# Patient Record
Sex: FEMALE | Race: BLACK OR AFRICAN AMERICAN | Employment: FULL TIME | URBAN - METROPOLITAN AREA
[De-identification: names, ages, dates, MRNs, and addresses within clinical notes are randomized per-mention and may not be internally consistent; named-entity substitution may affect disease eponyms.]

---

## 2017-02-25 ENCOUNTER — HOSPITAL ENCOUNTER (EMERGENCY)
Age: 42
Discharge: HOME | End: 2017-02-25
Attending: EMERGENCY MEDICINE | Admitting: EMERGENCY MEDICINE
Payer: MEDICAID

## 2017-02-25 ENCOUNTER — APPOINTMENT (OUTPATIENT)
Dept: CT IMAGING | Age: 42
End: 2017-02-25
Attending: PHYSICIAN ASSISTANT
Payer: MEDICAID

## 2017-02-25 VITALS
HEIGHT: 66 IN | SYSTOLIC BLOOD PRESSURE: 167 MMHG | OXYGEN SATURATION: 91 % | DIASTOLIC BLOOD PRESSURE: 102 MMHG | BODY MASS INDEX: 29.41 KG/M2 | HEART RATE: 76 BPM | TEMPERATURE: 97.5 F | RESPIRATION RATE: 20 BRPM | WEIGHT: 183 LBS

## 2017-02-25 DIAGNOSIS — N39.0 URINARY TRACT INFECTION WITHOUT HEMATURIA, SITE UNSPECIFIED: ICD-10-CM

## 2017-02-25 DIAGNOSIS — R11.2 NAUSEA AND VOMITING, INTRACTABILITY OF VOMITING NOT SPECIFIED, UNSPECIFIED VOMITING TYPE: ICD-10-CM

## 2017-02-25 DIAGNOSIS — K85.90 ACUTE PANCREATITIS, UNSPECIFIED COMPLICATION STATUS, UNSPECIFIED PANCREATITIS TYPE: Primary | ICD-10-CM

## 2017-02-25 DIAGNOSIS — R19.7 DIARRHEA, UNSPECIFIED TYPE: ICD-10-CM

## 2017-02-25 LAB
ALBUMIN SERPL BCP-MCNC: 2.9 G/DL (ref 3.4–5)
ALBUMIN/GLOB SERPL: 0.6 {RATIO} (ref 0.8–1.7)
ALP SERPL-CCNC: 99 U/L (ref 45–117)
ALT SERPL-CCNC: 85 U/L (ref 13–56)
ANION GAP BLD CALC-SCNC: 9 MMOL/L (ref 3–18)
APPEARANCE UR: ABNORMAL
AST SERPL W P-5'-P-CCNC: 225 U/L (ref 15–37)
BACTERIA URNS QL MICRO: ABNORMAL /HPF
BASOPHILS # BLD AUTO: 0 K/UL (ref 0–0.06)
BASOPHILS # BLD: 1 % (ref 0–2)
BILIRUB SERPL-MCNC: 0.3 MG/DL (ref 0.2–1)
BILIRUB UR QL: NEGATIVE
BUN SERPL-MCNC: 5 MG/DL (ref 7–18)
BUN/CREAT SERPL: 6 (ref 12–20)
CALCIUM SERPL-MCNC: 7.9 MG/DL (ref 8.5–10.1)
CHLORIDE SERPL-SCNC: 105 MMOL/L (ref 100–108)
CO2 SERPL-SCNC: 26 MMOL/L (ref 21–32)
COLOR UR: YELLOW
CREAT SERPL-MCNC: 0.79 MG/DL (ref 0.6–1.3)
DIFFERENTIAL METHOD BLD: ABNORMAL
EOSINOPHIL # BLD: 0.1 K/UL (ref 0–0.4)
EOSINOPHIL NFR BLD: 2 % (ref 0–5)
EPITH CASTS URNS QL MICRO: ABNORMAL /LPF (ref 0–5)
ERYTHROCYTE [DISTWIDTH] IN BLOOD BY AUTOMATED COUNT: 20.9 % (ref 11.6–14.5)
GLOBULIN SER CALC-MCNC: 4.9 G/DL (ref 2–4)
GLUCOSE SERPL-MCNC: 110 MG/DL (ref 74–99)
GLUCOSE UR STRIP.AUTO-MCNC: NEGATIVE MG/DL
HCT VFR BLD AUTO: 37.6 % (ref 35–45)
HGB BLD-MCNC: 12.4 G/DL (ref 12–16)
HGB UR QL STRIP: ABNORMAL
KETONES UR QL STRIP.AUTO: ABNORMAL MG/DL
LEUKOCYTE ESTERASE UR QL STRIP.AUTO: ABNORMAL
LIPASE SERPL-CCNC: 978 U/L (ref 73–393)
LYMPHOCYTES # BLD AUTO: 35 % (ref 21–52)
LYMPHOCYTES # BLD: 1.5 K/UL (ref 0.9–3.6)
MCH RBC QN AUTO: 30.3 PG (ref 24–34)
MCHC RBC AUTO-ENTMCNC: 33 G/DL (ref 31–37)
MCV RBC AUTO: 91.9 FL (ref 74–97)
MONOCYTES # BLD: 0.4 K/UL (ref 0.05–1.2)
MONOCYTES NFR BLD AUTO: 10 % (ref 3–10)
NEUTS SEG # BLD: 2.3 K/UL (ref 1.8–8)
NEUTS SEG NFR BLD AUTO: 52 % (ref 40–73)
NITRITE UR QL STRIP.AUTO: POSITIVE
PH UR STRIP: 5 [PH] (ref 5–8)
PLATELET # BLD AUTO: 284 K/UL (ref 135–420)
PMV BLD AUTO: 8.8 FL (ref 9.2–11.8)
POTASSIUM SERPL-SCNC: 3.8 MMOL/L (ref 3.5–5.5)
PROT SERPL-MCNC: 7.8 G/DL (ref 6.4–8.2)
PROT UR STRIP-MCNC: NEGATIVE MG/DL
RBC # BLD AUTO: 4.09 M/UL (ref 4.2–5.3)
RBC #/AREA URNS HPF: ABNORMAL /HPF (ref 0–5)
SODIUM SERPL-SCNC: 140 MMOL/L (ref 136–145)
SP GR UR REFRACTOMETRY: 1.02 (ref 1–1.03)
UROBILINOGEN UR QL STRIP.AUTO: 1 EU/DL (ref 0.2–1)
WBC # BLD AUTO: 4.4 K/UL (ref 4.6–13.2)
WBC URNS QL MICRO: ABNORMAL /HPF (ref 0–5)

## 2017-02-25 PROCEDURE — 85025 COMPLETE CBC W/AUTO DIFF WBC: CPT | Performed by: EMERGENCY MEDICINE

## 2017-02-25 PROCEDURE — 87045 FECES CULTURE AEROBIC BACT: CPT | Performed by: EMERGENCY MEDICINE

## 2017-02-25 PROCEDURE — 93005 ELECTROCARDIOGRAM TRACING: CPT

## 2017-02-25 PROCEDURE — 83690 ASSAY OF LIPASE: CPT

## 2017-02-25 PROCEDURE — 96374 THER/PROPH/DIAG INJ IV PUSH: CPT

## 2017-02-25 PROCEDURE — 81001 URINALYSIS AUTO W/SCOPE: CPT | Performed by: EMERGENCY MEDICINE

## 2017-02-25 PROCEDURE — 74011250636 HC RX REV CODE- 250/636: Performed by: PHYSICIAN ASSISTANT

## 2017-02-25 PROCEDURE — 74011250637 HC RX REV CODE- 250/637: Performed by: PHYSICIAN ASSISTANT

## 2017-02-25 PROCEDURE — 80053 COMPREHEN METABOLIC PANEL: CPT | Performed by: EMERGENCY MEDICINE

## 2017-02-25 PROCEDURE — 74176 CT ABD & PELVIS W/O CONTRAST: CPT

## 2017-02-25 PROCEDURE — 99285 EMERGENCY DEPT VISIT HI MDM: CPT

## 2017-02-25 PROCEDURE — 96361 HYDRATE IV INFUSION ADD-ON: CPT

## 2017-02-25 RX ORDER — CEPHALEXIN 250 MG/1
500 CAPSULE ORAL
Status: COMPLETED | OUTPATIENT
Start: 2017-02-25 | End: 2017-02-25

## 2017-02-25 RX ORDER — ONDANSETRON 4 MG/1
4 TABLET, ORALLY DISINTEGRATING ORAL
Qty: 30 TAB | Refills: 0 | Status: SHIPPED | OUTPATIENT
Start: 2017-02-25 | End: 2017-03-14

## 2017-02-25 RX ORDER — CEPHALEXIN 500 MG/1
500 CAPSULE ORAL 2 TIMES DAILY
Qty: 14 CAP | Refills: 0 | Status: SHIPPED | OUTPATIENT
Start: 2017-02-25 | End: 2017-03-04

## 2017-02-25 RX ORDER — ONDANSETRON 2 MG/ML
4 INJECTION INTRAMUSCULAR; INTRAVENOUS
Status: COMPLETED | OUTPATIENT
Start: 2017-02-25 | End: 2017-02-25

## 2017-02-25 RX ADMIN — SODIUM CHLORIDE 1000 ML: 900 INJECTION, SOLUTION INTRAVENOUS at 13:41

## 2017-02-25 RX ADMIN — SODIUM CHLORIDE 1000 ML: 900 INJECTION, SOLUTION INTRAVENOUS at 14:30

## 2017-02-25 RX ADMIN — CEPHALEXIN 500 MG: 250 CAPSULE ORAL at 13:41

## 2017-02-25 RX ADMIN — ONDANSETRON 4 MG: 2 INJECTION INTRAMUSCULAR; INTRAVENOUS at 10:24

## 2017-02-25 RX ADMIN — SODIUM CHLORIDE 1000 ML: 900 INJECTION, SOLUTION INTRAVENOUS at 11:59

## 2017-02-25 NOTE — DISCHARGE INSTRUCTIONS
Urinary Tract Infection in Women: Care Instructions  Your Care Instructions    A urinary tract infection, or UTI, is a general term for an infection anywhere between the kidneys and the urethra (where urine comes out). Most UTIs are bladder infections. They often cause pain or burning when you urinate. UTIs are caused by bacteria and can be cured with antibiotics. Be sure to complete your treatment so that the infection goes away. Follow-up care is a key part of your treatment and safety. Be sure to make and go to all appointments, and call your doctor if you are having problems. It's also a good idea to know your test results and keep a list of the medicines you take. How can you care for yourself at home? · Take your antibiotics as directed. Do not stop taking them just because you feel better. You need to take the full course of antibiotics. · Drink extra water and other fluids for the next day or two. This may help wash out the bacteria that are causing the infection. (If you have kidney, heart, or liver disease and have to limit fluids, talk with your doctor before you increase your fluid intake.)  · Avoid drinks that are carbonated or have caffeine. They can irritate the bladder. · Urinate often. Try to empty your bladder each time. · To relieve pain, take a hot bath or lay a heating pad set on low over your lower belly or genital area. Never go to sleep with a heating pad in place. To prevent UTIs  · Drink plenty of water each day. This helps you urinate often, which clears bacteria from your system. (If you have kidney, heart, or liver disease and have to limit fluids, talk with your doctor before you increase your fluid intake.)  · Consider adding cranberry juice to your diet. · Urinate when you need to. · Urinate right after you have sex. · Change sanitary pads often. · Avoid douches, bubble baths, feminine hygiene sprays, and other feminine hygiene products that have deodorants.   · After going to the bathroom, wipe from front to back. When should you call for help? Call your doctor now or seek immediate medical care if:  · Symptoms such as fever, chills, nausea, or vomiting get worse or appear for the first time. · You have new pain in your back just below your rib cage. This is called flank pain. · There is new blood or pus in your urine. · You have any problems with your antibiotic medicine. Watch closely for changes in your health, and be sure to contact your doctor if:  · You are not getting better after taking an antibiotic for 2 days. · Your symptoms go away but then come back. Where can you learn more? Go to http://sandra-abel.info/. Enter S194 in the search box to learn more about \"Urinary Tract Infection in Women: Care Instructions. \"  Current as of: August 12, 2016  Content Version: 11.1  © 5883-3958 [employer]. Care instructions adapted under license by [employer] (which disclaims liability or warranty for this information). If you have questions about a medical condition or this instruction, always ask your healthcare professional. [place] [address] any warranty or liability for your use of this information. Pancreatitis: Care Instructions  Your Care Instructions    The pancreas is an organ behind the stomach. It makes hormones and enzymes to help your body digest food. But if these enzymes attack the pancreas, it can get inflamed. This is called pancreatitis. Most cases are caused by gallstones or by heavy alcohol use. If you take care of yourself at home, it will help you get better. It will also help you avoid more problems with your pancreas. Follow-up care is a key part of your treatment and safety. Be sure to make and go to all appointments, and call your doctor if you are having problems. It's also a good idea to know your test results and keep a list of the medicines you take.   How can you care for yourself at home? · Drink clear liquids and eat bland foods until you feel better. [place] foods include rice, dry toast, and crackers. They also include bananas and applesauce. · Eat a low-fat diet until your doctor says your pancreas is healed. · Do not drink alcohol. Tell your doctor if you need help to quit. Counseling, support groups, and sometimes medicines can help you stay sober. · Be safe with medicines. Read and follow all instructions on the label. ¨ If the doctor gave you a prescription medicine for pain, take it as prescribed. ¨ If you are not taking a prescription pain medicine, ask your doctor if you can take an over-the-counter medicine. · If your doctor prescribed antibiotics, take them as directed. Do not stop taking them just because you feel better. You need to take the full course of antibiotics. · Get extra rest until you feel better. To prevent future problems with your pancreas  · Do not drink alcohol. · Tell your doctors and pharmacist that you've had pancreatitis. They can help you avoid medicines that may cause this problem again. When should you call for help? Call your doctor now or seek immediate medical care if:  · You have new or severe belly pain. · You have a new or higher fever. · You can't keep fluid or medicines down. Watch closely for changes in your health, and be sure to contact your doctor if:  · The symptoms you had when you first started feeling sick come back. · You do not get better as expected. · You need help to stop drinking alcohol. Where can you learn more? Go to http://sandra-abel.info/. Enter G728 in the search box to learn more about \"Pancreatitis: Care Instructions. \"  Current as of: August 9, 2016  Content Version: 11.1  © 8762-5035 [employer]. Care instructions adapted under license by [employer] (which disclaims liability or warranty for this information).  If you have questions about a medical condition or this instruction, always ask your healthcare professional. [place] [address] any warranty or liability for your use of this information.

## 2017-02-25 NOTE — ED NOTES
I have reviewed discharge instructions with the patient and spouse. The patient and spouse verbalized understanding.   Patient armband removed and shredded, scripts x 2 given

## 2017-02-25 NOTE — ED NOTES
Resting quietly on stretcher, up to bathroom (x1 stool ) without difficulty or unsteady gait, and now back to stretcher. Updated on status, explanation of wait and plan of care. Patient states she is feeling slightly better with decreased nausea. No emesis.

## 2017-02-25 NOTE — ED PROVIDER NOTES
[address]  EMERGENCY DEPARTMENT HISTORY AND PHYSICAL EXAM       Date: 2/25/2017   Patient Name: Justice Wilson   YOB: 1975  Medical Record Number: [id number]    History of Presenting Illness     Chief Complaint   Patient presents with    Diarrhea    Vomiting        History Provided By:  patient    Additional History:   9:49 AM  Justice Wilson is a 46 y.o. female who presents to the emergency department C/O intermittent n/v/d (at least 1 episode each day; 6-7 episodes yesterday), onset 1 month ago. Not associated with eating. Stools are watery, loose, and \"feels like its burning like acid\". Associated sxs include fatigue, abd pain when vomiting, and back pain \"when I eat\". Pt has a hx of endometrial CA and states her sxs remind her of the CA. Pt states she fell 2 days ago, denies tripping. Pt is unsure if she passed out. Pt states she fell again the same evening as she was going down the steps. PSHX gastric bypass, cholecystecomy. Endorses use of EtOH and tobacco. Pt denies abx use in the last 2 months, dysuria, and any other sxs or complaints. Primary Care Provider: Kari Martinez MD   Specialist:    Past History     Past Medical History:   Past Medical History:   Diagnosis Date    Anemia         Past Surgical History:   Past Surgical History:   Procedure Laterality Date    HX CHOLECYSTECTOMY      HX GASTRIC BYPASS          Family History:   History reviewed. No pertinent family history. Social History:   Social History   Substance Use Topics    Smoking status: None    Smokeless tobacco: None    Alcohol use None        Allergies:   No Known Allergies     Review of Systems   Review of Systems   Constitutional: Positive for fatigue. Gastrointestinal: Positive for abdominal pain (when vomiting), diarrhea, nausea and vomiting. Genitourinary: Negative for dysuria. Musculoskeletal: Positive for back pain (\"when I eat\").    All other systems reviewed and are negative. Physical Exam  Vitals:    02/25/17 1100 02/25/17 1159 02/25/17 1200 02/25/17 1446   BP: (!) 143/96 (!) 153/102 (!) 153/102 (!) 167/102   Pulse: 75 74 78 76   Resp: 17 20 15 20   Temp:       SpO2: 98% 98% 98% 91%   Weight:       Height:           Physical Exam   Constitutional: She is oriented to person, place, and time. She appears well-developed and well-nourished. No distress. HENT:   Head: Normocephalic and atraumatic. Eyes: Conjunctivae are normal.   Neck: Normal range of motion. Neck supple. Cardiovascular: Normal rate and regular rhythm. Pulmonary/Chest: Effort normal and breath sounds normal.   Abdominal: Soft. Bowel sounds are normal. She exhibits no distension and no mass. There is tenderness. There is no rebound and no guarding. RUQ +ttp   Musculoskeletal: Normal range of motion. Neurological: She is alert and oriented to person, place, and time. Skin: Skin is warm and dry. She is not diaphoretic. Psychiatric: She has a normal mood and affect. Nursing note and vitals reviewed.       Diagnostic Study Results     Labs -      Recent Results (from the past 12 hour(s))   URINALYSIS W/ RFLX MICROSCOPIC    Collection Time: 02/25/17  9:30 AM   Result Value Ref Range    Color YELLOW      Appearance CLOUDY      Specific gravity 1.022 1.005 - 1.030      pH (UA) 5.0 5.0 - 8.0      Protein NEGATIVE  NEG mg/dL    Glucose NEGATIVE  NEG mg/dL    Ketone TRACE (A) NEG mg/dL    Bilirubin NEGATIVE  NEG      Blood TRACE (A) NEG      Urobilinogen 1.0 0.2 - 1.0 EU/dL    Nitrites POSITIVE (A) NEG      Leukocyte Esterase MODERATE (A) NEG     URINE MICROSCOPIC ONLY    Collection Time: 02/25/17  9:30 AM   Result Value Ref Range    WBC 11 to 20 0 - 5 /hpf    RBC 0 to 3 0 - 5 /hpf    Epithelial cells 1+ 0 - 5 /lpf    Bacteria 2+ (A) NEG /hpf   EKG, 12 LEAD, INITIAL    Collection Time: 02/25/17  9:46 AM   Result Value Ref Range    Ventricular Rate 88 BPM    Atrial Rate 88 BPM    P-R Interval 138 ms QRS Duration 80 ms    Q-T Interval 364 ms    QTC Calculation (Bezet) 440 ms    Calculated P Axis 67 degrees    Calculated R Axis 31 degrees    Calculated T Axis 53 degrees    Diagnosis       Normal sinus rhythm  Normal ECG  No previous ECGs available     CBC WITH AUTOMATED DIFF    Collection Time: 02/25/17  9:50 AM   Result Value Ref Range    WBC 4.4 (L) 4.6 - 13.2 K/uL    RBC 4.09 (L) 4.20 - 5.30 M/uL    HGB 12.4 12.0 - 16.0 g/dL    HCT 37.6 35.0 - 45.0 %    MCV 91.9 74.0 - 97.0 FL    MCH 30.3 24.0 - 34.0 PG    MCHC 33.0 31.0 - 37.0 g/dL    RDW 20.9 (H) 11.6 - 14.5 %    PLATELET 642 294 - 625 K/uL    MPV 8.8 (L) 9.2 - 11.8 FL    NEUTROPHILS 52 40 - 73 %    LYMPHOCYTES 35 21 - 52 %    MONOCYTES 10 3 - 10 %    EOSINOPHILS 2 0 - 5 %    BASOPHILS 1 0 - 2 %    ABS. NEUTROPHILS 2.3 1.8 - 8.0 K/UL    ABS. LYMPHOCYTES 1.5 0.9 - 3.6 K/UL    ABS. MONOCYTES 0.4 0.05 - 1.2 K/UL    ABS. EOSINOPHILS 0.1 0.0 - 0.4 K/UL    ABS. BASOPHILS 0.0 0.0 - 0.06 K/UL    DF AUTOMATED     METABOLIC PANEL, COMPREHENSIVE    Collection Time: 02/25/17  9:50 AM   Result Value Ref Range    Sodium 140 136 - 145 mmol/L    Potassium 3.8 3.5 - 5.5 mmol/L    Chloride 105 100 - 108 mmol/L    CO2 26 21 - 32 mmol/L    Anion gap 9 3.0 - 18 mmol/L    Glucose 110 (H) 74 - 99 mg/dL    BUN 5 (L) 7.0 - 18 MG/DL    Creatinine 0.79 0.6 - 1.3 MG/DL    BUN/Creatinine ratio 6 (L) 12 - 20      GFR est AA >60 >60 ml/min/1.73m2    GFR est non-AA >60 >60 ml/min/1.73m2    Calcium 7.9 (L) 8.5 - 10.1 MG/DL    Bilirubin, total 0.3 0.2 - 1.0 MG/DL    ALT (SGPT) 85 (H) 13 - 56 U/L    AST (SGOT) 225 (H) 15 - 37 U/L    Alk. phosphatase 99 45 - 117 U/L    Protein, total 7.8 6.4 - 8.2 g/dL    Albumin 2.9 (L) 3.4 - 5.0 g/dL    Globulin 4.9 (H) 2.0 - 4.0 g/dL    A-G Ratio 0.6 (L) 0.8 - 1.7     LIPASE    Collection Time: 02/25/17  9:50 AM   Result Value Ref Range    Lipase 978 (H) 73 - 393 U/L       Radiologic Studies -  CT ABD PELV WO CONT   Final Result   1.  Minimal fat stranding in the region of the tail the pancreas. This is  nonspecific, may pertain to mild focal pancreatitis. No organized peripancreatic  fluid collection. Correlation with amylase and lipase may be helpful. 2. Postsurgical changes of gastric bypass procedure. No evidence of bowel  obstruction. No free intraperitoneal gas. 2. Cholecystectomy. No intrahepatic or extrahepatic biliary ductal dilatation. As read by the radiologist.        Medical Decision Making   I am the first provider for this patient. I reviewed the vital signs, available nursing notes, past medical history, past surgical history, family history and social history. Vital Signs-Reviewed the patient's vital signs. Patient Vitals for the past 12 hrs:   Temp Pulse Resp BP SpO2   02/25/17 1446 - 76 20 (!) 167/102 91 %   02/25/17 1200 - 78 15 (!) 153/102 98 %   02/25/17 1159 - 74 20 (!) 153/102 98 %   02/25/17 1100 - 75 17 (!) 143/96 98 %   02/25/17 0920 97.5 °F (36.4 °C) 98 16 126/83 98 %       Pulse Oximetry Analysis - Normal 98% on RA. No intervention needed. Cardiac Monitor:   Rate: 78 bpm  Rhythm: Normal Sinus Rhythm     EKG interpretation: (Preliminary)  9:46 AM  88 bpm, NSR, normal ECG. No STEMI. EKG read by Ajit Mcgee PA-C     Provider Notes:   DDX: UTI, renal stones, pancreatitis     ED Course:   11:41 AM Pt is resting comfortably, no n/v. Offered pt pain medicine, but she decline. I discussed lab and CT results with the patient and expressed my concerns for possible pancreatitis. Pt states she has had pancreatitis in the past and that her father had chronic pancreatitis. Will order lipase. 12:25 PM Discussed lipase results with pt. Pt has pancreatitis and a UTI. Discussed possible admission for observation. Pt states she will think about admission offer, but states she thinks she should probably stay. Will PO challenge with oral abx.     1:29 PM PO challenge given. 2:11 PM Pt is comfortable, no n/v.  Feeling better with hydration. Pt has kept down the Keflex so far. Will observe for another 30 minutes and plan for discharge. Medications Given in the ED:  Medications   ondansetron (ZOFRAN) injection 4 mg (4 mg IntraVENous Given 2/25/17 1024)   sodium chloride 0.9 % bolus infusion 1,000 mL (0 mL IntraVENous IV Completed 2/25/17 1341)   sodium chloride 0.9 % bolus infusion 1,000 mL (0 mL IntraVENous IV Completed 2/25/17 1430)   cephALEXin (KEFLEX) capsule 500 mg (500 mg Oral Given 2/25/17 1341)   sodium chloride 0.9 % bolus infusion 1,000 mL (0 mL IntraVENous IV Completed 2/25/17 1539)       Discharge Note:  2:24 PM  Pt has been reexamined. Patient has no new complaints, changes, or physical findings. Care plan outlined and precautions discussed. Results were reviewed with the patient. All medications were reviewed with the patient; will d/c home with Keflex and Zofran. All of pt's questions and concerns were addressed. Patient was instructed and agrees to follow up with her PCP, as well as to return to the ED upon further deterioration. Patient is ready to go home. Diagnosis   Clinical Impression:   1. Acute pancreatitis, unspecified complication status, unspecified pancreatitis type    2. Urinary tract infection without hematuria, site unspecified    3. Nausea and vomiting, intractability of vomiting not specified, unspecified vomiting type    4. Diarrhea, unspecified type         Discussion:  Pt presented with >1 month of vomiting and diarrhea. She felt she had the stomach flu at the time. She's elliott vomiting 6+ times per day. CT and labs correlated to mild pancreatitis and mild UTI. Option for discharge vs. Inpatient admission were discussed with the patient at great length. The pt and her  that if she could tolerate PO challenge s/p Keflex administration they would prefer to take her home. Pt was given 3L of IV fluids, Zofran which relieved her nausea, and reported overall improvement with IV fluids.  Stool cultures are pending. Pt is otherwise stable and will return to ED for worsening pain, intractable vomiting, fever, chills, or any other sxs that may seem alarming. Follow-up Information     Follow up With Details Comments Contact Info    Your PCP Schedule an appointment as soon as possible for a visit in 2 days      [facility] EMERGENCY DEPT  As needed, If symptoms worsen (increased pain, fever, nausea, vomiting) [address] [facility] [place]  [phone]          Discharge Medication List as of 2/25/2017  2:24 PM      START taking these medications    Details   cephALEXin (KEFLEX) 500 mg capsule Take 1 Cap by mouth two (2) times a day for 7 days. , Print, Disp-14 Cap, R-0      ondansetron (ZOFRAN ODT) 4 mg disintegrating tablet Take 1 Tab by mouth every eight (8) hours as needed for Nausea. , Print, Disp-30 Tab, R-0             _______________________________   Attestations:     SCRIBE ATTESTATION:  This note is prepared by Mariann Jose, acting as Scribe for Elis Perez PA-C.    PROVIDER ATTESTATION:  Elis Perez PA-C: The scribe's documentation has been prepared under my direction and personally reviewed by me in its entirety.  I confirm that the note above accurately reflects all work, treatment, procedures, and medical decision making performed by me.   _______________________________

## 2017-02-25 NOTE — ED TRIAGE NOTES
Pt states vomiting and diarrhea off and on for 1 month; Pt states having loose stools, not watery; Pt denies antibiotics in last 2 months;  Pt states keeps water down, but sometimes when she eats she vomits;   Pt states she feels weak, and fell 2 days ago, not sure if she passed out

## 2017-02-25 NOTE — ED NOTES
Lung and cardiac auscultation deferred, patient continually speaking with visitor during assessment.

## 2017-02-27 LAB
BACTERIA SPEC CULT: NORMAL
SERVICE CMNT-IMP: NORMAL

## 2017-03-05 LAB
ATRIAL RATE: 88 BPM
CALCULATED P AXIS, ECG09: 67 DEGREES
CALCULATED R AXIS, ECG10: 31 DEGREES
CALCULATED T AXIS, ECG11: 53 DEGREES
DIAGNOSIS, 93000: NORMAL
P-R INTERVAL, ECG05: 138 MS
Q-T INTERVAL, ECG07: 364 MS
QRS DURATION, ECG06: 80 MS
QTC CALCULATION (BEZET), ECG08: 440 MS
VENTRICULAR RATE, ECG03: 88 BPM

## 2017-03-14 ENCOUNTER — APPOINTMENT (OUTPATIENT)
Dept: GENERAL RADIOLOGY | Age: 42
End: 2017-03-14
Attending: EMERGENCY MEDICINE
Payer: MEDICAID

## 2017-03-14 ENCOUNTER — HOSPITAL ENCOUNTER (EMERGENCY)
Age: 42
Discharge: HOME | End: 2017-03-14
Attending: EMERGENCY MEDICINE | Admitting: EMERGENCY MEDICINE
Payer: MEDICAID

## 2017-03-14 VITALS
BODY MASS INDEX: 29.66 KG/M2 | TEMPERATURE: 99.2 F | HEIGHT: 65 IN | HEART RATE: 83 BPM | WEIGHT: 178 LBS | OXYGEN SATURATION: 100 % | DIASTOLIC BLOOD PRESSURE: 100 MMHG | SYSTOLIC BLOOD PRESSURE: 156 MMHG | RESPIRATION RATE: 18 BRPM

## 2017-03-14 DIAGNOSIS — K85.90 ACUTE PANCREATITIS, UNSPECIFIED COMPLICATION STATUS, UNSPECIFIED PANCREATITIS TYPE: Primary | ICD-10-CM

## 2017-03-14 LAB
ALBUMIN SERPL BCP-MCNC: 3.1 G/DL (ref 3.4–5)
ALBUMIN/GLOB SERPL: 0.6 {RATIO} (ref 0.8–1.7)
ALP SERPL-CCNC: 164 U/L (ref 45–117)
ALT SERPL-CCNC: 109 U/L (ref 13–56)
AMPHET UR QL SCN: NEGATIVE
ANION GAP BLD CALC-SCNC: 10 MMOL/L (ref 3–18)
APPEARANCE UR: CLEAR
AST SERPL W P-5'-P-CCNC: 260 U/L (ref 15–37)
BACTERIA URNS QL MICRO: ABNORMAL /HPF
BARBITURATES UR QL SCN: NEGATIVE
BASOPHILS # BLD AUTO: 0 K/UL (ref 0–0.06)
BASOPHILS # BLD: 0 % (ref 0–2)
BENZODIAZ UR QL: NEGATIVE
BILIRUB SERPL-MCNC: 1 MG/DL (ref 0.2–1)
BILIRUB UR QL: NEGATIVE
BUN SERPL-MCNC: 5 MG/DL (ref 7–18)
BUN/CREAT SERPL: 6 (ref 12–20)
CALCIUM SERPL-MCNC: 8.3 MG/DL (ref 8.5–10.1)
CANNABINOIDS UR QL SCN: NEGATIVE
CHLORIDE SERPL-SCNC: 100 MMOL/L (ref 100–108)
CK MB CFR SERPL CALC: NORMAL % (ref 0–4)
CK MB SERPL-MCNC: <1 NG/ML (ref 5–25)
CK SERPL-CCNC: 103 U/L (ref 26–192)
CO2 SERPL-SCNC: 25 MMOL/L (ref 21–32)
COCAINE UR QL SCN: NEGATIVE
COLOR UR: YELLOW
CREAT SERPL-MCNC: 0.82 MG/DL (ref 0.6–1.3)
DIFFERENTIAL METHOD BLD: ABNORMAL
EOSINOPHIL # BLD: 0.1 K/UL (ref 0–0.4)
EOSINOPHIL NFR BLD: 1 % (ref 0–5)
EPITH CASTS URNS QL MICRO: ABNORMAL /LPF (ref 0–5)
ERYTHROCYTE [DISTWIDTH] IN BLOOD BY AUTOMATED COUNT: 20.6 % (ref 11.6–14.5)
GLOBULIN SER CALC-MCNC: 5 G/DL (ref 2–4)
GLUCOSE SERPL-MCNC: 99 MG/DL (ref 74–99)
GLUCOSE UR STRIP.AUTO-MCNC: NEGATIVE MG/DL
HCT VFR BLD AUTO: 39.3 % (ref 35–45)
HDSCOM,HDSCOM: NORMAL
HGB BLD-MCNC: 13 G/DL (ref 12–16)
HGB UR QL STRIP: ABNORMAL
KETONES UR QL STRIP.AUTO: NEGATIVE MG/DL
LEUKOCYTE ESTERASE UR QL STRIP.AUTO: ABNORMAL
LIPASE SERPL-CCNC: 696 U/L (ref 73–393)
LYMPHOCYTES # BLD AUTO: 21 % (ref 21–52)
LYMPHOCYTES # BLD: 1.5 K/UL (ref 0.9–3.6)
MCH RBC QN AUTO: 31.1 PG (ref 24–34)
MCHC RBC AUTO-ENTMCNC: 33.1 G/DL (ref 31–37)
MCV RBC AUTO: 94 FL (ref 74–97)
METHADONE UR QL: NEGATIVE
MONOCYTES # BLD: 0.5 K/UL (ref 0.05–1.2)
MONOCYTES NFR BLD AUTO: 8 % (ref 3–10)
NEUTS SEG # BLD: 4.9 K/UL (ref 1.8–8)
NEUTS SEG NFR BLD AUTO: 70 % (ref 40–73)
NITRITE UR QL STRIP.AUTO: NEGATIVE
OPIATES UR QL: NEGATIVE
PCP UR QL: NEGATIVE
PH UR STRIP: 5 [PH] (ref 5–8)
PLATELET # BLD AUTO: 309 K/UL (ref 135–420)
PMV BLD AUTO: 8.9 FL (ref 9.2–11.8)
POTASSIUM SERPL-SCNC: 4 MMOL/L (ref 3.5–5.5)
PROT SERPL-MCNC: 8.1 G/DL (ref 6.4–8.2)
PROT UR STRIP-MCNC: NEGATIVE MG/DL
RBC # BLD AUTO: 4.18 M/UL (ref 4.2–5.3)
RBC #/AREA URNS HPF: ABNORMAL /HPF (ref 0–5)
SODIUM SERPL-SCNC: 135 MMOL/L (ref 136–145)
SP GR UR REFRACTOMETRY: 1.01 (ref 1–1.03)
TROPONIN I SERPL-MCNC: <0.02 NG/ML (ref 0–0.06)
UROBILINOGEN UR QL STRIP.AUTO: 0.2 EU/DL (ref 0.2–1)
WBC # BLD AUTO: 7 K/UL (ref 4.6–13.2)
WBC URNS QL MICRO: ABNORMAL /HPF (ref 0–5)

## 2017-03-14 PROCEDURE — 81001 URINALYSIS AUTO W/SCOPE: CPT | Performed by: EMERGENCY MEDICINE

## 2017-03-14 PROCEDURE — 83690 ASSAY OF LIPASE: CPT | Performed by: EMERGENCY MEDICINE

## 2017-03-14 PROCEDURE — 85025 COMPLETE CBC W/AUTO DIFF WBC: CPT | Performed by: EMERGENCY MEDICINE

## 2017-03-14 PROCEDURE — 96374 THER/PROPH/DIAG INJ IV PUSH: CPT

## 2017-03-14 PROCEDURE — 74022 RADEX COMPL AQT ABD SERIES: CPT

## 2017-03-14 PROCEDURE — 96361 HYDRATE IV INFUSION ADD-ON: CPT

## 2017-03-14 PROCEDURE — 74011250637 HC RX REV CODE- 250/637: Performed by: EMERGENCY MEDICINE

## 2017-03-14 PROCEDURE — 80053 COMPREHEN METABOLIC PANEL: CPT | Performed by: EMERGENCY MEDICINE

## 2017-03-14 PROCEDURE — 80307 DRUG TEST PRSMV CHEM ANLYZR: CPT | Performed by: EMERGENCY MEDICINE

## 2017-03-14 PROCEDURE — 74011250636 HC RX REV CODE- 250/636: Performed by: EMERGENCY MEDICINE

## 2017-03-14 PROCEDURE — 82550 ASSAY OF CK (CPK): CPT | Performed by: EMERGENCY MEDICINE

## 2017-03-14 PROCEDURE — 99285 EMERGENCY DEPT VISIT HI MDM: CPT

## 2017-03-14 PROCEDURE — 96375 TX/PRO/DX INJ NEW DRUG ADDON: CPT

## 2017-03-14 RX ORDER — LOPERAMIDE HCL 2 MG
2 TABLET ORAL
COMMUNITY
End: 2018-04-12

## 2017-03-14 RX ORDER — SODIUM CHLORIDE 0.9 % (FLUSH) 0.9 %
5-10 SYRINGE (ML) INJECTION AS NEEDED
Status: DISCONTINUED | OUTPATIENT
Start: 2017-03-14 | End: 2017-03-14 | Stop reason: HOSPADM

## 2017-03-14 RX ORDER — HYDROMORPHONE HYDROCHLORIDE 1 MG/ML
1 INJECTION, SOLUTION INTRAMUSCULAR; INTRAVENOUS; SUBCUTANEOUS ONCE
Status: DISCONTINUED | OUTPATIENT
Start: 2017-03-14 | End: 2017-03-14 | Stop reason: HOSPADM

## 2017-03-14 RX ORDER — CLONIDINE HYDROCHLORIDE 0.1 MG/1
0.1 TABLET ORAL
Status: COMPLETED | OUTPATIENT
Start: 2017-03-14 | End: 2017-03-14

## 2017-03-14 RX ORDER — PROMETHAZINE HYDROCHLORIDE 25 MG/1
25 TABLET ORAL
COMMUNITY
End: 2018-04-12

## 2017-03-14 RX ORDER — ONDANSETRON 2 MG/ML
4 INJECTION INTRAMUSCULAR; INTRAVENOUS
Status: COMPLETED | OUTPATIENT
Start: 2017-03-14 | End: 2017-03-14

## 2017-03-14 RX ORDER — ONDANSETRON 4 MG/1
4 TABLET, ORALLY DISINTEGRATING ORAL
Qty: 6 TAB | Refills: 0 | Status: SHIPPED | OUTPATIENT
Start: 2017-03-14 | End: 2018-04-12

## 2017-03-14 RX ORDER — OXYCODONE AND ACETAMINOPHEN 5; 325 MG/1; MG/1
1 TABLET ORAL
Qty: 20 TAB | Refills: 0 | Status: SHIPPED | OUTPATIENT
Start: 2017-03-14 | End: 2018-04-12

## 2017-03-14 RX ORDER — RANITIDINE 150 MG/1
150 CAPSULE ORAL 2 TIMES DAILY
COMMUNITY
End: 2018-04-12

## 2017-03-14 RX ORDER — SODIUM CHLORIDE 0.9 % (FLUSH) 0.9 %
5-10 SYRINGE (ML) INJECTION EVERY 8 HOURS
Status: DISCONTINUED | OUTPATIENT
Start: 2017-03-14 | End: 2017-03-14 | Stop reason: HOSPADM

## 2017-03-14 RX ADMIN — CLONIDINE HYDROCHLORIDE 0.1 MG: 0.1 TABLET ORAL at 12:31

## 2017-03-14 RX ADMIN — ONDANSETRON 4 MG: 2 INJECTION INTRAMUSCULAR; INTRAVENOUS at 11:55

## 2017-03-14 RX ADMIN — SODIUM CHLORIDE 1000 ML: 900 INJECTION, SOLUTION INTRAVENOUS at 14:38

## 2017-03-14 RX ADMIN — SODIUM CHLORIDE 1000 ML: 900 INJECTION, SOLUTION INTRAVENOUS at 12:32

## 2017-03-14 NOTE — ED NOTES
Pt states, \"I'd rather just take the pain medications once I get home in the comfort of my own home because I still have to go and get them filled. \" MD aware.

## 2017-03-14 NOTE — ED TRIAGE NOTES
Pt reports RUQ pain N/V/D x 2 months. Pt reports \"I was here two weeks ago and diagnosed w/ pancreatitis. I had four bags of fluids and the doctor wanted to keep me at the hospital for observation but I didn't want to because I don't like hospitals. I should have listened to her. \" Pt was seen at [facility] on 3/6/17 for follow up w/ her PCP. Pt states \"that doctor thought it was liver. I just need a diagnosis. I have been out of work for two months. I can't sleep. I'm so tired. I need this figured out. \"    Sepsis Screening completed    (  )Patient meets SIRS criteria. ( X )Patient does not meet SIRS criteria.       SIRS Criteria is achieved when two or more of the following are present   Temperature < 96.8°F (36°C) or > 100.9°F (38.3°C)   Heart Rate > 90 beats per minute   Respiratory Rate > 20 breaths per minute   WBC count > 12,000 or <4,000 or > 10% bands

## 2017-03-14 NOTE — ED PROVIDER NOTES
HPI Comments:   11:07 AM   Tameka Gutierrez is a 46 y.o. Female with a hx of anemia and endometrial cancer presenting to the ED C/O persistent 7/10 RUQ abdominal pain and nausea/vomiting, daily x 2 months. Pt reports she was seen at this facility 2017 for complaints of same and was dx'd with pancreatitis; states she was advised at that time she should be admitted but she refused because \"I don't like hospitals and I have children at home. \" She then followed up 3/6/2017 with her PCP Dr. Joni Espinal [facility]) who told her that her LFTs were low; states next appointment with him is 3/20/2017. + decreased appetite/PO intake secondary to sxs. PSHx gastric bypass (), cholecystectomy, , and partial hysterectomy. She endorses alcohol use (~2-3 days/week). Pt denies any other Sx or complaints. Patient is a 46 y.o. female presenting with abdominal pain. The history is provided by the patient. No  was used. Abdominal Pain    This is a new problem. The current episode started more than 1 week ago. The problem has not changed since onset. The pain is located in the RUQ. The pain is at a severity of 7/10. Associated symptoms include nausea and vomiting. The patient's surgical history includes cholecystectomy, hysterectomy and gastric bypass. Past Medical History:   Diagnosis Date    Anemia     Cancer [facility])     Endometrial Cancer       Past Surgical History:   Procedure Laterality Date    HX CHOLECYSTECTOMY      HX GASTRIC BYPASS           History reviewed. No pertinent family history. Social History     Social History    Marital status: SINGLE     Spouse name: N/A    Number of children: N/A    Years of education: N/A     Occupational History    Not on file.      Social History Main Topics    Smoking status: Former Smoker     Quit date: 2017    Smokeless tobacco: Not on file    Alcohol use No    Drug use: Not on file    Sexual activity: Not on file     Other Topics Concern    Not on file     Social History Narrative         ALLERGIES: Review of patient's allergies indicates no known allergies. Review of Systems   Constitutional: Positive for appetite change (decreased, secondary to sxs). Gastrointestinal: Positive for abdominal pain (RUQ), nausea and vomiting. All other systems reviewed and are negative. Vitals:    03/14/17 1231 03/14/17 1300 03/14/17 1330 03/14/17 1345   BP: (!) 163/119 (!) 154/116 (!) 157/112 (!) 147/101   Pulse: 100 86 95 85   Resp:  16 18 14   Temp:       SpO2:  100% 100% 100%   Weight:       Height:                Physical Exam   Constitutional: She is oriented to person, place, and time. She appears well-developed and well-nourished. No distress. HENT:   Head: Normocephalic and atraumatic. Eyes: Pupils are equal, round, and reactive to light. Neck: Neck supple. Cardiovascular: Normal rate, regular rhythm, S1 normal, S2 normal and normal heart sounds. Pulmonary/Chest: Breath sounds normal. No respiratory distress. She has no wheezes. She has no rales. She exhibits no tenderness. Abdominal: Soft. She exhibits no distension and no mass. There is tenderness (mild) in the right upper quadrant. There is no guarding. Musculoskeletal: Normal range of motion. She exhibits no edema or tenderness. Neurological: She is alert and oriented to person, place, and time. No cranial nerve deficit. Skin: No rash noted. Psychiatric: She has a normal mood and affect. Her behavior is normal. Thought content normal.   Nursing note and vitals reviewed. RESULTS:    CARDIAC MONITOR NOTE:  12:41 PM    Cardiac Rhythm: NSR  Rate: 92 bpm  Intervention: None      PULSE OXIMETRY NOTE:  12:41 PM    Pulse-ox is 100% on room air  Interpretation: Normal  Intervention: None      XR ABD ACUTE W 1 V CHEST   Final Result:  1. No acute pulmonary process identified. 2. Nonobstructive bowel gas pattern.   As read by the radiologist.            Labs Reviewed   URINALYSIS W/ RFLX MICROSCOPIC - Abnormal; Notable for the following:        Result Value    Blood SMALL (*)     Leukocyte Esterase TRACE (*)     All other components within normal limits   URINE MICROSCOPIC ONLY - Abnormal; Notable for the following:     Bacteria 2+ (*)     All other components within normal limits   CBC WITH AUTOMATED DIFF - Abnormal; Notable for the following:     RBC 4.18 (*)     RDW 20.6 (*)     MPV 8.9 (*)     All other components within normal limits   METABOLIC PANEL, COMPREHENSIVE - Abnormal; Notable for the following:     Sodium 135 (*)     BUN 5 (*)     BUN/Creatinine ratio 6 (*)     Calcium 8.3 (*)     ALT (SGPT) 109 (*)     AST (SGOT) 260 (*)     Alk.  phosphatase 164 (*)     Albumin 3.1 (*)     Globulin 5.0 (*)     A-G Ratio 0.6 (*)     All other components within normal limits   LIPASE - Abnormal; Notable for the following:     Lipase 696 (*)     All other components within normal limits   CARDIAC PANEL,(CK, CKMB & TROPONIN)   DRUG SCREEN, URINE       Recent Results (from the past 12 hour(s))   URINALYSIS W/ RFLX MICROSCOPIC    Collection Time: 03/14/17 10:24 AM   Result Value Ref Range    Color YELLOW      Appearance CLEAR      Specific gravity 1.006 1.005 - 1.030      pH (UA) 5.0 5.0 - 8.0      Protein NEGATIVE  NEG mg/dL    Glucose NEGATIVE  NEG mg/dL    Ketone NEGATIVE  NEG mg/dL    Bilirubin NEGATIVE  NEG      Blood SMALL (A) NEG      Urobilinogen 0.2 0.2 - 1.0 EU/dL    Nitrites NEGATIVE  NEG      Leukocyte Esterase TRACE (A) NEG     URINE MICROSCOPIC ONLY    Collection Time: 03/14/17 10:24 AM   Result Value Ref Range    WBC 2 to 5 0 - 5 /hpf    RBC 0 to 2 0 - 5 /hpf    Epithelial cells FEW 0 - 5 /lpf    Bacteria 2+ (A) NEG /hpf   DRUG SCREEN, URINE    Collection Time: 03/14/17 10:24 AM   Result Value Ref Range    BENZODIAZEPINE NEGATIVE  NEG      BARBITURATES NEGATIVE  NEG      THC (TH-CANNABINOL) NEGATIVE  NEG      OPIATES NEGATIVE  NEG PCP(PHENCYCLIDINE) NEGATIVE  NEG      COCAINE NEGATIVE  NEG      AMPHETAMINE NEGATIVE  NEG      METHADONE NEGATIVE  NEG      HDSCOM (NOTE)    CARDIAC PANEL,(CK, CKMB & TROPONIN)    Collection Time: 03/14/17 11:23 AM   Result Value Ref Range     26 - 192 U/L    CK - MB <1.0 <3.6 ng/ml    CK-MB Index Cannot be calulated 0.0 - 4.0 %    Troponin-I, Qt. <0.02 0.00 - 0.06 NG/ML   CBC WITH AUTOMATED DIFF    Collection Time: 03/14/17 11:23 AM   Result Value Ref Range    WBC 7.0 4.6 - 13.2 K/uL    RBC 4.18 (L) 4.20 - 5.30 M/uL    HGB 13.0 12.0 - 16.0 g/dL    HCT 39.3 35.0 - 45.0 %    MCV 94.0 74.0 - 97.0 FL    MCH 31.1 24.0 - 34.0 PG    MCHC 33.1 31.0 - 37.0 g/dL    RDW 20.6 (H) 11.6 - 14.5 %    PLATELET 213 306 - 682 K/uL    MPV 8.9 (L) 9.2 - 11.8 FL    NEUTROPHILS 70 40 - 73 %    LYMPHOCYTES 21 21 - 52 %    MONOCYTES 8 3 - 10 %    EOSINOPHILS 1 0 - 5 %    BASOPHILS 0 0 - 2 %    ABS. NEUTROPHILS 4.9 1.8 - 8.0 K/UL    ABS. LYMPHOCYTES 1.5 0.9 - 3.6 K/UL    ABS. MONOCYTES 0.5 0.05 - 1.2 K/UL    ABS. EOSINOPHILS 0.1 0.0 - 0.4 K/UL    ABS. BASOPHILS 0.0 0.0 - 0.06 K/UL    DF AUTOMATED     METABOLIC PANEL, COMPREHENSIVE    Collection Time: 03/14/17 11:23 AM   Result Value Ref Range    Sodium 135 (L) 136 - 145 mmol/L    Potassium 4.0 3.5 - 5.5 mmol/L    Chloride 100 100 - 108 mmol/L    CO2 25 21 - 32 mmol/L    Anion gap 10 3.0 - 18 mmol/L    Glucose 99 74 - 99 mg/dL    BUN 5 (L) 7.0 - 18 MG/DL    Creatinine 0.82 0.6 - 1.3 MG/DL    BUN/Creatinine ratio 6 (L) 12 - 20      GFR est AA >60 >60 ml/min/1.73m2    GFR est non-AA >60 >60 ml/min/1.73m2    Calcium 8.3 (L) 8.5 - 10.1 MG/DL    Bilirubin, total 1.0 0.2 - 1.0 MG/DL    ALT (SGPT) 109 (H) 13 - 56 U/L    AST (SGOT) 260 (H) 15 - 37 U/L    Alk.  phosphatase 164 (H) 45 - 117 U/L    Protein, total 8.1 6.4 - 8.2 g/dL    Albumin 3.1 (L) 3.4 - 5.0 g/dL    Globulin 5.0 (H) 2.0 - 4.0 g/dL    A-G Ratio 0.6 (L) 0.8 - 1.7     LIPASE    Collection Time: 03/14/17 11:23 AM   Result Value Ref Range    Lipase 696 (H) 73 - 393 U/L        MDM  Number of Diagnoses or Management Options  Diagnosis management comments: INITIAL CLINICAL IMPRESSION and PLANS:  The patient presents with the primary complaint(s) of: RUQ abdominal pain and nausea/vomiting. The presentation, to include historical aspects and clinical findings are consistent with the DX of nausea/vomiting and pancreatitis. However, other possible DX's to consider as primary, associated with, or exacerbated by include:    Dehydration, exacerbation of pancreatitis, hepatitis, electrolyte abnormalities, diverticulitis, pyelonephritis. Considering the above, my initial management plan to evaluate and therapeutic interventions include the following and as noted in the orders:    1. Labs: UA, Urine Microscope, Cardiac Panel, CBC, CMP, Lipase, UDS  2. Imaging: Abdominal x-ray        Amount and/or Complexity of Data Reviewed  Clinical lab tests: reviewed and ordered  Tests in the radiology section of CPT®: ordered and reviewed (Abdominal x-ray)  Independent visualization of images, tracings, or specimens: yes (Abdominal x-ray)      MEDICATIONS GIVEN:  Medications   sodium chloride (NS) flush 5-10 mL (not administered)   sodium chloride (NS) flush 5-10 mL (not administered)   HYDROmorphone (PF) (DILAUDID) injection 1 mg (0 mg IntraVENous Held 3/14/17 1214)   ondansetron (ZOFRAN) injection 4 mg (4 mg IntraVENous Given 3/14/17 1155)   sodium chloride 0.9 % bolus infusion 1,000 mL (0 mL IntraVENous IV Completed 3/14/17 1332)   cloNIDine HCl (CATAPRES) tablet 0.1 mg (0.1 mg Oral Given 3/14/17 1231)        ED Course       Procedures    PROGRESS NOTE:   11:07 AM  Initial assessment performed. Written by Pablo Muñoz, ED Scribe, as dictated by Laura hSook MD.    DISCHARGE NOTE:  2:23 PM   Chandra Brambila  results have been reviewed with her. She has been counseled regarding her diagnosis, treatment, and plan.   She verbally conveys understanding and agreement of the signs, symptoms, diagnosis, treatment and prognosis and additionally agrees to follow up as discussed. She also agrees with the care-plan and conveys that all of her questions have been answered. I have also provided discharge instructions for her that include: educational information regarding their diagnosis and treatment, and list of reasons why they would want to return to the ED prior to their follow-up appointment, should her condition change. The patient and/or family have been provided with education for proper Emergency Department utilization. CLINICAL IMPRESSION:    1. Acute pancreatitis, unspecified complication status, unspecified pancreatitis type        AFTER VISIT PLAN:    Current Discharge Medication List      START taking these medications    Details   ondansetron (ZOFRAN ODT) 4 mg disintegrating tablet Take 1 Tab by mouth every eight (8) hours as needed for Nausea. Qty: 6 Tab, Refills: 0      oxyCODONE-acetaminophen (PERCOCET) 5-325 mg per tablet Take 1 Tab by mouth every four (4) hours as needed for Pain. Max Daily Amount: 6 Tabs. Qty: 20 Tab, Refills: 0              Follow-up Information     Follow up With Details Comments Contact Info      Follow up with your scheduled Primary Care appointment next week     THE [facility] EMERGENCY DEPT  As needed, If symptoms worsen [address] [id number]  [phone]           Attestations: This note is prepared by Paramjit Otto, acting as Scribe for Kadie Larose MD.    Kadie Larose MD: The scribe's documentation has been prepared under my direction and personally reviewed by me in its entirety. I confirm that the note above accurately reflects all work, treatment, procedures, and medical decision making performed by me.

## 2017-03-14 NOTE — DISCHARGE INSTRUCTIONS
Pancreatitis: Care Instructions  Your Care Instructions    The pancreas is an organ behind the stomach. It makes hormones and enzymes to help your body digest food. But if these enzymes attack the pancreas, it can get inflamed. This is called pancreatitis. Most cases are caused by gallstones or by heavy alcohol use. If you take care of yourself at home, it will help you get better. It will also help you avoid more problems with your pancreas. Follow-up care is a key part of your treatment and safety. Be sure to make and go to all appointments, and call your doctor if you are having problems. It's also a good idea to know your test results and keep a list of the medicines you take. How can you care for yourself at home? · Drink clear liquids and eat bland foods until you feel better. Stewart foods include rice, dry toast, and crackers. They also include bananas and applesauce. · Eat a low-fat diet until your doctor says your pancreas is healed. · Do not drink alcohol. Tell your doctor if you need help to quit. Counseling, support groups, and sometimes medicines can help you stay sober. · Be safe with medicines. Read and follow all instructions on the label. ¨ If the doctor gave you a prescription medicine for pain, take it as prescribed. ¨ If you are not taking a prescription pain medicine, ask your doctor if you can take an over-the-counter medicine. · If your doctor prescribed antibiotics, take them as directed. Do not stop taking them just because you feel better. You need to take the full course of antibiotics. · Get extra rest until you feel better. To prevent future problems with your pancreas  · Do not drink alcohol. · Tell your doctors and pharmacist that you've had pancreatitis. They can help you avoid medicines that may cause this problem again. When should you call for help? Call your doctor now or seek immediate medical care if:  · You have new or severe belly pain.   · You have a new or higher fever. · You can't keep fluid or medicines down. Watch closely for changes in your health, and be sure to contact your doctor if:  · The symptoms you had when you first started feeling sick come back. · You do not get better as expected. · You need help to stop drinking alcohol. Where can you learn more? Go to http://sandra-abel.info/. Enter A715 in the search box to learn more about \"Pancreatitis: Care Instructions. \"  Current as of: August 9, 2016  Content Version: 11.1  © 5380-4081 [employer]. Care instructions adapted under license by [employer] (which disclaims liability or warranty for this information). If you have questions about a medical condition or this instruction, always ask your healthcare professional. [address] any warranty or liability for your use of this information.

## 2017-03-14 NOTE — ED NOTES
Pt reports, \"my ride is on his way and I'm ready to go home. \" Consulted w/ MD regarding discharge plan. MD states pt can go home once fluids are complete.

## 2017-03-14 NOTE — ED NOTES
Pt resting in stretcher in NAD at this time. Pt given warm blankets and TV turned on for pt comfort. Side rails raised and call light within reach.

## 2017-03-14 NOTE — ED NOTES
Order for IV fluids clarified w/ MD and MD made aware of pt's continued HTN. Orders given for BP medication. MD to bedside to speak w/ pt. Pt aware of policy regarding narcotic administration and ride at bedside. Pt states she will notify this RN when ride has arrived. Pt also reports to this RN that she has an appointment w/ her MD on 3/20/17 for her BP.

## 2018-04-12 ENCOUNTER — HOSPITAL ENCOUNTER (EMERGENCY)
Age: 43
Discharge: HOME | End: 2018-04-12
Attending: EMERGENCY MEDICINE
Payer: MEDICAID

## 2018-04-12 VITALS
HEART RATE: 74 BPM | DIASTOLIC BLOOD PRESSURE: 97 MMHG | TEMPERATURE: 98.4 F | WEIGHT: 192 LBS | BODY MASS INDEX: 31.99 KG/M2 | RESPIRATION RATE: 14 BRPM | SYSTOLIC BLOOD PRESSURE: 155 MMHG | HEIGHT: 65 IN | OXYGEN SATURATION: 100 %

## 2018-04-12 DIAGNOSIS — Z98.84 HISTORY OF GASTRIC BYPASS: ICD-10-CM

## 2018-04-12 DIAGNOSIS — R19.7 NAUSEA VOMITING AND DIARRHEA: Primary | ICD-10-CM

## 2018-04-12 DIAGNOSIS — R11.2 NAUSEA VOMITING AND DIARRHEA: Primary | ICD-10-CM

## 2018-04-12 LAB
ALBUMIN SERPL-MCNC: 3 G/DL (ref 3.4–5)
ALBUMIN/GLOB SERPL: 0.6 {RATIO} (ref 0.8–1.7)
ALP SERPL-CCNC: 103 U/L (ref 45–117)
ALT SERPL-CCNC: 48 U/L (ref 13–56)
ANION GAP SERPL CALC-SCNC: 13 MMOL/L (ref 3–18)
AST SERPL-CCNC: 96 U/L (ref 15–37)
BASOPHILS # BLD: 0 K/UL (ref 0–0.06)
BASOPHILS NFR BLD: 1 % (ref 0–2)
BILIRUB SERPL-MCNC: 0.4 MG/DL (ref 0.2–1)
BUN SERPL-MCNC: 10 MG/DL (ref 7–18)
BUN/CREAT SERPL: 11 (ref 12–20)
CALCIUM SERPL-MCNC: 7.9 MG/DL (ref 8.5–10.1)
CHLORIDE SERPL-SCNC: 100 MMOL/L (ref 100–108)
CO2 SERPL-SCNC: 23 MMOL/L (ref 21–32)
CREAT SERPL-MCNC: 0.92 MG/DL (ref 0.6–1.3)
DIFFERENTIAL METHOD BLD: ABNORMAL
EOSINOPHIL # BLD: 0 K/UL (ref 0–0.4)
EOSINOPHIL NFR BLD: 1 % (ref 0–5)
ERYTHROCYTE [DISTWIDTH] IN BLOOD BY AUTOMATED COUNT: 18.3 % (ref 11.6–14.5)
GLOBULIN SER CALC-MCNC: 5 G/DL (ref 2–4)
GLUCOSE SERPL-MCNC: 90 MG/DL (ref 74–99)
HCT VFR BLD AUTO: 38.1 % (ref 35–45)
HGB BLD-MCNC: 12.4 G/DL (ref 12–16)
LIPASE SERPL-CCNC: 136 U/L (ref 73–393)
LYMPHOCYTES # BLD: 1.7 K/UL (ref 0.9–3.6)
LYMPHOCYTES NFR BLD: 27 % (ref 21–52)
MCH RBC QN AUTO: 30.1 PG (ref 24–34)
MCHC RBC AUTO-ENTMCNC: 32.5 G/DL (ref 31–37)
MCV RBC AUTO: 92.5 FL (ref 74–97)
MONOCYTES # BLD: 0.4 K/UL (ref 0.05–1.2)
MONOCYTES NFR BLD: 7 % (ref 3–10)
NEUTS SEG # BLD: 3.9 K/UL (ref 1.8–8)
NEUTS SEG NFR BLD: 64 % (ref 40–73)
PLATELET # BLD AUTO: 381 K/UL (ref 135–420)
PMV BLD AUTO: 9.2 FL (ref 9.2–11.8)
POTASSIUM SERPL-SCNC: 5.1 MMOL/L (ref 3.5–5.5)
PROT SERPL-MCNC: 8 G/DL (ref 6.4–8.2)
RBC # BLD AUTO: 4.12 M/UL (ref 4.2–5.3)
SODIUM SERPL-SCNC: 136 MMOL/L (ref 136–145)
WBC # BLD AUTO: 6.1 K/UL (ref 4.6–13.2)

## 2018-04-12 PROCEDURE — 74011250636 HC RX REV CODE- 250/636: Performed by: PHYSICIAN ASSISTANT

## 2018-04-12 PROCEDURE — 96361 HYDRATE IV INFUSION ADD-ON: CPT

## 2018-04-12 PROCEDURE — 85025 COMPLETE CBC W/AUTO DIFF WBC: CPT | Performed by: PHYSICIAN ASSISTANT

## 2018-04-12 PROCEDURE — 99282 EMERGENCY DEPT VISIT SF MDM: CPT

## 2018-04-12 PROCEDURE — 74011000250 HC RX REV CODE- 250: Performed by: PHYSICIAN ASSISTANT

## 2018-04-12 PROCEDURE — 83690 ASSAY OF LIPASE: CPT | Performed by: PHYSICIAN ASSISTANT

## 2018-04-12 PROCEDURE — 80053 COMPREHEN METABOLIC PANEL: CPT | Performed by: PHYSICIAN ASSISTANT

## 2018-04-12 PROCEDURE — 96375 TX/PRO/DX INJ NEW DRUG ADDON: CPT

## 2018-04-12 PROCEDURE — 96365 THER/PROPH/DIAG IV INF INIT: CPT

## 2018-04-12 RX ORDER — FAMOTIDINE 20 MG/50ML
20 INJECTION, SOLUTION INTRAVENOUS
Status: COMPLETED | OUTPATIENT
Start: 2018-04-12 | End: 2018-04-12

## 2018-04-12 RX ORDER — ONDANSETRON 2 MG/ML
4 INJECTION INTRAMUSCULAR; INTRAVENOUS
Status: COMPLETED | OUTPATIENT
Start: 2018-04-12 | End: 2018-04-12

## 2018-04-12 RX ORDER — PROMETHAZINE HYDROCHLORIDE 25 MG/1
25 TABLET ORAL
Qty: 20 TAB | Refills: 0 | Status: SHIPPED | OUTPATIENT
Start: 2018-04-12 | End: 2018-11-07 | Stop reason: ALTCHOICE

## 2018-04-12 RX ORDER — HYDROCHLOROTHIAZIDE 12.5 MG/1
12.5 TABLET ORAL DAILY
COMMUNITY
End: 2018-09-25

## 2018-04-12 RX ADMIN — ONDANSETRON 4 MG: 2 INJECTION INTRAMUSCULAR; INTRAVENOUS at 14:52

## 2018-04-12 RX ADMIN — SODIUM CHLORIDE, SODIUM LACTATE, POTASSIUM CHLORIDE, AND CALCIUM CHLORIDE 2000 ML: 600; 310; 30; 20 INJECTION, SOLUTION INTRAVENOUS at 14:52

## 2018-04-12 RX ADMIN — FAMOTIDINE 20 MG: 20 INJECTION, SOLUTION INTRAVENOUS at 15:05

## 2018-04-12 NOTE — DISCHARGE INSTRUCTIONS
Nausea and Vomiting: Care Instructions  Your Care Instructions    When you are nauseated, you may feel weak and sweaty and notice a lot of saliva in your mouth. Nausea often leads to vomiting. Most of the time you do not need to worry about nausea and vomiting, but they can be signs of other illnesses. Two common causes of nausea and vomiting are stomach flu and food poisoning. Nausea and vomiting from viral stomach flu will usually start to improve within 24 hours. Nausea and vomiting from food poisoning may last from 12 to 48 hours. The doctor has checked you carefully, but problems can develop later. If you notice any problems or new symptoms, get medical treatment right away. Follow-up care is a key part of your treatment and safety. Be sure to make and go to all appointments, and call your doctor if you are having problems. It's also a good idea to know your test results and keep a list of the medicines you take. How can you care for yourself at home? · To prevent dehydration, drink plenty of fluids, enough so that your urine is light yellow or clear like water. Choose water and other caffeine-free clear liquids until you feel better. If you have kidney, heart, or liver disease and have to limit fluids, talk with your doctor before you increase the amount of fluids you drink. · Rest in bed until you feel better. · When you are able to eat, try clear soups, mild foods, and liquids until all symptoms are gone for 12 to 48 hours. Other good choices include dry toast, crackers, cooked cereal, and gelatin dessert, such as Jell-O. When should you call for help? Call 911 anytime you think you may need emergency care. For example, call if:  ? · You passed out (lost consciousness). ?Call your doctor now or seek immediate medical care if:  ? · You have symptoms of dehydration, such as:  ¨ Dry eyes and a dry mouth. ¨ Passing only a little dark urine.   ¨ Feeling thirstier than usual.   ? · You have new or worsening belly pain. ? · You have a new or higher fever. ? · You vomit blood or what looks like coffee grounds. ? Watch closely for changes in your health, and be sure to contact your doctor if:  ? · You have ongoing nausea and vomiting. ? · Your vomiting is getting worse. ? · Your vomiting lasts longer than 2 days. ? · You are not getting better as expected. Where can you learn more? Go to http://sandra-abel.info/. Enter 25 [id number] in the search box to learn more about \"Nausea and Vomiting: Care Instructions. \"  Current as of: March 20, 2017  Content Version: 11.4  © 3801-7373 [employer]. Care instructions adapted under license by [employer] (which disclaims liability or warranty for this information). If you have questions about a medical condition or this instruction, always ask your healthcare professional. [address] any warranty or liability for your use of this information.

## 2018-04-12 NOTE — ED TRIAGE NOTES
Patient with 5 day history of diarrhea and abdominal pain. Sepsis Screening completed    (  )Patient meets SIRS criteria. ( x )Patient does not meet SIRS criteria.       SIRS Criteria is achieved when two or more of the following are present   Temperature < 96.8°F (36°C) or > 100.9°F (38.3°C)   Heart Rate > 90 beats per minute   Respiratory Rate > 20 breaths per minute   WBC count > 12,000 or <4,000 or > 10% bands

## 2018-04-12 NOTE — LETTER
[facility] [place] 
[facility] 
[address] [address] 
[phone] Work/School Note Date: 4/12/2018 To Whom It May concern: 
 
Mercedes Rios was seen and treated today in the emergency room by the following provider(s): 
Attending Provider: Maikel Cameron MD 
Physician Assistant: Aj Soria PA-C. Mercedes Rios may return to work on 4/13/2018. Sincerely, Omidsong White PA-C

## 2018-04-12 NOTE — ED PROVIDER NOTES
EMERGENCY DEPARTMENT HISTORY AND PHYSICAL EXAM    Date: 4/12/2018  Patient Name: Isaias Woodson    History of Presenting Illness     Chief Complaint   Patient presents with    Abdominal Pain    Diarrhea         History Provided By: Patient    Chief Complaint: Diarrhea and Vomiting  Duration: 5 Days  Timing:  Acute  Modifying Factors: Pt states no worsening or relieving sx  Associated Symptoms: nausea, back pain and lightheadedness    Additional History (Context):   2:04 PM  Isaias Woodson is a 41 y.o. female with PMHX GERD and PSHx Gastric Bypass (Dr. Lisa Chappell, 18 years ago), cholecystectomy, Hysterectomy who presents to the emergency department C/O diarrhea (all liquid, yellow colored) and vomiting onset 5 days ago. Pt states she feels she is dehydrated. Associated sxs include nausea, back pain and lightheadedness. Pt last had EtOH 3 weeks ago. Pt smokes 4 cigarettes a day. Pt denies allergies to medications, abd pain, and any other sxs or complaints. Pt sees [facility]. PCP: Kari Martinez MD    Current Outpatient Prescriptions   Medication Sig Dispense Refill    hydroCHLOROthiazide (HYDRODIURIL) 12.5 mg tablet Take 12.5 mg by mouth daily.  promethazine (PHENERGAN) 25 mg tablet Take 1 Tab by mouth every six (6) hours as needed. 20 Tab 0       Past History     Past Medical History:  Past Medical History:   Diagnosis Date    Anemia     Cancer ([address]     Endometrial Cancer       Past Surgical History:  Past Surgical History:   Procedure Laterality Date    HX CHOLECYSTECTOMY      HX GASTRIC BYPASS         Family History:  History reviewed. No pertinent family history. Social History:  Social History   Substance Use Topics    Smoking status: Former Smoker     Quit date: 2/14/2017    Smokeless tobacco: Never Used    Alcohol use No       Allergies:  No Known Allergies      Review of Systems   Review of Systems   Constitutional: Negative for chills and fever.    Respiratory: Negative for shortness of breath. Cardiovascular: Negative for chest pain. Gastrointestinal: Positive for diarrhea, nausea and vomiting. Negative for abdominal pain. Genitourinary: Negative for decreased urine volume and dysuria. Musculoskeletal: Positive for back pain. Neurological: Positive for light-headedness. Negative for dizziness and headaches. All other systems reviewed and are negative. Physical Exam     Vitals:    04/12/18 1210 04/12/18 1507   BP: (!) 164/110 (!) 155/97   Pulse: 85 74   Resp: 20 14   Temp: 98.4 °F (36.9 °C)    SpO2: 100% 100%   Weight: 87.1 kg (192 lb)    Height: 5' 5\" (1.651 m)      Physical Exam   Constitutional: She is oriented to person, place, and time. She appears well-developed and well-nourished. No distress. AA female in NAD. Alert. Appears comfortable. HENT:   Head: Normocephalic and atraumatic. Right Ear: External ear normal.   Left Ear: External ear normal.   Nose: Nose normal.   Mouth/Throat: Uvula is midline, oropharynx is clear and moist and mucous membranes are normal.   Eyes: Conjunctivae are normal. No scleral icterus. Neck: Normal range of motion. Cardiovascular: Normal rate, regular rhythm, normal heart sounds and intact distal pulses. Exam reveals no gallop and no friction rub. No murmur heard. Pulmonary/Chest: Effort normal and breath sounds normal. No accessory muscle usage. No tachypnea. No respiratory distress. She has no decreased breath sounds. She has no wheezes. She has no rhonchi. She has no rales. Abdominal: Soft. Normal appearance. She exhibits no ascites and no mass. There is no tenderness. There is no rigidity, no rebound, no guarding, no CVA tenderness, no tenderness at McBurney's point and negative Tuttle's sign. Musculoskeletal: Normal range of motion. Neurological: She is alert and oriented to person, place, and time. Skin: Skin is warm and dry. No rash noted. She is not diaphoretic. No erythema.    Psychiatric: She has a normal mood and affect. Judgment normal.   Nursing note and vitals reviewed. Diagnostic Study Results     Labs -     No results found for this or any previous visit (from the past 12 hour(s)). Radiologic Studies -   No orders to display     CT Results  (Last 48 hours)    None        CXR Results  (Last 48 hours)    None          Medications given in the ED-  Medications   lactated ringers bolus infusion 2,000 mL (0 mL IntraVENous IV Completed 4/12/18 1750)   ondansetron (ZOFRAN) injection 4 mg (4 mg IntraVENous Given 4/12/18 0512)   famotidine (PEPCID) IVPB 20 mg (0 mg IntraVENous IV Completed 4/12/18 1536)         Medical Decision Making   I am the first provider for this patient. I reviewed the vital signs, available nursing notes, past medical history, past surgical history, family history and social history. Vital Signs-Reviewed the patient's vital signs. Pulse Oximetry Analysis - 100% on Room Air      Records Reviewed: Nursing Notes     Provider Notes (Medical Decision Making): viral, food borne, gastritis, gastroenteritis, pancreatitis, hepatitis, PUD, GERD, colitis, diverticulitis. Doubt cardiac or AAA    Procedures:  Procedures    ED Course:   2:04 PM   Initial assessment performed. The patients presenting problems have been discussed, and they are in agreement with the care plan formulated and outlined with them. I have encouraged them to ask questions as they arise throughout their visit. 4:19 PM  Pt feels better. Discussed improvement in labs from previous visit in 2017. She will continue with etoh cessation as labs improved from previous visit with no pancreatitis and improvement in LFTs. Discussed start on H2 blocker/PPI BID. Benign abdomen with no pain at McBurney's. GB surgically absent. No indication for emergent intraabdominal imaging. Pt states she takes a lot of BC powder for pain, and NSAIDs which I stressed are important to avoid with a hx of gastric bypass.  FU with bariatric surgery. Reasons to RTED discussed with pt. All questions answered. Pt feels comfortable going home at this time. Pt expressed understanding and she agrees with plan. Diagnosis and Disposition       DISCHARGE NOTE:  4:21 PM  Cody Keita  results have been reviewed with her. She has been counseled regarding her diagnosis, treatment, and plan. She verbally conveys understanding and agreement of the signs, symptoms, diagnosis, treatment and prognosis and additionally agrees to follow up as discussed. She also agrees with the care-plan and conveys that all of her questions have been answered. I have also provided discharge instructions for her that include: educational information regarding their diagnosis and treatment, and list of reasons why they would want to return to the ED prior to their follow-up appointment, should her condition change. She has been provided with education for proper emergency department utilization. CLINICAL IMPRESSION:    1. Nausea vomiting and diarrhea    2. History of gastric bypass        PLAN:  1. D/C Home  2. Discharge Medication List as of 4/12/2018  4:22 PM      START taking these medications    Details   promethazine (PHENERGAN) 25 mg tablet Take 1 Tab by mouth every six (6) hours as needed. , Print, Disp-20 Tab, R-0         CONTINUE these medications which have NOT CHANGED    Details   hydroCHLOROthiazide (HYDRODIURIL) 12.5 mg tablet Take 12.5 mg by mouth daily. , Historical Med           3.    Follow-up Information     Follow up With Details Comments [address]  MD Sada Schedule an appointment as soon as possible for a visit in 3 days For surgery follow up [address] [facility]      Micheline Shook MD Schedule an appointment as soon as possible for a visit in 3 days For GI follow up [address] [facility] EMERGENCY DEPT Go to As needed, if symptoms worsen [address] [place] [address]  [phone]        _______________________________    Attestations: This note is prepared by Rajeev Holt, acting as Scribe for Seferino Burrell PA-C. Seferino Burrell PA-C:  The scribe's documentation has been prepared under my direction and personally reviewed by me in its entirety.   I confirm that the note above accurately reflects all work, treatment, procedures, and medical decision making performed by me.  _______________________________

## 2018-06-20 ENCOUNTER — OFFICE VISIT (OUTPATIENT)
Dept: SURGERY | Age: 43
End: 2018-06-20

## 2018-06-20 ENCOUNTER — HOSPITAL ENCOUNTER (OUTPATIENT)
Dept: LAB | Age: 43
Discharge: HOME | End: 2018-06-20

## 2018-06-20 VITALS
BODY MASS INDEX: 35.62 KG/M2 | WEIGHT: 213.8 LBS | HEART RATE: 95 BPM | RESPIRATION RATE: 16 BRPM | DIASTOLIC BLOOD PRESSURE: 105 MMHG | SYSTOLIC BLOOD PRESSURE: 142 MMHG | HEIGHT: 65 IN | OXYGEN SATURATION: 98 %

## 2018-06-20 DIAGNOSIS — K90.9 INTESTINAL MALABSORPTION, UNSPECIFIED TYPE: ICD-10-CM

## 2018-06-20 DIAGNOSIS — Z98.84 STATUS POST GASTRIC BYPASS FOR OBESITY: ICD-10-CM

## 2018-06-20 DIAGNOSIS — C80.1 CANCER (HCC): ICD-10-CM

## 2018-06-20 DIAGNOSIS — F17.200 SMOKER: ICD-10-CM

## 2018-06-20 DIAGNOSIS — R55 SYNCOPE, UNSPECIFIED SYNCOPE TYPE: ICD-10-CM

## 2018-06-20 DIAGNOSIS — Z97.2 WEARS DENTURES: ICD-10-CM

## 2018-06-20 DIAGNOSIS — R11.10 VOMITING, INTRACTABILITY OF VOMITING NOT SPECIFIED, PRESENCE OF NAUSEA NOT SPECIFIED, UNSPECIFIED VOMITING TYPE: Primary | ICD-10-CM

## 2018-06-20 PROCEDURE — 99001 SPECIMEN HANDLING PT-LAB: CPT | Performed by: SPECIALIST

## 2018-06-20 RX ORDER — ERGOCALCIFEROL 1.25 MG/1
50000 CAPSULE ORAL
COMMUNITY
Start: 2018-06-13 | End: 2018-09-05

## 2018-06-20 RX ORDER — HYDROGEN PEROXIDE 3 %
20 SOLUTION, NON-ORAL MISCELLANEOUS
COMMUNITY
End: 2018-08-02

## 2018-06-20 RX ORDER — ESCITALOPRAM OXALATE 10 MG/1
10 TABLET ORAL
COMMUNITY
Start: 2018-04-18 | End: 2018-10-15

## 2018-06-20 NOTE — MR AVS SNAPSHOT
[address] [facility] 
[phone] Patient: Stephen Herediaan MRN: [id number] :1975 Visit Information Date & Time Provider Department Dept. Phone Encounter #  
 2018  8:00 AM Benjamin Mayen MD [facility] [phone] [id number] Upcoming Health Maintenance Date Due DTaP/Tdap/Td series (1 - Tdap) 1998 PAP AKA CERVICAL CYTOLOGY 1998 Influenza Age 5 to Adult 2018 Allergies as of 2018  Review Complete On: 2018 By: Benjamin Mayen MD  
 No Known Allergies Current Immunizations  Never Reviewed No immunizations on file. Not reviewed this visit You Were Diagnosed With   
  
 Codes Comments Intestinal malabsorption, unspecified type    -  Primary ICD-10-CM: K90.9 ICD-9-CM: 579.9 Cancer [facility])     ICD-10-CM: C80.1 ICD-9-CM: 199.1 Status post gastric bypass for obesity     ICD-10-CM: Z98.84 ICD-9-CM: V45.86 Wears dentures     ICD-10-CM: Z97.2 ICD-9-CM: V45.84 Syncope, unspecified syncope type     ICD-10-CM: R55 
ICD-9-CM: 780.2 Smoker     ICD-10-CM: U95.917 ICD-9-CM: 305.1 Vitals BP Pulse Resp Height(growth percentile) Weight(growth percentile) SpO2  
 (!) 142/105 (BP 1 Location: Left arm, BP Patient Position: Sitting) 95 16 5' 5\" (1.651 m) 213 lb 12.8 oz (97 kg) 98% BMI OB Status Smoking Status 35.58 kg/m2 Hysterectomy Current Every Day Smoker Vitals History BMI and BSA Data Body Mass Index Body Surface Area 35.58 kg/m 2 2.11 m 2 Your Updated Medication List  
  
   
This list is accurate as of 18  8:55 AM.  Always use your most recent med list.  
  
  
  
  
 ergocalciferol 50,000 unit capsule Commonly known as:  ERGOCALCIFEROL Take 50,000 Units by mouth.  
  
 escitalopram oxalate 10 mg tablet Commonly known as:  Dariela Narrow Take 10 mg by mouth.  
  
 esomeprazole 20 mg capsule Commonly known as:  Gunnar Feller Take 20 mg by mouth. hydroCHLOROthiazide 12.5 mg tablet Commonly known as:  HYDRODIURIL Take 12.5 mg by mouth daily. promethazine 25 mg tablet Commonly known as:  PHENERGAN Take 1 Tab by mouth every six (6) hours as needed. To-Do List   
 06/20/2018 Lab:  FERRITIN   
  
 06/20/2018 Lab:  FOLATE   
  
 06/20/2018 Lab:  IRON   
  
 06/20/2018 Lab:  VITAMIN B1, WHOLE BLOOD   
  
 06/20/2018 Lab:  VITAMIN B12 Patient Instructions Body Mass Index: Care Instructions Your Care Instructions Body mass index (BMI) can help you see if your weight is raising your risk for health problems. It uses a formula to compare how much you weigh with how tall you are. · A BMI lower than 18.5 is considered underweight. · A BMI between 18.5 and 24.9 is considered healthy. · A BMI between 25 and 29.9 is considered overweight. A BMI of 30 or higher is considered obese. If your BMI is in the normal range, it means that you have a lower risk for weight-related health problems. If your BMI is in the overweight or obese range, you may be at increased risk for weight-related health problems, such as high blood pressure, heart disease, stroke, arthritis or joint pain, and diabetes. If your BMI is in the underweight range, you may be at increased risk for health problems such as fatigue, lower protection (immunity) against illness, muscle loss, bone loss, hair loss, and hormone problems. BMI is just one measure of your risk for weight-related health problems. You may be at higher risk for health problems if you are not active, you eat an unhealthy diet, or you drink too much alcohol or use tobacco products. Follow-up care is a key part of your treatment and safety.  Be sure to make and go to all appointments, and call your doctor if you are having problems. It's also a good idea to know your test results and keep a list of the medicines you take. How can you care for yourself at home? · Practice healthy eating habits. This includes eating plenty of fruits, vegetables, whole grains, lean protein, and low-fat dairy. · If your doctor recommends it, get more exercise. Walking is a good choice. Bit by bit, increase the amount you walk every day. Try for at least 30 minutes on most days of the week. · Do not smoke. Smoking can increase your risk for health problems. If you need help quitting, talk to your doctor about stop-smoking programs and medicines. These can increase your chances of quitting for good. · Limit alcohol to 2 drinks a day for men and 1 drink a day for women. Too much alcohol can cause health problems. If you have a BMI higher than 25 · Your doctor may do other tests to check your risk for weight-related health problems. This may include measuring the distance around your waist. A waist measurement of more than 40 inches in men or 35 inches in women can increase the risk of weight-related health problems. · Talk with your doctor about steps you can take to stay healthy or improve your health. You may need to make lifestyle changes to lose weight and stay healthy, such as changing your diet and getting regular exercise. If you have a BMI lower than 18.5 · Your doctor may do other tests to check your risk for health problems. · Talk with your doctor about steps you can take to stay healthy or improve your health. You may need to make lifestyle changes to gain or maintain weight and stay healthy, such as getting more healthy foods in your diet and doing exercises to build muscle. Where can you learn more? Go to http://sandra-abel.info/. Enter S176 in the search box to learn more about \"Body Mass Index: Care Instructions. \" Current as of: October 13, 2016 Content Version: 11.4 © 0973-7744 [employer], [employer]. Care instructions adapted under license by [employer] (which disclaims liability or warranty for this information). If you have questions about a medical condition or this instruction, always ask your healthcare professional. Jose Benson any warranty or liability for your use of this information. Patient Instructions 1. Continue to monitor carbohydrate and protein intake- remember to keep your           total  carbohydrates to 50 grams or less per day for best results. 2. Remember hydration goals - usually 48 to 64 ounces of liquids per day 3. Continue to work towards exercise goals - minimum 3 days per week of 45          minutes to  1 hour at a time. 4. Remember to take vitamins as directed Supplement Resource Guide Importance of Protein:  
Maintains lean body mass, produces antibodies to fight off infections, heals wounds, minimizes hair loss, helps to give you energy, helps with satiety, and keeping you full between meals. Importance of Calcium: 
Needed for healthy bones and teeth, normal blood clotting, and nervous system functioning, higher risk of osteoporosis and bone disease with non-compliance. Importance of Multivitamins: Many functions. Supply you with extra nutrients that you may be missing from food. May lead to iron deficiency anemia, weakness, fatigue, and many other symptoms with non-compliance. Importance of B Vitamins: 
Important for red blood cell formation, metabolism, energy, and helps to maintain a healthy nervous system. Protein Supplement Find one you like now. Use immediately after surgery. Look for: 
35-50g protein each day from your protein supplement once you reach the progression diet. 0-3 g fat per serving 0-3 g sugar per serving Protein drinks should be split in separate dosages. Recommend: Lifelong 1 year + Calcium Supplement:  
 
Start taking within a month after surgery. Look for: Calcium Citrate Plus D (1500 mg per day) Recommend: Citracal 
 
 . Avoid chocolate chewable calcium. Can use chewable bariatric or [employer] brand or similar chewable. The body cannot absorb more than 500-600 mg @ a time. Take for Life Multi-vitamin Supplement:   
 
1st Month After Surgery: Any complete chewable, such as: Laurels Complete chewables. Avoid Laurel sours or gummies. They lack iron and other important nutrients and also have added sugar. Continue with chewable vitamin or change to adult complete multivitamin one month after surgery. Menstruating women can take a prenatal vitamin. Make sure has at least 18 mg iron and 699-886 mcg folic acid): Vitamin B12, B Complex Vitamin, and Biotin Start taking within a month after surgery. Vitamin B12:  1000 mcg of Vitamin B12 three times weekly Must take sublingually (meaning you take it under your tongue) or in a liquid drop form for easy absorption. B Complex Vitamin: Take a pill or liquid drop form once daily. Biotin: This vitamin can help prevent hair loss. Recommend 5mg  
(5000 mcg) a day Biotin is Optional  
 
 
 
 
 
  
Introducing [facility]! Yves Roger introduces [employer] patient portal. Now you can access parts of your medical record, email your doctor's office, and request medication refills online. 1. In your internet browser, go to [email or web address] 2. Click on the First Time User? Click Here link in the Sign In box. You will see the New Member Sign Up page. 3. Enter your [employer] Access Code exactly as it appears below. You will not need to use this code after youve completed the sign-up process. If you do not sign up before the expiration date, you must request a new code. · [employer] Access Code: [email or web address] Expires: 7/11/2018 12:09 PM 
 
 4. Enter the last four digits of your Social Security Number (xxxx) and Date of Birth (mm/dd/yyyy) as indicated and click Submit. You will be taken to the next sign-up page. 5. Create a [employer] ID. This will be your [employer] login ID and cannot be changed, so think of one that is secure and easy to remember. 6. Create a [employer] password. You can change your password at any time. 7. Enter your Password Reset Question and Answer. This can be used at a later time if you forget your password. 8. Enter your e-mail address. You will receive e-mail notification when new information is available in [address] 9. Click Sign Up. You can now view and download portions of your medical record. 10. Click the Download Summary menu link to download a portable copy of your medical information. If you have questions, please visit the Frequently Asked Questions section of the [employer] website. Remember, [employer] is NOT to be used for urgent needs. For medical emergencies, dial 911. Now available from your iPhone and Android! Please provide this summary of care documentation to your next provider. Your primary care clinician is listed as Estela Mercer. If you have any questions after today's visit, please call [phone].

## 2018-06-20 NOTE — PATIENT INSTRUCTIONS
Body Mass Index: Care Instructions  Your Care Instructions    Body mass index (BMI) can help you see if your weight is raising your risk for health problems. It uses a formula to compare how much you weigh with how tall you are. · A BMI lower than 18.5 is considered underweight. · A BMI between 18.5 and 24.9 is considered healthy. · A BMI between 25 and 29.9 is considered overweight. A BMI of 30 or higher is considered obese. If your BMI is in the normal range, it means that you have a lower risk for weight-related health problems. If your BMI is in the overweight or obese range, you may be at increased risk for weight-related health problems, such as high blood pressure, heart disease, stroke, arthritis or joint pain, and diabetes. If your BMI is in the underweight range, you may be at increased risk for health problems such as fatigue, lower protection (immunity) against illness, muscle loss, bone loss, hair loss, and hormone problems. BMI is just one measure of your risk for weight-related health problems. You may be at higher risk for health problems if you are not active, you eat an unhealthy diet, or you drink too much alcohol or use tobacco products. Follow-up care is a key part of your treatment and safety. Be sure to make and go to all appointments, and call your doctor if you are having problems. It's also a good idea to know your test results and keep a list of the medicines you take. How can you care for yourself at home? · Practice healthy eating habits. This includes eating plenty of fruits, vegetables, whole grains, lean protein, and low-fat dairy. · If your doctor recommends it, get more exercise. Walking is a good choice. Bit by bit, increase the amount you walk every day. Try for at least 30 minutes on most days of the week. · Do not smoke. Smoking can increase your risk for health problems. If you need help quitting, talk to your doctor about stop-smoking programs and medicines. These can increase your chances of quitting for good. · Limit alcohol to 2 drinks a day for men and 1 drink a day for women. Too much alcohol can cause health problems. If you have a BMI higher than 25  · Your doctor may do other tests to check your risk for weight-related health problems. This may include measuring the distance around your waist. A waist measurement of more than 40 inches in men or 35 inches in women can increase the risk of weight-related health problems. · Talk with your doctor about steps you can take to stay healthy or improve your health. You may need to make lifestyle changes to lose weight and stay healthy, such as changing your diet and getting regular exercise. If you have a BMI lower than 18.5  · Your doctor may do other tests to check your risk for health problems. · Talk with your doctor about steps you can take to stay healthy or improve your health. You may need to make lifestyle changes to gain or maintain weight and stay healthy, such as getting more healthy foods in your diet and doing exercises to build muscle. Where can you learn more? Go to http://sandra-abel.info/. Enter S176 in the search box to learn more about \"Body Mass Index: Care Instructions. \"  Current as of: October 13, 2016  Content Version: 11.4  © 0909-5592 Healthwise, Incorporated. Care instructions adapted under license by [employer] (which disclaims liability or warranty for this information). If you have questions about a medical condition or this instruction, always ask your healthcare professional. [place] [address] any warranty or liability for your use of this information. Patient Instructions      1. Continue to monitor carbohydrate and protein intake- remember to keep your           total  carbohydrates to 50 grams or less per day for best results.   2. Remember hydration goals - usually 48 to 64 ounces of liquids per day  3. Continue to work towards exercise goals - minimum 3 days per week of 45          minutes to  1 hour at a time. 4. Remember to take vitamins as directed        Supplement Resource Guide    Importance of Protein:   Maintains lean body mass, produces antibodies to fight off infections, heals wounds, minimizes hair loss, helps to give you energy, helps with satiety, and keeping you full between meals. Importance of Calcium:  Needed for healthy bones and teeth, normal blood clotting, and nervous system functioning, higher risk of osteoporosis and bone disease with non-compliance. Importance of Multivitamins: Many functions. Supply you with extra nutrients that you may be missing from food. May lead to iron deficiency anemia, weakness, fatigue, and many other symptoms with non-compliance. Importance of B Vitamins:  Important for red blood cell formation, metabolism, energy, and helps to maintain a healthy nervous system. Protein Supplement  Find one you like now. Use immediately after surgery. Look for:  35-50g protein each day from your protein supplement once you reach the progression diet. 0-3 g fat per serving  0-3 g sugar per serving    Protein drinks should be split in separate dosages. Recommend: Lifelong  1 year + Calcium Supplement:     Start taking within a month after surgery. Look for: Calcium Citrate Plus D (1500 mg per day)  Recommend: Citracal     .          Avoid chocolate chewable calcium. Can use chewable bariatric or [employer] brand or similar chewable. The body cannot absorb more than 500-600 mg @ a time. Take for Life Multi-vitamin Supplement:      1st Month After Surgery: Any complete chewable, such as: [employer] Rivers Complete chewables. Avoid [employer] sours or gummies. They lack iron and other important nutrients and also have added sugar.     Continue with chewable vitamin or change to adult complete multivitamin one month after surgery. Menstruating women can take a prenatal vitamin. Make sure has at least 18 mg iron and 693-351 mcg folic acid):    Vitamin B12, B Complex Vitamin, and Biotin  Start taking within a month after surgery. Vitamin B12:  1000 mcg of Vitamin B12 three times weekly    Must take sublingually (meaning you take it under your tongue) or in a liquid drop form for easy absorption. B Complex Vitamin: Take a pill or liquid drop form once daily. Biotin: This vitamin can help prevent hair loss.     Recommend 5mg   (5000 mcg) a day  Biotin is Optional

## 2018-06-20 NOTE — PROGRESS NOTES
Revision Surgery Consultation    Subjective: The patient is a 41 y.o. obese female with a Body mass index is 35.58 kg/(m^2). Aimee Suero The patient had a VBG/Gastric bypass procedure done approximatly 18 years ago in [address] [place] [address] by Dr. Jim Palmer.  her starting weight prior to surgery was 360.  she ultimately lost approximately 175 lbs with a subsequent weight regain of 38lbs. her peak EBWL at 2 years out from surgery was 82% and now her current EBWL is 65%. her last bariatric follow-up was 6 years ago with Dr. Jim Palmer. Jeanne Espinoza notes that she had vomiting issues in the immediate post-op phase and continues to have the same problem. she currently is having the following issues related to his health:vomiting to the point that she has now had to have her teeth pulled. she is here today to discuss her issues. All of their prior evaluations available by both their PCP's and specialists physicians have been reviewed today either in the Care Everywhere portal or scanned under the media tab. I have spent a large portion of my initial consultation today reviewing the patients current dietary habits which have contributed to their health issues, weight regain and  their current obesity. They understand that generally speaking,  weight regain is  a function of resuming less that ideal dietary habits instead of being a procedural issue. They understand that older procedures are more likely to be associated with a less that perfect procedural result, such as a prior vertical banded gastroplasty or non divided gastric bypass. These procedures are more likely to result in staple line failures with resultant weight regain. This has been explained to the patient via diagrams of these older procedures and given to the patient. I have suggested to them personally a dietary regimen that they can initiate now to help with their status as it pertains to their weight.   They understand that the most important aspect of their journey through their weight loss endeavor will be their adherence to a new lifestyle of healthy eating behavior. They also understand that an adherence to an exercise program will not only help with weight loss but is ultimately important in weight maintenance. Patient Active Problem List    Diagnosis Date Noted    Cancer ([facility]     Severe obesity with body mass index (BMI) of 35.0 to 39.9 with comorbidity ([facility]     Intestinal malabsorption     Hypertension     Anemia     Anxiety     GERD (gastroesophageal reflux disease)     Wears dentures     Syncopal episodes     Smoker     Diarrhea     Status post gastric bypass for obesity 01/01/2000      Past Surgical History:   Procedure Laterality Date    HX CHOLECYSTECTOMY      HX GASTRIC BYPASS  2000    open / [place]    HX OTHER SURGICAL      resection of benign breast mass    HX PARTIAL HYSTERECTOMY      cancer related / pt with both ovaries      Social History   Substance Use Topics    Smoking status: Current Every Day Smoker     Types: Cigarettes    Smokeless tobacco: Never Used    Alcohol use Yes      Family History   Problem Relation Age of Onset    Diabetes Mother       Current Outpatient Prescriptions   Medication Sig Dispense Refill    ergocalciferol (ERGOCALCIFEROL) 50,000 unit capsule Take 50,000 Units by mouth.  esomeprazole (NEXIUM) 20 mg capsule Take 20 mg by mouth.  escitalopram oxalate (LEXAPRO) 10 mg tablet Take 10 mg by mouth.  hydroCHLOROthiazide (HYDRODIURIL) 12.5 mg tablet Take 12.5 mg by mouth daily.  promethazine (PHENERGAN) 25 mg tablet Take 1 Tab by mouth every six (6) hours as needed.  20 Tab 0     No Known Allergies       Review of Systems:            General - No history or complaints of unexpected fever, chills, or weight loss  Head/Neck - No history or complaints of headache, diplopia, dysphagia, hearing loss  Cardiac - No history or complaints of chest pain, palpitations, murmur, or shortness of breath  Pulmonary - No history or complaints of shortness of breath, productive cough, hemoptysis  Gastrointestinal - No history or complaints of reflux,  abdominal pain, obstipation/constipation, blood per rectum  Genitourinary - No history or complaints of hematuria/dysuria, stress urinary incontinence symptoms, or renal lithiasis  Musculoskeletal - No history or complaints of joint pain or muscular weakness  Hematologic - No history or complaints of bleeding disorders, blood transfusions, sickle cell anemia  Neurologic - No history or complaints of  migraine headaches, seizure activity, syncopal episodes, TIA or stroke  Integumentary - No history or complaints of rashes, abnormal nevi, skin cancer  Gynecological - No history of heavy menses/abnormal menses           Objective:     Visit Vitals    BP (!) 142/105 (BP 1 Location: Left arm, BP Patient Position: Sitting)    Pulse 95    Resp 16    Ht 5' 5\" (1.651 m)    Wt 97 kg (213 lb 12.8 oz)    SpO2 98%    BMI 35.58 kg/m2       Physical Examination: General appearance - alert, well appearing, and in no distress and oriented to person, place, and time  Mental status - alert, oriented to person, place, and time, normal mood, behavior, speech, dress, motor activity, and thought processes  Eyes - pupils equal and reactive, extraocular eye movements intact, sclera anicteric, left eye normal, right eye normal  Ears - right ear normal, left ear normal  Nose - normal and patent, no erythema, discharge or polyps  Mouth - mucous membranes moist, pharynx normal without lesions  Neck - supple, no significant adenopathy  Lymphatics - no palpable lymphadenopathy, no hepatosplenomegaly  Chest - clear to auscultation, no wheezes, rales or rhonchi, symmetric air entry  Heart - normal rate, regular rhythm, normal S1, S2, no murmurs, rubs, clicks or gallops  Abdomen - soft, nontender, nondistended, no masses or organomegaly  Back exam - full range of motion, no tenderness, palpable spasm or pain on motion  Neurological - alert, oriented, normal speech, no focal findings or movement disorder noted  Musculoskeletal - no joint tenderness, deformity or swelling  Extremities - peripheral pulses normal, no pedal edema, no clubbing or cyanosis    Labs / Old Records: Old operative reports reviewed if available and are scanned under the media tab or reviewed under Care Everywhere        Assessment:     Morbid obesity status post VBG gastric bypass procedure approximately 18 years ago with complaint of chronic emesis. This is possibly related to a marginal ulcer versus a stricture at the level of the banded bypass. Plan: 1. Weight regain-Today in our office I had a lengthy discussion with Reggie Gudino regarding the nature of their prior procedure. We discussed the anatomical changes to their anatomy and how this relates to  contributing weight regain. Our office will continue to attempt to obtain any medical records, if not obtained or available already ,related to their procedure including her EGD that she reports she had. It was also discussed today that before any decisions can be made regarding correction of their initial  procedure that an upper GI swallow study must be obtained to evaluate their post surgical anatomy. My suspicion ids that she either had a stricture at the level of the band associated with this procedure versus a marginal ulcer since she is smoking   We will proceed with the UGI swallow study as described above. The patient understands all of the above and wishes to proceed with the study. 2.Nutrition-  I have discussed in detail the pitfalls in diet that have contributed to their weight regain and the importance of adhering to a lifelong regimen of dietary goals and proper eating habits. I have discussed the proper lifelong bariatric diet  in detail spending in excess of 20 minutes discussing this.  We will schedule them for a dietary consultation with our nutritionist and urge them to continue on a regular follow-up schedule with her. 3.Maintenance vitamins- Today we have discussed the importance of vitamins as it pertains to their procedure and we will obtain appropriate lab to check all levels. They have been provided a handout regarding this today. Total time spent with the patient reviewing their complex history of bariatric surgery,diet, and plan is in excess of 60 minutes.       Secondary Diagnoses:         Signed By: Anthony Washington MD     June 20, 2018

## 2018-06-23 LAB
FERRITIN SERPL-MCNC: 42 NG/ML (ref 15–150)
FOLATE SERPL-MCNC: 8.6 NG/ML
IRON SERPL-MCNC: 52 UG/DL (ref 27–159)
VIT B1 BLD-SCNC: 84.3 NMOL/L (ref 66.5–200)
VIT B12 SERPL-MCNC: 164 PG/ML (ref 232–1245)

## 2018-06-27 ENCOUNTER — HOSPITAL ENCOUNTER (OUTPATIENT)
Age: 43
Setting detail: OUTPATIENT SURGERY
Discharge: HOME | End: 2018-06-27
Attending: SPECIALIST | Admitting: SPECIALIST
Payer: MEDICAID

## 2018-06-27 ENCOUNTER — APPOINTMENT (OUTPATIENT)
Dept: GENERAL RADIOLOGY | Age: 43
End: 2018-06-27
Attending: SPECIALIST
Payer: MEDICAID

## 2018-06-27 VITALS
DIASTOLIC BLOOD PRESSURE: 106 MMHG | BODY MASS INDEX: 34.68 KG/M2 | RESPIRATION RATE: 15 BRPM | HEART RATE: 83 BPM | OXYGEN SATURATION: 99 % | WEIGHT: 208.4 LBS | SYSTOLIC BLOOD PRESSURE: 148 MMHG | TEMPERATURE: 98.3 F

## 2018-06-27 DIAGNOSIS — E66.01 MORBID OBESITY (HCC): ICD-10-CM

## 2018-06-27 PROCEDURE — 74240 X-RAY XM UPR GI TRC 1CNTRST: CPT

## 2018-06-27 PROCEDURE — 74011000255 HC RX REV CODE- 255: Performed by: SPECIALIST

## 2018-06-27 PROCEDURE — 76040000019: Performed by: SPECIALIST

## 2018-06-27 NOTE — PROCEDURES
18 years post [place] gastric bypass with chronic emesis that has resulted in having to have her teeth pulled.   UGI was normal.  EGD in the future

## 2018-06-27 NOTE — IP AVS SNAPSHOT
[facility] 
 
 
 [address] [address] 
[phone] Patient: Alexander Richard MRN: [id number] :1975 About your hospitalization You were admitted on:  2018 You last received care in the:  THE [facility] ENDOSCOPY You were discharged on:  2018 Why you were hospitalized Your primary diagnosis was:  Not on File Follow-up Information None Your Scheduled Appointments 2018 ENDOSCOPY with Kianna Rhodes MD  
THE [facility] ENDOSCOPY [facility] [address] [address]  
[phone] Discharge Orders None A check mega indicates which time of day the medication should be taken. My Medications ASK your doctor about these medications Instructions Each Dose to Equal  
 Morning Noon Evening Bedtime  
 ergocalciferol 50,000 unit capsule Commonly known as:  ERGOCALCIFEROL Your last dose was: Your next dose is: Take 50,000 Units by mouth. [address]93 Units  
    
   
   
   
  
 escitalopram oxalate 10 mg tablet Commonly known as:  Giovana Rye Your last dose was: Your next dose is: Take 10 mg by mouth. 10 mg  
    
   
   
   
  
 esomeprazole 20 mg capsule Commonly known as:  Huntington Don Your last dose was: Your next dose is: Take 20 mg by mouth. 20 mg  
    
   
   
   
  
 hydroCHLOROthiazide 12.5 mg tablet Commonly known as:  HYDRODIURIL Your last dose was: Your next dose is: Take 12.5 mg by mouth daily. 12.5 mg  
    
   
   
   
  
 promethazine 25 mg tablet Commonly known as:  PHENERGAN Your last dose was: Your next dose is: Take 1 Tab by mouth every six (6) hours as needed. 25 mg Discharge Instructions None Introducing hospitals & HEALTH SERVICES! [employer] introduces [facility] patient portal. Now you can access parts of your medical record, email your doctor's office, and request medication refills online. 1. In your internet browser, go to [email or web address] 2. Click on the First Time User? Click Here link in the Sign In box. You will see the New Member Sign Up page. 3. Enter your DineroTaxi Access Code exactly as it appears below. You will not need to use this code after youve completed the sign-up process. If you do not sign up before the expiration date, you must request a new code. · [employer]ero[employer] Access Code: [email or web address] Expires: 7/11/2018 12:09 PM 
 
4. Enter the last four digits of your Social Security Number (xxxx) and Date of Birth (mm/dd/yyyy) as indicated and click Submit. You will be taken to the next sign-up page. 5. Create a DineroTaxi ID. This will be your DineroTax[employer] login ID and cannot be changed, so think of one that is secure and easy to remember. 6. Create a [employer] password. You can change your password at any time. 7. Enter your Password Reset Question and Answer. This can be used at a later time if you forget your password. 8. Enter your e-mail address. You will receive e-mail notification when new information is available in [address] 9. Click Sign Up. You can now view and download portions of your medical record. 10. Click the Download Summary menu link to download a portable copy of your medical information. If you have questions, please visit the Frequently Asked Questions section of the [employer]ero[employer] website. Remember, DineroTaxi is NOT to be used for urgent needs. For medical emergencies, dial 911. Now available from your iPhone and Android! Introducing Jose Lynne As a [employer] patient, I wanted to make you aware of our electronic visit tool called Jose Lynne. [employer] 24/7 allows you to connect within minutes with a medical provider 24 hours a day, seven days a week via a mobile device or tablet or logging into a secure website from your computer. You can access MobiDough from anywhere in the [place]. A virtual visit might be right for you when you have a simple condition and feel like you just dont want to get out of bed, or cant get away from work for an appointment, when your regular [facility] provider is not available (evenings, weekends or holidays), or when youre out of town and need minor care. Electronic visits cost only $49 and if the [facility] 24/7 provider determines a prescription is needed to treat your condition, one can be electronically transmitted to a nearby pharmacy*. Please take a moment to enroll today if you have not already done so. The enrollment process is free and takes just a few minutes. To enroll, please download the [facility] 24/7 melanie to your tablet or phone, or visit [email or web address] to enroll on your computer. And, as an [address] [place] [address] patient with a [employer] account, the results of your visits will be scanned into your electronic medical record and your primary care provider will be able to view the scanned results. We urge you to continue to see your regular [facility] provider for your ongoing medical care. And while your primary care provider may not be the one available when you seek a Jose Lynne virtual visit, the peace of mind you get from getting a real diagnosis real time can be priceless. For more information on Jose Keepskoradorefin, view our Frequently Asked Questions (FAQs) at [email or web address]. Sincerely, 
 
Dawn Gama MD 
Chief Medical Officer [facility] Marielle Fabian *:  certain medications cannot be prescribed via [employer] Unresulted tests-please follow up with your PCP on these results Procedure/Test Authorizing Provider  [facility] GASTROGRAFF[facility]ER GI Nataly Jackson MD  
  
 Providers Seen During Your Hospitalization Provider Specialty Primary office phone Shaneka Rodriguez MD General Surgery [phone] Your Primary Care Physician (PCP) Primary Care Physician Office Phone Office Fax Paula Stewardny [phone] You are allergic to the following No active allergies Recent Documentation Weight BMI OB Status Smoking Status 94.5 kg 34.68 kg/m2 Hysterectomy Current Every Day Smoker Emergency Contacts Name Discharge Info Relation Home Work Mobile [address] [email or web address] CAREGIVER [3] Parent [1] [phone] Patient Belongings The following personal items are in your possession at time of discharge: 
                             
 
  
  
 Please provide this summary of care documentation to your next provider. Signatures-by signing, you are acknowledging that this After Visit Summary has been reviewed with you and you have received a copy. Patient Signature:  ____________________________________________________________ Date:  ____________________________________________________________  
  
Liz Damonamento Provider Signature:  ____________________________________________________________ Date:  ____________________________________________________________

## 2018-06-29 NOTE — OP NOTES
[facility]  OPERATIVE REPORT    Jennifer Tidwell  MR#: [id number]  : 1975  ACCOUNT #: [de-identified]   DATE OF SERVICE: 2018    PREPROCEDURE DIAGNOSIS:  Patient 18 years out from a gastric bypass performed by Dr. Binu Can with chronic emesis. POSTPROCEDURE DIAGNOSIS:  Patient 18 years out from a gastric bypass performed by Dr. Binu Can with chronic emesis with normal upper GI study. PROCEDURE PERFORMED:  Upper GI study with barium. ESTIMATED BLOOD LOSS:  None. SPECIMENS REMOVED:  None. ANESTHESIA:  None. COMPLICATIONS:  None. ASSISTANT:  None. IMPLANTS:  None. SURGEON:  THONG JEFFRIES     STATEMENT OF MEDICAL NECESSITY:  The patient is a 47-year-old female, a patient of Dr. Nicol Collins who 18 years ago underwent a Fobi pouch. She has had chronic emesis over many years and she is smoking and presented to me in consultation for her situation. DESCRIPTION OF PROCEDURE:  The patient brought to the fluoro unit where she was given thin barium. Upon swallowing the barium, she was noted to have normal peristalsis of her esophagus. She had prompt filling of distal esophagus with tapering into and through the gastroesophageal junction. Contrast then filled a vertically oriented gastric pouch which was normal size and dimension. There was no evidence of any staple line breakdown. Contrast really flowed through the Scar limb and a nondilated small intestinal limb. At this juncture, the likely diagnosis is marginal ulcer with possible slight stricture formation versus the band that was placed during a Fobi pouch. I have recommended to her that we are going to proceed with an endoscopy to try to ascertain what the etiology of her obstructive symptoms are and she is certainly amenable to that.   If she does not have a marginal ulcer and there is evidence of restriction at the level of the Fobi band, then we might offer her exploration and removal of that band if feasible.       Suzi Roger       AT / Michi Soto  D: 06/28/2018 23:05     T: 06/29/2018 09:05  JOB #: [id number]

## 2018-07-26 DIAGNOSIS — R11.15 PERSISTENT VOMITING: ICD-10-CM

## 2018-07-26 DIAGNOSIS — Z01.812 BLOOD TESTS PRIOR TO TREATMENT OR PROCEDURE: ICD-10-CM

## 2018-07-26 DIAGNOSIS — Z98.84 BARIATRIC SURGERY STATUS: ICD-10-CM

## 2018-07-26 DIAGNOSIS — K28.9 GASTROINTESTINAL ULCER: Primary | ICD-10-CM

## 2018-08-03 ENCOUNTER — HOSPITAL ENCOUNTER (OUTPATIENT)
Dept: PREADMISSION TESTING | Age: 43
Discharge: HOME | End: 2018-08-03
Payer: MEDICAID

## 2018-08-03 DIAGNOSIS — Z98.84 BARIATRIC SURGERY STATUS: ICD-10-CM

## 2018-08-03 DIAGNOSIS — K28.9 GASTROINTESTINAL ULCER: ICD-10-CM

## 2018-08-03 DIAGNOSIS — Z01.812 BLOOD TESTS PRIOR TO TREATMENT OR PROCEDURE: ICD-10-CM

## 2018-08-03 DIAGNOSIS — R11.15 PERSISTENT VOMITING: ICD-10-CM

## 2018-08-03 LAB
ALBUMIN SERPL-MCNC: 3.3 G/DL (ref 3.4–5)
ALBUMIN/GLOB SERPL: 0.7 {RATIO} (ref 0.8–1.7)
ALP SERPL-CCNC: 107 U/L (ref 45–117)
ALT SERPL-CCNC: 31 U/L (ref 13–56)
ANION GAP SERPL CALC-SCNC: 5 MMOL/L (ref 3–18)
AST SERPL-CCNC: 53 U/L (ref 15–37)
ATRIAL RATE: 86 BPM
BASOPHILS # BLD: 0 K/UL (ref 0–0.1)
BASOPHILS NFR BLD: 0 % (ref 0–2)
BILIRUB SERPL-MCNC: 0.7 MG/DL (ref 0.2–1)
BUN SERPL-MCNC: 8 MG/DL (ref 7–18)
BUN/CREAT SERPL: 7 (ref 12–20)
CALCIUM SERPL-MCNC: 8.4 MG/DL (ref 8.5–10.1)
CALCULATED P AXIS, ECG09: 60 DEGREES
CALCULATED R AXIS, ECG10: 35 DEGREES
CALCULATED T AXIS, ECG11: 42 DEGREES
CHLORIDE SERPL-SCNC: 105 MMOL/L (ref 100–108)
CO2 SERPL-SCNC: 29 MMOL/L (ref 21–32)
CREAT SERPL-MCNC: 1.09 MG/DL (ref 0.6–1.3)
DIAGNOSIS, 93000: NORMAL
DIFFERENTIAL METHOD BLD: ABNORMAL
EOSINOPHIL # BLD: 0.1 K/UL (ref 0–0.4)
EOSINOPHIL NFR BLD: 1 % (ref 0–5)
ERYTHROCYTE [DISTWIDTH] IN BLOOD BY AUTOMATED COUNT: 15.8 % (ref 11.6–14.5)
GLOBULIN SER CALC-MCNC: 4.8 G/DL (ref 2–4)
GLUCOSE SERPL-MCNC: 107 MG/DL (ref 74–99)
HCT VFR BLD AUTO: 35.2 % (ref 35–45)
HGB BLD-MCNC: 11.4 G/DL (ref 12–16)
LYMPHOCYTES # BLD: 1.6 K/UL (ref 0.9–3.6)
LYMPHOCYTES NFR BLD: 26 % (ref 21–52)
MCH RBC QN AUTO: 31.2 PG (ref 24–34)
MCHC RBC AUTO-ENTMCNC: 32.4 G/DL (ref 31–37)
MCV RBC AUTO: 96.4 FL (ref 74–97)
MONOCYTES # BLD: 0.6 K/UL (ref 0.05–1.2)
MONOCYTES NFR BLD: 10 % (ref 3–10)
NEUTS SEG # BLD: 3.8 K/UL (ref 1.8–8)
NEUTS SEG NFR BLD: 63 % (ref 40–73)
P-R INTERVAL, ECG05: 154 MS
PLATELET # BLD AUTO: 289 K/UL (ref 135–420)
PMV BLD AUTO: 8.9 FL (ref 9.2–11.8)
POTASSIUM SERPL-SCNC: 4.6 MMOL/L (ref 3.5–5.5)
PROT SERPL-MCNC: 8.1 G/DL (ref 6.4–8.2)
Q-T INTERVAL, ECG07: 366 MS
QRS DURATION, ECG06: 84 MS
QTC CALCULATION (BEZET), ECG08: 437 MS
RBC # BLD AUTO: 3.65 M/UL (ref 4.2–5.3)
SODIUM SERPL-SCNC: 139 MMOL/L (ref 136–145)
VENTRICULAR RATE, ECG03: 86 BPM
WBC # BLD AUTO: 6.1 K/UL (ref 4.6–13.2)

## 2018-08-03 PROCEDURE — 85025 COMPLETE CBC W/AUTO DIFF WBC: CPT | Performed by: SPECIALIST

## 2018-08-03 PROCEDURE — 93005 ELECTROCARDIOGRAM TRACING: CPT

## 2018-08-03 PROCEDURE — 36415 COLL VENOUS BLD VENIPUNCTURE: CPT | Performed by: SPECIALIST

## 2018-08-03 PROCEDURE — 80053 COMPREHEN METABOLIC PANEL: CPT | Performed by: SPECIALIST

## 2018-08-08 NOTE — H&P
EGD - History and Physical    Subjective: The patient is a 41 y.o. obese female who is 18 years post librado hermosillo gastric bypass with significant history of chronic emesis that has resulted in the need to have her teeth extracted. She was referred to our office for evaluation. An UGI was obtained that showed normal post gastric bypass. She understands the need for EGD to evaluate possible erosion of her silastic band. Patient Active Problem List    Diagnosis Date Noted    Cancer ([facility] [address]     Severe obesity with body mass index (BMI) of 35.0 to 39.9 with comorbidity ([address]     Intestinal malabsorption     Hypertension     Anemia     Anxiety     GERD (gastroesophageal reflux disease)     Wears dentures     Syncopal episodes     Smoker     Diarrhea     Status post gastric bypass for obesity 01/01/2000      Past Surgical History:   Procedure Laterality Date    HX CHOLECYSTECTOMY      HX GASTRIC BYPASS  2000    open / librado hermosillo    HX GI  2017    EGD    HX OTHER SURGICAL Right     resection of benign breast mass    HX PARTIAL HYSTERECTOMY      cancer related / pt with both ovaries      Social History   Substance Use Topics    Smoking status: Former Smoker     Types: Cigarettes     Quit date: 2/2/2018    Smokeless tobacco: Never Used    Alcohol use 0.6 oz/week     1 Cans of beer per week      Family History   Problem Relation Age of Onset    Diabetes Mother       Current Outpatient Prescriptions   Medication Sig Dispense Refill    vitamin B complex (B COMPLEX PO) Take  by mouth daily. Takes the liquid      ergocalciferol (ERGOCALCIFEROL) 50,000 unit capsule Take 50,000 Units by mouth every Wednesday.  escitalopram oxalate (LEXAPRO) 10 mg tablet Take 10 mg by mouth nightly. Indications: depression      hydroCHLOROthiazide (HYDRODIURIL) 12.5 mg tablet Take 12.5 mg by mouth daily.  Indications: hypertension      promethazine (PHENERGAN) 25 mg tablet Take 1 Tab by mouth every six (6) hours as needed. 20 Tab 0     No Known Allergies       Review of Systems:        General - No history or complaints of unexpected fever, chills, or weight loss  Head/Neck - No history or complaints of headache, diplopia, dysphagia, hearing loss  Cardiac - No history or complaints of chest pain, palpitations, murmur, or shortness of breath  Pulmonary - No history or complaints of shortness of breath, productive cough, hemoptysis  Gastrointestinal - No history or complaints of reflux,  abdominal pain, obstipation/constipation, blood per rectum  Genitourinary - No history or complaints of hematuria/dysuria, stress urinary incontinence symptoms, or renal lithiasis  Musculoskeletal - No history or complaints of joint pain or muscular weakness  Hematologic - No history or complaints of bleeding disorders, blood transfusions, sickle cell anemia  Neurologic - No history or complaints of  migraine headaches, seizure activity, syncopal episodes, TIA or stroke  Integumentary - No history or complaints of rashes, abnormal nevi, skin cancer  Gynecological - unremarkable    Objective: There were no vitals taken for this visit.       Physical Examination: General appearance - alert, well appearing, and in no distress and oriented to person, place, and time  Mental status - alert, oriented to person, place, and time, normal mood, behavior, speech, dress, motor activity, and thought processes  Eyes - pupils equal and reactive, extraocular eye movements intact, sclera anicteric, left eye normal, right eye normal  Ears - right ear normal, left ear normal  Nose - normal and patent, no erythema, discharge or polyps  Mouth - mucous membranes moist, pharynx normal without lesions  Neck - supple, no significant adenopathy  Lymphatics - no palpable lymphadenopathy, no hepatosplenomegaly  Chest - clear to auscultation, no wheezes, rales or rhonchi, symmetric air entry  Heart - normal rate, regular rhythm, normal S1, S2, no murmurs, rubs, clicks or gallops  Abdomen - soft, nontender, nondistended, no masses or organomegaly  Back exam - full range of motion, no tenderness, palpable spasm or pain on motion  Neurological - alert, oriented, normal speech, no focal findings or movement disorder noted  Musculoskeletal - no joint tenderness, deformity or swelling  Extremities - peripheral pulses normal, no pedal edema, no clubbing or cyanosis  Skin - normal coloration and turgor, no rashes, no suspicious skin lesions noted    Labs / Preoperative Evaluations:     Recent Results (from the past 1008 hour(s))   CBC WITH AUTOMATED DIFF    Collection Time: 08/03/18  4:31 PM   Result Value Ref Range    WBC 6.1 4.6 - 13.2 K/uL    RBC 3.65 (L) 4.20 - 5.30 M/uL    HGB 11.4 (L) 12.0 - 16.0 g/dL    HCT 35.2 35.0 - 45.0 %    MCV 96.4 74.0 - 97.0 FL    MCH 31.2 24.0 - 34.0 PG    MCHC 32.4 31.0 - 37.0 g/dL    RDW 15.8 (H) 11.6 - 14.5 %    PLATELET 698 451 - 752 K/uL    MPV 8.9 (L) 9.2 - 11.8 FL    NEUTROPHILS 63 40 - 73 %    LYMPHOCYTES 26 21 - 52 %    MONOCYTES 10 3 - 10 %    EOSINOPHILS 1 0 - 5 %    BASOPHILS 0 0 - 2 %    ABS. NEUTROPHILS 3.8 1.8 - 8.0 K/UL    ABS. LYMPHOCYTES 1.6 0.9 - 3.6 K/UL    ABS. MONOCYTES 0.6 0.05 - 1.2 K/UL    ABS. EOSINOPHILS 0.1 0.0 - 0.4 K/UL    ABS. BASOPHILS 0.0 0.0 - 0.1 K/UL    DF AUTOMATED     METABOLIC PANEL, COMPREHENSIVE    Collection Time: 08/03/18  4:31 PM   Result Value Ref Range    Sodium 139 136 - 145 mmol/L    Potassium 4.6 3.5 - 5.5 mmol/L    Chloride 105 100 - 108 mmol/L    CO2 29 21 - 32 mmol/L    Anion gap 5 3.0 - 18 mmol/L    Glucose 107 (H) 74 - 99 mg/dL    BUN 8 7.0 - 18 MG/DL    Creatinine 1.09 0.6 - 1.3 MG/DL    BUN/Creatinine ratio 7 (L) 12 - 20      GFR est AA >60 >60 ml/min/1.73m2    GFR est non-AA 55 (L) >60 ml/min/1.73m2    Calcium 8.4 (L) 8.5 - 10.1 MG/DL    Bilirubin, total 0.7 0.2 - 1.0 MG/DL    ALT (SGPT) 31 13 - 56 U/L    AST (SGOT) 53 (H) 15 - 37 U/L    Alk.  phosphatase 107 45 - 117 U/L    Protein, total 8.1 6.4 - 8.2 g/dL    Albumin 3.3 (L) 3.4 - 5.0 g/dL    Globulin 4.8 (H) 2.0 - 4.0 g/dL    A-G Ratio 0.7 (L) 0.8 - 1.7     EKG, 12 LEAD, INITIAL    Collection Time: 08/03/18  4:38 PM   Result Value Ref Range    Ventricular Rate 86 BPM    Atrial Rate 86 BPM    P-R Interval 154 ms    QRS Duration 84 ms    Q-T Interval 366 ms    QTC Calculation (Bezet) 437 ms    Calculated P Axis 60 degrees    Calculated R Axis 35 degrees    Calculated T Axis 42 degrees    Diagnosis       Normal sinus rhythm  Normal ECG  Confirmed by Leonides Faulkner MD, Iris Bravo ([id number]) on 8/3/2018 8:27:28 PM         Assessment:     20 years post librado bay open gastric bypass with chronic emesis and normal UGI    Plan:     EGD    Plan for EGD. She understands the risks, benefits and alternatives of the EGD. She understands the risk include but are not limited to; death, DVT/PE, damage to surrounding structures, perforation of the GI tract, equipment malfunction, bleeding, and infection. She understands all of the above and wishes to proceed with EGD.         Signed By: ARNIE Gonzales     August 8, 2018

## 2018-08-09 ENCOUNTER — ANESTHESIA EVENT (OUTPATIENT)
Dept: ENDOSCOPY | Age: 43
End: 2018-08-09
Payer: MEDICAID

## 2018-08-09 ENCOUNTER — HOSPITAL ENCOUNTER (OUTPATIENT)
Age: 43
Setting detail: OUTPATIENT SURGERY
Discharge: HOME | End: 2018-08-09
Attending: SPECIALIST | Admitting: SPECIALIST
Payer: MEDICAID

## 2018-08-09 ENCOUNTER — ANESTHESIA (OUTPATIENT)
Dept: ENDOSCOPY | Age: 43
End: 2018-08-09
Payer: MEDICAID

## 2018-08-09 VITALS
HEIGHT: 65 IN | TEMPERATURE: 97.7 F | HEART RATE: 93 BPM | SYSTOLIC BLOOD PRESSURE: 150 MMHG | DIASTOLIC BLOOD PRESSURE: 96 MMHG | OXYGEN SATURATION: 99 % | RESPIRATION RATE: 16 BRPM | BODY MASS INDEX: 34.37 KG/M2 | WEIGHT: 206.31 LBS

## 2018-08-09 PROCEDURE — 74011250636 HC RX REV CODE- 250/636

## 2018-08-09 PROCEDURE — 76040000019: Performed by: SPECIALIST

## 2018-08-09 PROCEDURE — 74011250636 HC RX REV CODE- 250/636: Performed by: SPECIALIST

## 2018-08-09 PROCEDURE — 76060000031 HC ANESTHESIA FIRST 0.5 HR: Performed by: SPECIALIST

## 2018-08-09 PROCEDURE — 77030020263 HC SOL INJ SOD CL0.9% LFCR 1000ML: Performed by: SPECIALIST

## 2018-08-09 PROCEDURE — 88342 IMHCHEM/IMCYTCHM 1ST ANTB: CPT | Performed by: SPECIALIST

## 2018-08-09 PROCEDURE — 77030009426 HC FCPS BIOP ENDOSC BSC -B: Performed by: SPECIALIST

## 2018-08-09 PROCEDURE — 74011000250 HC RX REV CODE- 250: Performed by: SPECIALIST

## 2018-08-09 PROCEDURE — 88305 TISSUE EXAM BY PATHOLOGIST: CPT | Performed by: SPECIALIST

## 2018-08-09 PROCEDURE — 77030038020 HC MANFLD NEPTUNE STRY -B: Performed by: SPECIALIST

## 2018-08-09 RX ORDER — FENTANYL CITRATE 50 UG/ML
25 INJECTION, SOLUTION INTRAMUSCULAR; INTRAVENOUS
Status: CANCELLED | OUTPATIENT
Start: 2018-08-09 | End: 2018-08-09

## 2018-08-09 RX ORDER — ONDANSETRON 2 MG/ML
4 INJECTION INTRAMUSCULAR; INTRAVENOUS ONCE
Status: CANCELLED | OUTPATIENT
Start: 2018-08-09 | End: 2018-08-09

## 2018-08-09 RX ORDER — HYDROMORPHONE HYDROCHLORIDE 2 MG/ML
0.5 INJECTION, SOLUTION INTRAMUSCULAR; INTRAVENOUS; SUBCUTANEOUS
Status: CANCELLED | OUTPATIENT
Start: 2018-08-09

## 2018-08-09 RX ORDER — MAGNESIUM SULFATE 100 %
4 CRYSTALS MISCELLANEOUS AS NEEDED
Status: CANCELLED | OUTPATIENT
Start: 2018-08-09

## 2018-08-09 RX ORDER — OXYCODONE AND ACETAMINOPHEN 5; 325 MG/1; MG/1
1 TABLET ORAL AS NEEDED
Status: CANCELLED | OUTPATIENT
Start: 2018-08-09

## 2018-08-09 RX ORDER — SODIUM CHLORIDE, SODIUM LACTATE, POTASSIUM CHLORIDE, CALCIUM CHLORIDE 600; 310; 30; 20 MG/100ML; MG/100ML; MG/100ML; MG/100ML
125 INJECTION, SOLUTION INTRAVENOUS CONTINUOUS
Status: DISCONTINUED | OUTPATIENT
Start: 2018-08-09 | End: 2018-08-09 | Stop reason: HOSPADM

## 2018-08-09 RX ORDER — NALOXONE HYDROCHLORIDE 0.4 MG/ML
0.1 INJECTION, SOLUTION INTRAMUSCULAR; INTRAVENOUS; SUBCUTANEOUS
Status: CANCELLED | OUTPATIENT
Start: 2018-08-09

## 2018-08-09 RX ORDER — SODIUM CHLORIDE 0.9 % (FLUSH) 0.9 %
5-10 SYRINGE (ML) INJECTION AS NEEDED
Status: CANCELLED | OUTPATIENT
Start: 2018-08-09

## 2018-08-09 RX ORDER — LIDOCAINE HYDROCHLORIDE 20 MG/ML
INJECTION, SOLUTION EPIDURAL; INFILTRATION; INTRACAUDAL; PERINEURAL AS NEEDED
Status: DISCONTINUED | OUTPATIENT
Start: 2018-08-09 | End: 2018-08-09 | Stop reason: HOSPADM

## 2018-08-09 RX ORDER — PROPOFOL 10 MG/ML
INJECTION, EMULSION INTRAVENOUS AS NEEDED
Status: DISCONTINUED | OUTPATIENT
Start: 2018-08-09 | End: 2018-08-09 | Stop reason: HOSPADM

## 2018-08-09 RX ORDER — DEXTROSE 50 % IN WATER (D50W) INTRAVENOUS SYRINGE
25-50 AS NEEDED
Status: CANCELLED | OUTPATIENT
Start: 2018-08-09

## 2018-08-09 RX ORDER — INSULIN LISPRO 100 [IU]/ML
INJECTION, SOLUTION INTRAVENOUS; SUBCUTANEOUS ONCE
Status: CANCELLED | OUTPATIENT
Start: 2018-08-09 | End: 2018-08-09

## 2018-08-09 RX ORDER — SODIUM CHLORIDE, SODIUM LACTATE, POTASSIUM CHLORIDE, CALCIUM CHLORIDE 600; 310; 30; 20 MG/100ML; MG/100ML; MG/100ML; MG/100ML
50 INJECTION, SOLUTION INTRAVENOUS CONTINUOUS
Status: CANCELLED | OUTPATIENT
Start: 2018-08-09

## 2018-08-09 RX ADMIN — PROPOFOL 50 MG: 10 INJECTION, EMULSION INTRAVENOUS at 15:21

## 2018-08-09 RX ADMIN — SODIUM CHLORIDE, SODIUM LACTATE, POTASSIUM CHLORIDE, AND CALCIUM CHLORIDE 125 ML/HR: 600; 310; 30; 20 INJECTION, SOLUTION INTRAVENOUS at 13:20

## 2018-08-09 RX ADMIN — PROPOFOL 200 MG: 10 INJECTION, EMULSION INTRAVENOUS at 15:19

## 2018-08-09 RX ADMIN — LIDOCAINE HYDROCHLORIDE 60 MG: 20 INJECTION, SOLUTION EPIDURAL; INFILTRATION; INTRACAUDAL; PERINEURAL at 15:19

## 2018-08-09 NOTE — IP AVS SNAPSHOT
[address] Nicki [phone] Patient: Diana Merritt MRN: [id number] :1975 About your hospitalization You were admitted on:  2018 You last received care in the:  [address] [place] [address] You were discharged on:  2018 Why you were hospitalized Your primary diagnosis was:  Not on File Follow-up Information Follow up With Details Comments Contact Info Paul [phone] [facility] 
[phone] Discharge Orders None A check mega indicates which time of day the medication should be taken. My Medications CONTINUE taking these medications Instructions Each Dose to Equal  
 Morning Noon Evening Bedtime B COMPLEX PO Your last dose was: Your next dose is: Take  by mouth daily. Takes the liquid  
     
   
   
   
  
 ergocalciferol 50,000 unit capsule Commonly known as:  ERGOCALCIFEROL Your last dose was: Your next dose is: Take 50,000 Units by mouth every Wednesday. 41448 Units  
    
   
   
   
  
 escitalopram oxalate 10 mg tablet Commonly known as:  Allison Leriche Your last dose was: Your next dose is: Take 10 mg by mouth nightly. Indications: depression 10 mg  
    
   
   
   
  
 hydroCHLOROthiazide 12.5 mg tablet Commonly known as:  HYDRODIURIL Your last dose was: Your next dose is: Take 12.5 mg by mouth daily. Indications: hypertension 12.5 mg  
    
   
   
   
  
 promethazine 25 mg tablet Commonly known as:  PHENERGAN Your last dose was: Your next dose is: Take 1 Tab by mouth every six (6) hours as needed. 25 mg Discharge Instructions Diana Merritt DISCHARGE SUMMARY from Nurse PATIENT INSTRUCTIONS: 
 
 
F-face looks uneven A-arms unable to move or move unevenly S-speech slurred or non-existent T-time-call 911 as soon as signs and symptoms begin-DO NOT go Back to bed or wait to see if you get better-TIME IS BRAIN. Warning Signs of HEART ATTACK Call 911 if you have these symptoms: 
? Chest discomfort. Most heart attacks involve discomfort in the center of the chest that lasts more than a few minutes, or that goes away and comes back. It can feel like uncomfortable pressure, squeezing, fullness, or pain. ? Discomfort in other areas of the upper body. Symptoms can include pain or discomfort in one or both arms, the back, neck, jaw, or stomach. ? Shortness of breath with or without chest discomfort. ? Other signs may include breaking out in a cold sweat, nausea, or lightheadedness. Don't wait more than five minutes to call [address]! Fast action can save your life. Calling 911 is almost always the fastest way to get lifesaving treatment. Emergency Medical Services staff can begin treatment when they arrive  up to an hour sooner than if someone gets to the hospital by car. The discharge information has been reviewed with the patient and caregiver. The patient and caregiver verbalized understanding. Discharge medications reviewed with the patient and caregiver and appropriate educational materials and side effects teaching were provided. ___________________________________________________________________________________________________________________________________ 
[id number] 1975 EGD DISCHARGE INSTRUCTIONS Discomfort: 
Sore throat- throat lozenges or warm salt water gargle 
redness at IV site- apply warm compress to area; if redness or soreness persist- contact your physician Gaseous discomfort- walking, belching will help relieve any discomfort You should not operate a vehicle for 12 hours You should note engage in an occupation involving machinery or appliances for rest of today You may note drink alcoholic beverages for at least 12 hours Avoid making any critical decisions for at least 24 hour DIET You may resume your regular diet  however -  remember your colon is empty and a heavy meal will produce gas. Avoid these foods:  vegetables, fried / greasy foods, carbonated drinks ACTIVITY You may resume your normal daily activities Spend the remainder of the day resting -  avoid any strenuous activity. CALL M.D. ANY SIGN OF Increasing pain, nausea, vomiting Abdominal distension (swelling) New increased bleeding (oral or rectal) Fever (chills) Pain in chest area Bloody discharge from nose or mouth Shortness of breath Follow-up Instructions: 
 Dr Anne Damon will call you in one to two weeks. If you do not hear from him, please call his office [phone]. Akhil Hernandez [id number] 1975 DISCHARGE SUMMARY from Nurse The following personal items collected during your admission are returned to you:  
Dental Appliance: Dental Appliances: Partials, Uppers, At home Vision:   
Hearing Aid:   
Jewelry: Jewelry: None Clothing: Clothing: Undergarments, Pants, Socks, Footwear (locker #8) Other Valuables: Other Valuables: Cell Phone (with Juan J) Valuables sent to safe:   
 
PATIENT INSTRUCTIONS: 
 
Take Home Medications: 
 
Patient armband removed and shredded Introducing [facility] & HEALTH SERVICES! [facility] introduces [email or web address] patient portal. Now you can access parts of your medical record, email your doctor's office, and request medication refills online. 1. In your internet browser, go to [email or web address] 2. Click on the First Time User? Click Here link in the Sign In box. You will see the New Member Sign Up page. 3. Enter your [facility] [employer] Access Code exactly as it appears below. You will not need to use this code after youve completed the sign-up process. If you do not sign up before the expiration date, you must request a new code. · [employer] [employer] Access Code: [email or web address] Expires: 11/7/2018  3:45 PM 
 
4. Enter the last four digits of your Social Security Number (xxxx) and Date of Birth (mm/dd/yyyy) as indicated and click Submit. You will be taken to the next sign-up page. 5. Create a [facility] [employer] ID. This will be your [facility] [employer] login ID and cannot be changed, so think of one that is secure and easy to remember. 6. Create a [employer] password. You can change your password at any time. 7. Enter your Password Reset Question and Answer. This can be used at a later time if you forget your password. 8. Enter your e-mail address. You will receive e-mail notification when new information is available in [address] 9. Click Sign Up. You can now view and download portions of your medical record. 10. Click the Download Summary menu link to download a portable copy of your medical information. If you have questions, please visit the Frequently Asked Questions section of the [employer] website. Remember, [facility] [employer] is NOT to be used for urgent needs. For medical emergencies, dial 911. Now available from your iPhone and Android! Introducing Jose Lynne As a [facility] patient, I wanted to make you aware of our electronic visit tool called Jose Lynne. [facility] 24/7 allows you to connect within minutes with a medical provider 24 hours a day, seven days a week via a mobile device or tablet or logging into a secure website from your computer. You can access Jose Lynne from anywhere in the [place].  
 
A virtual visit might be right for you when you have a simple condition and feel like you just dont want to get out of bed, or cant get away from work for an appointment, when your regular [facility] provider is not available (evenings, weekends or holidays), or when youre out of town and need minor care. Electronic visits cost only $49 and if the [facility] 24/7 provider determines a prescription is needed to treat your condition, one can be electronically transmitted to a nearby pharmacy*. Please take a moment to enroll today if you have not already done so. The enrollment process is free and takes just a few minutes. To enroll, please download the [facility] 24/7 melanie to your tablet or phone, or visit [email or web address] to enroll on your computer. And, as an [address] [place] [address] patient with a [employer] account, the results of your visits will be scanned into your electronic medical record and your primary care provider will be able to view the scanned results. We urge you to continue to see your regular [facility] provider for your ongoing medical care. And while your primary care provider may not be the one available when you seek a Jose Energreenadorefin virtual visit, the peace of mind you get from getting a real diagnosis real time can be priceless. For more information on [email or web address], view our Frequently Asked Questions (FAQs) at [email or web address]. Sincerely, 
 
Huy Cazares MD 
Chief Medical Officer Randy Fabian *:  certain medications cannot be prescribed via Virtual Event Bags Providers Seen During Your Hospitalization Provider Specialty Primary office phone Tamara Moreno MD General Surgery [phone] Your Primary Care Physician (PCP) Primary Care Physician Office Phone Office Fax Camelia France [phone] You are allergic to the following No active allergies Recent Documentation Height Weight BMI OB Status Smoking Status 1.645 m 93.6 kg 34.6 kg/m2 Hysterectomy Former Smoker Emergency Contacts Name Discharge Info Relation Home Work Mobile Juan J Duarte DISCHARGE CAREGIVER [3] Father [15]   [phone] [address] CAREGIVER [3] Parent [1] [phone] Patient Belongings The following personal items are in your possession at time of discharge: 
  Dental Appliances: Partials, Uppers, At home         Home Medications: None   Jewelry: None  Clothing: Undergarments, Pants, Socks, Footwear (locker #8)    Other Valuables: Cell Phone (with Justyn) Please provide this summary of care documentation to your next provider. Signatures-by signing, you are acknowledging that this After Visit Summary has been reviewed with you and you have received a copy. Patient Signature:  ____________________________________________________________ Date:  ____________________________________________________________  
  
Juana Green Provider Signature:  ____________________________________________________________ Date:  ____________________________________________________________

## 2018-08-09 NOTE — ANESTHESIA PREPROCEDURE EVALUATION
Anesthetic History   No history of anesthetic complications            Review of Systems / Medical History  Patient summary reviewed, nursing notes reviewed and pertinent labs reviewed    Pulmonary  Within defined limits                 Neuro/Psych   Within defined limits           Cardiovascular    Hypertension                   GI/Hepatic/Renal     GERD           Endo/Other        Morbid obesity     Other Findings              Physical Exam    Airway  Mallampati: II  TM Distance: 4 - 6 cm  Neck ROM: normal range of motion   Mouth opening: Normal     Cardiovascular  Regular rate and rhythm,  S1 and S2 normal,  no murmur, click, rub, or gallop             Dental  No notable dental hx       Pulmonary  Breath sounds clear to auscultation               Abdominal  GI exam deferred       Other Findings            Anesthetic Plan    ASA: 2  Anesthesia type: MAC          Induction: Intravenous  Anesthetic plan and risks discussed with: Patient

## 2018-08-09 NOTE — DISCHARGE INSTRUCTIONS
Connie Gilliam   DISCHARGE SUMMARY from Nurse    PATIENT INSTRUCTIONS:    After general anesthesia or intravenous sedation, for 24 hours or while taking prescription Narcotics:  · Limit your activities  · Do not drive and operate hazardous machinery  · Do not make important personal or business decisions  · Do  not drink alcoholic beverages  · If you have not urinated within 8 hours after discharge, please contact your surgeon on call. Report the following to your surgeon:  · Excessive pain, swelling, redness or odor of or around the surgical area  · Temperature over 100.5  · Nausea and vomiting lasting longer than 4 hours or if unable to take medications  · Any signs of decreased circulation or nerve impairment to extremity: change in color, persistent  numbness, tingling, coldness or increase pain  · Any questions    What to do at Home:  SOFT DIET ADVANCE AS [address]    If you experience any of the following symptoms bleeding, fevers, severe pain, please follow up with dr Glen Norton    *  Please give a list of your current medications to your Primary Care Provider. *  Please update this list whenever your medications are discontinued, doses are      changed, or new medications (including over-the-counter products) are added. *  Please carry medication information at all times in case of emergency situations. These are general instructions for a healthy lifestyle:    No smoking/ No tobacco products/ Avoid exposure to second hand smoke  Surgeon General's Warning:  Quitting smoking now greatly reduces serious risk to your health.     Obesity, smoking, and sedentary lifestyle greatly increases your risk for illness    A healthy diet, regular physical exercise & weight monitoring are important for maintaining a healthy lifestyle    You may be retaining fluid if you have a history of heart failure or if you experience any of the following symptoms:  Weight gain of 3 pounds or more overnight or 5 pounds in a week, increased swelling in our hands or feet or shortness of breath while lying flat in bed. Please call your doctor as soon as you notice any of these symptoms; do not wait until your next office visit. Recognize signs and symptoms of STROKE:    F-face looks uneven    A-arms unable to move or move unevenly    S-speech slurred or non-existent    T-time-call 911 as soon as signs and symptoms begin-DO NOT go       Back to bed or wait to see if you get better-TIME IS BRAIN. Warning Signs of HEART ATTACK     Call 911 if you have these symptoms:   Chest discomfort. Most heart attacks involve discomfort in the center of the chest that lasts more than a few minutes, or that goes away and comes back. It can feel like uncomfortable pressure, squeezing, fullness, or pain.  Discomfort in other areas of the upper body. Symptoms can include pain or discomfort in one or both arms, the back, neck, jaw, or stomach.  Shortness of breath with or without chest discomfort.  Other signs may include breaking out in a cold sweat, nausea, or lightheadedness. Don't wait more than five minutes to call 911 - MINUTES MATTER! Fast action can save your life. Calling 911 is almost always the fastest way to get lifesaving treatment. Emergency Medical Services staff can begin treatment when they arrive -- up to an hour sooner than if someone gets to the hospital by car. The discharge information has been reviewed with the patient and caregiver. The patient and caregiver verbalized understanding. Discharge medications reviewed with the patient and caregiver and appropriate educational materials and side effects teaching were provided.   ___________________________________________________________________________________________________________________________________  [id number]  1975    EGD DISCHARGE INSTRUCTIONS    Discomfort:  Sore throat- throat lozenges or warm salt water gargle  redness at IV site- apply warm compress to area; if redness or soreness persist- contact your physician  Gaseous discomfort- walking, belching will help relieve any discomfort  You should not operate a vehicle for 12 hours  You should note engage in an occupation involving machinery or appliances for rest of today  You may note drink alcoholic beverages for at least 12 hours  Avoid making any critical decisions for at least 24 hour  DIET  You may resume your regular diet - however -  remember your colon is empty and a heavy meal will produce gas. Avoid these foods:  vegetables, fried / greasy foods, carbonated drinks    ACTIVITY  You may resume your normal daily activities   Spend the remainder of the day resting -  avoid any strenuous activity. CALL M.D. ANY SIGN OF   Increasing pain, nausea, vomiting  Abdominal distension (swelling)  New increased bleeding (oral or rectal)  Fever (chills)  Pain in chest area  Bloody discharge from nose or mouth  Shortness of breath       Follow-up Instructions:   Dr Amor Jarquin will call you in one to two weeks. If you do not hear from him, please call his office [phone]. German Donald  [id number]  1975        DISCHARGE SUMMARY from Nurse    The following personal items collected during your admission are returned to you:   Dental Appliance: Dental Appliances: Partials, Uppers, At home  Vision:    Hearing Aid:    Jewelry: Jewelry: None  Clothing: Clothing: Undergarments, Pants, Socks, Footwear (locker #8)  Other Valuables:  Other Valuables: Cell Phone (with Juan J)  Valuables sent to safe:      PATIENT INSTRUCTIONS:    Take Home Medications:    Patient armband removed and shredded

## 2018-08-09 NOTE — INTERVAL H&P NOTE
H&P Update:  Alicia Alexis was seen and examined. History and physical has been reviewed. The patient has been examined.  There have been no significant clinical changes since the completion of the originally dated History and Physical.    Signed By: Sharon Anderson MD     August 9, 2018 2:21 PM

## 2018-08-09 NOTE — PROCEDURES
Esophagogastroduodenoscopy Procedure Note    Indications: 20 years post [place] open gastric bypass with chronic emesis and normal UGI    Anesthesia/Sedation: MAC    Pre-Procedure Exam:  Airway: clear   Heart: normal S1and S2    Lungs: clear bilateral  Abdomen: soft, nontender, bowel sounds present and normal in all quadrants   Mental Status: awake, alert, and oriented to person, place, and time      Procedure in Detail:  Informed consent was obtained for the procedure, including conscious sedation. Risks of pancreatitis, infection, perforation, hemorrhage, adverse drug reaction, and aspiration were discussed. The patient was placed in the left lateral decubitus position. Based on the pre-procedure assessment, including review of the patient's medical history, medications, allergies, and review of systems, he had been deemed to be an appropriate candidate for moderate sedation; he was therefore sedated with the medications listed above. He was monitored continuously with electrocardiogram tracing, pulse oximetry, blood pressure monitoring, and direct observation. The [id number] gastroscope was inserted into the mouth and advanced under direct vision to proximal tory limb. A careful inspection was made as the gastroscope was withdrawn, including a retroflexed view of the proximal stomach; findings and interventions are described below. Appropriate photodocumentation was obtained.     Findings:   Oropharynx - normal mucosa without abnormalities  Esophagus - moderate esophagitis without webs, rings, or strictures, no esophagitis or ulcers  Gastric pouch - severe narrowing at the silastic band otherwise, normal size and configuration with normal mucosa without evidence of ulcers   Anastomosis - normal diameter for timeframe without ulcers or other anatomical abnormalities  [place] limb - normal mucosa without ulcers or obstruction    Plan for laparoscopy with removal of silastic band     Therapies:    biopsy of stomach pouch    Specimens: Specimens were collected and sent to pathology. Complications:   None; patient tolerated the procedure well. EBL:  None           Attending Attestation:  I performed the procedure. Recommendations:  - plan for OR    Signed by:  Ines Peck MD

## 2018-08-30 ENCOUNTER — HOSPITAL ENCOUNTER (OUTPATIENT)
Dept: PREADMISSION TESTING | Age: 43
Discharge: HOME | End: 2018-08-30

## 2018-09-17 ENCOUNTER — ANESTHESIA EVENT (OUTPATIENT)
Dept: SURGERY | Age: 43
DRG: 223 | End: 2018-09-17
Payer: MEDICAID

## 2018-09-18 ENCOUNTER — ANESTHESIA (OUTPATIENT)
Dept: SURGERY | Age: 43
DRG: 223 | End: 2018-09-18
Payer: MEDICAID

## 2018-09-18 ENCOUNTER — HOSPITAL ENCOUNTER (INPATIENT)
Age: 43
LOS: 7 days | Discharge: HOME HEALTH | DRG: 223 | End: 2018-09-25
Attending: SPECIALIST | Admitting: SPECIALIST
Payer: MEDICAID

## 2018-09-18 PROBLEM — Z98.890 STATUS POST LAPAROSCOPY: Status: ACTIVE | Noted: 2018-09-18

## 2018-09-18 LAB
ERYTHROCYTE [DISTWIDTH] IN BLOOD BY AUTOMATED COUNT: 15.5 % (ref 11.6–14.5)
HCT VFR BLD AUTO: 34.5 % (ref 35–45)
HGB BLD-MCNC: 11.2 G/DL (ref 12–16)
MCH RBC QN AUTO: 29.9 PG (ref 24–34)
MCHC RBC AUTO-ENTMCNC: 32.5 G/DL (ref 31–37)
MCV RBC AUTO: 92 FL (ref 74–97)
PLATELET # BLD AUTO: 244 K/UL (ref 135–420)
PMV BLD AUTO: 9 FL (ref 9.2–11.8)
RBC # BLD AUTO: 3.75 M/UL (ref 4.2–5.3)
WBC # BLD AUTO: 6.6 K/UL (ref 4.6–13.2)

## 2018-09-18 PROCEDURE — 0DNW4ZZ RELEASE PERITONEUM, PERCUTANEOUS ENDOSCOPIC APPROACH: ICD-10-PCS | Performed by: SPECIALIST

## 2018-09-18 PROCEDURE — 77030020782 HC GWN BAIR PAWS FLX 3M -B: Performed by: SPECIALIST

## 2018-09-18 PROCEDURE — 76010000132 HC OR TIME 2.5 TO 3 HR: Performed by: SPECIALIST

## 2018-09-18 PROCEDURE — 0DCA4ZZ EXTIRPATION OF MATTER FROM JEJUNUM, PERCUTANEOUS ENDOSCOPIC APPROACH: ICD-10-PCS | Performed by: SPECIALIST

## 2018-09-18 PROCEDURE — 77030002933 HC SUT MCRYL J&J -A: Performed by: SPECIALIST

## 2018-09-18 PROCEDURE — 77030008602 HC TRCR ENDOSC EPATH J&J -B: Performed by: SPECIALIST

## 2018-09-18 PROCEDURE — 65270000029 HC RM PRIVATE

## 2018-09-18 PROCEDURE — 0WQF4ZZ REPAIR ABDOMINAL WALL, PERCUTANEOUS ENDOSCOPIC APPROACH: ICD-10-PCS | Performed by: SPECIALIST

## 2018-09-18 PROCEDURE — 77030003580 HC NDL INSUF VERES J&J -B: Performed by: SPECIALIST

## 2018-09-18 PROCEDURE — 76210000016 HC OR PH I REC 1 TO 1.5 HR: Performed by: SPECIALIST

## 2018-09-18 PROCEDURE — 74011250636 HC RX REV CODE- 250/636: Performed by: SPECIALIST

## 2018-09-18 PROCEDURE — 74011250636 HC RX REV CODE- 250/636

## 2018-09-18 PROCEDURE — 77030034154 HC SHR COAG HARM ACE J&J -F: Performed by: SPECIALIST

## 2018-09-18 PROCEDURE — 77030008728 HC TU GAST LAPSCP APOL -C: Performed by: SPECIALIST

## 2018-09-18 PROCEDURE — 77030018836 HC SOL IRR NACL ICUM -A: Performed by: SPECIALIST

## 2018-09-18 PROCEDURE — 77030018875 HC APPL CLP LIG4 J&J -B: Performed by: SPECIALIST

## 2018-09-18 PROCEDURE — 77030020255 HC SOL INJ LR 1000ML BG: Performed by: SPECIALIST

## 2018-09-18 PROCEDURE — 77030016151 HC PROTCTR LNS DFOG COVD -B: Performed by: SPECIALIST

## 2018-09-18 PROCEDURE — 74011250636 HC RX REV CODE- 250/636: Performed by: ANESTHESIOLOGY

## 2018-09-18 PROCEDURE — 77030002966 HC SUT PDS J&J -A: Performed by: SPECIALIST

## 2018-09-18 PROCEDURE — C9113 INJ PANTOPRAZOLE SODIUM, VIA: HCPCS | Performed by: SPECIALIST

## 2018-09-18 PROCEDURE — 77030038020 HC MANFLD NEPTUNE STRY -B: Performed by: SPECIALIST

## 2018-09-18 PROCEDURE — 77030008603 HC TRCR ENDOSC EPATH J&J -C: Performed by: SPECIALIST

## 2018-09-18 PROCEDURE — 77030032490 HC SLV COMPR SCD KNE COVD -B: Performed by: SPECIALIST

## 2018-09-18 PROCEDURE — 74011000250 HC RX REV CODE- 250

## 2018-09-18 PROCEDURE — 85027 COMPLETE CBC AUTOMATED: CPT | Performed by: SPECIALIST

## 2018-09-18 PROCEDURE — 77030006643: Performed by: ANESTHESIOLOGY

## 2018-09-18 PROCEDURE — 76060000036 HC ANESTHESIA 2.5 TO 3 HR: Performed by: SPECIALIST

## 2018-09-18 PROCEDURE — 74011000250 HC RX REV CODE- 250: Performed by: SPECIALIST

## 2018-09-18 PROCEDURE — 77030014008 HC SPNG HEMSTAT J&J -C: Performed by: SPECIALIST

## 2018-09-18 PROCEDURE — 77030010030: Performed by: SPECIALIST

## 2018-09-18 PROCEDURE — 77030008683 HC TU ET CUF COVD -A: Performed by: ANESTHESIOLOGY

## 2018-09-18 PROCEDURE — 77030031139 HC SUT VCRL2 J&J -A: Performed by: SPECIALIST

## 2018-09-18 PROCEDURE — 36415 COLL VENOUS BLD VENIPUNCTURE: CPT | Performed by: SPECIALIST

## 2018-09-18 PROCEDURE — 88300 SURGICAL PATH GROSS: CPT | Performed by: SPECIALIST

## 2018-09-18 PROCEDURE — 74011250637 HC RX REV CODE- 250/637

## 2018-09-18 RX ORDER — NEOSTIGMINE METHYLSULFATE 5 MG/5 ML
SYRINGE (ML) INTRAVENOUS AS NEEDED
Status: DISCONTINUED | OUTPATIENT
Start: 2018-09-18 | End: 2018-09-18 | Stop reason: HOSPADM

## 2018-09-18 RX ORDER — SUCCINYLCHOLINE CHLORIDE 20 MG/ML
INJECTION INTRAMUSCULAR; INTRAVENOUS AS NEEDED
Status: DISCONTINUED | OUTPATIENT
Start: 2018-09-18 | End: 2018-09-18 | Stop reason: HOSPADM

## 2018-09-18 RX ORDER — FENTANYL CITRATE 50 UG/ML
50 INJECTION, SOLUTION INTRAMUSCULAR; INTRAVENOUS
Status: DISCONTINUED | OUTPATIENT
Start: 2018-09-18 | End: 2018-09-18 | Stop reason: HOSPADM

## 2018-09-18 RX ORDER — ENOXAPARIN SODIUM 100 MG/ML
40 INJECTION SUBCUTANEOUS EVERY 12 HOURS
Status: DISCONTINUED | OUTPATIENT
Start: 2018-09-18 | End: 2018-09-24

## 2018-09-18 RX ORDER — ALBUTEROL SULFATE 90 UG/1
AEROSOL, METERED RESPIRATORY (INHALATION) AS NEEDED
Status: DISCONTINUED | OUTPATIENT
Start: 2018-09-18 | End: 2018-09-18 | Stop reason: HOSPADM

## 2018-09-18 RX ORDER — SODIUM CHLORIDE, SODIUM LACTATE, POTASSIUM CHLORIDE, CALCIUM CHLORIDE 600; 310; 30; 20 MG/100ML; MG/100ML; MG/100ML; MG/100ML
125 INJECTION, SOLUTION INTRAVENOUS CONTINUOUS
Status: DISCONTINUED | OUTPATIENT
Start: 2018-09-18 | End: 2018-09-18

## 2018-09-18 RX ORDER — FLUMAZENIL 0.1 MG/ML
0.2 INJECTION INTRAVENOUS
Status: DISCONTINUED | OUTPATIENT
Start: 2018-09-18 | End: 2018-09-18 | Stop reason: HOSPADM

## 2018-09-18 RX ORDER — ONDANSETRON 2 MG/ML
4 INJECTION INTRAMUSCULAR; INTRAVENOUS
Status: DISCONTINUED | OUTPATIENT
Start: 2018-09-18 | End: 2018-09-25 | Stop reason: HOSPADM

## 2018-09-18 RX ORDER — PROPOFOL 10 MG/ML
INJECTION, EMULSION INTRAVENOUS AS NEEDED
Status: DISCONTINUED | OUTPATIENT
Start: 2018-09-18 | End: 2018-09-18 | Stop reason: HOSPADM

## 2018-09-18 RX ORDER — LABETALOL HCL 20 MG/4 ML
SYRINGE (ML) INTRAVENOUS AS NEEDED
Status: DISCONTINUED | OUTPATIENT
Start: 2018-09-18 | End: 2018-09-18 | Stop reason: HOSPADM

## 2018-09-18 RX ORDER — CEFAZOLIN SODIUM 2 G/50ML
2 SOLUTION INTRAVENOUS ONCE
Status: DISCONTINUED | OUTPATIENT
Start: 2018-09-18 | End: 2018-09-18

## 2018-09-18 RX ORDER — NALOXONE HYDROCHLORIDE 0.4 MG/ML
0.4 INJECTION, SOLUTION INTRAMUSCULAR; INTRAVENOUS; SUBCUTANEOUS AS NEEDED
Status: DISCONTINUED | OUTPATIENT
Start: 2018-09-18 | End: 2018-09-25 | Stop reason: HOSPADM

## 2018-09-18 RX ORDER — SODIUM CHLORIDE 0.9 % (FLUSH) 0.9 %
5-10 SYRINGE (ML) INJECTION AS NEEDED
Status: DISCONTINUED | OUTPATIENT
Start: 2018-09-18 | End: 2018-09-18 | Stop reason: HOSPADM

## 2018-09-18 RX ORDER — GLYCOPYRROLATE 0.2 MG/ML
INJECTION INTRAMUSCULAR; INTRAVENOUS AS NEEDED
Status: DISCONTINUED | OUTPATIENT
Start: 2018-09-18 | End: 2018-09-18 | Stop reason: HOSPADM

## 2018-09-18 RX ORDER — SODIUM CHLORIDE, SODIUM LACTATE, POTASSIUM CHLORIDE, CALCIUM CHLORIDE 600; 310; 30; 20 MG/100ML; MG/100ML; MG/100ML; MG/100ML
125 INJECTION, SOLUTION INTRAVENOUS CONTINUOUS
Status: DISCONTINUED | OUTPATIENT
Start: 2018-09-18 | End: 2018-09-18 | Stop reason: HOSPADM

## 2018-09-18 RX ORDER — CEFAZOLIN SODIUM 1 G/3ML
2 INJECTION, POWDER, FOR SOLUTION INTRAMUSCULAR; INTRAVENOUS
Status: DISCONTINUED | OUTPATIENT
Start: 2018-09-18 | End: 2018-09-18 | Stop reason: RX

## 2018-09-18 RX ORDER — ROCURONIUM BROMIDE 10 MG/ML
INJECTION, SOLUTION INTRAVENOUS AS NEEDED
Status: DISCONTINUED | OUTPATIENT
Start: 2018-09-18 | End: 2018-09-18 | Stop reason: HOSPADM

## 2018-09-18 RX ORDER — DIPHENHYDRAMINE HYDROCHLORIDE 50 MG/ML
25 INJECTION, SOLUTION INTRAMUSCULAR; INTRAVENOUS
Status: DISCONTINUED | OUTPATIENT
Start: 2018-09-18 | End: 2018-09-25 | Stop reason: HOSPADM

## 2018-09-18 RX ORDER — MORPHINE SULFATE 4 MG/ML
6 INJECTION INTRAVENOUS
Status: DISCONTINUED | OUTPATIENT
Start: 2018-09-18 | End: 2018-09-20

## 2018-09-18 RX ORDER — LIDOCAINE HYDROCHLORIDE 20 MG/ML
INJECTION, SOLUTION EPIDURAL; INFILTRATION; INTRACAUDAL; PERINEURAL AS NEEDED
Status: DISCONTINUED | OUTPATIENT
Start: 2018-09-18 | End: 2018-09-18 | Stop reason: HOSPADM

## 2018-09-18 RX ORDER — BUPIVACAINE HYDROCHLORIDE AND EPINEPHRINE 2.5; 5 MG/ML; UG/ML
INJECTION, SOLUTION EPIDURAL; INFILTRATION; INTRACAUDAL; PERINEURAL AS NEEDED
Status: DISCONTINUED | OUTPATIENT
Start: 2018-09-18 | End: 2018-09-18 | Stop reason: HOSPADM

## 2018-09-18 RX ORDER — KETOROLAC TROMETHAMINE 30 MG/ML
30 INJECTION, SOLUTION INTRAMUSCULAR; INTRAVENOUS EVERY 6 HOURS
Status: COMPLETED | OUTPATIENT
Start: 2018-09-18 | End: 2018-09-20

## 2018-09-18 RX ORDER — ONDANSETRON 2 MG/ML
INJECTION INTRAMUSCULAR; INTRAVENOUS AS NEEDED
Status: DISCONTINUED | OUTPATIENT
Start: 2018-09-18 | End: 2018-09-18 | Stop reason: HOSPADM

## 2018-09-18 RX ORDER — MIDAZOLAM HYDROCHLORIDE 1 MG/ML
INJECTION, SOLUTION INTRAMUSCULAR; INTRAVENOUS AS NEEDED
Status: DISCONTINUED | OUTPATIENT
Start: 2018-09-18 | End: 2018-09-18 | Stop reason: HOSPADM

## 2018-09-18 RX ORDER — ACETAMINOPHEN 10 MG/ML
1000 INJECTION, SOLUTION INTRAVENOUS EVERY 6 HOURS
Status: COMPLETED | OUTPATIENT
Start: 2018-09-18 | End: 2018-09-21

## 2018-09-18 RX ORDER — FENTANYL CITRATE 50 UG/ML
INJECTION, SOLUTION INTRAMUSCULAR; INTRAVENOUS AS NEEDED
Status: DISCONTINUED | OUTPATIENT
Start: 2018-09-18 | End: 2018-09-18 | Stop reason: HOSPADM

## 2018-09-18 RX ORDER — CEFAZOLIN SODIUM 2 G/50ML
2 SOLUTION INTRAVENOUS EVERY 8 HOURS
Status: COMPLETED | OUTPATIENT
Start: 2018-09-18 | End: 2018-09-21

## 2018-09-18 RX ORDER — NALOXONE HYDROCHLORIDE 0.4 MG/ML
0.4 INJECTION, SOLUTION INTRAMUSCULAR; INTRAVENOUS; SUBCUTANEOUS AS NEEDED
Status: DISCONTINUED | OUTPATIENT
Start: 2018-09-18 | End: 2018-09-18 | Stop reason: HOSPADM

## 2018-09-18 RX ORDER — SODIUM CHLORIDE, SODIUM LACTATE, POTASSIUM CHLORIDE, CALCIUM CHLORIDE 600; 310; 30; 20 MG/100ML; MG/100ML; MG/100ML; MG/100ML
75 INJECTION, SOLUTION INTRAVENOUS CONTINUOUS
Status: DISCONTINUED | OUTPATIENT
Start: 2018-09-18 | End: 2018-09-23

## 2018-09-18 RX ADMIN — SODIUM CHLORIDE, SODIUM LACTATE, POTASSIUM CHLORIDE, AND CALCIUM CHLORIDE: 600; 310; 30; 20 INJECTION, SOLUTION INTRAVENOUS at 16:22

## 2018-09-18 RX ADMIN — SODIUM CHLORIDE, SODIUM LACTATE, POTASSIUM CHLORIDE, AND CALCIUM CHLORIDE: 600; 310; 30; 20 INJECTION, SOLUTION INTRAVENOUS at 14:10

## 2018-09-18 RX ADMIN — ROCURONIUM BROMIDE 35 MG: 10 INJECTION, SOLUTION INTRAVENOUS at 14:25

## 2018-09-18 RX ADMIN — Medication 5 MG: at 14:52

## 2018-09-18 RX ADMIN — SODIUM CHLORIDE, SODIUM LACTATE, POTASSIUM CHLORIDE, AND CALCIUM CHLORIDE 125 ML/HR: 600; 310; 30; 20 INJECTION, SOLUTION INTRAVENOUS at 11:21

## 2018-09-18 RX ADMIN — FENTANYL CITRATE 50 MCG: 50 INJECTION, SOLUTION INTRAMUSCULAR; INTRAVENOUS at 17:42

## 2018-09-18 RX ADMIN — CEFAZOLIN SODIUM 2 G: 2 SOLUTION INTRAVENOUS at 23:38

## 2018-09-18 RX ADMIN — SUCCINYLCHOLINE CHLORIDE 120 MG: 20 INJECTION INTRAMUSCULAR; INTRAVENOUS at 14:15

## 2018-09-18 RX ADMIN — ROCURONIUM BROMIDE 5 MG: 10 INJECTION, SOLUTION INTRAVENOUS at 14:15

## 2018-09-18 RX ADMIN — ONDANSETRON 4 MG: 2 INJECTION INTRAMUSCULAR; INTRAVENOUS at 14:21

## 2018-09-18 RX ADMIN — CEFAZOLIN 2 G: 1 INJECTION, POWDER, FOR SOLUTION INTRAMUSCULAR; INTRAVENOUS; PARENTERAL at 15:46

## 2018-09-18 RX ADMIN — KETOROLAC TROMETHAMINE 30 MG: 30 INJECTION, SOLUTION INTRAMUSCULAR at 23:44

## 2018-09-18 RX ADMIN — FENTANYL CITRATE 50 MCG: 50 INJECTION, SOLUTION INTRAMUSCULAR; INTRAVENOUS at 17:06

## 2018-09-18 RX ADMIN — Medication 3 MG: at 16:28

## 2018-09-18 RX ADMIN — LIDOCAINE HYDROCHLORIDE 80 MG: 20 INJECTION, SOLUTION EPIDURAL; INFILTRATION; INTRACAUDAL; PERINEURAL at 14:15

## 2018-09-18 RX ADMIN — GLYCOPYRROLATE 0.6 MG: 0.2 INJECTION INTRAMUSCULAR; INTRAVENOUS at 16:28

## 2018-09-18 RX ADMIN — KETOROLAC TROMETHAMINE 30 MG: 30 INJECTION, SOLUTION INTRAMUSCULAR at 18:48

## 2018-09-18 RX ADMIN — SODIUM CHLORIDE, SODIUM LACTATE, POTASSIUM CHLORIDE, AND CALCIUM CHLORIDE 125 ML/HR: 600; 310; 30; 20 INJECTION, SOLUTION INTRAVENOUS at 13:43

## 2018-09-18 RX ADMIN — ALBUTEROL SULFATE 3 PUFF: 90 AEROSOL, METERED RESPIRATORY (INHALATION) at 14:21

## 2018-09-18 RX ADMIN — FENTANYL CITRATE 50 MCG: 50 INJECTION, SOLUTION INTRAMUSCULAR; INTRAVENOUS at 16:32

## 2018-09-18 RX ADMIN — MORPHINE SULFATE 6 MG: 4 INJECTION INTRAVENOUS at 21:13

## 2018-09-18 RX ADMIN — FENTANYL CITRATE 100 MCG: 50 INJECTION, SOLUTION INTRAMUSCULAR; INTRAVENOUS at 14:34

## 2018-09-18 RX ADMIN — FENTANYL CITRATE 100 MCG: 50 INJECTION, SOLUTION INTRAMUSCULAR; INTRAVENOUS at 14:15

## 2018-09-18 RX ADMIN — SODIUM CHLORIDE, SODIUM LACTATE, POTASSIUM CHLORIDE, AND CALCIUM CHLORIDE 125 ML: 600; 310; 30; 20 INJECTION, SOLUTION INTRAVENOUS at 17:00

## 2018-09-18 RX ADMIN — SODIUM CHLORIDE, SODIUM LACTATE, POTASSIUM CHLORIDE, AND CALCIUM CHLORIDE 1000 ML: 600; 310; 30; 20 INJECTION, SOLUTION INTRAVENOUS at 22:37

## 2018-09-18 RX ADMIN — ACETAMINOPHEN 1000 MG: 10 INJECTION, SOLUTION INTRAVENOUS at 21:52

## 2018-09-18 RX ADMIN — SODIUM CHLORIDE, SODIUM LACTATE, POTASSIUM CHLORIDE, AND CALCIUM CHLORIDE 150 ML/HR: 600; 310; 30; 20 INJECTION, SOLUTION INTRAVENOUS at 18:50

## 2018-09-18 RX ADMIN — SODIUM CHLORIDE 40 MG: 9 INJECTION INTRAMUSCULAR; INTRAVENOUS; SUBCUTANEOUS at 20:46

## 2018-09-18 RX ADMIN — ROCURONIUM BROMIDE 10 MG: 10 INJECTION, SOLUTION INTRAVENOUS at 15:04

## 2018-09-18 RX ADMIN — PROPOFOL 180 MG: 10 INJECTION, EMULSION INTRAVENOUS at 14:15

## 2018-09-18 RX ADMIN — FENTANYL CITRATE 50 MCG: 50 INJECTION, SOLUTION INTRAMUSCULAR; INTRAVENOUS at 16:33

## 2018-09-18 RX ADMIN — ROCURONIUM BROMIDE 10 MG: 10 INJECTION, SOLUTION INTRAVENOUS at 15:59

## 2018-09-18 RX ADMIN — MIDAZOLAM HYDROCHLORIDE 4 MG: 1 INJECTION, SOLUTION INTRAMUSCULAR; INTRAVENOUS at 14:03

## 2018-09-18 NOTE — IP AVS SNAPSHOT
[address] [place] [id number] 
[phone] Patient: Suzi Shore MRN: [id number] :1975 A check mega indicates which time of day the medication should be taken. My Medications START taking these medications Instructions Each Dose to Equal  
 Morning Noon Evening Bedtime  
 cloNIDine 0.2 mg/24 hr patch Commonly known as:  CATAPRES Start taking on:  2018 Your last dose was: Your next dose is:    
   
   
 1 Patch by TransDERmal route every seven (7) days. 1 Patch  
    
   
   
   
  
 fluconazole 100 mg tablet Commonly known as:  DIFLUCAN Your last dose was: Your next dose is: Take 1 Tab by mouth daily for 7 days. FDA advises cautious prescribing of oral fluconazole in pregnancy. 100 mg  
    
   
   
   
  
 levoFLOXacin 500 mg tablet Commonly known as:  Sarah Jose Your last dose was: Your next dose is: Take 1 Tab by mouth daily for 10 days. 500 mg Omeprazole delayed release 20 mg tablet Commonly known as:  PRILOSEC D/R Your last dose was: Your next dose is: Take 1 Tab by mouth daily. 20 mg CONTINUE taking these medications Instructions Each Dose to Equal  
 Morning Noon Evening Bedtime B COMPLEX PO Your last dose was: Your next dose is: Take  by mouth daily. Takes the liquid  
     
   
   
   
  
 escitalopram oxalate 10 mg tablet Commonly known as:  Kellie Shaw Your last dose was: Your next dose is: Take 10 mg by mouth nightly. Indications: depression 10 mg  
    
   
   
   
  
 promethazine 25 mg tablet Commonly known as:  PHENERGAN Your last dose was: Your next dose is: Take 1 Tab by mouth every six (6) hours as needed.   
 25 mg  
    
   
   
   
  
  
 STOP taking these medications   
 hydroCHLOROthiazide 12.5 mg tablet Commonly known as:  HYDRODIURIL Where to Get Your Medications These medications were sent to [facility]/pharm[id number]- [place], [address]  [address], [address] [address] Phone:  [phone]  
  cloNIDine 0.2 mg/24 hr patch  
 fluconazole 100 mg tablet  
 levoFLOXacin 500 mg tablet Omeprazole delayed release 20 mg tablet

## 2018-09-18 NOTE — PERIOP NOTES
TRANSFER - OUT REPORT:    Verbal report given to LAMONTE Galvan (name) on [place]  being transferred to 3 S (unit) for routine post - op       Report consisted of patients Situation, Background, Assessment and   Recommendations(SBAR). Information from the following report(s) SBAR, Kardex, OR Summary, Procedure Summary, Intake/Output, MAR and Cardiac Rhythm NSR was reviewed with the receiving nurse. Lines:   Peripheral IV 09/18/18 Left Hand (Active)   Site Assessment Clean, dry, & intact 9/18/2018  5:30 PM   Phlebitis Assessment 0 9/18/2018  5:30 PM   Infiltration Assessment 0 9/18/2018  5:30 PM   Dressing Status Clean, dry, & intact 9/18/2018  5:30 PM   Dressing Type Tape;Transparent 9/18/2018  5:30 PM   Hub Color/Line Status Outdated; Infusing;Pink 9/18/2018  5:30 PM        Opportunity for questions and clarification was provided.       Patient transported with:   O2 @ 2 liters

## 2018-09-18 NOTE — ROUTINE PROCESS
1806 rcved report from recovery, will assess pt upon arrival to unit    1826 rcved pt via bed from recovery, pt awake, alert and oriented x4, no acute distress noted, vitals obtained, lactated ringers started at 150 ml/r, will monitor     1940 Bedside and Verbal shift change report given to LAMONTE Souza(oncoming nurse) by LAMONTE Clemons (offgoing nurse). Report included the following information SBAR, Kardex and MAR.

## 2018-09-18 NOTE — IP AVS SNAPSHOT
[address] [address] 
[phone] Patient: Jackelin Hill MRN: [id number] :[id number] About your hospitalization You were admitted on:  2018 You last received care in the:  [address] [place] [address] You were discharged on:  2018 Why you were hospitalized Your primary diagnosis was:  Not on File Your diagnoses also included:  Status Post Laparoscopy, Fever Follow-up Information Follow up With Details Comments Contact Info Maykel Luther MD On 10/2/2018 Follow up appointment scheduled for 2018 at 11:00 a.m. [address] [facility] [address] [facility] 
[phone] Chika Garcia NP   [address] [facility] 
[phone] Bonita Lin DO On 2018 Follow up appointment scheduled for 2018 at 8:30 a.m. Please bring picture ID, insurance cards and current list of medications to f/u appointment. [address] [place] [address] [address] [place] [address] 
[phone] [address] [place] [address] to continue managing your healthcare needs [address] [place] [address] [phone] Your Scheduled Appointments 2018 11:00 AM EDT  
POST OP with Elige Ganser, NP [facility] [employer] [facility] Javi Peck ([address])  
 [address] [place] [address] [address] [facility]  
[phone] Discharge Orders None A check mega indicates which time of day the medication should be taken. My Medications START taking these medications Instructions Each Dose to Equal  
 Morning Noon Evening Bedtime  
 cloNIDine 0.2 mg/24 hr patch Commonly known as:  CATAPRES Start taking on:  2018 Your last dose was: Your next dose is:    
   
   
 1 Patch by TransDERmal route every seven (7) days. 1 Patch fluconazole 100 mg tablet Commonly known as:  DIFLUCAN Your last dose was: Your next dose is: Take 1 Tab by mouth daily for 7 days. FDA advises cautious prescribing of oral fluconazole in pregnancy. 100 mg  
    
   
   
   
  
 levoFLOXacin 500 mg tablet Commonly known as:  Garry Rahman Your last dose was: Your next dose is: Take 1 Tab by mouth daily for 10 days. 500 mg Omeprazole delayed release 20 mg tablet Commonly known as:  PRILOSEC D/R Your last dose was: Your next dose is: Take 1 Tab by mouth daily. 20 mg CONTINUE taking these medications Instructions Each Dose to Equal  
 Morning Noon Evening Bedtime B COMPLEX PO Your last dose was: Your next dose is: Take  by mouth daily. Takes the liquid  
     
   
   
   
  
 escitalopram oxalate 10 mg tablet Commonly known as:  Guanako Gordon Your last dose was: Your next dose is: Take 10 mg by mouth nightly. Indications: depression 10 mg  
    
   
   
   
  
 promethazine 25 mg tablet Commonly known as:  PHENERGAN Your last dose was: Your next dose is: Take 1 Tab by mouth every six (6) hours as needed. 25 mg  
    
   
   
   
  
  
STOP taking these medications   
 hydroCHLOROthiazide 12.5 mg tablet Commonly known as:  HYDRODIURIL Where to Get Your Medications These medications were sent to [facility]/pharmacy #[id number]- [place], [address] [facility] [address]  [address], [address] [address] Phone:  [phone]  
  cloNIDine 0.2 mg/24 hr patch  
 fluconazole 100 mg tablet  
 levoFLOXacin 500 mg tablet Omeprazole delayed release 20 mg tablet Discharge Instructions DISCHARGE SUMMARY from Nurse PATIENT INSTRUCTIONS: 
 
 
F-face looks uneven A-arms unable to move or move unevenly S-speech slurred or non-existent T-time-call 911 as soon as signs and symptoms begin-DO NOT go Back to bed or wait to see if you get better-TIME IS BRAIN. Warning Signs of HEART ATTACK Call 911 if you have these symptoms: 
? Chest discomfort. Most heart attacks involve discomfort in the center of the chest that lasts more than a few minutes, or that goes away and comes back. It can feel like uncomfortable pressure, squeezing, fullness, or pain. ? Discomfort in other areas of the upper body. Symptoms can include pain or discomfort in one or both arms, the back, neck, jaw, or stomach. ? Shortness of breath with or without chest discomfort. ? Other signs may include breaking out in a cold sweat, nausea, or lightheadedness. Don't wait more than five minutes to call [address]! Fast action can save your life. Calling 911 is almost always the fastest way to get lifesaving treatment. Emergency Medical Services staff can begin treatment when they arrive  up to an hour sooner than if someone gets to the hospital by car. The discharge information has been reviewed with the patient. The patient verbalized understanding. Discharge medications reviewed with the patient and appropriate educational materials and side effects teaching were provided. ___________________________________________________________________________________________________________________________________ Introducing [facility]!    
 Nash Contreras introduces [employer] patient portal. Now you can access parts of your medical record, email your doctor's office, and request medication refills online. 1. In your internet browser, go to [email or web address] 2. Click on the First Time User? Click Here link in the Sign In box. You will see the New Member Sign Up page. 3. Enter your Mimiboard Access Code exactly as it appears below. You will not need to use this code after youve completed the sign-up process. If you do not sign up before the expiration date, you must request a new code. · Mimiboard Access Code: [email or web address] Expires: 11/7/2018  3:45 PM 
 
4. Enter the last four digits of your Social Security Number (xxxx) and Date of Birth (mm/dd/yyyy) as indicated and click Submit. You will be taken to the next sign-up page. 5. Create a Mimiboard ID. This will be your Mimiboard login ID and cannot be changed, so think of one that is secure and easy to remember. 6. Create a Mimiboard password. You can change your password at any time. 7. Enter your Password Reset Question and Answer. This can be used at a later time if you forget your password. 8. Enter your e-mail address. You will receive e-mail notification when new information is available in [address] 9. Click Sign Up. You can now view and download portions of your medical record. 10. Click the Download Summary menu link to download a portable copy of your medical information. If you have questions, please visit the Frequently Asked Questions section of the Mimiboard website. Remember, Mimiboard is NOT to be used for urgent needs. For medical emergencies, dial 911. Now available from your iPhone and Android! Introducing Jose Lynne As a [facility] patient, I wanted to make you aware of our electronic visit tool called Jose Lynne. [facility] 24/7 allows you to connect within minutes with a medical provider 24 hours a day, seven days a week via a mobile device or tablet or logging into a secure website from your computer. You can access Jose Winkadorefin from anywhere in the [place]. A virtual visit might be right for you when you have a simple condition and feel like you just dont want to get out of bed, or cant get away from work for an appointment, when your regular [facility] provider is not available (evenings, weekends or holidays), or when youre out of town and need minor care. Electronic visits cost only $49 and if the [facility] 24/7 provider determines a prescription is needed to treat your condition, one can be electronically transmitted to a nearby pharmacy*. Please take a moment to enroll today if you have not already done so. The enrollment process is free and takes just a few minutes. To enroll, please download the [facility] 24/7 melanie to your tablet or phone, or visit [email or web address] to enroll on your computer. And, as an [address] [place] [address] patient with a [employer] account, the results of your visits will be scanned into your electronic medical record and your primary care provider will be able to view the scanned results. We urge you to continue to see your regular [facility] provider for your ongoing medical care. And while your primary care provider may not be the one available when you seek a Jose Lynne virtual visit, the peace of mind you get from getting a real diagnosis real time can be priceless. For more information on Jose Winkadorefin, view our Frequently Asked Questions (FAQs) at [email or web address]. Sincerely, 
 
Daiana Smith MD 
Chief Medical Officer [employer] *:  certain medications cannot be prescribed via Jose WinkadoreGreenSQL Unresulted Labs-Please follow up with your PCP about these lab tests Order Current Status CULTURE, BLOOD Preliminary result CULTURE, BLOOD Preliminary result Providers Seen During Your Hospitalization Provider Specialty Primary office phone Ayan Velazquez MD General Surgery [phone] Your Primary Care Physician (PCP) Primary Care Physician Office Phone Office Fax Darnell Daniel [phone] You are allergic to the following No active allergies Recent Documentation Height Weight BMI OB Status Smoking Status 1.651 m 107.3 kg 39.36 kg/m2 Hysterectomy Former Smoker Emergency Contacts Name Discharge Info Relation Home Work Mobile Pedro Agustin DISCHARGE CAREGIVER [3] Friend [5]   [phone] Juan J Duarte DISCHARGE CAREGIVER [3] Father [15]   [phone] [address] CAREGIVER [3] Parent [1] [phone] Patient Belongings The following personal items are in your possession at time of discharge: 
  Dental Appliances: None  Visual Aid: None      Home Medications: None   Jewelry: None  Clothing: Undergarments, Footwear, Shirt, Pants    Other Valuables: Money (comment), Cell Phone, Georjean Casi ($18.07, wallet, keys, bank cards) Discharge Instructions Attachments/References FEVER: GENERAL INFO (ENGLISH) LAPAROSCOPY: POST-OP (ENGLISH) Patient Handouts Learning About Fever What is a fever? A fever is a high body temperature. It's one way your body fights being sick. A fever shows that the body is responding to infection or other illnesses, both minor and severe. A fever is a symptom, not an illness by itself. A fever can be a sign that you are ill, but most fevers are not caused by a serious problem. You may have a fever with a minor illness, such as a cold. But sometimes a very serious infection may cause little or no fever. It is important to look at other symptoms, other conditions you have, and how you feel in general. In children, notice how they act and see what symptoms they complain of. What is a normal body temperature? A normal body temperature is about 98. 6ºF. Some people have a normal temperature that is a little higher or a little lower than this. Your temperature may be a little lower in the morning than it is later in the day. It may go up during hot weather or when you exercise, wear heavy clothes, or take a hot bath. Your temperature may also be different depending on how you take it. A temperature taken in the mouth (oral) or under the arm may be a little lower than your core temperature (rectal). What is a fever temperature? A core temperature of 100.4°F or above is considered a fever. What can cause a fever? A fever may be caused by: · Infections. This is the most common cause of a fever. Examples of infections that can cause a fever include the flu, a kidney infection, or pneumonia. · Some medicines. · Severe trauma or injury, such as a heart attack, stroke, heatstroke, or burns. · Other medical conditions, such as arthritis and some cancers. How can you treat a fever at home? · Ask your doctor if you can take an over-the-counter pain medicine, such as acetaminophen (Tylenol), ibuprofen (Advil, Motrin), or naproxen (Aleve). Be safe with medicines. Read and follow all instructions on the label. · To prevent dehydration, drink plenty of fluids. Choose water and other caffeine-free clear liquids until you feel better. If you have kidney, heart, or liver disease and have to limit fluids, talk with your doctor before you increase the amount of fluids you drink. Follow-up care is a key part of your treatment and safety. Be sure to make and go to all appointments, and call your doctor if you are having problems. It's also a good idea to know your test results and keep a list of the medicines you take. Where can you learn more? Go to http://sandra-abel.info/. Enter E455 in the search box to learn more about \"Learning About Fever. \" Current as of: November 20, 2017 Content Version: 11.7 © 9060-5021 Healthwise, Incorporated. Care instructions adapted under license by [employer] (which disclaims liability or warranty for this information). If you have questions about a medical condition or this instruction, always ask your healthcare professional. [address] any warranty or liability for your use of this information. Laparoscopy: What to Expect at [facility] Your Recovery After laparoscopic surgery, you are likely to have pain for the next several days. You may also feel like you have the flu. You may have a low fever and feel tired and sick to your stomach. This is common. You should feel better after 1 to 2 weeks. This care sheet gives you a general idea about how long it will take for you to recover. But each person recovers at a different pace. Follow the steps below to get better as quickly as possible. How can you care for yourself at home? Activity 
  · Rest when you feel tired. Getting enough sleep will help you recover.  
  · Try to walk each day. Start by walking a little more than you did the day before. Bit by bit, increase the amount you walk. Walking boosts blood flow and helps prevent pneumonia and constipation.  
  · Avoid strenuous activities, such as bicycle riding, jogging, weight lifting, or aerobic exercise, until your doctor says it is okay.  
  · Avoid lifting anything that would make you strain. This may include a child, heavy grocery bags and milk containers, a heavy briefcase or backpack, cat litter or dog food bags, or a vacuum .  
  · You may also have pain in your shoulder. The pain usually lasts about 1 or 2 days.  
  · You may drive when you are no longer taking pain medicine and can quickly move your foot from the gas pedal to the brake. You must also be able to sit comfortably for a long period of time, even if you do not plan to go far. You might get caught in traffic.   · You will probably need to take 2 weeks off from work. It depends on the type of work you do and how you feel.  
  · You may shower 24 to 48 hours after surgery, if your doctor okays it. Pat the cut (incision) dry. Do not take a bath for the first 2 weeks, or until your doctor tells you it is okay. Diet 
  · If your stomach is upset, try bland, low-fat foods such as plain rice, broiled chicken, toast, and yogurt.  
  · Drink plenty of fluids (enough so that your urine is light yellow or clear like water) to prevent dehydration. Choose water and other caffeine-free clear liquids. If you have kidney, heart, or liver disease and have to limit fluids, talk with your doctor before you increase the amount of fluids you drink.  
  · You may notice that your bowel movements are not regular right after your surgery. This is common. Avoid constipation and straining with bowel movements. You may want to take a fiber supplement every day. If you have not had a bowel movement after a couple of days, ask your doctor about taking a mild laxative. Medicines 
  · Your doctor will tell you if and when you can restart your medicines. He or she will also give you instructions about taking any new medicines.  
  · If you take blood thinners, such as warfarin (Coumadin), clopidogrel (Plavix), or aspirin, be sure to talk to your doctor. He or she will tell you if and when to start taking those medicines again. Make sure that you understand exactly what your doctor wants you to do.  
  · Take pain medicines exactly as directed. ¨ If the doctor gave you a prescription medicine for pain, take it as prescribed. ¨ If you are not taking a prescription pain medicine, ask your doctor if you can take an over-the-counter medicine.  
  · If your doctor prescribed antibiotics, take them as directed. Do not stop taking them just because you feel better. You need to take the full course of antibiotics.   · If you think your pain medicine is making you sick to your stomach: 
¨ Take your medicine after meals (unless your doctor has told you not to). ¨ Ask your doctor for a different pain medicine. Incision care 
  · If you have strips of tape on the incision, leave the tape on for a week or until it falls off.  
  · Wash the area daily with warm, soapy water and pat it dry. Don't use hydrogen peroxide or alcohol, which can slow healing. You may cover the area with a gauze bandage if it weeps or rubs against clothing. Change the bandage every day. Follow-up care is a key part of your treatment and safety. Be sure to make and go to all appointments, and call your doctor if you are having problems. It's also a good idea to know your test results and keep a list of the medicines you take. When should you call for help? Call 911 anytime you think you may need emergency care. For example, call if: 
  · You passed out (lost consciousness).  
  · You are short of breath.  
 Call your doctor now or seek immediate medical care if: 
  · You have pain that does not get better after you take pain medicine.  
  · You have loose stitches, or your incision comes open.  
  · Bright red blood has soaked through your bandage.  
  · You have signs of infection, such as: 
¨ Increased pain, swelling, warmth, or redness. ¨ Red streaks leading from the incision. ¨ Pus draining from the incision. ¨ A fever.  
  · You are sick to your stomach or cannot keep fluids down.  
  · You have signs of a blood clot in your leg (called a deep vein thrombosis), such as: 
¨ Pain in your calf, back of the knee, thigh, or groin. ¨ Redness and swelling in your leg or groin.  
  · You cannot pass stools or gas.  
 Watch closely for any changes in your health, and be sure to contact your doctor if you have any problems. Where can you learn more? Go to http://sandra-abel.info/. Enter G260 in the search box to learn more about \"Laparoscopy: What to Expect at Home. \" Current as of: May 12, 2017 Content Version: 11.7 © 2006-2018 [facility], Incorporated. Care instructions adapted under license by [employer] (which disclaims liability or warranty for this information). If you have questions about a medical condition or this instruction, always ask your healthcare professional. [address] any warranty or liability for your use of this information. Please provide this summary of care documentation to your next provider. Signatures-by signing, you are acknowledging that this After Visit Summary has been reviewed with you and you have received a copy. Patient Signature:  ____________________________________________________________ Date:  ____________________________________________________________  
  
Dot Huertaer Provider Signature:  ____________________________________________________________ Date:  ____________________________________________________________

## 2018-09-18 NOTE — INTERVAL H&P NOTE
H&P Update:  Criselda Negro was seen and examined. History and physical has been reviewed. The patient has been examined.  There have been no significant clinical changes since the completion of the originally dated History and Physical.    Signed By: Diana Thomason MD     September 18, 2018 1:51 PM

## 2018-09-18 NOTE — PROGRESS NOTES
Vivian Purdy assumed pt care. Dual skin assessment and progress of care completed. Patient stable; dressing CDI. Opportunity for questions and clarification was provided.

## 2018-09-18 NOTE — ANESTHESIA POSTPROCEDURE EVALUATION
Post-Anesthesia Evaluation and Assessment Cardiovascular Function/Vital Signs Visit Vitals  /88  Pulse 99  Temp 36.6 °C (97.9 °F)  Resp 21  
 Ht 5' 5\" (1.651 m)  Wt 92.3 kg (203 lb 7 oz)  SpO2 100%  BMI 33.85 kg/m2 Patient is status post Procedure(s): DIAGNOSTIC LAPAROSCOPY WITH ENDOSCOPY, LYSIS OF ADHESIONS FOR 90 MINUTES, ENTEROTOMY WITH REMOVAL OF SILASTIC BAND FROM POUCH, CLOSURE OF ENTEROTOMY. Nausea/Vomiting: Controlled. Postoperative hydration reviewed and adequate. Pain: 
Pain Scale 1: FLACC (09/18/18 1745) Pain Intensity 1: 2 (09/18/18 1745) Managed. Neurological Status:  
Neuro (WDL): Within Defined Limits (09/18/18 1745) At baseline. Mental Status and Level of Consciousness: Arousable. Pulmonary Status:  
O2 Device: Nasal cannula (09/18/18 1757) Adequate oxygenation and airway patent. Complications related to anesthesia: None Post-anesthesia assessment completed. No concerns. Patient has met all discharge requirements. Signed By: Jimmy Richter CRNA September 18, 2018

## 2018-09-18 NOTE — ANESTHESIA PREPROCEDURE EVALUATION
Anesthetic History No history of anesthetic complications Review of Systems / Medical History Patient summary reviewed, nursing notes reviewed and pertinent labs reviewed Pulmonary Comments: 1/4 ppd x  10y; did not smoke today Neuro/Psych Within defined limits Cardiovascular Hypertension Exercise tolerance: >4 METS 
  
GI/Hepatic/Renal 
  
GERD: poorly controlled Endo/Other Obesity Other Findings Physical Exam 
 
Airway Mallampati: II 
TM Distance: 4 - 6 cm Neck ROM: normal range of motion Mouth opening: Normal 
 
 Cardiovascular Regular rate and rhythm,  S1 and S2 normal,  no murmur, click, rub, or gallop Dental 
 
Dentition: Upper partial plate Pulmonary Breath sounds clear to auscultation Abdominal 
GI exam deferred Other Findings Anesthetic Plan ASA: 2 Anesthesia type: general 
 
 
 
 
Induction: RSI Anesthetic plan and risks discussed with: Patient

## 2018-09-18 NOTE — H&P
Diagnostic Laparoscopy - History and Physical    Subjective: The patient is a 41 y.o. obese female with a Body mass index is 35.58 kg/(m^2). Adele Kruger The patient had a VBG/Gastric bypass procedure done approximatly 18 years ago in [address] [place] [address] by Dr. Carter Tatum.  her starting weight prior to surgery was 360.  she ultimately lost approximately 175 lbs with a subsequent weight regain of 38lbs. her peak EBWL at 2 years out from surgery was 82% and now her current EBWL is 65%. her last bariatric follow-up was 6 years ago with Dr. Carter Tatum (prior to being seen in our office summer of 2018). Teresa Harrison notes that she had vomiting issues in the immediate post-op phase and continues to have the same problem. she currently is having the following issues related to his health: vomiting to the point that she has now had to have her teeth pulled. After and UGI and EGD her issues are most likely related her silastic band placed at the time of her gastric bypass.     Bariatric comorbidities continue to include:   Patient Active Problem List   Diagnosis Code    Cancer ([address] C80.1    Severe obesity with body mass index (BMI) of 35.0 to 39.9 with comorbidity ([address] E66.01    Intestinal malabsorption K90.9    Status post gastric bypass for obesity Z98.84    Hypertension I10    Anemia D64.9    Anxiety F41.9    GERD (gastroesophageal reflux disease) K21.9    Wears dentures Z97.2    Syncopal episodes R55    Smoker F17.200    Diarrhea R19.7        Patient Active Problem List    Diagnosis Date Noted    Cancer ([address]     Severe obesity with body mass index (BMI) of 35.0 to 39.9 with comorbidity ([address]     Intestinal malabsorption     Hypertension     Anemia     Anxiety     GERD (gastroesophageal reflux disease)     Wears dentures     Syncopal episodes     Smoker     Diarrhea     Status post gastric bypass for obesity 01/01/2000     Past Surgical History:   Procedure Laterality Date    HX CHOLECYSTECTOMY      HX GASTRIC BYPASS  2000    open / [facility] [place]    HX GI  2017    EGD    HX OTHER SURGICAL Right     resection of benign breast mass    HX PARTIAL HYSTERECTOMY      cancer related / pt with both ovaries      Social History   Substance Use Topics    Smoking status: Former Smoker     Types: Cigarettes     Quit date: 2/2/2018    Smokeless tobacco: Never Used    Alcohol use 0.6 oz/week     1 Cans of beer per week      Family History   Problem Relation Age of Onset    Diabetes Mother       Current Facility-Administered Medications   Medication Dose Route Frequency Provider Last Rate Last Dose    lactated Ringers infusion  125 mL/hr IntraVENous CONTINUOUS Bijal Cid  mL/hr at 09/18/18 1121 125 mL/hr at 09/18/18 1121     No Known Allergies     Review of Systems:     General - No history or complaints of unexpected fever, chills, or weight loss  Head/Neck - No history or complaints of headache, diplopia, dysphagia, hearing loss  Cardiac - No history or complaints of chest pain, palpitations, murmur, or shortness of breath  Pulmonary - No history or complaints of shortness of breath, productive cough, hemoptysis  Gastrointestinal - see above  Genitourinary - No history or complaints of hematuria/dysuria, stress urinary incontinence symptoms, or renal lithiasis  Musculoskeletal - No history or complaints of joint pain or muscular weakness  Hematologic - No history or complaints of bleeding disorders, blood transfusions, sickle cell anemia  Neurologic - No history or complaints of  migraine headaches, seizure activity, syncopal episodes, TIA or stroke  Integumentary - No history or complaints of rashes, abnormal nevi, skin cancer  Gynecological - unremarkable    Objective:     Visit Vitals    BP (!) 147/106 (BP 1 Location: Left arm, BP Patient Position: At rest;Pre-activity; Sitting)    Pulse 97    Temp 97.3 °F (36.3 °C)    Resp 15    Ht 5' 5\" (1.651 m)    Wt 92.3 kg (203 lb 7 oz)    SpO2 100%    BMI 33.85 kg/m2       Physical Examination: General appearance - alert, well appearing, and in no distress  Mental status - alert, oriented to person, place, and time  Eyes - pupils equal and reactive, extraocular eye movements intact  Ears - bilateral TM's and external ear canals normal  Nose - normal and patent, no erythema, discharge or polyps  Mouth - mucous membranes moist, pharynx normal without lesions  Neck - supple, no significant adenopathy  Lymphatics - no palpable lymphadenopathy, no hepatosplenomegaly  Chest - clear to auscultation, no wheezes, rales or rhonchi, symmetric air entry  Heart - normal rate, regular rhythm, normal S1, S2, no murmurs, rubs, clicks or gallops  Abdomen - soft, nontender, nondistended, no masses or organomegaly  Back exam - full range of motion, no tenderness, palpable spasm or pain on motion  Neurological - alert, oriented, normal speech, no focal findings or movement disorder noted  Musculoskeletal - no joint tenderness, deformity or swelling  Extremities - peripheral pulses normal, no pedal edema, no clubbing or cyanosis  Skin - normal coloration and turgor, no rashes, no suspicious skin lesions noted    Labs / Preoperative Evaluation:      No results found for this or any previous visit (from the past 1008 hour(s)). Assessment:     18 years post open VBG by [facility] with chronic emesis and EGD evidence of stricture at level of her silastic band    Plan:     diagnostic laparoscopy with plans for silastic band removal    She understands the risks associated with this procedure include but are not limited to; death, DVT/PE, bleeding, infection, damage to surrounding structures and perforation of intestinal organs. She understands the above risks and wishes to proceed.     Signed By: ARNIE Rodriguez     September 18, 2018

## 2018-09-18 NOTE — PERIOP NOTES
Primary Nurse Taiwo Mcelroy RN and Shavonne Machado RN performed a dual skin assessment on this patient     Reviewed PTA medication list with patient/caregiver and patient/caregiver denies any additional medications.  Patient admits to having a responsible adult care for them for at least 24 hours after surgery.

## 2018-09-18 NOTE — BRIEF OP NOTE
BRIEF OPERATIVE NOTE    Date of Procedure: 9/18/2018   Preoperative Diagnosis: VOMITING,MECHANICAL COMPLICATION OF GASTRO INTESTINAL PROSTHETIC,POST BARIATRIC SURGERY  Postoperative Diagnosis: EROSION SILASTIC BAND INTO JESSICA LIMB  Procedure(s):  1. DIAGNOSTIC LAPAROSCOPY WITH ENDOSCOPY  2. LYSIS OF ADHESIONS FOR 90 MINUTES  3.  ENTEROTOMY WITH REMOVAL OF SILASTIC BAND FROM JESSICA LIMB  4. CLOSURE OF ENTEROTOMY  Surgeon(s) and Role:     * Almedia Gosselin, MD - Primary         Surgical Assistant: Janay Pitt    Surgical Staff:  Circ-1: Nicolas Guaman RN  Physician Assistant: ARNIE Rodriguez  Scrub Tech-1: Ryder Backbone; Mitzi Loan  Scrub Tech-2: Rose Batista  Scrub RN-2: Lucia Alcantar RN  Event Time In   Incision Start 1431   Incision Close      Anesthesia: General   Estimated Blood Loss: 10cc  Specimens:   ID Type Source Tests Collected by Time Destination   1 : SILASTIC BAND Fresh Stomach  Almedia Gosselin, MD 9/18/2018 1436 Pathology      Findings: ERODED SILASTIC BAND INTO JESSICA LIMB   Complications: NONE  Implants: * No implants in log *

## 2018-09-19 LAB
ANION GAP SERPL CALC-SCNC: 9 MMOL/L (ref 3–18)
BUN SERPL-MCNC: 7 MG/DL (ref 7–18)
BUN/CREAT SERPL: 5 (ref 12–20)
CALCIUM SERPL-MCNC: 7.5 MG/DL (ref 8.5–10.1)
CHLORIDE SERPL-SCNC: 100 MMOL/L (ref 100–108)
CO2 SERPL-SCNC: 27 MMOL/L (ref 21–32)
CREAT SERPL-MCNC: 1.38 MG/DL (ref 0.6–1.3)
ERYTHROCYTE [DISTWIDTH] IN BLOOD BY AUTOMATED COUNT: 15.7 % (ref 11.6–14.5)
GLUCOSE SERPL-MCNC: 101 MG/DL (ref 74–99)
HCT VFR BLD AUTO: 31.6 % (ref 35–45)
HGB BLD-MCNC: 10.1 G/DL (ref 12–16)
MAGNESIUM SERPL-MCNC: 1.2 MG/DL (ref 1.6–2.6)
MCH RBC QN AUTO: 29.5 PG (ref 24–34)
MCHC RBC AUTO-ENTMCNC: 32 G/DL (ref 31–37)
MCV RBC AUTO: 92.4 FL (ref 74–97)
PLATELET # BLD AUTO: 214 K/UL (ref 135–420)
PMV BLD AUTO: 8.7 FL (ref 9.2–11.8)
POTASSIUM SERPL-SCNC: 4.5 MMOL/L (ref 3.5–5.5)
RBC # BLD AUTO: 3.42 M/UL (ref 4.2–5.3)
SODIUM SERPL-SCNC: 136 MMOL/L (ref 136–145)
WBC # BLD AUTO: 7.2 K/UL (ref 4.6–13.2)

## 2018-09-19 PROCEDURE — 36415 COLL VENOUS BLD VENIPUNCTURE: CPT | Performed by: SPECIALIST

## 2018-09-19 PROCEDURE — 77010033678 HC OXYGEN DAILY

## 2018-09-19 PROCEDURE — 65270000029 HC RM PRIVATE

## 2018-09-19 PROCEDURE — 80048 BASIC METABOLIC PNL TOTAL CA: CPT | Performed by: SPECIALIST

## 2018-09-19 PROCEDURE — 85027 COMPLETE CBC AUTOMATED: CPT | Performed by: SPECIALIST

## 2018-09-19 PROCEDURE — 74011250636 HC RX REV CODE- 250/636: Performed by: SPECIALIST

## 2018-09-19 PROCEDURE — C9113 INJ PANTOPRAZOLE SODIUM, VIA: HCPCS | Performed by: SPECIALIST

## 2018-09-19 PROCEDURE — 83735 ASSAY OF MAGNESIUM: CPT | Performed by: SPECIALIST

## 2018-09-19 RX ORDER — MAGNESIUM SULFATE 1 G/100ML
1 INJECTION INTRAVENOUS ONCE
Status: COMPLETED | OUTPATIENT
Start: 2018-09-19 | End: 2018-09-21

## 2018-09-19 RX ORDER — MAGNESIUM SULFATE HEPTAHYDRATE 40 MG/ML
2 INJECTION, SOLUTION INTRAVENOUS ONCE
Status: COMPLETED | OUTPATIENT
Start: 2018-09-19 | End: 2018-09-21

## 2018-09-19 RX ADMIN — SODIUM CHLORIDE 40 MG: 9 INJECTION INTRAMUSCULAR; INTRAVENOUS; SUBCUTANEOUS at 09:17

## 2018-09-19 RX ADMIN — MORPHINE SULFATE 6 MG: 4 INJECTION INTRAVENOUS at 00:29

## 2018-09-19 RX ADMIN — TAZOBACTAM SODIUM AND PIPERACILLIN SODIUM 3.38 G: 375; 3 INJECTION, SOLUTION INTRAVENOUS at 01:45

## 2018-09-19 RX ADMIN — KETOROLAC TROMETHAMINE 30 MG: 30 INJECTION, SOLUTION INTRAMUSCULAR at 18:48

## 2018-09-19 RX ADMIN — SODIUM CHLORIDE, SODIUM LACTATE, POTASSIUM CHLORIDE, AND CALCIUM CHLORIDE 1000 ML: 600; 310; 30; 20 INJECTION, SOLUTION INTRAVENOUS at 06:40

## 2018-09-19 RX ADMIN — MORPHINE SULFATE 6 MG: 4 INJECTION INTRAVENOUS at 04:20

## 2018-09-19 RX ADMIN — KETOROLAC TROMETHAMINE 30 MG: 30 INJECTION, SOLUTION INTRAMUSCULAR at 05:50

## 2018-09-19 RX ADMIN — MORPHINE SULFATE 6 MG: 4 INJECTION INTRAVENOUS at 16:32

## 2018-09-19 RX ADMIN — MAGNESIUM SULFATE IN DEXTROSE 1 G: 10 INJECTION, SOLUTION INTRAVENOUS at 14:00

## 2018-09-19 RX ADMIN — MORPHINE SULFATE 6 MG: 4 INJECTION INTRAVENOUS at 21:00

## 2018-09-19 RX ADMIN — SODIUM CHLORIDE, SODIUM LACTATE, POTASSIUM CHLORIDE, AND CALCIUM CHLORIDE 1000 ML: 600; 310; 30; 20 INJECTION, SOLUTION INTRAVENOUS at 13:39

## 2018-09-19 RX ADMIN — ACETAMINOPHEN 1000 MG: 10 INJECTION, SOLUTION INTRAVENOUS at 09:18

## 2018-09-19 RX ADMIN — KETOROLAC TROMETHAMINE 30 MG: 30 INJECTION, SOLUTION INTRAMUSCULAR at 13:39

## 2018-09-19 RX ADMIN — MAGNESIUM SULFATE HEPTAHYDRATE 2 G: 40 INJECTION, SOLUTION INTRAVENOUS at 15:50

## 2018-09-19 RX ADMIN — ACETAMINOPHEN 1000 MG: 10 INJECTION, SOLUTION INTRAVENOUS at 04:20

## 2018-09-19 RX ADMIN — MORPHINE SULFATE 6 MG: 4 INJECTION INTRAVENOUS at 10:33

## 2018-09-19 RX ADMIN — TAZOBACTAM SODIUM AND PIPERACILLIN SODIUM 3.38 G: 375; 3 INJECTION, SOLUTION INTRAVENOUS at 05:50

## 2018-09-19 RX ADMIN — ENOXAPARIN SODIUM 40 MG: 40 INJECTION, SOLUTION INTRAVENOUS; SUBCUTANEOUS at 18:49

## 2018-09-19 RX ADMIN — SODIUM CHLORIDE, SODIUM LACTATE, POTASSIUM CHLORIDE, AND CALCIUM CHLORIDE 150 ML/HR: 600; 310; 30; 20 INJECTION, SOLUTION INTRAVENOUS at 16:00

## 2018-09-19 RX ADMIN — SODIUM CHLORIDE, SODIUM LACTATE, POTASSIUM CHLORIDE, AND CALCIUM CHLORIDE 1000 ML: 600; 310; 30; 20 INJECTION, SOLUTION INTRAVENOUS at 00:27

## 2018-09-19 RX ADMIN — TAZOBACTAM SODIUM AND PIPERACILLIN SODIUM 3.38 G: 375; 3 INJECTION, SOLUTION INTRAVENOUS at 13:39

## 2018-09-19 RX ADMIN — SODIUM CHLORIDE 40 MG: 9 INJECTION INTRAMUSCULAR; INTRAVENOUS; SUBCUTANEOUS at 21:00

## 2018-09-19 RX ADMIN — TAZOBACTAM SODIUM AND PIPERACILLIN SODIUM 3.38 G: 375; 3 INJECTION, SOLUTION INTRAVENOUS at 18:49

## 2018-09-19 RX ADMIN — CEFAZOLIN SODIUM 2 G: 2 SOLUTION INTRAVENOUS at 07:54

## 2018-09-19 NOTE — NURSE NAVIGATOR
Resting quietly in bed. Denies pain/nausea at this time. Incisions with  Steristrips, dry and intact. No ss of infection. Remains NPO. Complains of Lightheadedness upon sitting up or standing she feels is due to low bp. Last documented BP 45 min ago was 100/65. Instructed to call for assistance before standing.

## 2018-09-19 NOTE — PROGRESS NOTES
Problem: Falls - Risk of  Goal: *Absence of Falls  Document Lucila Fall Risk and appropriate interventions in the flowsheet.    Outcome: Progressing Towards Goal  Fall Risk Interventions:            Medication Interventions: Patient to call before getting OOB

## 2018-09-19 NOTE — PROGRESS NOTES
Inpatient Daily Progress Note    Subjective:    Patient is c/o having a dry mouth. Pain is well controlled. She has not had nausea, has not vomited, has not passed flatus, and has not had a bowel movement. Objective:     Physical Exam:  Visit Vitals    BP 98/74 (BP 1 Location: Right arm, BP Patient Position: At rest)    Pulse 95    Temp 98.2 °F (36.8 °C)    Resp 18    Ht 5' 5\" (1.651 m)    Wt 96.6 kg (212 lb 15.4 oz)    SpO2 97%    BMI 35.44 kg/m2       Gen: Well developed, well nourished 41 y.o. female in no acute distress  Resp: clear to auscultation bilaterally, no wheezes   Cardio: Regular rate and rhythm, no murmurs, clicks, gallops or rubs, no edema  Abdomen: soft, is appropriately tender, nondistended, without active bowel sounds, no hernias  Psych: alert and oriented to person, place and time      Recent Results (from the past 12 hour(s))   CBC W/O DIFF    Collection Time: 09/18/18 10:18 PM   Result Value Ref Range    WBC 6.6 4.6 - 13.2 K/uL    RBC 3.75 (L) 4.20 - 5.30 M/uL    HGB 11.2 (L) 12.0 - 16.0 g/dL    HCT 34.5 (L) 35.0 - 45.0 %    MCV 92.0 74.0 - 97.0 FL    MCH 29.9 24.0 - 34.0 PG    MCHC 32.5 31.0 - 37.0 g/dL    RDW 15.5 (H) 11.6 - 14.5 %    PLATELET 699 136 - 564 K/uL    MPV 9.0 (L) 9.2 - 11.8 FL   CBC W/O DIFF    Collection Time: 09/19/18  7:20 AM   Result Value Ref Range    WBC 7.2 4.6 - 13.2 K/uL    RBC 3.42 (L) 4.20 - 5.30 M/uL    HGB 10.1 (L) 12.0 - 16.0 g/dL    HCT 31.6 (L) 35.0 - 45.0 %    MCV 92.4 74.0 - 97.0 FL    MCH 29.5 24.0 - 34.0 PG    MCHC 32.0 31.0 - 37.0 g/dL    RDW 15.7 (H) 11.6 - 14.5 %    PLATELET 525 502 - 621 K/uL    MPV 8.7 (L) 9.2 - 11.8 FL       Assessment:     Diana Merritt is a 41 y.o. female POD #1 diagnostic laparoscopy with enterotomy/gastrotomy w/ removal of silatstic band from Scar limb.      Plan:     - Continue current medications  - Strict NPO  - Strict I&O  - UGI tomorrow  - Notify Dr. Kanchan Connolly team if vital signs worsen

## 2018-09-19 NOTE — OP NOTES
[facility] [place]  OPERATIVE REPORT    Lazarus Dad  MR#: [id number]  : 1975  ACCOUNT #: [de-identified]   DATE OF SERVICE: 2018    PREOPERATIVE DIAGNOSIS:  Obstructing Silastic band around gastric pouch causing chronic emesis. POSTOPERATIVE DIAGNOSES:    1. Erosion of Silastic band into Scar limb of gastric bypass. 2.  Massive intra-abdominal adhesions due to prior open laparotomy. PROCEDURES PERFORMED:  1. Laparoscopic lysis of adhesions greater than 90 minutes' duration. 2.  Intraoperative endoscopy x4.  3.  Enterotomy of Scar limb with extraction of eroded Silastic band. 4.  Closure of enterotomy. 5.  Removal silastic band off of gastric remnanat    SURGEON:  Grant Means MD    ASSISTANT:  ARNIE Gupta. ARNIE Tomlin assisted with laparoscopic lysis of adhesions, multiple endoscopies and identification of the eroded Silastic band, enterotomy for extraction of band, and closure of enterotomy and closure of skin incisions    ANESTHESIA:  General endotracheal.    ESTIMATED BLOOD LOSS:  10cc    SPECIMENS REMOVED:  Silastic bands x 2    COMPLICATIONS:  None. IMPLANTS:  None implanted. FINDINGS:  1. The patient did have massive intra-abdominal adhesions due to prior laparotomy, which were out of proportion to a normal post-gastric bypass procedure. 2.  The patient had evidence of an eroded band in her Scar limb, which was not identified initially on endoscopy due to the unusual location of the eroded band. STATEMENT OF MEDICAL NECESSITY:  The patient is a patient of Dr. Ole Lewis who 18 years ago underwent a vertical banded gastroplasty gastric bypass procedure, or more commonly known as a Fobi pouch.   She states that very soon after surgery, she began to experience reflux symptoms, and she presented to Dr. Carlos Hayes innumerous times for her complaint of her symptoms, which she thought was just simply related to her eating too fast.  She suffered for innumerous years with this problem. She has had prior evaluations with upper GI endoscopies in the past, and finally she got to the point where she presented to me for 2 reasons, number one, the fact that she had gained some weight back, and number two, she was still having the same symptoms. We assessed her initially in our fluoroscopy clinic with an upper GI study. There appeared to be some holdup of contrast in her gastric pouch initially and we could not identify a specific site of obstruction, but there was stasis of contrast present. We then scheduled her for an endoscopy, which I performed several weeks ago, and endoscopically within her gastric pouch, there appeared to be narrowing at the distal pouch consistent with where the Silastic band is usually placed on the Fobi pouch, and I entered this narrow area, then saw her anastomosis and entered the Scar limb, and did not see anything else on that endoscopic evaluation. I felt like that was indeed the Silastic band causing obstruction, and with her chronic symptoms, I have recommended removal of that band. She is desperate for help and she agrees to proceed with surgery at this juncture. DESCRIPTION OF PROCEDURE:  The patient was brought to operating room and placed on the table in supine position, at which time general anesthesia was administered without any difficulty. Her abdomen was then prepped and draped in the usual sterile fashion. Using a 15 blade, a 1 cm incision was made just to the left of the umbilicus. Veress needle approach was used to gain access to the peritoneal cavity, which was then insufflated. I placed a Visiport at that site and I entered the abdomen just under a mass of adhesions, which extended from costal margin to costal margin all the way to the level of the liver.   I had to place 2 lateral trocars in the right upper quadrant region and I began an exhaustive adhesiolysis, taking down omental attachments to the anterior abdominal wall, and I actually reduced a small incisional hernia in the process. That adhesiolysis just to clear the mid abdomen took about 30-40 minutes. Then, I began a much more difficult portion of the operation, which was to try to dissect in the left subhepatic space. The stomach was pulled anteriorly, and initially I did not understand why that was the case, but we identified the gastrostomy tube and we had to take down the gastric remnant off the abdominal wall, and there was a Silastic band as a radiologic marker present, which we excised off the gastric wall. I then began to dissect further in the left subhepatic space. This took almost another hour to achieve, clearing the entire abdomen all the way to the level of the diaphragm. Once this was achieved, interestingly enough, I noted that the patient had a retrogastric anastomosis, which was unusual for this surgeon's procedures, as I have operated on innumerous patients of his in the past.  I was able to finally identify the Scar limb. I traced it up cephalad to the anastomosis at the gastric pouch, but I saw no Silastic band around the pouch, which once again, knowing this surgeon's operations, I felt was very unusual.  There was a dense scar present anterior to the gastric pouch distally, and I then went to the head of the table where I performed an endoscopy on the patient. I placed the scope transorally. The esophagus was normal.  Pouch was normal.  Distally along the pouch, there was some erythema and that narrowing which I had identified previously, which I entered using the scope. I then saw the anastomosis and entered the Scar limb. It was only after I began to pull the scope back that I noticed completely almost posteriorly and behind the scope was an eroded Silastic band present. I then went back towards the abdominal portion of the operation where I dissected further around the medial aspect of the pouch.   I saw some evidence where likely the Silastic band had eroded into the gastric pouch, but I could not find the Silastic band at all. I went back towards the head of the table where I performed an endoscopy a second time. With my PA's help, I identified where the Silastic band was located. We put a mega on the bowel and it was actually migrated into the Scar limb. I then went back towards the abdominal portion of the operation where I performed an anterior enterotomy of the Scar limb, which likely transected just a portion of the gastrojejunal anastomosis, and this was exceptionally difficult. I had to perform a third endoscopy to confirm the location again as I could not initially see the Silastic band, but with considerable effort, we were able to then grasp the band using graspers and then remove the clips from the Silastic band and remove the Silastic band in its entirety from its position. With this having been completed, I then closed the enterotomy using a 2-layer approach using 2-0 Vicryl suture in a running fashion, then 2-0 Ethibond suture in a Lembert fashion. I went back towards the abdominal  Portion of the operation where I performed endoscopy once again. The enterotomy closure was intact. The Silastic band had been removed and there were no other abnormalities noted. We then went back towards the abdominal portion of the   operation. We reirrigated the abdomen using copious amounts of normal saline solution. Some Surgicel was placed in the upper abdomen for hemostasis purposes due to all of the raw surface areas, and we then removed all trocars, closing the skin incisions using 4-0 subcuticular Monocryl. Steri-Strips and sterile dressings were applied. The patient tolerated the procedure well.       Igor Pierre       AT / Alise Limb  D: 09/19/2018 07:42     T: 09/19/2018 08:40  JOB #: [id number]

## 2018-09-19 NOTE — PROGRESS NOTES
2052 Pt on phone and telling nurse that she is in [place] lot of pain\" and in \"too much pain. \" Offered pt morphine and explained it is pain medication but pt decidednot to take now as it makes her sleepy. Will offer again. 2203 Pt's HR heart rate rising despite being given pain medication. No acute distress, pt is calm and alert, denies lightheadedness and nausea. Pain rated 8/10. No obvious bleeding. Informed Dr. Grey Silver about rising HR, /88, temp 99. Asked if EKG is appropriate at this time. Received no new order for EKG. Orders received for 1L LR bolus over 30mins, CBC stat, hold lovenox at this time, and telemetry monitor. [address] Grey Silver calls the floor after CBC was checked. Received orders for a second LR 1L bolus over 1 hour, and also to expand antibiotics to include zosyn 3.375g q6h.    0025 Ambulated pt to the restroom. Per tele tech, pt's HR rena to 140's. After returning to bed, rate returned to 120's.     0145 Both boluses completed. HR 95 Per telemetry tech. 0230 BP 94/57, HR 90. Abdomen  to touch. 0300 Ambulate for some steps in the hallway. Fairly tolerated. Pain afterwards, but pt declined offer for morphine. 0600 HR 95 per tele tech. BP 95/74. Temp 98.2F. Pt reports itchiness, benadryl given. 5269 Lovenox held at this time. 8153 Third LR bolus started. 0440 Informed MORA GAITAN that lovenox was held this morning. Asked if it should be given, and she deferred to Dr. Grey Silver. SHIFT SUMMARY: Pt stated that she was feeling \"weak. \" No sweating. Able to tolerate ambulation to the restroom, though it is painful for her. Burping, but not passing flatus. She describes the pain to be in her shoulders and upper lateral abdomen. When offered morphine, she is reluctant to take it but occasionally accepts dose. Voiding, but only little at a time. 400ml urine measured for shift.

## 2018-09-19 NOTE — PROGRESS NOTES
Bedside and Verbal shift change report given to REBEKAH Truong RN (oncoming nurse) by Elena Frankel RN  (offgoing nurse). Report included the following information SBAR, Kardex, Intake/Output and MAR.

## 2018-09-20 ENCOUNTER — APPOINTMENT (OUTPATIENT)
Dept: GENERAL RADIOLOGY | Age: 43
DRG: 223 | End: 2018-09-20
Attending: SPECIALIST
Payer: MEDICAID

## 2018-09-20 ENCOUNTER — APPOINTMENT (OUTPATIENT)
Dept: CT IMAGING | Age: 43
DRG: 223 | End: 2018-09-20
Attending: SPECIALIST
Payer: MEDICAID

## 2018-09-20 PROCEDURE — C9113 INJ PANTOPRAZOLE SODIUM, VIA: HCPCS | Performed by: SPECIALIST

## 2018-09-20 PROCEDURE — 74240 X-RAY XM UPR GI TRC 1CNTRST: CPT

## 2018-09-20 PROCEDURE — 74011250636 HC RX REV CODE- 250/636: Performed by: SPECIALIST

## 2018-09-20 PROCEDURE — 74011636320 HC RX REV CODE- 636/320: Performed by: SPECIALIST

## 2018-09-20 PROCEDURE — 65270000029 HC RM PRIVATE

## 2018-09-20 PROCEDURE — 74011000250 HC RX REV CODE- 250: Performed by: SPECIALIST

## 2018-09-20 PROCEDURE — 74150 CT ABDOMEN W/O CONTRAST: CPT

## 2018-09-20 RX ORDER — OXYCODONE HYDROCHLORIDE 5 MG/1
5-10 TABLET ORAL
Status: DISCONTINUED | OUTPATIENT
Start: 2018-09-20 | End: 2018-09-25 | Stop reason: HOSPADM

## 2018-09-20 RX ORDER — ACETAMINOPHEN 10 MG/ML
1000 INJECTION, SOLUTION INTRAVENOUS ONCE
Status: COMPLETED | OUTPATIENT
Start: 2018-09-20 | End: 2018-09-21

## 2018-09-20 RX ORDER — ACETAMINOPHEN 500 MG
1000 TABLET ORAL
Status: DISCONTINUED | OUTPATIENT
Start: 2018-09-20 | End: 2018-09-25 | Stop reason: HOSPADM

## 2018-09-20 RX ORDER — FLUCONAZOLE 2 MG/ML
200 INJECTION, SOLUTION INTRAVENOUS EVERY 24 HOURS
Status: DISCONTINUED | OUTPATIENT
Start: 2018-09-20 | End: 2018-09-21

## 2018-09-20 RX ORDER — MORPHINE SULFATE 10 MG/ML
5 INJECTION, SOLUTION INTRAMUSCULAR; INTRAVENOUS ONCE
Status: COMPLETED | OUTPATIENT
Start: 2018-09-20 | End: 2018-09-20

## 2018-09-20 RX ADMIN — SODIUM CHLORIDE 40 MG: 9 INJECTION INTRAMUSCULAR; INTRAVENOUS; SUBCUTANEOUS at 21:44

## 2018-09-20 RX ADMIN — ENOXAPARIN SODIUM 40 MG: 40 INJECTION, SOLUTION INTRAVENOUS; SUBCUTANEOUS at 21:47

## 2018-09-20 RX ADMIN — MORPHINE SULFATE 6 MG: 4 INJECTION INTRAVENOUS at 06:40

## 2018-09-20 RX ADMIN — MORPHINE SULFATE 5 MG: 10 INJECTION INTRAVENOUS at 21:43

## 2018-09-20 RX ADMIN — KETOROLAC TROMETHAMINE 30 MG: 30 INJECTION, SOLUTION INTRAMUSCULAR at 00:05

## 2018-09-20 RX ADMIN — SODIUM CHLORIDE, SODIUM LACTATE, POTASSIUM CHLORIDE, AND CALCIUM CHLORIDE 150 ML/HR: 600; 310; 30; 20 INJECTION, SOLUTION INTRAVENOUS at 23:55

## 2018-09-20 RX ADMIN — TAZOBACTAM SODIUM AND PIPERACILLIN SODIUM 3.38 G: 375; 3 INJECTION, SOLUTION INTRAVENOUS at 16:42

## 2018-09-20 RX ADMIN — ACETAMINOPHEN 1000 MG: 10 INJECTION, SOLUTION INTRAVENOUS at 17:04

## 2018-09-20 RX ADMIN — TAZOBACTAM SODIUM AND PIPERACILLIN SODIUM 3.38 G: 375; 3 INJECTION, SOLUTION INTRAVENOUS at 23:57

## 2018-09-20 RX ADMIN — SODIUM CHLORIDE 40 MG: 9 INJECTION INTRAMUSCULAR; INTRAVENOUS; SUBCUTANEOUS at 09:26

## 2018-09-20 RX ADMIN — SODIUM CHLORIDE, SODIUM LACTATE, POTASSIUM CHLORIDE, AND CALCIUM CHLORIDE 150 ML/HR: 600; 310; 30; 20 INJECTION, SOLUTION INTRAVENOUS at 15:31

## 2018-09-20 RX ADMIN — AZTREONAM 1 G: 1 INJECTION, POWDER, LYOPHILIZED, FOR SOLUTION INTRAMUSCULAR; INTRAVENOUS at 23:56

## 2018-09-20 RX ADMIN — ENOXAPARIN SODIUM 40 MG: 40 INJECTION, SOLUTION INTRAVENOUS; SUBCUTANEOUS at 06:34

## 2018-09-20 RX ADMIN — IOHEXOL 60 ML: 240 INJECTION, SOLUTION INTRATHECAL; INTRAVASCULAR; INTRAVENOUS; ORAL at 08:32

## 2018-09-20 RX ADMIN — MORPHINE SULFATE 6 MG: 4 INJECTION INTRAVENOUS at 00:06

## 2018-09-20 RX ADMIN — FLUCONAZOLE IN SODIUM CHLORIDE 200 MG: 2 INJECTION, SOLUTION INTRAVENOUS at 23:56

## 2018-09-20 RX ADMIN — TAZOBACTAM SODIUM AND PIPERACILLIN SODIUM 3.38 G: 375; 3 INJECTION, SOLUTION INTRAVENOUS at 04:33

## 2018-09-20 RX ADMIN — TAZOBACTAM SODIUM AND PIPERACILLIN SODIUM 3.38 G: 375; 3 INJECTION, SOLUTION INTRAVENOUS at 09:26

## 2018-09-20 NOTE — PROGRESS NOTES
Inpatient Daily Progress Note    Subjective:    Patient sitting on edge of bed and is asking when she can start drinking and eating. Pain is well controlled. She has not had nausea, has not vomited, has not passed flatus, and has not had a bowel movement. Objective:     Physical Exam:  Visit Vitals    /81 (BP Patient Position: At rest)    Pulse (!) 108    Temp 99.4 °F (37.4 °C)    Resp 18    Ht 5' 5\" (1.651 m)    Wt 106.2 kg (234 lb 1.6 oz)    SpO2 92%    BMI 38.96 kg/m2       Gen: Well developed, well nourished 41 y.o. female in no acute distress  Resp: clear to auscultation bilaterally, no wheezes   Cardio: Regular rate and rhythm, no murmurs, clicks, gallops or rubs, no edema  Abdomen: soft, appropriately tender, nondistended, with active bowel sounds, no hernias  Psych: alert and oriented to person, place and time      No results found for this or any previous visit (from the past 12 hour(s)). Assessment:     Gianluca Mario is a 41 y.o. female POD #2 s/p diagnostic laparoscopy with enterotomy and removal of silastic band from Scar limb. Persistent tachycardia    Plan:     -Continue current medications  -Encourage IS 10x/hr  -Encourage ambulation  -Bariatric Stage 2 diet  -Continue telemetry  -Switched to pain pills, pt does not like narcotics, so I gave her the option of Tylenol and Roxicodone separately if needed.

## 2018-09-20 NOTE — PROGRESS NOTES
Shift summary: Pt A&Ox4, up with minimal assist, appears to be resting comfortably. Pain managed with IV morphine. Pt ambulating in the hallway.

## 2018-09-20 NOTE — PROGRESS NOTES
0800hrs Patient alert and oriented x4, denies pain. Patient going to xray at this time. 0900hrs Patient returned from xray. She is ordering breakfast at this time. 1130hrs Patient had chicken broth and popcicle small bite. Patient ambulated in hallway. 1500hrs Patient complained of chills, T 99.8    1511hrs Dr Mirta Saba was paged with VS BP:164/94, P:113, T:101.2, R:18, O2 sats 97% at room air. Orders: Keep patient NPO and no shower at this time. 1645hrs VS: /110 T:99.4, P:114, R:18, O2 sates 93% at room air. 1705hrs Tylenol 1000mg IV given at this time. 1730hrs Pt to CT scan    1800hrs Patient returned from CT.    2005hrs Bedside, Verbal and Written shift change report given to Angelica Olvera RN (oncoming nurse) by Christiane Thakkar (offgoing nurse). Report included the following information SBAR, Kardex, Procedure Summary, Intake/Output, MAR, Accordion, Recent Results and Med Rec Status. All questions answered.

## 2018-09-20 NOTE — NURSE NAVIGATOR
Just notified that retake of vs 116, 101.2 T, 164/94. Dr. Alissa Cedillo notified order to be put in by Dr Parveen Cedillo. Patient notified to be NPO.

## 2018-09-20 NOTE — NURSE NAVIGATOR
Sitting up on side of bed. Currently bp diastolic is 148. States she feels [place] all over. \"  T 99. Wanting to shower. Tolerated liquid diet but states she tolerated soup better than other liquids.

## 2018-09-20 NOTE — ROUTINE PROCESS
Responded to room 310 @ 1615 on alert of MEWS of 5. Upon arriving at patient's bedside, found female pt. 43 years, sitting up in bed, eating ice chips, pt. c/o headache. After introducing myself to the patient we discussed her head pain, and concern over being off her antihypertensive, (HCTZ) beyond 48 hrs. With assistance from an RN & a CNA we assessed the patients vitals as:  Respirations -15 x 2 = 30 breaths per minute by observation, Temp- 99.4 F axillary, ( patient had been eating ice chips ), Sa02- 96% (room air), B/P- 154/121, (repositioned the B/P cuff assuring proper placement), 2nd B/P 2 minutes later- 168/110, Pulse- 122, (regular). Based only on the respirations & pulse I calculate a current MEWS of 4, (excepting an elevated B/P, and axillary temp. Of 99.4. Our concerns over the patient's status are found from the sudden increase in temp & respiratory status with tachycardia. In speaking with the patient's RN, Marsha Serrano), she was very helpful with history and her contact with the patient's physician, Dr. Mary Sierra and his concerns post op. The patient has been receiving IV antibiotics post surgery today and the second bag was started while we were assessing the patient. The patient's primary concerns are headache and thirst, patient's nurse states the IV Acetaminophen,  is forthcoming from the pharmacy.   Cleared from the response asking the RN to please advise if we may assist in any way with caring for the patient,  stipulating specifically that in the event POC Lactic Acid, Blood Cultures,  IV's, target fluid management measures should be needed we will respond upon request.

## 2018-09-20 NOTE — ROUTINE PROCESS
Bedside and Verbal shift change report given to REBEKAH Holley RN (oncoming nurse) by KATINA Baron RN (offgoing nurse). Report included the following information SBAR, Kardex, Intake/Output and MAR.

## 2018-09-21 ENCOUNTER — APPOINTMENT (OUTPATIENT)
Dept: GENERAL RADIOLOGY | Age: 43
DRG: 223 | End: 2018-09-21
Attending: INTERNAL MEDICINE
Payer: MEDICAID

## 2018-09-21 ENCOUNTER — APPOINTMENT (OUTPATIENT)
Dept: INTERVENTIONAL RADIOLOGY/VASCULAR | Age: 43
DRG: 223 | End: 2018-09-21
Attending: SPECIALIST
Payer: MEDICAID

## 2018-09-21 ENCOUNTER — APPOINTMENT (OUTPATIENT)
Dept: VASCULAR SURGERY | Age: 43
DRG: 223 | End: 2018-09-21
Attending: INTERNAL MEDICINE
Payer: MEDICAID

## 2018-09-21 ENCOUNTER — HOME HEALTH ADMISSION (OUTPATIENT)
Dept: HOME HEALTH SERVICES | Facility: HOME HEALTH | Age: 43
End: 2018-09-21

## 2018-09-21 PROBLEM — R50.9 FEVER: Status: ACTIVE | Noted: 2018-09-21

## 2018-09-21 LAB
ALBUMIN SERPL-MCNC: 2.2 G/DL (ref 3.4–5)
ALBUMIN/GLOB SERPL: 0.5 {RATIO} (ref 0.8–1.7)
ALP SERPL-CCNC: 73 U/L (ref 45–117)
ALT SERPL-CCNC: 36 U/L (ref 13–56)
ANION GAP SERPL CALC-SCNC: 10 MMOL/L (ref 3–18)
AST SERPL-CCNC: 39 U/L (ref 15–37)
BASOPHILS # BLD: 0 K/UL (ref 0–0.1)
BASOPHILS NFR BLD: 0 % (ref 0–2)
BILIRUB SERPL-MCNC: 0.7 MG/DL (ref 0.2–1)
BUN SERPL-MCNC: 5 MG/DL (ref 7–18)
BUN/CREAT SERPL: 6 (ref 12–20)
CALCIUM SERPL-MCNC: 8 MG/DL (ref 8.5–10.1)
CHLORIDE SERPL-SCNC: 101 MMOL/L (ref 100–108)
CO2 SERPL-SCNC: 24 MMOL/L (ref 21–32)
CREAT SERPL-MCNC: 0.89 MG/DL (ref 0.6–1.3)
DIFFERENTIAL METHOD BLD: ABNORMAL
EOSINOPHIL # BLD: 0.3 K/UL (ref 0–0.4)
EOSINOPHIL NFR BLD: 4 % (ref 0–5)
ERYTHROCYTE [DISTWIDTH] IN BLOOD BY AUTOMATED COUNT: 15.7 % (ref 11.6–14.5)
GLOBULIN SER CALC-MCNC: 4.1 G/DL (ref 2–4)
GLUCOSE BLD STRIP.AUTO-MCNC: 76 MG/DL (ref 70–110)
GLUCOSE SERPL-MCNC: 68 MG/DL (ref 74–99)
HCT VFR BLD AUTO: 30 % (ref 35–45)
HGB BLD-MCNC: 9.9 G/DL (ref 12–16)
INR PPP: 1 (ref 0.8–1.2)
LYMPHOCYTES # BLD: 0.8 K/UL (ref 0.9–3.6)
LYMPHOCYTES NFR BLD: 10 % (ref 21–52)
MAGNESIUM SERPL-MCNC: 1.8 MG/DL (ref 1.6–2.6)
MCH RBC QN AUTO: 30.3 PG (ref 24–34)
MCHC RBC AUTO-ENTMCNC: 33 G/DL (ref 31–37)
MCV RBC AUTO: 91.7 FL (ref 74–97)
MONOCYTES # BLD: 0.4 K/UL (ref 0.05–1.2)
MONOCYTES NFR BLD: 5 % (ref 3–10)
NEUTS SEG # BLD: 6.6 K/UL (ref 1.8–8)
NEUTS SEG NFR BLD: 81 % (ref 40–73)
PLATELET # BLD AUTO: 200 K/UL (ref 135–420)
PMV BLD AUTO: 9.1 FL (ref 9.2–11.8)
POTASSIUM SERPL-SCNC: 3.3 MMOL/L (ref 3.5–5.5)
PROT SERPL-MCNC: 6.3 G/DL (ref 6.4–8.2)
PROTHROMBIN TIME: 13.1 SEC (ref 11.5–15.2)
RBC # BLD AUTO: 3.27 M/UL (ref 4.2–5.3)
SODIUM SERPL-SCNC: 135 MMOL/L (ref 136–145)
WBC # BLD AUTO: 8.1 K/UL (ref 4.6–13.2)

## 2018-09-21 PROCEDURE — 82962 GLUCOSE BLOOD TEST: CPT

## 2018-09-21 PROCEDURE — 85025 COMPLETE CBC W/AUTO DIFF WBC: CPT | Performed by: SPECIALIST

## 2018-09-21 PROCEDURE — 83735 ASSAY OF MAGNESIUM: CPT | Performed by: INTERNAL MEDICINE

## 2018-09-21 PROCEDURE — 65270000029 HC RM PRIVATE

## 2018-09-21 PROCEDURE — 80053 COMPREHEN METABOLIC PANEL: CPT | Performed by: SPECIALIST

## 2018-09-21 PROCEDURE — 74011250636 HC RX REV CODE- 250/636: Performed by: INTERNAL MEDICINE

## 2018-09-21 PROCEDURE — 76937 US GUIDE VASCULAR ACCESS: CPT

## 2018-09-21 PROCEDURE — 74011000250 HC RX REV CODE- 250: Performed by: SPECIALIST

## 2018-09-21 PROCEDURE — 85610 PROTHROMBIN TIME: CPT | Performed by: SPECIALIST

## 2018-09-21 PROCEDURE — 93970 EXTREMITY STUDY: CPT

## 2018-09-21 PROCEDURE — C9113 INJ PANTOPRAZOLE SODIUM, VIA: HCPCS | Performed by: SPECIALIST

## 2018-09-21 PROCEDURE — 74011250636 HC RX REV CODE- 250/636: Performed by: RADIOLOGY

## 2018-09-21 PROCEDURE — 74011250637 HC RX REV CODE- 250/637: Performed by: INTERNAL MEDICINE

## 2018-09-21 PROCEDURE — 74011250637 HC RX REV CODE- 250/637: Performed by: PHYSICIAN ASSISTANT

## 2018-09-21 PROCEDURE — 71046 X-RAY EXAM CHEST 2 VIEWS: CPT

## 2018-09-21 PROCEDURE — 74011250636 HC RX REV CODE- 250/636: Performed by: SPECIALIST

## 2018-09-21 PROCEDURE — 36415 COLL VENOUS BLD VENIPUNCTURE: CPT | Performed by: SPECIALIST

## 2018-09-21 PROCEDURE — 87040 BLOOD CULTURE FOR BACTERIA: CPT | Performed by: INTERNAL MEDICINE

## 2018-09-21 RX ORDER — VANCOMYCIN 1.75 GRAM/500 ML IN 0.9 % SODIUM CHLORIDE INTRAVENOUS
1750 ONCE
Status: COMPLETED | OUTPATIENT
Start: 2018-09-21 | End: 2018-09-21

## 2018-09-21 RX ORDER — HEPARIN SODIUM (PORCINE) LOCK FLUSH IV SOLN 100 UNIT/ML 100 UNIT/ML
500 SOLUTION INTRAVENOUS
Status: COMPLETED | OUTPATIENT
Start: 2018-09-21 | End: 2018-09-21

## 2018-09-21 RX ORDER — LEVOFLOXACIN 5 MG/ML
500 INJECTION, SOLUTION INTRAVENOUS EVERY 24 HOURS
Status: DISCONTINUED | OUTPATIENT
Start: 2018-09-21 | End: 2018-09-24

## 2018-09-21 RX ORDER — HEPARIN SODIUM 200 [USP'U]/100ML
500 INJECTION, SOLUTION INTRAVENOUS
Status: COMPLETED | OUTPATIENT
Start: 2018-09-21 | End: 2018-09-21

## 2018-09-21 RX ORDER — LIDOCAINE HYDROCHLORIDE 10 MG/ML
1-5 INJECTION, SOLUTION EPIDURAL; INFILTRATION; INTRACAUDAL; PERINEURAL
Status: COMPLETED | OUTPATIENT
Start: 2018-09-21 | End: 2018-09-21

## 2018-09-21 RX ORDER — IPRATROPIUM BROMIDE AND ALBUTEROL SULFATE 2.5; .5 MG/3ML; MG/3ML
3 SOLUTION RESPIRATORY (INHALATION)
Status: DISCONTINUED | OUTPATIENT
Start: 2018-09-21 | End: 2018-09-25 | Stop reason: HOSPADM

## 2018-09-21 RX ORDER — CLONIDINE 0.1 MG/24H
1 PATCH, EXTENDED RELEASE TRANSDERMAL
Status: DISCONTINUED | OUTPATIENT
Start: 2018-09-21 | End: 2018-09-22

## 2018-09-21 RX ORDER — VANCOMYCIN/0.9 % SOD CHLORIDE 1.5G/250ML
1500 PLASTIC BAG, INJECTION (ML) INTRAVENOUS EVERY 12 HOURS
Status: DISCONTINUED | OUTPATIENT
Start: 2018-09-22 | End: 2018-09-23

## 2018-09-21 RX ORDER — POTASSIUM CHLORIDE 7.45 MG/ML
10 INJECTION INTRAVENOUS
Status: COMPLETED | OUTPATIENT
Start: 2018-09-21 | End: 2018-09-21

## 2018-09-21 RX ADMIN — POTASSIUM CHLORIDE 10 MEQ: 10 INJECTION, SOLUTION INTRAVENOUS at 13:33

## 2018-09-21 RX ADMIN — LIDOCAINE HYDROCHLORIDE 2 ML: 10 INJECTION, SOLUTION EPIDURAL; INFILTRATION; INTRACAUDAL; PERINEURAL at 10:38

## 2018-09-21 RX ADMIN — POTASSIUM CHLORIDE 10 MEQ: 10 INJECTION, SOLUTION INTRAVENOUS at 15:32

## 2018-09-21 RX ADMIN — TAZOBACTAM SODIUM AND PIPERACILLIN SODIUM 3.38 G: 375; 3 INJECTION, SOLUTION INTRAVENOUS at 16:09

## 2018-09-21 RX ADMIN — SODIUM CHLORIDE, SODIUM LACTATE, POTASSIUM CHLORIDE, AND CALCIUM CHLORIDE 150 ML/HR: 600; 310; 30; 20 INJECTION, SOLUTION INTRAVENOUS at 08:54

## 2018-09-21 RX ADMIN — TAZOBACTAM SODIUM AND PIPERACILLIN SODIUM 3.38 G: 375; 3 INJECTION, SOLUTION INTRAVENOUS at 21:06

## 2018-09-21 RX ADMIN — TAZOBACTAM SODIUM AND PIPERACILLIN SODIUM 3.38 G: 375; 3 INJECTION, SOLUTION INTRAVENOUS at 04:54

## 2018-09-21 RX ADMIN — POTASSIUM CHLORIDE 10 MEQ: 10 INJECTION, SOLUTION INTRAVENOUS at 13:00

## 2018-09-21 RX ADMIN — OXYCODONE HYDROCHLORIDE 5 MG: 5 TABLET ORAL at 18:47

## 2018-09-21 RX ADMIN — POTASSIUM CHLORIDE 10 MEQ: 10 INJECTION, SOLUTION INTRAVENOUS at 16:47

## 2018-09-21 RX ADMIN — HEPARIN SODIUM (PORCINE) LOCK FLUSH IV SOLN 100 UNIT/ML 500 UNITS: 100 SOLUTION at 10:41

## 2018-09-21 RX ADMIN — AZTREONAM 1 G: 1 INJECTION, POWDER, LYOPHILIZED, FOR SOLUTION INTRAMUSCULAR; INTRAVENOUS at 06:38

## 2018-09-21 RX ADMIN — ENOXAPARIN SODIUM 40 MG: 40 INJECTION, SOLUTION INTRAVENOUS; SUBCUTANEOUS at 18:47

## 2018-09-21 RX ADMIN — LEVOFLOXACIN 500 MG: 5 INJECTION, SOLUTION INTRAVENOUS at 11:49

## 2018-09-21 RX ADMIN — HEPARIN SODIUM 1000 UNITS: 200 INJECTION, SOLUTION INTRAVENOUS at 10:38

## 2018-09-21 RX ADMIN — SODIUM CHLORIDE 40 MG: 9 INJECTION INTRAMUSCULAR; INTRAVENOUS; SUBCUTANEOUS at 20:37

## 2018-09-21 RX ADMIN — SODIUM CHLORIDE, SODIUM LACTATE, POTASSIUM CHLORIDE, AND CALCIUM CHLORIDE 150 ML/HR: 600; 310; 30; 20 INJECTION, SOLUTION INTRAVENOUS at 11:49

## 2018-09-21 RX ADMIN — HEPARIN SODIUM (PORCINE) LOCK FLUSH IV SOLN 100 UNIT/ML 500 UNITS: 100 SOLUTION at 10:43

## 2018-09-21 RX ADMIN — VANCOMYCIN HYDROCHLORIDE 1750 MG: 10 INJECTION, POWDER, LYOPHILIZED, FOR SOLUTION INTRAVENOUS at 13:57

## 2018-09-21 RX ADMIN — SODIUM CHLORIDE 40 MG: 9 INJECTION INTRAMUSCULAR; INTRAVENOUS; SUBCUTANEOUS at 08:54

## 2018-09-21 RX ADMIN — OXYCODONE HYDROCHLORIDE 5 MG: 5 TABLET ORAL at 08:59

## 2018-09-21 NOTE — PROGRESS NOTES
Vancomycin - Pharmacy to Dose  Consult provided for this 41y.o. year old ,female for indication of Bloodstream Infection. Therapy day 1        Wt Readings from Last 1 Encounters:   09/20/18 108 kg (238 lb 1.6 oz)       Ht Readings from Last 1 Encounters:   09/18/18 165.1 cm (65\")     Dosing Weight:  108 kg     Additional Antibiotics:   Levofloxacin, Zosyn    Date:   9/21/18   Lab Results   Component Value Date/Time    Creatinine 0.89 09/21/2018 06:40 AM     creatinine clearance:  101.6 ml/min    white blood cell count:  8.1    Will dose Vancomycin 1750 mg IV now, then 1500 mg IV q12hrs. Trough level after 4-5 doses infused. Dose calculated to approximate a therapeutic trough of 15-20 mcg/mL. Pharmacy to follow daily and will make changes to dose and/or frequency based on clinical status.       [address] [facility], [address] [place] [address]

## 2018-09-21 NOTE — PROGRESS NOTES
Progress Note  German Donald  9/20/18    I have seen Ms. Ksenia Dyson now about three times on rounds today, first around noon, then 3:00 PM and then again at 4:30 PM. I saw her earlier today. She really had no complaints other than some chills. She was drinking liquids and having no issues with liquids. She complained mainly about the chills she was having. On examination, she appears very normal. Her abdomen is soft, nontender, nondistended. Her upper GI study from earlier in the day with gastrografin was totally normal with no leak present. I then left to go back to the operating room and was called by my bariatric coordinator who said she had a fever of 101 and then we ordered a CT scan of the abdomen at that juncture. I did give her oral contrast. I reviewed the CT scan with the radiologist and there was no extravasation of contrast. There was considerable reaction in the region which we had dissected on Tuesday for the silastic band removal. There were some tiny air pockets present which was consistent with the prior laparoscopy and the Surgicel which was placed intraoperatively and I felt like this was all normal. Once again, she had no extravasation of the oral contrast which we had given her. Clinically, she has remained stable. Her vital signs are now back to normal. She is afebrile. I have extended her antibiotic coverage to include Aztreonam and Diflucan. We will continue with the current plan of action. There is no indication for any surgical intervention at this juncture. I have explained this to the patient. We will treat her in a conservative fashion.

## 2018-09-21 NOTE — ROUTINE PROCESS
19:50: Receive verbal/bedside change of shift report from Mimi Mak RN. Report included SBAR, KARDEX, MAR and recent results. 20:15: Pt received ambulating on unit utilizing IV pole for stability and in NAD. Pt is tolerating this activity well and appears euthymic. Respirations even and unlabored, skin W&D. IVF infusing as ordered. Denies C/O at this time. Monitoring ongoing. 06:15: Pt slept soundly overnight with brief periods of wakefulness noted.  Denies C/O discomfort/ N&V.

## 2018-09-21 NOTE — CONSULTS
Medicine Consult    Patient:  Mushtaq Miranda 41 y.o. female 1975  Asked to evaluate patient by  Dr Rain Ruelas  Primary Care Provider: Johanny Lozano NP  Date of Admission:  9/18/2018  Reason for Consult: postoperative fever      Assessment/Plan     1. Post operative fever- likely atelectasis but DDX includes: intraabdominal source post surgery, pnuemonia, UTI, DVT  2. Calf pain  3. HTN uncontrolled  4. Morbid obesity  5. Hypokalemia  6./ hypomagnasemia      PLAN:  - stop IV azactam/ diflucan, continue zosyn, add vancomycin and levaquin IV  - obtain cultures of blood/ urine  - CXR  - PVL LE  - encourage IS  - add clonidine patch for HTN while npo  - replete electrolytes   - may need to consider repeat imaging of abdomen to reassess fluid collection depending on clinical course  - DVT ppx/ diet per primary team      Patient Active Problem List   Diagnosis Code    Cancer ([facility] C80.1    Severe obesity with body mass index (BMI) of 35.0 to 39.9 with comorbidity ([facility] E66.01    Intestinal malabsorption K90.9    Status post gastric bypass for obesity Z98.84    Hypertension I10    Anemia D64.9    Anxiety F41.9    GERD (gastroesophageal reflux disease) K21.9    Wears dentures Z97.2    Syncopal episodes R55    Smoker F17.200    Diarrhea R19.7    Status post laparoscopy Z98.890    Fever R50.9       Thank you for allowing us to participate in Ms Sousa's care  HPI:   CC: absil Miranda is a 41 y.o. female with past medical history significant for HTn and morbid obesity sp gastric bypass remotely  Who had been experiencing significant vomiting and issues related to her surgery requiring removal of silastic bands at her pouch. She had diagnostic laparscopy w endoscopy, BETTY and enterotomy w removal of band from limb on 9/18. Overnight last nigh she started experiencing a fever w temp 101.2 assocaited w tachycardia and hypertension.  This is associated w epigastric abdominal pain, worse w deep inspiration and movement, better w medication, cough which she reports is productive of thick sputum and dyspnea witihout wheezing and R calf tenderness. She has no leukocytosis but a left shift is present. She underwent CT of abdomin with out extravasation of contrast but she did have a small amount of fluid near the lesser curvature of the stomach for which infection can not be ruled out as well as bilobar atelectasis Medicine is asked to evaluate regarding her fever. She also reports she is not using IS often due to associated pain. Past Medical History:   Diagnosis Date    Anemia     Anxiety     Cancer ([address] 2011     history of endometrial cancer    Diarrhea     GERD (gastroesophageal reflux disease)     Hypertension 2017    Intestinal malabsorption     Severe obesity with body mass index (BMI) of 35.0 to 39.9 with comorbidity ([facility]     Smoker     smokes 5 cigarettes per day    Status post gastric bypass for obesity 2000    daryl / librado hermosillo    Syncopal episodes     has plans to see a neurologist     Wears dentures     teeth extraction due to vomiting and vitamin deficiencies     Past Surgical History:   Procedure Laterality Date    HX CHOLECYSTECTOMY      HX GASTRIC BYPASS  2000    open / librado hermosillo    HX GI  2017    EGD    HX OTHER SURGICAL Right     resection of benign breast mass    HX PARTIAL HYSTERECTOMY      cancer related / pt with both ovaries      Social History   Substance Use Topics    Smoking status: Former Smoker     Types: Cigarettes     Quit date: 2/2/2018    Smokeless tobacco: Never Used    Alcohol use 0.6 oz/week     1 Cans of beer per week     Family History   Problem Relation Age of Onset    Diabetes Mother      No current facility-administered medications on file prior to encounter. Current Outpatient Prescriptions on File Prior to Encounter   Medication Sig Dispense Refill    escitalopram oxalate (LEXAPRO) 10 mg tablet Take 10 mg by mouth nightly.  Indications: depression      hydroCHLOROthiazide (HYDRODIURIL) 12.5 mg tablet Take 12.5 mg by mouth daily. Indications: hypertension      promethazine (PHENERGAN) 25 mg tablet Take 1 Tab by mouth every six (6) hours as needed. 20 Tab 0    vitamin B complex (B COMPLEX PO) Take  by mouth daily. Takes the liquid        No Known Allergies      Review of Systems  Constitutional:+ fever, -chills, diaphoresis, malaise, fatigue or weight gain/loss or falls  Skin: no itching or rashes  HEENT: no ear discomfort, hearing loss, tinnitus, epistaxis or sore throat  EYES: no blurry vision, double vision or photophobia  CARDIOVASCULAR: no claudication, cp, palpitations, orthopnea, pnd or LE edema  PULMONARY: no cough, wheeze, shortness of breath or sputum production  GI: no nausea, vomiting, +diarrhea, +abdominal pain, -melena, -hematemesis - brbpr  : no dysuria, hematuria  MUSCULOSKELETAL: no back pain, joint pain or myalgias  ENDOCRINE: no heat/cold intolerance, polyuria or polydipsia  HEME: no easy bruising or bleeding  NEURO: no unilateral weakness, numbness, tingling or seizures      Physical Exam:      Visit Vitals    BP (!) 164/98 (BP 1 Location: Left arm, BP Patient Position: At rest)    Pulse (!) 108    Temp 99.2 °F (37.3 °C)    Resp 18    Ht 5' 5\" (1.651 m)    Wt 108 kg (238 lb 1.6 oz)    SpO2 100%    BMI 39.62 kg/m2     Body mass index is 39.62 kg/(m^2).     Physical Exam:  GEN: well nourished, laying in bed in no acute distress  HEENT: atraumatic, nose normal,oropharynx clear, MMM  NECK: supple, trachea midline, no supraclavicular or submandibular adenopathy noted  EYES: conjuctiva normal, lids with out lesions, PERRL  HEART: RRR with out m/r/g, pmi nondisplaced, pulses 2+ distally, cap refil normal  LUNGS: equal chest wall expansion, cta bl with out wheezes/rales or rhonchi  AB: soft, +BS, nt/nd no organomegaly  NEURO: alert, awake and oriented x3, gait not assessed, cranial nerves intact, strength 5/5 bl UE and LE, sensation intact, reflexes nonpathological  SKIN: dry, intact, warm no breakdown noted        Laboratory Studies:    CMP:   Lab Results   Component Value Date/Time     (L) 09/21/2018 06:40 AM    K 3.3 (L) 09/21/2018 06:40 AM     09/21/2018 06:40 AM    CO2 24 09/21/2018 06:40 AM    AGAP 10 09/21/2018 06:40 AM    GLU 68 (L) 09/21/2018 06:40 AM    BUN 5 (L) 09/21/2018 06:40 AM    CREA 0.89 09/21/2018 06:40 AM    GFRAA >60 09/21/2018 06:40 AM    GFRNA >60 09/21/2018 06:40 AM    CA 8.0 (L) 09/21/2018 06:40 AM    ALB 2.2 (L) 09/21/2018 06:40 AM    TP 6.3 (L) 09/21/2018 06:40 AM    GLOB 4.1 (H) 09/21/2018 06:40 AM    AGRAT 0.5 (L) 09/21/2018 06:40 AM    SGOT 39 (H) 09/21/2018 06:40 AM    ALT 36 09/21/2018 06:40 AM     CBC:   Lab Results   Component Value Date/Time    WBC 8.1 09/21/2018 06:40 AM    HGB 9.9 (L) 09/21/2018 06:40 AM    HCT 30.0 (L) 09/21/2018 06:40 AM     09/21/2018 06:40 AM       Imaging studies personally reviewed:  CT abdomen:\"    There are postsurgical changes from lysis of adhesion and gastric band removal.  There is a low density collection between the lesser curvature the stomach in  the left hepatic lobe with the above-mentioned dimensions with tiny dots of gas. No extraluminal contrast is identified. This collection may represent  postsurgical changes.  Infection of this fluid collection is difficult to  completely exclude.     Bilateral small effusions with lower lobe atelectasis\"      Magdalena Baker, DO  Internal Medicine/Geriatrics

## 2018-09-21 NOTE — PROGRESS NOTES
Problem: Falls - Risk of 
Goal: *Absence of Falls Document Edgar Brunner Fall Risk and appropriate interventions in the flowsheet. Outcome: Progressing Towards Goal 
Fall Risk Interventions: 
Mobility Interventions: Patient to call before getting OOB Medication Interventions: Teach patient to arise slowly, Patient to call before getting OOB Elimination Interventions: Call light in reach, Patient to call for help with toileting needs, Toileting schedule/hourly rounds

## 2018-09-21 NOTE — PROGRESS NOTES
Bariatric Surgery                POD #3    Visit Vitals    BP (!) 164/98 (BP 1 Location: Left arm, BP Patient Position: At rest)    Pulse (!) 108    Temp 99.2 °F (37.3 °C)    Resp 18    Ht 5' 5\" (1.651 m)    Wt 108 kg (238 lb 1.6 oz)    SpO2 100%    BMI 39.62 kg/m2     Patient has minimal complaints of pain, minimal nausea noted     Exam:  Appears well in no distress  Lungs- clear bilaterally  Abd - soft, incisions look good without erythema  Extremities- no new edema or swelling      Data Review:    Labs: Results:       Chemistry Recent Labs      09/21/18   0640  09/19/18   0720   GLU  68*  101*   NA  135*  136   K  3.3*  4.5   CL  101  100   CO2  24  27   BUN  5*  7   CREA  0.89  1.38*   CA  8.0*  7.5*   AGAP  10  9   BUCR  6*  5*   AP  73   --    TP  6.3*   --    ALB  2.2*   --    GLOB  4.1*   --    AGRAT  0.5*   --       CBC w/Diff Recent Labs      09/21/18   0640  09/19/18   0720  09/18/18   2218   WBC  8.1  7.2  6.6   RBC  3.27*  3.42*  3.75*   HGB  9.9*  10.1*  11.2*   HCT  30.0*  31.6*  34.5*   PLT  200  214  244   GRANS  81*   --    --    LYMPH  10*   --    --    EOS  4   --    --       Coagulation Recent Labs      09/21/18   0640   PTP  13.1   INR  1.0       Liver Enzymes Recent Labs      09/21/18   0640   TP  6.3*   ALB  2.2*   AP  73   SGOT  39*          Assessment/Plan: S/P  removal of eroded silastic gastric band from around gastric bypass pouch. I had a long discussion with the pt this AM regarding the nature of her hospitalization. She understands the rationale behind her gastrotomy / enterotomy to remove her intraluminal silastic band. She understands the complications that can occur with the spillage of gastric contents into her abd cavity. I have explained to her the need for minimal PO intake while she heals. She understands the need for antibiotics and her pending PICC line for possible long term TPN. 1. Seen by [facility] this AM (refer to note)  2.  Will be seen by hospitalist for antibiotic input

## 2018-09-21 NOTE — PROGRESS NOTES
Bedside and Verbal shift change report given to KALEN Khan RN (oncoming nurse) by Cesia Jay RN (offgoing nurse). Report included the following information SBAR and Kardex.      Patient Vitals for the past 12 hrs:   Temp Pulse Resp BP SpO2   09/21/18 1913 99.3 °F (37.4 °C) (!) 102 18 (!) 151/103 100 %   09/21/18 1550 99.1 °F (37.3 °C) 91 18 (!) 152/93 97 %   09/21/18 1130 98.7 °F (37.1 °C) 91 18 (!) 157/96 99 %

## 2018-09-21 NOTE — ROUTINE PROCESS
Bedside, Verbal and Written shift change report given to PORTILLO Allison RN (oncoming nurse) by Zak Quach (offgoing nurse). Report included the following information SBAR, Kardex, MAR and Recent Results.

## 2018-09-21 NOTE — ROUTINE PROCESS
Bedside and Verbal shift change report given to Ramo Valdez (oncoming nurse) by Alex Li RN   (offgoing nurse). Report included the following information SBAR, Kardex, Intake/Output and MAR.

## 2018-09-21 NOTE — PROGRESS NOTES
Chart reviewed. Met with pt at bedside. Pt will likely require IV abx and TPN at discharge. Pt has PICC. Provider, please provide scripts for abx and TPN. Need home health order. FOC offered for [facility] and pt states she will use whomever works with her insurance. Pt noted to have [employer]. Will submit referral for [facility]. CM will cont to follow.

## 2018-09-21 NOTE — PROGRESS NOTES
Progress Note  Rashid Randolph  9/21/18 (Approximately 6:30 AM)    I saw the patient on morning rounds and I reviewed her x-rays last night with the radiologist on call. It did show quite a bit of inflammation particularly around the medial aspect of her pouch which would be expected which was likely the erosion point of the Silastic band, but there was no extravasation of contrast present and we only have some post operative edema and fluid collection likely all consistent with this area in question. She, today, is afebrile. Her highest temperature last night as 99. Leslie Barrio Her vital signs have been totally stable. She is intermittently tachycardic, but only in the low 100's at times. She states she feels about the same, no worse, no better. She is really frustrated with the progress to this point. On clinical examination she appears normal. Her abdomen is mildly tender in the mid epigastric region, but no worse than yesterday. There is certainly no peritoneal sinus present. At this juncture, I told her that this is going to be a very slow process of healing and that there is no extravasation contrast; this is all nonoperative management of the situation. I have explained to her that I did expand her antibiotics to include Aztreonam and Difllucan last night and I will have the hospitalist look in on her and see if they have any suggestions regarding any expansion of her antibiotics. Since she did have a flare up in her symptoms yesterday when she had clear liquids, we will keep her NPO with the exception of minimal ice chips and we discussed the plan for PICC line placement as we might need the PICC line for long term IV antibiotics and/or potential TPN. She does have very poor IV access peripherally at this juncture. She is very comfortable with the plan of action and we will proceed accordingly.

## 2018-09-21 NOTE — ROUTINE PROCESS
19:40: Received verbal/bedside change of shift report from Suma Pool RN. Report included SBAR, KARDEX, MAR and recent results. 20:10: Received pt ambulating on unit utilizing IV pole for stability. Pt inquired of previous shift RN if \"the doctor ever cleared me to drink something? \"  Pt informed as of this time her CT results were still pending and as such she was NPO. Pt was displeased by this information. Telemetry box #36 transmitting SR at this time. 21:45: Pt received MS04 5 MG IVP at this time for C/O generalized discomfort.    06:15: Pt alternated between periods of restful sleep and wakefulness overnight. Pt has not expressed any somatic C/O but rather is focused on the \"disruption to her life\" this hospitalization has caused.

## 2018-09-21 NOTE — DIABETES MGMT
GLYCEMIC CONTROL PROGRESS NOTE:    -no known h/o DM, s/p bariatric surgery with NPO status  Noted:  -mild hypoglycemia on AM labs r/t NPO status recommend obtain POCT and follow hypoglycemia protocol if needed  -if continued hypoglycemia recommend POCT Q 6 hours  -RN on floor notified    Recent Glucose Results:   Lab Results   Component Value Date/Time    GLU 68 (L) 09/21/2018 06:40 AM     Bay Anderson MS, RN, CDE  Glycemic Control Team  [phone]  Pager [phone] (M-TH 8:30-5P)  *After Hours pager [phone]

## 2018-09-22 LAB
ALBUMIN SERPL-MCNC: 2.2 G/DL (ref 3.4–5)
ALBUMIN/GLOB SERPL: 0.6 {RATIO} (ref 0.8–1.7)
ALP SERPL-CCNC: 72 U/L (ref 45–117)
ALT SERPL-CCNC: 30 U/L (ref 13–56)
ANION GAP SERPL CALC-SCNC: 13 MMOL/L (ref 3–18)
APPEARANCE UR: CLEAR
AST SERPL-CCNC: 26 U/L (ref 15–37)
BASOPHILS # BLD: 0 K/UL (ref 0–0.1)
BASOPHILS NFR BLD: 0 % (ref 0–2)
BILIRUB SERPL-MCNC: 0.6 MG/DL (ref 0.2–1)
BILIRUB UR QL: NEGATIVE
BUN SERPL-MCNC: 5 MG/DL (ref 7–18)
BUN/CREAT SERPL: 5 (ref 12–20)
CALCIUM SERPL-MCNC: 7.9 MG/DL (ref 8.5–10.1)
CHLORIDE SERPL-SCNC: 101 MMOL/L (ref 100–108)
CO2 SERPL-SCNC: 22 MMOL/L (ref 21–32)
COLOR UR: YELLOW
CREAT SERPL-MCNC: 0.92 MG/DL (ref 0.6–1.3)
DIFFERENTIAL METHOD BLD: ABNORMAL
EOSINOPHIL # BLD: 0.3 K/UL (ref 0–0.4)
EOSINOPHIL NFR BLD: 5 % (ref 0–5)
EPITH CASTS URNS QL MICRO: ABNORMAL /LPF (ref 0–5)
ERYTHROCYTE [DISTWIDTH] IN BLOOD BY AUTOMATED COUNT: 15.8 % (ref 11.6–14.5)
GLOBULIN SER CALC-MCNC: 3.8 G/DL (ref 2–4)
GLUCOSE SERPL-MCNC: 83 MG/DL (ref 74–99)
GLUCOSE UR STRIP.AUTO-MCNC: NEGATIVE MG/DL
HCT VFR BLD AUTO: 30 % (ref 35–45)
HGB BLD-MCNC: 9.8 G/DL (ref 12–16)
HGB UR QL STRIP: ABNORMAL
KETONES UR QL STRIP.AUTO: ABNORMAL MG/DL
LEUKOCYTE ESTERASE UR QL STRIP.AUTO: NEGATIVE
LYMPHOCYTES # BLD: 0.8 K/UL (ref 0.9–3.6)
LYMPHOCYTES NFR BLD: 13 % (ref 21–52)
MCH RBC QN AUTO: 30.1 PG (ref 24–34)
MCHC RBC AUTO-ENTMCNC: 32.7 G/DL (ref 31–37)
MCV RBC AUTO: 92 FL (ref 74–97)
MONOCYTES # BLD: 0.7 K/UL (ref 0.05–1.2)
MONOCYTES NFR BLD: 11 % (ref 3–10)
NEUTS SEG # BLD: 4.4 K/UL (ref 1.8–8)
NEUTS SEG NFR BLD: 71 % (ref 40–73)
NITRITE UR QL STRIP.AUTO: NEGATIVE
PH UR STRIP: 5 [PH] (ref 5–8)
PLATELET # BLD AUTO: 202 K/UL (ref 135–420)
PMV BLD AUTO: 9.2 FL (ref 9.2–11.8)
POTASSIUM SERPL-SCNC: 3.7 MMOL/L (ref 3.5–5.5)
PROT SERPL-MCNC: 6 G/DL (ref 6.4–8.2)
PROT UR STRIP-MCNC: NEGATIVE MG/DL
RBC # BLD AUTO: 3.26 M/UL (ref 4.2–5.3)
SODIUM SERPL-SCNC: 136 MMOL/L (ref 136–145)
SP GR UR REFRACTOMETRY: 1.01 (ref 1–1.03)
UROBILINOGEN UR QL STRIP.AUTO: 0.2 EU/DL (ref 0.2–1)
WBC # BLD AUTO: 6.1 K/UL (ref 4.6–13.2)
YEAST URNS QL MICRO: ABNORMAL

## 2018-09-22 PROCEDURE — 80053 COMPREHEN METABOLIC PANEL: CPT | Performed by: INTERNAL MEDICINE

## 2018-09-22 PROCEDURE — 87086 URINE CULTURE/COLONY COUNT: CPT | Performed by: INTERNAL MEDICINE

## 2018-09-22 PROCEDURE — C9113 INJ PANTOPRAZOLE SODIUM, VIA: HCPCS | Performed by: SPECIALIST

## 2018-09-22 PROCEDURE — 74011250636 HC RX REV CODE- 250/636: Performed by: SPECIALIST

## 2018-09-22 PROCEDURE — 85025 COMPLETE CBC W/AUTO DIFF WBC: CPT | Performed by: INTERNAL MEDICINE

## 2018-09-22 PROCEDURE — 65270000029 HC RM PRIVATE

## 2018-09-22 PROCEDURE — 74011250637 HC RX REV CODE- 250/637: Performed by: PHYSICIAN ASSISTANT

## 2018-09-22 PROCEDURE — 74011250636 HC RX REV CODE- 250/636: Performed by: INTERNAL MEDICINE

## 2018-09-22 PROCEDURE — 81001 URINALYSIS AUTO W/SCOPE: CPT | Performed by: INTERNAL MEDICINE

## 2018-09-22 PROCEDURE — 74011250637 HC RX REV CODE- 250/637: Performed by: HOSPITALIST

## 2018-09-22 RX ORDER — CLONIDINE 0.2 MG/24H
1 PATCH, EXTENDED RELEASE TRANSDERMAL
Status: DISCONTINUED | OUTPATIENT
Start: 2018-09-22 | End: 2018-09-25 | Stop reason: HOSPADM

## 2018-09-22 RX ORDER — FUROSEMIDE 10 MG/ML
20 INJECTION INTRAMUSCULAR; INTRAVENOUS ONCE
Status: COMPLETED | OUTPATIENT
Start: 2018-09-22 | End: 2018-09-22

## 2018-09-22 RX ADMIN — VANCOMYCIN HYDROCHLORIDE 1500 MG: 10 INJECTION, POWDER, LYOPHILIZED, FOR SOLUTION INTRAVENOUS at 12:20

## 2018-09-22 RX ADMIN — LEVOFLOXACIN 500 MG: 5 INJECTION, SOLUTION INTRAVENOUS at 10:31

## 2018-09-22 RX ADMIN — SODIUM CHLORIDE, SODIUM LACTATE, POTASSIUM CHLORIDE, AND CALCIUM CHLORIDE 150 ML/HR: 600; 310; 30; 20 INJECTION, SOLUTION INTRAVENOUS at 17:50

## 2018-09-22 RX ADMIN — FUROSEMIDE 20 MG: 10 INJECTION, SOLUTION INTRAMUSCULAR; INTRAVENOUS at 21:32

## 2018-09-22 RX ADMIN — SODIUM CHLORIDE 40 MG: 9 INJECTION INTRAMUSCULAR; INTRAVENOUS; SUBCUTANEOUS at 20:35

## 2018-09-22 RX ADMIN — OXYCODONE HYDROCHLORIDE 10 MG: 5 TABLET ORAL at 20:43

## 2018-09-22 RX ADMIN — ACETAMINOPHEN 1000 MG: 500 TABLET, FILM COATED ORAL at 12:23

## 2018-09-22 RX ADMIN — TAZOBACTAM SODIUM AND PIPERACILLIN SODIUM 3.38 G: 375; 3 INJECTION, SOLUTION INTRAVENOUS at 15:51

## 2018-09-22 RX ADMIN — TAZOBACTAM SODIUM AND PIPERACILLIN SODIUM 3.38 G: 375; 3 INJECTION, SOLUTION INTRAVENOUS at 04:01

## 2018-09-22 RX ADMIN — SODIUM CHLORIDE 40 MG: 9 INJECTION INTRAMUSCULAR; INTRAVENOUS; SUBCUTANEOUS at 08:13

## 2018-09-22 RX ADMIN — OXYCODONE HYDROCHLORIDE 10 MG: 5 TABLET ORAL at 01:07

## 2018-09-22 RX ADMIN — TAZOBACTAM SODIUM AND PIPERACILLIN SODIUM 3.38 G: 375; 3 INJECTION, SOLUTION INTRAVENOUS at 21:35

## 2018-09-22 RX ADMIN — ENOXAPARIN SODIUM 40 MG: 40 INJECTION, SOLUTION INTRAVENOUS; SUBCUTANEOUS at 18:25

## 2018-09-22 RX ADMIN — TAZOBACTAM SODIUM AND PIPERACILLIN SODIUM 3.38 G: 375; 3 INJECTION, SOLUTION INTRAVENOUS at 09:38

## 2018-09-22 RX ADMIN — VANCOMYCIN HYDROCHLORIDE 1500 MG: 10 INJECTION, POWDER, LYOPHILIZED, FOR SOLUTION INTRAVENOUS at 01:08

## 2018-09-22 NOTE — PROGRESS NOTES
Problem: Falls - Risk of  Goal: *Absence of Falls  Document Lucila Fall Risk and appropriate interventions in the flowsheet.    Outcome: Progressing Towards Goal  Fall Risk Interventions:  Mobility Interventions: Patient to call before getting OOB         Medication Interventions: Teach patient to arise slowly    Elimination Interventions: Call light in reach

## 2018-09-22 NOTE — PROGRESS NOTES
Hospitalist Progress Note    Patient: Jackelin Hill MRN: [id number]  CSN: [id number]    YOB: 1975  Age: 41 y.o. Sex: female    DOA: 9/18/2018 LOS:  LOS: 4 days          Chief Complaint:    fevers      Assessment/Plan     1. Post operative fever- silastic band removal after a prior tory-en-Y procedure- likely atelectasis -her cx are unrevealing-abx broadened yesterday, can deescalate shortly if remains afebrile and cx neg  2. Calf pain-she has no DVT on the US  3. HTN uncontrolled-change catapres to 0.3 mg patch  4. Morbid obesity  5.  Hypokalemia-replete as needed-K normal now  6.hx of gyn cancer    Continue current surveillance and current abx plan  Labs reviewed  Resume diet or start TPN as per bariatric surgery  Daily BC Cand BMP      Disposition :  Patient Active Problem List   Diagnosis Code    Cancer ([facility] C80.1    Severe obesity with body mass index (BMI) of 35.0 to 39.9 with comorbidity (HCC) E66.01    Intestinal malabsorption K90.9    Status post gastric bypass for obesity Z98.84    Hypertension I10    Anemia D64.9    Anxiety F41.9    GERD (gastroesophageal reflux disease) K21.9    Wears dentures Z97.2    Syncopal episodes R55    Smoker F17.200    Diarrhea R19.7    Status post laparoscopy Z98.890    Fever R50.9       Subjective:    Wants to eat  denies CP, SOB, cough, fevers, chills, worsening leg pain    Review of systems:    Constitutional: denies fevers, chills, myalgias  Cardiovascular: denies chest pain, palpitations  Gastrointestinal: denies nausea, vomiting, diarrhea      Vital signs/Intake and Output:  Visit Vitals    BP (!) 162/106 (BP 1 Location: Left arm, BP Patient Position: Sitting)    Pulse 78    Temp 99.7 °F (37.6 °C)    Resp 18    Ht 5' 5\" (1.651 m)    Wt 108 kg (238 lb 1.6 oz)    SpO2 100%    BMI 39.62 kg/m2     Current Shift:     Last three shifts:  09/20 1901 - 09/22 0700  In: 50 [I.V.:50]  Out: 750 [Urine:750]    Exam:    General: Well developed, alert, NAD, OX3  CVS:Regular rate and rhythm, no M/R/G, S1/S2 heard, no thrill  Lungs:Clear to auscultation bilaterally, no wheezes, rhonchi, or rales  Abdomen: Soft, Nontender, No distention, Normal Bowel sounds, No hepatomegaly  Extremities: No C/C/E, pulses palpable 2+  Neuro:grossly normal , follows commands  Psych:appropriate                Labs: Results:       Chemistry Recent Labs      09/22/18   0400  09/21/18   0640   GLU  83  68*   NA  136  135*   K  3.7  3.3*   CL  101  101   CO2  22  24   BUN  5*  5*   CREA  0.92  0.89   CA  7.9*  8.0*   AGAP  13  10   BUCR  5*  6*   AP  72  73   TP  6.0*  6.3*   ALB  2.2*  2.2*   GLOB  3.8  4.1*   AGRAT  0.6*  0.5*      CBC w/Diff Recent Labs      09/22/18   0400  09/21/18   0640   WBC  6.1  8.1   RBC  3.26*  3.27*   HGB  9.8*  9.9*   HCT  30.0*  30.0*   PLT  202  200   GRANS  71  81*   LYMPH  13*  10*   EOS  5  4      Cardiac Enzymes No results for input(s): CPK, CKND1, JONATAN in the last 72 hours. No lab exists for component: CKRMB, TROIP   Coagulation Recent Labs      09/21/18   0640   PTP  13.1   INR  1.0       Lipid Panel No results found for: CHOL, CHOLPOCT, CHOLX, CHLST, CHOLV, 953941, HDL, LDL, LDLC, DLDLP, 528536, VLDLC, VLDL, TGLX, TRIGL, TRIGP, TGLPOCT, CHHD, CHHDX   BNP No results for input(s): BNPP in the last 72 hours.    Liver Enzymes Recent Labs      09/22/18   0400   TP  6.0*   ALB  2.2*   AP  72   SGOT  26      Thyroid Studies No results found for: T4, T3U, TSH, TSHEXT     Procedures/imaging: see electronic medical records for all procedures/Xrays and details which were not copied into this note but were reviewed prior to creation of Jamaal Manriquez MD

## 2018-09-22 NOTE — PROGRESS NOTES
0715-received report from Kirsten Duffy RN included SBAR MAR and Kardex. Shift summary-no change in pt status, ambulating in hallway, tolerating diet. Bedside and Verbal shift change report given to Amberly Reardon RN (oncoming nurse) by Radhika Archuleta RN (offgoing nurse). Report included the following information SBAR, Kardex and MAR.

## 2018-09-22 NOTE — ROUTINE PROCESS
Bedside, Verbal and Written shift change report given to Devonna Kehr, RN (oncoming nurse) by Trent Roberts. Robbi Ritter (offgoing nurse). Report included the following information SBAR, Kardex, MAR and Recent Results.

## 2018-09-23 LAB
ALBUMIN SERPL-MCNC: 1.8 G/DL (ref 3.4–5)
ALBUMIN/GLOB SERPL: 0.5 {RATIO} (ref 0.8–1.7)
ALP SERPL-CCNC: 54 U/L (ref 45–117)
ALT SERPL-CCNC: 22 U/L (ref 13–56)
ANION GAP SERPL CALC-SCNC: 11 MMOL/L (ref 3–18)
AST SERPL-CCNC: 25 U/L (ref 15–37)
BASOPHILS # BLD: 0 K/UL (ref 0–0.1)
BASOPHILS NFR BLD: 0 % (ref 0–2)
BILIRUB SERPL-MCNC: 0.5 MG/DL (ref 0.2–1)
BUN SERPL-MCNC: 5 MG/DL (ref 7–18)
BUN/CREAT SERPL: 3 (ref 12–20)
CALCIUM SERPL-MCNC: 7.7 MG/DL (ref 8.5–10.1)
CHLORIDE SERPL-SCNC: 101 MMOL/L (ref 100–108)
CO2 SERPL-SCNC: 23 MMOL/L (ref 21–32)
CREAT SERPL-MCNC: 1.83 MG/DL (ref 0.6–1.3)
DIFFERENTIAL METHOD BLD: ABNORMAL
EOSINOPHIL # BLD: 0.2 K/UL (ref 0–0.4)
EOSINOPHIL NFR BLD: 4 % (ref 0–5)
ERYTHROCYTE [DISTWIDTH] IN BLOOD BY AUTOMATED COUNT: 15.8 % (ref 11.6–14.5)
GLOBULIN SER CALC-MCNC: 4 G/DL (ref 2–4)
GLUCOSE BLD STRIP.AUTO-MCNC: 112 MG/DL (ref 70–110)
GLUCOSE BLD STRIP.AUTO-MCNC: 87 MG/DL (ref 70–110)
GLUCOSE SERPL-MCNC: 82 MG/DL (ref 74–99)
HCT VFR BLD AUTO: 26.3 % (ref 35–45)
HGB BLD-MCNC: 8.5 G/DL (ref 12–16)
LYMPHOCYTES # BLD: 0.8 K/UL (ref 0.9–3.6)
LYMPHOCYTES NFR BLD: 18 % (ref 21–52)
MCH RBC QN AUTO: 29.5 PG (ref 24–34)
MCHC RBC AUTO-ENTMCNC: 32.3 G/DL (ref 31–37)
MCV RBC AUTO: 91.3 FL (ref 74–97)
MONOCYTES # BLD: 1 K/UL (ref 0.05–1.2)
MONOCYTES NFR BLD: 22 % (ref 3–10)
NEUTS SEG # BLD: 2.6 K/UL (ref 1.8–8)
NEUTS SEG NFR BLD: 56 % (ref 40–73)
PLATELET # BLD AUTO: 167 K/UL (ref 135–420)
PMV BLD AUTO: 8.8 FL (ref 9.2–11.8)
POTASSIUM SERPL-SCNC: 3.2 MMOL/L (ref 3.5–5.5)
PROT SERPL-MCNC: 5.8 G/DL (ref 6.4–8.2)
RBC # BLD AUTO: 2.88 M/UL (ref 4.2–5.3)
SODIUM SERPL-SCNC: 135 MMOL/L (ref 136–145)
WBC # BLD AUTO: 4.6 K/UL (ref 4.6–13.2)

## 2018-09-23 PROCEDURE — 74011000258 HC RX REV CODE- 258: Performed by: HOSPITALIST

## 2018-09-23 PROCEDURE — 65270000029 HC RM PRIVATE

## 2018-09-23 PROCEDURE — 36592 COLLECT BLOOD FROM PICC: CPT

## 2018-09-23 PROCEDURE — 85025 COMPLETE CBC W/AUTO DIFF WBC: CPT | Performed by: INTERNAL MEDICINE

## 2018-09-23 PROCEDURE — 80053 COMPREHEN METABOLIC PANEL: CPT | Performed by: INTERNAL MEDICINE

## 2018-09-23 PROCEDURE — 74011250636 HC RX REV CODE- 250/636: Performed by: SPECIALIST

## 2018-09-23 PROCEDURE — 82962 GLUCOSE BLOOD TEST: CPT

## 2018-09-23 PROCEDURE — 74011250636 HC RX REV CODE- 250/636: Performed by: HOSPITALIST

## 2018-09-23 PROCEDURE — 74011250637 HC RX REV CODE- 250/637: Performed by: PHYSICIAN ASSISTANT

## 2018-09-23 PROCEDURE — C9113 INJ PANTOPRAZOLE SODIUM, VIA: HCPCS | Performed by: SPECIALIST

## 2018-09-23 PROCEDURE — 74011250636 HC RX REV CODE- 250/636: Performed by: INTERNAL MEDICINE

## 2018-09-23 RX ORDER — SODIUM CHLORIDE 450 MG/100ML
100 INJECTION, SOLUTION INTRAVENOUS CONTINUOUS
Status: DISCONTINUED | OUTPATIENT
Start: 2018-09-23 | End: 2018-09-24

## 2018-09-23 RX ORDER — HYDRALAZINE HYDROCHLORIDE 20 MG/ML
20 INJECTION INTRAMUSCULAR; INTRAVENOUS
Status: DISCONTINUED | OUTPATIENT
Start: 2018-09-23 | End: 2018-09-25 | Stop reason: HOSPADM

## 2018-09-23 RX ORDER — POTASSIUM CHLORIDE 7.45 MG/ML
10 INJECTION INTRAVENOUS
Status: COMPLETED | OUTPATIENT
Start: 2018-09-23 | End: 2018-09-23

## 2018-09-23 RX ADMIN — SODIUM CHLORIDE 100 ML/HR: 450 INJECTION, SOLUTION INTRAVENOUS at 09:32

## 2018-09-23 RX ADMIN — SODIUM CHLORIDE, SODIUM LACTATE, POTASSIUM CHLORIDE, AND CALCIUM CHLORIDE 75 ML/HR: 600; 310; 30; 20 INJECTION, SOLUTION INTRAVENOUS at 06:48

## 2018-09-23 RX ADMIN — SODIUM CHLORIDE 100 ML/HR: 450 INJECTION, SOLUTION INTRAVENOUS at 22:10

## 2018-09-23 RX ADMIN — OXYCODONE HYDROCHLORIDE 10 MG: 5 TABLET ORAL at 23:07

## 2018-09-23 RX ADMIN — TAZOBACTAM SODIUM AND PIPERACILLIN SODIUM 3.38 G: 375; 3 INJECTION, SOLUTION INTRAVENOUS at 11:15

## 2018-09-23 RX ADMIN — TAZOBACTAM SODIUM AND PIPERACILLIN SODIUM 3.38 G: 375; 3 INJECTION, SOLUTION INTRAVENOUS at 04:02

## 2018-09-23 RX ADMIN — ENOXAPARIN SODIUM 40 MG: 40 INJECTION, SOLUTION INTRAVENOUS; SUBCUTANEOUS at 06:46

## 2018-09-23 RX ADMIN — SODIUM CHLORIDE 40 MG: 9 INJECTION INTRAMUSCULAR; INTRAVENOUS; SUBCUTANEOUS at 08:52

## 2018-09-23 RX ADMIN — ENOXAPARIN SODIUM 40 MG: 40 INJECTION, SOLUTION INTRAVENOUS; SUBCUTANEOUS at 18:24

## 2018-09-23 RX ADMIN — POTASSIUM CHLORIDE 10 MEQ: 10 INJECTION, SOLUTION INTRAVENOUS at 10:11

## 2018-09-23 RX ADMIN — TAZOBACTAM SODIUM AND PIPERACILLIN SODIUM 3.38 G: 375; 3 INJECTION, SOLUTION INTRAVENOUS at 22:10

## 2018-09-23 RX ADMIN — TAZOBACTAM SODIUM AND PIPERACILLIN SODIUM 3.38 G: 375; 3 INJECTION, SOLUTION INTRAVENOUS at 15:55

## 2018-09-23 RX ADMIN — VANCOMYCIN HYDROCHLORIDE 1500 MG: 10 INJECTION, POWDER, LYOPHILIZED, FOR SOLUTION INTRAVENOUS at 01:40

## 2018-09-23 RX ADMIN — LEVOFLOXACIN 500 MG: 5 INJECTION, SOLUTION INTRAVENOUS at 12:01

## 2018-09-23 RX ADMIN — POTASSIUM CHLORIDE 10 MEQ: 10 INJECTION, SOLUTION INTRAVENOUS at 08:54

## 2018-09-23 RX ADMIN — SODIUM CHLORIDE 40 MG: 9 INJECTION INTRAMUSCULAR; INTRAVENOUS; SUBCUTANEOUS at 20:16

## 2018-09-23 NOTE — PROGRESS NOTES
0720-received report from [address] included SBAR MAR and Kardex. Shift summary-no change in pt status  Bedside and Verbal shift change report given to Edgar Casillas RN (oncoming nurse) by Gabbie Ravi RN (offgoing nurse). Report included the following information SBAR, Kardex and MAR.

## 2018-09-23 NOTE — PROGRESS NOTES
Bariatric Surgery                POD #4    Visit Vitals    BP (!) 149/95 (BP 1 Location: Left arm, BP Patient Position: At rest)  Comment: RN AWARE    Pulse 71    Temp 98.3 °F (36.8 °C)    Resp 18    Ht 5' 5\" (1.651 m)    Wt 108 kg (238 lb 1.6 oz)    SpO2 100%    BMI 39.62 kg/m2     Patient anxious for D/C     Exam:  Appears well in no distress  Lungs- clear bilaterally  Abd - soft, incisions look good without erythema      Data Review:    Labs: Results:       Chemistry Recent Labs      09/23/18 0417 09/22/18   0400  09/21/18   0640   GLU  82  83  68*   NA  135*  136  135*   K  3.2*  3.7  3.3*   CL  101  101  101   CO2  23  22  24   BUN  5*  5*  5*   CREA  1.83*  0.92  0.89   CA  7.7*  7.9*  8.0*   AGAP  11  13  10   BUCR  3*  5*  6*   AP  54  72  73   TP  5.8*  6.0*  6.3*   ALB  1.8*  2.2*  2.2*   GLOB  4.0  3.8  4.1*   AGRAT  0.5*  0.6*  0.5*      CBC w/Diff Recent Labs      09/23/18 0417 09/22/18   0400  09/21/18   0640   WBC  4.6  6.1  8.1   RBC  2.88*  3.26*  3.27*   HGB  8.5*  9.8*  9.9*   HCT  26.3*  30.0*  30.0*   PLT  167  202  200   GRANS  56  71  81*   LYMPH  18*  13*  10*   EOS  4  5  4      Coagulation Recent Labs      09/21/18   0640   PTP  13.1   INR  1.0       Liver Enzymes Recent Labs      09/23/18 0417   TP  5.8*   ALB  1.8*   AP  54   SGOT  25          Assessment/Plan: S/P  removal of eroded silastic gastric band from around gastric bypass pouch. Note IM entry - K+ and Cr- addressed as well has HTN. She has had two normal BMs since yesterday. She is tolerating PO without issue - will add protein shakes today. 1. Cont PO  2. Labs in AM  3.  D/C home in AM if cont afebrile - no plans for IV antibiotics upon D/C per IM

## 2018-09-23 NOTE — PROGRESS NOTES
2045 - (Per RN Deshawn Huber) Dr. Javi Kay ordered to give one time dose of 20 mg Lasix IV and to decrease IVF to 75 mL/hr. Administered PRN Roxicodone for pain rated 8/10.    2132 - VS rechecked: HR 82, /87. IV Lasix administered    2335 - VS rechecked: T 99.5, , R 18, /94. Reassessed pain at this time. Will continue to monitor. 0330 - VS rechecked: HR 79, /78     0417 - Labs drawn from PICC line. 0530 - Resting quietly. Respirations observed.      Patient Vitals for the past 12 hrs:   Temp Pulse Resp BP SpO2   09/23/18 0330 98.6 °F (37 °C) 79 18 140/78 97 %   09/22/18 2335 99.5 °F (37.5 °C) (!) 104 18 (!) 130/94 97 %   09/22/18 2132 - 82 - 155/87 -   09/22/18 1937 99.1 °F (37.3 °C) 91 18 (!) 175/103 100 %

## 2018-09-23 NOTE — PROGRESS NOTES
Hospitalist Progress Note    Patient: Dina Garcia MRN: [id number]  CSN: [id number]    YOB: 1975  Age: 41 y.o.   Sex: female    DOA: 9/18/2018 LOS:  LOS: 5 days          Chief Complaint:    LARON      Assessment/Plan     Post operative fever- silastic band removal after a prior tory-en-Y procedure- possible pneumonia -her cx are unrevealing-abx broadened 9/21, can deescalate shortly if remains afebrile and cx neg    New uaodf-SLS-BM up to 1.8, GFR down, will change to IV      Calf pain-she has no DVT on the US  HTN uncontrolled-changed catapres to 0.3 mg patch on 9/22, add IV PRN hydralazine  Morbid obesity  Hypokalemia-k repeltion IV  hx of gyn cancer    Stop vanco as no signs/sx for staph  Continue tx for possible pneumonia  Hydration, BP control    Repeat BMP Monday  Diet as per bariatrics    Do not expect her to need continued IV antibitoic treatment after d/c      Disposition :  Patient Active Problem List   Diagnosis Code    Cancer ([facility] [address] C80.1    Severe obesity with body mass index (BMI) of 35.0 to 39.9 with comorbidity (HCC) E66.01    Intestinal malabsorption K90.9    Status post gastric bypass for obesity Z98.84    Hypertension I10    Anemia D64.9    Anxiety F41.9    GERD (gastroesophageal reflux disease) K21.9    Wears dentures Z97.2    Syncopal episodes R55    Smoker F17.200    Diarrhea R19.7    Status post laparoscopy Z98.890    Fever R50.9       Subjective:  Wants to go home  Eating liquids w/o vomiting but not happy about it  Denies abd pain  Is urinating regularly  No dysuria      Review of systems:    Constitutional: denies fevers, chills, myalgias  Respiratory: denies SOB, cough  Cardiovascular: denies chest pain  Gastrointestinal: denies nausea, vomiting, diarrhea      Vital signs/Intake and Output:  Visit Vitals    BP (!) 145/96    Pulse 93    Temp 98.7 °F (37.1 °C)    Resp 18    Ht 5' 5\" (1.651 m)    Wt 108 kg (238 lb 1.6 oz)    SpO2 100%    BMI 39.62 kg/m2     Current Shift:  09/23 0701 - 09/23 1900  In: -   Out: 400 [Urine:400]  Last three shifts:  09/21 1901 - 09/23 0700  In: 5438.2 [P.O.:240; I.V.:5198.2]  Out: 650 [Urine:650]    Exam:    General: Well developed, alert, NAD, OX3  Head/Neck: NCAT, supple, No masses, No lymphadenopathy  CVS:Regular rate and rhythm, no M/R/G, S1/S2 heard, no thrill  Lungs:Clear to auscultation bilaterally, no wheezes, rhonchi, or rales  Abdomen: Soft, Nontender, No distention, Normal Bowel sounds, No hepatomegaly  Extremities: 1 plus edema LE BL  Neuro:grossly normal , follows commands  Psych:appropriate                Labs: Results:       Chemistry Recent Labs      09/23/18 0417 09/22/18   0400  09/21/18   0640   GLU  82  83  68*   NA  135*  136  135*   K  3.2*  3.7  3.3*   CL  101  101  101   CO2  23  22  24   BUN  5*  5*  5*   CREA  1.83*  0.92  0.89   CA  7.7*  7.9*  8.0*   AGAP  11  13  10   BUCR  3*  5*  6*   AP  54  72  73   TP  5.8*  6.0*  6.3*   ALB  1.8*  2.2*  2.2*   GLOB  4.0  3.8  4.1*   AGRAT  0.5*  0.6*  0.5*      CBC w/Diff Recent Labs      09/23/18 0417 09/22/18   0400  09/21/18   0640   WBC  4.6  6.1  8.1   RBC  2.88*  3.26*  3.27*   HGB  8.5*  9.8*  9.9*   HCT  26.3*  30.0*  30.0*   PLT  167  202  200   GRANS  56  71  81*   LYMPH  18*  13*  10*   EOS  4  5  4      Cardiac Enzymes No results for input(s): CPK, CKND1, JONATAN in the last 72 hours. No lab exists for component: CKRMB, TROIP   Coagulation Recent Labs      09/21/18   0640   PTP  13.1   INR  1.0       Lipid Panel No results found for: CHOL, CHOLPOCT, CHOLX, CHLST, CHOLV, 641194, HDL, LDL, LDLC, DLDLP, 683421, VLDLC, VLDL, TGLX, TRIGL, TRIGP, TGLPOCT, CHHD, CHHDX   BNP No results for input(s): BNPP in the last 72 hours.    Liver Enzymes Recent Labs      09/23/18   0417   TP  5.8*   ALB  1.8*   AP  54   SGOT  25      Thyroid Studies No results found for: T4, T3U, TSH, TSHEXT     Procedures/imaging: see electronic medical records for all procedures/Xrays and details which were not copied into this note but were reviewed prior to creation of Roshni Bauer MD

## 2018-09-23 NOTE — ROUTINE PROCESS
Bedside and Verbal shift change report given to JACQUELYN Petty RN (oncoming nurse) by Stefan Bourne RN  (offgoing nurse). Report included the following information SBAR, Kardex, Procedure Summary, Intake/Output, MAR and Recent Results.

## 2018-09-24 ENCOUNTER — APPOINTMENT (OUTPATIENT)
Dept: ULTRASOUND IMAGING | Age: 43
DRG: 223 | End: 2018-09-24
Attending: HOSPITALIST
Payer: MEDICAID

## 2018-09-24 LAB
ALBUMIN SERPL-MCNC: 1.7 G/DL (ref 3.4–5)
ALBUMIN/GLOB SERPL: 0.4 {RATIO} (ref 0.8–1.7)
ALP SERPL-CCNC: 49 U/L (ref 45–117)
ALT SERPL-CCNC: 17 U/L (ref 13–56)
ANION GAP SERPL CALC-SCNC: 12 MMOL/L (ref 3–18)
ANION GAP SERPL CALC-SCNC: 8 MMOL/L (ref 3–18)
APPEARANCE UR: CLEAR
AST SERPL-CCNC: 19 U/L (ref 15–37)
BACTERIA SPEC CULT: NORMAL
BACTERIA URNS QL MICRO: NEGATIVE /HPF
BASOPHILS # BLD: 0 K/UL (ref 0–0.1)
BASOPHILS NFR BLD: 1 % (ref 0–2)
BILIRUB SERPL-MCNC: 0.5 MG/DL (ref 0.2–1)
BILIRUB UR QL: NEGATIVE
BUN SERPL-MCNC: 8 MG/DL (ref 7–18)
BUN SERPL-MCNC: 8 MG/DL (ref 7–18)
BUN/CREAT SERPL: 3 (ref 12–20)
BUN/CREAT SERPL: 3 (ref 12–20)
CALCIUM SERPL-MCNC: 7.7 MG/DL (ref 8.5–10.1)
CALCIUM SERPL-MCNC: 8.1 MG/DL (ref 8.5–10.1)
CHLORIDE SERPL-SCNC: 101 MMOL/L (ref 100–108)
CHLORIDE SERPL-SCNC: 102 MMOL/L (ref 100–108)
CO2 SERPL-SCNC: 22 MMOL/L (ref 21–32)
CO2 SERPL-SCNC: 24 MMOL/L (ref 21–32)
COLOR UR: YELLOW
CREAT SERPL-MCNC: 2.7 MG/DL (ref 0.6–1.3)
CREAT SERPL-MCNC: 2.97 MG/DL (ref 0.6–1.3)
CREAT UR-MCNC: 47.91 MG/DL (ref 30–125)
DIFFERENTIAL METHOD BLD: ABNORMAL
EOSINOPHIL # BLD: 0.3 K/UL (ref 0–0.4)
EOSINOPHIL NFR BLD: 5 % (ref 0–5)
EPITH CASTS URNS QL MICRO: NORMAL /LPF (ref 0–5)
ERYTHROCYTE [DISTWIDTH] IN BLOOD BY AUTOMATED COUNT: 15.5 % (ref 11.6–14.5)
GLOBULIN SER CALC-MCNC: 4 G/DL (ref 2–4)
GLUCOSE BLD STRIP.AUTO-MCNC: 74 MG/DL (ref 70–110)
GLUCOSE BLD STRIP.AUTO-MCNC: 96 MG/DL (ref 70–110)
GLUCOSE SERPL-MCNC: 126 MG/DL (ref 74–99)
GLUCOSE SERPL-MCNC: 70 MG/DL (ref 74–99)
GLUCOSE UR STRIP.AUTO-MCNC: NEGATIVE MG/DL
HCT VFR BLD AUTO: 28.7 % (ref 35–45)
HGB BLD-MCNC: 9.3 G/DL (ref 12–16)
HGB UR QL STRIP: NEGATIVE
KETONES UR QL STRIP.AUTO: NEGATIVE MG/DL
LEUKOCYTE ESTERASE UR QL STRIP.AUTO: NEGATIVE
LYMPHOCYTES # BLD: 1.2 K/UL (ref 0.9–3.6)
LYMPHOCYTES NFR BLD: 18 % (ref 21–52)
MCH RBC QN AUTO: 29.2 PG (ref 24–34)
MCHC RBC AUTO-ENTMCNC: 32.4 G/DL (ref 31–37)
MCV RBC AUTO: 90.3 FL (ref 74–97)
MONOCYTES # BLD: 1 K/UL (ref 0.05–1.2)
MONOCYTES NFR BLD: 16 % (ref 3–10)
NEUTS SEG # BLD: 4 K/UL (ref 1.8–8)
NEUTS SEG NFR BLD: 60 % (ref 40–73)
NITRITE UR QL STRIP.AUTO: NEGATIVE
OSMOLALITY UR: 96 MOSM/KG H2O (ref 50–1400)
PH UR STRIP: 5 [PH] (ref 5–8)
PLATELET # BLD AUTO: 207 K/UL (ref 135–420)
PMV BLD AUTO: 8.9 FL (ref 9.2–11.8)
POTASSIUM SERPL-SCNC: 3.3 MMOL/L (ref 3.5–5.5)
POTASSIUM SERPL-SCNC: 3.8 MMOL/L (ref 3.5–5.5)
PROT SERPL-MCNC: 5.7 G/DL (ref 6.4–8.2)
PROT UR STRIP-MCNC: NEGATIVE MG/DL
RBC # BLD AUTO: 3.18 M/UL (ref 4.2–5.3)
RBC #/AREA URNS HPF: NEGATIVE /HPF (ref 0–5)
SERVICE CMNT-IMP: NORMAL
SODIUM SERPL-SCNC: 134 MMOL/L (ref 136–145)
SODIUM SERPL-SCNC: 135 MMOL/L (ref 136–145)
SODIUM UR-SCNC: 20 MMOL/L (ref 20–110)
SP GR UR REFRACTOMETRY: 1.01 (ref 1–1.03)
UROBILINOGEN UR QL STRIP.AUTO: 0.2 EU/DL (ref 0.2–1)
WBC # BLD AUTO: 6.5 K/UL (ref 4.6–13.2)
WBC URNS QL MICRO: NORMAL /HPF (ref 0–5)

## 2018-09-24 PROCEDURE — 76770 US EXAM ABDO BACK WALL COMP: CPT

## 2018-09-24 PROCEDURE — 85025 COMPLETE CBC W/AUTO DIFF WBC: CPT | Performed by: INTERNAL MEDICINE

## 2018-09-24 PROCEDURE — 82962 GLUCOSE BLOOD TEST: CPT

## 2018-09-24 PROCEDURE — 81001 URINALYSIS AUTO W/SCOPE: CPT | Performed by: INTERNAL MEDICINE

## 2018-09-24 PROCEDURE — 36415 COLL VENOUS BLD VENIPUNCTURE: CPT | Performed by: INTERNAL MEDICINE

## 2018-09-24 PROCEDURE — 84300 ASSAY OF URINE SODIUM: CPT | Performed by: INTERNAL MEDICINE

## 2018-09-24 PROCEDURE — 74011250636 HC RX REV CODE- 250/636: Performed by: HOSPITALIST

## 2018-09-24 PROCEDURE — C9113 INJ PANTOPRAZOLE SODIUM, VIA: HCPCS | Performed by: SPECIALIST

## 2018-09-24 PROCEDURE — 74011250637 HC RX REV CODE- 250/637: Performed by: PHYSICIAN ASSISTANT

## 2018-09-24 PROCEDURE — 74011250636 HC RX REV CODE- 250/636: Performed by: INTERNAL MEDICINE

## 2018-09-24 PROCEDURE — 83935 ASSAY OF URINE OSMOLALITY: CPT | Performed by: INTERNAL MEDICINE

## 2018-09-24 PROCEDURE — 74011000258 HC RX REV CODE- 258: Performed by: HOSPITALIST

## 2018-09-24 PROCEDURE — 74011250637 HC RX REV CODE- 250/637: Performed by: HOSPITALIST

## 2018-09-24 PROCEDURE — 82570 ASSAY OF URINE CREATININE: CPT | Performed by: INTERNAL MEDICINE

## 2018-09-24 PROCEDURE — 80053 COMPREHEN METABOLIC PANEL: CPT | Performed by: INTERNAL MEDICINE

## 2018-09-24 PROCEDURE — 36592 COLLECT BLOOD FROM PICC: CPT

## 2018-09-24 PROCEDURE — 65270000029 HC RM PRIVATE

## 2018-09-24 PROCEDURE — 74011250636 HC RX REV CODE- 250/636: Performed by: SPECIALIST

## 2018-09-24 RX ORDER — FLUCONAZOLE 2 MG/ML
100 INJECTION, SOLUTION INTRAVENOUS DAILY
Status: DISCONTINUED | OUTPATIENT
Start: 2018-09-25 | End: 2018-09-25 | Stop reason: HOSPADM

## 2018-09-24 RX ORDER — POTASSIUM CHLORIDE 20 MEQ/1
20 TABLET, EXTENDED RELEASE ORAL
Status: COMPLETED | OUTPATIENT
Start: 2018-09-24 | End: 2018-09-24

## 2018-09-24 RX ORDER — LEVOFLOXACIN 5 MG/ML
250 INJECTION, SOLUTION INTRAVENOUS
Status: DISCONTINUED | OUTPATIENT
Start: 2018-09-25 | End: 2018-09-25 | Stop reason: HOSPADM

## 2018-09-24 RX ORDER — HEPARIN SODIUM 5000 [USP'U]/ML
5000 INJECTION, SOLUTION INTRAVENOUS; SUBCUTANEOUS EVERY 8 HOURS
Status: DISCONTINUED | OUTPATIENT
Start: 2018-09-24 | End: 2018-09-25 | Stop reason: HOSPADM

## 2018-09-24 RX ORDER — POTASSIUM CHLORIDE, DEXTROSE MONOHYDRATE AND SODIUM CHLORIDE 300; 5; 900 MG/100ML; G/100ML; MG/100ML
INJECTION, SOLUTION INTRAVENOUS CONTINUOUS
Status: DISCONTINUED | OUTPATIENT
Start: 2018-09-24 | End: 2018-09-25

## 2018-09-24 RX ADMIN — POTASSIUM CHLORIDE 20 MEQ: 20 TABLET, EXTENDED RELEASE ORAL at 10:18

## 2018-09-24 RX ADMIN — ENOXAPARIN SODIUM 40 MG: 40 INJECTION, SOLUTION INTRAVENOUS; SUBCUTANEOUS at 06:28

## 2018-09-24 RX ADMIN — HEPARIN SODIUM 5000 UNITS: 5000 INJECTION INTRAVENOUS; SUBCUTANEOUS at 22:31

## 2018-09-24 RX ADMIN — SODIUM CHLORIDE, SODIUM LACTATE, POTASSIUM CHLORIDE, AND CALCIUM CHLORIDE 1000 ML: 600; 310; 30; 20 INJECTION, SOLUTION INTRAVENOUS at 16:58

## 2018-09-24 RX ADMIN — SODIUM CHLORIDE 40 MG: 9 INJECTION INTRAMUSCULAR; INTRAVENOUS; SUBCUTANEOUS at 21:32

## 2018-09-24 RX ADMIN — POTASSIUM CHLORIDE, DEXTROSE MONOHYDRATE AND SODIUM CHLORIDE: 300; 5; 900 INJECTION, SOLUTION INTRAVENOUS at 12:08

## 2018-09-24 RX ADMIN — HEPARIN SODIUM 5000 UNITS: 5000 INJECTION INTRAVENOUS; SUBCUTANEOUS at 15:00

## 2018-09-24 RX ADMIN — OXYCODONE HYDROCHLORIDE 10 MG: 5 TABLET ORAL at 22:30

## 2018-09-24 RX ADMIN — PIPERACILLIN SODIUM,TAZOBACTAM SODIUM 2.25 G: 2; .25 INJECTION, POWDER, FOR SOLUTION INTRAVENOUS at 10:20

## 2018-09-24 RX ADMIN — TAZOBACTAM SODIUM AND PIPERACILLIN SODIUM 3.38 G: 375; 3 INJECTION, SOLUTION INTRAVENOUS at 03:03

## 2018-09-24 RX ADMIN — SODIUM CHLORIDE 40 MG: 9 INJECTION INTRAMUSCULAR; INTRAVENOUS; SUBCUTANEOUS at 10:18

## 2018-09-24 RX ADMIN — ACETAMINOPHEN 1000 MG: 500 TABLET, FILM COATED ORAL at 21:38

## 2018-09-24 RX ADMIN — SODIUM CHLORIDE 100 ML/HR: 450 INJECTION, SOLUTION INTRAVENOUS at 07:50

## 2018-09-24 RX ADMIN — PIPERACILLIN SODIUM,TAZOBACTAM SODIUM 2.25 G: 2; .25 INJECTION, POWDER, FOR SOLUTION INTRAVENOUS at 22:24

## 2018-09-24 RX ADMIN — PIPERACILLIN SODIUM,TAZOBACTAM SODIUM 2.25 G: 2; .25 INJECTION, POWDER, FOR SOLUTION INTRAVENOUS at 16:58

## 2018-09-24 NOTE — ROUTINE PROCESS
Bedside and Verbal shift change report given to GENOVEVA Keenan (oncoming nurse) by Alissa Agrawal RN  (offgoing nurse). Report included the following information SBAR, Kardex, Intake/Output, MAR and Recent Results.

## 2018-09-24 NOTE — ROUTINE PROCESS
Bedside and Verbal shift change report given to Armand Lopez RN (oncoming nurse) by Kailee Caldera RN (offgoing nurse). Report included the following information SBAR, Kardex, Intake/Output, MAR and Recent Results.

## 2018-09-24 NOTE — PROGRESS NOTES
Received bedside report from night shift RN. Patient resting quietly in bed. Lung sounds clear, bowel sounds active. Patient frustrated that she is still in the hospital.     Nephrologist in to speak with patient. Dr Arsen Mann contacted regarding patient's diet, she wanted to be on full liquid instead of bariatric full liquid. Physician changed order. Physician came in to speak with patient. Patient resting quietly throughout shift, ambulated hallway periodically without difficulty. Patient voiding throughout shift without difficulty.

## 2018-09-24 NOTE — CONSULTS
RENAL CONSULT  2018    Patient:  Jewell Bender  :  1975  Gender:  female  MRN #:  [id number]    Consulting Physician:  Radha Wong DO,  Assessment:    )Active Problems:    Status post laparoscopy (2018)      Fever (2018)      ARF  Hyponatremia  Hypokalemia  Yeast UTI  Plan:    Pt most likely has ketoacidosis and the labs (probe)  Is reading keto acetate has Creatinine  PT needs more carb such simple sugars to change the metabolism from Ketogenesis. Treat Yeast UTI  Labs are pending  PT really wants to go home. If the above working diagnosis is correct, her cr should improve. PT can follow up with me at office on  Wed. History of Present Illness:  Jewell Bender is a 41y.o. year old female s/p gastric bypass band removal on the . PT had normal kidney function on  since then her cr has risen, of note is that pt had not any nephrotoxic drugs and her BP had been maintained. PT had not been eating and had been on clear liquid diet. She has been on IVF in form of lactate ringers. PT is urinating about 350 ml every 30 minutes  Has edema since Friday.        Past Medical History:   Diagnosis Date    Anemia     Anxiety     Cancer ([address]      history of endometrial cancer    Diarrhea     GERD (gastroesophageal reflux disease)     Hypertension 2017    Intestinal malabsorption     Severe obesity with body mass index (BMI) of 35.0 to 39.9 with comorbidity ([address]     Smoker     smokes 5 cigarettes per day    Status post gastric bypass for obesity     daryl / librado hermosillo    Syncopal episodes     has plans to see a neurologist     Wears dentures     teeth extraction due to vomiting and vitamin deficiencies     Past Surgical History:   Procedure Laterality Date    HX CHOLECYSTECTOMY      HX GASTRIC BYPASS      daryl / librado hermosillo    HX GI  2017    EGD    HX OTHER SURGICAL Right     resection of benign breast mass    HX PARTIAL HYSTERECTOMY      cancer related / pt with both ovaries     Family History   Problem Relation Age of Onset    Diabetes Mother      No Known Allergies  Current Facility-Administered Medications   Medication Dose Route Frequency Provider Last Rate Last Dose    [START ON 9/25/2018] levoFLOXacin (LEVAQUIN) 250 mg in D5W IVPB  250 mg IntraVENous Q48H Reyna Mackenzie MD        heparin (porcine) injection 5,000 Units  5,000 Units SubCUTAneous Jarrett Helm MD   Stopped at 09/24/18 0700    piperacillin-tazobactam (ZOSYN) 2.25 g in 0.9% sodium chloride (MBP/ADV) 50 mL MBP  2.25 g IntraVENous Q6H Reyna Mackenzie MD        dextrose 5% - 0.9% NaCl with KCl 40 mEq/L infusion   IntraVENous CONTINUOUS Rayo Browne DO        [START ON 9/25/2018] fluconazole (DIFLUCAN) 100 mg/50 ml IVPB (premix)  100 mg IntraVENous DAILY Rayo Browne DO        hydrALAZINE (APRESOLINE) 20 mg/mL injection 20 mg  20 mg IntraVENous Q6H PRN Reyna Mackenzie MD        cloNIDine (CATAPRES) 0.2 mg/24 hr patch 1 Patch  1 Patch TransDERmal Q7D Reyna Mackenzie MD   1 Patch at 09/22/18 1451    albuterol-ipratropium (DUO-NEB) 2.5 MG-0.5 MG/3 ML  3 mL Nebulization Q4H PRN Sydni Giron DO        acetaminophen (TYLENOL) tablet 1,000 mg  1,000 mg Oral Q6H PRN ARNIE Torres   1,000 mg at 09/22/18 1223    oxyCODONE IR (ROXICODONE) tablet 5-10 mg  5-10 mg Oral Q4H PRN ARNIE Torres   10 mg at 09/23/18 2307    naloxone (NARCAN) injection 0.4 mg  0.4 mg IntraVENous PRN Dylan Wells MD        ondansetron [facility]) injection 4 mg  4 mg IntraVENous Q6H PRN Dylan Wells MD        [facility]) with saline injection 12.5 mg  12.5 mg IntraVENous Q6H PRN Dylan Wells MD        diphenhydrAMINE (BENADRYL) injection 25 mg  25 mg IntraVENous Q4H PRN Dylan Wells MD        pantoprazole (PROTONIX) 40 mg in sodium chloride 0.9% 10 mL injection  40 mg IntraVENous Q12H Dylan Wells MD   40 mg at 09/24/18 1018       Review of Symptoms:  Below symptoms negative if not in [place]. Consitutional Symptoms: Fever, weight loss, weight gain, fatigue  Eyes:  pain, sudden vision loss, blurry vision, double vision             bleeding nose, sore throat, hoarseness  GI: nausea, vomiting, diarrhea, pain, blood in stool  Pulmonary; Cough, Shortness of breath, Wheezing's  Cardiac: chest pain, palpitation  Musculoskeletal: difficulty walking, falls, pain over muscle, joint pain, weakness  : dysuria, blood in urine, pain with urination, difficulty voiding  Neurologia: dizziness, syncope, focal weakness, difficulty speaking  Integumentary: rash, redness, ulcer   Psychiatric:  Depression suicidal ideation    Objective:  Visit Vitals    /87 (BP 1 Location: Right arm)    Pulse 79    Temp 98.5 °F (36.9 °C)    Resp 18    Ht 5' 5\" (1.651 m)    Wt 108 kg (238 lb 1.6 oz)    SpO2 100%    BMI 39.62 kg/m2         Well developed and groomed  Eyes: anicteric  Ears, nose, mouth and throat without visible mass, scars, lesion  Neck: supple, trachea in midline position, thyriod without pain on palpation nor enlargement. Respiratory: good effort, no rales, good breath sounds, no wheezings  Cardiovascular:  Normal rate, regular rythem normal S1 S2 no rubs or gallops   GI: soft  Musculoskeletal: No clubbing cynosis, no petechia  Skin: no rash, lesion or ulcers  Edema plus 2  Neruoligcal: no focal dificit grossly  Psychicatric: good judgment and insights, good memory. No ynes affect. Non anxiouse.           Laboratory Data:  Lab Results   Component Value Date    BUN 8 09/24/2018    BUN 5 (L) 09/23/2018    BUN 5 (L) 09/22/2018     (L) 09/24/2018     (L) 09/23/2018     09/22/2018    CO2 22 09/24/2018    CO2 23 09/23/2018    CO2 22 09/22/2018     Lab Results   Component Value Date    WBC 6.5 09/24/2018    HGB 9.3 (L) 09/24/2018    HCT 28.7 (L) 09/24/2018     Imaging have been reviewed:  )Xr Chest Pa Lat    Result Date: 9/21/2018  CHEST PA, LATERAL Indication: Fever. Comparison: X-ray 03/14/2017. Findings: Right arm PICC with catheter tip at the superior vena cava. There is small left pleural effusion and left lower lobe alveolar opacity partially obscuring the diaphragm. Left upper lung zone and right lung appear clear. No evidence for right pleural effusion. No pneumothorax. Cardiac silhouette and pulmonary vascularity are within normal limits. Osseous structures appear intact. IMPRESSION: Small left pleural effusion and left lower lobe atelectasis or infiltrate. Xr Gastrograffin Upper Gi    Result Date: 9/20/2018  UPPER GI. INDICATION:  41year-old patient, recent removal of a laparotomy band, status post Scar-en-Y gastric bypass Fluoroscopic time: 48 seconds Fluoroscopic dose (reference air kerma): 59.69 mGy TECHNIQUE:  projection obtained. Omnipaque-240 water-soluble oral contrast was administered under direct fluoroscopic observation for postsurgical bariatric upper GI series with multiple overhead fluoroscopic spot views. Findings:  projection demonstrates surgical clips and chain suture material projecting over the upper and mid portions of the left abdomen. A small amount of extraperitoneal gas noted along the subcutaneous tissues of the left mid and lower abdomen. Nonobstructive and nonspecific bowel gas pattern demonstrated. Contrast freely flows across the gastro-esophageal junction. No evidence of contrast crossing into the gastric remnant. Contrast flows across the gastrojejunostomy anastomosis freely. There is no evidence of extraluminal contrast. Surgical drain is present with no evidence of contrast to suggest leak. IMPRESSION: 1. No evidence of stricture or leak following laparotomy band removal.     Xr Gastrograffin Upper Gi    Result Date: 6/28/2018  See impression. Impression:  Fluoroscopy was provided for this procedure under the supervision and/or direction of the attending provider. ?  For further information regarding this procedure, see patient medical record. Ct Abd Wo Cont    Result Date: 9/20/2018  EXAM: CT of the abdomen INDICATION: Status post removal of a eroded gastric band and lysis of adhesions from gastric pouch. COMPARISON: Gastrografin studies dated same date and prior CT dated February 25, 2017 TECHNIQUE: Axial CT imaging of the abdomen was performed without intravenous contrast. Multiplanar reformats were generated. One or more dose reduction techniques were used on this CT: automated exposure control, adjustment of the mAs and/or kVp according to patient size, and iterative reconstruction techniques. The specific techniques used on this CT exam have been documented in the patient's electronic medical record. FINDINGS: LOWER CHEST: Small effusions are present left greater than right with bibasilar atelectasis. LIVER, BILIARY: Liver is normal. No biliary dilation. Cholecystectomy PANCREAS: Normal. SPLEEN: Normal. ADRENALS: Normal. KIDNEYS: Normal. VASCULATURE: Unremarkable LYMPH NODES: No enlarged lymph nodes seen. GASTROINTESTINAL TRACT: There are postsurgical changes from gastric band removal and lysis of adhesions. There is an inflammatory low-attenuation area between the lesser curvature the stomach and the left hepatic lobe with bubbles of gas. This collection measures 7.6 x 5.7 x 7 cm. There is no extraluminal contrast. Small bubbles of free air is seen along the anterior surface of the liver secondary to recent surgery. There is stranding of the omentum. There is a small ventral hernia containing fat content along the midline. BONES: No acute or aggressive osseous abnormalities identified. OTHER: There is stranding of the subcutaneous tissues of the anterior abdominal wall with soft tissue gas from recent laparoscopic surgery.     IMPRESSION: There are postsurgical changes from lysis of adhesion and gastric band removal. There is a low density collection between the lesser curvature the stomach in the left hepatic lobe with the above-mentioned dimensions with tiny dots of gas. No extraluminal contrast is identified. This collection may represent postsurgical changes. Infection of this fluid collection is difficult to completely exclude. Bilateral small effusions with lower lobe atelectasis Findings were discussed with Dr. Grant Farris at approximately 6:30 PM September 20, 2018. Ir Fluoro Guide Picc Insertion    Result Date: 9/21/2018  PICC Placement Indication: iv access for long-term intravenous antibiotics. Comment: After informed consent was obtained the patient was prepped and draped in sterile fashion, including cap, mask, sterile gown, proper hand hygiene and sterile gloves, large full body drape, and 2% chlorhexidine for cutaneous antisepsis per current guidelines. Sterile US technique, sterile gel and sterile probe cover was used. Time out was observed at 10:36 hours. Under concurrent real-time ultrasound guidance the right basilic vein was accessed with a 21 gauge needle. The tip of the needle was identified within the lumen under ultrasound. Accessed vessel is patent and a permanent ultrasound image was saved in PACS. A 5 Georgian peel away sheath was advanced over an .018 in mandrill wire. A 41 cm, 5 [place] double lumen Bard power injectable PICC line was advanced to the superior vena cava under fluoroscopic guidance and image saved in PACS. Both ports were flushed with heparinized saline. The patient tolerated procedure well without complication. GUIDANCE: ultrasound and fluoroscopy guidance was used to position (and confirm the position of) the needle and catheter. Image(s) saved in PACS: Ultrasound and fluoroscopy. Dose (reference air kerma): 2 mGy. Impression: Successful right upper extremity basilic, 41 cm DL power PICC line placement as described. PICC is ready for immediate use and is power injectable for CT.      65 minutes of  time spent in the direct evaluation and treatment of this high risk patient.  . This care involved high complexity decision making to assess, manipulate, and support vital system functions, to treat this degreee vital organ system failure and to prevent further life threatening deterioration of the patients condition.         )Rayo Browne DO,

## 2018-09-24 NOTE — PROGRESS NOTES
Hospitalist Progress Note    Patient: Connie Gilliam MRN: [id number]  CSN: [id number]    YOB: 1975  Age: 41 y.o.   Sex: female    DOA: 9/18/2018 LOS:  LOS: 6 days          Chief Complaint:    LARON      Assessment/Plan     Post operative fever- resolved  silastic band removal after a prior tory-en-Y procedure  possible pneumonia -her cx are unrevealing-abx broadened 9/21,  And deescalated this weekend  Hypoalbuminemia  anemia     New tdalb-CPV-OA up to 1.8 yesterday, 2.7 with depressed GFR this am-discussed with patient-ordered renal US, consult to nephrology  Not on nephrotoxic meds  Stopped vanco this weekend after it was started 9/21  Continue IVF, she is urinating regularly  No contrast products     Calf pain-neg DVT study  HTN uncontrolled-changed catapres to 0.3 mg patch on 9/22, added IV PRN hydralazine  Morbid obesity  Hypokalemia-k repeltion PO  hx of gyn cancer     As far as possible pneumonia she can likely change to PO antibiotics, but her renal function is concerning and needs further addressing before d/c planning      Disposition :  Patient Active Problem List   Diagnosis Code    Cancer ([facility] C80.1    Severe obesity with body mass index (BMI) of 35.0 to 39.9 with comorbidity ([facility] E66.01    Intestinal malabsorption K90.9    Status post gastric bypass for obesity Z98.84    Hypertension I10    Anemia D64.9    Anxiety F41.9    GERD (gastroesophageal reflux disease) K21.9    Wears dentures Z97.2    Syncopal episodes R55    Smoker F17.200    Diarrhea R19.7    Status post laparoscopy Z98.890    Fever R50.9       Subjective:    She is upset and wanted to leave today  States she wants to know what has happened to her kidneys  Denies N/V/D  Has had a few BM's  Denies CP, SOB or cough  Tolerated her diet    Review of systems:    Constitutional: denies fevers, chills, myalgias  Cardiovascular: denies chest pain, palpitations  Gastrointestinal: denies nausea, vomiting      Vital signs/Intake and Output:  Visit Vitals    /87 (BP 1 Location: Right arm)    Pulse 79    Temp 98.5 °F (36.9 °C)    Resp 18    Ht 5' 5\" (1.651 m)    Wt 108 kg (238 lb 1.6 oz)    SpO2 100%    BMI 39.62 kg/m2     Current Shift:  09/24 0701 - 09/24 1900  In: 2509.9 [I.V.:2509.9]  Out: -   Last three shifts:  09/22 1901 - 09/24 0700  In: 5798.2 [P.O.:550; I.V.:5248.2]  Out: 1700 [Urine:1700]    Exam:    General: Well developed, alert, NAD, OX3  Patient is on her phone and upset, so CVS/pulm exam deferred    Extremities: No C/C/E, pulses palpable 2+  Neuro:grossly normal , follows commands  Psych:appropriate, but upset                Labs: Results:       Chemistry Recent Labs      09/24/18 0437 09/23/18 0417 09/22/18   0400   GLU  70*  82  83   NA  135*  135*  136   K  3.3*  3.2*  3.7   CL  101  101  101   CO2  22  23  22   BUN  8  5*  5*   CREA  2.70*  1.83*  0.92   CA  7.7*  7.7*  7.9*   AGAP  12  11  13   BUCR  3*  3*  5*   AP  49  54  72   TP  5.7*  5.8*  6.0*   ALB  1.7*  1.8*  2.2*   GLOB  4.0  4.0  3.8   AGRAT  0.4*  0.5*  0.6*      CBC w/Diff Recent Labs      09/24/18 0437 09/23/18 0417 09/22/18   0400   WBC  6.5  4.6  6.1   RBC  3.18*  2.88*  3.26*   HGB  9.3*  8.5*  9.8*   HCT  28.7*  26.3*  30.0*   PLT  207  167  202   GRANS  60  56  71   LYMPH  18*  18*  13*   EOS  5  4  5      Cardiac Enzymes No results for input(s): CPK, CKND1, JONATAN in the last 72 hours. No lab exists for component: CKRMB, TROIP   Coagulation No results for input(s): PTP, INR, APTT in the last 72 hours. No lab exists for component: INREXT    Lipid Panel No results found for: CHOL, CHOLPOCT, CHOLX, CHLST, CHOLV, 231380, HDL, LDL, LDLC, DLDLP, 647289, VLDLC, VLDL, TGLX, TRIGL, TRIGP, TGLPOCT, CHHD, CHHDX   BNP No results for input(s): BNPP in the last 72 hours.    Liver Enzymes Recent Labs      09/24/18   0437   TP  5.7*   ALB  1.7*   AP  49   SGOT  19      Thyroid Studies No results found for: T4, T3U, TSH, TSHEXT Procedures/imaging: see electronic medical records for all procedures/Xrays and details which were not copied into this note but were reviewed prior to creation of Rashid Novoa MD

## 2018-09-24 NOTE — PROGRESS NOTES
Notified by Dr. Jackie Pettit that this pt will not require TPN or IVABX upon discharge. Notified CMS to assist.  Pt continues to have renal issues and is not medically stable for discharge. Anticipate pt will be discharged within the next 24-48 hours. CM continuing to follow. Care Management Interventions  PCP Verified by CM: Yes  Mode of Transport at Discharge:  Other (see comment) (friend/family)  Transition of Care Consult (CM Consult): Home Health  Current Support Network: Own Home (lives with children)  Confirm Follow Up Transport: Self  Plan discussed with Pt/Family/Caregiver: Yes  Discharge Location  Discharge Placement: Home

## 2018-09-24 NOTE — PROGRESS NOTES
Shift summary: No reports of N/V with liquid diet. Ambulated in hallway frequently. Pain controlled with PO Roxicodone.     Patient Vitals for the past 12 hrs:   Temp Pulse Resp BP SpO2   09/24/18 0358 98 °F (36.7 °C) 77 18 141/82 100 %   09/23/18 2226 98.4 °F (36.9 °C) 80 18 (!) 142/97 100 %   09/23/18 1916 98.4 °F (36.9 °C) 78 18 (!) 151/96 100 %

## 2018-09-24 NOTE — PROGRESS NOTES
Problem: Falls - Risk of  Goal: *Absence of Falls  Document Lucila Fall Risk and appropriate interventions in the flowsheet.    Outcome: Progressing Towards Goal  Fall Risk Interventions:  Mobility Interventions: Assess mobility with egress test     Medication Interventions: Teach patient to arise slowly    Elimination Interventions: Call light in reach, Patient to call for help with toileting needs

## 2018-09-25 VITALS
HEIGHT: 65 IN | TEMPERATURE: 98.2 F | RESPIRATION RATE: 18 BRPM | DIASTOLIC BLOOD PRESSURE: 73 MMHG | OXYGEN SATURATION: 100 % | SYSTOLIC BLOOD PRESSURE: 117 MMHG | HEART RATE: 92 BPM | WEIGHT: 236.5 LBS | BODY MASS INDEX: 39.4 KG/M2

## 2018-09-25 LAB
ANION GAP SERPL CALC-SCNC: 7 MMOL/L (ref 3–18)
ANION GAP SERPL CALC-SCNC: 9 MMOL/L (ref 3–18)
BUN SERPL-MCNC: 8 MG/DL (ref 7–18)
BUN SERPL-MCNC: 8 MG/DL (ref 7–18)
BUN/CREAT SERPL: 3 (ref 12–20)
BUN/CREAT SERPL: 3 (ref 12–20)
CALCIUM SERPL-MCNC: 7.8 MG/DL (ref 8.5–10.1)
CALCIUM SERPL-MCNC: 8 MG/DL (ref 8.5–10.1)
CHLORIDE SERPL-SCNC: 104 MMOL/L (ref 100–108)
CHLORIDE SERPL-SCNC: 105 MMOL/L (ref 100–108)
CO2 SERPL-SCNC: 23 MMOL/L (ref 21–32)
CO2 SERPL-SCNC: 24 MMOL/L (ref 21–32)
CREAT SERPL-MCNC: 2.89 MG/DL (ref 0.6–1.3)
CREAT SERPL-MCNC: 2.96 MG/DL (ref 0.6–1.3)
GLUCOSE SERPL-MCNC: 117 MG/DL (ref 74–99)
GLUCOSE SERPL-MCNC: 94 MG/DL (ref 74–99)
MAGNESIUM SERPL-MCNC: 1.3 MG/DL (ref 1.6–2.6)
POTASSIUM SERPL-SCNC: 3.5 MMOL/L (ref 3.5–5.5)
POTASSIUM SERPL-SCNC: 3.6 MMOL/L (ref 3.5–5.5)
SODIUM SERPL-SCNC: 135 MMOL/L (ref 136–145)
SODIUM SERPL-SCNC: 137 MMOL/L (ref 136–145)

## 2018-09-25 PROCEDURE — 80048 BASIC METABOLIC PNL TOTAL CA: CPT | Performed by: SPECIALIST

## 2018-09-25 PROCEDURE — 74011250637 HC RX REV CODE- 250/637: Performed by: INTERNAL MEDICINE

## 2018-09-25 PROCEDURE — 74011250636 HC RX REV CODE- 250/636: Performed by: SPECIALIST

## 2018-09-25 PROCEDURE — 83735 ASSAY OF MAGNESIUM: CPT | Performed by: SPECIALIST

## 2018-09-25 PROCEDURE — 74011250636 HC RX REV CODE- 250/636: Performed by: HOSPITALIST

## 2018-09-25 PROCEDURE — 74011250636 HC RX REV CODE- 250/636: Performed by: INTERNAL MEDICINE

## 2018-09-25 PROCEDURE — 36592 COLLECT BLOOD FROM PICC: CPT

## 2018-09-25 PROCEDURE — 74011000258 HC RX REV CODE- 258: Performed by: HOSPITALIST

## 2018-09-25 PROCEDURE — C9113 INJ PANTOPRAZOLE SODIUM, VIA: HCPCS | Performed by: SPECIALIST

## 2018-09-25 PROCEDURE — 74011258636 HC RX REV CODE- 258/636: Performed by: SPECIALIST

## 2018-09-25 RX ORDER — DEXTROSE, SODIUM CHLORIDE, SODIUM LACTATE, POTASSIUM CHLORIDE, AND CALCIUM CHLORIDE 5; .6; .31; .03; .02 G/100ML; G/100ML; G/100ML; G/100ML; G/100ML
500 INJECTION, SOLUTION INTRAVENOUS CONTINUOUS
Status: DISCONTINUED | OUTPATIENT
Start: 2018-09-25 | End: 2018-09-25

## 2018-09-25 RX ORDER — FLUCONAZOLE 100 MG/1
100 TABLET ORAL DAILY
Qty: 7 TAB | Refills: 0 | Status: SHIPPED | OUTPATIENT
Start: 2018-09-25 | End: 2018-10-02

## 2018-09-25 RX ORDER — LEVOFLOXACIN 500 MG/1
500 TABLET, FILM COATED ORAL DAILY
Qty: 14 TAB | Refills: 0 | Status: SHIPPED | OUTPATIENT
Start: 2018-09-25 | End: 2018-09-26 | Stop reason: SDUPTHER

## 2018-09-25 RX ORDER — CLONIDINE 0.2 MG/24H
1 PATCH, EXTENDED RELEASE TRANSDERMAL
Qty: 4 PATCH | Refills: 0 | Status: SHIPPED | OUTPATIENT
Start: 2018-09-29 | End: 2018-10-02 | Stop reason: ALTCHOICE

## 2018-09-25 RX ORDER — MAGNESIUM SULFATE 1 G/100ML
1 INJECTION INTRAVENOUS ONCE
Status: COMPLETED | OUTPATIENT
Start: 2018-09-25 | End: 2018-09-25

## 2018-09-25 RX ORDER — MAGNESIUM SULFATE HEPTAHYDRATE 40 MG/ML
2 INJECTION, SOLUTION INTRAVENOUS ONCE
Status: COMPLETED | OUTPATIENT
Start: 2018-09-25 | End: 2018-09-25

## 2018-09-25 RX ORDER — LANOLIN ALCOHOL/MO/W.PET/CERES
400 CREAM (GRAM) TOPICAL DAILY
Status: DISCONTINUED | OUTPATIENT
Start: 2018-09-25 | End: 2018-09-25 | Stop reason: HOSPADM

## 2018-09-25 RX ORDER — PHENOL/SODIUM PHENOLATE
20 AEROSOL, SPRAY (ML) MUCOUS MEMBRANE DAILY
Qty: 30 TAB | Refills: 2 | Status: SHIPPED | OUTPATIENT
Start: 2018-09-25 | End: 2019-12-03

## 2018-09-25 RX ADMIN — SODIUM CHLORIDE, SODIUM LACTATE, POTASSIUM CHLORIDE, CALCIUM CHLORIDE, AND DEXTROSE MONOHYDRATE 500 ML/HR: 600; 310; 30; 20; 5 INJECTION, SOLUTION INTRAVENOUS at 09:02

## 2018-09-25 RX ADMIN — PIPERACILLIN SODIUM,TAZOBACTAM SODIUM 2.25 G: 2; .25 INJECTION, POWDER, FOR SOLUTION INTRAVENOUS at 10:29

## 2018-09-25 RX ADMIN — Medication 400 MG: at 12:42

## 2018-09-25 RX ADMIN — MAGNESIUM SULFATE HEPTAHYDRATE 2 G: 40 INJECTION, SOLUTION INTRAVENOUS at 08:55

## 2018-09-25 RX ADMIN — LEVOFLOXACIN 250 MG: 5 INJECTION, SOLUTION INTRAVENOUS at 12:42

## 2018-09-25 RX ADMIN — PIPERACILLIN SODIUM,TAZOBACTAM SODIUM 2.25 G: 2; .25 INJECTION, POWDER, FOR SOLUTION INTRAVENOUS at 03:27

## 2018-09-25 RX ADMIN — HEPARIN SODIUM 5000 UNITS: 5000 INJECTION INTRAVENOUS; SUBCUTANEOUS at 06:10

## 2018-09-25 RX ADMIN — SODIUM CHLORIDE 40 MG: 9 INJECTION INTRAMUSCULAR; INTRAVENOUS; SUBCUTANEOUS at 08:55

## 2018-09-25 RX ADMIN — FLUCONAZOLE 100 MG: 2 INJECTION INTRAVENOUS at 11:43

## 2018-09-25 RX ADMIN — MAGNESIUM SULFATE HEPTAHYDRATE 1 G: 1 INJECTION, SOLUTION INTRAVENOUS at 08:55

## 2018-09-25 NOTE — PROGRESS NOTES
D/C Plan: [facility] 9/25/18    Noted pt is to be discharged today. Pt will be discharged with a PICC line and will need [facility] services. CM met with pt at bedside to discuss transition of care. CM offered a list of area [facility] agencies and offered [address]. Pt has selected [facility]. Spoke with Kavita (physician liaison) and she will fax orders to [facility]. No other transition of care needs have been identified. Care Management Interventions  PCP Verified by CM: Yes  Mode of Transport at Discharge:  Other (see comment) (Family)  Transition of Care Consult (CM Consult): Home Health, [facility] [address]: No  Reason Outside [place]: Patient already serviced by other home care/hospice agency (pt preference)  Health Maintenance Reviewed: Yes  Current Support Network: Family Lives Nearby  Confirm Follow Up Transport: Self  Plan discussed with Pt/Family/Caregiver: Yes  Freedom of Choice Offered: Yes  Discharge Location  Discharge Placement: Home with home health ([facility])

## 2018-09-25 NOTE — PROGRESS NOTES
Hospitalist Progress Note    Patient: Tanja Moya MRN: [id number]  CSN: [id number]    YOB: 1975  Age: 41 y.o. Sex: female    DOA: 9/18/2018 LOS:  LOS: 7 days          Chief Complaint:gina          Assessment/Plan       Disposition :  Patient Active Problem List   Diagnosis Code    Cancer ([facility] C80.1    Severe obesity with body mass index (BMI) of 35.0 to 39.9 with comorbidity ([facility] E66.01    Intestinal malabsorption K90.9    Status post gastric bypass for obesity Z98.84    Hypertension I10    Anemia D64.9    Anxiety F41.9    GERD (gastroesophageal reflux disease) K21.9    Wears dentures Z97.2    Syncopal episodes R55    Smoker F17.200    Diarrhea R19.7    Status post laparoscopy Z98.890    Fever R50.9       Doing well. No complaints. Working diagnosis  = gina. May be due to vancomycin. Should resolve. Replete deficient electrolytes. Can go home and f/u in office with Dr. Mamadou Vail in a couple days. Repeat renal fxn then. Subjective:    Feeling well. Seen with Dr. Mamadou Vail. Review of systems:    Constitutional: denies fevers, chills, myalgias  Respiratory: denies SOB, cough  Cardiovascular: denies chest pain, palpitations  Gastrointestinal: denies nausea, vomiting, diarrhea      Vital signs/Intake and Output:  Visit Vitals    BP (!) 154/91 (BP 1 Location: Left arm, BP Patient Position: Sitting)    Pulse 70    Temp 97.9 °F (36.6 °C)    Resp 18    Ht 5' 5\" (1.651 m)    Wt 107.3 kg (236 lb 8 oz)    SpO2 100%    BMI 39.36 kg/m2     Current Shift:     Last three shifts:  09/23 1901 - 09/25 0700  In: 4659.9 [I.V.:4659.9]  Out: 1500 [Urine:1500]    Exam:    General: Well developed, alert, NAD, OX3  Head/Neck: mmm  CVS:Regular rate and rhythm, no M/R/G, S1/S2 heard, no thrill  Lungs:Clear to auscultation bilaterally, no wheezes, rhonchi, or rales  Abdomen: Soft,BS+  Extremities: + edema.                  Labs: Results:       Chemistry Recent Labs      09/25/18   0413 09/24/18   1845  09/24/18 0437 09/23/18 0417   GLU  117*  126*  70*  82   NA  137  134*  135*  135*   K  3.6  3.8  3.3*  3.2*   CL  105  102  101  101   CO2  23  24  22  23   BUN  8  8  8  5*   CREA  2.96*  2.97*  2.70*  1.83*   CA  7.8*  8.1*  7.7*  7.7*   AGAP  9  8  12  11   BUCR  3*  3*  3*  3*   AP   --    --   49  54   TP   --    --   5.7*  5.8*   ALB   --    --   1.7*  1.8*   GLOB   --    --   4.0  4.0   AGRAT   --    --   0.4*  0.5*      CBC w/Diff Recent Labs      09/24/18 0437 09/23/18 0417   WBC  6.5  4.6   RBC  3.18*  2.88*   HGB  9.3*  8.5*   HCT  28.7*  26.3*   PLT  207  167   GRANS  60  56   LYMPH  18*  18*   EOS  5  4      Cardiac Enzymes No results for input(s): CPK, CKND1, JONATAN in the last 72 hours. No lab exists for component: CKRMB, TROIP   Coagulation No results for input(s): PTP, INR, APTT in the last 72 hours. No lab exists for component: INREXT    Lipid Panel No results found for: CHOL, CHOLPOCT, CHOLX, CHLST, CHOLV, 820497, HDL, LDL, LDLC, DLDLP, 174658, VLDLC, VLDL, TGLX, TRIGL, TRIGP, TGLPOCT, CHHD, CHHDX   BNP No results for input(s): BNPP in the last 72 hours.    Liver Enzymes Recent Labs      09/24/18 0437   TP  5.7*   ALB  1.7*   AP  49   SGOT  19      Thyroid Studies No results found for: T4, T3U, TSH, TSHEXT     Procedures/imaging: see electronic medical records for all procedures/Xrays and details which were not copied into this note but were reviewed prior to creation of Sami Helm MD

## 2018-09-25 NOTE — PROGRESS NOTES
Progress Note  John Paredes  9/25/18    I saw Ms. Mary Burnette on rounds early this morning before my OR cases started. She was totally asymptomatic, feeling fine. She made good urine output throughout the night and her creatinine from about 7:00 PM last night to 0400 had stabilized and had gone down just a touch. I told her I would give one more liter of IV fluids as a bolus, repeat creatinine at 12 noon and we would allow her to go home if she is in a downward trend will plans to follow up with Dr. Sarah Courtney on Wednesday. We did draw that repeat BMP albeit slightly later than I wanted to and indeed, the creatinine continued to drop slightly. Since there is a downward trend and she appears to be doing well and he have had no problem with oral intake, we are going to go ahead and discharge her home. She will follow up with Dr. Sarah Courtney in his office tomorrow.

## 2018-09-25 NOTE — PROGRESS NOTES
Progress Note  Sukhi Mckay  9/24/18    I saw the patient earlier today at approximately 4:00 PM in the afternoon. Ms. Andreea Hargrove essentially had a very uneventful weekend in my absence being managed by the hospitalist service. My coverage was Dr. Monica Lew if there were any surgical emergencies. Over the weekend, she really felt progressively better. She was afebrile, white blood cell count normalized and she was advanced to a liquid diet on Sunday with no ill effects. Interestingly enough on Friday night, I did back off on the fluids because she was having some persistent hypertension. Her urine output was quite good at that juncture, but on Sunday she had an elevation of her creatinine in the 1.7-1.8 range and today she has had a further elevation to 2.7 of unclear etiology. She did receive a couple of doses of Vancomycin on Friday and Saturday and that was stopped on Sunday and Dr. Teresa Coronado consulted nephrology, Dr. Alfonzo Briceno. I have discussed the case with this evening with him. Dr. Alfonzo Briceno feels like this is likely not a volume issue, but instead relates to severe ketosis, and probably not related to her Vancomycin. However, my feeling is that it is likely a combination of volume depletion, Vancomycin and of course, the ketosis. The patient at bedside feels no different than she did all weekend long. She feels well. She wants to go home but is frustrated that she cannot go home due to the elevation of her creatinine. For the past 24 hours she has had in excess of 1800 cc's of urine output and that has not included a couple of times when she did not record her urine output and on clinical examination her abdomen is completely soft. It is nondistended and nontender throughout.      At this juncture certainly I will defer to Dr. Alfonzo Briceno for treatment of her acute renal insufficiency and I have given her on new bolus of Ringer's as I do feel like volume is part of the issue and Dr. Alfonzo Briceno is checking some lab work on her and urine electrolytes to see if that will assist in her diagnosis. He feels that this should get better over the next 24-48 hours. I have had an exceptionally long discussion with the patient tonight, probably in excess of 45 minutes, talking about her treatment course to this point, the fact that she has had a very unusual issue from her original operation which is now corrected, and in my experience, patient's can get quite ill with the removal of the eroded bands. Indeed, she was quite ill at her initial postoperative 48-72 hour time period. That seems to all have reversed and right now her discharge is pending recheck of her basic metabolic panel and the creatinine tomorrow morning. I have explained to the patient that I have discussed the case in detail with all consultants and that is the current plan of action.

## 2018-09-25 NOTE — PROGRESS NOTES
BARIATRIC SURGERY PROGRESS NOTE      Name: Robert Tong MRN: [id number]   : 1975 Hospital: [facility] [place]   Date: 2018  Admission Date: 2018     Chart and notes reviewed. Data reviewed. I have evaluated and examined the patient at 4 pm today  She feels better and wants to go home    Patient Active Problem List   Diagnosis Code    Cancer ([facility] C80.1    Severe obesity with body mass index (BMI) of 35.0 to 39.9 with comorbidity ([facility] E66.01    Intestinal malabsorption K90.9    Status post gastric bypass for obesity Z98.84    Hypertension I10    Anemia D64.9    Anxiety F41.9    GERD (gastroesophageal reflux disease) K21.9    Wears dentures Z97.2    Syncopal episodes R55    Smoker F17.200    Diarrhea R19.7    Status post laparoscopy Z98.890    Fever R50.9       Vital Signs:  Visit Vitals    /73 (BP 1 Location: Left arm, BP Patient Position: At rest)    Pulse 87    Temp 98.7 °F (37.1 °C)    Resp 18    Ht 5' 5\" (1.651 m)    Wt 108 kg (238 lb 1.6 oz)    SpO2 100%    BMI 39.62 kg/m2       O2 Device: Room air   O2 Flow Rate (L/min): 2 l/min   Temp (24hrs), Av.3 °F (36.8 °C), Min:98 °F (36.7 °C), Max:98.7 °F (37.1 °C)       Intake/Output:   Last shift:         Last 3 shifts:  0701 -  1900  In: 3109.9 [P.O.:550;  I.V.:2559.9]  Out: 1700 [Urine:1700]    Intake/Output Summary (Last 24 hours) at 18  Last data filed at 18 0750   Gross per 24 hour   Intake          2559.88 ml   Output             1100 ml   Net          1459.88 ml            Physical Exam:    General: in no apparent distress   Chest: normal   Lungs: normal air entry   Heart: Regular rate and rhythm   Abdomen: abdomen is soft without significant tenderness, masses, organomegaly or guarding   Extremity: negative   Neuro: alert      DATA:  MAR reviewed and pertinent medications noted or modified as needed    Labs:  Recent Labs      18   0437  18   0417  18   0405 WBC  6.5  4.6  6.1   HGB  9.3*  8.5*  9.8*   HCT  28.7*  26.3*  30.0*   PLT  207  167  202     Recent Labs      09/24/18   1845  09/24/18   0437  09/23/18   0417  09/22/18   0400   NA  134*  135*  135*  136   K  3.8  3.3*  3.2*  3.7   CL  102  101  101  101   CO2  24  22  23  22   GLU  126*  70*  82  83   BUN  8  8  5*  5*   CREA  2.97*  2.70*  1.83*  0.92   CA  8.1*  7.7*  7.7*  7.9*   ALB   --   1.7*  1.8*  2.2*   SGOT   --   19  25  26   ALT   --   17  22  30     No results for input(s): PH, PCO2, PO2, HCO3, FIO2 in the last 72 hours.     Imaging:  [x]                           I have personally reviewed the patients radiographs and reports      [x]      Time spent with patient exclusive of procedures  45     Minutes    IMPRESSION:   · New onset ARF      PLAN:   · Dr Miriam Araujo to manage- have discussed with patient  · See long dictation from earlier for specifics         Danis Le MD

## 2018-09-25 NOTE — PROGRESS NOTES
Problem: Falls - Risk of  Goal: *Absence of Falls  Document Lucila Fall Risk and appropriate interventions in the flowsheet.    Outcome: Progressing Towards Goal  Fall Risk Interventions:  Mobility Interventions: Patient to call before getting OOB, Assess mobility with egress test, Utilize gait belt for transfers/ambulation         Medication Interventions: Teach patient to arise slowly, Assess postural VS orthostatic hypotension, Evaluate medications/consider consulting pharmacy    Elimination Interventions: Call light in reach, Patient to call for help with toileting needs    History of Falls Interventions: Evaluate medications/consider consulting pharmacy

## 2018-09-25 NOTE — ROUTINE PROCESS
Bedside and Verbal shift change report given to KALEN Herbert (oncoming nurse) by Nisa Woodruff RN  (offgoing nurse). Report included the following information SBAR, Kardex, Intake/Output and MAR.

## 2018-09-25 NOTE — NURSE NAVIGATOR
Very anxious for discharge. Appointment with Dr. Mamta Bynum discussed. Informed of of HH. PICC line dressing intact without ss of infection.

## 2018-09-25 NOTE — PROGRESS NOTES
2226 Lactated Ringers bolus completed. Switched back to d5% + Normal saline with 40meq KCL infusion. Roxicodone 10mg given for pain in abdomen. 0327 D5% + Normal saline with 40meq KCL infusion 1 L completed. Infusion stopped per order. 8885 Metabolic panel and magnesum blood drawn from PICC line. Flushes easily. SHIFT SUMMARY: Fluids completed. Pt is voiding. However, creatinine is rising. Able to get some rest. She is hopeful for discharge, but is upset this morning that she is not yet able to.

## 2018-09-25 NOTE — PROGRESS NOTES
Dual AVS reviewed with Vasile ABURTO. All medications reviewed individually with patient. Opportunities for questions and concerns provided. Patient discharged via (mode of transport ie. Car, ambulance or air transport) car  Patient's arm band appropriately discarded. Patient is going home with a PICC line. Home health has been set up per Cassie Smith .

## 2018-09-25 NOTE — NURSE NAVIGATOR
HH orders for PICC management faxed to [facility] and [facility] case management.

## 2018-09-25 NOTE — DISCHARGE INSTRUCTIONS
DISCHARGE SUMMARY from Nurse    PATIENT INSTRUCTIONS:    After general anesthesia or intravenous sedation, for 24 hours or while taking prescription Narcotics:  · Limit your activities  · Do not drive and operate hazardous machinery  · Do not make important personal or business decisions  · Do  not drink alcoholic beverages  · If you have not urinated within 8 hours after discharge, please contact your surgeon on call. Report the following to your surgeon:  · Excessive pain, swelling, redness or odor of or around the surgical area  · Temperature over 100.5  · Nausea and vomiting lasting longer than 4 hours or if unable to take medications  · Any signs of decreased circulation or nerve impairment to extremity: change in color, persistent  numbness, tingling, coldness or increase pain  · Any questions    What to do at Home:  Recommended activity: Activity as tolerated and Activity as tolerated and no driving for today. If you experience any of the following symptoms fever, unrelieved pain and bleeding see the doctor, please follow up with Dr Teixeira President after one week. *  Please give a list of your current medications to your Primary Care Provider. *  Please update this list whenever your medications are discontinued, doses are      changed, or new medications (including over-the-counter products) are added. *  Please carry medication information at all times in case of emergency situations. These are general instructions for a healthy lifestyle:    No smoking/ No tobacco products/ Avoid exposure to second hand smoke  Surgeon General's Warning:  Quitting smoking now greatly reduces serious risk to your health.     Obesity, smoking, and sedentary lifestyle greatly increases your risk for illness    A healthy diet, regular physical exercise & weight monitoring are important for maintaining a healthy lifestyle    You may be retaining fluid if you have a history of heart failure or if you experience any of the following symptoms:  Weight gain of 3 pounds or more overnight or 5 pounds in a week, increased swelling in our hands or feet or shortness of breath while lying flat in bed. Please call your doctor as soon as you notice any of these symptoms; do not wait until your next office visit. Recognize signs and symptoms of STROKE:    F-face looks uneven    A-arms unable to move or move unevenly    S-speech slurred or non-existent    T-time-call 911 as soon as signs and symptoms begin-DO NOT go       Back to bed or wait to see if you get better-TIME IS BRAIN. Warning Signs of HEART ATTACK     Call 911 if you have these symptoms:   Chest discomfort. Most heart attacks involve discomfort in the center of the chest that lasts more than a few minutes, or that goes away and comes back. It can feel like uncomfortable pressure, squeezing, fullness, or pain.  Discomfort in other areas of the upper body. Symptoms can include pain or discomfort in one or both arms, the back, neck, jaw, or stomach.  Shortness of breath with or without chest discomfort.  Other signs may include breaking out in a cold sweat, nausea, or lightheadedness. Don't wait more than five minutes to call 911 - MINUTES MATTER! Fast action can save your life. Calling 911 is almost always the fastest way to get lifesaving treatment. Emergency Medical Services staff can begin treatment when they arrive -- up to an hour sooner than if someone gets to the hospital by car. The discharge information has been reviewed with the patient. The patient verbalized understanding. Discharge medications reviewed with the patient and appropriate educational materials and side effects teaching were provided.   ___________________________________________________________________________________________________________________________________

## 2018-09-25 NOTE — PROGRESS NOTES
Assessment:   Status post laparoscopy (9/18/2018)       ARF  Hyponatremia  Hypokalemia  Hypomagnesemia  Yeast UTI    Plan:     Cr did not improve with Dextrose infusion  Her renal size slightly above normal range. Possible Vanc's base solution induced AIN. The rate of rise in cr is less, pt is making adequate amount of urine. She does not appear to be hypovolumic on exam and by Ins and outs. PT can be discharged, with labs on Thursday at my office and office visit with me on Friday  As long as she has swelling, no need to \"push fluid\" given risk of Hyponatremia. Prefer she ate nutritious food. Mag supplement for out pt oral therapy  D/w pt Dr. Dao Mabry and Dr. Mary Sierra. CC: ARF  Interval History: PT is feeling about the same, feels bloated and edematous      Subjective:   No change since i saw pt last. No new symptoms or issues. Review of Systems  Negative for  SOB, Chest pain, Tremors, Nausea, vomiting        Blood pressure (!) 154/91, pulse 70, temperature 97.9 °F (36.6 °C), resp. rate 18, height 5' 5\" (1.651 m), weight 107.3 kg (236 lb 8 oz), SpO2 100 %. awake and alert and NAD  Abdomen is soft  Plus 2 edema  No new rash  Lungs are CTA    I have reviewed the following and here are my interpretation  )Xr Chest Pa Lat    Result Date: 9/21/2018  CHEST PA, LATERAL Indication: Fever. Comparison: X-ray 03/14/2017. Findings: Right arm PICC with catheter tip at the superior vena cava. There is small left pleural effusion and left lower lobe alveolar opacity partially obscuring the diaphragm. Left upper lung zone and right lung appear clear. No evidence for right pleural effusion. No pneumothorax. Cardiac silhouette and pulmonary vascularity are within normal limits. Osseous structures appear intact. IMPRESSION: Small left pleural effusion and left lower lobe atelectasis or infiltrate.     Xr Gastrograffin Upper Gi    Result Date: 9/20/2018  UPPER GI. INDICATION:  41year-old patient, recent removal of a laparotomy band, status post Scar-en-Y gastric bypass Fluoroscopic time: 48 seconds Fluoroscopic dose (reference air kerma): 59.69 mGy TECHNIQUE:  projection obtained. Omnipaque-240 water-soluble oral contrast was administered under direct fluoroscopic observation for postsurgical bariatric upper GI series with multiple overhead fluoroscopic spot views. Findings:  projection demonstrates surgical clips and chain suture material projecting over the upper and mid portions of the left abdomen. A small amount of extraperitoneal gas noted along the subcutaneous tissues of the left mid and lower abdomen. Nonobstructive and nonspecific bowel gas pattern demonstrated. Contrast freely flows across the gastro-esophageal junction. No evidence of contrast crossing into the gastric remnant. Contrast flows across the gastrojejunostomy anastomosis freely. There is no evidence of extraluminal contrast. Surgical drain is present with no evidence of contrast to suggest leak. IMPRESSION: 1. No evidence of stricture or leak following laparotomy band removal.     Xr Gastrograffin Upper Gi    Result Date: 6/28/2018  See impression. Impression:  Fluoroscopy was provided for this procedure under the supervision and/or direction of the attending provider. ? For further information regarding this procedure, see patient medical record. Ct Abd Wo Cont    Result Date: 9/20/2018  EXAM: CT of the abdomen INDICATION: Status post removal of a eroded gastric band and lysis of adhesions from gastric pouch. COMPARISON: Gastrografin studies dated same date and prior CT dated February 25, 2017 TECHNIQUE: Axial CT imaging of the abdomen was performed without intravenous contrast. Multiplanar reformats were generated.  One or more dose reduction techniques were used on this CT: automated exposure control, adjustment of the mAs and/or kVp according to patient size, and iterative reconstruction techniques. The specific techniques used on this CT exam have been documented in the patient's electronic medical record. FINDINGS: LOWER CHEST: Small effusions are present left greater than right with bibasilar atelectasis. LIVER, BILIARY: Liver is normal. No biliary dilation. Cholecystectomy PANCREAS: Normal. SPLEEN: Normal. ADRENALS: Normal. KIDNEYS: Normal. VASCULATURE: Unremarkable LYMPH NODES: No enlarged lymph nodes seen. GASTROINTESTINAL TRACT: There are postsurgical changes from gastric band removal and lysis of adhesions. There is an inflammatory low-attenuation area between the lesser curvature the stomach and the left hepatic lobe with bubbles of gas. This collection measures 7.6 x 5.7 x 7 cm. There is no extraluminal contrast. Small bubbles of free air is seen along the anterior surface of the liver secondary to recent surgery. There is stranding of the omentum. There is a small ventral hernia containing fat content along the midline. BONES: No acute or aggressive osseous abnormalities identified. OTHER: There is stranding of the subcutaneous tissues of the anterior abdominal wall with soft tissue gas from recent laparoscopic surgery. IMPRESSION: There are postsurgical changes from lysis of adhesion and gastric band removal. There is a low density collection between the lesser curvature the stomach in the left hepatic lobe with the above-mentioned dimensions with tiny dots of gas. No extraluminal contrast is identified. This collection may represent postsurgical changes. Infection of this fluid collection is difficult to completely exclude. Bilateral small effusions with lower lobe atelectasis Findings were discussed with Dr. Ela Maloney at approximately 6:30 PM September 20, 2018. Us Retroperitoneum Comp    Result Date: 9/25/2018  EXAM: Ultrasound retroperitoneum complete INDICATION: Acute kidney injury. History of hypertension.  COMPARISON: CT abdomen on 9/20/2018  FINDINGS: Kidneys are normal in size and configuration. The parenchymal mantles are well preserved. No hydronephrosis or renal calculi. No renal space-occupying lesion. Right renal echotexture is equivocal into the adjacent liver, left renal echotexture is difficult to , possibly also slightly increased. No filling defects in the urinary bladder. The prevoid bladder volume measures 106 cc, no postvoid residual. Ureteral jets could not be demonstrated in the urinary bladder with color Doppler, probably not significant in the absence of hydronephrosis. Correlated with the prior CT, kidneys are unchanged. IMPRESSION: Mildly echogenic kidneys suggesting medical renal disease. No hydronephrosis. Ir Fluoro Guide Picc Insertion    Result Date: 9/21/2018  PICC Placement Indication: iv access for long-term intravenous antibiotics. Comment: After informed consent was obtained the patient was prepped and draped in sterile fashion, including cap, mask, sterile gown, proper hand hygiene and sterile gloves, large full body drape, and 2% chlorhexidine for cutaneous antisepsis per current guidelines. Sterile US technique, sterile gel and sterile probe cover was used. Time out was observed at 10:36 hours. Under concurrent real-time ultrasound guidance the right basilic vein was accessed with a 21 gauge needle. The tip of the needle was identified within the lumen under ultrasound. Accessed vessel is patent and a permanent ultrasound image was saved in PACS. A 5 Qatari peel away sheath was advanced over an .018 in mandrill wire. A 41 cm, 5 [place] double lumen Bard power injectable PICC line was advanced to the superior vena cava under fluoroscopic guidance and image saved in PACS. Both ports were flushed with heparinized saline. The patient tolerated procedure well without complication. GUIDANCE: ultrasound and fluoroscopy guidance was used to position (and confirm the position of) the needle and catheter.   Image(s) saved in PACS: Ultrasound and fluoroscopy. Dose (reference air kerma): 2 mGy. Impression: Successful right upper extremity basilic, 41 cm DL power PICC line placement as described. PICC is ready for immediate use and is power injectable for CT.          Intake/Output Summary (Last 24 hours) at 09/25/18 1045  Last data filed at 09/25/18 0410   Gross per 24 hour   Intake             2100 ml   Output              200 ml   Net             1900 ml      Recent Labs      09/24/18   0437   WBC  6.5     Lab Results   Component Value Date/Time    Sodium 137 09/25/2018 04:10 AM    Potassium 3.6 09/25/2018 04:10 AM    Chloride 105 09/25/2018 04:10 AM    CO2 23 09/25/2018 04:10 AM    Anion gap 9 09/25/2018 04:10 AM    Glucose 117 (H) 09/25/2018 04:10 AM    BUN 8 09/25/2018 04:10 AM    Creatinine 2.96 (H) 09/25/2018 04:10 AM    BUN/Creatinine ratio 3 (L) 09/25/2018 04:10 AM    GFR est AA 21 (L) 09/25/2018 04:10 AM    GFR est non-AA 17 (L) 09/25/2018 04:10 AM    Calcium 7.8 (L) 09/25/2018 04:10 AM      No Known Allergies  Current Facility-Administered Medications   Medication Dose Route Frequency Provider Last Rate Last Dose    magnesium oxide (MAG-OX) tablet 400 mg  400 mg Oral DAILY Rayo Browne DO        levoFLOXacin (LEVAQUIN) 250 mg in D5W IVPB  250 mg IntraVENous Q48H Karl Driscoll MD        heparin (porcine) injection 5,000 Units  5,000 Units SubCUTAneous Q8H Karl Driscoll MD   5,000 Units at 09/25/18 0610    piperacillin-tazobactam (ZOSYN) 2.25 g in 0.9% sodium chloride (MBP/ADV) 50 mL MBP  2.25 g IntraVENous Q6H Karl Driscoll  mL/hr at 09/25/18 1029 2.25 g at 09/25/18 1029    fluconazole (DIFLUCAN) 100 mg/50 ml IVPB (premix)  100 mg IntraVENous DAILY Rayo Browne DO        hydrALAZINE (APRESOLINE) 20 mg/mL injection 20 mg  20 mg IntraVENous Q6H PRN Karl Driscoll MD        cloNIDine (CATAPRES) 0.2 mg/24 hr patch 1 Patch  1 Patch TransDERmal Q7D Karl Driscoll MD   1 Patch at 09/22/18 1457    albuterol-ipratropium (DUO-NEB) 2.5 MG-0.5 MG/3 ML  3 mL Nebulization Q4H PRN Matilde Wilson DO        acetaminophen (TYLENOL) tablet 1,000 mg  1,000 mg Oral Q6H PRN ARNIE Reddy   1,000 mg at 09/24/18 2138    oxyCODONE IR (ROXICODONE) tablet 5-10 mg  5-10 mg Oral Q4H PRN ARNIE Reddy   10 mg at 09/24/18 2230    naloxone [facility]) injection 0.4 mg  0.4 mg IntraVENous PRN Amada Richter MD        ondansetron [facility]) injection 4 mg  4 mg IntraVENous Q6H PRN Amada Richter MD        [facility]) with saline injection 12.5 mg  12.5 mg IntraVENous Q6H PRN Amada Richter MD        diphenhydrAMINE (BENADRYL) injection 25 mg  25 mg IntraVENous Q4H PRN Amada Richter MD        pantoprazole (PROTONIX) 40 mg in sodium chloride 0.9% 10 mL injection  40 mg IntraVENous Q12H Amada Richter MD   40 mg at 09/25/18 6086

## 2018-09-26 ENCOUNTER — DOCUMENTATION ONLY (OUTPATIENT)
Dept: SURGERY | Age: 43
End: 2018-09-26

## 2018-09-26 ENCOUNTER — NURSE NAVIGATOR (OUTPATIENT)
Dept: SURGERY | Age: 43
End: 2018-09-26

## 2018-09-26 DIAGNOSIS — G89.18 POST-OP PAIN: Primary | ICD-10-CM

## 2018-09-26 RX ORDER — LEVOFLOXACIN 500 MG/1
500 TABLET, FILM COATED ORAL DAILY
Qty: 10 TAB | Refills: 0 | Status: SHIPPED | OUTPATIENT
Start: 2018-09-26 | End: 2018-10-06

## 2018-09-26 RX ORDER — OXYCODONE AND ACETAMINOPHEN 5; 325 MG/1; MG/1
1 TABLET ORAL
Qty: 30 TAB | Refills: 0 | Status: SHIPPED | OUTPATIENT
Start: 2018-09-26 | End: 2019-01-02

## 2018-09-26 NOTE — PROGRESS NOTES
Received message from patient earlier stating Rx Levaquin was written for 14 tabs for 10 days. Prescription corrected and receipt confirmed by pharmacy. Pt. Verbalized understanding.

## 2018-09-26 NOTE — PROGRESS NOTES
Pt called stating that she had recent surgery and requested pain medicine. Stated that none was prescribed. Gave message to BRADY. Kavita will speak with Dr. Antonieta Gannon and get back with the pt.

## 2018-09-27 ENCOUNTER — HOSPITAL ENCOUNTER (OUTPATIENT)
Dept: LAB | Age: 43
Discharge: HOME | End: 2018-09-27

## 2018-09-27 LAB
BACTERIA SPEC CULT: NORMAL
BACTERIA SPEC CULT: NORMAL
SERVICE CMNT-IMP: NORMAL
SERVICE CMNT-IMP: NORMAL
XX-LABCORP SPECIMEN COL,LCBCF: NORMAL

## 2018-09-27 PROCEDURE — 99001 SPECIMEN HANDLING PT-LAB: CPT | Performed by: SPECIALIST

## 2018-09-28 LAB
BUN SERPL-MCNC: 6 MG/DL (ref 6–24)
BUN/CREAT SERPL: 2 (ref 9–23)
CALCIUM SERPL-MCNC: 8.5 MG/DL (ref 8.7–10.2)
CHLORIDE SERPL-SCNC: 106 MMOL/L (ref 96–106)
CO2 SERPL-SCNC: 21 MMOL/L (ref 20–29)
CREAT SERPL-MCNC: 2.85 MG/DL (ref 0.57–1)
GLUCOSE SERPL-MCNC: 97 MG/DL (ref 65–99)
POTASSIUM SERPL-SCNC: 4 MMOL/L (ref 3.5–5.2)
SODIUM SERPL-SCNC: 143 MMOL/L (ref 134–144)

## 2018-09-28 NOTE — DISCHARGE SUMMARY
Leeroy Huddleston  MR#: [id number]  : 1975  ACCOUNT #: [de-identified]   ADMIT DATE: 2018  DISCHARGE DATE: 2018    CHIEF DIAGNOSIS:   Erosion of Silastic band through prior gastric bypass pouch. SECONDARY DIAGNOSES:  1.  Moderate bacteremia status post removal of eroded band. 2.  Acute renal failure. 3.  Hypertension. 4.  Anemia. 5.  Gastroesophageal reflux disease. 6.  Chronic emesis. PROCEDURES PERFORMED:  1. On 2018 she underwent an extensive laparoscopic lysis of adhesions of greater than 90 minutes' duration. She underwent intraoperative endoscopy x4.  2.  She had enterotomy of Scar limb with extraction of eroded Silastic band. 3.  She had closure of enterotomy. 4.  She had removal of Silastic band off her gastric remnant. HOSPITAL COURSE:  Patient was admitted to the hospital on the aforementioned date with her history of having chronic emesis after a vertical banded gastroplasty gastric bypass procedure by Dr. Bogdan Mariscal many years ago. We explored her to remove the Silastic band off of the gastric pouch, but during the extensive adhesiolysis, we found that the band was not around the gastric pouch but had instead eroded into the Scar limb. We had to create an enterotomy to extract the Silastic band and we closed that enterotomy. Postoperative day #1, as is per my experience, patients develop moderate bacteremia and she required extensive resuscitation over the first 24 hour time periods with multiple lactated Ringer's boluses to maintain a normal blood pressure and bring her pulse down. I saw her on rounds the day after surgery and as I expected, once again, she did require extensive volume resuscitation.   I continued with resuscitation for 24 hours and basically the next day, on the , I had seen her earlier that day on rounds and I saw her at noon, 3 o'clock, and then at 4:30 p.m., she had no real complaints other than fever and her abdomen was, although, somewhat tender to midepigastrium. Clinically, she did not look particularly bad. I obtained a Gastrografin swallow earlier that day, I thought DI would go and feed her, but right after she took her first liquid intake, about midday, was when the fever began. I then ordered a CT scan of the abdomen with oral contrast and that CT scan showed some fluid around the gastric pouch, some postoperative changes, but no extravasation of contrast was noted. I expanded her antibiotic coverage that day to include aztreonam and Diflucan and the next day, on the 21st, I asked the hospitalist to be involved in her case to check on my antibiotic coverage and to see the patient clinically. I saw her early that morning, about 6:00 a.m., and things appear to be relatively stable. That weekend I was out of town, so therefore she had very difficult IV access, so I talked to her about a PICC line placement, which we had placed that morning for IV antibiotic purposes. Throughout that Friday she was fine. No new issues were noted, but I did expand her antibiotic coverage to include vancomycin and she did have a dose on Friday and a dose on Saturday, but then on Sunday, in my absence, they did notice that her creatinine had bumped up slightly. It had gone from a value of 1 preoperatively to 1.8 by that Sunday and vancomycin was stopped. The next day they elected to go ahead and check another creatinine, it was 2.7, which was Monday on the 24th. They consulted nephrology. We did resuscitate her little bit with some volume. Urine electrolytes suggested that she might be slightly dehydrated, and her nephrologist felt like it might also be due to her ketotic state. We did go ahead and feed her some more as far as some carbohydrates were concerned and the patient was very adamant about trying to get out of the hospital on Tuesday. We rechecked her creatinine twice.   It was stable early in the morning that at midday it was 2.8 and it was starting to head downward. The patient was comfortable being discharged home. She knew that she would have to follow up with Dr. Obed Redman, nephrologist, the next day to recheck her creatinine and she was making very good urine. She was tolerating a diet with no symptoms and her pain was well controlled from her surgical sites. PLAN:  At this time period is to discharge her home. She will follow up with Dr. Obed Redman next day for a check of her creatinine and from our standpoint, she will stay on a liquid diet for a week before advancing to a soft diet. She will be discharged home on p.o. Levaquin and a proton pump inhibitor and once again will follow up with Dr. Obed Redman next day and with me next week. DISPOSITION:  Home.       Kip Spine       AT/LN  D: 09/27/2018 14:56     T: 09/27/2018 22:48  JOB #: [id number]

## 2018-09-29 ENCOUNTER — TELEPHONE (OUTPATIENT)
Dept: SURGERY | Age: 43
End: 2018-09-29

## 2018-09-29 NOTE — PROGRESS NOTES
Progress Note  Jackelin Hill  9/29/18    I was checking through Ms. Nita Martinez old lab and I noticed that her creatinine is still about 2.8 on last check. She finally had her appointment yesterday with Dr. Jessica Carvalho and apparently according to Dr. Jessica Carvalho, she told the patient her creatinine has changed very little since leaving the hospital. She continues to make a large amount of urine. She has had no other issues. She has asked if she can advance to a small diet with a larger carbohydrate load. I told her today that would be fine from my standpoint. She will see Dr. Jessica Carvalho next Thursday. She will follow up with our office this Tuesday.

## 2018-10-02 ENCOUNTER — OFFICE VISIT (OUTPATIENT)
Dept: SURGERY | Age: 43
End: 2018-10-02

## 2018-10-02 VITALS
HEIGHT: 65 IN | SYSTOLIC BLOOD PRESSURE: 143 MMHG | HEART RATE: 70 BPM | TEMPERATURE: 97.6 F | WEIGHT: 208.2 LBS | DIASTOLIC BLOOD PRESSURE: 95 MMHG | BODY MASS INDEX: 34.69 KG/M2 | OXYGEN SATURATION: 99 %

## 2018-10-02 DIAGNOSIS — Z98.890 STATUS POST LAPAROSCOPY: ICD-10-CM

## 2018-10-02 DIAGNOSIS — Z98.84 STATUS POST GASTRIC BYPASS FOR OBESITY: ICD-10-CM

## 2018-10-02 DIAGNOSIS — K90.9 INTESTINAL MALABSORPTION, UNSPECIFIED TYPE: Primary | ICD-10-CM

## 2018-10-02 DIAGNOSIS — I10 HYPERTENSION, UNSPECIFIED TYPE: ICD-10-CM

## 2018-10-02 RX ORDER — FUROSEMIDE 40 MG/1
40 TABLET ORAL DAILY
Refills: 0 | COMMUNITY
Start: 2018-09-28 | End: 2019-01-02

## 2018-10-02 RX ORDER — HYDROCHLOROTHIAZIDE 25 MG/1
TABLET ORAL
Refills: 2 | COMMUNITY
Start: 2018-08-30 | End: 2018-10-02 | Stop reason: ALTCHOICE

## 2018-10-02 RX ORDER — SPIRONOLACTONE 25 MG/1
25 TABLET ORAL
Refills: 0 | COMMUNITY
Start: 2018-09-28 | End: 2019-01-02

## 2018-10-02 NOTE — PROGRESS NOTES
Subjective:  
  
Claudia Han is a 41 y.o. female is now 2 weeks status post removal of eroded silastic band into Scar limb, with BETTY greater than 90 minutes. Pt had history of VBG. Doing well overall. Her weight on day of admission was 203 lbs. She developed LARON with peak creatinine 2.97 during course of hospitalization from 9/18-9/25. Was discharged home on Levaquin for bacteremia. Since discharge, has seen Dr. Dinorah Guevara for kidney function and has weekly follow-up scheduled. Still has PICC in place. She has lost a total of 33 pounds of fluid weight since discharge and reports now being on 2 duiretics. Body mass index is 34.65 kg/(m^2). Currently on a liquid diet without difficulty. Taking in 60oz water daily. She has advanced her diet to soft food and has been able to tolerate tuna, jello, canned fruit. There has been concern about not getting enough calories/carbs and she has been trying to add more back into her diet. She cannot stand the taste of protein shakes. Bowel movements are alternating constipation and regularity. Pain is controlled without any medications. . The patient is compliant with multivitamins. Weight Loss Metrics 10/2/2018 9/24/2018 9/18/2018 9/14/2018 8/9/2018 8/2/2018 6/27/2018 Today's Wt 208 lb 3.2 oz 236 lb 8 oz - 213 lb 206 lb 5 oz 196 lb 208 lb 6.4 oz BMI 34.65 kg/m2 - 39.36 kg/m2 35.45 kg/m2 34.6 kg/m2 33.64 kg/m2 34.68 kg/m2 Comorbidities: Hypertension: unchanged Diabetes: not applicable Obstructive Sleep Apnea: not applicable Hyperlipidemia: not applicable Stress Urinary Incontinence: not applicable Gastroesophageal Reflux: unchanged Weight related arthropathy:not applicable Patient Active Problem List  
Diagnosis Code  [facility] C80.1  Severe obesity with body mass index (BMI) of 35.0 to 39.9 with comorbidity (HCC) E66.01  
 Intestinal malabsorption K90.9  Status post gastric bypass for obesity Z98.84  
 Hypertension I10  
  Anemia D64.9  Anxiety F41.9  GERD (gastroesophageal reflux disease) K21.9  Wears dentures Z97.2  Syncopal episodes R55  Smoker F17.200  Diarrhea R19.7  Status post laparoscopy Z98.890  Fever R50.9 Past Medical History:  
Diagnosis Date  Anemia  Anxiety  Cancer [facility]) 2011  
  history of endometrial cancer  Diarrhea  GERD (gastroesophageal reflux disease)  Hypertension 2017  Intestinal malabsorption  Severe obesity with body mass index (BMI) of 35.0 to 39.9 with comorbidity ([address]  Smoker   
 smokes 5 cigarettes per day  Status post gastric bypass for obesity 2000  
 open / librado hermosillo  Syncopal episodes   
 has plans to see a neurologist   
 Wears dentures   
 teeth extraction due to vomiting and vitamin deficiencies Past Surgical History:  
Procedure Laterality Date  HX CHOLECYSTECTOMY  HX GASTRIC BYPASS  2000  
 open / [facility]  HX GI  2017 EGD  HX OTHER SURGICAL Right   
 resection of benign breast mass  HX PARTIAL HYSTERECTOMY    
 cancer related / pt with both ovaries Current Outpatient Prescriptions Medication Sig Dispense Refill  oxyCODONE-acetaminophen (PERCOCET) 5-325 mg per tablet Take 1 Tab by mouth every four (4) hours as needed for Pain. Max Daily Amount: 6 Tabs. 30 Tab 0  
 levoFLOXacin (LEVAQUIN) 500 mg tablet Take 1 Tab by mouth daily for 10 days. 10 Tab 0  
 Omeprazole delayed release (PRILOSEC D/R) 20 mg tablet Take 1 Tab by mouth daily. 30 Tab 2  
 fluconazole (DIFLUCAN) 100 mg tablet Take 1 Tab by mouth daily for 7 days. FDA advises cautious prescribing of oral fluconazole in pregnancy. 7 Tab 0  
 vitamin B complex (B COMPLEX PO) Take  by mouth daily. Takes the liquid  escitalopram oxalate (LEXAPRO) 10 mg tablet Take 10 mg by mouth nightly. Indications: depression  furosemide (LASIX) 40 mg tablet   0  
 hydroCHLOROthiazide (HYDRODIURIL) 25 mg tablet   2  
  cloNIDine (CATAPRES) 0.2 mg/24 hr patch 1 Patch by TransDERmal route every seven (7) days. 4 Patch 0  
 promethazine (PHENERGAN) 25 mg tablet Take 1 Tab by mouth every six (6) hours as needed. 20 Tab 0 No Known Allergies Review of Symptoms:  
 
 
General - No history or complaints of unexpected fever or chills Head/Neck - No history or complaints of headache or dizziness Cardiac - No history or complaints of chest pain, palpitations, or shortness of breath Pulmonary - No history or complaints of shortness of breath or productive cough Gastrointestinal - as noted above Genitourinary - No history or complaints of hematuria/dysuria or renal lithiasis Musculoskeletal - No history or complaints of joint  muscular weakness Hematologic - No history of any bleeding episodes Neurologic - No history or complaints of  migraine headaches or neurologic symptoms Objective:  
 
Visit Vitals  BP (!) 143/95 (BP 1 Location: Left arm, BP Patient Position: Sitting)  Pulse 70  Temp 97.6 °F (36.4 °C)  Ht 5' 5\" (1.651 m)  Wt 94.4 kg (208 lb 3.2 oz)  SpO2 99%  BMI 34.65 kg/m2 General:  alert, cooperative, no distress, appears stated age; PICC in right upper arm, dressing dry and intact Chest: lungs clear to auscultation, breath sounds equal and symmetric, no rhonchi, rales or wheezes, no accessory muscle use Cor:   Regular rate and rhythm, S1S2 present or without murmur or extra heart sounds Abdomen: soft, bowel sounds active, non-tender, no masses or organomegaly Incisions:   healing well, no drainage, no erythema, no hernia, no seroma, no swelling, no dehiscence, incision well approximated Assessment:  
History of Morbid obesity, status post removal of eroded silastic band from prior VBG. Doing well postoperatively. Is going to work on increasing protein (given handout on lactose free products).  Will work on adding more calories/carbs into diet. Has close renal f/u. Pt will call back in 48 hours once sees Dr. Angela Boo again to see if PICC still needed. Has been using po antibiotics without difficulty. HTN - on Rx, monitor closely and f/u PCP Plan:  
 
1. Increase protein and calories 2. Advance diet to soft solid phase. Reminded to measure portions, continue high protein, low carbohydrate diet. Reminded to eat regularly, to eat slowly & not to drink with meals. Refer to the handbook given in class. 3. Continue multivitamin 4. Continue current medications and follow up with PCP for management of regimen. 5. Encouraged to attend support group 6. I have discussed this plan with patient and they verbalized understanding 7. Follow up in 2 weeks or sooner if patient has questions, concerns or worsening of condition, if unable to reach our office, patient should report to the ED. 8. Ms. Margaret Polk has a reminder for a \"due or due soon\" health maintenance. I have asked that she contact her primary care provider for a follow-up on this health maintenance.

## 2018-10-02 NOTE — MR AVS SNAPSHOT
[facility] 
 
 
 [address] 
[phone] Patient: Coleen Encarnacion MRN: [id number] :1975 Visit Information Date & Time Provider Department Dept. Phone Encounter #  
 10/2/2018 11:00 AM Ronen Rodriguez  [facility] [place] [phone] [id number] Follow-up Instructions Return in about 2 weeks (around 10/16/2018). Your Appointments 10/17/2018  8:00 AM  
POST OP with Aggie Gamez MD  
[facility] [place] ([facility] Appt Note: 1 month post op [address] [facility]  
  
   
 [address] [place] [address] Upcoming Health Maintenance Date Due Pneumococcal 19-64 Highest Risk (1 of 3 - PCV13) 1996 DTaP/Tdap/Td series (1 - Tdap) 1998 PAP AKA CERVICAL CYTOLOGY 1998 Influenza Age 5 to Adult 2018 Allergies as of 10/2/2018  Review Complete On: 10/2/2018 By: Major Rodríguez LPN No Known Allergies Current Immunizations  Reviewed on 2018 Name Date Influenza Vaccine 2005 12:00 AM  
  
 Not reviewed this visit Vitals BP Pulse Temp Height(growth percentile) Weight(growth percentile) SpO2  
 (!) 143/95 (BP 1 Location: Left arm, BP Patient Position: Sitting) 70 97.6 °F (36.4 °C) 5' 5\" (1.651 m) 208 lb 3.2 oz (94.4 kg) 99% BMI OB Status Smoking Status 34.65 kg/m2 Hysterectomy Former Smoker Vitals History BMI and BSA Data Body Mass Index Body Surface Area  
 34.65 kg/m 2 2.08 m 2 Preferred Pharmacy Pharmacy Name Phone [id number] [place], [address] Your Updated Medication List  
  
   
This list is accurate as of 10/2/18 11:21 AM.  Always use your most recent med list.  
  
  
  
  
 B COMPLEX PO  
 Take  by mouth daily. Takes the liquid  
  
 escitalopram oxalate 10 mg tablet Commonly known as:  Ezzard File Take 10 mg by mouth nightly. Indications: depression  
  
 fluconazole 100 mg tablet Commonly known as:  DIFLUCAN Take 1 Tab by mouth daily for 7 days. FDA advises cautious prescribing of oral fluconazole in pregnancy. furosemide 40 mg tablet Commonly known as:  LASIX  
  
 levoFLOXacin 500 mg tablet Commonly known as:  David Musa Take 1 Tab by mouth daily for 10 days. Omeprazole delayed release 20 mg tablet Commonly known as:  PRILOSEC D/R Take 1 Tab by mouth daily. oxyCODONE-acetaminophen 5-325 mg per tablet Commonly known as:  PERCOCET Take 1 Tab by mouth every four (4) hours as needed for Pain. Max Daily Amount: 6 Tabs. promethazine 25 mg tablet Commonly known as:  PHENERGAN Take 1 Tab by mouth every six (6) hours as needed. spironolactone 25 mg tablet Commonly known as:  ALDACTONE Follow-up Instructions Return in about 2 weeks (around 10/16/2018). Patient Instructions Try IsoPur protein water usually at [facility] - has 40g protein Premiere water - 15g May advance diet to solid phase. Continue the use of protein shakes or hidalgo. Stay hydrated! Eat regularly, eat slowly & do not drink with your meals. Vitamins to be taken include your multivitamin, B12, calcium citrate, Vit D & Bcomplex. Refer to your notebook & handouts for dosages. Continue follow-up with your PCP. Return to this office in 2 weeks. Call if questions/concerns prior to your office visit. Introducing [facility]! [facility] introduces PRX patient portal. Now you can access parts of your medical record, email your doctor's office, and request medication refills online. 1. In your internet browser, go to [email or web address] 2. Click on the First Time User? Click Here link in the Sign In box.  You will see the New Member Sign Up page. 3. Enter your [employer] Access Code exactly as it appears below. You will not need to use this code after youve completed the sign-up process. If you do not sign up before the expiration date, you must request a new code. · [employer] Access Code: [email or web address] Expires: 11/7/2018  3:45 PM 
 
4. Enter the last four digits of your Social Security Number (xxxx) and Date of Birth (mm/dd/yyyy) as indicated and click Submit. You will be taken to the next sign-up page. 5. Create a [employer] ID. This will be your [employer] login ID and cannot be changed, so think of one that is secure and easy to remember. 6. Create a [employer] password. You can change your password at any time. 7. Enter your Password Reset Question and Answer. This can be used at a later time if you forget your password. 8. Enter your e-mail address. You will receive e-mail notification when new information is available in [address] 9. Click Sign Up. You can now view and download portions of your medical record. 10. Click the Download Summary menu link to download a portable copy of your medical information. If you have questions, please visit the Frequently Asked Questions section of the [employer] website. Remember, [employer] is NOT to be used for urgent needs. For medical emergencies, dial 911. Now available from your iPhone and Android! Please provide this summary of care documentation to your next provider. Your primary care clinician is listed as Karlie Reynolds. If you have any questions after today's visit, please call [phone].

## 2018-10-02 NOTE — PATIENT INSTRUCTIONS
Try IsoPur protein water usually at [facility] - has 40g protein Premiere water - 15g May advance diet to solid phase. Continue the use of protein shakes or hidalgo. Stay hydrated! Eat regularly, eat slowly & do not drink with your meals. Vitamins to be taken include your multivitamin, B12, calcium citrate, Vit D & Bcomplex. Refer to your notebook & handouts for dosages. Continue follow-up with your PCP. Return to this office in 2 weeks. Call if questions/concerns prior to your office visit.

## 2018-10-04 ENCOUNTER — TELEPHONE (OUTPATIENT)
Dept: SURGERY | Age: 43
End: 2018-10-04

## 2018-10-04 NOTE — TELEPHONE ENCOUNTER
Dr. Vladislav Jimenez please be advised:    Patient called with c/o tingling in hands and feet, poor fluid intake, nausea and weakness. Stated there has been no PICC since 9/28/18. Spoke with [address] [place] [address] and the following orders sent per Dr. Vladislav Jimenez:    Labs: BMP and Magnesium    PICC line flush per Protocol. [facility] appt scheduled for 10/05/18 @10am for 2 Liters LR. Patient advised of all instructions and verbalized understanding.

## 2018-10-05 ENCOUNTER — TELEPHONE (OUTPATIENT)
Dept: SURGERY | Age: 43
End: 2018-10-05

## 2018-10-05 NOTE — TELEPHONE ENCOUNTER
Dr. Mireya Obrien please advise:    Patient called  requesting results of recent lab orders drawn by [address] on 10/04/18. She stated another physician said her \"potassium and magnesium were low and she did not need fuids\". Advised patient that lab results were not in system. Advised pt to cancel [facility] appointment until lab were reviewed by Dr. Mireya Obrien. Patient agreed and stated she will call to cancel.

## 2018-10-09 ENCOUNTER — TELEPHONE (OUTPATIENT)
Dept: SURGERY | Age: 43
End: 2018-10-09

## 2018-10-09 NOTE — TELEPHONE ENCOUNTER
Dr. Ceravntes Fuelnomi please advise:    Patient requesting results of recent lab testing and advice on next steps of care.

## 2018-10-10 ENCOUNTER — TELEPHONE (OUTPATIENT)
Dept: SURGERY | Age: 43
End: 2018-10-10

## 2018-10-10 NOTE — PROGRESS NOTES
Progress Note  Dori Goodman  10/10/18    I talked with Ms. Leelee Aragon this morning and she had seen her family doctor who felt that she did not need to go to the infusion center. I did confirm that she had been taking in normal amounts of volume via liquids and she is having no problem with eating. She informed me that her follow up with  Dr. Sindhu Corley did indicate her kidney function is improving and her creatinine is down to 2.2. I explained to her that even though she was told by her family doctor her potassium was slightly low, we want to be very careful in not administering any oral potassium given her marginalized renal function. I will try to track down those labs although they are not in [address] [facility], but [address] [facility] did draw the labs and they are likely in one of their hospital systems. She seems to be doing well overall and I told her a good way to increase magnesium and potassium naturally would be with some green leafy vegetables which she will start on today.

## 2018-10-23 ENCOUNTER — TELEPHONE (OUTPATIENT)
Dept: SURGERY | Age: 43
End: 2018-10-23

## 2018-10-23 NOTE — TELEPHONE ENCOUNTER
Dr. Maty Sinclair please be advised:    Received call from [address] [place] [address]. The patient has been discharged from care and has returned to work.

## 2018-11-07 ENCOUNTER — OFFICE VISIT (OUTPATIENT)
Dept: SURGERY | Age: 43
End: 2018-11-07

## 2018-11-07 ENCOUNTER — HOSPITAL ENCOUNTER (OUTPATIENT)
Dept: LAB | Age: 43
Discharge: HOME | End: 2018-11-07

## 2018-11-07 VITALS
DIASTOLIC BLOOD PRESSURE: 80 MMHG | TEMPERATURE: 98.2 F | BODY MASS INDEX: 33.89 KG/M2 | HEIGHT: 65 IN | OXYGEN SATURATION: 100 % | WEIGHT: 203.4 LBS | SYSTOLIC BLOOD PRESSURE: 122 MMHG | HEART RATE: 90 BPM

## 2018-11-07 DIAGNOSIS — K95.09 EROSION OF GASTRIC BAND: Primary | ICD-10-CM

## 2018-11-07 DIAGNOSIS — K90.9 INTESTINAL MALABSORPTION, UNSPECIFIED TYPE: ICD-10-CM

## 2018-11-07 LAB — XX-LABCORP SPECIMEN COL,LCBCF: NORMAL

## 2018-11-07 PROCEDURE — 99001 SPECIMEN HANDLING PT-LAB: CPT | Performed by: SPECIALIST

## 2018-11-07 RX ORDER — METOPROLOL TARTRATE 25 MG/1
25 TABLET, FILM COATED ORAL
Refills: 0 | COMMUNITY
Start: 2018-10-30 | End: 2019-01-02

## 2018-11-07 RX ORDER — HYDROXYZINE PAMOATE 25 MG/1
25 CAPSULE ORAL
Qty: 50 CAP | Refills: 1 | Status: SHIPPED | OUTPATIENT
Start: 2018-11-07 | End: 2018-11-21

## 2018-11-07 RX ORDER — ESCITALOPRAM OXALATE 10 MG/1
10 TABLET ORAL
COMMUNITY
Start: 2018-04-18 | End: 2018-11-07 | Stop reason: ALTCHOICE

## 2018-11-07 NOTE — PATIENT INSTRUCTIONS
Body Mass Index: Care Instructions Your Care Instructions Body mass index (BMI) can help you see if your weight is raising your risk for health problems. It uses a formula to compare how much you weigh with how tall you are. · A BMI lower than 18.5 is considered underweight. · A BMI between 18.5 and 24.9 is considered healthy. · A BMI between 25 and 29.9 is considered overweight. A BMI of 30 or higher is considered obese. If your BMI is in the normal range, it means that you have a lower risk for weight-related health problems. If your BMI is in the overweight or obese range, you may be at increased risk for weight-related health problems, such as high blood pressure, heart disease, stroke, arthritis or joint pain, and diabetes. If your BMI is in the underweight range, you may be at increased risk for health problems such as fatigue, lower protection (immunity) against illness, muscle loss, bone loss, hair loss, and hormone problems. BMI is just one measure of your risk for weight-related health problems. You may be at higher risk for health problems if you are not active, you eat an unhealthy diet, or you drink too much alcohol or use tobacco products. Follow-up care is a key part of your treatment and safety. Be sure to make and go to all appointments, and call your doctor if you are having problems. It's also a good idea to know your test results and keep a list of the medicines you take. How can you care for yourself at home? · Practice healthy eating habits. This includes eating plenty of fruits, vegetables, whole grains, lean protein, and low-fat dairy. · If your doctor recommends it, get more exercise. Walking is a good choice. Bit by bit, increase the amount you walk every day. Try for at least 30 minutes on most days of the week. · Do not smoke. Smoking can increase your risk for health problems.  If you need help quitting, talk to your doctor about stop-smoking programs and medicines. These can increase your chances of quitting for good. · Limit alcohol to 2 drinks a day for men and 1 drink a day for women. Too much alcohol can cause health problems. If you have a BMI higher than 25 · Your doctor may do other tests to check your risk for weight-related health problems. This may include measuring the distance around your waist. A waist measurement of more than 40 inches in men or 35 inches in women can increase the risk of weight-related health problems. · Talk with your doctor about steps you can take to stay healthy or improve your health. You may need to make lifestyle changes to lose weight and stay healthy, such as changing your diet and getting regular exercise. If you have a BMI lower than 18.5 · Your doctor may do other tests to check your risk for health problems. · Talk with your doctor about steps you can take to stay healthy or improve your health. You may need to make lifestyle changes to gain or maintain weight and stay healthy, such as getting more healthy foods in your diet and doing exercises to build muscle. Where can you learn more? Go to http://sandra-abel.info/. Enter S176 in the search box to learn more about \"Body Mass Index: Care Instructions. \" Current as of: June 26, 2018 Content Version: 11.8 © 7938-1888 Healthwise, [employer]orporated. Care instructions adapted under license by [employer] (which disclaims liability or warranty for this information). If you have questions about a medical condition or this instruction, always ask your healthcare professional. [address] any warranty or liability for your use of this information.

## 2018-11-08 LAB
25(OH)D3+25(OH)D2 SERPL-MCNC: 14.2 NG/ML (ref 30–100)
ALBUMIN SERPL-MCNC: 3.7 G/DL (ref 3.5–5.5)
ALBUMIN/GLOB SERPL: 1 {RATIO} (ref 1.2–2.2)
ALP SERPL-CCNC: 99 IU/L (ref 39–117)
ALT SERPL-CCNC: 14 IU/L (ref 0–32)
AST SERPL-CCNC: 37 IU/L (ref 0–40)
BASOPHILS # BLD AUTO: 0 X10E3/UL (ref 0–0.2)
BASOPHILS NFR BLD AUTO: 0 %
BILIRUB SERPL-MCNC: 1.2 MG/DL (ref 0–1.2)
BUN SERPL-MCNC: 6 MG/DL (ref 6–24)
BUN/CREAT SERPL: 6 (ref 9–23)
CALCIUM SERPL-MCNC: 8.5 MG/DL (ref 8.7–10.2)
CHLORIDE SERPL-SCNC: 99 MMOL/L (ref 96–106)
CO2 SERPL-SCNC: 26 MMOL/L (ref 20–29)
CREAT SERPL-MCNC: 0.95 MG/DL (ref 0.57–1)
EOSINOPHIL # BLD AUTO: 0.2 X10E3/UL (ref 0–0.4)
EOSINOPHIL NFR BLD AUTO: 5 %
ERYTHROCYTE [DISTWIDTH] IN BLOOD BY AUTOMATED COUNT: 22.8 % (ref 12.3–15.4)
FERRITIN SERPL-MCNC: 86 NG/ML (ref 15–150)
FOLATE SERPL-MCNC: 3.1 NG/ML
GLOBULIN SER CALC-MCNC: 3.8 G/DL (ref 1.5–4.5)
GLUCOSE SERPL-MCNC: 111 MG/DL (ref 65–99)
HCT VFR BLD AUTO: 34.5 % (ref 34–46.6)
HGB BLD-MCNC: 10.6 G/DL (ref 11.1–15.9)
IMM GRANULOCYTES # BLD: 0 X10E3/UL (ref 0–0.1)
IMM GRANULOCYTES NFR BLD: 0 %
IRON SERPL-MCNC: 345 UG/DL (ref 27–159)
LYMPHOCYTES # BLD AUTO: 1.3 X10E3/UL (ref 0.7–3.1)
LYMPHOCYTES NFR BLD AUTO: 30 %
MCH RBC QN AUTO: 29.6 PG (ref 26.6–33)
MCHC RBC AUTO-ENTMCNC: 30.7 G/DL (ref 31.5–35.7)
MCV RBC AUTO: 96 FL (ref 79–97)
MONOCYTES # BLD AUTO: 0.3 X10E3/UL (ref 0.1–0.9)
MONOCYTES NFR BLD AUTO: 7 %
MORPHOLOGY BLD-IMP: ABNORMAL
NEUTROPHILS # BLD AUTO: 2.6 X10E3/UL (ref 1.4–7)
NEUTROPHILS NFR BLD AUTO: 58 %
PLATELET # BLD AUTO: 345 X10E3/UL (ref 150–379)
POTASSIUM SERPL-SCNC: 4.1 MMOL/L (ref 3.5–5.2)
PROT SERPL-MCNC: 7.5 G/DL (ref 6–8.5)
RBC # BLD AUTO: 3.58 X10E6/UL (ref 3.77–5.28)
SODIUM SERPL-SCNC: 136 MMOL/L (ref 134–144)
VIT B1 BLD-SCNC: NORMAL NMOL/L
VIT B12 SERPL-MCNC: 219 PG/ML (ref 232–1245)
WBC # BLD AUTO: 4.4 X10E3/UL (ref 3.4–10.8)

## 2018-11-09 RX ORDER — ERGOCALCIFEROL 1.25 MG/1
CAPSULE ORAL
Qty: 52 CAP | Refills: 1 | Status: SHIPPED | OUTPATIENT
Start: 2018-11-09 | End: 2019-03-23

## 2018-11-10 LAB — VIT B1 BLD-SCNC: 61.8 NMOL/L (ref 66.5–200)

## 2018-11-13 ENCOUNTER — TELEPHONE (OUTPATIENT)
Dept: SURGERY | Age: 43
End: 2018-11-13

## 2018-11-13 NOTE — PROGRESS NOTES
Progress Note  Yi Lock  11/13/18    I talked with Ms. Aparna Angelo once again today. Her BUN level did come back slightly low also. As we discussed previously, she has low values on all of her vitamins and she has already initiated taking a B-12 supplementation and a B-complex supplement. We will repeat all of her blood work in three months.

## 2018-12-14 ENCOUNTER — OFFICE VISIT (OUTPATIENT)
Dept: SURGERY | Age: 43
End: 2018-12-14

## 2018-12-14 VITALS
TEMPERATURE: 97.8 F | BODY MASS INDEX: 34.62 KG/M2 | OXYGEN SATURATION: 100 % | SYSTOLIC BLOOD PRESSURE: 140 MMHG | HEIGHT: 65 IN | DIASTOLIC BLOOD PRESSURE: 80 MMHG | RESPIRATION RATE: 16 BRPM | HEART RATE: 97 BPM | WEIGHT: 207.8 LBS

## 2018-12-14 DIAGNOSIS — K90.9 INTESTINAL MALABSORPTION, UNSPECIFIED TYPE: Primary | ICD-10-CM

## 2018-12-14 DIAGNOSIS — Z98.890 STATUS POST LAPAROSCOPY: ICD-10-CM

## 2018-12-14 RX ORDER — BISMUTH SUBSALICYLATE 262 MG
1 TABLET,CHEWABLE ORAL DAILY
COMMUNITY

## 2018-12-17 NOTE — PROGRESS NOTES
3 months follow-up / 18 years post open VBG    Subjective:     Claudia Han  is a 41 y.o. female who presents for follow-up about 3 months following removal of eroded silastic band from prior librado hermosillo VBG procedure. She has lost a total of 153 pounds since VBG surgery. Body mass index is 34.58 kg/m². . EBWL is (adequate). The patient presents today to assess their progress toward their goal of weight loss and to address any issues that may be present. Today the patient and I have reviewed their diet and how appropriate their food choices are. The following issues have been identified - none from a surgical standpoint. Pain assessment - 0/10  . Surgery related complication: prior librado hermosillo VBG pt with abd pain this past summer related to eroded silastic band       She reports a complete resolution of all prior abd pains and denies vomiting, abdominal pain, diarrhea and difficulty breathing. The patient's exercise level: moderately active. Changes in her medical history and medications have been reviewed.     Patient Active Problem List   Diagnosis Code    [facility] C[facility]    Intestinal malabsorption K90.9    Status post gastric bypass for obesity Z98.84    Hypertension I10    Anemia D64.9    Anxiety F41.9    GERD (gastroesophageal reflux disease) K21.9    Wears dentures Z97.2    Syncopal episodes R55    Smoker F17.200    Diarrhea R19.7    Status post laparoscopy Z98.890    Fever R50.9     Past Medical History:   Diagnosis Date    Anemia     Anxiety     [facility] ([facility] 2011     history of endometrial cancer    Diarrhea     GERD (gastroesophageal reflux disease)     Hypertension 2017    Intestinal malabsorption     Severe obesity with body mass index (BMI) of 35.0 to 39.9 with comorbidity ([facility]     Smoker     smokes 5 cigarettes per day    Status post gastric bypass for obesity 2000    open / librado hermosillo    Syncopal episodes     has plans to see a neurologist     Wears dentures teeth extraction due to vomiting and vitamin deficiencies     Past Surgical History:   Procedure Laterality Date    HX CHOLECYSTECTOMY      HX GASTRIC BYPASS  2000    open / [facility]    HX GI  2017    EGD    HX OTHER SURGICAL Right     resection of benign breast mass    HX PARTIAL HYSTERECTOMY      cancer related / pt with both ovaries     Current Outpatient Medications   Medication Sig Dispense Refill    multivitamin (ONE A DAY) tablet Take 1 Tab by mouth daily.  ergocalciferol (ERGOCALCIFEROL) 50,000 unit capsule Take 1 cap by mouth twice weekly for 1 year 52 Cap 1    Omeprazole delayed release (PRILOSEC D/R) 20 mg tablet Take 1 Tab by mouth daily. 30 Tab 2    vitamin B complex (B COMPLEX PO) Take  by mouth daily. Takes the liquid      metoprolol tartrate (LOPRESSOR) 25 mg tablet   0    furosemide (LASIX) 40 mg tablet   0    spironolactone (ALDACTONE) 25 mg tablet   0    oxyCODONE-acetaminophen (PERCOCET) 5-325 mg per tablet Take 1 Tab by mouth every four (4) hours as needed for Pain. Max Daily Amount: 6 Tabs.  30 Tab 0         Review of Symptoms:     General - No history or complaints of unexpected fever or chills  Head/Neck - No history or complaints of headache or dizziness  Cardiac - No history or complaints of chest pain, palpitations, or shortness of breath  Pulmonary - No history or complaints of shortness of breath or productive cough  Gastrointestinal - as noted above  Genitourinary - No history or complaints of hematuria/dysuria or renal lithiasis  Musculoskeletal - No history or complaints of joint  muscular weakness  Hematologic - No history of any bleeding episodes  Neurologic - No history or complaints of  migraine headaches or neurologic symptoms    Objective:     Visit Vitals  /80 (BP 1 Location: Left arm, BP Patient Position: Sitting)   Pulse 97   Temp 97.8 °F (36.6 °C)   Resp 16   Ht 5' 5\" (1.651 m)   Wt 94.3 kg (207 lb 12.8 oz)   SpO2 100%   BMI 34.58 kg/m² Physical Exam:    General:  alert, cooperative, no distress, appears stated age   Lungs:   clear to auscultation bilaterally   Heart:  Regular rate and rhythm   Abdomen:   abdomen is soft without significant tenderness, masses, organomegaly or guarding;     Incisions: healing well, no significant drainage         Labs:     Recent Results (from the past 2016 hour(s))   CREATININE, UR, RANDOM    Collection Time: 09/24/18 11:15 AM   Result Value Ref Range    Creatinine, urine 47.91 30 - 125 mg/dL   SODIUM, UR, RANDOM    Collection Time: 09/24/18 11:15 AM   Result Value Ref Range    Sodium,urine random 20 20 - 110 MMOL/L   URINALYSIS W/MICROSCOPIC    Collection Time: 09/24/18 11:15 AM   Result Value Ref Range    Color YELLOW      Appearance CLEAR      Specific gravity 1.008 1.005 - 1.030      pH (UA) 5.0 5.0 - 8.0      Protein NEGATIVE  NEG mg/dL    Glucose NEGATIVE  NEG mg/dL    Ketone NEGATIVE  NEG mg/dL    Bilirubin NEGATIVE  NEG      Blood NEGATIVE  NEG      Urobilinogen 0.2 0.2 - 1.0 EU/dL    Nitrites NEGATIVE  NEG      Leukocyte Esterase NEGATIVE  NEG      WBC 0 to 1 0 - 5 /hpf    RBC NEGATIVE  0 - 5 /hpf    Epithelial cells FEW 0 - 5 /lpf    Bacteria NEGATIVE  NEG /hpf   OSMOLALITY, UR    Collection Time: 09/24/18 11:15 AM   Result Value Ref Range    Osmolality,urine 96 50 - 1,400 MOSM/kg H2O   GLUCOSE, POC    Collection Time: 09/24/18 12:09 PM   Result Value Ref Range    Glucose (POC) 96 70 - 021 mg/dL   METABOLIC PANEL, BASIC    Collection Time: 09/24/18  6:45 PM   Result Value Ref Range    Sodium 134 (L) 136 - 145 mmol/L    Potassium 3.8 3.5 - 5.5 mmol/L    Chloride 102 100 - 108 mmol/L    CO2 24 21 - 32 mmol/L    Anion gap 8 3.0 - 18 mmol/L    Glucose 126 (H) 74 - 99 mg/dL    BUN 8 7.0 - 18 MG/DL    Creatinine 2.97 (H) 0.6 - 1.3 MG/DL    BUN/Creatinine ratio 3 (L) 12 - 20      GFR est AA 21 (L) >60 ml/min/1.73m2    GFR est non-AA 17 (L) >60 ml/min/1.73m2    Calcium 8.1 (L) 8.5 - 78.0 MG/DL   METABOLIC PANEL, BASIC    Collection Time: 09/25/18  4:10 AM   Result Value Ref Range    Sodium 137 136 - 145 mmol/L    Potassium 3.6 3.5 - 5.5 mmol/L    Chloride 105 100 - 108 mmol/L    CO2 23 21 - 32 mmol/L    Anion gap 9 3.0 - 18 mmol/L    Glucose 117 (H) 74 - 99 mg/dL    BUN 8 7.0 - 18 MG/DL    Creatinine 2.96 (H) 0.6 - 1.3 MG/DL    BUN/Creatinine ratio 3 (L) 12 - 20      GFR est AA 21 (L) >60 ml/min/1.73m2    GFR est non-AA 17 (L) >60 ml/min/1.73m2    Calcium 7.8 (L) 8.5 - 10.1 MG/DL   MAGNESIUM    Collection Time: 09/25/18  4:10 AM   Result Value Ref Range    Magnesium 1.3 (L) 1.6 - 2.6 mg/dL   METABOLIC PANEL, BASIC    Collection Time: 09/25/18  2:25 PM   Result Value Ref Range    Sodium 135 (L) 136 - 145 mmol/L    Potassium 3.5 3.5 - 5.5 mmol/L    Chloride 104 100 - 108 mmol/L    CO2 24 21 - 32 mmol/L    Anion gap 7 3.0 - 18 mmol/L    Glucose 94 74 - 99 mg/dL    BUN 8 7.0 - 18 MG/DL    Creatinine 2.89 (H) 0.6 - 1.3 MG/DL    BUN/Creatinine ratio 3 (L) 12 - 20      GFR est AA 22 (L) >60 ml/min/1.73m2    GFR est non-AA 18 (L) >60 ml/min/1.73m2    Calcium 8.0 (L) 8.5 - 10.1 MG/DL   [facility] SPECIMEN COL    Collection Time: 09/27/18  8:45 AM   Result Value Ref Range    [facility]. Specimens collected/sent to [facility]. Please direct inquiries to ([phone]).    METABOLIC PANEL, BASIC    Collection Time: 09/27/18  8:47 AM   Result Value Ref Range    Glucose 97 65 - 99 mg/dL    BUN 6 6 - 24 mg/dL    Creatinine 2.85 (H) 0.57 - 1.00 mg/dL    GFR est non-AA 20 (L) >59 mL/min/1.73    GFR est AA 23 (L) >59 mL/min/1.73    BUN/Creatinine ratio 2 (L) 9 - 23    Sodium 143 134 - 144 mmol/L    Potassium 4.0 3.5 - 5.2 mmol/L    Chloride 106 96 - 106 mmol/L    CO2 21 20 - 29 mmol/L    Calcium 8.5 (L) 8.7 - 10.2 mg/dL   VITAMIN B1, WHOLE BLOOD    Collection Time: 11/07/18 12:00 AM   Result Value Ref Range    Vitamin B1 61.8 (L) 66.5 - 200.0 nmol/L   [facility] SPECIMEN COL    Collection Time: 11/07/18  9:38 AM   Result Value Ref Range    [facility]. Specimens collected/sent to [facility]. Please direct inquiries to ([phone]). VITAMIN D, 25 HYDROXY    Collection Time: 11/07/18  9:38 AM   Result Value Ref Range    VITAMIN D, 25-HYDROXY 14.2 (L) 30.0 - 100.0 ng/mL   CBC WITH AUTOMATED DIFF    Collection Time: 11/07/18  9:38 AM   Result Value Ref Range    WBC 4.4 3.4 - 10.8 x10E3/uL    RBC 3.58 (L) 3.77 - 5.28 x10E6/uL    HGB 10.6 (L) 11.1 - 15.9 g/dL    HCT 34.5 34.0 - 46.6 %    MCV 96 79 - 97 fL    MCH 29.6 26.6 - 33.0 pg    MCHC 30.7 (L) 31.5 - 35.7 g/dL    RDW 22.8 (H) 12.3 - 15.4 %    PLATELET 935 975 - 234 x10E3/uL    NEUTROPHILS 58 Not Estab. %    Lymphocytes 30 Not Estab. %    MONOCYTES 7 Not Estab. %    EOSINOPHILS 5 Not Estab. %    BASOPHILS 0 Not Estab. %    ABS. NEUTROPHILS 2.6 1.4 - 7.0 x10E3/uL    Abs Lymphocytes 1.3 0.7 - 3.1 x10E3/uL    ABS. MONOCYTES 0.3 0.1 - 0.9 x10E3/uL    ABS. EOSINOPHILS 0.2 0.0 - 0.4 x10E3/uL    ABS. BASOPHILS 0.0 0.0 - 0.2 x10E3/uL    IMMATURE GRANULOCYTES 0 Not Estab. %    ABS. IMM. GRANS. 0.0 0.0 - 0.1 x10E3/uL    Hematology comments: Note:    METABOLIC PANEL, COMPREHENSIVE    Collection Time: 11/07/18  9:38 AM   Result Value Ref Range    Glucose 111 (H) 65 - 99 mg/dL    BUN 6 6 - 24 mg/dL    Creatinine 0.95 0.57 - 1.00 mg/dL    GFR est non-AA 75 >59 mL/min/1.73    GFR est AA 86 >59 mL/min/1.73    BUN/Creatinine ratio 6 (L) 9 - 23    Sodium 136 134 - 144 mmol/L    Potassium 4.1 3.5 - 5.2 mmol/L    Chloride 99 96 - 106 mmol/L    CO2 26 20 - 29 mmol/L    Calcium 8.5 (L) 8.7 - 10.2 mg/dL    Protein, total 7.5 6.0 - 8.5 g/dL    Albumin 3.7 3.5 - 5.5 g/dL    GLOBULIN, TOTAL 3.8 1.5 - 4.5 g/dL    A-G Ratio 1.0 (L) 1.2 - 2.2    Bilirubin, total 1.2 0.0 - 1.2 mg/dL    Alk.  phosphatase 99 39 - 117 IU/L    AST (SGOT) 37 0 - 40 IU/L    ALT (SGPT) 14 0 - 32 IU/L   VITAMIN B12 & FOLATE    Collection Time: 11/07/18  9:38 AM   Result Value Ref Range    Vitamin B12 219 (L) 232 - 1,245 pg/mL    Folate 3.1 >3.0 ng/mL   VITAMIN B1, WHOLE BLOOD    Collection Time: 11/07/18  9:38 AM   Result Value Ref Range    Vitamin B1 CANCELED nmol/L   IRON    Collection Time: 11/07/18  9:38 AM   Result Value Ref Range    Iron 345 (HH) 27 - 159 ug/dL   FERRITIN    Collection Time: 11/07/18  9:38 AM   Result Value Ref Range    Ferritin 86 15 - 150 ng/mL       Assessment:     1. History of Morbid obesity, status post  removal of silastic band from prior VBG procedure. Doing well, no concerns but still with moderate dysphagia symptoms. The patient understands the complicated removal process (gastrotomy was needed to remove eroded band). At this point we will continue yearly follow-up as we would with any post bariatric patient. We will need to assess her repair and for any stricture formation at outlet  of her anastomosis. Plan:     Plan is for EGD to assess gastric outlet.   Cont diet as tolerated  Cont vitamins

## 2018-12-17 NOTE — PATIENT INSTRUCTIONS
Patient Instructions      1. Continue to monitor carbohydrate and protein intake- remember to keep your           total  carbohydrates to 50 grams or less per day for best results. 2. Remember hydration goals - usually 48 to 64 ounces of liquids per day  3. Continue to work towards exercise goals - minimum 3 days per week of 45          minutes to  1 hour at a time. 4. Remember to take vitamins as directed        Supplement Resource Guide    Importance of Protein:   Maintains lean body mass, produces antibodies to fight off infections, heals wounds, minimizes hair loss, helps to give you energy, helps with satiety, and keeping you full between meals. Importance of Calcium:  Needed for healthy bones and teeth, normal blood clotting, and nervous system functioning, higher risk of osteoporosis and bone disease with non-compliance. Importance of Multivitamins: Many functions. Supply you with extra nutrients that you may be missing from food. May lead to iron deficiency anemia, weakness, fatigue, and many other symptoms with non-compliance. Importance of B Vitamins:  Important for red blood cell formation, metabolism, energy, and helps to maintain a healthy nervous system. Protein Supplement  Find one you like now. Use immediately after surgery. Look for:  35-50g protein each day from your protein supplement once you reach the progression diet. 0-3 g fat per serving  0-3 g sugar per serving    Protein drinks should be split in separate dosages. Recommend: Lifelong  1 year + Calcium Supplement:     Start taking within a month after surgery. Look for: Calcium Citrate Plus D (1500 mg per day)  Recommend: Citracal     .          Avoid chocolate chewable calcium. Can use chewable bariatric or [employer] brand or similar chewable. The body cannot absorb more than 500-600 mg @ a time.       Take for Life Multi-vitamin Supplement:      1st Month After Surgery: Any complete chewable, such as: Hintons Complete chewables. Avoid Hinton sours or gummies. They lack iron and other important nutrients and also have added sugar. Continue with chewable vitamin or change to adult complete multivitamin one month after surgery. Menstruating women can take a prenatal vitamin. Make sure has at least 18 mg iron and 687-960 mcg folic acid):    Vitamin B12, B Complex Vitamin, and Biotin  Start taking within a month after surgery. Vitamin B12:  1000 mcg of Vitamin B12 three times weekly    Must take sublingually (meaning you take it under your tongue) or in a liquid drop form for easy absorption. B Complex Vitamin: Take a pill or liquid drop form once daily. Biotin: This vitamin can help prevent hair loss.     Recommend 5mg   (5000 mcg) a day  Biotin is Optional

## 2019-01-03 ENCOUNTER — HOSPITAL ENCOUNTER (OUTPATIENT)
Age: 44
Setting detail: OUTPATIENT SURGERY
Discharge: HOME | End: 2019-01-03
Attending: SPECIALIST | Admitting: SPECIALIST
Payer: MEDICAID

## 2019-01-03 ENCOUNTER — ANESTHESIA (OUTPATIENT)
Dept: ENDOSCOPY | Age: 44
End: 2019-01-03
Payer: MEDICAID

## 2019-01-03 ENCOUNTER — ANESTHESIA EVENT (OUTPATIENT)
Dept: ENDOSCOPY | Age: 44
End: 2019-01-03
Payer: MEDICAID

## 2019-01-03 VITALS
TEMPERATURE: 97.5 F | SYSTOLIC BLOOD PRESSURE: 148 MMHG | DIASTOLIC BLOOD PRESSURE: 84 MMHG | BODY MASS INDEX: 34.04 KG/M2 | WEIGHT: 204.31 LBS | HEIGHT: 65 IN | RESPIRATION RATE: 18 BRPM | OXYGEN SATURATION: 100 % | HEART RATE: 72 BPM

## 2019-01-03 DIAGNOSIS — R19.7 DIARRHEA, UNSPECIFIED TYPE: Primary | ICD-10-CM

## 2019-01-03 LAB
ALBUMIN SERPL-MCNC: 3.4 G/DL (ref 3.4–5)
ALBUMIN/GLOB SERPL: 0.8 {RATIO} (ref 0.8–1.7)
ALP SERPL-CCNC: 112 U/L (ref 45–117)
ALT SERPL-CCNC: 25 U/L (ref 13–56)
ANION GAP SERPL CALC-SCNC: 7 MMOL/L (ref 3–18)
AST SERPL-CCNC: 42 U/L (ref 15–37)
BASOPHILS # BLD: 0 K/UL (ref 0–0.1)
BASOPHILS NFR BLD: 0 % (ref 0–2)
BILIRUB SERPL-MCNC: 0.3 MG/DL (ref 0.2–1)
BUN SERPL-MCNC: 12 MG/DL (ref 7–18)
BUN/CREAT SERPL: 11
CALCIUM SERPL-MCNC: 8.6 MG/DL (ref 8.5–10.1)
CHLORIDE SERPL-SCNC: 105 MMOL/L (ref 100–108)
CO2 SERPL-SCNC: 26 MMOL/L (ref 21–32)
CREAT SERPL-MCNC: 1.13 MG/DL (ref 0.6–1.3)
DIFFERENTIAL METHOD BLD: ABNORMAL
EOSINOPHIL # BLD: 0.2 K/UL (ref 0–0.4)
EOSINOPHIL NFR BLD: 3 % (ref 0–5)
ERYTHROCYTE [DISTWIDTH] IN BLOOD BY AUTOMATED COUNT: 23 % (ref 11.6–14.5)
GLOBULIN SER CALC-MCNC: 4.3 G/DL (ref 2–4)
GLUCOSE SERPL-MCNC: 88 MG/DL (ref 74–99)
HCT VFR BLD AUTO: 36 % (ref 35–45)
HGB BLD-MCNC: 11.6 G/DL (ref 12–16)
LYMPHOCYTES # BLD: 2 K/UL (ref 0.9–3.6)
LYMPHOCYTES NFR BLD: 36 % (ref 21–52)
MCH RBC QN AUTO: 32.7 PG (ref 24–34)
MCHC RBC AUTO-ENTMCNC: 32.2 G/DL (ref 31–37)
MCV RBC AUTO: 101.4 FL (ref 74–97)
MONOCYTES # BLD: 0.3 K/UL (ref 0.05–1.2)
MONOCYTES NFR BLD: 6 % (ref 3–10)
NEUTS SEG # BLD: 3.1 K/UL (ref 1.8–8)
NEUTS SEG NFR BLD: 55 % (ref 40–73)
PLATELET # BLD AUTO: 281 K/UL (ref 135–420)
PMV BLD AUTO: 9.3 FL (ref 9.2–11.8)
POTASSIUM SERPL-SCNC: 5 MMOL/L (ref 3.5–5.5)
PROT SERPL-MCNC: 7.7 G/DL (ref 6.4–8.2)
RBC # BLD AUTO: 3.55 M/UL (ref 4.2–5.3)
SODIUM SERPL-SCNC: 138 MMOL/L (ref 136–145)
WBC # BLD AUTO: 5.7 K/UL (ref 4.6–13.2)

## 2019-01-03 PROCEDURE — 85025 COMPLETE CBC W/AUTO DIFF WBC: CPT

## 2019-01-03 PROCEDURE — 93005 ELECTROCARDIOGRAM TRACING: CPT

## 2019-01-03 PROCEDURE — 74011000250 HC RX REV CODE- 250: Performed by: SPECIALIST

## 2019-01-03 PROCEDURE — 88305 TISSUE EXAM BY PATHOLOGIST: CPT

## 2019-01-03 PROCEDURE — 36415 COLL VENOUS BLD VENIPUNCTURE: CPT

## 2019-01-03 PROCEDURE — 76060000031 HC ANESTHESIA FIRST 0.5 HR: Performed by: SPECIALIST

## 2019-01-03 PROCEDURE — 76040000019: Performed by: SPECIALIST

## 2019-01-03 PROCEDURE — 80053 COMPREHEN METABOLIC PANEL: CPT

## 2019-01-03 PROCEDURE — 77030009426 HC FCPS BIOP ENDOSC BSC -B: Performed by: SPECIALIST

## 2019-01-03 PROCEDURE — 77030020263 HC SOL INJ SOD CL0.9% LFCR 1000ML: Performed by: SPECIALIST

## 2019-01-03 PROCEDURE — 74011250636 HC RX REV CODE- 250/636: Performed by: SPECIALIST

## 2019-01-03 PROCEDURE — 74011250636 HC RX REV CODE- 250/636

## 2019-01-03 RX ORDER — HYDROMORPHONE HYDROCHLORIDE 2 MG/ML
0.5 INJECTION, SOLUTION INTRAMUSCULAR; INTRAVENOUS; SUBCUTANEOUS
Status: CANCELLED | OUTPATIENT
Start: 2019-01-03

## 2019-01-03 RX ORDER — OXYCODONE AND ACETAMINOPHEN 5; 325 MG/1; MG/1
1 TABLET ORAL AS NEEDED
Status: CANCELLED | OUTPATIENT
Start: 2019-01-03

## 2019-01-03 RX ORDER — FENTANYL CITRATE 50 UG/ML
25 INJECTION, SOLUTION INTRAMUSCULAR; INTRAVENOUS
Status: CANCELLED | OUTPATIENT
Start: 2019-01-03 | End: 2019-01-03

## 2019-01-03 RX ORDER — NALOXONE HYDROCHLORIDE 0.4 MG/ML
0.1 INJECTION, SOLUTION INTRAMUSCULAR; INTRAVENOUS; SUBCUTANEOUS
Status: CANCELLED | OUTPATIENT
Start: 2019-01-03

## 2019-01-03 RX ORDER — SODIUM CHLORIDE, SODIUM LACTATE, POTASSIUM CHLORIDE, CALCIUM CHLORIDE 600; 310; 30; 20 MG/100ML; MG/100ML; MG/100ML; MG/100ML
50 INJECTION, SOLUTION INTRAVENOUS CONTINUOUS
Status: CANCELLED | OUTPATIENT
Start: 2019-01-03

## 2019-01-03 RX ORDER — LIDOCAINE HYDROCHLORIDE 20 MG/ML
INJECTION, SOLUTION EPIDURAL; INFILTRATION; INTRACAUDAL; PERINEURAL AS NEEDED
Status: DISCONTINUED | OUTPATIENT
Start: 2019-01-03 | End: 2019-01-03 | Stop reason: HOSPADM

## 2019-01-03 RX ORDER — ONDANSETRON 2 MG/ML
4 INJECTION INTRAMUSCULAR; INTRAVENOUS ONCE
Status: CANCELLED | OUTPATIENT
Start: 2019-01-03 | End: 2019-01-03

## 2019-01-03 RX ORDER — PROPOFOL 10 MG/ML
INJECTION, EMULSION INTRAVENOUS AS NEEDED
Status: DISCONTINUED | OUTPATIENT
Start: 2019-01-03 | End: 2019-01-03 | Stop reason: HOSPADM

## 2019-01-03 RX ORDER — SODIUM CHLORIDE, SODIUM LACTATE, POTASSIUM CHLORIDE, CALCIUM CHLORIDE 600; 310; 30; 20 MG/100ML; MG/100ML; MG/100ML; MG/100ML
125 INJECTION, SOLUTION INTRAVENOUS CONTINUOUS
Status: DISCONTINUED | OUTPATIENT
Start: 2019-01-03 | End: 2019-01-03 | Stop reason: HOSPADM

## 2019-01-03 RX ADMIN — PROPOFOL 30 MG: 10 INJECTION, EMULSION INTRAVENOUS at 14:19

## 2019-01-03 RX ADMIN — PROPOFOL 30 MG: 10 INJECTION, EMULSION INTRAVENOUS at 14:21

## 2019-01-03 RX ADMIN — PROPOFOL 70 MG: 10 INJECTION, EMULSION INTRAVENOUS at 14:15

## 2019-01-03 RX ADMIN — LIDOCAINE HYDROCHLORIDE 50 MG: 20 INJECTION, SOLUTION EPIDURAL; INFILTRATION; INTRACAUDAL; PERINEURAL at 14:18

## 2019-01-03 RX ADMIN — SODIUM CHLORIDE, SODIUM LACTATE, POTASSIUM CHLORIDE, AND CALCIUM CHLORIDE 125 ML/HR: 600; 310; 30; 20 INJECTION, SOLUTION INTRAVENOUS at 12:58

## 2019-01-03 RX ADMIN — PROPOFOL 30 MG: 10 INJECTION, EMULSION INTRAVENOUS at 14:16

## 2019-01-03 RX ADMIN — PROPOFOL 40 MG: 10 INJECTION, EMULSION INTRAVENOUS at 14:18

## 2019-01-03 NOTE — INTERVAL H&P NOTE
H&P Update: 
Reyna Huynh was seen and examined. History and physical has been reviewed. The patient has been examined.  There have been no significant clinical changes since the completion of the originally dated History and Physical. 
 
Signed By: Valeria Vazquez MD   
 January 3, 2019 12:20 PM

## 2019-01-03 NOTE — DISCHARGE INSTRUCTIONS
Yamini Marrero  [id number]  1975    EGD DISCHARGE INSTRUCTIONS    Discomfort:  Sore throat- throat lozenges or warm salt water gargle  redness at IV site- apply warm compress to area; if redness or soreness persist- contact your physician  Gaseous discomfort- walking, belching will help relieve any discomfort  You should not operate a vehicle for 12 hours  You should note engage in an occupation involving machinery or appliances for rest of today  You may note drink alcoholic beverages for at least 12 hours  Avoid making any critical decisions for at least 24 hour  DIET  You may resume your regular diet - however -  remember your colon is empty and a heavy meal will produce gas. Avoid these foods:  vegetables, fried / greasy foods, carbonated drinks    ACTIVITY  You may resume your normal daily activities   Spend the remainder of the day resting -  avoid any strenuous activity. CALL M.D. ANY SIGN OF   Increasing pain, nausea, vomiting  Abdominal distension (swelling)  New increased bleeding (oral or rectal)  Fever (chills)  Pain in chest area  Bloody discharge from nose or mouth  Shortness of breath       Follow-up Instructions:   Dr Pauly Castillo will call you in one to two weeks. If you do not hear from him, please call his office [phone]. Yaminiyamil Marrero  [id number]  1975        DISCHARGE SUMMARY from Nurse    The following personal items collected during your admission are returned to you:   Dental Appliance: Dental Appliances: At home, Partials, Uppers  Vision:    Hearing Aid:    Jewelry: Jewelry: None  Clothing: Clothing: Shirt, Pants, Undergarments, Footwear, Socks, Jacket/Coat(locker 16)  Other Valuables:  Other Valuables: None  Valuables sent to safe: Personal Items Sent to Safe: cell    PATIENT INSTRUCTIONS:    Take Home Medications:      DISCHARGE SUMMARY from Nurse    PATIENT INSTRUCTIONS:    After general anesthesia or intravenous sedation, for 24 hours or while taking prescription Narcotics:  · Limit your activities  · Do not drive and operate hazardous machinery  · Do not make important personal or business decisions  · Do  not drink alcoholic beverages  · If you have not urinated within 8 hours after discharge, please contact your surgeon on call. Report the following to your surgeon:  · Excessive pain, swelling, redness or odor of or around the surgical area  · Temperature over 100.5  · Nausea and vomiting lasting longer than 4 hours or if unable to take medications  · Any signs of decreased circulation or nerve impairment to extremity: change in color, persistent  numbness, tingling, coldness or increase pain  · Any questions    What to do at Home:  Recommended activity: Activity as tolerated and Ambulate in house,     If you experience any of the following symptoms above, please follow up with Dr. Marcia Blair. *  Please give a list of your current medications to your Primary Care Provider. *  Please update this list whenever your medications are discontinued, doses are      changed, or new medications (including over-the-counter products) are added. *  Please carry medication information at all times in case of emergency situations. These are general instructions for a healthy lifestyle:    No smoking/ No tobacco products/ Avoid exposure to second hand smoke  Surgeon General's Warning:  Quitting smoking now greatly reduces serious risk to your health. Obesity, smoking, and sedentary lifestyle greatly increases your risk for illness    A healthy diet, regular physical exercise & weight monitoring are important for maintaining a healthy lifestyle    You may be retaining fluid if you have a history of heart failure or if you experience any of the following symptoms:  Weight gain of 3 pounds or more overnight or 5 pounds in a week, increased swelling in our hands or feet or shortness of breath while lying flat in bed.   Please call your doctor as soon as you notice any of these symptoms; do not wait until your next office visit. Recognize signs and symptoms of STROKE:    F-face looks uneven    A-arms unable to move or move unevenly    S-speech slurred or non-existent    T-time-call 911 as soon as signs and symptoms begin-DO NOT go       Back to bed or wait to see if you get better-TIME IS BRAIN. Warning Signs of HEART ATTACK     Call 911 if you have these symptoms:   Chest discomfort. Most heart attacks involve discomfort in the center of the chest that lasts more than a few minutes, or that goes away and comes back. It can feel like uncomfortable pressure, squeezing, fullness, or pain.  Discomfort in other areas of the upper body. Symptoms can include pain or discomfort in one or both arms, the back, neck, jaw, or stomach.  Shortness of breath with or without chest discomfort.  Other signs may include breaking out in a cold sweat, nausea, or lightheadedness. Don't wait more than five minutes to call 911 - MINUTES MATTER! Fast action can save your life. Calling 911 is almost always the fastest way to get lifesaving treatment. Emergency Medical Services staff can begin treatment when they arrive -- up to an hour sooner than if someone gets to the hospital by car. Patient armband removed and shredded      The discharge information has been reviewed with the patient and caregiver. The patient and caregiver verbalized understanding. Discharge medications reviewed with the patient and caregiver and appropriate educational materials and side effects teaching were provided.   ___________________________________________________________________________________________________________________________________

## 2019-01-03 NOTE — H&P
EGD - History and Physical 
 
Subjective: The patient is a 41 y.o. obese female who had a laparoscopy to remove the silastic band from around her VBG pouch (performed in ) in mid 2018. The removal process was complicated due to the fact her band had eroded into her gastric pouch. A gastrotomy was performed and the band was removed successfully. Despite a successful removal and closure of her gastrotomy the patient still has issues with dysphagia. She understands the need for EGD to evaluate her anastomosis and gastrotomy repair site. Patient Active Problem List  
 Diagnosis Date Noted  Fever 2018  Status post laparoscopy 2018  Cancer ([facility] [address]  Intestinal malabsorption  Hypertension  Anemia  Anxiety  GERD (gastroesophageal reflux disease)  Wears dentures  Syncopal episodes  Smoker  Diarrhea  Status post gastric bypass for obesity 2000 Past Surgical History:  
Procedure Laterality Date  HX CHOLECYSTECTOMY  HX GASTRIC BYPASS    
 open / [facility] Pigrega  HX GASTRIC BYPASS  2018  HX GI  2017 EGD  HX HYSTERECTOMY  HX OTHER SURGICAL Right   
 resection of benign breast mass Social History Tobacco Use  Smoking status: Former Smoker Types: Cigarettes Last attempt to quit: 2018 Years since quittin.3  Smokeless tobacco: Never Used Substance Use Topics  Alcohol use: No  
  Alcohol/week: 0.6 oz Types: 1 Cans of beer per week Frequency: Never Family History Problem Relation Age of Onset  Diabetes Mother Current Outpatient Medications Medication Sig Dispense Refill  multivitamin (ONE A DAY) tablet Take 1 Tab by mouth daily.     
 ergocalciferol (ERGOCALCIFEROL) 50,000 unit capsule Take 1 cap by mouth twice weekly for 1 year 52 Cap 1  
 Omeprazole delayed release (PRILOSEC D/R) 20 mg tablet Take 1 Tab by mouth daily. (Patient taking differently: Take 20 mg by mouth two (2) times a day.) 30 Tab 2 No Known Allergies Review of Systems:  
  
 
General - No history or complaints of unexpected fever, chills, or weight loss Head/Neck - No history or complaints of headache, diplopia, dysphagia, hearing loss Cardiac - No history or complaints of chest pain, palpitations, murmur, or shortness of breath Pulmonary - No history or complaints of shortness of breath, productive cough, hemoptysis Gastrointestinal - No history or complaints of reflux,  abdominal pain, obstipation/constipation, blood per rectum Genitourinary - No history or complaints of hematuria/dysuria, stress urinary incontinence symptoms, or renal lithiasis Musculoskeletal - No history or complaints of joint pain or muscular weakness Hematologic - No history or complaints of bleeding disorders, blood transfusions, sickle cell anemia Neurologic - No history or complaints of  migraine headaches, seizure activity, syncopal episodes, TIA or stroke Integumentary - No history or complaints of rashes, abnormal nevi, skin cancer Gynecological - unremarkable Objective: There were no vitals taken for this visit. Physical Examination: General appearance - alert, well appearing, and in no distress and oriented to person, place, and time Mental status - alert, oriented to person, place, and time, normal mood, behavior, speech, dress, motor activity, and thought processes Eyes - pupils equal and reactive, extraocular eye movements intact, sclera anicteric, left eye normal, right eye normal 
Ears - right ear normal, left ear normal 
Nose - normal and patent, no erythema, discharge or polyps Mouth - mucous membranes moist, pharynx normal without lesions Neck - supple, no significant adenopathy Lymphatics - no palpable lymphadenopathy, no hepatosplenomegaly Chest - clear to auscultation, no wheezes, rales or rhonchi, symmetric air entry Heart - normal rate, regular rhythm, normal S1, S2, no murmurs, rubs, clicks or gallops Abdomen - soft, nontender, nondistended, no masses or organomegaly Back exam - full range of motion, no tenderness, palpable spasm or pain on motion Neurological - alert, oriented, normal speech, no focal findings or movement disorder noted Musculoskeletal - no joint tenderness, deformity or swelling Extremities - peripheral pulses normal, no pedal edema, no clubbing or cyanosis Skin - normal coloration and turgor, no rashes, no suspicious skin lesions noted Labs / Preoperative Evaluations:  
 
No results found for this or any previous visit (from the past 1008 hour(s)). Assessment: We will need to assess her repair and for any stricture formation at outlet of her anastomosis 3.5 months removal of eroded silastic band from around VBG pouch. Plan: EGD Plan for EGD. She understands the risks, benefits and alternatives of the EGD. She understands the risk include but are not limited to; death, DVT/PE, damage to surrounding structures, perforation of the GI tract, equipment malfunction, bleeding, and infection. She understands all of the above and wishes to proceed with EGD. Signed By: ARNIE Horton   
 January 3, 2019

## 2019-01-03 NOTE — PROCEDURES
Esophagogastroduodenoscopy Procedure Note    Indications: The patient is a 41 y.o. obese female who had a laparoscopy to remove the silastic band from around her VBG pouch (performed in 2000) in mid August of 2018. The removal process was complicated due to the fact her band had eroded into her gastric pouch. A gastrotomy was performed and the band was removed successfully. Despite a successful removal and closure of her gastrotomy the patient still has issues with dysphagia. She understands the need for EGD to evaluate her anastomosis and gastrotomy repair site. Anesthesia/Sedation: MAC anesthesia    Pre-Procedure Exam:  Airway: clear   Heart: normal S1and S2    Lungs: clear bilateral  Abdomen: soft, nontender, bowel sounds present and normal in all quadrants   Mental Status: awake, alert, and oriented to person, place, and time      Procedure in Detail:  Informed consent was obtained for the procedure, including conscious sedation. Risks of pancreatitis, infection, perforation, hemorrhage, adverse drug reaction, and aspiration were discussed. The patient was placed in the left lateral decubitus position. Based on the pre-procedure assessment, including review of the patient's medical history, medications, allergies, and review of systems, he had been deemed to be an appropriate candidate for moderate sedation; he was therefore sedated with the medications listed above. He was monitored continuously with electrocardiogram tracing, pulse oximetry, blood pressure monitoring, and direct observation. The [id number] gastroscope was inserted into the mouth and advanced under direct vision to proximal tory limb. A careful inspection was made as the gastroscope was withdrawn, including a retroflexed view of the proximal stomach; findings and interventions are described below. Appropriate photodocumentation was obtained.     Findings:   Oropharynx - normal mucosa without abnormalities  Esophagus - normal mucosa without webs, rings, or strictures, no esophagitis or ulcers  Gastric pouch - satisfactory evidence of prior closure of gastrotomy, normal size and configuration with normal mucosa without evidence of ulcers  Anastomosis - normal diameter for timeframe without ulcers or other anatomical abnormalities  Scar limb - normal mucosa without ulcers or obstruction    (normal anatomy - no anatomical reason for her symptoms. C. Diff was ordered today as an outpatient test)    Therapies:    biopsy of stomach (gastric pouch)    Specimens: Specimens were collected and sent to pathology. Complications:   None; patient tolerated the procedure well. EBL:  None           Attending Attestation:  I performed the procedure. Recommendations:  - Await pathology. Signed by:  Larry Chang MD

## 2019-01-03 NOTE — ANESTHESIA POSTPROCEDURE EVALUATION
Post-Anesthesia Evaluation and Assessment Cardiovascular Function/Vital Signs Visit Vitals BP (!) 157/93 (BP 1 Location: Right arm, BP Patient Position: At rest;Sitting) Pulse 79 Temp 36.4 °C (97.6 °F) Resp 16 Ht 5' 5\" (1.651 m) Wt 92.7 kg (204 lb 5 oz) SpO2 100% BMI 34.00 kg/m² Patient is status post Procedure(s): EGD WITH BIOPSY WITH MAC ANESTHESIA. Nausea/Vomiting: Controlled. Postoperative hydration reviewed and adequate. Pain: 
Pain Scale 1: Numeric (0 - 10) (01/03/19 1436) Pain Intensity 1: 0 (01/03/19 1436) Managed. Neurological Status:  
Neuro (WDL): Within Defined Limits (01/03/19 1436) At baseline. Mental Status and Level of Consciousness: Baseline and appropriate for discharge. Pulmonary Status:  
O2 Device: Nasal cannula (01/03/19 1425) Adequate oxygenation and airway patent. Complications related to anesthesia: None Post-anesthesia assessment completed. No concerns. Patient has met all discharge requirements. Signed By: Urvashi Neely MD  
 January 3, 2019

## 2019-01-03 NOTE — ANESTHESIA PREPROCEDURE EVALUATION
Anesthetic History No history of anesthetic complications Review of Systems / Medical History Patient summary reviewed, nursing notes reviewed and pertinent labs reviewed Pulmonary Within defined limits Neuro/Psych Within defined limits Cardiovascular Pertinent negatives: No hypertension Exercise tolerance: >4 METS 
  
GI/Hepatic/Renal 
  
GERD: well controlled Endo/Other Pertinent negatives: No morbid obesity Other Findings Physical Exam 
 
Airway Mallampati: II 
TM Distance: 4 - 6 cm Neck ROM: normal range of motion Mouth opening: Normal 
 
 Cardiovascular Dental 
 
Dentition: Full upper dentures Pulmonary Abdominal 
 
 
 
 Other Findings Anesthetic Plan ASA: 2 Anesthesia type: MAC Anesthetic plan and risks discussed with: Patient

## 2019-01-03 NOTE — PERIOP NOTES
Discharge instructions completed - opportunity for questions - AVS med list reviewed - pt tolerating ice chips and water without swallowing difficulties

## 2019-01-06 DIAGNOSIS — K90.9 INTESTINAL MALABSORPTION, UNSPECIFIED TYPE: ICD-10-CM

## 2019-01-09 ENCOUNTER — HOSPITAL ENCOUNTER (OUTPATIENT)
Dept: LAB | Age: 44
Discharge: HOME | End: 2019-01-09

## 2019-01-09 LAB — XX-LABCORP SPECIMEN COL,LCBCF: NORMAL

## 2019-01-09 PROCEDURE — 99001 SPECIMEN HANDLING PT-LAB: CPT

## 2019-01-11 LAB — C DIFF TOX A+B STL QL IA: NEGATIVE

## 2019-03-06 LAB
ATRIAL RATE: 79 BPM
CALCULATED P AXIS, ECG09: 37 DEGREES
CALCULATED R AXIS, ECG10: 17 DEGREES
CALCULATED T AXIS, ECG11: 15 DEGREES
DIAGNOSIS, 93000: NORMAL
P-R INTERVAL, ECG05: 160 MS
Q-T INTERVAL, ECG07: 396 MS
QRS DURATION, ECG06: 76 MS
QTC CALCULATION (BEZET), ECG08: 454 MS
VENTRICULAR RATE, ECG03: 79 BPM

## 2019-03-23 ENCOUNTER — HOSPITAL ENCOUNTER (EMERGENCY)
Age: 44
Discharge: HOME | End: 2019-03-23
Attending: EMERGENCY MEDICINE | Admitting: EMERGENCY MEDICINE
Payer: MEDICAID

## 2019-03-23 ENCOUNTER — APPOINTMENT (OUTPATIENT)
Dept: GENERAL RADIOLOGY | Age: 44
End: 2019-03-23
Attending: PHYSICIAN ASSISTANT
Payer: MEDICAID

## 2019-03-23 ENCOUNTER — HOSPITAL ENCOUNTER (EMERGENCY)
Dept: CT IMAGING | Age: 44
Discharge: HOME | End: 2019-03-23
Attending: PHYSICIAN ASSISTANT
Payer: MEDICAID

## 2019-03-23 VITALS
RESPIRATION RATE: 13 BRPM | TEMPERATURE: 98.3 F | HEART RATE: 69 BPM | DIASTOLIC BLOOD PRESSURE: 102 MMHG | WEIGHT: 200 LBS | HEIGHT: 65 IN | OXYGEN SATURATION: 100 % | SYSTOLIC BLOOD PRESSURE: 176 MMHG | BODY MASS INDEX: 33.32 KG/M2

## 2019-03-23 DIAGNOSIS — K52.9 CHRONIC DIARRHEA: ICD-10-CM

## 2019-03-23 DIAGNOSIS — K85.90 ACUTE PANCREATITIS, UNSPECIFIED COMPLICATION STATUS, UNSPECIFIED PANCREATITIS TYPE: Primary | ICD-10-CM

## 2019-03-23 DIAGNOSIS — Z98.84 HISTORY OF GASTRIC BYPASS: ICD-10-CM

## 2019-03-23 LAB
ALBUMIN SERPL-MCNC: 3.1 G/DL (ref 3.4–5)
ALBUMIN/GLOB SERPL: 0.8 {RATIO} (ref 0.8–1.7)
ALP SERPL-CCNC: 126 U/L (ref 45–117)
ALT SERPL-CCNC: 51 U/L (ref 13–56)
ANION GAP SERPL CALC-SCNC: 8 MMOL/L (ref 3–18)
APPEARANCE UR: NORMAL
AST SERPL-CCNC: 128 U/L (ref 15–37)
BASOPHILS # BLD: 0 K/UL (ref 0–0.1)
BASOPHILS NFR BLD: 0 % (ref 0–2)
BILIRUB SERPL-MCNC: 0.4 MG/DL (ref 0.2–1)
BILIRUB UR QL: NEGATIVE
BUN SERPL-MCNC: 9 MG/DL (ref 7–18)
BUN/CREAT SERPL: 10 (ref 12–20)
CALCIUM SERPL-MCNC: 8.2 MG/DL (ref 8.5–10.1)
CHLORIDE SERPL-SCNC: 104 MMOL/L (ref 100–108)
CK MB CFR SERPL CALC: NORMAL % (ref 0–4)
CK MB SERPL-MCNC: <1 NG/ML (ref 5–25)
CK SERPL-CCNC: 93 U/L (ref 26–192)
CO2 SERPL-SCNC: 27 MMOL/L (ref 21–32)
COLOR UR: YELLOW
CREAT SERPL-MCNC: 0.94 MG/DL (ref 0.6–1.3)
DIFFERENTIAL METHOD BLD: ABNORMAL
EOSINOPHIL # BLD: 0 K/UL (ref 0–0.4)
EOSINOPHIL NFR BLD: 0 % (ref 0–5)
ERYTHROCYTE [DISTWIDTH] IN BLOOD BY AUTOMATED COUNT: 19.4 % (ref 11.6–14.5)
ETHANOL SERPL-MCNC: 17 MG/DL (ref 0–3)
GLOBULIN SER CALC-MCNC: 3.9 G/DL (ref 2–4)
GLUCOSE SERPL-MCNC: 119 MG/DL (ref 74–99)
GLUCOSE UR STRIP.AUTO-MCNC: NEGATIVE MG/DL
HCG UR QL: NEGATIVE
HCT VFR BLD AUTO: 33.7 % (ref 35–45)
HGB BLD-MCNC: 11.2 G/DL (ref 12–16)
HGB UR QL STRIP: NEGATIVE
KETONES UR QL STRIP.AUTO: NEGATIVE MG/DL
LEUKOCYTE ESTERASE UR QL STRIP.AUTO: NEGATIVE
LIPASE SERPL-CCNC: 531 U/L (ref 73–393)
LYMPHOCYTES # BLD: 1.2 K/UL (ref 0.9–3.6)
LYMPHOCYTES NFR BLD: 21 % (ref 21–52)
MCH RBC QN AUTO: 36.8 PG (ref 24–34)
MCHC RBC AUTO-ENTMCNC: 33.2 G/DL (ref 31–37)
MCV RBC AUTO: 110.9 FL (ref 74–97)
MONOCYTES # BLD: 0.6 K/UL (ref 0.05–1.2)
MONOCYTES NFR BLD: 9 % (ref 3–10)
NEUTS SEG # BLD: 4.2 K/UL (ref 1.8–8)
NEUTS SEG NFR BLD: 70 % (ref 40–73)
NITRITE UR QL STRIP.AUTO: NEGATIVE
PH UR STRIP: 7 [PH] (ref 5–8)
PLATELET # BLD AUTO: 234 K/UL (ref 135–420)
PMV BLD AUTO: 9.5 FL (ref 9.2–11.8)
POTASSIUM SERPL-SCNC: 3.4 MMOL/L (ref 3.5–5.5)
PROT SERPL-MCNC: 7 G/DL (ref 6.4–8.2)
PROT UR STRIP-MCNC: NEGATIVE MG/DL
RBC # BLD AUTO: 3.04 M/UL (ref 4.2–5.3)
SODIUM SERPL-SCNC: 139 MMOL/L (ref 136–145)
SP GR UR REFRACTOMETRY: 1.02 (ref 1–1.03)
TROPONIN I SERPL-MCNC: <0.02 NG/ML (ref 0–0.04)
UROBILINOGEN UR QL STRIP.AUTO: 0.2 EU/DL (ref 0.2–1)
WBC # BLD AUTO: 6 K/UL (ref 4.6–13.2)

## 2019-03-23 PROCEDURE — 80307 DRUG TEST PRSMV CHEM ANLYZR: CPT

## 2019-03-23 PROCEDURE — 99285 EMERGENCY DEPT VISIT HI MDM: CPT

## 2019-03-23 PROCEDURE — C9113 INJ PANTOPRAZOLE SODIUM, VIA: HCPCS | Performed by: PHYSICIAN ASSISTANT

## 2019-03-23 PROCEDURE — 93005 ELECTROCARDIOGRAM TRACING: CPT

## 2019-03-23 PROCEDURE — 81025 URINE PREGNANCY TEST: CPT

## 2019-03-23 PROCEDURE — 85025 COMPLETE CBC W/AUTO DIFF WBC: CPT

## 2019-03-23 PROCEDURE — 96374 THER/PROPH/DIAG INJ IV PUSH: CPT

## 2019-03-23 PROCEDURE — 74022 RADEX COMPL AQT ABD SERIES: CPT

## 2019-03-23 PROCEDURE — 81003 URINALYSIS AUTO W/O SCOPE: CPT

## 2019-03-23 PROCEDURE — 82550 ASSAY OF CK (CPK): CPT

## 2019-03-23 PROCEDURE — 80053 COMPREHEN METABOLIC PANEL: CPT

## 2019-03-23 PROCEDURE — 96361 HYDRATE IV INFUSION ADD-ON: CPT

## 2019-03-23 PROCEDURE — 83690 ASSAY OF LIPASE: CPT

## 2019-03-23 PROCEDURE — 74011250636 HC RX REV CODE- 250/636: Performed by: PHYSICIAN ASSISTANT

## 2019-03-23 PROCEDURE — 74177 CT ABD & PELVIS W/CONTRAST: CPT

## 2019-03-23 PROCEDURE — 74011636320 HC RX REV CODE- 636/320: Performed by: PHYSICIAN ASSISTANT

## 2019-03-23 PROCEDURE — 96375 TX/PRO/DX INJ NEW DRUG ADDON: CPT

## 2019-03-23 RX ORDER — PANTOPRAZOLE SODIUM 40 MG/10ML
40 INJECTION, POWDER, LYOPHILIZED, FOR SOLUTION INTRAVENOUS
Status: COMPLETED | OUTPATIENT
Start: 2019-03-23 | End: 2019-03-23

## 2019-03-23 RX ORDER — ONDANSETRON 2 MG/ML
4 INJECTION INTRAMUSCULAR; INTRAVENOUS
Status: COMPLETED | OUTPATIENT
Start: 2019-03-23 | End: 2019-03-23

## 2019-03-23 RX ORDER — ONDANSETRON 4 MG/1
4 TABLET, ORALLY DISINTEGRATING ORAL
Qty: 20 TAB | Refills: 0 | Status: SHIPPED | OUTPATIENT
Start: 2019-03-23 | End: 2019-04-26

## 2019-03-23 RX ORDER — HYDROCODONE BITARTRATE AND ACETAMINOPHEN 5; 325 MG/1; MG/1
1 TABLET ORAL
Qty: 10 TAB | Refills: 0 | Status: SHIPPED | OUTPATIENT
Start: 2019-03-23 | End: 2019-03-26

## 2019-03-23 RX ADMIN — SODIUM CHLORIDE 1000 ML: 900 INJECTION, SOLUTION INTRAVENOUS at 19:31

## 2019-03-23 RX ADMIN — IOPAMIDOL 100 ML: 612 INJECTION, SOLUTION INTRAVENOUS at 20:33

## 2019-03-23 RX ADMIN — SODIUM CHLORIDE 1000 ML: 900 INJECTION, SOLUTION INTRAVENOUS at 22:07

## 2019-03-23 RX ADMIN — PANTOPRAZOLE SODIUM 40 MG: 40 INJECTION, POWDER, FOR SOLUTION INTRAVENOUS at 19:31

## 2019-03-23 RX ADMIN — ONDANSETRON 4 MG: 2 INJECTION INTRAMUSCULAR; INTRAVENOUS at 19:31

## 2019-03-23 NOTE — ED PROVIDER NOTES
EMERGENCY DEPARTMENT HISTORY AND PHYSICAL EXAM 
 
Date: 3/23/2019 Patient Name: Chris Cortez History of Presenting Illness Chief Complaint Patient presents with  Abdominal Pain History Provided By: Patient Chris Cortez is a 41 y.o. female with PMHX of gastric bypass who presents to the emergency department C/O epigastric abdominal pain. Associated sxs include nausea vomiting non-bloody diarrhea. She complaining of epigastric sharp shooting pain that radiates from epigastric area to back onset last night almost 24 hours ago to constant pain worsened with lying down in certain positions she endorses nausea and vomiting. Patient is followed by Dr. Lockhart Pert she has had remote gastric bypass with revision approximately 7 months ago. She is followed by Dr. Dior Kam most recently has undergone colonoscopy for chronic diarrhea and abdominal pain patient reports being worked up for Crohn's disease and colitis. Patient states currently on steroids for colitis. Pt denies chest pain shortness of breath fever bloody or black stool, alcohol use, and any other sxs or complaints. PCP: Nicole Lozano NP Current Outpatient Medications Medication Sig Dispense Refill  
 HYDROcodone-acetaminophen (NORCO) 5-325 mg per tablet Take 1 Tab by mouth every four (4) hours as needed for Pain for up to 3 days. Max Daily Amount: 6 Tabs. 10 Tab 0  
 ondansetron (ZOFRAN ODT) 4 mg disintegrating tablet Take 1 Tab by mouth every eight (8) hours as needed for Nausea. 20 Tab 0  
 multivitamin (ONE A DAY) tablet Take 1 Tab by mouth daily.  Omeprazole delayed release (PRILOSEC D/R) 20 mg tablet Take 1 Tab by mouth daily. (Patient taking differently: Take 20 mg by mouth two (2) times a day.) 30 Tab 2 Past History Past Medical History: 
Past Medical History:  
Diagnosis Date  Anemia  Cancer [facility]) 2011  
  history of endometrial cancer  Diarrhea  GERD (gastroesophageal reflux disease)  Hypertension 2017  Intestinal malabsorption  Psychiatric disorder Anxiety and depression  Severe obesity with body mass index (BMI) of 35.0 to 39.9 with comorbidity ([address]  Smoker   
 smokes 5 cigarettes per day  Status post gastric bypass for obesity   
 daryl / librado hermosillo  Syncopal episodes   
 has plans to see a neurologist   
 Wears dentures   
 teeth extraction due to vomiting and vitamin deficiencies Past Surgical History: 
Past Surgical History:  
Procedure Laterality Date  HX CHOLECYSTECTOMY  HX GASTRIC BYPASS    
 open / Tonia John  HX GASTRIC BYPASS  2018  HX GI  2017 EGD  HX HYSTERECTOMY  HX OTHER SURGICAL Right   
 resection of benign breast mass Family History: 
Family History Problem Relation Age of Onset  Diabetes Mother Social History: 
Social History Tobacco Use  Smoking status: Former Smoker Types: Cigarettes Last attempt to quit: 2018 Years since quittin.5  Smokeless tobacco: Never Used Substance Use Topics  Alcohol use: No  
  Alcohol/week: 0.6 oz Types: 1 Cans of beer per week Frequency: Never  Drug use: No  
 
 
Allergies: 
No Known Allergies Review of Systems Review of Systems Constitutional: Negative for fever. Respiratory: Negative for cough and shortness of breath. Cardiovascular: Negative for chest pain. Gastrointestinal: Positive for abdominal pain, diarrhea, nausea and vomiting. Negative for blood in stool. All other systems reviewed and are negative. Physical Exam  
 
Vitals:  
 19 1616 19 1947 19 2200 BP: (!) 152/122 (!) 160/102 (!) 176/102 Pulse: 92 73 69 Resp: 18 13 13 Temp: 98.3 °F (36.8 °C) SpO2: 100% Weight: 90.7 kg (200 lb) Height: 5' 5\" (1.651 m) Physical Exam  
Constitutional: She is oriented to person, place, and time.  She appears well-developed and well-nourished. overweight HENT:  
Head: Normocephalic and atraumatic. Eyes: Pupils are equal, round, and reactive to light. Conjunctivae and EOM are normal. No scleral icterus. Neck: Normal range of motion. Neck supple. Cardiovascular: Normal rate and regular rhythm. Pulmonary/Chest: Effort normal and breath sounds normal. She exhibits no tenderness. Abdominal: Soft. Bowel sounds are normal. She exhibits no distension. There is tenderness. There is guarding. There is no rebound and no CVA tenderness. Musculoskeletal: Normal range of motion. Neurological: She is alert and oriented to person, place, and time. Skin: Skin is warm and dry. Psychiatric: She has a normal mood and affect. Her behavior is normal.  
Nursing note and vitals reviewed. Diagnostic Study Results Labs - Recent Results (from the past 12 hour(s)) CBC WITH AUTOMATED DIFF Collection Time: 03/23/19  6:29 PM  
Result Value Ref Range WBC 6.0 4.6 - 13.2 K/uL  
 RBC 3.04 (L) 4.20 - 5.30 M/uL  
 HGB 11.2 (L) 12.0 - 16.0 g/dL HCT 33.7 (L) 35.0 - 45.0 % .9 (H) 74.0 - 97.0 FL  
 MCH 36.8 (H) 24.0 - 34.0 PG  
 MCHC 33.2 31.0 - 37.0 g/dL  
 RDW 19.4 (H) 11.6 - 14.5 % PLATELET 377 302 - 417 K/uL MPV 9.5 9.2 - 11.8 FL  
 NEUTROPHILS 70 40 - 73 % LYMPHOCYTES 21 21 - 52 % MONOCYTES 9 3 - 10 % EOSINOPHILS 0 0 - 5 % BASOPHILS 0 0 - 2 %  
 ABS. NEUTROPHILS 4.2 1.8 - 8.0 K/UL  
 ABS. LYMPHOCYTES 1.2 0.9 - 3.6 K/UL  
 ABS. MONOCYTES 0.6 0.05 - 1.2 K/UL  
 ABS. EOSINOPHILS 0.0 0.0 - 0.4 K/UL  
 ABS. BASOPHILS 0.0 0.0 - 0.1 K/UL  
 DF AUTOMATED METABOLIC PANEL, COMPREHENSIVE Collection Time: 03/23/19  6:29 PM  
Result Value Ref Range Sodium 139 136 - 145 mmol/L Potassium 3.4 (L) 3.5 - 5.5 mmol/L Chloride 104 100 - 108 mmol/L  
 CO2 27 21 - 32 mmol/L Anion gap 8 3.0 - 18 mmol/L Glucose 119 (H) 74 - 99 mg/dL  BUN 9 7.0 - 18 MG/DL  
 Creatinine 0.94 0.6 - 1.3 MG/DL  
 BUN/Creatinine ratio 10 (L) 12 - 20 GFR est AA >60 >60 ml/min/1.73m2 GFR est non-AA >60 >60 ml/min/1.73m2 Calcium 8.2 (L) 8.5 - 10.1 MG/DL Bilirubin, total 0.4 0.2 - 1.0 MG/DL  
 ALT (SGPT) 51 13 - 56 U/L  
 AST (SGOT) 128 (H) 15 - 37 U/L Alk. phosphatase 126 (H) 45 - 117 U/L Protein, total 7.0 6.4 - 8.2 g/dL Albumin 3.1 (L) 3.4 - 5.0 g/dL Globulin 3.9 2.0 - 4.0 g/dL A-G Ratio 0.8 0.8 - 1.7 CARDIAC PANEL,(CK, CKMB & TROPONIN) Collection Time: 03/23/19  6:29 PM  
Result Value Ref Range CK 93 26 - 192 U/L  
 CK - MB <1.0 <3.6 ng/ml CK-MB Index  0.0 - 4.0 % CALCULATION NOT PERFORMED WHEN RESULT IS BELOW LINEAR LIMIT Troponin-I, QT <0.02 0.0 - 0.045 NG/ML  
LIPASE Collection Time: 03/23/19  6:29 PM  
Result Value Ref Range Lipase 531 (H) 73 - 393 U/L  
ETHYL ALCOHOL Collection Time: 03/23/19  6:29 PM  
Result Value Ref Range ALCOHOL(ETHYL),SERUM 17 (H) 0 - 3 MG/DL URINALYSIS W/ RFLX MICROSCOPIC Collection Time: 03/23/19  7:00 PM  
Result Value Ref Range Color YELLOW Appearance CLOUDY Specific gravity 1.024 1.005 - 1.030    
 pH (UA) 7.0 5.0 - 8.0 Protein NEGATIVE  NEG mg/dL Glucose NEGATIVE  NEG mg/dL Ketone NEGATIVE  NEG mg/dL Bilirubin NEGATIVE  NEG Blood NEGATIVE  NEG Urobilinogen 0.2 0.2 - 1.0 EU/dL Nitrites NEGATIVE  NEG Leukocyte Esterase NEGATIVE  NEG    
HCG URINE, QL. - POC Collection Time: 03/23/19  7:08 PM  
Result Value Ref Range Pregnancy test,urine (POC) NEGATIVE  NEG    
EKG, 12 LEAD, INITIAL Collection Time: 03/23/19  7:28 PM  
Result Value Ref Range Ventricular Rate 73 BPM  
 Atrial Rate 73 BPM  
 P-R Interval 162 ms QRS Duration 84 ms Q-T Interval 404 ms QTC Calculation (Bezet) 445 ms Calculated P Axis 69 degrees Calculated R Axis 30 degrees Calculated T Axis 32 degrees Diagnosis Normal sinus rhythm Normal ECG 
 When compared with ECG of 03-JAN-2019 13:17, No significant change was found Radiologic Studies -  
CT ABD PELV W CONT Final Result IMPRESSION:  
  
There is acute edematous interstitial pancreatitis. Periduodenal inflammation likely secondary to the acute pancreatitis however  
duodenitis not excluded Diffuse colonic wall thickening of the entire colon suggesting underdistention  
versus colitis either infectious or inflammatory. Post gastric bypass changes with a small hiatal hernia. XR ABD ACUTE W 1 V CHEST    (Results Pending) CT Results  (Last 48 hours) 03/23/19 2044  CT ABD PELV W CONT Final result Impression:  IMPRESSION:  
   
There is acute edematous interstitial pancreatitis. Periduodenal inflammation likely secondary to the acute pancreatitis however  
duodenitis not excluded Diffuse colonic wall thickening of the entire colon suggesting underdistention  
versus colitis either infectious or inflammatory. Post gastric bypass changes with a small hiatal hernia. Narrative:  EXAM: CT of the abdomen and pelvis INDICATION: Abdominal pain COMPARISON: September 20, 2018 TECHNIQUE: Axial CT imaging of the abdomen and pelvis was performed with  
intravenous contrast. Multiplanar reformats were generated. One or more dose  
reduction techniques were used on this CT: automated exposure control,  
adjustment of the mAs and/or kVp according to patient size, and iterative  
reconstruction techniques. The specific techniques used on this CT exam have  
been documented in the patient's electronic medical record. Digital Imaging and  
Communications in Medicine (DICOM) format image data are available to  
nonaffiliated external healthcare facilities or entities on a secure, media  
free, reciprocally searchable basis with patient authorization for at least a  
12-month period after this study. _______________ FINDINGS:  
   
LOWER CHEST: Unremarkable. There is a small hiatal hernia. LIVER, BILIARY: Liver is normal. No biliary dilation. Cholecystectomy PANCREAS: There is peripancreatic inflammation around the head and uncinate  
process of the pancreas suggesting acute interstitial edematous pancreatitis. Pancreas demonstrates normal enhancement and there is no pancreatic ductal  
dilatation. SPLEEN: Normal.  
   
ADRENALS: Normal.  
   
KIDNEYS: Normal.  
   
VASCULATURE: Mild calcific atherosclerosis present LYMPH NODES: No enlarged lymph nodes. GASTROINTESTINAL TRACT: There are postsurgical changes from gastric bypass  
surgery. There is extensive inflammation around the duodenum likely secondary to  
the pancreatitis however duodenitis not excluded. No bowel obstruction seen. There is colonic wall thickening of rectosigmoid colon, descending colon,  
transverse and ascending colon suggesting underdistention versus colitis either  
infectious or inflammatory. Appendix is normal.  
   
PELVIC ORGANS: Urinary bladder is under distended therefore difficult to  
evaluate. Hysterectomy. No free fluid. BONES: No acute or aggressive osseous abnormalities identified. OTHER: None.  
   
_______________ CXR Results  (Last 48 hours) None Medications given in the ED- Medications  
pantoprazole (PROTONIX) injection 40 mg (40 mg IntraVENous Given 3/23/19 1931) ondansetron [facility]) injection 4 mg (4 mg IntraVENous Given 3/23/19 1931)  
sodium chloride 0.9 % bolus infusion 1,000 mL (0 mL IntraVENous IV Completed 3/23/19 2031)  
sodium chloride 0.9 % bolus infusion 1,000 mL (0 mL IntraVENous IV Completed 3/23/19 2307) Medical Decision Making I am the first provider for this patient. I reviewed the vital signs, available nursing notes, past medical history, past surgical history, family history and social history. Vital Signs-Reviewed the patient's vital signs. Pulse Oximetry Analysis - 100% on RA Records Reviewed: Nursing Notes and Old Medical records. Upon review of EMR patient with multiple visits to GI. Patient with complex GI history with history of VD BG gastric bypass almost 20 years ago and had revision by Dr. Ashwini Cain this last year. He is followed by GI for postprandial diarrhea chronic in nature question whether or not she has dumping syndrome versus Crohn's disease. Patient just had colonoscopy 6 days ago currently on steroids for possible colitis. Assessment patient was questioned about pancreatitis and alcohol consumption patient states she has never had pancreatitis nor does she drink alcohol, otherwise she has had dx of pancreatitis in the past 
 
 
Procedures: 
Procedures ED Course:  
6:00 PM Initial assessment performed. The patients presenting problems have been discussed, and they are in agreement with the care plan formulated and outlined with them. I have encouraged them to ask questions as they arise throughout their visit. Discussion: 41 y.o. female history of gastric bypass, chronic diarrhea, hiatal hernia currently on steroids for possible inflammation of the colon status post colonoscopy 6 days ago as part of Crohn's disease work up, ambulatory to ED complaining of epigastric abdominal pain nausea vomiting diarrhea acute onset last night. She is afebrile with stable vital signs, normal WBC, lipase slightly elevated and a CT that shows mild pancreatitis with possible colitis. On initial assessment patient denied alcohol consumption however alcohol values here elevated with further questioning patient admits to drinking alcohol last night which is probably what triggered her pancreatitis. Patient is tolerating p.o. she is stable vital signs think it is appropriate for outpatient follow-up.   Patient also asking for discharge she wants to go home hoping to see her GI doctor and follow-up. Strict return precautions discussed patient in agreement with plan. Diagnosis and Disposition DISCHARGE NOTE: 
Jing Espinoza  results have been reviewed with her. She has been counseled regarding her diagnosis, treatment, and plan. She verbally conveys understanding and agreement of the signs, symptoms, diagnosis, treatment and prognosis and additionally agrees to follow up as discussed. She also agrees with the care-plan and conveys that all of her questions have been answered. I have also provided discharge instructions for her that include: educational information regarding their diagnosis and treatment, and list of reasons why they would want to return to the ED prior to their follow-up appointment, should her condition change. She has been provided with education for proper emergency department utilization. CLINICAL IMPRESSION: 
 
1. Acute pancreatitis, unspecified complication status, unspecified pancreatitis type 2. History of gastric bypass 3. Chronic diarrhea PLAN: 
1. D/C Home 2. Discharge Medication List as of 3/23/2019 10:49 PM  
  
START taking these medications Details HYDROcodone-acetaminophen (NORCO) 5-325 mg per tablet Take 1 Tab by mouth every four (4) hours as needed for Pain for up to 3 days. Max Daily Amount: 6 Tabs., Print, Disp-10 Tab, R-0  
  
ondansetron (ZOFRAN ODT) 4 mg disintegrating tablet Take 1 Tab by mouth every eight (8) hours as needed for Nausea. , Print, Disp-20 Tab, R-0  
  
  
CONTINUE these medications which have NOT CHANGED Details  
multivitamin (ONE A DAY) tablet Take 1 Tab by mouth daily. , Historical Med Omeprazole delayed release (PRILOSEC D/R) 20 mg tablet Take 1 Tab by mouth daily. , Normal, Disp-30 Tab, R-2 3. Follow-up Information Follow up With Specialties Details Why Contact Info  Nicole Lozano NP Nurse Practitioner Schedule an appointment as soon as possible for a visit  [address] [facility] [address] [facility] [place] 
[phone] 
  
 your GI Doctor DR Anabell Campbell  Schedule an appointment as soon as possible for a visit THE [facility] EMERGENCY DEPT Emergency Medicine  As needed, If symptoms worsen [address] [place] [id number] 
[phone] Please note that this dictation was completed with [employer], the computer voice recognition software. Quite often unanticipated grammatical, syntax, homophones, and other interpretive errors are inadvertently transcribed by the computer software. Please disregard these errors. Please excuse any errors that have escaped final proofreading.

## 2019-03-23 NOTE — ED TRIAGE NOTES
Patient ambulated to ED with abdominal pain since this morning with nausea and vomiting, a/ox3, patient states pain starts mid abdomin and pain shoots to her back

## 2019-03-23 NOTE — ED NOTES
Bedside report given to [facility], pt resting on stretcher, side rails up, call bell within reach, no needs expressed at this time

## 2019-03-24 NOTE — DISCHARGE INSTRUCTIONS
Patient Education        Pancreatitis: Care Instructions  Your Care Instructions    The pancreas is an organ behind the stomach. It makes hormones and enzymes to help your body digest food. But if these enzymes attack the pancreas, it can get inflamed. This is called pancreatitis. Most cases are caused by gallstones or by heavy alcohol use. If you take care of yourself at home, it will help you get better. It will also help you avoid more problems with your pancreas. Follow-up care is a key part of your treatment and safety. Be sure to make and go to all appointments, and call your doctor if you are having problems. It's also a good idea to know your test results and keep a list of the medicines you take. How can you care for yourself at home? · Drink clear liquids and eat bland foods until you feel better. [place] foods include rice, dry toast, and crackers. They also include bananas and applesauce. · Eat a low-fat diet until your doctor says your pancreas is healed. · Do not drink alcohol. Tell your doctor if you need help to quit. Counseling, support groups, and sometimes medicines can help you stay sober. · Be safe with medicines. Read and follow all instructions on the label. ? If the doctor gave you a prescription medicine for pain, take it as prescribed. ? If you are not taking a prescription pain medicine, ask your doctor if you can take an over-the-counter medicine. · If your doctor prescribed antibiotics, take them as directed. Do not stop taking them just because you feel better. You need to take the full course of antibiotics. · Get extra rest until you feel better. To prevent future problems with your pancreas  · Do not drink alcohol. · Tell your doctors and pharmacist that you've had pancreatitis. They can help you avoid medicines that may cause this problem again. When should you call for help? Call 911 anytime you think you may need emergency care.  For example, call if:    · You vomit blood or what looks like coffee grounds.     · Your stools are maroon or very bloody.    Call your doctor now or seek immediate medical care if:    · You have new or worse belly pain.     · Your stools are black and look like tar, or they have streaks of blood.     · You are vomiting.    Watch closely for changes in your health, and be sure to contact your doctor if:    · You do not get better as expected. Where can you learn more? Go to http://sandra-abel.info/. Enter U247 in the search box to learn more about \"Pancreatitis: Care Instructions. \"  Current as of: March 27, 2018  Content Version: 11.9  © 6744-5801 [employer]. Care instructions adapted under license by [employer] (which disclaims liability or warranty for this information). If you have questions about a medical condition or this instruction, always ask your healthcare professional. [address] any warranty or liability for your use of this information.

## 2019-04-26 ENCOUNTER — HOSPITAL ENCOUNTER (EMERGENCY)
Age: 44
Discharge: OTHER INSTITUTION | End: 2019-04-26
Attending: EMERGENCY MEDICINE
Payer: MEDICAID

## 2019-04-26 VITALS
TEMPERATURE: 98.7 F | HEIGHT: 65 IN | RESPIRATION RATE: 15 BRPM | OXYGEN SATURATION: 100 % | DIASTOLIC BLOOD PRESSURE: 76 MMHG | WEIGHT: 195 LBS | HEART RATE: 108 BPM | BODY MASS INDEX: 32.49 KG/M2 | SYSTOLIC BLOOD PRESSURE: 130 MMHG

## 2019-04-26 DIAGNOSIS — N39.0 URINARY TRACT INFECTION WITHOUT HEMATURIA, SITE UNSPECIFIED: ICD-10-CM

## 2019-04-26 DIAGNOSIS — K85.90 ACUTE PANCREATITIS, UNSPECIFIED COMPLICATION STATUS, UNSPECIFIED PANCREATITIS TYPE: Primary | ICD-10-CM

## 2019-04-26 LAB
ALBUMIN SERPL-MCNC: 3.4 G/DL (ref 3.4–5)
ALBUMIN/GLOB SERPL: 0.8 {RATIO} (ref 0.8–1.7)
ALP SERPL-CCNC: 115 U/L (ref 45–117)
ALT SERPL-CCNC: 51 U/L (ref 13–56)
ANION GAP SERPL CALC-SCNC: 11 MMOL/L (ref 3–18)
APPEARANCE UR: ABNORMAL
AST SERPL-CCNC: 43 U/L (ref 15–37)
BACTERIA URNS QL MICRO: ABNORMAL /HPF
BASOPHILS # BLD: 0 K/UL (ref 0–0.1)
BASOPHILS NFR BLD: 0 % (ref 0–2)
BILIRUB SERPL-MCNC: 0.8 MG/DL (ref 0.2–1)
BILIRUB UR QL: ABNORMAL
BUN SERPL-MCNC: 12 MG/DL (ref 7–18)
BUN/CREAT SERPL: 11 (ref 12–20)
CALCIUM SERPL-MCNC: 8.9 MG/DL (ref 8.5–10.1)
CHLORIDE SERPL-SCNC: 100 MMOL/L (ref 100–108)
CO2 SERPL-SCNC: 22 MMOL/L (ref 21–32)
COLOR UR: ABNORMAL
CREAT SERPL-MCNC: 1.14 MG/DL (ref 0.6–1.3)
DIFFERENTIAL METHOD BLD: ABNORMAL
EOSINOPHIL # BLD: 0 K/UL (ref 0–0.4)
EOSINOPHIL NFR BLD: 0 % (ref 0–5)
EPITH CASTS URNS QL MICRO: ABNORMAL /LPF (ref 0–5)
ERYTHROCYTE [DISTWIDTH] IN BLOOD BY AUTOMATED COUNT: 20.5 % (ref 11.6–14.5)
GLOBULIN SER CALC-MCNC: 4.4 G/DL (ref 2–4)
GLUCOSE SERPL-MCNC: 114 MG/DL (ref 74–99)
GLUCOSE UR STRIP.AUTO-MCNC: NEGATIVE MG/DL
GRAN CASTS URNS QL MICRO: ABNORMAL /LPF
HCG SERPL QL: NEGATIVE
HCT VFR BLD AUTO: 38 % (ref 35–45)
HEMOCCULT STL QL: NEGATIVE
HGB BLD-MCNC: 12.6 G/DL (ref 12–16)
HGB UR QL STRIP: ABNORMAL
INR PPP: 1 (ref 0.8–1.2)
KETONES UR QL STRIP.AUTO: ABNORMAL MG/DL
LACTATE BLD-SCNC: 0.6 MMOL/L (ref 0.4–2)
LEUKOCYTE ESTERASE UR QL STRIP.AUTO: ABNORMAL
LIPASE SERPL-CCNC: 2532 U/L (ref 73–393)
LYMPHOCYTES # BLD: 1 K/UL (ref 0.9–3.6)
LYMPHOCYTES NFR BLD: 9 % (ref 21–52)
MCH RBC QN AUTO: 33.4 PG (ref 24–34)
MCHC RBC AUTO-ENTMCNC: 33.2 G/DL (ref 31–37)
MCV RBC AUTO: 100.8 FL (ref 74–97)
MONOCYTES # BLD: 0.8 K/UL (ref 0.05–1.2)
MONOCYTES NFR BLD: 8 % (ref 3–10)
NEUTS SEG # BLD: 8.8 K/UL (ref 1.8–8)
NEUTS SEG NFR BLD: 83 % (ref 40–73)
NITRITE UR QL STRIP.AUTO: POSITIVE
PH UR STRIP: 5.5 [PH] (ref 5–8)
PLATELET # BLD AUTO: 228 K/UL (ref 135–420)
PMV BLD AUTO: 9.6 FL (ref 9.2–11.8)
POTASSIUM SERPL-SCNC: 3.5 MMOL/L (ref 3.5–5.5)
PROT SERPL-MCNC: 7.8 G/DL (ref 6.4–8.2)
PROT UR STRIP-MCNC: 100 MG/DL
PROTHROMBIN TIME: 12.4 SEC (ref 11.5–15.2)
RBC # BLD AUTO: 3.77 M/UL (ref 4.2–5.3)
RBC #/AREA URNS HPF: ABNORMAL /HPF (ref 0–5)
SODIUM SERPL-SCNC: 133 MMOL/L (ref 136–145)
SP GR UR REFRACTOMETRY: 1.03 (ref 1–1.03)
UROBILINOGEN UR QL STRIP.AUTO: 1 EU/DL (ref 0.2–1)
WBC # BLD AUTO: 10.6 K/UL (ref 4.6–13.2)
WBC URNS QL MICRO: ABNORMAL /HPF (ref 0–5)

## 2019-04-26 PROCEDURE — 80053 COMPREHEN METABOLIC PANEL: CPT

## 2019-04-26 PROCEDURE — 85025 COMPLETE CBC W/AUTO DIFF WBC: CPT

## 2019-04-26 PROCEDURE — 96361 HYDRATE IV INFUSION ADD-ON: CPT

## 2019-04-26 PROCEDURE — 85610 PROTHROMBIN TIME: CPT

## 2019-04-26 PROCEDURE — 96365 THER/PROPH/DIAG IV INF INIT: CPT

## 2019-04-26 PROCEDURE — C9113 INJ PANTOPRAZOLE SODIUM, VIA: HCPCS | Performed by: EMERGENCY MEDICINE

## 2019-04-26 PROCEDURE — 83690 ASSAY OF LIPASE: CPT

## 2019-04-26 PROCEDURE — 74011000258 HC RX REV CODE- 258: Performed by: EMERGENCY MEDICINE

## 2019-04-26 PROCEDURE — 74011250636 HC RX REV CODE- 250/636: Performed by: EMERGENCY MEDICINE

## 2019-04-26 PROCEDURE — 81001 URINALYSIS AUTO W/SCOPE: CPT

## 2019-04-26 PROCEDURE — 84703 CHORIONIC GONADOTROPIN ASSAY: CPT

## 2019-04-26 PROCEDURE — 87040 BLOOD CULTURE FOR BACTERIA: CPT

## 2019-04-26 PROCEDURE — 96375 TX/PRO/DX INJ NEW DRUG ADDON: CPT

## 2019-04-26 PROCEDURE — 82272 OCCULT BLD FECES 1-3 TESTS: CPT

## 2019-04-26 PROCEDURE — 93005 ELECTROCARDIOGRAM TRACING: CPT

## 2019-04-26 PROCEDURE — 83605 ASSAY OF LACTIC ACID: CPT

## 2019-04-26 PROCEDURE — 99285 EMERGENCY DEPT VISIT HI MDM: CPT

## 2019-04-26 RX ORDER — PANTOPRAZOLE SODIUM 40 MG/10ML
40 INJECTION, POWDER, LYOPHILIZED, FOR SOLUTION INTRAVENOUS
Status: COMPLETED | OUTPATIENT
Start: 2019-04-26 | End: 2019-04-26

## 2019-04-26 RX ORDER — ONDANSETRON 2 MG/ML
4 INJECTION INTRAMUSCULAR; INTRAVENOUS ONCE
Status: COMPLETED | OUTPATIENT
Start: 2019-04-26 | End: 2019-04-26

## 2019-04-26 RX ORDER — TRAZODONE HYDROCHLORIDE 50 MG/1
TABLET ORAL
Refills: 0 | COMMUNITY
Start: 2019-03-28 | End: 2019-12-03

## 2019-04-26 RX ORDER — LOSARTAN POTASSIUM 25 MG/1
25 TABLET ORAL
COMMUNITY
Start: 2019-03-28 | End: 2019-12-03

## 2019-04-26 RX ORDER — ESCITALOPRAM OXALATE 10 MG/1
10 TABLET ORAL
COMMUNITY
Start: 2019-03-28 | End: 2019-09-24

## 2019-04-26 RX ORDER — PIPERACILLIN SODIUM, TAZOBACTAM SODIUM 3; .375 G/15ML; G/15ML
3.38 INJECTION, POWDER, LYOPHILIZED, FOR SOLUTION INTRAVENOUS
Status: DISCONTINUED | OUTPATIENT
Start: 2019-04-26 | End: 2019-04-26 | Stop reason: CLARIF

## 2019-04-26 RX ORDER — MORPHINE SULFATE 4 MG/ML
4 INJECTION INTRAVENOUS ONCE
Status: COMPLETED | OUTPATIENT
Start: 2019-04-26 | End: 2019-04-26

## 2019-04-26 RX ADMIN — MORPHINE SULFATE 4 MG: 4 INJECTION INTRAVENOUS at 10:50

## 2019-04-26 RX ADMIN — PIPERACILLIN SODIUM,TAZOBACTAM SODIUM 3.38 G: 3; .375 INJECTION, POWDER, FOR SOLUTION INTRAVENOUS at 11:33

## 2019-04-26 RX ADMIN — SODIUM CHLORIDE 1000 ML: 900 INJECTION, SOLUTION INTRAVENOUS at 10:47

## 2019-04-26 RX ADMIN — ONDANSETRON 4 MG: 2 INJECTION INTRAMUSCULAR; INTRAVENOUS at 10:48

## 2019-04-26 RX ADMIN — SODIUM CHLORIDE 1000 ML: 900 INJECTION, SOLUTION INTRAVENOUS at 08:42

## 2019-04-26 RX ADMIN — PANTOPRAZOLE SODIUM 40 MG: 40 INJECTION, POWDER, FOR SOLUTION INTRAVENOUS at 08:42

## 2019-04-26 NOTE — ED NOTES
Pt arrives alert and oriented c/o diffuse abdominal pain x 3 days. Pt had gastric bypass surgery, states she has been taking motrin \"that's all they told me I could take\" x 3 days. Pt c/o poor appetite and vomiting.

## 2019-04-26 NOTE — ED NOTES
Report received from DEAN Solares pt laying in bed voices complaints of abdominal pain, discussed care with Dr. Dietrich [place] orders received. Jerad Cho

## 2019-04-26 NOTE — ED NOTES
EMERGENCY DEPARTMENT HISTORY AND PHYSICAL EXAM    Date: 4/26/2019  Patient Name: Bebe Wright    History of Presenting Illness     Chief Complaint   Patient presents with    Vomiting    Abdominal Pain         History Provided By: Patient    Chief Complaint: Abdominal pain vomiting and diarrhea  Duration: Days  Timing:  Acute  Location: Abdomen  Quality: Sharp  Severity: Severe  Modifying Factors: eating  Associated Symptoms: denies any other associated signs or symptoms    Additional History (Context):   8:20 AM  Bebe Wright is a 45 y.o. female with PMHX of gastric bypass who presents to the emergency department C/O abdominal pain, vomiting diarrhea. Pt denies chest pain, SOB, fevers and any other sxs or complaints. Patient has a history of gastric bypass x 2. She initially had gastric bypass in 2002 by Dr. Miladys Chan. This was redone by Dr. Ivonne Kauffman in October of last year. Since that she was well until 3 days ago when she started having epigastric pain with vomiting and diarrhea. She has been taking NSAIDs(motrin) for the last 2 days. Yesterday she had a black stool. Her pain got worse she came in for evaluation today. She denies any chest pain or shortness of breath. PCP: Chevy RAMOS, NP    Current Facility-Administered Medications   Medication Dose Route Frequency Provider Last Rate Last Dose    iohexol (OMNIPAQUE) ORAL mixture   Oral NOW Trice Lewis MD   Stopped at 04/26/19 0820    sodium chloride 0.9 % bolus infusion 1,000 mL  1,000 mL IntraVENous ONCE Trice Lewis MD 1,000 mL/hr at 04/26/19 1047 1,000 mL at 04/26/19 1047    piperacillin-tazobactam (ZOSYN) injection 3.375 g  3.375 g IntraVENous NOW Trice Lewis MD         Current Outpatient Medications   Medication Sig Dispense Refill    escitalopram oxalate (LEXAPRO) 10 mg tablet Take 10 mg by mouth.  losartan (COZAAR) 25 mg tablet Take 25 mg by mouth.       traZODone (DESYREL) 50 mg tablet   0    multivitamin (ONE A DAY) tablet Take 1 Tab by mouth daily.  Omeprazole delayed release (PRILOSEC D/R) 20 mg tablet Take 1 Tab by mouth daily. (Patient taking differently: Take 20 mg by mouth two (2) times a day.) 30 Tab 2       Past History     Past Medical History:  Past Medical History:   Diagnosis Date    Anemia     Cancer ([facility] 2011     history of endometrial cancer    Diarrhea     GERD (gastroesophageal reflux disease)     Hypertension 2017    Intestinal malabsorption     Psychiatric disorder     Anxiety and depression    Severe obesity with body mass index (BMI) of 35.0 to 39.9 with comorbidity ([facility]     Smoker     smokes 5 cigarettes per day    Status post gastric bypass for obesity     daryl / librado hermosillo    Syncopal episodes     has plans to see a neurologist     Wears dentures     teeth extraction due to vomiting and vitamin deficiencies       Past Surgical History:  Past Surgical History:   Procedure Laterality Date    HX CHOLECYSTECTOMY      HX GASTRIC BYPASS      daryl / librado hermosillo    HX GASTRIC BYPASS  2018    HX GI  2017    EGD    HX HYSTERECTOMY      HX OTHER SURGICAL Right     resection of benign breast mass       Family History:  Family History   Problem Relation Age of Onset    Diabetes Mother        Social History:  Social History     Tobacco Use    Smoking status: Former Smoker     Types: Cigarettes     Last attempt to quit: 2018     Years since quittin.6    Smokeless tobacco: Never Used   Substance Use Topics    Alcohol use: No     Alcohol/week: 0.6 oz     Types: 1 Cans of beer per week     Frequency: Never    Drug use: No       Allergies:  No Known Allergies      Review of Systems   Review of Systems   Constitutional: Positive for appetite change. Negative for fever. Respiratory: Negative for cough and shortness of breath. Cardiovascular: Negative for chest pain and palpitations.    Gastrointestinal: Positive for abdominal pain, blood in stool, diarrhea, nausea and vomiting. Genitourinary: Negative for dysuria and flank pain. Musculoskeletal: Negative for back pain and neck pain. Skin: Negative for color change and rash. All other systems reviewed and are negative. Physical Exam     Vitals:    04/26/19 0848 04/26/19 0915 04/26/19 1003 04/26/19 1030   BP: (!) 131/103 (!) 145/99  (!) 112/93   Pulse: (!) 106 (!) 106 (!) 146 (!) 108   Resp: 14 21 (!) 32 24   Temp: 98.7 °F (37.1 °C)      SpO2: 100% 100% 96% 100%   Weight:       Height:         Physical Exam   Constitutional: She is oriented to person, place, and time. She appears well-developed and well-nourished. HENT:   Head: Normocephalic and atraumatic. Right Ear: External ear normal.   Left Ear: External ear normal.   Nose: Nose normal.   Mouth/Throat: Oropharynx is clear and moist.   Eyes: Pupils are equal, round, and reactive to light. Conjunctivae and EOM are normal.   Neck: Normal range of motion. Neck supple. No JVD present. No tracheal deviation present. Cardiovascular: Normal rate, regular rhythm, normal heart sounds and intact distal pulses. Exam reveals no gallop and no friction rub. No murmur heard. Pulmonary/Chest: Effort normal and breath sounds normal. No respiratory distress. She has no wheezes. She has no rales. Abdominal: She exhibits no distension and no mass. There is tenderness. There is no rebound and no guarding. With diffuse abdominal tenderness greatest in the epigastrium with guarding, decreased bowel sounds   Genitourinary:   Genitourinary Comments: Rectal exam chaperone Kenyatta RN  Stool brown, no gross blood. Musculoskeletal: Normal range of motion. She exhibits no edema or tenderness. Neurological: She is alert and oriented to person, place, and time. She has normal reflexes. No cranial nerve deficit. Skin: Skin is warm and dry. No rash noted. Psychiatric: She has a normal mood and affect. Her behavior is normal.   Nursing note and vitals reviewed.         Diagnostic Study Results     Labs -     Recent Results (from the past 24 hour(s))   OCCULT BLOOD, STOOL    Collection Time: 04/26/19  8:00 AM   Result Value Ref Range    Occult blood, stool NEGATIVE  NEG     EKG, 12 LEAD, INITIAL    Collection Time: 04/26/19  8:08 AM   Result Value Ref Range    Ventricular Rate 106 BPM    Atrial Rate 106 BPM    P-R Interval 134 ms    QRS Duration 82 ms    Q-T Interval 320 ms    QTC Calculation (Bezet) 425 ms    Calculated P Axis 64 degrees    Calculated R Axis 2 degrees    Calculated T Axis 54 degrees    Diagnosis       Sinus tachycardia  Possible Anterior infarct , age undetermined  Abnormal ECG  When compared with ECG of 23-MAR-2019 19:28,  Borderline criteria for Anterior infarct are now present     CBC WITH AUTOMATED DIFF    Collection Time: 04/26/19  8:15 AM   Result Value Ref Range    WBC 10.6 4.6 - 13.2 K/uL    RBC 3.77 (L) 4.20 - 5.30 M/uL    HGB 12.6 12.0 - 16.0 g/dL    HCT 38.0 35.0 - 45.0 %    .8 (H) 74.0 - 97.0 FL    MCH 33.4 24.0 - 34.0 PG    MCHC 33.2 31.0 - 37.0 g/dL    RDW 20.5 (H) 11.6 - 14.5 %    PLATELET 567 307 - 539 K/uL    MPV 9.6 9.2 - 11.8 FL    NEUTROPHILS 83 (H) 40 - 73 %    LYMPHOCYTES 9 (L) 21 - 52 %    MONOCYTES 8 3 - 10 %    EOSINOPHILS 0 0 - 5 %    BASOPHILS 0 0 - 2 %    ABS. NEUTROPHILS 8.8 (H) 1.8 - 8.0 K/UL    ABS. LYMPHOCYTES 1.0 0.9 - 3.6 K/UL    ABS. MONOCYTES 0.8 0.05 - 1.2 K/UL    ABS. EOSINOPHILS 0.0 0.0 - 0.4 K/UL    ABS.  BASOPHILS 0.0 0.0 - 0.1 K/UL    DF AUTOMATED     METABOLIC PANEL, COMPREHENSIVE    Collection Time: 04/26/19  8:15 AM   Result Value Ref Range    Sodium 133 (L) 136 - 145 mmol/L    Potassium 3.5 3.5 - 5.5 mmol/L    Chloride 100 100 - 108 mmol/L    CO2 22 21 - 32 mmol/L    Anion gap 11 3.0 - 18 mmol/L    Glucose 114 (H) 74 - 99 mg/dL    BUN 12 7.0 - 18 MG/DL    Creatinine 1.14 0.6 - 1.3 MG/DL    BUN/Creatinine ratio 11 (L) 12 - 20      GFR est AA >60 >60 ml/min/1.73m2    GFR est non-AA 52 (L) >60 ml/min/1.73m2    Calcium 8.9 8.5 - 10.1 MG/DL    Bilirubin, total 0.8 0.2 - 1.0 MG/DL    ALT (SGPT) 51 13 - 56 U/L    AST (SGOT) 43 (H) 15 - 37 U/L    Alk.  phosphatase 115 45 - 117 U/L    Protein, total 7.8 6.4 - 8.2 g/dL    Albumin 3.4 3.4 - 5.0 g/dL    Globulin 4.4 (H) 2.0 - 4.0 g/dL    A-G Ratio 0.8 0.8 - 1.7     LIPASE    Collection Time: 04/26/19  8:15 AM   Result Value Ref Range    Lipase 2,532 (H) 73 - 393 U/L   PROTHROMBIN TIME + INR    Collection Time: 04/26/19  8:15 AM   Result Value Ref Range    Prothrombin time 12.4 11.5 - 15.2 sec    INR 1.0 0.8 - 1.2     HCG QL SERUM    Collection Time: 04/26/19  8:15 AM   Result Value Ref Range    HCG, Ql. NEGATIVE  NEG     POC LACTIC ACID    Collection Time: 04/26/19  8:43 AM   Result Value Ref Range    Lactic Acid (POC) 0.60 0.40 - 2.00 mmol/L   URINALYSIS W/ RFLX MICROSCOPIC    Collection Time: 04/26/19 10:00 AM   Result Value Ref Range    Color OR      Appearance CLOUDY      Specific gravity 1.026 1.005 - 1.030      pH (UA) 5.5 5.0 - 8.0      Protein 100 (A) NEG mg/dL    Glucose NEGATIVE  NEG mg/dL    Ketone TRACE (A) NEG mg/dL    Bilirubin SMALL (A) NEG      Blood MODERATE (A) NEG      Urobilinogen 1.0 0.2 - 1.0 EU/dL    Nitrites POSITIVE (A) NEG      Leukocyte Esterase SMALL (A) NEG     URINE MICROSCOPIC ONLY    Collection Time: 04/26/19 10:00 AM   Result Value Ref Range    WBC 41 to 50 0 - 5 /hpf    RBC 0 to 3 0 - 5 /hpf    Epithelial cells 3+ 0 - 5 /lpf    Bacteria 4+ (A) NEG /hpf    Granular cast 4 to 10 NEG /lpf       Radiologic Studies -  CT ABD PELV W CONT    (Results Pending)     CT Results  (Last 48 hours)    None        CXR Results  (Last 48 hours)    None          Medications given in the ED-  Medications   iohexol (OMNIPAQUE) ORAL mixture ( Oral Held 4/26/19 0820)   sodium chloride 0.9 % bolus infusion 1,000 mL (1,000 mL IntraVENous New Bag 4/26/19 1047)   piperacillin-tazobactam (ZOSYN) injection 3.375 g (has no administration in time range)   sodium chloride 0.9 % bolus infusion 1,000 mL (0 mL IntraVENous IV Completed 4/26/19 1056)   pantoprazole (PROTONIX) injection 40 mg (40 mg IntraVENous Given 4/26/19 0842)   morphine injection 4 mg (4 mg IntraVENous Given 4/26/19 1050)   ondansetron (ZOFRAN) injection 4 mg (4 mg IntraVENous Given 4/26/19 1048)         Medical Decision Making   I am the first provider for this patient. I reviewed the vital signs, available nursing notes, past medical history, past surgical history, family history and social history. Vital Signs-Reviewed the patient's vital signs.     Pulse Oximetry Analysis - 100 of % on RA     Cardiac Monitor:  Rate: 106 bpm  Rhythm: ST    EKG interpretation: (Preliminary)  8:08AM  Sinus tachycardia 106 with poor R wave progression and no acute ST-T wave changes     ECG read by Kimmy Braun MD     Records Reviewed: Nursing Notes    Provider Notes (Medical Decision Making):   DDx-incarcerated hernia, obstruction, pancreatitis, perforation, upper GI bleed, peptic ulcer disease    Procedures:  CRITICAL CARE (ASAP ONLY)  Performed by: Fran Perez MD  Authorized by: Fran Perez MD     Critical care provider statement:     Critical care time (minutes):  30    Critical care start time:  4/26/2019 11:00 AM    Critical care end time:  4/26/2019 11:31 AM    Critical care time was exclusive of:  Separately billable procedures and treating other patients    Critical care was necessary to treat or prevent imminent or life-threatening deterioration of the following conditions:  Dehydration and sepsis    Critical care was time spent personally by me on the following activities:  Blood draw for specimens, ordering and performing treatments and interventions, ordering and review of laboratory studies, pulse oximetry, re-evaluation of patient's condition, discussions with consultants, examination of patient and evaluation of patient's response to treatment (Patient transferred [facility] for abd/pelvic CT scan imaging-Patient has had prior gastric bypass)    I assumed direction of critical care for this patient from another provider in my specialty: no          ED Course:   Initial assessment performed. The patients presenting problems have been discussed, and they are in agreement with the care plan formulated and outlined with them. I have encouraged them to ask questions as they arise throughout their visit. 10:55AM  Case discussed with [facility] and with Dr. Jorge Prado who accepted the patient for further evaluation. Patient will be transferred to [facility] for further evaluation and CT scan imaging of her abdomen      Diagnosis and Disposition   DISCUSSION:  Patient was tachycardic in the emergency department improved after IV normal saline hydration, Zofran, Protonix and morphine. Patient met SIRS criteria blood cultures were drawn and serum lactate was normal at 0.6. Stool brown heme-negative, no melena. Labs remarkable for hyponatremia, elevated lipase. Patient likely has pancreatitis. UA showed UTI patient was given Zosyn IV urine culture sent. Abdominal pelvic CT scan was not available at [facility]. Patient transferred to [facility] for further evaluation. Patient accepted by Dr. Jorge Prado. DISCHARGE NOTE:  11:20AM  Radha Sinclair  results have been reviewed with her. She has been counseled regarding her diagnosis, treatment, and plan. She verbally conveys understanding and agreement of the signs, symptoms, diagnosis, treatment and prognosis and additionally agrees to follow up as discussed. She also agrees with the care-plan and conveys that all of her questions have been answered. I have also provided discharge instructions for her that include: educational information regarding their diagnosis and treatment, and list of reasons why they would want to return to the ED prior to their follow-up appointment, should her condition change.  She has been provided with education for proper emergency department utilization. CLINICAL IMPRESSION:    1. Acute pancreatitis, unspecified complication status, unspecified pancreatitis type    2. Urinary tract infection without hematuria, site unspecified        PLAN:  1. Transferred to [facility]  Accepting Dr. Kamila Hoang      Please note that this dictation was completed with [employer], the computer voice recognition software. Quite often unanticipated grammatical, syntax, homophones, and other interpretive errors are inadvertently transcribed by the computer software. Please disregard these errors. Please excuse any errors that have escaped final proofreading.

## 2019-05-02 LAB
BACTERIA SPEC CULT: NORMAL
SERVICE CMNT-IMP: NORMAL

## 2019-05-12 LAB
ATRIAL RATE: 106 BPM
CALCULATED P AXIS, ECG09: 64 DEGREES
CALCULATED R AXIS, ECG10: 2 DEGREES
CALCULATED T AXIS, ECG11: 54 DEGREES
DIAGNOSIS, 93000: NORMAL
P-R INTERVAL, ECG05: 134 MS
Q-T INTERVAL, ECG07: 320 MS
QRS DURATION, ECG06: 82 MS
QTC CALCULATION (BEZET), ECG08: 425 MS
VENTRICULAR RATE, ECG03: 106 BPM

## 2019-05-14 LAB
ATRIAL RATE: 73 BPM
CALCULATED P AXIS, ECG09: 69 DEGREES
CALCULATED R AXIS, ECG10: 30 DEGREES
CALCULATED T AXIS, ECG11: 32 DEGREES
DIAGNOSIS, 93000: NORMAL
P-R INTERVAL, ECG05: 162 MS
Q-T INTERVAL, ECG07: 404 MS
QRS DURATION, ECG06: 84 MS
QTC CALCULATION (BEZET), ECG08: 445 MS
VENTRICULAR RATE, ECG03: 73 BPM

## 2019-05-15 NOTE — PROGRESS NOTES
Patient alert and oriented x4, complained of pain to the abdomen 8/10 in the pain scale. Morphine 6mg IVP given at this time. Dr Grey Silver was called, patient complained of hunger pain and thurst. She was re-educated about NPO status. Dr Grey Silver stated to keep patient NPO until tomorrow morning after xrays are read. 1700hrs Patient ambulated on hallway x2 without any complications. 1935hrs Bedside, Verbal and Written shift change report given to Waldemar Farias RN (oncoming nurse) by Rober Yoder RN (offgoing nurse). Report included the following information SBAR, Kardex, OR Summary, Procedure Summary, Intake/Output, MAR, Accordion, Recent Results, Med Rec Status and Cardiac Rhythm NSR Heart rate 110. All questions answered. Statement Selected

## 2019-10-02 ENCOUNTER — APPOINTMENT (OUTPATIENT)
Dept: CT IMAGING | Age: 44
End: 2019-10-02
Attending: PHYSICIAN ASSISTANT
Payer: MEDICAID

## 2019-10-02 ENCOUNTER — HOSPITAL ENCOUNTER (EMERGENCY)
Age: 44
Discharge: HOME | End: 2019-10-02
Attending: EMERGENCY MEDICINE
Payer: MEDICAID

## 2019-10-02 VITALS
HEIGHT: 65 IN | OXYGEN SATURATION: 100 % | RESPIRATION RATE: 16 BRPM | SYSTOLIC BLOOD PRESSURE: 152 MMHG | TEMPERATURE: 98.8 F | WEIGHT: 200 LBS | HEART RATE: 89 BPM | BODY MASS INDEX: 33.32 KG/M2 | DIASTOLIC BLOOD PRESSURE: 103 MMHG

## 2019-10-02 DIAGNOSIS — N39.0 UTI (URINARY TRACT INFECTION), UNCOMPLICATED: ICD-10-CM

## 2019-10-02 DIAGNOSIS — K52.9 COLITIS: Primary | ICD-10-CM

## 2019-10-02 LAB
ALBUMIN SERPL-MCNC: 3.7 G/DL (ref 3.4–5)
ALBUMIN/GLOB SERPL: 0.9 {RATIO} (ref 0.8–1.7)
ALP SERPL-CCNC: 143 U/L (ref 45–117)
ALT SERPL-CCNC: 77 U/L (ref 13–56)
ANION GAP SERPL CALC-SCNC: 8 MMOL/L (ref 3–18)
APPEARANCE UR: ABNORMAL
AST SERPL-CCNC: 201 U/L (ref 10–38)
BACTERIA URNS QL MICRO: ABNORMAL /HPF
BASOPHILS # BLD: 0 K/UL (ref 0–0.1)
BASOPHILS NFR BLD: 0 % (ref 0–2)
BILIRUB SERPL-MCNC: 1.1 MG/DL (ref 0.2–1)
BILIRUB UR QL: NEGATIVE
BUN SERPL-MCNC: 10 MG/DL (ref 7–18)
BUN/CREAT SERPL: 9 (ref 12–20)
CALCIUM SERPL-MCNC: 8.5 MG/DL (ref 8.5–10.1)
CHLORIDE SERPL-SCNC: 104 MMOL/L (ref 100–111)
CK MB CFR SERPL CALC: NORMAL % (ref 0–4)
CK MB SERPL-MCNC: <1 NG/ML (ref 5–25)
CK SERPL-CCNC: 93 U/L (ref 26–192)
CO2 SERPL-SCNC: 24 MMOL/L (ref 21–32)
COLOR UR: YELLOW
CREAT SERPL-MCNC: 1.1 MG/DL (ref 0.6–1.3)
DIFFERENTIAL METHOD BLD: ABNORMAL
EOSINOPHIL # BLD: 0 K/UL (ref 0–0.4)
EOSINOPHIL NFR BLD: 0 % (ref 0–5)
EPITH CASTS URNS QL MICRO: ABNORMAL /LPF (ref 0–5)
ERYTHROCYTE [DISTWIDTH] IN BLOOD BY AUTOMATED COUNT: 23 % (ref 11.6–14.5)
GLOBULIN SER CALC-MCNC: 4.1 G/DL (ref 2–4)
GLUCOSE SERPL-MCNC: 117 MG/DL (ref 74–99)
GLUCOSE UR STRIP.AUTO-MCNC: NEGATIVE MG/DL
HCT VFR BLD AUTO: 38.7 % (ref 35–45)
HEMOCCULT STL QL: POSITIVE
HGB BLD-MCNC: 12.5 G/DL (ref 12–16)
HGB UR QL STRIP: ABNORMAL
KETONES UR QL STRIP.AUTO: NEGATIVE MG/DL
LEUKOCYTE ESTERASE UR QL STRIP.AUTO: ABNORMAL
LIPASE SERPL-CCNC: 200 U/L (ref 73–393)
LYMPHOCYTES # BLD: 1.3 K/UL (ref 0.9–3.6)
LYMPHOCYTES NFR BLD: 17 % (ref 21–52)
MAGNESIUM SERPL-MCNC: 1.8 MG/DL (ref 1.6–2.6)
MCH RBC QN AUTO: 32.3 PG (ref 24–34)
MCHC RBC AUTO-ENTMCNC: 32.3 G/DL (ref 31–37)
MCV RBC AUTO: 100 FL (ref 74–97)
MONOCYTES # BLD: 0.5 K/UL (ref 0.05–1.2)
MONOCYTES NFR BLD: 6 % (ref 3–10)
NEUTS SEG # BLD: 5.8 K/UL (ref 1.8–8)
NEUTS SEG NFR BLD: 77 % (ref 40–73)
NITRITE UR QL STRIP.AUTO: NEGATIVE
PH UR STRIP: 6 [PH] (ref 5–8)
PLATELET # BLD AUTO: 229 K/UL (ref 135–420)
PMV BLD AUTO: 9.4 FL (ref 9.2–11.8)
POTASSIUM SERPL-SCNC: 4.2 MMOL/L (ref 3.5–5.5)
PROT SERPL-MCNC: 7.8 G/DL (ref 6.4–8.2)
PROT UR STRIP-MCNC: NEGATIVE MG/DL
RBC # BLD AUTO: 3.87 M/UL (ref 4.2–5.3)
RBC #/AREA URNS HPF: ABNORMAL /HPF (ref 0–5)
SODIUM SERPL-SCNC: 136 MMOL/L (ref 136–145)
SP GR UR REFRACTOMETRY: 1.02 (ref 1–1.03)
TROPONIN I SERPL-MCNC: <0.02 NG/ML (ref 0–0.04)
UROBILINOGEN UR QL STRIP.AUTO: 1 EU/DL (ref 0.2–1)
WBC # BLD AUTO: 7.7 K/UL (ref 4.6–13.2)
WBC URNS QL MICRO: ABNORMAL /HPF (ref 0–5)

## 2019-10-02 PROCEDURE — 82272 OCCULT BLD FECES 1-3 TESTS: CPT

## 2019-10-02 PROCEDURE — C9113 INJ PANTOPRAZOLE SODIUM, VIA: HCPCS | Performed by: PHYSICIAN ASSISTANT

## 2019-10-02 PROCEDURE — 87045 FECES CULTURE AEROBIC BACT: CPT

## 2019-10-02 PROCEDURE — 81001 URINALYSIS AUTO W/SCOPE: CPT

## 2019-10-02 PROCEDURE — 96361 HYDRATE IV INFUSION ADD-ON: CPT

## 2019-10-02 PROCEDURE — 83735 ASSAY OF MAGNESIUM: CPT

## 2019-10-02 PROCEDURE — 83690 ASSAY OF LIPASE: CPT

## 2019-10-02 PROCEDURE — 74011250637 HC RX REV CODE- 250/637: Performed by: PHYSICIAN ASSISTANT

## 2019-10-02 PROCEDURE — 74011636320 HC RX REV CODE- 636/320: Performed by: EMERGENCY MEDICINE

## 2019-10-02 PROCEDURE — 80053 COMPREHEN METABOLIC PANEL: CPT

## 2019-10-02 PROCEDURE — 82550 ASSAY OF CK (CPK): CPT

## 2019-10-02 PROCEDURE — 74011250636 HC RX REV CODE- 250/636: Performed by: PHYSICIAN ASSISTANT

## 2019-10-02 PROCEDURE — 99285 EMERGENCY DEPT VISIT HI MDM: CPT

## 2019-10-02 PROCEDURE — 74177 CT ABD & PELVIS W/CONTRAST: CPT

## 2019-10-02 PROCEDURE — 96375 TX/PRO/DX INJ NEW DRUG ADDON: CPT

## 2019-10-02 PROCEDURE — 96374 THER/PROPH/DIAG INJ IV PUSH: CPT

## 2019-10-02 PROCEDURE — 74011636320 HC RX REV CODE- 636/320: Performed by: PHYSICIAN ASSISTANT

## 2019-10-02 PROCEDURE — 85025 COMPLETE CBC W/AUTO DIFF WBC: CPT

## 2019-10-02 RX ORDER — ONDANSETRON 2 MG/ML
4 INJECTION INTRAMUSCULAR; INTRAVENOUS
Status: COMPLETED | OUTPATIENT
Start: 2019-10-02 | End: 2019-10-02

## 2019-10-02 RX ORDER — CIPROFLOXACIN 500 MG/1
500 TABLET ORAL
Status: COMPLETED | OUTPATIENT
Start: 2019-10-02 | End: 2019-10-02

## 2019-10-02 RX ORDER — METRONIDAZOLE 250 MG/1
500 TABLET ORAL
Status: COMPLETED | OUTPATIENT
Start: 2019-10-02 | End: 2019-10-02

## 2019-10-02 RX ORDER — LORAZEPAM 1 MG/1
1 TABLET ORAL
COMMUNITY
End: 2019-12-03

## 2019-10-02 RX ORDER — PANTOPRAZOLE SODIUM 40 MG/10ML
40 INJECTION, POWDER, LYOPHILIZED, FOR SOLUTION INTRAVENOUS
Status: COMPLETED | OUTPATIENT
Start: 2019-10-02 | End: 2019-10-02

## 2019-10-02 RX ORDER — METRONIDAZOLE 500 MG/1
500 TABLET ORAL 2 TIMES DAILY
Qty: 14 TAB | Refills: 0 | Status: SHIPPED | OUTPATIENT
Start: 2019-10-02 | End: 2019-10-09

## 2019-10-02 RX ORDER — CIPROFLOXACIN 500 MG/1
500 TABLET ORAL 2 TIMES DAILY
Qty: 14 TAB | Refills: 0 | Status: SHIPPED | OUTPATIENT
Start: 2019-10-02 | End: 2019-10-09

## 2019-10-02 RX ADMIN — METRONIDAZOLE 500 MG: 250 TABLET ORAL at 21:04

## 2019-10-02 RX ADMIN — SODIUM CHLORIDE 1000 ML: 900 INJECTION, SOLUTION INTRAVENOUS at 17:24

## 2019-10-02 RX ADMIN — ONDANSETRON 4 MG: 2 INJECTION INTRAMUSCULAR; INTRAVENOUS at 17:23

## 2019-10-02 RX ADMIN — CIPROFLOXACIN HYDROCHLORIDE 500 MG: 500 TABLET, FILM COATED ORAL at 21:04

## 2019-10-02 RX ADMIN — IOPAMIDOL 100 ML: 612 INJECTION, SOLUTION INTRAVENOUS at 19:44

## 2019-10-02 RX ADMIN — PANTOPRAZOLE SODIUM 40 MG: 40 INJECTION, POWDER, FOR SOLUTION INTRAVENOUS at 17:23

## 2019-10-02 RX ADMIN — IOHEXOL: 240 INJECTION, SOLUTION INTRATHECAL; INTRAVASCULAR; INTRAVENOUS; ORAL at 18:33

## 2019-10-02 NOTE — ED PROVIDER NOTES
EMERGENCY DEPARTMENT HISTORY AND PHYSICAL EXAM    Date: 10/2/2019  Patient Name: Kevin Berkowitz    History of Presenting Illness     Chief Complaint   Patient presents with    Diarrhea         History Provided By: Patient    Kevin Berkowitz is a 45 y.o. female with PMHX of anxiety, depression, gastric bypass, chronic diarrhea, GERD, anemia, pancreatitis who presents to the emergency department C/O abdominal pain. Associated sxs include nausea vomiting diarrhea. Patient reports long-standing history of chronic diarrhea however over the last month and then worsening again over the last 2 days has had green diarrhea nausea vomiting and epigastric abdominal pain. Patient reports gastric bypass 20 years ago but then again with some sort of revision 1 year ago by [place]. pt denies fever, bloody or black stools, and any other sxs or complaints. PCP: Abida RAMOS NP    Current Facility-Administered Medications   Medication Dose Route Frequency Provider Last Rate Last Dose    ciprofloxacin HCl (CIPRO) tablet 500 mg  500 mg Oral NOW Sixto Monique PA        metroNIDAZOLE (FLAGYL) tablet 500 mg  500 mg Oral NOW Sixto Monique PA         Current Outpatient Medications   Medication Sig Dispense Refill    LORazepam (ATIVAN) 1 mg tablet Take 1 mg by mouth every four (4) hours as needed for Anxiety.  ciprofloxacin HCl (CIPRO) 500 mg tablet Take 1 Tab by mouth two (2) times a day for 7 days. 14 Tab 0    metroNIDAZOLE (FLAGYL) 500 mg tablet Take 1 Tab by mouth two (2) times a day for 7 days. 14 Tab 0    traZODone (DESYREL) 50 mg tablet   0    multivitamin (ONE A DAY) tablet Take 1 Tab by mouth daily.  losartan (COZAAR) 25 mg tablet Take 25 mg by mouth.  Omeprazole delayed release (PRILOSEC D/R) 20 mg tablet Take 1 Tab by mouth daily.  (Patient taking differently: Take 20 mg by mouth two (2) times a day.) 30 Tab 2       Past History     Past Medical History:  Past Medical History:   Diagnosis Date    Anemia     Cancer [facility]) 2011     history of endometrial cancer    Diarrhea     GERD (gastroesophageal reflux disease)     Hypertension 2017    Intestinal malabsorption     Psychiatric disorder     Anxiety and depression    Severe obesity with body mass index (BMI) of 35.0 to 39.9 with comorbidity ([address]     Smoker     smokes 5 cigarettes per day    Status post gastric bypass for obesity     daryl / librado [place]    Syncopal episodes     has plans to see a neurologist     Wears dentures     teeth extraction due to vomiting and vitamin deficiencies       Past Surgical History:  Past Surgical History:   Procedure Laterality Date    HX CHOLECYSTECTOMY      HX GASTRIC BYPASS      open / librado [place]    HX GASTRIC BYPASS  2018    HX GI  2017    EGD    HX HYSTERECTOMY      HX OTHER SURGICAL Right     resection of benign breast mass       Family History:  Family History   Problem Relation Age of Onset    Diabetes Mother        Social History:  Social History     Tobacco Use    Smoking status: Former Smoker     Types: Cigarettes     Last attempt to quit: 2018     Years since quittin.0    Smokeless tobacco: Never Used   Substance Use Topics    Alcohol use: No     Alcohol/week: 1.0 standard drinks     Types: 1 Cans of beer per week     Frequency: Never    Drug use: No       Allergies:  No Known Allergies      Review of Systems   Review of Systems   Constitutional: Negative for fever. Cardiovascular: Negative for chest pain. Gastrointestinal: Positive for abdominal pain, diarrhea, nausea and vomiting. Negative for blood in stool. Genitourinary: Negative for dysuria. All other systems reviewed and are negative.       Physical Exam     Vitals:    10/02/19 1827 10/02/19 1909 10/02/19 1927 10/02/19 1933   BP:  (!) 154/105     Pulse:   89    Resp:       Temp:       SpO2: 98% 90% 100% 100%   Weight:       Height:         Physical Exam   Constitutional: She is oriented to person, place, and time. She appears well-developed and well-nourished. No distress. HENT:   Head: Normocephalic and atraumatic. Eyes: Pupils are equal, round, and reactive to light. Conjunctivae and EOM are normal.   Neck: Normal range of motion. Neck supple. Cardiovascular: Normal rate and regular rhythm. Pulmonary/Chest: Effort normal and breath sounds normal.   Abdominal: Soft. Bowel sounds are normal. She exhibits no distension. There is tenderness in the epigastric area. There is no rebound, no guarding and no CVA tenderness. Musculoskeletal: Normal range of motion. Neurological: She is alert and oriented to person, place, and time. Skin: Skin is warm and dry. Psychiatric: She has a normal mood and affect. Her behavior is normal.   Nursing note and vitals reviewed.       Diagnostic Study Results     Labs -     Recent Results (from the past 12 hour(s))   URINALYSIS W/ RFLX MICROSCOPIC    Collection Time: 10/02/19  5:00 PM   Result Value Ref Range    Color YELLOW      Appearance CLOUDY      Specific gravity 1.023 1.005 - 1.030      pH (UA) 6.0 5.0 - 8.0      Protein NEGATIVE  NEG mg/dL    Glucose NEGATIVE  NEG mg/dL    Ketone NEGATIVE  NEG mg/dL    Bilirubin NEGATIVE  NEG      Blood TRACE (A) NEG      Urobilinogen 1.0 0.2 - 1.0 EU/dL    Nitrites NEGATIVE  NEG      Leukocyte Esterase MODERATE (A) NEG     URINE MICROSCOPIC ONLY    Collection Time: 10/02/19  5:00 PM   Result Value Ref Range    WBC 21 to 30 0 - 5 /hpf    RBC 0 to 3 0 - 5 /hpf    Epithelial cells FEW 0 - 5 /lpf    Bacteria FEW (A) NEG /hpf   CBC WITH AUTOMATED DIFF    Collection Time: 10/02/19  5:25 PM   Result Value Ref Range    WBC 7.7 4.6 - 13.2 K/uL    RBC 3.87 (L) 4.20 - 5.30 M/uL    HGB 12.5 12.0 - 16.0 g/dL    HCT 38.7 35.0 - 45.0 %    .0 (H) 74.0 - 97.0 FL    MCH 32.3 24.0 - 34.0 PG    MCHC 32.3 31.0 - 37.0 g/dL    RDW 23.0 (H) 11.6 - 14.5 %    PLATELET 248 336 - 477 K/uL    MPV 9.4 9.2 - 11.8 FL    NEUTROPHILS 77 (H) 40 - 73 % LYMPHOCYTES 17 (L) 21 - 52 %    MONOCYTES 6 3 - 10 %    EOSINOPHILS 0 0 - 5 %    BASOPHILS 0 0 - 2 %    ABS. NEUTROPHILS 5.8 1.8 - 8.0 K/UL    ABS. LYMPHOCYTES 1.3 0.9 - 3.6 K/UL    ABS. MONOCYTES 0.5 0.05 - 1.2 K/UL    ABS. EOSINOPHILS 0.0 0.0 - 0.4 K/UL    ABS. BASOPHILS 0.0 0.0 - 0.1 K/UL    DF AUTOMATED     METABOLIC PANEL, COMPREHENSIVE    Collection Time: 10/02/19  5:25 PM   Result Value Ref Range    Sodium 136 136 - 145 mmol/L    Potassium 4.2 3.5 - 5.5 mmol/L    Chloride 104 100 - 111 mmol/L    CO2 24 21 - 32 mmol/L    Anion gap 8 3.0 - 18 mmol/L    Glucose 117 (H) 74 - 99 mg/dL    BUN 10 7.0 - 18 MG/DL    Creatinine 1.10 0.6 - 1.3 MG/DL    BUN/Creatinine ratio 9 (L) 12 - 20      GFR est AA >60 >60 ml/min/1.73m2    GFR est non-AA 54 (L) >60 ml/min/1.73m2    Calcium 8.5 8.5 - 10.1 MG/DL    Bilirubin, total 1.1 (H) 0.2 - 1.0 MG/DL    ALT (SGPT) 77 (H) 13 - 56 U/L    AST (SGOT) 201 (H) 10 - 38 U/L    Alk. phosphatase 143 (H) 45 - 117 U/L    Protein, total 7.8 6.4 - 8.2 g/dL    Albumin 3.7 3.4 - 5.0 g/dL    Globulin 4.1 (H) 2.0 - 4.0 g/dL    A-G Ratio 0.9 0.8 - 1.7     MAGNESIUM    Collection Time: 10/02/19  5:25 PM   Result Value Ref Range    Magnesium 1.8 1.6 - 2.6 mg/dL   LIPASE    Collection Time: 10/02/19  5:25 PM   Result Value Ref Range    Lipase 200 73 - 393 U/L   CARDIAC PANEL,(CK, CKMB & TROPONIN)    Collection Time: 10/02/19  5:25 PM   Result Value Ref Range    CK 93 26 - 192 U/L    CK - MB <1.0 <3.6 ng/ml    CK-MB Index  0.0 - 4.0 %     CALCULATION NOT PERFORMED WHEN RESULT IS BELOW LINEAR LIMIT    Troponin-I, QT <0.02 0.0 - 0.045 NG/ML   OCCULT BLOOD, STOOL    Collection Time: 10/02/19  6:20 PM   Result Value Ref Range    Occult blood, stool POSITIVE (A) NEG         Radiologic Studies -   CT ABD PELV W CONT   Final Result   IMPRESSION:      There is colonic wall thickening of the sigmoid colon suggesting colitis. Post gastric bypass changes present.         CT Results  (Last 48 hours) 10/02/19 1952  CT ABD PELV W CONT Final result    Impression:  IMPRESSION:       There is colonic wall thickening of the sigmoid colon suggesting colitis. Post gastric bypass changes present. Narrative:  EXAM: CT of the abdomen and pelvis       INDICATION: Acute abdominal pain status post gastric bypass surgery in 2000 and   2018       COMPARISON: March 23, 2019       TECHNIQUE: Axial CT imaging of the abdomen and pelvis was performed with   intravenous contrast. Multiplanar reformats were generated. One or more dose   reduction techniques were used on this CT: automated exposure control,   adjustment of the mAs and/or kVp according to patient size, and iterative   reconstruction techniques. The specific techniques used on this CT exam have   been documented in the patient's electronic medical record. Digital Imaging and   Communications in Medicine (DICOM) format image data are available to   nonaffiliated external healthcare facilities or entities on a secure, media   free, reciprocally searchable basis with patient authorization for at least a   12-month period after this study. _______________       FINDINGS:       LOWER CHEST: Unremarkable. LIVER, BILIARY: Liver is normal. No biliary dilation. Cholecystectomy       PANCREAS: Normal.       SPLEEN: Normal.       ADRENALS: Normal.       KIDNEYS: Normal.       VASCULATURE: Unremarkable       LYMPH NODES: No enlarged lymph nodes. GASTROINTESTINAL TRACT: There is colonic wall thickening of the sigmoid colon   suggesting colitis either infectious or inflammatory. Appendix is normal. There   is no bowel obstruction. There are postoperative changes from gastric bypass   surgery. There is no bowel obstruction. PELVIC ORGANS: Urinary bladder is under distended therefore difficult to   evaluate. There is directly. BONES: No acute or aggressive osseous abnormalities identified.        OTHER: None.       _______________ CXR Results  (Last 48 hours)    None          Medications given in the ED-  Medications   ciprofloxacin HCl (CIPRO) tablet 500 mg (has no administration in time range)   metroNIDAZOLE (FLAGYL) tablet 500 mg (has no administration in time range)   ondansetron (ZOFRAN) injection 4 mg (4 mg IntraVENous Given 10/2/19 1723)   sodium chloride 0.9 % bolus infusion 1,000 mL (0 mL IntraVENous IV Completed 10/2/19 1824)   pantoprazole (PROTONIX) injection 40 mg (40 mg IntraVENous Given 10/2/19 1723)   iohexol (OMNIPAQUE) ORAL mixture ( Oral Given 10/2/19 1833)   iopamidol (ISOVUE 300) 61 % contrast injection 100 mL (100 mL IntraVENous Given 10/2/19 1944)         Medical Decision Making   I am the first provider for this patient. I reviewed the vital signs, available nursing notes, past medical history, past surgical history, family history and social history. Vital Signs-Reviewed the patient's vital signs. Records Reviewed: Nursing Notes    Procedures:  Procedures    ED Course:   5:08 PM   Initial assessment performed. The patients presenting problems have been discussed, and they are in agreement with the care plan formulated and outlined with them. I have encouraged them to ask questions as they arise throughout their visit. Discussion: 45 y.o. female history of gastric bypass chronic pancreatitis chronic diarrhea complaining of abdominal pain nausea and diarrhea nonbloody. Patient is afebrile with mild tenderness to her abdomen but tolerating p.o. and nontoxic-appearing. Diagnostics remarkable for normal white blood cell count, normal lipase, UTI, and CT with findings of a mild colitis. Will plan for Cipro Flagyl and GI follow-up with strict return precautions discussed. Diagnosis and Disposition       DISCHARGE NOTE:  Alan Vidal  results have been reviewed with her. She has been counseled regarding her diagnosis, treatment, and plan.   She verbally conveys understanding and agreement of the signs, symptoms, diagnosis, treatment and prognosis and additionally agrees to follow up as discussed. She also agrees with the care-plan and conveys that all of her questions have been answered. I have also provided discharge instructions for her that include: educational information regarding their diagnosis and treatment, and list of reasons why they would want to return to the ED prior to their follow-up appointment, should her condition change. She has been provided with education for proper emergency department utilization. CLINICAL IMPRESSION:    1. Colitis    2. UTI (urinary tract infection), uncomplicated        PLAN:  1. D/C Home  2. Current Discharge Medication List      START taking these medications    Details   ciprofloxacin HCl (CIPRO) 500 mg tablet Take 1 Tab by mouth two (2) times a day for 7 days. Qty: 14 Tab, Refills: 0      metroNIDAZOLE (FLAGYL) 500 mg tablet Take 1 Tab by mouth two (2) times a day for 7 days. Qty: 14 Tab, Refills: 0           3. Follow-up Information     Follow up With Specialties Details Why Contact Info    Rochelle Medrano NP Nurse Practitioner Schedule an appointment as soon as possible for a visit  [address] [facility]  [phone]      Twyla Soler MD Gastroenterology Schedule an appointment as soon as possible for a visit  [address] [facility] [facility] EMERGENCY DEPT Emergency Medicine  As needed, If symptoms worsen [address] [id number]  [phone]                  Please note that this dictation was completed with Jellynote, the computer voice recognition software. Quite often unanticipated grammatical, syntax, homophones, and other interpretive errors are inadvertently transcribed by the computer software. Please disregard these errors. Please excuse any errors that have escaped final proofreading.

## 2019-10-02 NOTE — ED NOTES
Patient is aware stool sample is needed, patient states that she cannot have a bowel movement at this time

## 2019-10-02 NOTE — ED TRIAGE NOTES
Pt arrives ambulatory alert and oriented c/o diarrhea x 2 days. Pt states she cant eat she can only drink water,. Pt also c/o back pain.

## 2019-10-02 NOTE — ED NOTES
Verbal shift change report given to Genaro Lopes RN (oncoming nurse) by Tang High (offgoing nurse). Report included the following information SBAR, ED Summary, Procedure Summary, Intake/Output, MAR and Recent Results.

## 2019-10-03 NOTE — DISCHARGE INSTRUCTIONS
Patient Education     Colitis: Care Instructions  Your Care Instructions  Colitis is the medical term for swelling (inflammation) of the intestine. It can be caused by different things, such as an infection or loss of blood flow in the intestine. Other causes are problems like Crohn's disease or ulcerative colitis. Symptoms may include fever, diarrhea that may be bloody, or belly pain. Sometimes symptoms go away without treatment. But you may need treatment or more tests, such as blood tests or a stool test. Or you may need imaging tests like a CT scan or a colonoscopy. In some cases, the doctor may want to test a sample of tissue from the intestine. This test is called a biopsy. The doctor has checked you carefully, but problems can develop later. If you notice any problems or new symptoms, get medical treatment right away. Follow-up care is a key part of your treatment and safety. Be sure to make and go to all appointments, and call your doctor if you are having problems. It's also a good idea to know your test results and keep a list of the medicines you take. How can you care for yourself at home? · Rest until you feel better. · Your doctor may recommend that you eat bland foods. These include rice, dry toast or crackers, bananas, and applesauce. · To prevent dehydration, drink plenty of fluids. Choose water and other caffeine-free clear liquids until you feel better. If you have kidney, heart, or liver disease and have to limit fluids, talk with your doctor before you increase the amount of fluids you drink. · Be safe with medicines. Take your medicines exactly as prescribed. Call your doctor if you think you are having a problem with your medicine. You will get more details on the specific medicines your doctor prescribes. When should you call for help? Call 911 anytime you think you may need emergency care. For example, call if:  · You passed out (lost consciousness).   · You vomit blood or what looks like coffee grounds. · Your stools are maroon or very bloody. Call your doctor now or seek immediate medical care if:  · You have new or worse pain. · You have a new or higher fever. · You have new or worse symptoms. · You cannot keep fluids or medicines down. Watch closely for changes in your health, and be sure to contact your doctor if:  · You do not get better as expected. Where can you learn more? Go to [employer]  Enter [id number] in the search box to learn more about \"Colitis: Care Instructions. \"   © 1452-9469 [employer], [employer]. Care instructions adapted under license by [facility] (which disclaims liability or warranty for this information). This care instruction is for use with your licensed healthcare professional. If you have questions about a medical condition or this instruction, always ask your healthcare professional. [address] any warranty or liability for your use of this information.   Content Version: 44.2.208710; Current as of: November 20, 2015

## 2019-10-04 LAB
BACTERIA SPEC CULT: NORMAL
SERVICE CMNT-IMP: NORMAL

## 2019-12-03 ENCOUNTER — APPOINTMENT (OUTPATIENT)
Dept: ULTRASOUND IMAGING | Age: 44
DRG: 282 | End: 2019-12-03
Attending: INTERNAL MEDICINE
Payer: MEDICAID

## 2019-12-03 ENCOUNTER — APPOINTMENT (OUTPATIENT)
Dept: CT IMAGING | Age: 44
DRG: 282 | End: 2019-12-03
Attending: EMERGENCY MEDICINE
Payer: MEDICAID

## 2019-12-03 ENCOUNTER — HOSPITAL ENCOUNTER (INPATIENT)
Age: 44
LOS: 2 days | Discharge: HOME | DRG: 282 | End: 2019-12-05
Attending: EMERGENCY MEDICINE | Admitting: INTERNAL MEDICINE
Payer: MEDICAID

## 2019-12-03 DIAGNOSIS — K85.90 ACUTE PANCREATITIS, UNSPECIFIED COMPLICATION STATUS, UNSPECIFIED PANCREATITIS TYPE: Primary | ICD-10-CM

## 2019-12-03 DIAGNOSIS — I10 UNCONTROLLED HYPERTENSION: ICD-10-CM

## 2019-12-03 DIAGNOSIS — K85.00 IDIOPATHIC ACUTE PANCREATITIS WITHOUT INFECTION OR NECROSIS: ICD-10-CM

## 2019-12-03 DIAGNOSIS — E87.1 HYPONATREMIA: ICD-10-CM

## 2019-12-03 LAB
ALBUMIN SERPL-MCNC: 3.3 G/DL (ref 3.4–5)
ALBUMIN/GLOB SERPL: 0.8 {RATIO} (ref 0.8–1.7)
ALP SERPL-CCNC: 149 U/L (ref 45–117)
ALT SERPL-CCNC: 158 U/L (ref 13–56)
AMPHET UR QL SCN: NEGATIVE
ANION GAP SERPL CALC-SCNC: 12 MMOL/L (ref 3–18)
APPEARANCE UR: CLEAR
AST SERPL-CCNC: 276 U/L (ref 10–38)
ATRIAL RATE: 95 BPM
BARBITURATES UR QL SCN: NEGATIVE
BASOPHILS # BLD: 0 K/UL (ref 0–0.1)
BASOPHILS NFR BLD: 0 % (ref 0–2)
BENZODIAZ UR QL: NEGATIVE
BILIRUB SERPL-MCNC: 1.7 MG/DL (ref 0.2–1)
BILIRUB UR QL: NEGATIVE
BUN SERPL-MCNC: 14 MG/DL (ref 7–18)
BUN/CREAT SERPL: 13 (ref 12–20)
CALCIUM SERPL-MCNC: 8.2 MG/DL (ref 8.5–10.1)
CALCULATED P AXIS, ECG09: 59 DEGREES
CALCULATED R AXIS, ECG10: 21 DEGREES
CALCULATED T AXIS, ECG11: 43 DEGREES
CANNABINOIDS UR QL SCN: NEGATIVE
CHLORIDE SERPL-SCNC: 101 MMOL/L (ref 100–111)
CHOLEST SERPL-MCNC: 137 MG/DL
CO2 SERPL-SCNC: 18 MMOL/L (ref 21–32)
COCAINE UR QL SCN: NEGATIVE
COLOR UR: ABNORMAL
CREAT SERPL-MCNC: 1.07 MG/DL (ref 0.6–1.3)
D DIMER PPP FEU-MCNC: 3.69 UG/ML(FEU)
DIAGNOSIS, 93000: NORMAL
DIFFERENTIAL METHOD BLD: ABNORMAL
EOSINOPHIL # BLD: 0 K/UL (ref 0–0.4)
EOSINOPHIL NFR BLD: 0 % (ref 0–5)
ERYTHROCYTE [DISTWIDTH] IN BLOOD BY AUTOMATED COUNT: 17.8 % (ref 11.6–14.5)
FOLATE SERPL-MCNC: 8.2 NG/ML (ref 3.1–17.5)
GLOBULIN SER CALC-MCNC: 4.4 G/DL (ref 2–4)
GLUCOSE SERPL-MCNC: 128 MG/DL (ref 74–99)
GLUCOSE UR STRIP.AUTO-MCNC: NEGATIVE MG/DL
HCG SERPL QL: NEGATIVE
HCT VFR BLD AUTO: 42 % (ref 35–45)
HDLC SERPL-MCNC: 38 MG/DL (ref 40–60)
HDLC SERPL: 3.6 {RATIO} (ref 0–5)
HDSCOM,HDSCOM: ABNORMAL
HGB BLD-MCNC: 13.8 G/DL (ref 12–16)
HGB UR QL STRIP: NEGATIVE
IRON SATN MFR SERPL: 37 %
IRON SERPL-MCNC: 124 UG/DL (ref 50–175)
KETONES UR QL STRIP.AUTO: NEGATIVE MG/DL
LACTATE BLD-SCNC: 1.84 MMOL/L (ref 0.4–2)
LDLC SERPL CALC-MCNC: 64.8 MG/DL (ref 0–100)
LEUKOCYTE ESTERASE UR QL STRIP.AUTO: NEGATIVE
LIPASE SERPL-CCNC: 3309 U/L (ref 73–393)
LIPID PROFILE,FLP: ABNORMAL
LYMPHOCYTES # BLD: 1.2 K/UL (ref 0.9–3.6)
LYMPHOCYTES NFR BLD: 11 % (ref 21–52)
MCH RBC QN AUTO: 34.1 PG (ref 24–34)
MCHC RBC AUTO-ENTMCNC: 32.9 G/DL (ref 31–37)
MCV RBC AUTO: 103.7 FL (ref 74–97)
METHADONE UR QL: NEGATIVE
MONOCYTES # BLD: 0.5 K/UL (ref 0.05–1.2)
MONOCYTES NFR BLD: 5 % (ref 3–10)
NEUTS SEG # BLD: 9.4 K/UL (ref 1.8–8)
NEUTS SEG NFR BLD: 84 % (ref 40–73)
NITRITE UR QL STRIP.AUTO: NEGATIVE
OPIATES UR QL: POSITIVE
P-R INTERVAL, ECG05: 152 MS
PCP UR QL: NEGATIVE
PH UR STRIP: 5.5 [PH] (ref 5–8)
PLATELET # BLD AUTO: 250 K/UL (ref 135–420)
PMV BLD AUTO: 9.6 FL (ref 9.2–11.8)
POTASSIUM SERPL-SCNC: 4.2 MMOL/L (ref 3.5–5.5)
PROT SERPL-MCNC: 7.7 G/DL (ref 6.4–8.2)
PROT UR STRIP-MCNC: NEGATIVE MG/DL
Q-T INTERVAL, ECG07: 372 MS
QRS DURATION, ECG06: 82 MS
QTC CALCULATION (BEZET), ECG08: 467 MS
RBC # BLD AUTO: 4.05 M/UL (ref 4.2–5.3)
SODIUM SERPL-SCNC: 131 MMOL/L (ref 136–145)
SP GR UR REFRACTOMETRY: >1.03 (ref 1–1.03)
TIBC SERPL-MCNC: 334 UG/DL (ref 250–450)
TRIGL SERPL-MCNC: 171 MG/DL (ref ?–150)
TROPONIN I SERPL-MCNC: <0.02 NG/ML (ref 0–0.04)
UROBILINOGEN UR QL STRIP.AUTO: 0.2 EU/DL (ref 0.2–1)
VENTRICULAR RATE, ECG03: 95 BPM
VIT B12 SERPL-MCNC: 316 PG/ML (ref 211–911)
VIT B12 SERPL-MCNC: 549 PG/ML (ref 211–911)
VLDLC SERPL CALC-MCNC: 34.2 MG/DL
WBC # BLD AUTO: 11.1 K/UL (ref 4.6–13.2)

## 2019-12-03 PROCEDURE — 93005 ELECTROCARDIOGRAM TRACING: CPT

## 2019-12-03 PROCEDURE — 76705 ECHO EXAM OF ABDOMEN: CPT

## 2019-12-03 PROCEDURE — 74011250637 HC RX REV CODE- 250/637: Performed by: HOSPITALIST

## 2019-12-03 PROCEDURE — 99285 EMERGENCY DEPT VISIT HI MDM: CPT

## 2019-12-03 PROCEDURE — 87045 FECES CULTURE AEROBIC BACT: CPT

## 2019-12-03 PROCEDURE — 83690 ASSAY OF LIPASE: CPT

## 2019-12-03 PROCEDURE — 74011250636 HC RX REV CODE- 250/636: Performed by: EMERGENCY MEDICINE

## 2019-12-03 PROCEDURE — 80307 DRUG TEST PRSMV CHEM ANLYZR: CPT

## 2019-12-03 PROCEDURE — 96375 TX/PRO/DX INJ NEW DRUG ADDON: CPT

## 2019-12-03 PROCEDURE — 87177 OVA AND PARASITES SMEARS: CPT

## 2019-12-03 PROCEDURE — 82390 ASSAY OF CERULOPLASMIN: CPT

## 2019-12-03 PROCEDURE — 80074 ACUTE HEPATITIS PANEL: CPT

## 2019-12-03 PROCEDURE — 36415 COLL VENOUS BLD VENIPUNCTURE: CPT

## 2019-12-03 PROCEDURE — 82103 ALPHA-1-ANTITRYPSIN TOTAL: CPT

## 2019-12-03 PROCEDURE — 94761 N-INVAS EAR/PLS OXIMETRY MLT: CPT

## 2019-12-03 PROCEDURE — 81003 URINALYSIS AUTO W/O SCOPE: CPT

## 2019-12-03 PROCEDURE — 74011000250 HC RX REV CODE- 250: Performed by: INTERNAL MEDICINE

## 2019-12-03 PROCEDURE — 82607 VITAMIN B-12: CPT

## 2019-12-03 PROCEDURE — 83605 ASSAY OF LACTIC ACID: CPT

## 2019-12-03 PROCEDURE — 82525 ASSAY OF COPPER: CPT

## 2019-12-03 PROCEDURE — 80053 COMPREHEN METABOLIC PANEL: CPT

## 2019-12-03 PROCEDURE — 74011250636 HC RX REV CODE- 250/636: Performed by: INTERNAL MEDICINE

## 2019-12-03 PROCEDURE — C9113 INJ PANTOPRAZOLE SODIUM, VIA: HCPCS | Performed by: INTERNAL MEDICINE

## 2019-12-03 PROCEDURE — 96374 THER/PROPH/DIAG INJ IV PUSH: CPT

## 2019-12-03 PROCEDURE — 86038 ANTINUCLEAR ANTIBODIES: CPT

## 2019-12-03 PROCEDURE — 65270000029 HC RM PRIVATE

## 2019-12-03 PROCEDURE — 89055 LEUKOCYTE ASSESSMENT FECAL: CPT

## 2019-12-03 PROCEDURE — 74011636320 HC RX REV CODE- 636/320: Performed by: EMERGENCY MEDICINE

## 2019-12-03 PROCEDURE — 80061 LIPID PANEL: CPT

## 2019-12-03 PROCEDURE — 87493 C DIFF AMPLIFIED PROBE: CPT

## 2019-12-03 PROCEDURE — 84484 ASSAY OF TROPONIN QUANT: CPT

## 2019-12-03 PROCEDURE — 84703 CHORIONIC GONADOTROPIN ASSAY: CPT

## 2019-12-03 PROCEDURE — 83540 ASSAY OF IRON: CPT

## 2019-12-03 PROCEDURE — 85025 COMPLETE CBC W/AUTO DIFF WBC: CPT

## 2019-12-03 PROCEDURE — 85379 FIBRIN DEGRADATION QUANT: CPT

## 2019-12-03 PROCEDURE — 74011250637 HC RX REV CODE- 250/637: Performed by: INTERNAL MEDICINE

## 2019-12-03 PROCEDURE — 83520 IMMUNOASSAY QUANT NOS NONAB: CPT

## 2019-12-03 PROCEDURE — 83516 IMMUNOASSAY NONANTIBODY: CPT

## 2019-12-03 PROCEDURE — 0107U C DIFF TOX AG DETCJ IA STOOL: CPT

## 2019-12-03 PROCEDURE — 74177 CT ABD & PELVIS W/CONTRAST: CPT

## 2019-12-03 PROCEDURE — 83993 ASSAY FOR CALPROTECTIN FECAL: CPT

## 2019-12-03 RX ORDER — ACETAMINOPHEN 325 MG/1
650 TABLET ORAL
Status: DISCONTINUED | OUTPATIENT
Start: 2019-12-03 | End: 2019-12-05 | Stop reason: HOSPADM

## 2019-12-03 RX ORDER — OXYCODONE AND ACETAMINOPHEN 5; 325 MG/1; MG/1
1 TABLET ORAL
Status: DISCONTINUED | OUTPATIENT
Start: 2019-12-03 | End: 2019-12-05 | Stop reason: HOSPADM

## 2019-12-03 RX ORDER — PANTOPRAZOLE SODIUM 40 MG/10ML
40 INJECTION, POWDER, LYOPHILIZED, FOR SOLUTION INTRAVENOUS DAILY
Status: DISCONTINUED | OUTPATIENT
Start: 2019-12-03 | End: 2019-12-05 | Stop reason: HOSPADM

## 2019-12-03 RX ORDER — DIPHENHYDRAMINE HYDROCHLORIDE 50 MG/ML
12.5 INJECTION, SOLUTION INTRAMUSCULAR; INTRAVENOUS
Status: DISCONTINUED | OUTPATIENT
Start: 2019-12-03 | End: 2019-12-05 | Stop reason: HOSPADM

## 2019-12-03 RX ORDER — LORAZEPAM 1 MG/1
2 TABLET ORAL
Status: DISCONTINUED | OUTPATIENT
Start: 2019-12-03 | End: 2019-12-05 | Stop reason: HOSPADM

## 2019-12-03 RX ORDER — LORAZEPAM 2 MG/ML
2 INJECTION INTRAMUSCULAR
Status: DISCONTINUED | OUTPATIENT
Start: 2019-12-03 | End: 2019-12-05 | Stop reason: HOSPADM

## 2019-12-03 RX ORDER — HYDRALAZINE HYDROCHLORIDE 20 MG/ML
10 INJECTION INTRAMUSCULAR; INTRAVENOUS
Status: DISCONTINUED | OUTPATIENT
Start: 2019-12-03 | End: 2019-12-05 | Stop reason: HOSPADM

## 2019-12-03 RX ORDER — MORPHINE SULFATE 10 MG/ML
4 INJECTION, SOLUTION INTRAMUSCULAR; INTRAVENOUS
Status: DISCONTINUED | OUTPATIENT
Start: 2019-12-03 | End: 2019-12-05 | Stop reason: HOSPADM

## 2019-12-03 RX ORDER — LORAZEPAM 1 MG/1
1 TABLET ORAL
Status: DISCONTINUED | OUTPATIENT
Start: 2019-12-03 | End: 2019-12-05 | Stop reason: HOSPADM

## 2019-12-03 RX ORDER — DICYCLOMINE HYDROCHLORIDE 20 MG/1
20 TABLET ORAL EVERY 6 HOURS
COMMUNITY
End: 2020-07-03

## 2019-12-03 RX ORDER — ONDANSETRON 2 MG/ML
4 INJECTION INTRAMUSCULAR; INTRAVENOUS
Status: DISCONTINUED | OUTPATIENT
Start: 2019-12-03 | End: 2019-12-05 | Stop reason: HOSPADM

## 2019-12-03 RX ORDER — AMLODIPINE BESYLATE 5 MG/1
10 TABLET ORAL DAILY
Status: DISCONTINUED | OUTPATIENT
Start: 2019-12-03 | End: 2019-12-05 | Stop reason: HOSPADM

## 2019-12-03 RX ORDER — LORAZEPAM 2 MG/ML
3 INJECTION INTRAMUSCULAR
Status: DISCONTINUED | OUTPATIENT
Start: 2019-12-03 | End: 2019-12-05 | Stop reason: HOSPADM

## 2019-12-03 RX ORDER — LORAZEPAM 2 MG/ML
1 INJECTION INTRAMUSCULAR
Status: DISCONTINUED | OUTPATIENT
Start: 2019-12-03 | End: 2019-12-05 | Stop reason: HOSPADM

## 2019-12-03 RX ORDER — MORPHINE SULFATE 2 MG/ML
4 INJECTION, SOLUTION INTRAMUSCULAR; INTRAVENOUS ONCE
Status: COMPLETED | OUTPATIENT
Start: 2019-12-03 | End: 2019-12-03

## 2019-12-03 RX ORDER — ONDANSETRON 2 MG/ML
4 INJECTION INTRAMUSCULAR; INTRAVENOUS ONCE
Status: COMPLETED | OUTPATIENT
Start: 2019-12-03 | End: 2019-12-03

## 2019-12-03 RX ORDER — HEPARIN SODIUM 5000 [USP'U]/ML
5000 INJECTION, SOLUTION INTRAVENOUS; SUBCUTANEOUS EVERY 8 HOURS
Status: DISCONTINUED | OUTPATIENT
Start: 2019-12-03 | End: 2019-12-05 | Stop reason: HOSPADM

## 2019-12-03 RX ORDER — DICYCLOMINE HYDROCHLORIDE 10 MG/1
20 CAPSULE ORAL
Status: DISCONTINUED | OUTPATIENT
Start: 2019-12-04 | End: 2019-12-05 | Stop reason: HOSPADM

## 2019-12-03 RX ORDER — SODIUM CHLORIDE 9 MG/ML
125 INJECTION, SOLUTION INTRAVENOUS CONTINUOUS
Status: DISCONTINUED | OUTPATIENT
Start: 2019-12-03 | End: 2019-12-05 | Stop reason: HOSPADM

## 2019-12-03 RX ORDER — TRAZODONE HYDROCHLORIDE 100 MG/1
TABLET ORAL
COMMUNITY

## 2019-12-03 RX ORDER — NALOXONE HYDROCHLORIDE 0.4 MG/ML
0.4 INJECTION, SOLUTION INTRAMUSCULAR; INTRAVENOUS; SUBCUTANEOUS AS NEEDED
Status: DISCONTINUED | OUTPATIENT
Start: 2019-12-03 | End: 2019-12-05 | Stop reason: HOSPADM

## 2019-12-03 RX ORDER — SODIUM CHLORIDE 0.9 % (FLUSH) 0.9 %
5-40 SYRINGE (ML) INJECTION AS NEEDED
Status: DISCONTINUED | OUTPATIENT
Start: 2019-12-03 | End: 2019-12-05 | Stop reason: HOSPADM

## 2019-12-03 RX ORDER — MORPHINE SULFATE 4 MG/ML
8 INJECTION INTRAVENOUS ONCE
Status: COMPLETED | OUTPATIENT
Start: 2019-12-03 | End: 2019-12-03

## 2019-12-03 RX ORDER — SODIUM CHLORIDE 0.9 % (FLUSH) 0.9 %
5-40 SYRINGE (ML) INJECTION EVERY 8 HOURS
Status: DISCONTINUED | OUTPATIENT
Start: 2019-12-03 | End: 2019-12-05 | Stop reason: HOSPADM

## 2019-12-03 RX ADMIN — Medication 10 ML: at 11:28

## 2019-12-03 RX ADMIN — DIPHENHYDRAMINE HYDROCHLORIDE 12.5 MG: 50 INJECTION, SOLUTION INTRAMUSCULAR; INTRAVENOUS at 21:32

## 2019-12-03 RX ADMIN — ACETAMINOPHEN 650 MG: 325 TABLET ORAL at 17:54

## 2019-12-03 RX ADMIN — MORPHINE SULFATE 4 MG: 10 INJECTION INTRAVENOUS at 13:33

## 2019-12-03 RX ADMIN — OXYCODONE HYDROCHLORIDE AND ACETAMINOPHEN 1 TABLET: 5; 325 TABLET ORAL at 09:18

## 2019-12-03 RX ADMIN — HEPARIN SODIUM 5000 UNITS: 5000 INJECTION INTRAVENOUS; SUBCUTANEOUS at 14:30

## 2019-12-03 RX ADMIN — MORPHINE SULFATE 4 MG: 2 INJECTION, SOLUTION INTRAMUSCULAR; INTRAVENOUS at 03:44

## 2019-12-03 RX ADMIN — SODIUM CHLORIDE 1000 ML: 900 INJECTION, SOLUTION INTRAVENOUS at 03:43

## 2019-12-03 RX ADMIN — IOPAMIDOL 100 ML: 612 INJECTION, SOLUTION INTRAVENOUS at 04:52

## 2019-12-03 RX ADMIN — Medication 10 ML: at 16:43

## 2019-12-03 RX ADMIN — OXYCODONE HYDROCHLORIDE AND ACETAMINOPHEN 1 TABLET: 5; 325 TABLET ORAL at 23:01

## 2019-12-03 RX ADMIN — ONDANSETRON 4 MG: 2 INJECTION INTRAMUSCULAR; INTRAVENOUS at 03:44

## 2019-12-03 RX ADMIN — PANTOPRAZOLE SODIUM 40 MG: 40 INJECTION, POWDER, FOR SOLUTION INTRAVENOUS at 08:53

## 2019-12-03 RX ADMIN — HEPARIN SODIUM 5000 UNITS: 5000 INJECTION INTRAVENOUS; SUBCUTANEOUS at 07:11

## 2019-12-03 RX ADMIN — ACETAMINOPHEN 650 MG: 325 TABLET ORAL at 09:18

## 2019-12-03 RX ADMIN — MORPHINE SULFATE 4 MG: 10 INJECTION INTRAVENOUS at 07:11

## 2019-12-03 RX ADMIN — MORPHINE SULFATE 8 MG: 4 INJECTION INTRAVENOUS at 05:56

## 2019-12-03 RX ADMIN — OXYCODONE HYDROCHLORIDE AND ACETAMINOPHEN 1 TABLET: 5; 325 TABLET ORAL at 17:54

## 2019-12-03 RX ADMIN — HEPARIN SODIUM 5000 UNITS: 5000 INJECTION INTRAVENOUS; SUBCUTANEOUS at 21:32

## 2019-12-03 RX ADMIN — FOLIC ACID: 5 INJECTION, SOLUTION INTRAMUSCULAR; INTRAVENOUS; SUBCUTANEOUS at 06:20

## 2019-12-03 RX ADMIN — SODIUM CHLORIDE 125 ML/HR: 900 INJECTION, SOLUTION INTRAVENOUS at 10:00

## 2019-12-03 RX ADMIN — AMLODIPINE BESYLATE 10 MG: 5 TABLET ORAL at 17:24

## 2019-12-03 NOTE — H&P
History & Physical    Patient: Rebecca Álvarez MRN: [id number]  CSN: [id number]    YOB: 1975  Age: 45 y.o. Sex: female      DOA: 12/3/2019    Chief Complaint:   Chief Complaint   Patient presents with    Epigastric Pain          HPI:     Rebecca Álvarez is a 45 y.o.  female who has history of pancreatitis, colitis, fatty liver, post gastric bypass surgery presents to the emergency room via ambulance with worsening abdominal pain 9 out of 10. Patient states she had several alcoholic drinks for Thanksgiving and on Friday started with nausea dry heaving as well as abdominal pain. She is tried Bentyl with no improvement she is only been able to keep liquids down. In the emergency room she was given 8 of morphine with minimal improvement of pain. CT scan shows ascites and pancreatitis no pseudocyst.  Patient is post gastric bypass with secondary inflammation . I was asked to admit for intractable pain and nausea and vomiting    Past Medical History:   Diagnosis Date    Anemia     Cancer ([facility] [address] 2011     history of endometrial cancer    Diarrhea     GERD (gastroesophageal reflux disease)     Hypertension 2017    Intestinal malabsorption     Psychiatric disorder     Anxiety and depression    Severe obesity with body mass index (BMI) of 35.0 to 39.9 with comorbidity ([address]     Smoker     smokes 5 cigarettes per day    Status post gastric bypass for obesity 2000    daryl / librado hermosillo    Syncopal episodes     has plans to see a neurologist    [address] [place] [address] Wears dentures     teeth extraction due to vomiting and vitamin deficiencies       Past Surgical History:   Procedure Laterality Date    HX CHOLECYSTECTOMY      HX GASTRIC BYPASS  2000    daryl / librado hermosillo    HX GASTRIC BYPASS  09/2018    HX GI  2017    EGD    HX HYSTERECTOMY      HX OTHER SURGICAL Right     resection of benign breast mass       Family History   Problem Relation Age of Onset    Diabetes Mother        Social History Socioeconomic History    Marital status: SINGLE     Spouse name: Not on file    Number of children: Not on file    Years of education: Not on file    Highest education level: Not on file   Tobacco Use    Smoking status: Former Smoker     Types: Cigarettes     Last attempt to quit: 2018     Years since quittin.2    Smokeless tobacco: Never Used   Substance and Sexual Activity    Alcohol use: No     Alcohol/week: 1.0 standard drinks     Types: 1 Cans of beer per week     Frequency: Never    Drug use: No    Sexual activity: Not Currently     Birth control/protection: Surgical       Prior to Admission medications    Medication Sig Start Date End Date Taking? Authorizing Provider   dicyclomine (BENTYL) 20 mg tablet Take 20 mg by mouth every six (6) hours. Yes Other, MD Kari   multivitamin (ONE A DAY) tablet Take 1 Tab by mouth daily. Provider, Historical       No Known Allergies      Review of Systems  GENERAL: Patient alert, awake and oriented times 3, able to communicate full sentences and not in distress. HEENT: No change in vision, no earache, tinnitus, sore throat or sinus congestion. NECK: No pain or stiffness. PULMONARY: No shortness of breath, cough or wheeze. Cardiovascular: no pnd or orthopnea, no CP  GASTROINTESTINAL: + abdominal pain, +nausea, +vomiting or diarrhea, melena or bright red blood per rectum. GENITOURINARY: No urinary frequency, urgency, hesitancy or dysuria. MUSCULOSKELETAL: No joint or muscle pain, no back pain, no recent trauma. DERMATOLOGIC: No rash, no itching, no lesions. ENDOCRINE: No polyuria, polydipsia, no heat or cold intolerance. No recent change in weight. HEMATOLOGICAL: + anemia or easy bruising or bleeding. NEUROLOGIC: No headache, seizures, numbness, tingling or weakness.        Physical Exam:     Physical Exam:  Visit Vitals  BP (!) 157/117   Pulse 95   Temp 98.1 °F (36.7 °C)   Resp 21   Ht 5' 5\" (1.651 m)   Wt 97.5 kg (215 lb)   SpO2 100%   BMI 35.78 kg/m²      O2 Device: Room air    Temp (24hrs), Av.1 °F (36.7 °C), Min:98.1 °F (36.7 °C), Max:98.1 °F (36.7 °C)    No intake/output data recorded. No intake/output data recorded. General:  Alert, cooperative, no distress, appears stated age. Head: Normocephalic, without obvious abnormality, atraumatic. Eyes:  Conjunctivae/corneas clear. PERRL, EOMs intact. Nose: Nares normal. No drainage or sinus tenderness. Neck: Supple, symmetrical, trachea midline, no adenopathy, thyroid: no enlargement, no carotid bruit and no JVD. Lungs:   Clear to auscultation bilaterally. Heart:  Regular rate and rhythm, S1, S2 normal.     Abdomen: Soft, +tender. Bowel sounds normal.  Upper gastric tender to palpation mild guarding   Extremities: Extremities normal, atraumatic, no cyanosis or edema. Pulses: 2+ and symmetric all extremities. Skin:  No rashes or lesions   Neurologic: AAOx3, No focal motor or sensory deficit. Labs Reviewed: All lab results for the last 24 hours reviewed. Recent Results (from the past 24 hour(s))   CBC WITH AUTOMATED DIFF    Collection Time: 19  3:28 AM   Result Value Ref Range    WBC 11.1 4.6 - 13.2 K/uL    RBC 4.05 (L) 4.20 - 5.30 M/uL    HGB 13.8 12.0 - 16.0 g/dL    HCT 42.0 35.0 - 45.0 %    .7 (H) 74.0 - 97.0 FL    MCH 34.1 (H) 24.0 - 34.0 PG    MCHC 32.9 31.0 - 37.0 g/dL    RDW 17.8 (H) 11.6 - 14.5 %    PLATELET 914 588 - 718 K/uL    MPV 9.6 9.2 - 11.8 FL    NEUTROPHILS 84 (H) 40 - 73 %    LYMPHOCYTES 11 (L) 21 - 52 %    MONOCYTES 5 3 - 10 %    EOSINOPHILS 0 0 - 5 %    BASOPHILS 0 0 - 2 %    ABS. NEUTROPHILS 9.4 (H) 1.8 - 8.0 K/UL    ABS. LYMPHOCYTES 1.2 0.9 - 3.6 K/UL    ABS. MONOCYTES 0.5 0.05 - 1.2 K/UL    ABS. EOSINOPHILS 0.0 0.0 - 0.4 K/UL    ABS.  BASOPHILS 0.0 0.0 - 0.1 K/UL    DF AUTOMATED     METABOLIC PANEL, COMPREHENSIVE    Collection Time: 19  3:28 AM   Result Value Ref Range    Sodium 131 (L) 136 - 145 mmol/L Potassium 4.2 3.5 - 5.5 mmol/L    Chloride 101 100 - 111 mmol/L    CO2 18 (L) 21 - 32 mmol/L    Anion gap 12 3.0 - 18 mmol/L    Glucose 128 (H) 74 - 99 mg/dL    BUN 14 7.0 - 18 MG/DL    Creatinine 1.07 0.6 - 1.3 MG/DL    BUN/Creatinine ratio 13 12 - 20      GFR est AA >60 >60 ml/min/1.73m2    GFR est non-AA 56 (L) >60 ml/min/1.73m2    Calcium 8.2 (L) 8.5 - 10.1 MG/DL    Bilirubin, total 1.7 (H) 0.2 - 1.0 MG/DL    ALT (SGPT) 158 (H) 13 - 56 U/L    AST (SGOT) 276 (H) 10 - 38 U/L    Alk. phosphatase 149 (H) 45 - 117 U/L    Protein, total 7.7 6.4 - 8.2 g/dL    Albumin 3.3 (L) 3.4 - 5.0 g/dL    Globulin 4.4 (H) 2.0 - 4.0 g/dL    A-G Ratio 0.8 0.8 - 1.7     TROPONIN I    Collection Time: 12/03/19  3:28 AM   Result Value Ref Range    Troponin-I, QT <0.02 0.0 - 0.045 NG/ML   LIPASE    Collection Time: 12/03/19  3:28 AM   Result Value Ref Range    Lipase 3,309 (H) 73 - 393 U/L   HCG QL SERUM    Collection Time: 12/03/19  3:28 AM   Result Value Ref Range    HCG, Ql. NEGATIVE  NEG     EKG, 12 LEAD, INITIAL    Collection Time: 12/03/19  3:43 AM   Result Value Ref Range    Ventricular Rate 95 BPM    Atrial Rate 95 BPM    P-R Interval 152 ms    QRS Duration 82 ms    Q-T Interval 372 ms    QTC Calculation (Bezet) 467 ms    Calculated P Axis 59 degrees    Calculated R Axis 21 degrees    Calculated T Axis 43 degrees    Diagnosis       Normal sinus rhythm  Possible Left atrial enlargement  Borderline ECG  When compared with ECG of 26-APR-2019 08:08,  No significant change was found      and EKG    Procedures/imaging: see electronic medical records for all procedures/Xrays and details which were not copied into this note but were reviewed prior to creation of Plan  CT Results  (Last 48 hours)               12/03/19 0500  CT ABD PELV W CONT Final result    Impression:  IMPRESSION:       1. Acute interstitial pancreatitis. 2. Hepatomegaly and steatosis.        3. Prior gastric bypass with nonspecific edematous appearance of the gastric   pouch the proximal diverting jejunal, potentially reactive etiology from   interstitial pancreatitis. Narrative:  EXAM: CT of the abdomen and pelvis       INDICATION: Epigastric abdominal pain, pancreatitis       COMPARISON: 10/2/2019       TECHNIQUE: Axial CT imaging of the abdomen and pelvis was performed without oral   but, with intravenous contrast. Multiplanar reformats were generated. One or   more dose reduction techniques were used on this CT: automated exposure control,   adjustment of the mAs and/or kVp according to patient size, and iterative   reconstruction techniques. The specific techniques used on this CT exam have   been documented in the patient's electronic medical record. Digital Imaging and   Communications in Medicine (DICOM) format image data are available to   nonaffiliated external healthcare facilities or entities on a secure, media   free, reciprocally searchable basis with patient authorization for at least a   12-month period after this study. _______________       FINDINGS:       LOWER CHEST: Unremarkable. LIVER, BILIARY: Hepatomegaly measuring 20 cm with mild low   attenuation/steatosis. No biliary duct dilatation. Prior cholecystectomy       PANCREAS: Diffuse pancreatic edema with homogeneous enhancement, with moderate   to marked peripancreatic stranding and fluid       SPLEEN: Normal.       ADRENALS: Normal.       KIDNEYS, URETERS, BLADDER: Symmetric enhancing bilateral kidneys without   hydronephrosis, perinephric fluid or induration. No hydroureter. Unremarkable   bladder       GASTROINTESTINAL TRACT: Degraded evaluation secondary to the absence of oral   contrast. Postoperative changes of prior gastric bypass with edematous   thickening of the gastric pouch, gastrojejunal anastomosis and proximal   diverting jejunal. Edematous appearance of the excluded stomach and duodenal   sweep, almost certainly reactive.  No findings of obstruction       LYMPH NODES: No enlarged lymph nodes. PELVIC ORGANS: Prior hysterectomy right adnexal 2.6 and meter cyst, favoring   physiologic ovarian etiology       VASCULATURE: Normal caliber aorta. Narrowed portal vein at the origin, probably   secondary to pancreatic edema, otherwise patent. Patent splenic vein       OSSEOUS: No acute or aggressive osseous abnormalities identified. OTHER: Small volume ascites, moderate central mesenteric edema notably fluid.       _______________               CT-scan of the abdomen  Assessment/Plan     Active Problems:  .  Pancreatitis  We will start on fluids at 125 an hour  We will also start clear liquid diet give Zofran for nausea  Pain control will be with morphine alternating Percocet    Hypertension  Likely aggravated by pain and weight gain (patient is at weight higher than prior to surgery) we will use hydralazine as needed    Tobacco disorder  Advised to quit    Reactive colitis  Was seen by Dr. Carlos Manuel Orellana as an outpatient r consultation called in      Abnormal liver function test   question from alcohol versus stones has history of cholecystectomy will check abdominal ultrasound and placed on CIWA and banana bags daily patient underestimates alcohol consumption     Macrocytosis  Likely from V71 folic acid check levels may need replacement  DVT/GI Prophylaxis: Hep SQ    Discussed with patient at bedside about hospital admission and my plan care, who understood and agree with my plan care.     Dina Moya MD  12/3/2019 5:37 AM

## 2019-12-03 NOTE — PROGRESS NOTES
0930 Bedside shift change report received from Leslie Kelly (offgoing nurse). Report included the following information SBAR, Kardex, ED Summary, Intake/Output, MAR, Accordion, Recent Results and Quality Measures. Patient denies any pain or distress. Patient received PRN Percocet at 6872 by previous Nurse for breakthrough pain and PRN Morphine at 0711. Patient lipase was 3,309, and continues on iv hydration per order. Assessment completed, see flow sheets          1200 Reassessment, no changes, patient alert talking with visitors at bedside        1333  patient c/o abdominal throbbing  pain of  7/10 at this time, like a toothache. Patient states she was here 2 months ago for same pain, dx colitis,uti, discharged home from the ER with antibiotics. Patient c/o Nia Christ very often,patient states \"I had the runs this morning x1\"      1400 Reassessment of pain 0/10. Patient resting in bed, family at bedside  Dr Mena Abreu was in to see patient placed patient on enteric precautions to rule out CDiff. Unable to attain stool sample at this time, patient has not had another bowel movement to collect.           1600-Reassessment completed, no changes, Ciwa scale still remains a zero. Philis Bullion was ordered for HTN by Hospaitalist Dr Sagar Perez       [phone]  Bedside shift change report given to Leslie Kelly (oncoming nurse) by Charmaine Brown RN  (offgoing nurse). Report included the following information SBAR, Kardex, ED Summary, Intake/Output, MAR, Accordion, Recent Results, Med Rec Status and Quality Measures. Patient currently denies pain or distress, and is talking on the telephone, call bell in reach and whiteboard updated.

## 2019-12-03 NOTE — PROGRESS NOTES
Hospitalist Progress Note    Patient: Nickie Rasmussen MRN: [id number]  CSN: [id number]    YOB: 1975  Age: 45 y.o. Sex: female    DOA: 12/3/2019 LOS:  LOS: 0 days          Patient admitted this am by Dr Jeanette Richards labs and orders reviewed    US report reviewed after done. Gi in to see patient and has ordered stool to be sent for c diff. Continue current management and repeat labs including  lipase in am, will add norvasc for uncontrolled HTN.

## 2019-12-03 NOTE — ROUTINE PROCESS
Bedside and Verbal shift change report given to GENOVEVA Shetty (oncoming nurse) by Home Sandoval RN (offgoing nurse). Report included the following information SBAR, Kardex, Intake/Output, MAR and Recent Results.

## 2019-12-03 NOTE — PROGRESS NOTES
0930 Bedside shift change report received from Leslie Kelly (offgoing nurse). Report included the following information SBAR, Kardex, ED Summary, Intake/Output, MAR, Accordion, Recent Results and Quality Measures. Patient denies any pain or distress. Patient received PRN Percocet at 2849 by previous Nurse for breakthrough pain and PRN Morphine at 0711. Patient lipase was 3,309, and continues on iv hydration per order. Patient is awake and oriented to all spheres, denies any distress,mood is euthymic, Patient c/o of black specks falling in her eye site that comes and goes,but denies it happening now,denies earache,sore throat or any congestion, eyes are clear,PERRLA. Patient denies any neck pain, performs neck rolls without difficulty. Neck is supple, no enlarged glands or lymph nodes Patient denies any SOB, c/o of cough when she first wakes up, probable for nasal drip, but declines any medication for it when asked if she would like this writer to notify MD for Flonase. Lung sounds are clear bilaterally, no wheezes, rhonchi,or cough at present. Patient denies any chest pain, regular rate and rhythm. Patient states \"my entire abdomen hurts\" when I am not on this pain medicine\", abdomen is tender with palpation,mild guarding to RUQ,denies nausea, vomiting or diarrhea at the present, stated last diarrhea was this morning at 0200. Patient denies any bleeding via rectum. Active bowel sounds auscultated via sthethoscope, hernia also present on upper abdomen, pt states, \" it has been there for several years. Patient voiding without difficulty with urination however urine is dark dominick color. Patient denies any urinary frequency, or dysuria. Patient denies lower back pain at present but states \"I have lower back pain when my abdomen hurts. She denies any trauma. Extremities are WDL,no edema, or cyanosis, pulses 2+ in all extremities. Patient negative for rashes, or skin lesions, no wounds, or rashes. Patient is + for weight gain, patient states,\" I gained 35lbs over 4 months\". Patient verbalizes usually weighs 180lbs , but weighs 215lbs,She states,\"it is the steroid\". Patient  denies polyuria, polydipsia, she has no heat or cold intolerance. H&H 13.8/42.0,+ for anemia,patient verbalizes \"I  bruise easily\". No active bleeding noted or reported to this writer. Patient denies headache, denies any numbness, weakness,or tingling, no seizures. There is no sensory deficit. Patient has no abnormal reflexes assessed by this writer. Patient able to demonstrate stable gait and stance while ambulating to the bathroom,patient demonstrated fine finger movements, with rapid alternating movements,finger-nose-finger,heel knee shin.patient denies tingling or numbness to extremities. Patient hand  are equal  and strong bilaterally. Patient was attentive and speech is clear. Patient has a very good memory, she is able to regurgitate all past surgeries and dates without difficulty during this writers assessment      1200 Reassessment, no changes, patient alert talking with visitors at bedside      1333  patient c/o abdominal throbbing  pain of  7/10 at this time, like a toothache. Patient states she was here 2 months ago for same pain, dx colitis,uti, discharged home from the ER with antibiotics. Patient c/o Dawna Card very often,patient states \"I had the runs this morning x1\"     1400 Reassessment of pain 0/10. Patient resting in bed, family at bedside  Dr Rosalia Otto was in to see patient placed patient on enteric precautions to rule out CDiff. Unable to attain stool sample at this time, patient has not had another bowel movement to collect.     Patient Vitals for the past 12 hrs:   Temp Pulse Resp BP SpO2   12/03/19 1016 99.4 °F (37.4 °C) 100 15 (!) 156/94 100 %   12/03/19 0729 99.6 °F (37.6 °C) 100 15 (!) 159/98 100 %   12/03/19 0600 98.8 °F (37.1 °C) 97 12 (!) 168/108 100 %   12/03/19 0530  95 15 (!) 170/111 100 %   12/03/19 0400  95 19 (!) 171/110 100 % 12/03/19 0330  95 21 (!) 157/117 100 %   12/03/19 0326 98.1 °F (36.7 °C) (!) 101 20 (!) 174/111 100 %   02 device: Room Air        1600-Reassessment completed, no changes, Ciwa scale still remains a zero. Jose See was ordered for HTN by Hospaitalist Dr Renee Zhang      [phone]  Bedside shift change report given to Lenin Guerrero (oncoming nurse) by Maureen Garnett RN  (offgoing nurse). Report included the following information SBAR, Kardex, ED Summary, Intake/Output, MAR, Accordion, Recent Results, Med Rec Status and Quality Measures. Patient currently denies pain or distress, and is talking on the telephone, call bell in reach and whiteboard updated.

## 2019-12-03 NOTE — ED NOTES
EMERGENCY DEPARTMENT HISTORY AND PHYSICAL EXAM    Date: 12/3/2019  Patient Name: Tan Gutierres    History of Presenting Illness     Chief Complaint   Patient presents with    Epigastric Pain         History Provided By: Patient    Chief Complaint: Epigastric pain and vomiting  Duration: Days  Timing:  Acute  Location: epigastric  Quality: Sharp  Severity: 10 out of 10  Modifying Factors: Movement vomiting  Associated Symptoms: denies any other associated signs or symptoms    Additional History (Context):   3:32 AM  Tan Gutierres is a 45 y.o. female with PMHX of alcoholic pancreatitis, colitis, who presents to the emergency department C/O epigastric pain. Associated sxs include bloating. Pt denies chest pain shortness of breath, and any other sxs or complaints. Patient was well till 2 days ago when she had onset of epigastric pain associated with vomiting. She had abdominal bloating and increased pain worse with eating. She notes no alleviating factors. PCP: Lizeth RAMOS NP    Current Facility-Administered Medications   Medication Dose Route Frequency Provider Last Rate Last Dose    morphine injection 8 mg  8 mg IntraVENous ONCE Bentley Burns MD         Current Outpatient Medications   Medication Sig Dispense Refill    dicyclomine (BENTYL) 20 mg tablet Take 20 mg by mouth every six (6) hours.  multivitamin (ONE A DAY) tablet Take 1 Tab by mouth daily.          Past History     Past Medical History:  Past Medical History:   Diagnosis Date    Anemia     Cancer ([facility] [address] 2011     history of endometrial cancer    Diarrhea     GERD (gastroesophageal reflux disease)     Hypertension 2017    Intestinal malabsorption     Psychiatric disorder     Anxiety and depression    Severe obesity with body mass index (BMI) of 35.0 to 39.9 with comorbidity ([facility] [address]     Smoker     smokes 5 cigarettes per day    Status post gastric bypass for obesity 2000    daryl / librado hermosillo    Syncopal episodes     has plans to see a neurologist    Royce Wears dentures     teeth extraction due to vomiting and vitamin deficiencies       Past Surgical History:  Past Surgical History:   Procedure Laterality Date    HX CHOLECYSTECTOMY      HX GASTRIC BYPASS      open / [place]    HX GASTRIC BYPASS  2018    HX GI  2017    EGD    HX HYSTERECTOMY      HX OTHER SURGICAL Right     resection of benign breast mass       Family History:  Family History   Problem Relation Age of Onset    Diabetes Mother        Social History:  Social History     Tobacco Use    Smoking status: Former Smoker     Types: Cigarettes     Last attempt to quit: 2018     Years since quittin.2    Smokeless tobacco: Never Used   Substance Use Topics    Alcohol use: No     Alcohol/week: 1.0 standard drinks     Types: 1 Cans of beer per week     Frequency: Never    Drug use: No       Allergies:  No Known Allergies      Review of Systems   Review of Systems   Constitutional: Positive for appetite change. Negative for chills and fever. HENT: Negative for congestion and sore throat. Eyes: Negative for pain and redness. Respiratory: Negative for cough and shortness of breath. Cardiovascular: Negative for chest pain and palpitations. Gastrointestinal: Positive for abdominal pain, nausea and vomiting. Negative for diarrhea. Endocrine: Negative for polydipsia and polyuria. Genitourinary: Negative for difficulty urinating and dysuria. Musculoskeletal: Negative for back pain and neck pain. Skin: Negative for pallor and rash. Neurological: Negative for weakness, numbness and headaches. All other systems reviewed and are negative. Physical Exam     Vitals:    19 0326 19 0330   BP: (!) 174/111 (!) 157/117   Pulse: (!) 101 95   Resp: 20 21   Temp: 98.1 °F (36.7 °C)    SpO2: 100% 100%   Weight: 97.5 kg (215 lb)    Height: 5' 5\" (1.651 m)      Physical Exam  Vitals signs and nursing note reviewed.    Constitutional: Appearance: Normal appearance. She is well-developed. HENT:      Head: Normocephalic and atraumatic. Right Ear: External ear normal.      Left Ear: External ear normal.      Nose: Nose normal.      Mouth/Throat:      Mouth: Mucous membranes are dry. Pharynx: No posterior oropharyngeal erythema. Eyes:      Extraocular Movements: Extraocular movements intact. Conjunctiva/sclera: Conjunctivae normal.      Pupils: Pupils are equal, round, and reactive to light. Neck:      Musculoskeletal: Normal range of motion and neck supple. Vascular: No JVD. Trachea: No tracheal deviation. Cardiovascular:      Rate and Rhythm: Tachycardia present. Pulses: Normal pulses. Heart sounds: Normal heart sounds. No murmur. No friction rub. No gallop. Comments: Fast rate and rhythm  Pulmonary:      Effort: Pulmonary effort is normal. No respiratory distress. Breath sounds: Normal breath sounds. No wheezing or rales. Abdominal:      General: Bowel sounds are normal. There is no distension. Palpations: Abdomen is soft. There is no mass. Tenderness: There is tenderness. There is guarding. There is no rebound. Comments: Marked epigastric tenderness to palpation with guarding. Musculoskeletal: Normal range of motion. General: No tenderness. Skin:     General: Skin is warm and dry. Capillary Refill: Capillary refill takes less than 2 seconds. Findings: No rash. Neurological:      General: No focal deficit present. Mental Status: She is alert and oriented to person, place, and time. Cranial Nerves: No cranial nerve deficit. Deep Tendon Reflexes: Reflexes are normal and symmetric. Psychiatric:         Mood and Affect: Mood normal.         Behavior: Behavior normal.         Thought Content:  Thought content normal.           Diagnostic Study Results     Labs -     Recent Results (from the past 24 hour(s))   CBC WITH AUTOMATED DIFF Collection Time: 12/03/19  3:28 AM   Result Value Ref Range    WBC 11.1 4.6 - 13.2 K/uL    RBC 4.05 (L) 4.20 - 5.30 M/uL    HGB 13.8 12.0 - 16.0 g/dL    HCT 42.0 35.0 - 45.0 %    .7 (H) 74.0 - 97.0 FL    MCH 34.1 (H) 24.0 - 34.0 PG    MCHC 32.9 31.0 - 37.0 g/dL    RDW 17.8 (H) 11.6 - 14.5 %    PLATELET 587 027 - 570 K/uL    MPV 9.6 9.2 - 11.8 FL    NEUTROPHILS 84 (H) 40 - 73 %    LYMPHOCYTES 11 (L) 21 - 52 %    MONOCYTES 5 3 - 10 %    EOSINOPHILS 0 0 - 5 %    BASOPHILS 0 0 - 2 %    ABS. NEUTROPHILS 9.4 (H) 1.8 - 8.0 K/UL    ABS. LYMPHOCYTES 1.2 0.9 - 3.6 K/UL    ABS. MONOCYTES 0.5 0.05 - 1.2 K/UL    ABS. EOSINOPHILS 0.0 0.0 - 0.4 K/UL    ABS. BASOPHILS 0.0 0.0 - 0.1 K/UL    DF AUTOMATED     METABOLIC PANEL, COMPREHENSIVE    Collection Time: 12/03/19  3:28 AM   Result Value Ref Range    Sodium 131 (L) 136 - 145 mmol/L    Potassium 4.2 3.5 - 5.5 mmol/L    Chloride 101 100 - 111 mmol/L    CO2 18 (L) 21 - 32 mmol/L    Anion gap 12 3.0 - 18 mmol/L    Glucose 128 (H) 74 - 99 mg/dL    BUN 14 7.0 - 18 MG/DL    Creatinine 1.07 0.6 - 1.3 MG/DL    BUN/Creatinine ratio 13 12 - 20      GFR est AA >60 >60 ml/min/1.73m2    GFR est non-AA 56 (L) >60 ml/min/1.73m2    Calcium 8.2 (L) 8.5 - 10.1 MG/DL    Bilirubin, total 1.7 (H) 0.2 - 1.0 MG/DL    ALT (SGPT) 158 (H) 13 - 56 U/L    AST (SGOT) 276 (H) 10 - 38 U/L    Alk.  phosphatase 149 (H) 45 - 117 U/L    Protein, total 7.7 6.4 - 8.2 g/dL    Albumin 3.3 (L) 3.4 - 5.0 g/dL    Globulin 4.4 (H) 2.0 - 4.0 g/dL    A-G Ratio 0.8 0.8 - 1.7     TROPONIN I    Collection Time: 12/03/19  3:28 AM   Result Value Ref Range    Troponin-I, QT <0.02 0.0 - 0.045 NG/ML   LIPASE    Collection Time: 12/03/19  3:28 AM   Result Value Ref Range    Lipase 3,309 (H) 73 - 393 U/L   HCG QL SERUM    Collection Time: 12/03/19  3:28 AM   Result Value Ref Range    HCG, Ql. NEGATIVE  NEG     EKG, 12 LEAD, INITIAL    Collection Time: 12/03/19  3:43 AM   Result Value Ref Range    Ventricular Rate 95 BPM    Atrial Rate 95 BPM    P-R Interval 152 ms    QRS Duration 82 ms    Q-T Interval 372 ms    QTC Calculation (Bezet) 467 ms    Calculated P Axis 59 degrees    Calculated R Axis 21 degrees    Calculated T Axis 43 degrees    Diagnosis       Normal sinus rhythm  Possible Left atrial enlargement  Borderline ECG  When compared with ECG of 26-APR-2019 08:08,  No significant change was found         Radiologic Studies -  CT ABD PELV W CONT   Final Result   IMPRESSION:      1. Acute interstitial pancreatitis. 2. Hepatomegaly and steatosis. 3. Prior gastric bypass with nonspecific edematous appearance of the gastric   pouch the proximal diverting jejunal, potentially reactive etiology from   interstitial pancreatitis. CT Results  (Last 48 hours)               12/03/19 0500  CT ABD PELV W CONT Final result    Impression:  IMPRESSION:       1. Acute interstitial pancreatitis. 2. Hepatomegaly and steatosis. 3. Prior gastric bypass with nonspecific edematous appearance of the gastric   pouch the proximal diverting jejunal, potentially reactive etiology from   interstitial pancreatitis. Narrative:  EXAM: CT of the abdomen and pelvis       INDICATION: Epigastric abdominal pain, pancreatitis       COMPARISON: 10/2/2019       TECHNIQUE: Axial CT imaging of the abdomen and pelvis was performed without oral   but, with intravenous contrast. Multiplanar reformats were generated. One or   more dose reduction techniques were used on this CT: automated exposure control,   adjustment of the mAs and/or kVp according to patient size, and iterative   reconstruction techniques. The specific techniques used on this CT exam have   been documented in the patient's electronic medical record.  Digital Imaging and   Communications in Medicine (DICOM) format image data are available to   nonaffiliated external healthcare facilities or entities on a secure, media   free, reciprocally searchable basis with patient authorization for at least a   12-month period after this study. _______________       FINDINGS:       LOWER CHEST: Unremarkable. LIVER, BILIARY: Hepatomegaly measuring 20 cm with mild low   attenuation/steatosis. No biliary duct dilatation. Prior cholecystectomy       PANCREAS: Diffuse pancreatic edema with homogeneous enhancement, with moderate   to marked peripancreatic stranding and fluid       SPLEEN: Normal.       ADRENALS: Normal.       KIDNEYS, URETERS, BLADDER: Symmetric enhancing bilateral kidneys without   hydronephrosis, perinephric fluid or induration. No hydroureter. Unremarkable   bladder       GASTROINTESTINAL TRACT: Degraded evaluation secondary to the absence of oral   contrast. Postoperative changes of prior gastric bypass with edematous   thickening of the gastric pouch, gastrojejunal anastomosis and proximal   diverting jejunal. Edematous appearance of the excluded stomach and duodenal   sweep, almost certainly reactive. No findings of obstruction       LYMPH NODES: No enlarged lymph nodes. PELVIC ORGANS: Prior hysterectomy right adnexal 2.6 and meter cyst, favoring   physiologic ovarian etiology       VASCULATURE: Normal caliber aorta. Narrowed portal vein at the origin, probably   secondary to pancreatic edema, otherwise patent. Patent splenic vein       OSSEOUS: No acute or aggressive osseous abnormalities identified.        OTHER: Small volume ascites, moderate central mesenteric edema notably fluid.       _______________               CXR Results  (Last 48 hours)    None          Medications given in the ED-  Medications   morphine injection 8 mg (has no administration in time range)   sodium chloride 0.9 % bolus infusion 1,000 mL (1,000 mL IntraVENous New Bag 12/3/19 8255)   morphine injection 4 mg (4 mg IntraVENous Given 12/3/19 7327)   ondansetron (ZOFRAN) injection 4 mg (4 mg IntraVENous Given 12/3/19 8505)   iopamidol (ISOVUE 300) 61 % contrast injection  mL (100 mL IntraVENous Given 12/3/19 0452)         Medical Decision Making   I am the first provider for this patient. I reviewed the vital signs, available nursing notes, past medical history, past surgical history, family history and social history. Vital Signs-Reviewed the patient's vital signs. EKG interpretation: (Preliminary)  03:43  ECG  Normal sinus rhythm @ 95 with LAE, normal intervals and no ST-T wave changes suggestive of ischemia  ECG read by Zane Litten, MD    Records Reviewed: Nursing Notes    Provider Notes (Medical Decision Making):   DDx-pancreatitis, GERD, obstruction, ACS    Procedures:  Procedures    ED Course:   Initial assessment performed. The patients presenting problems have been discussed, and they are in agreement with the care plan formulated and outlined with them. I have encouraged them to ask questions as they arise throughout their visit. 05:40  Case discussed with Dr. Jonel Cottrell hospitalist who agrees to admission      Diagnosis and Disposition   DISCUSSION:  Patient was hypertensive and tachycardic on presentation improved after IV NS hydration morphine and Zofran. ECG and troponin showed no evidence of ACS. Labs remarkable for elevated lipase, hyponatremia, elevated LFTs. CT abdomen and pelvis showed acute pancreatitis. Case discussed with hospitalist who agreed with admission for further evaluation. Critical Care Time: 0 minutes    Core Measures:  For Hospitalized Patients:    1. Hospitalization Decision Time:  The decision to hospitalize the patient was made by DEAN Salazar at 05:35 on 12/3/2019    2. Aspirin: Aspirin was not given because the patient did not present with a stroke at the time of their Emergency Department evaluation    5:42 AM  Patient is being admitted to the hospital by Jonel Cottrell. The results of their tests and reasons for their admission have been discussed with them and/or available family.  They convey agreement and understanding for the need to be admitted and for their admission diagnosis. CONDITIONS ON ADMISSION:  Sepsis is not present at the time of admission. Deep Vein Thrombosis is not present at the time of admission. Thrombosis is not present at the time of admission. Urinary Tract Infection is not present at the time of admission. Pneumonia is not present at the time of admission. MRSA is not present at the time of admission. Wound infection is not present at the time of admission. Pressure Ulcer is not present at the time of admission. CLINICAL IMPRESSION:    1. Acute pancreatitis, unspecified complication status, unspecified pancreatitis type    2. Hyponatremia    3. Uncontrolled hypertension          Please note that this dictation was completed with iTagged, the computer voice recognition software. Quite often unanticipated grammatical, syntax, homophones, and other interpretive errors are inadvertently transcribed by the computer software. Please disregard these errors. Please excuse any errors that have escaped final proofreading.

## 2019-12-03 NOTE — PROGRESS NOTES
Reason for Admission:   Epigastric Pain                   RRAT Score:   Low; 8                  Plan for utilizing home health:  unlikely                        Current Advanced Directive/Advance Care Plan:  Not on file                         Transition of Care Plan:  Physician follow up                     Chart reviewed. Per H&P Hannah Anderson is a 45 y.o.  female who has history of pancreatitis, colitis, fatty liver, post gastric bypass surgery presents to the emergency room via ambulance with worsening abdominal pain 9 out of 10. Patient states she had several alcoholic drinks for Thanksgiving and on Friday started with nausea dry heaving as well as abdominal pain. She is tried Bentyl with no improvement she is only been able to keep liquids down. In the emergency room she was given 8 of morphine with minimal improvement of pain. CT scan shows ascites and pancreatitis no pseudocyst.  Patient is post gastric bypass with secondary inflammation . I was asked to admit for intractable pain and nausea and vomiting\"    1420:  CM met with pt at bedside to discuss transition of care. Pt is independent and her teenage children live with her. Pt's mother also lives near by and will assist as needed. Please encourage ambulation as appropriate to assist with identifying potential transition of care needs. No needs have been identified at this time. CM to continue to follow and assist.    Care Management Interventions  PCP Verified by CM: Yes  Mode of Transport at Discharge:  Other (see comment)(Family)  Transition of Care Consult (CM Consult): Discharge Planning  Health Maintenance Reviewed: Yes  Current Support Network: Family Lives Nearby  Confirm Follow Up Transport: Self  Discharge Location  Discharge Placement: Home with family assistance

## 2019-12-03 NOTE — ROUTINE PROCESS
TRANSFER - OUT REPORT: 
 
Attempted to call report on Vidhya Tellez  being transferred to 3S(unit) for routine progression of care Report available consisted of patients Situation, Background, Assessment and  
Recommendations(SBAR). Information from the following report(s) SBAR, ED Summary and MAR was made available to the recieving nurse. Lines:  
Peripheral IV 12/03/19 Left Antecubital (Active) Site Assessment Clean, dry, & intact 12/3/2019  3:34 AM  
Phlebitis Assessment 0 12/3/2019  3:34 AM  
Infiltration Assessment 0 12/3/2019  3:34 AM  
Dressing Status Clean, dry, & intact 12/3/2019  3:34 AM  
Dressing Type Transparent 12/3/2019  3:34 AM  
Hub Color/Line Status Pink 12/3/2019  3:34 AM  
Action Taken Blood drawn 12/3/2019  3:34 AM  
   
Peripheral IV 12/03/19 Left Antecubital (Active) Asked  to have receiving RN review SBAR and call back within ten min with any questions Patient transported with: 
 Registered Nurse

## 2019-12-04 PROBLEM — R50.9 FEVER: Status: RESOLVED | Noted: 2018-09-21 | Resolved: 2019-12-04

## 2019-12-04 LAB
A1AT SERPL-MCNC: 125 MG/DL (ref 101–187)
ACTIN IGG SERPL-ACNC: 11 UNITS (ref 0–19)
ALBUMIN SERPL-MCNC: 2.7 G/DL (ref 3.4–5)
ALBUMIN/GLOB SERPL: 0.7 {RATIO} (ref 0.8–1.7)
ALP SERPL-CCNC: 179 U/L (ref 45–117)
ALT SERPL-CCNC: 134 U/L (ref 13–56)
ANA SER QL: NEGATIVE
ANION GAP SERPL CALC-SCNC: 9 MMOL/L (ref 3–18)
AST SERPL-CCNC: 238 U/L (ref 10–38)
BASOPHILS # BLD: 0 K/UL (ref 0–0.1)
BASOPHILS NFR BLD: 0 % (ref 0–2)
BILIRUB SERPL-MCNC: 2.4 MG/DL (ref 0.2–1)
BUN SERPL-MCNC: 5 MG/DL (ref 7–18)
BUN/CREAT SERPL: 6 (ref 12–20)
CALCIUM SERPL-MCNC: 8 MG/DL (ref 8.5–10.1)
CERULOPLASMIN SERPL-MCNC: 18.8 MG/DL (ref 19–39)
CHLORIDE SERPL-SCNC: 105 MMOL/L (ref 100–111)
CO2 SERPL-SCNC: 23 MMOL/L (ref 21–32)
CREAT SERPL-MCNC: 0.84 MG/DL (ref 0.6–1.3)
DIFFERENTIAL METHOD BLD: ABNORMAL
EOSINOPHIL # BLD: 0.1 K/UL (ref 0–0.4)
EOSINOPHIL NFR BLD: 1 % (ref 0–5)
ERYTHROCYTE [DISTWIDTH] IN BLOOD BY AUTOMATED COUNT: 17.2 % (ref 11.6–14.5)
GLOBULIN SER CALC-MCNC: 3.8 G/DL (ref 2–4)
GLUCOSE SERPL-MCNC: 117 MG/DL (ref 74–99)
HAV IGM SER QL: NEGATIVE
HBV CORE IGM SER QL: NEGATIVE
HBV SURFACE AG SER QL: <0.1 INDEX
HBV SURFACE AG SER QL: NEGATIVE
HCT VFR BLD AUTO: 35.9 % (ref 35–45)
HCV AB SER IA-ACNC: 0.13 INDEX
HCV AB SERPL QL IA: NEGATIVE
HCV COMMENT,HCGAC: NORMAL
HGB BLD-MCNC: 11.7 G/DL (ref 12–16)
LIPASE SERPL-CCNC: 535 U/L (ref 73–393)
LYMPHOCYTES # BLD: 0.7 K/UL (ref 0.9–3.6)
LYMPHOCYTES NFR BLD: 13 % (ref 21–52)
MCH RBC QN AUTO: 33.9 PG (ref 24–34)
MCHC RBC AUTO-ENTMCNC: 32.6 G/DL (ref 31–37)
MCV RBC AUTO: 104.1 FL (ref 74–97)
MITOCHONDRIA M2 IGG SER-ACNC: <20 UNITS (ref 0–20)
MONOCYTES # BLD: 0.3 K/UL (ref 0.05–1.2)
MONOCYTES NFR BLD: 6 % (ref 3–10)
NEUTS SEG # BLD: 4.4 K/UL (ref 1.8–8)
NEUTS SEG NFR BLD: 80 % (ref 40–73)
PLATELET # BLD AUTO: 198 K/UL (ref 135–420)
PMV BLD AUTO: 10 FL (ref 9.2–11.8)
POTASSIUM SERPL-SCNC: 4 MMOL/L (ref 3.5–5.5)
PROT SERPL-MCNC: 6.5 G/DL (ref 6.4–8.2)
RBC # BLD AUTO: 3.45 M/UL (ref 4.2–5.3)
SODIUM SERPL-SCNC: 137 MMOL/L (ref 136–145)
SP1: NORMAL
SP2: NORMAL
SP3: NORMAL
WBC # BLD AUTO: 5.5 K/UL (ref 4.6–13.2)
WBC #/AREA STL HPF: NORMAL /HPF

## 2019-12-04 PROCEDURE — 83690 ASSAY OF LIPASE: CPT

## 2019-12-04 PROCEDURE — 80053 COMPREHEN METABOLIC PANEL: CPT

## 2019-12-04 PROCEDURE — 65270000029 HC RM PRIVATE

## 2019-12-04 PROCEDURE — 85025 COMPLETE CBC W/AUTO DIFF WBC: CPT

## 2019-12-04 PROCEDURE — 74011250637 HC RX REV CODE- 250/637: Performed by: HOSPITALIST

## 2019-12-04 PROCEDURE — 36415 COLL VENOUS BLD VENIPUNCTURE: CPT

## 2019-12-04 PROCEDURE — 74011250636 HC RX REV CODE- 250/636: Performed by: INTERNAL MEDICINE

## 2019-12-04 PROCEDURE — 74011250637 HC RX REV CODE- 250/637: Performed by: INTERNAL MEDICINE

## 2019-12-04 PROCEDURE — C9113 INJ PANTOPRAZOLE SODIUM, VIA: HCPCS | Performed by: INTERNAL MEDICINE

## 2019-12-04 RX ORDER — SIMETHICONE 80 MG
80 TABLET,CHEWABLE ORAL
Status: DISCONTINUED | OUTPATIENT
Start: 2019-12-04 | End: 2019-12-05 | Stop reason: HOSPADM

## 2019-12-04 RX ORDER — METOPROLOL TARTRATE 25 MG/1
25 TABLET, FILM COATED ORAL EVERY 12 HOURS
Status: DISCONTINUED | OUTPATIENT
Start: 2019-12-04 | End: 2019-12-05 | Stop reason: HOSPADM

## 2019-12-04 RX ORDER — ASPIRIN 325 MG/1
100 TABLET, FILM COATED ORAL DAILY
Status: DISCONTINUED | OUTPATIENT
Start: 2019-12-04 | End: 2019-12-05 | Stop reason: HOSPADM

## 2019-12-04 RX ADMIN — OXYCODONE HYDROCHLORIDE AND ACETAMINOPHEN 1 TABLET: 5; 325 TABLET ORAL at 12:21

## 2019-12-04 RX ADMIN — OXYCODONE HYDROCHLORIDE AND ACETAMINOPHEN 1 TABLET: 5; 325 TABLET ORAL at 20:42

## 2019-12-04 RX ADMIN — Medication 100 MG: at 12:07

## 2019-12-04 RX ADMIN — MORPHINE SULFATE 4 MG: 10 INJECTION INTRAVENOUS at 03:43

## 2019-12-04 RX ADMIN — METOPROLOL TARTRATE 25 MG: 25 TABLET ORAL at 09:57

## 2019-12-04 RX ADMIN — DICYCLOMINE HYDROCHLORIDE 20 MG: 10 CAPSULE ORAL at 06:56

## 2019-12-04 RX ADMIN — Medication 10 ML: at 14:00

## 2019-12-04 RX ADMIN — PANTOPRAZOLE SODIUM 40 MG: 40 INJECTION, POWDER, FOR SOLUTION INTRAVENOUS at 09:36

## 2019-12-04 RX ADMIN — HEPARIN SODIUM 5000 UNITS: 5000 INJECTION INTRAVENOUS; SUBCUTANEOUS at 15:40

## 2019-12-04 RX ADMIN — AMLODIPINE BESYLATE 10 MG: 5 TABLET ORAL at 09:36

## 2019-12-04 RX ADMIN — SODIUM CHLORIDE 125 ML/HR: 900 INJECTION, SOLUTION INTRAVENOUS at 12:21

## 2019-12-04 RX ADMIN — HEPARIN SODIUM 5000 UNITS: 5000 INJECTION INTRAVENOUS; SUBCUTANEOUS at 06:22

## 2019-12-04 RX ADMIN — OXYCODONE HYDROCHLORIDE AND ACETAMINOPHEN 1 TABLET: 5; 325 TABLET ORAL at 06:56

## 2019-12-04 RX ADMIN — SODIUM CHLORIDE 125 ML/HR: 900 INJECTION, SOLUTION INTRAVENOUS at 03:43

## 2019-12-04 RX ADMIN — DICYCLOMINE HYDROCHLORIDE 20 MG: 10 CAPSULE ORAL at 17:04

## 2019-12-04 RX ADMIN — MORPHINE SULFATE 4 MG: 10 INJECTION INTRAVENOUS at 17:27

## 2019-12-04 RX ADMIN — DICYCLOMINE HYDROCHLORIDE 20 MG: 10 CAPSULE ORAL at 12:21

## 2019-12-04 RX ADMIN — HYDRALAZINE HYDROCHLORIDE 10 MG: 20 INJECTION INTRAMUSCULAR; INTRAVENOUS at 06:25

## 2019-12-04 NOTE — PROGRESS NOTES
Problem: Risk for Spread of Infection  Goal: Prevent transmission of infectious organism to others  Description  Prevent the transmission of infectious organisms to other patients, staff members, and visitors. Outcome: Progressing Towards Goal     Problem: Patient Education:  Go to Education Activity  Goal: Patient/Family Education  Outcome: Progressing Towards Goal     Problem: Management of Known or Suspected C. difficile  Goal: Minimize complications of C.difficile infection and prevent spread of infection  Outcome: Progressing Towards Goal  Goal: Patient/Family Education  Outcome: Progressing Towards Goal     Problem: Falls - Risk of  Goal: *Absence of Falls  Description  Document [facility] Fall Risk and appropriate interventions in the flowsheet.   Outcome: Progressing Towards Goal  Note: Fall Risk Interventions:            Medication Interventions: Assess postural VS orthostatic hypotension                   Problem: Patient Education: Go to Patient Education Activity  Goal: Patient/Family Education  Outcome: Progressing Towards Goal     Problem: Pain  Goal: *Control of Pain  Outcome: Progressing Towards Goal     Problem: Patient Education: Go to Patient Education Activity  Goal: Patient/Family Education  Outcome: Progressing Towards Goal

## 2019-12-04 NOTE — PROGRESS NOTES
Hospitalist Progress Note    Patient: Henna Luis MRN: [id number]  CSN: [id number]    YOB: 1975  Age: 45 y.o. Sex: female    DOA: 12/3/2019 LOS:  LOS: 1 day          Chief Complaint:    abd pain      Assessment/Plan     Acute pancreatitis-lipase is better this am, continue IVF hydration, pain control, and advance diet as tolerated    Diarrhea-none since Monday so no C diff testing done, prolonged diarrheal issue-seen as outpatient for this also-testing otherwise sent by GI last night    Morbid obesity    Uncontrolled HTN-add lopressor BID to norvasc-is this etoh w/d also?     Hx of revision of gastric bypass-had eroded band from original surgery operated on last year    Anemia-no signs for blood loss, mild, continue PPI    GERD    transaminitis-follow daily LFT's-US showed fatty liver    Advance diet  Continue IVF  Monitor clinical course-she is better today  Can ambulate in [place]      Disposition :  Patient Active Problem List   Diagnosis Code    Intestinal malabsorption K90.9    Status post gastric bypass for obesity Z98.84    Hypertension I10    Anemia D64.9    Anxiety F41.9    GERD (gastroesophageal reflux disease) K21.9    Wears dentures Z97.2    Syncopal episodes R55    Smoker F17.200    Status post laparoscopy Z98.890    Acute pancreatitis K85.90    Hyponatremia E87.1       Subjective:    She feels better  Less abd pain but a lot of gas  Denies new issue  No BM since Monday  No vomiting    Review of systems:    Constitutional: denies fevers, chills  Respiratory: denies SOB  Cardiovascular: denies chest pain, palpitations        Vital signs/Intake and Output:  Visit Vitals  BP (!) 116/93 (BP 1 Location: Right arm, BP Patient Position: At rest)   Pulse (!) 119   Temp 98.9 °F (37.2 °C)   Resp 18   Ht 5' 5\" (1.651 m)   Wt 97.5 kg (215 lb)   SpO2 96%   Breastfeeding No   BMI 35.78 kg/m²     Current Shift:  12/04 0701 - 12/04 1900  In: -   Out: 600 [Urine:600]  Last three shifts: 12/02 1901 - 12/04 0700  In: 2880 [P.O.:305; I.V.:2575]  Out: 1800 [Urine:1800]    Exam:    General: Well developed, alert, NAD, OX3  Head/Neck: NCAT, supple, No masses, No lymphadenopathy  CVS:Regular rate and rhythm, no M/R/G, S1/S2 heard, no thrill  Lungs:Clear to auscultation bilaterally, no wheezes, rhonchi, or rales  Abdomen: Soft, mild tenderness in epigastrium, No distention, Normal Bowel sounds, No hepatomegaly  Extremities: No C/C/E, pulses palpable 2+  Neuro:grossly normal , follows commands  Psych:appropriate                Labs: Results:       Chemistry Recent Labs     12/04/19 0450 12/03/19 0328   * 128*    131*   K 4.0 4.2    101   CO2 23 18*   BUN 5* 14   CREA 0.84 1.07   CA 8.0* 8.2*   AGAP 9 12   BUCR 6* 13   * 149*   TP 6.5 7.7   ALB 2.7* 3.3*   GLOB 3.8 4.4*   AGRAT 0.7* 0.8      CBC w/Diff Recent Labs     12/04/19 0450 12/03/19 0328   WBC 5.5 11.1   RBC 3.45* 4.05*   HGB 11.7* 13.8   HCT 35.9 42.0    250   GRANS 80* 84*   LYMPH 13* 11*   EOS 1 0      Cardiac Enzymes No results for input(s): CPK, CKND1, JONATAN in the last 72 hours. No lab exists for component: CKRMB, TROIP   Coagulation No results for input(s): PTP, INR, APTT, INREXT, INREXT in the last 72 hours. Lipid Panel Lab Results   Component Value Date/Time    Cholesterol, total 137 12/03/2019 02:10 PM    HDL Cholesterol 38 (L) 12/03/2019 02:10 PM    LDL, calculated 64.8 12/03/2019 02:10 PM    VLDL, calculated 34.2 12/03/2019 02:10 PM    Triglyceride 171 (H) 12/03/2019 02:10 PM    CHOL/HDL Ratio 3.6 12/03/2019 02:10 PM      BNP No results for input(s): BNPP in the last 72 hours.    Liver Enzymes Recent Labs     12/04/19  0450   TP 6.5   ALB 2.7*   *   SGOT 238*      Thyroid Studies No results found for: T4, T3U, TSH, TSHEXT, TSHEXT     Procedures/imaging: see electronic medical records for all procedures/Xrays and details which were not copied into this note but were reviewed prior to creation of Jeffrey Cheung MD

## 2019-12-04 NOTE — PROGRESS NOTES
5024  Bedside and Verbal shift change report given by Gladis Boston, RN (off going nurse) to Triston Siu RN (on coming nurse). Report included the following information SBAR, Kardex, OR Summary, Intake/Output and MAR.    1915  Bedside and Verbal shift change report given by Triston Siu RN (off going nurse) to DANNA Pal (on coming nurse). Report included the following information SBAR, Kardex, OR Summary, Intake/Output and MAR.

## 2019-12-04 NOTE — PROGRESS NOTES
Per pt, Dr. Cynthia Cabrera redid Gastric bypass in 2018. Tolerating clear liquids. 2300 Ambulate to bathroom to void without assist. Informed pt of order for stool specimen - container provided for collection    0655 Hydralazine given as ordered for BP of 147/103, BP monitored - see flowsheet. Pain med given for abdominal pain, pt also reported passing flatus and felt better afterwards    0730 Bedside and Verbal shift change report given to Joseph West RN (oncoming nurse) by Ivan Hector RN   (offgoing nurse). Report included the following information SBAR, Kardex, Intake/Output, MAR and Recent Results.

## 2019-12-05 VITALS
OXYGEN SATURATION: 98 % | BODY MASS INDEX: 35.82 KG/M2 | SYSTOLIC BLOOD PRESSURE: 144 MMHG | TEMPERATURE: 98.1 F | HEART RATE: 93 BPM | WEIGHT: 215 LBS | DIASTOLIC BLOOD PRESSURE: 89 MMHG | RESPIRATION RATE: 16 BRPM | HEIGHT: 65 IN

## 2019-12-05 LAB
ALBUMIN SERPL-MCNC: 2.3 G/DL (ref 3.4–5)
ALBUMIN/GLOB SERPL: 0.7 {RATIO} (ref 0.8–1.7)
ALP SERPL-CCNC: 149 U/L (ref 45–117)
ALT SERPL-CCNC: 77 U/L (ref 13–56)
ANION GAP SERPL CALC-SCNC: 6 MMOL/L (ref 3–18)
AST SERPL-CCNC: 70 U/L (ref 10–38)
BASOPHILS # BLD: 0 K/UL (ref 0–0.1)
BASOPHILS NFR BLD: 0 % (ref 0–2)
BILIRUB SERPL-MCNC: 1.3 MG/DL (ref 0.2–1)
BUN SERPL-MCNC: 4 MG/DL (ref 7–18)
BUN/CREAT SERPL: 6 (ref 12–20)
C DIFF TOX GENS STL QL NAA+PROBE: POSITIVE
CALCIUM SERPL-MCNC: 7.6 MG/DL (ref 8.5–10.1)
CHLORIDE SERPL-SCNC: 107 MMOL/L (ref 100–111)
CO2 SERPL-SCNC: 24 MMOL/L (ref 21–32)
COPPER SERPL-MCNC: 80 UG/DL (ref 72–166)
CREAT SERPL-MCNC: 0.64 MG/DL (ref 0.6–1.3)
DIFFERENTIAL METHOD BLD: ABNORMAL
EOSINOPHIL # BLD: 0.1 K/UL (ref 0–0.4)
EOSINOPHIL NFR BLD: 2 % (ref 0–5)
ERYTHROCYTE [DISTWIDTH] IN BLOOD BY AUTOMATED COUNT: 17.4 % (ref 11.6–14.5)
GLOBULIN SER CALC-MCNC: 3.4 G/DL (ref 2–4)
GLUCOSE SERPL-MCNC: 95 MG/DL (ref 74–99)
HCT VFR BLD AUTO: 31.9 % (ref 35–45)
HGB BLD-MCNC: 10.4 G/DL (ref 12–16)
INTERPRETATION: ABNORMAL
LIPASE SERPL-CCNC: 243 U/L (ref 73–393)
LYMPHOCYTES # BLD: 1.1 K/UL (ref 0.9–3.6)
LYMPHOCYTES NFR BLD: 18 % (ref 21–52)
MCH RBC QN AUTO: 33.8 PG (ref 24–34)
MCHC RBC AUTO-ENTMCNC: 32.6 G/DL (ref 31–37)
MCV RBC AUTO: 103.6 FL (ref 74–97)
MONOCYTES # BLD: 0.4 K/UL (ref 0.05–1.2)
MONOCYTES NFR BLD: 7 % (ref 3–10)
NEUTS SEG # BLD: 4.3 K/UL (ref 1.8–8)
NEUTS SEG NFR BLD: 73 % (ref 40–73)
PCR REFLEX: ABNORMAL
PLATELET # BLD AUTO: 174 K/UL (ref 135–420)
PMV BLD AUTO: 10.4 FL (ref 9.2–11.8)
POTASSIUM SERPL-SCNC: 3.6 MMOL/L (ref 3.5–5.5)
PROT SERPL-MCNC: 5.7 G/DL (ref 6.4–8.2)
RBC # BLD AUTO: 3.08 M/UL (ref 4.2–5.3)
SODIUM SERPL-SCNC: 137 MMOL/L (ref 136–145)
WBC # BLD AUTO: 5.9 K/UL (ref 4.6–13.2)

## 2019-12-05 PROCEDURE — 85025 COMPLETE CBC W/AUTO DIFF WBC: CPT

## 2019-12-05 PROCEDURE — 80053 COMPREHEN METABOLIC PANEL: CPT

## 2019-12-05 PROCEDURE — 83690 ASSAY OF LIPASE: CPT

## 2019-12-05 PROCEDURE — 74011250636 HC RX REV CODE- 250/636: Performed by: INTERNAL MEDICINE

## 2019-12-05 PROCEDURE — 74011250637 HC RX REV CODE- 250/637: Performed by: HOSPITALIST

## 2019-12-05 PROCEDURE — 74011250637 HC RX REV CODE- 250/637: Performed by: INTERNAL MEDICINE

## 2019-12-05 PROCEDURE — 36415 COLL VENOUS BLD VENIPUNCTURE: CPT

## 2019-12-05 RX ORDER — OMEPRAZOLE 20 MG/1
20 TABLET, DELAYED RELEASE ORAL DAILY
Qty: 30 TAB | Refills: 0 | Status: ON HOLD | OUTPATIENT
Start: 2019-12-05 | End: 2020-09-23 | Stop reason: CLARIF

## 2019-12-05 RX ORDER — AMLODIPINE BESYLATE 10 MG/1
5 TABLET ORAL DAILY
Qty: 30 TAB | Refills: 0 | Status: ON HOLD | OUTPATIENT
Start: 2019-12-05 | End: 2020-09-23 | Stop reason: CLARIF

## 2019-12-05 RX ORDER — ASPIRIN 325 MG/1
100 TABLET, FILM COATED ORAL DAILY
Qty: 30 TAB | Refills: 0 | Status: SHIPPED | OUTPATIENT
Start: 2019-12-05 | End: 2020-08-31

## 2019-12-05 RX ORDER — OXYCODONE HYDROCHLORIDE 5 MG/1
5 TABLET ORAL
Qty: 10 TAB | Refills: 0 | Status: SHIPPED | OUTPATIENT
Start: 2019-12-05 | End: 2019-12-08

## 2019-12-05 RX ADMIN — OXYCODONE HYDROCHLORIDE AND ACETAMINOPHEN 1 TABLET: 5; 325 TABLET ORAL at 01:23

## 2019-12-05 RX ADMIN — METOPROLOL TARTRATE 25 MG: 25 TABLET ORAL at 00:05

## 2019-12-05 RX ADMIN — DICYCLOMINE HYDROCHLORIDE 20 MG: 10 CAPSULE ORAL at 00:06

## 2019-12-05 RX ADMIN — HEPARIN SODIUM 5000 UNITS: 5000 INJECTION INTRAVENOUS; SUBCUTANEOUS at 06:52

## 2019-12-05 RX ADMIN — DICYCLOMINE HYDROCHLORIDE 20 MG: 10 CAPSULE ORAL at 06:52

## 2019-12-05 RX ADMIN — SODIUM CHLORIDE 125 ML/HR: 900 INJECTION, SOLUTION INTRAVENOUS at 00:07

## 2019-12-05 RX ADMIN — MORPHINE SULFATE 4 MG: 10 INJECTION INTRAVENOUS at 06:59

## 2019-12-05 RX ADMIN — HEPARIN SODIUM 5000 UNITS: 5000 INJECTION INTRAVENOUS; SUBCUTANEOUS at 00:06

## 2019-12-05 NOTE — ROUTINE PROCESS
Bedside and Verbal shift change report given to Clemencia Helm RN (oncoming nurse) by Yanick Potetr RN 
 (offgoing nurse). Report included the following information SBAR, Kardex, Intake/Output, MAR and Recent Results.

## 2019-12-05 NOTE — PROGRESS NOTES
Pt ambulating in room, no distress noted, voiding dominick urine, no BM reported for shift. On contact isolation for history of C. Diff.  Pt stated she is tired of the full liquid diet and wants to go home - informed pt that MD will see her soon, passed on in report to incoming nurse

## 2019-12-05 NOTE — DISCHARGE SUMMARY
[address]    Name:  Keya Adams  MR#:   [id number]  :  1975  ACCOUNT #:  [de-identified]  ADMIT DATE:  2019  DISCHARGE DATE:  2019    ALLERGIES:  THE PATIENT HAS NO MEDICAL ALLERGIES. CODE STATUS:  She is a full code. DISCHARGE DIAGNOSES:  1. Acute pancreatitis. 2.  History of gastric bypass revision. 3.  History of alcohol use. 4.  History of eosinophilic colitis. 5.  Previous cholecystectomy. 6.  Partial hysterectomy. HOSPITAL SUMMARY:  This is a 53-year-old [facility] American female who has had a revised gastric bypass surgery in 2018. She also has had issues with mild colitis, diagnosed by Dr. Kaitlin Armenta, who did an upper endoscopy and colonoscopy apparently within the last year or two. She was having regular diarrhea issues, has had some intermittent abdominal pain, but came into the emergency room again here with abdominal pain, elevated lipase, diagnosed with pancreatitis. In 2019, a CT scan showed she had preduodenal inflammation secondary to acute pancreatitis and diffuse colonic wall thickening of the entire colon, suggestive of either colitis and/or distention then. She had been on an extensive course of steroids after being diagnosed with colitis in the past.  She states she gained a lot of weight from that. She also admits to drinking a bottle of wine at least 3 to 4 times a week. Her last drink was on Thanksgiving weekend. She had some glasses of Moscato per her report. The patient states that she has improved since her hospitalization. She has been given IV Protonix, Bentyl, and Mylicon for gas pains, and pain medicine as necessary. Her lipase has come down to normal range, from over 3000 now to 243. Her LFTs were elevated, with an ALT of 158, , now down to ALT 77, AST 70. She admits to taking Tylenol regularly at home for pain. Pregnancy test was negative. Troponin was within normal limits.   Metabolic panel is otherwise normal as is her creatinine. A urinalysis was unremarkable on admission. Her tox screen was positive for opiates. She has been advised and seen by GI here, recommended strongly to eliminate alcohol completely from her diet, which she agrees to do. At this point, she has improved. She has advanced to a full liquid diet. She has had a bowel movement. Her gas pains are better and she would like to go home today. She is awake and alert, in no acute distress, feeling better. No nausea, vomiting. Alert and oriented x3. Blood pressure 144/89, pulse 93, temperature 98.1, respiratory rate 16, SaO2 of 98%. Abdomen:  Soft, nontender with normal bowel sounds. Lungs are clear bilaterally. Cardiac exam within normal limits. We did have to start her on two antihypertensives here due to persistent hypertension. With improvement of her blood pressure now, I think we can narrow that to a lower dose of one. She has not been on blood pressure medicine prior. She is to continue her vitamins, and we will add thiamine as well for her discharge. Dr. Elisabeth Garcia sent a number of blood tests for her here, including a hepatitis panel which was negative, alpha-1 antitrypsin test which is within normal limits, and GABRIELLA direct which is negative. No C. diff was collected because she did not have enough watery stools here, her diarrhea really resolved. Ceruloplasmin, which is within normal range. Mitochondrial antibody, pancreatic elastase is pending. Actin smooth muscle test is pending. She is clinically stable to leave the hospital today. I have advised against regular Tylenol use. She could do an Extra Strength Tylenol once or twice a day as her LFTs are better, but only sparingly. DISCHARGE MEDICATIONS:  For discharge, I recommend the following meds. 1.  Norvasc 5 mg daily. 2.  Prilosec 20 mg daily. 3.  Oxycodone 1 tablet every 8 hours due to her severe pain, #10 only, no refill.   4.  Thiamine 100 mg daily.    Follow up with her PCP, Gayathri Levi, as scheduled on 12/11 and Dr. Yumiko Mckeon on 12/10. She is stable to leave the hospital.  She has been advised against any alcohol in her diet. Continue on full liquids for now as tolerated, advance slowly, keep diet bland. She understands these instructions. 40 minutes on discharge.       Magi Gandhi MD      [id number]  D:  12/05/2019 8:23  T:  12/05/2019 9:10  JOB #:  [id number]  CC:  Gayathri Levi NP

## 2019-12-05 NOTE — PROGRESS NOTES
Bedside and Verbal shift change report given to Lele Archuleta RN (oncoming nurse) by Denny Carias RN (offgoing nurse). Report included the following information SBAR, Kardex, Procedure Summary, Intake/Output, MAR and Recent Results.

## 2019-12-05 NOTE — DISCHARGE INSTRUCTIONS
Patient Education        DASH Diet: Care Instructions  Your Care Instructions    The DASH diet is an eating plan that can help lower your blood pressure. DASH stands for Dietary Approaches to Stop Hypertension. Hypertension is high blood pressure. The DASH diet focuses on eating foods that are high in calcium, potassium, and magnesium. These nutrients can lower blood pressure. The foods that are highest in these nutrients are fruits, vegetables, low-fat dairy products, nuts, seeds, and legumes. But taking calcium, potassium, and magnesium supplements instead of eating foods that are high in those nutrients does not have the same effect. The DASH diet also includes whole grains, fish, and poultry. The DASH diet is one of several lifestyle changes your doctor may recommend to lower your high blood pressure. Your doctor may also want you to decrease the amount of sodium in your diet. Lowering sodium while following the DASH diet can lower blood pressure even further than just the DASH diet alone. Follow-up care is a key part of your treatment and safety. Be sure to make and go to all appointments, and call your doctor if you are having problems. It's also a good idea to know your test results and keep a list of the medicines you take. How can you care for yourself at home? Following the DASH diet  · Eat 4 to 5 servings of fruit each day. A serving is 1 medium-sized piece of fruit, ½ cup chopped or canned fruit, 1/4 cup dried fruit, or 4 ounces (½ cup) of fruit juice. Choose fruit more often than fruit juice. · Eat 4 to 5 servings of vegetables each day. A serving is 1 cup of lettuce or raw leafy vegetables, ½ cup of chopped or cooked vegetables, or 4 ounces (½ cup) of vegetable juice. Choose vegetables more often than vegetable juice. · Get 2 to 3 servings of low-fat and fat-free dairy each day. A serving is 8 ounces of milk, 1 cup of yogurt, or 1 ½ ounces of cheese. · Eat 6 to 8 servings of grains each day.  A serving is 1 slice of bread, 1 ounce of dry cereal, or ½ cup of cooked rice, pasta, or cooked cereal. Try to choose whole-grain products as much as possible. · Limit lean meat, poultry, and fish to 2 servings each day. A serving is 3 ounces, about the size of a deck of cards. · Eat 4 to 5 servings of nuts, seeds, and legumes (cooked dried beans, lentils, and split peas) each week. A serving is 1/3 cup of nuts, 2 tablespoons of seeds, or ½ cup of cooked beans or peas. · Limit fats and oils to 2 to 3 servings each day. A serving is 1 teaspoon of vegetable oil or 2 tablespoons of salad dressing. · Limit sweets and added sugars to 5 servings or less a week. A serving is 1 tablespoon jelly or jam, ½ cup sorbet, or 1 cup of lemonade. · Eat less than 2,300 milligrams (mg) of sodium a day. If you limit your sodium to 1,500 mg a day, you can lower your blood pressure even more. Tips for success  · Start small. Do not try to make dramatic changes to your diet all at once. You might feel that you are missing out on your favorite foods and then be more likely to not follow the plan. Make small changes, and stick with them. Once those changes become habit, add a few more changes. · Try some of the following:  ? Make it a goal to eat a fruit or vegetable at every meal and at snacks. This will make it easy to get the recommended amount of fruits and vegetables each day. ? Try yogurt topped with fruit and nuts for a snack or healthy dessert. ? Add lettuce, tomato, cucumber, and onion to sandwiches. ? Combine a ready-made pizza crust with low-fat mozzarella cheese and lots of vegetable toppings. Try using tomatoes, squash, spinach, broccoli, carrots, cauliflower, and onions. ? Have a variety of cut-up vegetables with a low-fat dip as an appetizer instead of chips and dip. ? Sprinkle sunflower seeds or chopped almonds over salads. Or try adding chopped walnuts or almonds to cooked vegetables.   ? Try some vegetarian meals using beans and peas. Add garbanzo or kidney beans to salads. Make burritos and tacos with mashed marcelino beans or black beans. Where can you learn more? Go to http://sandra-abel.info/. Enter G089 in the search box to learn more about \"DASH Diet: Care Instructions. \"  Current as of: April 9, 2019  Content Version: 12.2  © 0822-5656 [employer]. Care instructions adapted under license by [employer] (which disclaims liability or warranty for this information). If you have questions about a medical condition or this instruction, always ask your healthcare professional. [place] [address] any warranty or liability for your use of this information. Patient Education        Learning About High Blood Pressure in Children and Teens  What is high blood pressure? Blood pressure is a measure of how hard the blood pushes against the walls of the arteries as it moves through the body. It's normal for your child's blood pressure to go up and down throughout the day. But if it stays up, your child has high blood pressure. Another name for high blood pressure is hypertension. What is normal and what is high blood pressure depends on your child's age, sex, and height. The numbers change as your child grows. Blood pressure is described with two numbers. For example, a child's reading might be 96/57 or \"96 over 57.\"  · The first number is the systolic pressure. It shows how hard the blood pushes when the heart is pumping. · The second number is the diastolic pressure. It shows how hard the blood pushes between heartbeats, when the heart is relaxed and filling with blood. What can happen when children and teens have high blood pressure? When blood pressure is a little high, it may increase the risk of health problems later in life. If blood pressure is very high, it can cause serious and immediate damage to a child's body, especially the heart and brain.  This type of high blood pressure is rare. With very high blood pressure, your child or teen may need more tests to find the cause. What causes high blood pressure in children and teens? Primary, or essential, high blood pressure is the most common type of high blood pressure. With this type, doctors can't tell exactly what is causing the high blood pressure. But several things make a child more likely to get it. One is having a family history of it. Another is being overweight. Secondary high blood pressure is caused by another disease or medicine. This type is less common. Problems that can cause secondary high blood pressure may include:  · Sleep apnea. · Kidney disease. · Thyroid disease and other problems with glands in the body (the endocrine system). Sometimes it can be caused by medicine a child is taking. How is high blood pressure diagnosed in children and teens? Children age 1 and older often have their blood pressure checked during routine doctor visits. If your child's blood pressure reads high, you may be asked to bring your child in again for another blood pressure check. The doctor might have your child wear a portable device to measure blood pressure over 24 hours. Your child may need more tests to check for illnesses that may be causing high blood pressure. How is high blood pressure in children and teens treated? High blood pressure is treated in different ways, depending on how high it is. When blood pressure is just a little high, doctors often treat it with lifestyle changes, like eating healthy foods and being more active. If the blood pressure is higher, and if lifestyle changes don't help lower it, the doctor may recommend medicine. If another health problem is causing the high blood pressure (secondary high blood pressure), and the levels are very high, treating the other health problem usually lowers the blood pressure.  Your child may also need medicine to lower blood pressure. Lifestyle changes  Your doctor may suggest lifestyle changes to help lower your child's blood pressure. Try these tips:  · Help your child lose weight, if your child is overweight. Eating healthy foods and being physically active are the best ways to do this. · Encourage your child to eat more fresh fruit and vegetables, fiber, and nonfat dairy products. Help your child eat fewer high-sugar and high-sodium foods and drinks. · Help your child be more active. Children need at least 1 hour of physical activity every day. · Limit how much your child watches TV and plays video or computer games. Set a goal of seeing that your child does these activities for no more than 2 hours a day. · Work on lifestyle changes together as a family. For example, try to eat as a family at regular times. Find an activity you all can do. Medicine  If your child has very high blood pressure, medicines may be needed. Your doctor can tell you how long your child may need to take medicine. It can be hard to remember to help your child take pills when there are no symptoms. But blood pressure will go back up if your child doesn't take the medicine. Make your child's pill schedule as simple as you can. Try to plan a time for your child to take medicine along with something else that happens at that same time every day. This can be something like eating a meal or getting ready for bed. If that is hard to do, you or your child can set a daily alarm as a reminder. Medicines for high blood pressure can have side effects. Ask your doctor what side effects to look for and what to do if you see them. Follow-up care is a key part of your child's treatment and safety. Be sure to make and go to all appointments, and call your doctor if your child is having problems. It's also a good idea to know your child's test results and keep a list of the medicines your child takes. Where can you learn more?   Go to http://sandra-abel.info/. Enter 136 354 563 in the search box to learn more about \"Learning About High Blood Pressure in Children and Teens. \"  Current as of: April 9, 2019  Content Version: 12.2  © 2666-0725 [employer]. Care instructions adapted under license by [employer] (which disclaims liability or warranty for this information). If you have questions about a medical condition or this instruction, always ask your healthcare professional. [address] any warranty or liability for your use of this information. Patient Education        Pancreatitis: Care Instructions  Your Care Instructions    The pancreas is an organ behind the stomach. It makes hormones and enzymes to help your body digest food. But if these enzymes attack the pancreas, it can get inflamed. This is called pancreatitis. Most cases are caused by gallstones or by heavy alcohol use. If you take care of yourself at home, it will help you get better. It will also help you avoid more problems with your pancreas. Follow-up care is a key part of your treatment and safety. Be sure to make and go to all appointments, and call your doctor if you are having problems. It's also a good idea to know your test results and keep a list of the medicines you take. How can you care for yourself at home? · Drink clear liquids and eat bland foods until you feel better. Coke foods include rice, dry toast, and crackers. They also include bananas and applesauce. · Eat a low-fat diet until your doctor says your pancreas is healed. · Do not drink alcohol. Tell your doctor if you need help to quit. Counseling, support groups, and sometimes medicines can help you stay sober. · Be safe with medicines. Read and follow all instructions on the label. ? If the doctor gave you a prescription medicine for pain, take it as prescribed.   ? If you are not taking a prescription pain medicine, ask your doctor if you can take an over-the-counter medicine. · If your doctor prescribed antibiotics, take them as directed. Do not stop taking them just because you feel better. You need to take the full course of antibiotics. · Get extra rest until you feel better. To prevent future problems with your pancreas  · Do not drink alcohol. · Tell your doctors and pharmacist that you've had pancreatitis. They can help you avoid medicines that may cause this problem again. When should you call for help? Call 911 anytime you think you may need emergency care. For example, call if:    · You vomit blood or what looks like coffee grounds.     · Your stools are maroon or very bloody.    Call your doctor now or seek immediate medical care if:    · You have new or worse belly pain.     · Your stools are black and look like tar, or they have streaks of blood.     · You are vomiting.    Watch closely for changes in your health, and be sure to contact your doctor if:    · You do not get better as expected. Where can you learn more? Go to http://sandra-abel.info/. Enter G791 in the search box to learn more about \"Pancreatitis: Care Instructions. \"  Current as of: November 7, 2018  Content Version: 12.2  © 5100-9908 [employer]. Care instructions adapted under license by [employer] (which disclaims liability or warranty for this information). If you have questions about a medical condition or this instruction, always ask your healthcare professional. [address] any warranty or liability for your use of this information.

## 2019-12-05 NOTE — ROUTINE PROCESS
2100 Bedside and Verbal shift change report given to KALEN Arcos RN (oncoming nurse) by Mike Allen RN  (offgoing nurse). Report included the following information SBAR, Kardex, Intake/Output and MAR.

## 2019-12-05 NOTE — PROGRESS NOTES
Hospitalist Progress Note    Patient: David Brewer MRN: [id number]  [facility]: [id number]    YOB: 1975  Age: 45 y.o.   Sex: female    DOA: 12/3/2019 LOS:  LOS: 2 days            Called about positive c diff after her discharge    I called and updated Ms Renee Rehman    Reviewed course and tx plan    vanco PO for 10 days called to her pharmacy of choice    Follow with PCP early next week    She had no diarrhea last 3 days

## 2019-12-05 NOTE — PROGRESS NOTES
Problem: Risk for Spread of Infection  Goal: Prevent transmission of infectious organism to others  Description  Prevent the transmission of infectious organisms to other patients, staff members, and visitors. Outcome: Resolved/Met     Problem: Patient Education:  Go to Education Activity  Goal: Patient/Family Education  Outcome: Resolved/Met     Problem: Management of Known or Suspected C. difficile  Goal: Minimize complications of C.difficile infection and prevent spread of infection  Outcome: Resolved/Met  Goal: Patient/Family Education  Outcome: Resolved/Met     Problem: Falls - Risk of  Goal: *Absence of Falls  Description  Document Lucila Fall Risk and appropriate interventions in the flowsheet.   Outcome: Resolved/Met  Note: Fall Risk Interventions:            Medication Interventions: Assess postural VS orthostatic hypotension                   Problem: Patient Education: Go to Patient Education Activity  Goal: Patient/Family Education  Outcome: Resolved/Met     Problem: Pain  Goal: *Control of Pain  Outcome: Resolved/Met     Problem: Patient Education: Go to Patient Education Activity  Goal: Patient/Family Education  Outcome: Resolved/Met

## 2019-12-06 LAB
BACTERIA SPEC CULT: NORMAL
CALPROTECTIN STL-MCNT: 120 UG/G (ref 0–120)
SERVICE CMNT-IMP: NORMAL

## 2019-12-09 LAB
ELASTASE PANC STL-MCNT: 144 UG ELAST./G
O+P SPEC MICRO: NORMAL
O+P STL CONC: NORMAL
SPECIMEN SOURCE: NORMAL

## 2020-06-26 ENCOUNTER — APPOINTMENT (OUTPATIENT)
Dept: CT IMAGING | Age: 45
DRG: 282 | End: 2020-06-26
Attending: PHYSICIAN ASSISTANT
Payer: MEDICAID

## 2020-06-26 ENCOUNTER — HOSPITAL ENCOUNTER (INPATIENT)
Age: 45
LOS: 7 days | Discharge: HOME | DRG: 282 | End: 2020-07-03
Attending: EMERGENCY MEDICINE | Admitting: FAMILY MEDICINE
Payer: MEDICAID

## 2020-06-26 DIAGNOSIS — I81 PORTAL VEIN THROMBOSIS: ICD-10-CM

## 2020-06-26 DIAGNOSIS — R19.7 DIARRHEA, UNSPECIFIED TYPE: ICD-10-CM

## 2020-06-26 DIAGNOSIS — Z86.19 HISTORY OF CLOSTRIDIUM DIFFICILE INFECTION: ICD-10-CM

## 2020-06-26 DIAGNOSIS — K55.069 MESENTERIC VEIN THROMBOSIS (HCC): ICD-10-CM

## 2020-06-26 DIAGNOSIS — K85.90 ACUTE PANCREATITIS, UNSPECIFIED COMPLICATION STATUS, UNSPECIFIED PANCREATITIS TYPE: Primary | ICD-10-CM

## 2020-06-26 PROBLEM — K85.91 ACUTE PANCREATIC NECROSIS: Status: ACTIVE | Noted: 2020-06-26

## 2020-06-26 LAB
ALBUMIN SERPL-MCNC: 3.1 G/DL (ref 3.4–5)
ALBUMIN/GLOB SERPL: 0.7 {RATIO} (ref 0.8–1.7)
ALP SERPL-CCNC: 165 U/L (ref 45–117)
ALT SERPL-CCNC: 40 U/L (ref 13–56)
ANION GAP SERPL CALC-SCNC: 7 MMOL/L (ref 3–18)
APTT PPP: 30.9 SEC (ref 23–36.4)
AST SERPL-CCNC: 154 U/L (ref 10–38)
BASOPHILS # BLD: 0 K/UL (ref 0–0.1)
BASOPHILS NFR BLD: 0 % (ref 0–2)
BILIRUB SERPL-MCNC: 0.8 MG/DL (ref 0.2–1)
BUN SERPL-MCNC: 10 MG/DL (ref 7–18)
BUN/CREAT SERPL: 12 (ref 12–20)
CALCIUM SERPL-MCNC: 8.7 MG/DL (ref 8.5–10.1)
CHLORIDE SERPL-SCNC: 103 MMOL/L (ref 100–111)
CO2 SERPL-SCNC: 23 MMOL/L (ref 21–32)
CREAT SERPL-MCNC: 0.86 MG/DL (ref 0.6–1.3)
DIFFERENTIAL METHOD BLD: ABNORMAL
EOSINOPHIL # BLD: 0.2 K/UL (ref 0–0.4)
EOSINOPHIL NFR BLD: 2 % (ref 0–5)
ERYTHROCYTE [DISTWIDTH] IN BLOOD BY AUTOMATED COUNT: 16.7 % (ref 11.6–14.5)
ETHANOL SERPL-MCNC: <3 MG/DL (ref 0–3)
GLOBULIN SER CALC-MCNC: 4.3 G/DL (ref 2–4)
GLUCOSE SERPL-MCNC: 131 MG/DL (ref 74–99)
HCT VFR BLD AUTO: 38.5 % (ref 35–45)
HGB BLD-MCNC: 12.6 G/DL (ref 12–16)
INR PPP: 1.1 (ref 0.8–1.2)
LACTATE SERPL-SCNC: 1.1 MMOL/L (ref 0.4–2)
LIPASE SERPL-CCNC: 1879 U/L (ref 73–393)
LYMPHOCYTES # BLD: 1.5 K/UL (ref 0.9–3.6)
LYMPHOCYTES NFR BLD: 17 % (ref 21–52)
MCH RBC QN AUTO: 33.1 PG (ref 24–34)
MCHC RBC AUTO-ENTMCNC: 32.7 G/DL (ref 31–37)
MCV RBC AUTO: 101 FL (ref 74–97)
MONOCYTES # BLD: 0.6 K/UL (ref 0.05–1.2)
MONOCYTES NFR BLD: 7 % (ref 3–10)
NEUTS SEG # BLD: 6.4 K/UL (ref 1.8–8)
NEUTS SEG NFR BLD: 74 % (ref 40–73)
PLATELET # BLD AUTO: 342 K/UL (ref 135–420)
PMV BLD AUTO: 9.1 FL (ref 9.2–11.8)
POTASSIUM SERPL-SCNC: 4.2 MMOL/L (ref 3.5–5.5)
PROT SERPL-MCNC: 7.4 G/DL (ref 6.4–8.2)
PROTHROMBIN TIME: 13.5 SEC (ref 11.5–15.2)
RBC # BLD AUTO: 3.81 M/UL (ref 4.2–5.3)
SODIUM SERPL-SCNC: 133 MMOL/L (ref 136–145)
WBC # BLD AUTO: 8.7 K/UL (ref 4.6–13.2)

## 2020-06-26 PROCEDURE — 99285 EMERGENCY DEPT VISIT HI MDM: CPT

## 2020-06-26 PROCEDURE — 83605 ASSAY OF LACTIC ACID: CPT

## 2020-06-26 PROCEDURE — 83690 ASSAY OF LIPASE: CPT

## 2020-06-26 PROCEDURE — 65270000029 HC RM PRIVATE

## 2020-06-26 PROCEDURE — 80053 COMPREHEN METABOLIC PANEL: CPT

## 2020-06-26 PROCEDURE — 85610 PROTHROMBIN TIME: CPT

## 2020-06-26 PROCEDURE — 96375 TX/PRO/DX INJ NEW DRUG ADDON: CPT

## 2020-06-26 PROCEDURE — 74177 CT ABD & PELVIS W/CONTRAST: CPT

## 2020-06-26 PROCEDURE — 96374 THER/PROPH/DIAG INJ IV PUSH: CPT

## 2020-06-26 PROCEDURE — 85025 COMPLETE CBC W/AUTO DIFF WBC: CPT

## 2020-06-26 PROCEDURE — 74011636320 HC RX REV CODE- 636/320: Performed by: EMERGENCY MEDICINE

## 2020-06-26 PROCEDURE — 85730 THROMBOPLASTIN TIME PARTIAL: CPT

## 2020-06-26 PROCEDURE — 80307 DRUG TEST PRSMV CHEM ANLYZR: CPT

## 2020-06-26 PROCEDURE — 74011250636 HC RX REV CODE- 250/636: Performed by: PHYSICIAN ASSISTANT

## 2020-06-26 PROCEDURE — 87040 BLOOD CULTURE FOR BACTERIA: CPT

## 2020-06-26 RX ORDER — HEPARIN SODIUM 1000 [USP'U]/ML
80 INJECTION, SOLUTION INTRAVENOUS; SUBCUTANEOUS ONCE
Status: COMPLETED | OUTPATIENT
Start: 2020-06-26 | End: 2020-06-26

## 2020-06-26 RX ORDER — ONDANSETRON 2 MG/ML
4 INJECTION INTRAMUSCULAR; INTRAVENOUS
Status: DISCONTINUED | OUTPATIENT
Start: 2020-06-26 | End: 2020-07-03 | Stop reason: HOSPADM

## 2020-06-26 RX ORDER — ONDANSETRON 2 MG/ML
4 INJECTION INTRAMUSCULAR; INTRAVENOUS
Status: COMPLETED | OUTPATIENT
Start: 2020-06-26 | End: 2020-06-26

## 2020-06-26 RX ORDER — HEPARIN SODIUM 10000 [USP'U]/100ML
18-36 INJECTION, SOLUTION INTRAVENOUS
Status: DISCONTINUED | OUTPATIENT
Start: 2020-06-26 | End: 2020-06-30

## 2020-06-26 RX ORDER — SODIUM CHLORIDE 9 MG/ML
100 INJECTION, SOLUTION INTRAVENOUS CONTINUOUS
Status: DISCONTINUED | OUTPATIENT
Start: 2020-06-26 | End: 2020-06-30

## 2020-06-26 RX ORDER — MORPHINE SULFATE 4 MG/ML
4 INJECTION INTRAVENOUS
Status: COMPLETED | OUTPATIENT
Start: 2020-06-26 | End: 2020-06-26

## 2020-06-26 RX ADMIN — HEPARIN SODIUM 7260 UNITS: 1000 INJECTION INTRAVENOUS; SUBCUTANEOUS at 23:04

## 2020-06-26 RX ADMIN — MORPHINE SULFATE 4 MG: 4 INJECTION INTRAVENOUS at 21:00

## 2020-06-26 RX ADMIN — ONDANSETRON 4 MG: 2 INJECTION INTRAMUSCULAR; INTRAVENOUS at 21:00

## 2020-06-26 RX ADMIN — SODIUM CHLORIDE 1000 ML: 900 INJECTION, SOLUTION INTRAVENOUS at 21:00

## 2020-06-26 RX ADMIN — HEPARIN SODIUM 18 UNITS/KG/HR: 10000 INJECTION, SOLUTION INTRAVENOUS at 23:03

## 2020-06-26 RX ADMIN — IOPAMIDOL 100 ML: 612 INJECTION, SOLUTION INTRAVENOUS at 21:21

## 2020-06-26 NOTE — ED TRIAGE NOTES
Patient states has C-diff infection. States was put on an antibiotic two days ago per Dr. Stephani Soriano and she is not getting any better.

## 2020-06-27 PROBLEM — I81 PORTAL VEIN THROMBOSIS: Status: ACTIVE | Noted: 2020-06-27

## 2020-06-27 PROBLEM — Z86.19 HISTORY OF CLOSTRIDIOIDES DIFFICILE COLITIS: Status: ACTIVE | Noted: 2020-06-27

## 2020-06-27 PROBLEM — K29.90 GASTRITIS AND DUODENITIS: Status: ACTIVE | Noted: 2020-06-27

## 2020-06-27 LAB
ALBUMIN SERPL-MCNC: 2.5 G/DL (ref 3.4–5)
ALBUMIN/GLOB SERPL: 0.7 {RATIO} (ref 0.8–1.7)
ALP SERPL-CCNC: 256 U/L (ref 45–117)
ALT SERPL-CCNC: 280 U/L (ref 13–56)
ANION GAP SERPL CALC-SCNC: 8 MMOL/L (ref 3–18)
APPEARANCE UR: CLEAR
APTT PPP: 114.9 SEC (ref 23–36.4)
APTT PPP: 99.7 SEC (ref 23–36.4)
APTT PPP: >180 SEC (ref 23–36.4)
AST SERPL-CCNC: 2037 U/L (ref 10–38)
BACTERIA URNS QL MICRO: ABNORMAL /HPF
BASOPHILS # BLD: 0 K/UL (ref 0–0.1)
BASOPHILS NFR BLD: 0 % (ref 0–2)
BILIRUB SERPL-MCNC: 1.9 MG/DL (ref 0.2–1)
BILIRUB UR QL: NEGATIVE
BUN SERPL-MCNC: 7 MG/DL (ref 7–18)
BUN/CREAT SERPL: 9 (ref 12–20)
C DIFF GDH STL QL: POSITIVE
C DIFF TOX A+B STL QL IA: POSITIVE
CA-I SERPL-SCNC: 1.1 MMOL/L (ref 1.12–1.32)
CALCIUM SERPL-MCNC: 7.7 MG/DL (ref 8.5–10.1)
CHLORIDE SERPL-SCNC: 106 MMOL/L (ref 100–111)
CHOLEST SERPL-MCNC: 156 MG/DL
CO2 SERPL-SCNC: 22 MMOL/L (ref 21–32)
COLOR UR: ABNORMAL
CREAT SERPL-MCNC: 0.78 MG/DL (ref 0.6–1.3)
DIFFERENTIAL METHOD BLD: ABNORMAL
EOSINOPHIL # BLD: 0.1 K/UL (ref 0–0.4)
EOSINOPHIL NFR BLD: 2 % (ref 0–5)
EPITH CASTS URNS QL MICRO: ABNORMAL /LPF (ref 0–5)
ERYTHROCYTE [DISTWIDTH] IN BLOOD BY AUTOMATED COUNT: 16.6 % (ref 11.6–14.5)
GLOBULIN SER CALC-MCNC: 3.4 G/DL (ref 2–4)
GLUCOSE SERPL-MCNC: 100 MG/DL (ref 74–99)
GLUCOSE UR STRIP.AUTO-MCNC: NEGATIVE MG/DL
HCT VFR BLD AUTO: 33.2 % (ref 35–45)
HDLC SERPL-MCNC: 47 MG/DL (ref 40–60)
HDLC SERPL: 3.3 {RATIO} (ref 0–5)
HGB BLD-MCNC: 10.8 G/DL (ref 12–16)
HGB UR QL STRIP: NEGATIVE
INTERPRETATION: ABNORMAL
KETONES UR QL STRIP.AUTO: NEGATIVE MG/DL
LDH SERPL L TO P-CCNC: 1234 U/L (ref 81–234)
LDLC SERPL CALC-MCNC: 86.4 MG/DL (ref 0–100)
LEUKOCYTE ESTERASE UR QL STRIP.AUTO: NEGATIVE
LIPASE SERPL-CCNC: 1849 U/L (ref 73–393)
LIPID PROFILE,FLP: NORMAL
LYMPHOCYTES # BLD: 1.1 K/UL (ref 0.9–3.6)
LYMPHOCYTES NFR BLD: 33 % (ref 21–52)
MCH RBC QN AUTO: 32.7 PG (ref 24–34)
MCHC RBC AUTO-ENTMCNC: 32.5 G/DL (ref 31–37)
MCV RBC AUTO: 100.6 FL (ref 74–97)
MONOCYTES # BLD: 0.2 K/UL (ref 0.05–1.2)
MONOCYTES NFR BLD: 7 % (ref 3–10)
NEUTS SEG # BLD: 1.9 K/UL (ref 1.8–8)
NEUTS SEG NFR BLD: 58 % (ref 40–73)
NITRITE UR QL STRIP.AUTO: NEGATIVE
PH UR STRIP: 5 [PH] (ref 5–8)
PLATELET # BLD AUTO: 309 K/UL (ref 135–420)
PMV BLD AUTO: 9.8 FL (ref 9.2–11.8)
POTASSIUM SERPL-SCNC: 3.8 MMOL/L (ref 3.5–5.5)
PROT SERPL-MCNC: 5.9 G/DL (ref 6.4–8.2)
PROT UR STRIP-MCNC: ABNORMAL MG/DL
RBC # BLD AUTO: 3.3 M/UL (ref 4.2–5.3)
RBC #/AREA URNS HPF: NEGATIVE /HPF (ref 0–5)
SODIUM SERPL-SCNC: 136 MMOL/L (ref 136–145)
SP GR UR REFRACTOMETRY: >1.03 (ref 1–1.03)
TRIGL SERPL-MCNC: 113 MG/DL (ref ?–150)
UROBILINOGEN UR QL STRIP.AUTO: 1 EU/DL (ref 0.2–1)
VLDLC SERPL CALC-MCNC: 22.6 MG/DL
WBC # BLD AUTO: 3.3 K/UL (ref 4.6–13.2)
WBC URNS QL MICRO: NEGATIVE /HPF (ref 0–5)

## 2020-06-27 PROCEDURE — 74011250637 HC RX REV CODE- 250/637: Performed by: INTERNAL MEDICINE

## 2020-06-27 PROCEDURE — 87506 IADNA-DNA/RNA PROBE TQ 6-11: CPT

## 2020-06-27 PROCEDURE — 82330 ASSAY OF CALCIUM: CPT

## 2020-06-27 PROCEDURE — 83690 ASSAY OF LIPASE: CPT

## 2020-06-27 PROCEDURE — 85025 COMPLETE CBC W/AUTO DIFF WBC: CPT

## 2020-06-27 PROCEDURE — 36415 COLL VENOUS BLD VENIPUNCTURE: CPT

## 2020-06-27 PROCEDURE — 81001 URINALYSIS AUTO W/SCOPE: CPT

## 2020-06-27 PROCEDURE — 74011250636 HC RX REV CODE- 250/636: Performed by: FAMILY MEDICINE

## 2020-06-27 PROCEDURE — 83615 LACTATE (LD) (LDH) ENZYME: CPT

## 2020-06-27 PROCEDURE — 85730 THROMBOPLASTIN TIME PARTIAL: CPT

## 2020-06-27 PROCEDURE — 65270000029 HC RM PRIVATE

## 2020-06-27 PROCEDURE — 74011250636 HC RX REV CODE- 250/636: Performed by: PHYSICIAN ASSISTANT

## 2020-06-27 PROCEDURE — 74011000250 HC RX REV CODE- 250: Performed by: FAMILY MEDICINE

## 2020-06-27 PROCEDURE — 87177 OVA AND PARASITES SMEARS: CPT

## 2020-06-27 PROCEDURE — 0107U C DIFF TOX AG DETCJ IA STOOL: CPT

## 2020-06-27 PROCEDURE — 80053 COMPREHEN METABOLIC PANEL: CPT

## 2020-06-27 PROCEDURE — 80061 LIPID PANEL: CPT

## 2020-06-27 PROCEDURE — C9113 INJ PANTOPRAZOLE SODIUM, VIA: HCPCS | Performed by: FAMILY MEDICINE

## 2020-06-27 PROCEDURE — 74011250636 HC RX REV CODE- 250/636: Performed by: INTERNAL MEDICINE

## 2020-06-27 RX ORDER — LORAZEPAM 2 MG/ML
0.5 INJECTION INTRAMUSCULAR ONCE
Status: COMPLETED | OUTPATIENT
Start: 2020-06-27 | End: 2020-06-27

## 2020-06-27 RX ORDER — ESCITALOPRAM OXALATE 10 MG/1
10 TABLET ORAL DAILY
Status: DISCONTINUED | OUTPATIENT
Start: 2020-06-27 | End: 2020-06-30

## 2020-06-27 RX ORDER — DIPHENHYDRAMINE HYDROCHLORIDE 50 MG/ML
25 INJECTION, SOLUTION INTRAMUSCULAR; INTRAVENOUS
Status: DISCONTINUED | OUTPATIENT
Start: 2020-06-27 | End: 2020-07-03 | Stop reason: HOSPADM

## 2020-06-27 RX ORDER — METRONIDAZOLE 500 MG/100ML
500 INJECTION, SOLUTION INTRAVENOUS EVERY 8 HOURS
Status: DISCONTINUED | OUTPATIENT
Start: 2020-06-27 | End: 2020-07-01 | Stop reason: ALTCHOICE

## 2020-06-27 RX ORDER — FENTANYL CITRATE 50 UG/ML
50 INJECTION, SOLUTION INTRAMUSCULAR; INTRAVENOUS ONCE
Status: COMPLETED | OUTPATIENT
Start: 2020-06-27 | End: 2020-06-27

## 2020-06-27 RX ORDER — FENTANYL CITRATE 50 UG/ML
50 INJECTION, SOLUTION INTRAMUSCULAR; INTRAVENOUS
Status: DISCONTINUED | OUTPATIENT
Start: 2020-06-27 | End: 2020-06-27

## 2020-06-27 RX ORDER — MORPHINE SULFATE 2 MG/ML
2 INJECTION, SOLUTION INTRAMUSCULAR; INTRAVENOUS
Status: DISCONTINUED | OUTPATIENT
Start: 2020-06-27 | End: 2020-07-01

## 2020-06-27 RX ORDER — HYDROMORPHONE HYDROCHLORIDE 1 MG/ML
0.5 INJECTION, SOLUTION INTRAMUSCULAR; INTRAVENOUS; SUBCUTANEOUS
Status: DISCONTINUED | OUTPATIENT
Start: 2020-06-27 | End: 2020-06-27

## 2020-06-27 RX ADMIN — SODIUM CHLORIDE 40 MG: 9 INJECTION, SOLUTION INTRAMUSCULAR; INTRAVENOUS; SUBCUTANEOUS at 02:49

## 2020-06-27 RX ADMIN — SODIUM CHLORIDE 200 ML/HR: 900 INJECTION, SOLUTION INTRAVENOUS at 15:49

## 2020-06-27 RX ADMIN — DIPHENHYDRAMINE HYDROCHLORIDE 25 MG: 50 INJECTION, SOLUTION INTRAMUSCULAR; INTRAVENOUS at 02:19

## 2020-06-27 RX ADMIN — MORPHINE SULFATE 2 MG: 2 INJECTION, SOLUTION INTRAMUSCULAR; INTRAVENOUS at 22:19

## 2020-06-27 RX ADMIN — HYDROMORPHONE HYDROCHLORIDE 0.5 MG: 1 INJECTION, SOLUTION INTRAMUSCULAR; INTRAVENOUS; SUBCUTANEOUS at 00:40

## 2020-06-27 RX ADMIN — MORPHINE SULFATE 2 MG: 2 INJECTION, SOLUTION INTRAMUSCULAR; INTRAVENOUS at 17:22

## 2020-06-27 RX ADMIN — FAMOTIDINE 20 MG: 10 INJECTION INTRAVENOUS at 02:50

## 2020-06-27 RX ADMIN — METRONIDAZOLE 500 MG: 500 INJECTION, SOLUTION INTRAVENOUS at 22:19

## 2020-06-27 RX ADMIN — SODIUM CHLORIDE 200 ML/HR: 900 INJECTION, SOLUTION INTRAVENOUS at 22:19

## 2020-06-27 RX ADMIN — SODIUM CHLORIDE 1000 ML: 900 INJECTION, SOLUTION INTRAVENOUS at 00:55

## 2020-06-27 RX ADMIN — FAMOTIDINE 20 MG: 10 INJECTION INTRAVENOUS at 21:15

## 2020-06-27 RX ADMIN — SODIUM CHLORIDE 200 ML/HR: 900 INJECTION, SOLUTION INTRAVENOUS at 03:33

## 2020-06-27 RX ADMIN — FENTANYL CITRATE 50 MCG: 50 INJECTION INTRAMUSCULAR; INTRAVENOUS at 05:58

## 2020-06-27 RX ADMIN — METRONIDAZOLE 500 MG: 500 INJECTION, SOLUTION INTRAVENOUS at 15:48

## 2020-06-27 RX ADMIN — MORPHINE SULFATE 2 MG: 2 INJECTION, SOLUTION INTRAMUSCULAR; INTRAVENOUS at 12:00

## 2020-06-27 RX ADMIN — ONDANSETRON 4 MG: 2 INJECTION INTRAMUSCULAR; INTRAVENOUS at 16:53

## 2020-06-27 RX ADMIN — FAMOTIDINE 20 MG: 10 INJECTION INTRAVENOUS at 10:50

## 2020-06-27 RX ADMIN — FENTANYL CITRATE 50 MCG: 50 INJECTION INTRAMUSCULAR; INTRAVENOUS at 10:44

## 2020-06-27 RX ADMIN — LORAZEPAM 0.5 MG: 2 INJECTION INTRAMUSCULAR; INTRAVENOUS at 00:38

## 2020-06-27 RX ADMIN — ESCITALOPRAM OXALATE 10 MG: 10 TABLET ORAL at 14:13

## 2020-06-27 RX ADMIN — MORPHINE SULFATE 2 MG: 2 INJECTION, SOLUTION INTRAMUSCULAR; INTRAVENOUS at 14:13

## 2020-06-27 RX ADMIN — DIPHENHYDRAMINE HYDROCHLORIDE 25 MG: 50 INJECTION, SOLUTION INTRAMUSCULAR; INTRAVENOUS at 22:19

## 2020-06-27 RX ADMIN — HEPARIN SODIUM 13 UNITS/KG/HR: 10000 INJECTION, SOLUTION INTRAVENOUS at 19:21

## 2020-06-27 RX ADMIN — FENTANYL CITRATE 50 MCG: 50 INJECTION INTRAMUSCULAR; INTRAVENOUS at 03:32

## 2020-06-27 RX ADMIN — HEPARIN SODIUM 13 UNITS/KG/HR: 10000 INJECTION, SOLUTION INTRAVENOUS at 14:46

## 2020-06-27 NOTE — PROGRESS NOTES
Bedside and verbal shift change report given to Verónica Diaz and Faustina Christensen, RN (on coming nurse) by Love Figueroa RN (off going nurse). Report included the following information SBAR, Kardex, OR Summary, Intake/Output and MAR.    1930 Bedside and verbal shift change report given by  Verónica Diaz and Faustina Christensen RN (off going nurse) to [employer] , RN(on coming nurse). Report included the following information SBAR, Kardex, OR Summary, Intake/Output and MAR.

## 2020-06-27 NOTE — PROGRESS NOTES
CM on call, chart reviewed. Met with pt at bedside. Lives with 3 children. States is IADLs, denies use of DME. Pt was not very talkative, states is in pain. Primary nurse aware. No needs identified. Care Management Interventions  PCP Verified by CM: Yes  Current Support Network:  Other  Confirm Follow Up Transport: Family  Discharge Location  Discharge Placement: Home    Reason for Admission:   Acute pancreatic necrosis                   RUR Score:     11                Plan for utilizing home health:  none    PCP: First and Last name:  Blayne Krishnamurthy    Name of Practice:    Are you a current patient: Yes/No:    Approximate date of last visit:    Can you participate in a virtual visit with your PCP:                     Current Advanced Directive/Advance Care Plan:                          Transition of Care Plan:   Home with provider follow up and family assistance

## 2020-06-27 NOTE — PROGRESS NOTES
Problem: Falls - Risk of  Goal: *Absence of Falls  Description: Document Jim Lemus Fall Risk and appropriate interventions in the flowsheet.   Outcome: Progressing Towards Goal  Note: Fall Risk Interventions:  Mobility Interventions: Communicate number of staff needed for ambulation/transfer, Patient to call before getting OOB         Medication Interventions: Assess postural VS orthostatic hypotension, Patient to call before getting OOB, Teach patient to arise slowly    Elimination Interventions: Call light in reach, Patient to call for help with toileting needs

## 2020-06-27 NOTE — PROGRESS NOTES
0800 Bedside shift report with Cassia Guerin, RN offgoing nurse. Included plan for GI consult and an MRI. 1030 Head to toe assessment. WDL except abdomen which is very sore/tender. 1053 Administered Fentanyl as prescribed. Pain of 9/10.    1130 Pain assessment 7/10. Pt goal of 5/10. Kirt ESCOBAR for prn pain medication for severe pain. Pt has not slept since admission. 1133 Informed MD pt pain 9/10. Requesting additional prn pain medication. Asked MD about possible order for SCD's. MD said Heparin drip makes it unnecessary for SCD at this time. MD putting in order for Morphine. 1200 Pt responding well to morphine and trying to sleep. Pt requested home medication for depression. Verified with Louisa Humphreys a prescription that is current. Kirt ESCOBAR to request order. 1226 Verified new order for Escital 10 mg/daily in am for depression with Dr. Joel Caldwell. Telephone order with readback. 1300 Pt pain stated 4/10. Resting in bed.    1450 Informed MD of critical result of C-Diff positive. No new orders received. 1605 Re-assessed BP manually as elevated. Asked pt if she normally has high BP or takes any medications. She said she takes BP medications. 1653 pt c/o of nausea. Provided prn medication as prescribed. 1730 pt informed nausea resolved. Zeny Moralez MD to inform of pt home medication for hypertension. 1930 Bedside shift report given by Madison Read RN to oncoming nurse LAMONTE Easley. Informed oncoming nurse about next pTT at 2046.

## 2020-06-27 NOTE — ED PROVIDER NOTES
EMERGENCY DEPARTMENT HISTORY AND PHYSICAL EXAM    Date: 6/26/2020  Patient Name: Dot Lopez    History of Presenting Illness     Chief Complaint   Patient presents with    Diarrhea         History Provided By: Patient    8:50 PM  Dot Lopez is a 39 y.o. female with PMHX of pancreatitis and recurrent C. difficile who presents to the emergency department C/O abdominal pain and diarrhea onset 2 to 3 days ago. Associated N/V. Patient reports 10 episodes of loose stool per day. Called her GI DrParveen Crenshaw who called in vancomycin which she started taking 2 days ago, in addition to Tylenol, without relief. Pain worsened today prompting her to come to ED. Past surgical history of gastric bypass with revision. Pt denies fever, dysuria, pregnancy, and any other sxs or complaints. PCP: Dorie RAMOS NP    Current Facility-Administered Medications   Medication Dose Route Frequency Provider Last Rate Last Dose    sodium chloride 0.9 % bolus infusion 1,000 mL  1,000 mL IntraVENous ONCE [place] [facility]        ondansetron [facility]) injection 4 mg  4 mg IntraVENous Q6H PRN Ludmila Holder MD        0.9% sodium chloride infusion  150 mL/hr IntraVENous CONTINUOUS Ludmila Holder MD        heparin (porcine) 1,000 unit/mL injection 7,260 Units  80 Units/kg IntraVENous ONCE Bret Petersen [place]        heparin 25,000 units in D5W 250 ml infusion  18-36 Units/kg/hr IntraVENous TITRATE Jill Rincon [place]         Current Outpatient Medications   Medication Sig Dispense Refill    vancomycin HCl (VANCOMYCIN PO) Take  by mouth.  amLODIPine (NORVASC) 10 mg tablet Take 0.5 Tabs by mouth daily. 30 Tab 0    thiamine mononitrate (B-1) 100 mg tablet Take 1 Tab by mouth daily. 30 Tab 0    omeprazole (PRILOSEC OTC) 20 mg tablet Take 1 Tab by mouth daily. 30 Tab 0    dicyclomine (BENTYL) 20 mg tablet Take 20 mg by mouth every six (6) hours.       traZODone (DESYREL) 100 mg tablet Take  by mouth nightly. Pt is unsure of the dose for this medication      multivitamin (ONE A DAY) tablet Take 1 Tab by mouth daily. Past History     Past Medical History:  Past Medical History:   Diagnosis Date    Anemia     C. difficile diarrhea     Cancer ([facility]      history of endometrial cancer    Diarrhea     GERD (gastroesophageal reflux disease)     Hypertension 2017    Intestinal malabsorption     Psychiatric disorder     Anxiety and depression    Severe obesity with body mass index (BMI) of 35.0 to 39.9 with comorbidity ([facility]     Smoker     smokes 5 cigarettes per day    Status post gastric bypass for obesity     daryl / librado hermosillo    Syncopal episodes     has plans to see a neurologist     Wears dentures     teeth extraction due to vomiting and vitamin deficiencies       Past Surgical History:  Past Surgical History:   Procedure Laterality Date    HX CHOLECYSTECTOMY      HX GASTRIC BYPASS      open / librado hermosillo    HX GASTRIC BYPASS  2018    HX GI  2017    EGD    HX HYSTERECTOMY      HX OTHER SURGICAL Right     resection of benign breast mass       Family History:  Family History   Problem Relation Age of Onset    Diabetes Mother        Social History:  Social History     Tobacco Use    Smoking status: Former Smoker     Types: Cigarettes     Last attempt to quit: 2018     Years since quittin.8    Smokeless tobacco: Never Used   Substance Use Topics    Alcohol use: No     Alcohol/week: 1.0 standard drinks     Types: 1 Cans of beer per week     Frequency: Never    Drug use: No       Allergies:  No Known Allergies      Review of Systems   Review of Systems   Constitutional: Negative for fever. Gastrointestinal: Positive for abdominal pain, diarrhea, nausea and vomiting. Genitourinary: Negative for dysuria. All other systems reviewed and are negative.         Physical Exam     Vitals:    20 1942 20 2200   BP: 132/90 (!) 133/92   Pulse: (!) 113 100   Resp: 16 16   Temp: 97.7 °F (36.5 °C)    SpO2: 99% 93%   Weight: 90.7 kg (200 lb)    Height: 5' 5\" (1.651 m)      Physical Exam  Vital signs and nursing notes reviewed. CONSTITUTIONAL: Alert. Well-appearing; well-nourished; in no apparent distress. HEAD: Normocephalic; atraumatic. CV: Normal S1, S2; no murmurs, rubs, or gallops. No chest wall tenderness. RESPIRATORY: Normal chest excursion with respiration; breath sounds clear and equal bilaterally; no wheezes, rhonchi, or rales. GI: Normal bowel sounds; non-distended; generalized upper abdominal tenderness; no guarding or rigidity; no palpable organomegaly. No CVA tenderness. SKIN: Normal for age and race; warm; dry; good turgor; no apparent lesions or exudate. NEURO: A & O x3. PSYCH:  Mood and affect appropriate. Diagnostic Study Results     Labs -     Recent Results (from the past 12 hour(s))   CBC WITH AUTOMATED DIFF    Collection Time: 06/26/20  8:30 PM   Result Value Ref Range    WBC 8.7 4.6 - 13.2 K/uL    RBC 3.81 (L) 4.20 - 5.30 M/uL    HGB 12.6 12.0 - 16.0 g/dL    HCT 38.5 35.0 - 45.0 %    .0 (H) 74.0 - 97.0 FL    MCH 33.1 24.0 - 34.0 PG    MCHC 32.7 31.0 - 37.0 g/dL    RDW 16.7 (H) 11.6 - 14.5 %    PLATELET 383 748 - 583 K/uL    MPV 9.1 (L) 9.2 - 11.8 FL    NEUTROPHILS 74 (H) 40 - 73 %    LYMPHOCYTES 17 (L) 21 - 52 %    MONOCYTES 7 3 - 10 %    EOSINOPHILS 2 0 - 5 %    BASOPHILS 0 0 - 2 %    ABS. NEUTROPHILS 6.4 1.8 - 8.0 K/UL    ABS. LYMPHOCYTES 1.5 0.9 - 3.6 K/UL    ABS. MONOCYTES 0.6 0.05 - 1.2 K/UL    ABS. EOSINOPHILS 0.2 0.0 - 0.4 K/UL    ABS.  BASOPHILS 0.0 0.0 - 0.1 K/UL    DF AUTOMATED     METABOLIC PANEL, COMPREHENSIVE    Collection Time: 06/26/20  8:30 PM   Result Value Ref Range    Sodium 133 (L) 136 - 145 mmol/L    Potassium 4.2 3.5 - 5.5 mmol/L    Chloride 103 100 - 111 mmol/L    CO2 23 21 - 32 mmol/L    Anion gap 7 3.0 - 18 mmol/L    Glucose 131 (H) 74 - 99 mg/dL    BUN 10 7.0 - 18 MG/DL    Creatinine 0.86 0.6 - 1.3 MG/DL    BUN/Creatinine ratio 12 12 - 20      GFR est AA >60 >60 ml/min/1.73m2    GFR est non-AA >60 >60 ml/min/1.73m2    Calcium 8.7 8.5 - 10.1 MG/DL    Bilirubin, total 0.8 0.2 - 1.0 MG/DL    ALT (SGPT) 40 13 - 56 U/L    AST (SGOT) 154 (H) 10 - 38 U/L    Alk. phosphatase 165 (H) 45 - 117 U/L    Protein, total 7.4 6.4 - 8.2 g/dL    Albumin 3.1 (L) 3.4 - 5.0 g/dL    Globulin 4.3 (H) 2.0 - 4.0 g/dL    A-G Ratio 0.7 (L) 0.8 - 1.7     LIPASE    Collection Time: 06/26/20  8:30 PM   Result Value Ref Range    Lipase 1,879 (H) 73 - 393 U/L   LACTIC ACID    Collection Time: 06/26/20  8:30 PM   Result Value Ref Range    Lactic acid 1.1 0.4 - 2.0 MMOL/L       Radiologic Studies -   CT ABD PELV W CONT   Final Result   IMPRESSION:      There appears to be recurrence of acute edematous interstitial pancreatitis   involving the head and uncinate process of the pancreas with 4.3 x 3.3 cm area   of decreased enhancement in the head of the pancreas concerning for pancreatic   necrosis. There is  atrophy of the pancreatic body and tail with pancreatic duct   dilatation may be a sequelae of prior pancreatitis. Follow-up MRI advised after   appropriate therapy to exclude any underlying neoplasm in the head of the   pancreas. There are cystic structures along the body and tail the pancreas which may   represent pancreatic pseudocyst versus side branch IPMN. 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 Advise GI consultation. ARNIE Petersen informed 9:58 PM June 26, 2020. There is secondary inflammation of the duodenum and stomach.         CT Results  (Last 48 hours)               06/26/20 2131  CT ABD PELV W CONT Final result    Impression:  IMPRESSION:       There appears to be recurrence of acute edematous interstitial pancreatitis   involving the head and uncinate process of the pancreas with 4.3 x 3.3 cm area   of decreased enhancement in the head of the pancreas concerning for pancreatic   necrosis. There is  atrophy of the pancreatic body and tail with pancreatic duct   dilatation may be a sequelae of prior pancreatitis. Follow-up MRI advised after   appropriate therapy to exclude any underlying neoplasm in the head of the   pancreas. There are cystic structures along the body and tail the pancreas which may   represent pancreatic pseudocyst versus side branch IPMN. 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 Advise GI consultation. ARNIE Petersen informed 9:58 PM June 26, 2020. There is secondary inflammation of the duodenum and stomach. Narrative:  EXAM: CT of the abdomen and pelvis       INDICATION: Abdominal pain diarrhea x2 days       COMPARISON: December 3, 2019       TECHNIQUE: Axial CT imaging of the abdomen and pelvis was performed with   intravenous contrast. Multiplanar reformats were generated. One or more dose   reduction techniques were used on this CT: automated exposure control,   adjustment of the mAs and/or kVp according to patient size, and iterative   reconstruction techniques. The specific techniques used on this CT exam have   been documented in the patient's electronic medical record. Digital Imaging and   Communications in Medicine (DICOM) format image data are available to   nonaffiliated external healthcare facilities or entities on a secure, media   free, reciprocally searchable basis with patient authorization for at least a   12-month period after this study. _______________       FINDINGS:       LOWER CHEST: Unremarkable. LIVER, BILIARY: Liver is normal. No biliary dilation. Cholecystectomy. PANCREAS: There is diffuse enlargement of the head and uncinate process of the   pancreas with extensive peripancreatic inflammation suggesting acute edematous   interstitial pancreatitis.  There is decreased enhancement of the head of the   pancreas measuring 4.3 x 3.3 cm suggesting pancreatic necrosis. There is mild   pancreatic duct dilatation of the body measuring 5 mm. Cystic structures are seen in the tail the pancreas measuring up to 14 mm and   another cystic duct and the junction of the body and tail measuring 10 mm. These   are new from December 2019 and suggestive of pancreatic pseudocyst however side   branch IPMN cannot be completely excluded. There is some atrophy of the body and   tail the pancreas when compared to prior exam..       SPLEEN: Normal.       ADRENALS: Normal.       KIDNEYS: Normal.       VASCULATURE: There is thrombosis of the superior mesenteric vein at the   confluence and partial thrombosis of the portal vein at the confluence. This is   best seen on axial image 39. LYMPH NODES: No enlarged lymph nodes. GASTROINTESTINAL TRACT: There is wall thickening of the duodenum and antrum of   the stomach secondary to the adjacent acute pancreatitis. Appendix is normal.   Post gastric bypass changes present. PELVIC ORGANS: Urinary bladder is decompressed therefore difficult to evaluate. Hysterectomy. Small amount of free fluid is present in the pelvis. Small midline   ventral hernia with fat content measuring 2.8 x 1.7 cm. BONES: No acute or aggressive osseous abnormalities identified.        OTHER: None.       _______________               CXR Results  (Last 48 hours)    None          Medications given in the ED-  Medications   sodium chloride 0.9 % bolus infusion 1,000 mL (has no administration in time range)   ondansetron (ZOFRAN) injection 4 mg (has no administration in time range)   0.9% sodium chloride infusion (has no administration in time range)   heparin (porcine) 1,000 unit/mL injection 7,260 Units (has no administration in time range)   heparin 25,000 units in D5W 250 ml infusion (has no administration in time range)   sodium chloride 0.9 % bolus infusion 1,000 mL (1,000 mL IntraVENous New Bag 6/26/20 2100)   ondansetron (ZOFRAN) injection 4 mg (4 mg IntraVENous Given 6/26/20 2100)   morphine injection 4 mg (4 mg IntraVENous Given 6/26/20 2100)   iopamidoL (ISOVUE 300) 61 % contrast injection 100 mL (100 mL IntraVENous Given 6/26/20 2121)         Medical Decision Making   I am the first provider for this patient. I reviewed the vital signs, available nursing notes, past medical history, past surgical history, family history and social history. Vital Signs-Reviewed the patient's vital signs. Records Reviewed: Nursing Notes and Old Medical Records      Procedures:  Procedures    ED Course:  8:50 PM   Initial assessment performed. The patients presenting problems have been discussed, and they are in agreement with the care plan formulated and outlined with them. I have encouraged them to ask questions as they arise throughout their visit. 10 PM progress note  Patient states her pain is currently a 6 out of 10 and is sitting comfortably. Results and plan for admission reviewed with patient. 10:10 PM consult note  Case discussed with the ED attending Dr. Heraclio Ling who agrees with consulting hospitalist for admission and anticoagulant recommendation for mesenteric/portal vein thrombosis. 10:15 PM consult note  Case discussed with hospitalist Dr. Lewis Vicente who will admit to medical floor. Recommends consulting GI, and starting heparin bolus and drip per PE/thrombosis protocol. 10:25 PM consult note  Case discussed with gastroenterologist Dr. Darcy Dixon, who agrees with starting heparin for mesenteric vein thrombus, clear liquid diet and he will consult. Diagnosis and Disposition     ADMISSION NOTE:  10:15PM  Patient is being admitted to the hospital by Dr. Nasra Griffin. The results of their tests and reasons for their admission have been discussed with them and/or available family. They convey agreement and understanding for the need to be admitted and for their admission diagnosis.       CONDITIONS ON ADMISSION:  Deep Vein Thrombosis is not present at the time of admission. Thrombosis is present at the time of admission. Urinary Tract Infection is not present at the time of admission. Pneumonia is not present at the time of admission. MRSA is not present at the time of admission. Wound infection is not present at the time of admission. Pressure Ulcer is not present at the time of admission. CLINICAL IMPRESSION:    1. Acute pancreatitis, unspecified complication status, unspecified pancreatitis type    2. Portal vein thrombosis    3. Mesenteric vein thrombosis ([address]    4. Diarrhea, unspecified type    5. History of Clostridium difficile infection        PLAN:    1. Admit      Please note that this dictation was completed with [employer], the computer voice recognition software. Quite often unanticipated grammatical, syntax, homophones, and other interpretive errors are inadvertently transcribed by the computer software. Please disregard these errors. Please excuse any errors that have escaped final proofreading.

## 2020-06-27 NOTE — H&P
History & Physical    Patient: Jovani Lou MRN: [id number]  CSN: [id number]    YOB: 1975  Age: 39 y.o. Sex: female      DOA: 6/26/2020  Primary Care Provider:  Moises Nath NP      Assessment/Plan     Patient Active Problem List   Diagnosis Code    Intestinal malabsorption K90.9    Status post gastric bypass for obesity Z98.84    Hypertension I10    Anemia D64.9    Anxiety F41.9    GERD (gastroesophageal reflux disease) K21.9    Wears dentures Z97.2    Syncopal episodes R55    Smoker F17.200    Status post laparoscopy Z98.890    Hyponatremia E87.1    Acute pancreatic necrosis K85.91    Portal vein thrombosis I81    Gastritis and duodenitis K29.90    History of Clostridioides difficile colitis Z80.22     40 y/o female w/ hx of C diff infection in the past as well as gastric bypass surgery w/ revision is admitted for acute pancreatitis w/  necrosis as well as portal vein/SMV thrombosis and gastritis/duodenitis. Pancreatic necrosis/pancreatitis-recurrent pancreatitis but necrosis may be due to thrombosis  -aggressive IV hydration  -monitor I/O  -GI c/s  -will need pancreas MRI, defer to GI in terms of the timing of this    Portal vein/SMV thrombosis  -heparin gtt    Gastritis/duodenitis  -IV pepcid and protonix to minimize inflammation    H/o C diff infection-I do not believe she has an acute infection right now. I think her symptoms are explained by the above diagnoses. Additionally, there is no colitis noted on CT scan.  -contact precautions  -retest for C diff  -hold PO vanc unless she has a positive test    Full code    Prophylaxis: SCDs, protonix IV, heparin gtt    Estimated length of stay : 3-4 nights    Tyrell Patel MD  Nocturnist  -------------------------------------------------------------------------------------------------------------------------------------------------------------------------  CC: abdominal pain       HPI:     Jovani Lou is a 39 y.o. female who has a history of recurrent pancreatitis and as well as history of C diff infection (last in Dec 2019) who came to the ER for abdominal pain and diarrhea that started 2 days ago. She has also had N/V and noticed foul smelling stool. Dr. Zulema Hutton (GI) prescribed oral vancomycin but she has had no relief with this so she came to the ER. No fever.      Past Medical History:   Diagnosis Date    Anemia     C. difficile diarrhea     [facility]      history of endometrial cancer    Diarrhea     GERD (gastroesophageal reflux disease)     Hypertension 2017    Intestinal malabsorption     Psychiatric disorder     Anxiety and depression    Severe obesity with body mass index (BMI) of 35.0 to 39.9 with comorbidity ([facility]     Smoker     smokes 5 cigarettes per day    Status post gastric bypass for obesity     daryl hermosillo    Syncopal episodes     has plans to see a neurologist     Wears dentures     teeth extraction due to vomiting and vitamin deficiencies       Past Surgical History:   Procedure Laterality Date    HX CHOLECYSTECTOMY      HX GASTRIC BYPASS      daryl / librado hermosillo    HX GASTRIC BYPASS  2018    HX GI  2017    EGD    HX HYSTERECTOMY      HX OTHER SURGICAL Right     resection of benign breast mass       Family History   Problem Relation Age of Onset    Diabetes Mother    liver cancer-father    Social History     Socioeconomic History    Marital status: SINGLE     Spouse name: Not on file    Number of children: Not on file    Years of education: Not on file    Highest education level: Not on file   Tobacco Use    Smoking status: Former Smoker     Types: Cigarettes     Last attempt to quit: 2018     Years since quittin.8    Smokeless tobacco: Never Used   Substance and Sexual Activity    Alcohol use: No     Alcohol/week: 1.0 standard drinks     Types: 1 Cans of beer per week     Frequency: Never    Drug use: No    Sexual activity: Not Currently     Birth control/protection: Surgical       Prior to Admission medications    Medication Sig Start Date End Date Taking? Authorizing Provider   vancomycin HCl (VANCOMYCIN PO) Take  by mouth. Yes Other, MD Kari   amLODIPine (NORVASC) 10 mg tablet Take 0.5 Tabs by mouth daily. 19  Yes Maria L Bergeron MD   thiamine mononitrate (B-1) 100 mg tablet Take 1 Tab by mouth daily. 19  Yes Maria L Bergeron MD   omeprazole (PRILOSEC OTC) 20 mg tablet Take 1 Tab by mouth daily. 19  Yes Maria L Bergeron MD   dicyclomine (BENTYL) 20 mg tablet Take 20 mg by mouth every six (6) hours. Yes Other, MD Kari   traZODone (DESYREL) 100 mg tablet Take  by mouth nightly. Pt is unsure of the dose for this medication   Yes Provider, Historical   multivitamin (ONE A DAY) tablet Take 1 Tab by mouth daily. Yes Provider, Historical       No Known Allergies    Review of Systems  Gen: No fever, chills, malaise, weight loss/gain. Heent: No headache, rhinorrhea, epistaxis, ear pain, hearing loss, sinus pain, neck pain/stiffness, sore throat. Heart: No chest pain, palpitations, HAYES, pnd, or orthopnea. Resp: No cough, hemoptysis, wheezing and shortness of breath. GI: +nausea, vomiting, diarrhea, no constipation, melena or hematochezia. : No urinary obstruction, dysuria or hematuria. Vasc: No edema, cyanosis or claudication. Endo: No heat/cold intolerance, no polyuria,polydipsia or polyphagia. Neuro: No unilateral weakness, numbness, tingling. No seizures. Heme: No easy bruising or bleeding. Physical Exam:     Physical Exam:  Visit Vitals  /87 (BP 1 Location: Left arm)   Pulse 95   Temp 98.8 °F (37.1 °C)   Resp 16   Ht 5' 5\" (1.651 m)   Wt 90.7 kg (200 lb)   SpO2 96%   BMI 33.28 kg/m²      O2 Device: Room air    Temp (24hrs), Av.1 °F (36.7 °C), Min:97.7 °F (36.5 °C), Max:98.8 °F (37.1 °C)    No intake/output data recorded. No intake/output data recorded. General:  Awake, cooperative, uncomfortable.    Head: Normocephalic, without obvious abnormality, atraumatic. Eyes:  Conjunctivae/corneas clear, sclera anicteric. Neck: Supple, symmetrical, trachea midline. Lungs:   Clear to auscultation bilaterally. Heart:  Regular rate and rhythm, S1, S2 normal, no murmur, click, rub or gallop. Abdomen: Soft, ttp over epigastric region and superior to umbilicus. Bowel sounds normal. No masses,  No organomegaly. Extremities: Extremities normal, atraumatic, no cyanosis or edema. Capillary refill normal.   Pulses: 2+ and symmetric all extremities. Skin: Skin color pink, turgor normal. No rashes or lesions   Neurologic: No focal motor or sensory deficit. Labs Reviewed: All lab results for the last 24 hours reviewed. Recent Results (from the past 24 hour(s))   CBC WITH AUTOMATED DIFF    Collection Time: 06/26/20  8:30 PM   Result Value Ref Range    WBC 8.7 4.6 - 13.2 K/uL    RBC 3.81 (L) 4.20 - 5.30 M/uL    HGB 12.6 12.0 - 16.0 g/dL    HCT 38.5 35.0 - 45.0 %    .0 (H) 74.0 - 97.0 FL    MCH 33.1 24.0 - 34.0 PG    MCHC 32.7 31.0 - 37.0 g/dL    RDW 16.7 (H) 11.6 - 14.5 %    PLATELET 414 774 - 801 K/uL    MPV 9.1 (L) 9.2 - 11.8 FL    NEUTROPHILS 74 (H) 40 - 73 %    LYMPHOCYTES 17 (L) 21 - 52 %    MONOCYTES 7 3 - 10 %    EOSINOPHILS 2 0 - 5 %    BASOPHILS 0 0 - 2 %    ABS. NEUTROPHILS 6.4 1.8 - 8.0 K/UL    ABS. LYMPHOCYTES 1.5 0.9 - 3.6 K/UL    ABS. MONOCYTES 0.6 0.05 - 1.2 K/UL    ABS. EOSINOPHILS 0.2 0.0 - 0.4 K/UL    ABS.  BASOPHILS 0.0 0.0 - 0.1 K/UL    DF AUTOMATED     METABOLIC PANEL, COMPREHENSIVE    Collection Time: 06/26/20  8:30 PM   Result Value Ref Range    Sodium 133 (L) 136 - 145 mmol/L    Potassium 4.2 3.5 - 5.5 mmol/L    Chloride 103 100 - 111 mmol/L    CO2 23 21 - 32 mmol/L    Anion gap 7 3.0 - 18 mmol/L    Glucose 131 (H) 74 - 99 mg/dL    BUN 10 7.0 - 18 MG/DL    Creatinine 0.86 0.6 - 1.3 MG/DL    BUN/Creatinine ratio 12 12 - 20      GFR est AA >60 >60 ml/min/1.73m2    GFR est non-AA >60 >60 ml/min/1.73m2    Calcium 8.7 8.5 - 10.1 MG/DL    Bilirubin, total 0.8 0.2 - 1.0 MG/DL    ALT (SGPT) 40 13 - 56 U/L    AST (SGOT) 154 (H) 10 - 38 U/L    Alk.  phosphatase 165 (H) 45 - 117 U/L    Protein, total 7.4 6.4 - 8.2 g/dL    Albumin 3.1 (L) 3.4 - 5.0 g/dL    Globulin 4.3 (H) 2.0 - 4.0 g/dL    A-G Ratio 0.7 (L) 0.8 - 1.7     LIPASE    Collection Time: 06/26/20  8:30 PM   Result Value Ref Range    Lipase 1,879 (H) 73 - 393 U/L   LACTIC ACID    Collection Time: 06/26/20  8:30 PM   Result Value Ref Range    Lactic acid 1.1 0.4 - 2.0 MMOL/L   PTT    Collection Time: 06/26/20  8:30 PM   Result Value Ref Range    aPTT 30.9 23.0 - 36.4 SEC   PROTHROMBIN TIME + INR    Collection Time: 06/26/20  8:30 PM   Result Value Ref Range    Prothrombin time 13.5 11.5 - 15.2 sec    INR 1.1 0.8 - 1.2     ETHYL ALCOHOL    Collection Time: 06/26/20  8:30 PM   Result Value Ref Range    ALCOHOL(ETHYL),SERUM <3 0 - 3 MG/DL   URINALYSIS W/ RFLX MICROSCOPIC    Collection Time: 06/27/20 12:27 AM   Result Value Ref Range    Color DARK YELLOW      Appearance CLEAR      Specific gravity >1.030 (H) 1.005 - 1.030    pH (UA) 5.0 5.0 - 8.0      Protein TRACE (A) NEG mg/dL    Glucose Negative NEG mg/dL    Ketone Negative NEG mg/dL    Bilirubin Negative NEG      Blood Negative NEG      Urobilinogen 1.0 0.2 - 1.0 EU/dL    Nitrites Negative NEG      Leukocyte Esterase Negative NEG     URINE MICROSCOPIC ONLY    Collection Time: 06/27/20 12:27 AM   Result Value Ref Range    WBC Negative 0 - 5 /hpf    RBC Negative 0 - 5 /hpf    Epithelial cells 2+ 0 - 5 /lpf    Bacteria FEW (A) NEG /hpf     Results   CT ABD PELV W CONT (Accession [id number]) (Order [id number])   Allergies      No Known Allergies   Exam Information     Status Exam Begun  Exam Ended    Final [99] 6/26/2020 21:20 6/26/2020 21:31   Result Information     Status: Final result (Exam End: 6/26/2020 21:31) Provider Status: Open   Study Result     EXAM: CT of the abdomen and pelvis     INDICATION: Abdominal pain diarrhea x2 days     COMPARISON: December 3, 2019     TECHNIQUE: Axial CT imaging of the abdomen and pelvis was performed with  intravenous contrast. Multiplanar reformats were generated. One or more dose  reduction techniques were used on this CT: automated exposure control,  adjustment of the mAs and/or kVp according to patient size, and iterative  reconstruction techniques. The specific techniques used on this CT exam have  been documented in the patient's electronic medical record. Digital Imaging and  Communications in Medicine (DICOM) format image data are available to  nonaffiliated external healthcare facilities or entities on a secure, media  free, reciprocally searchable basis with patient authorization for at least a  12-month period after this study.     _______________     FINDINGS:     LOWER CHEST: Unremarkable.     LIVER, BILIARY: Liver is normal. No biliary dilation. Cholecystectomy.     PANCREAS: There is diffuse enlargement of the head and uncinate process of the  pancreas with extensive peripancreatic inflammation suggesting acute edematous  interstitial pancreatitis. There is decreased enhancement of the head of the  pancreas measuring 4.3 x 3.3 cm suggesting pancreatic necrosis. There is mild  pancreatic duct dilatation of the body measuring 5 mm. Cystic structures are seen in the tail the pancreas measuring up to 14 mm and  another cystic duct and the junction of the body and tail measuring 10 mm. These  are new from December 2019 and suggestive of pancreatic pseudocyst however side  branch IPMN cannot be completely excluded. There is some atrophy of the body and  tail the pancreas when compared to prior exam..     SPLEEN: Normal.     ADRENALS: Normal.     KIDNEYS: Normal.     VASCULATURE: There is thrombosis of the superior mesenteric vein at the  confluence and partial thrombosis of the portal vein at the confluence.  This is  best seen on axial image 39.     LYMPH NODES: No enlarged lymph nodes.     GASTROINTESTINAL TRACT: There is wall thickening of the duodenum and antrum of  the stomach secondary to the adjacent acute pancreatitis. Appendix is normal.  Post gastric bypass changes present.     PELVIC ORGANS: Urinary bladder is decompressed therefore difficult to evaluate. Hysterectomy. Small amount of free fluid is present in the pelvis. Small midline  ventral hernia with fat content measuring 2.8 x 1.7 cm.     BONES: No acute or aggressive osseous abnormalities identified.     OTHER: None.     _______________     IMPRESSION  IMPRESSION:     There appears to be recurrence of acute edematous interstitial pancreatitis  involving the head and uncinate process of the pancreas with 4.3 x 3.3 cm area  of decreased enhancement in the head of the pancreas concerning for pancreatic  necrosis. There is  atrophy of the pancreatic body and tail with pancreatic duct  dilatation may be a sequelae of prior pancreatitis. Follow-up MRI advised after  appropriate therapy to exclude any underlying neoplasm in the head of the  pancreas.      There are cystic structures along the body and tail the pancreas which may  represent pancreatic pseudocyst versus side branch IPMN.      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 Advise GI consultation.      ARNIE Petersen informed 9:58 PM June 26, 2020.     There is secondary inflammation of the duodenum and stomach.

## 2020-06-27 NOTE — ED NOTES
Pt co abd pain, N/V/D x 2 days. Pt reports Vx4, Dx 8 in last 24hrs. Pt has hx of Cdiff since Dec2019, pancreatitis, gastric bypass x 2. Pt called her PCP and was placed on vancomycin q6hrs 2 days ago. Pt denies CP, SOB, fever. Endorses chills, and has been taking tylenol every 4-6 hrs for her pain, which she now rates at 8/10.

## 2020-06-27 NOTE — PROGRESS NOTES
Hospitalist Progress Note    Patient: Maryuri Maravilla MRN: [id number]  CSN: [id number]    YOB: 1975  Age: 39 y.o. Sex: female    DOA: 6/26/2020 LOS:  LOS: 1 day              IMPRESSION and Plan:    Maryuri Maravilla is a 39 y.o. female with   Patient Active Problem List    Diagnosis Date Noted    Portal vein thrombosis 06/27/2020    Gastritis and duodenitis 06/27/2020    History of Clostridioides difficile colitis 06/27/2020    Acute pancreatic necrosis 06/26/2020    Hyponatremia 12/03/2019    Status post laparoscopy 09/18/2018    Intestinal malabsorption     Hypertension     Anemia     Anxiety     GERD (gastroesophageal reflux disease)     Wears dentures     Syncopal episodes     Smoker     Status post gastric bypass for obesity 01/01/2000     Principal Problem:    Acute pancreatic necrosis (6/26/2020)    Active Problems:    Portal vein thrombosis (6/27/2020)      Gastritis and duodenitis (6/27/2020)      History of Clostridioides difficile colitis (6/27/2020)        38 y/o female w/ hx of C diff infection in the past as well as gastric bypass surgery w/ revision is admitted for acute pancreatitis w/  necrosis as well as portal vein/SMV thrombosis and gastritis/duodenitis.     Pancreatic necrosis/pancreatitis-recurrent pancreatitis but necrosis may be due to thrombosis -- await GI consult. Pain meds as ordered. IV hep MRI pancreas per GI     Pain meds as ordered     Portal vein/SMV thrombosis  -heparin gtt     Gastritis/duodenitis  -IV pepcid and protonix      H/o C diff infection-I do not believe she has an acute infection right now. I think her symptoms are explained by the above diagnoses. Additionally, there is no colitis noted on CT scan. -- doubt c.diff testing pending         Patient's condition is fair        Recommend to continue hospitalization. Discussed with patient.     Chief Complaints:   Chief Complaint   Patient presents with    Diarrhea     SUBJECTIVE:  Pt is seen and examined. Chart reviewed  Still co abd pain 7/10. Fentanyl doesnot hep. IV morphine had helped in ER. Review of systems:    Review of Systems   Constitutional: Positive for malaise/fatigue. HENT: Negative. Eyes: Negative. Respiratory: Negative for shortness of breath. Cardiovascular: Positive for leg swelling. Negative for chest pain, palpitations and orthopnea. Gastrointestinal: Positive for abdominal pain, heartburn, nausea and vomiting. Negative for diarrhea. Genitourinary: Negative for dysuria and hematuria. Skin: Negative. Neurological: Positive for weakness. Psychiatric/Behavioral: Negative for depression, substance abuse and suicidal ideas. The patient is not nervous/anxious. PE:  Patient Vitals for the past 24 hrs:   BP Temp Pulse Resp SpO2 Height Weight   06/27/20 1125 (!) 146/96 98.5 °F (36.9 °C) 87 16 97 %     06/27/20 0730 137/80 97.8 °F (36.6 °C) 95 17 95 %     06/27/20 0509 (!) 133/97 98 °F (36.7 °C) 94 17 95 %     06/27/20 0029 135/87 98.8 °F (37.1 °C) 95 16 96 %     06/26/20 2300 135/90 97.9 °F (36.6 °C) (!) 104 15 100 %     06/26/20 2230 137/83  96 18 100 %     06/26/20 2200 (!) 133/92  100 16 93 %     06/26/20 2045 (!) 137/101  93 19 100 %     06/26/20 1942 132/90 97.7 °F (36.5 °C) (!) 113 16 99 % 5' 5\" (1.651 m) 90.7 kg (200 lb)       Intake/Output Summary (Last 24 hours) at 6/27/2020 1134  Last data filed at 6/27/2020 0644  Gross per 24 hour   Intake 1640 ml   Output    Net 1640 ml     Patient Vitals for the past 120 hrs:   Weight   06/26/20 1942 90.7 kg (200 lb)         Physical Exam  Vitals signs and nursing note reviewed. Constitutional:       General: She is in acute distress. HENT:      Nose: No rhinorrhea. Mouth/Throat:      Mouth: Mucous membranes are dry. Pharynx: Oropharynx is clear. Eyes:      Extraocular Movements: Extraocular movements intact.       Conjunctiva/sclera: Conjunctivae normal.      Pupils: Pupils are equal, round, and reactive to light. Neck:      Musculoskeletal: Normal range of motion and neck supple. Vascular: No JVD. Cardiovascular:      Rate and Rhythm: Regular rhythm. Tachycardia present. Heart sounds: Normal heart sounds. Pulmonary:      Effort: No respiratory distress. Breath sounds: Normal breath sounds. Abdominal:      General: Bowel sounds are normal. There is no distension. Palpations: Abdomen is soft. There is no mass. Tenderness: There is abdominal tenderness. There is guarding. There is no right CVA tenderness, left CVA tenderness or rebound. Hernia: No hernia is present. Musculoskeletal: Normal range of motion. Skin:     General: Skin is warm and dry. Neurological:      Mental Status: She is alert and oriented to person, place, and time. Psychiatric:         Mood and Affect: Mood and affect normal.         Behavior: Behavior normal.         Thought Content: Thought content normal.             Intake and Output:  Current Shift:  No intake/output data recorded. Last three shifts:  06/25 1901 - 06/27 0700  In: 1640 [P.O.:240; I.V.:1400]  Out: -     Lab/Data Reviewed:  Recent Results (from the past 8 hour(s))   METABOLIC PANEL, COMPREHENSIVE    Collection Time: 06/27/20  4:10 AM   Result Value Ref Range    Sodium 136 136 - 145 mmol/L    Potassium 3.8 3.5 - 5.5 mmol/L    Chloride 106 100 - 111 mmol/L    CO2 22 21 - 32 mmol/L    Anion gap 8 3.0 - 18 mmol/L    Glucose 100 (H) 74 - 99 mg/dL    BUN 7 7.0 - 18 MG/DL    Creatinine 0.78 0.6 - 1.3 MG/DL    BUN/Creatinine ratio 9 (L) 12 - 20      GFR est AA >60 >60 ml/min/1.73m2    GFR est non-AA >60 >60 ml/min/1.73m2    Calcium 7.7 (L) 8.5 - 10.1 MG/DL    Bilirubin, total 1.9 (H) 0.2 - 1.0 MG/DL    ALT (SGPT) 280 (H) 13 - 56 U/L    AST (SGOT) 2,037 (H) 10 - 38 U/L    Alk.  phosphatase 256 (H) 45 - 117 U/L    Protein, total 5.9 (L) 6.4 - 8.2 g/dL    Albumin 2.5 (L) 3.4 - 5.0 g/dL    Globulin 3.4 2.0 - 4.0 g/dL A-G Ratio 0.7 (L) 0.8 - 1.7     LIPASE    Collection Time: 06/27/20  4:10 AM   Result Value Ref Range    Lipase 1,849 (H) 73 - 393 U/L   PTT    Collection Time: 06/27/20  4:10 AM   Result Value Ref Range    aPTT >180.0 (HH) 23.0 - 36.4 SEC   CBC WITH AUTOMATED DIFF    Collection Time: 06/27/20  4:10 AM   Result Value Ref Range    WBC 3.3 (L) 4.6 - 13.2 K/uL    RBC 3.30 (L) 4.20 - 5.30 M/uL    HGB 10.8 (L) 12.0 - 16.0 g/dL    HCT 33.2 (L) 35.0 - 45.0 %    .6 (H) 74.0 - 97.0 FL    MCH 32.7 24.0 - 34.0 PG    MCHC 32.5 31.0 - 37.0 g/dL    RDW 16.6 (H) 11.6 - 14.5 %    PLATELET 425 556 - 341 K/uL    MPV 9.8 9.2 - 11.8 FL    NEUTROPHILS 58 40 - 73 %    LYMPHOCYTES 33 21 - 52 %    MONOCYTES 7 3 - 10 %    EOSINOPHILS 2 0 - 5 %    BASOPHILS 0 0 - 2 %    ABS. NEUTROPHILS 1.9 1.8 - 8.0 K/UL    ABS. LYMPHOCYTES 1.1 0.9 - 3.6 K/UL    ABS. MONOCYTES 0.2 0.05 - 1.2 K/UL    ABS. EOSINOPHILS 0.1 0.0 - 0.4 K/UL    ABS.  BASOPHILS 0.0 0.0 - 0.1 K/UL    DF AUTOMATED     CALCIUM, IONIZED    Collection Time: 06/27/20  6:10 AM   Result Value Ref Range    Ionized Calcium 1.10 (L) 1.12 - 1.32 MMOL/L   PTT    Collection Time: 06/27/20 10:17 AM   Result Value Ref Range    aPTT 99.7 (H) 23.0 - 36.4 SEC     Medications:  Current Facility-Administered Medications   Medication Dose Route Frequency    diphenhydrAMINE (BENADRYL) injection 25 mg  25 mg IntraVENous Q6H PRN    pantoprazole (PROTONIX) 40 mg in 0.9% sodium chloride 10 mL injection  40 mg IntraVENous DAILY    famotidine (PF) (PEPCID) 20 mg in 0.9% sodium chloride 10 mL injection  20 mg IntraVENous Q12H    morphine injection 2 mg  2 mg IntraVENous Q2H PRN    ondansetron (ZOFRAN) injection 4 mg  4 mg IntraVENous Q6H PRN    0.9% sodium chloride infusion  200 mL/hr IntraVENous CONTINUOUS    heparin 25,000 units in D5W 250 ml infusion  18-36 Units/kg/hr IntraVENous TITRATE       Recent Results (from the past 24 hour(s))   CBC WITH AUTOMATED DIFF    Collection Time: 06/26/20 8:30 PM   Result Value Ref Range    WBC 8.7 4.6 - 13.2 K/uL    RBC 3.81 (L) 4.20 - 5.30 M/uL    HGB 12.6 12.0 - 16.0 g/dL    HCT 38.5 35.0 - 45.0 %    .0 (H) 74.0 - 97.0 FL    MCH 33.1 24.0 - 34.0 PG    MCHC 32.7 31.0 - 37.0 g/dL    RDW 16.7 (H) 11.6 - 14.5 %    PLATELET 335 241 - 482 K/uL    MPV 9.1 (L) 9.2 - 11.8 FL    NEUTROPHILS 74 (H) 40 - 73 %    LYMPHOCYTES 17 (L) 21 - 52 %    MONOCYTES 7 3 - 10 %    EOSINOPHILS 2 0 - 5 %    BASOPHILS 0 0 - 2 %    ABS. NEUTROPHILS 6.4 1.8 - 8.0 K/UL    ABS. LYMPHOCYTES 1.5 0.9 - 3.6 K/UL    ABS. MONOCYTES 0.6 0.05 - 1.2 K/UL    ABS. EOSINOPHILS 0.2 0.0 - 0.4 K/UL    ABS. BASOPHILS 0.0 0.0 - 0.1 K/UL    DF AUTOMATED     METABOLIC PANEL, COMPREHENSIVE    Collection Time: 06/26/20  8:30 PM   Result Value Ref Range    Sodium 133 (L) 136 - 145 mmol/L    Potassium 4.2 3.5 - 5.5 mmol/L    Chloride 103 100 - 111 mmol/L    CO2 23 21 - 32 mmol/L    Anion gap 7 3.0 - 18 mmol/L    Glucose 131 (H) 74 - 99 mg/dL    BUN 10 7.0 - 18 MG/DL    Creatinine 0.86 0.6 - 1.3 MG/DL    BUN/Creatinine ratio 12 12 - 20      GFR est AA >60 >60 ml/min/1.73m2    GFR est non-AA >60 >60 ml/min/1.73m2    Calcium 8.7 8.5 - 10.1 MG/DL    Bilirubin, total 0.8 0.2 - 1.0 MG/DL    ALT (SGPT) 40 13 - 56 U/L    AST (SGOT) 154 (H) 10 - 38 U/L    Alk.  phosphatase 165 (H) 45 - 117 U/L    Protein, total 7.4 6.4 - 8.2 g/dL    Albumin 3.1 (L) 3.4 - 5.0 g/dL    Globulin 4.3 (H) 2.0 - 4.0 g/dL    A-G Ratio 0.7 (L) 0.8 - 1.7     LIPASE    Collection Time: 06/26/20  8:30 PM   Result Value Ref Range    Lipase 1,879 (H) 73 - 393 U/L   LACTIC ACID    Collection Time: 06/26/20  8:30 PM   Result Value Ref Range    Lactic acid 1.1 0.4 - 2.0 MMOL/L   CULTURE, BLOOD    Collection Time: 06/26/20  8:30 PM   Result Value Ref Range    Special Requests: NO SPECIAL REQUESTS      Culture result: NO GROWTH AFTER 11 HOURS     PTT    Collection Time: 06/26/20  8:30 PM   Result Value Ref Range    aPTT 30.9 23.0 - 36.4 SEC   PROTHROMBIN TIME + INR    Collection Time: 06/26/20  8:30 PM   Result Value Ref Range    Prothrombin time 13.5 11.5 - 15.2 sec    INR 1.1 0.8 - 1.2     ETHYL ALCOHOL    Collection Time: 06/26/20  8:30 PM   Result Value Ref Range    ALCOHOL(ETHYL),SERUM <3 0 - 3 MG/DL   URINALYSIS W/ RFLX MICROSCOPIC    Collection Time: 06/27/20 12:27 AM   Result Value Ref Range    Color DARK YELLOW      Appearance CLEAR      Specific gravity >1.030 (H) 1.005 - 1.030    pH (UA) 5.0 5.0 - 8.0      Protein TRACE (A) NEG mg/dL    Glucose Negative NEG mg/dL    Ketone Negative NEG mg/dL    Bilirubin Negative NEG      Blood Negative NEG      Urobilinogen 1.0 0.2 - 1.0 EU/dL    Nitrites Negative NEG      Leukocyte Esterase Negative NEG     URINE MICROSCOPIC ONLY    Collection Time: 06/27/20 12:27 AM   Result Value Ref Range    WBC Negative 0 - 5 /hpf    RBC Negative 0 - 5 /hpf    Epithelial cells 2+ 0 - 5 /lpf    Bacteria FEW (A) NEG /hpf   METABOLIC PANEL, COMPREHENSIVE    Collection Time: 06/27/20  4:10 AM   Result Value Ref Range    Sodium 136 136 - 145 mmol/L    Potassium 3.8 3.5 - 5.5 mmol/L    Chloride 106 100 - 111 mmol/L    CO2 22 21 - 32 mmol/L    Anion gap 8 3.0 - 18 mmol/L    Glucose 100 (H) 74 - 99 mg/dL    BUN 7 7.0 - 18 MG/DL    Creatinine 0.78 0.6 - 1.3 MG/DL    BUN/Creatinine ratio 9 (L) 12 - 20      GFR est AA >60 >60 ml/min/1.73m2    GFR est non-AA >60 >60 ml/min/1.73m2    Calcium 7.7 (L) 8.5 - 10.1 MG/DL    Bilirubin, total 1.9 (H) 0.2 - 1.0 MG/DL    ALT (SGPT) 280 (H) 13 - 56 U/L    AST (SGOT) 2,037 (H) 10 - 38 U/L    Alk.  phosphatase 256 (H) 45 - 117 U/L    Protein, total 5.9 (L) 6.4 - 8.2 g/dL    Albumin 2.5 (L) 3.4 - 5.0 g/dL    Globulin 3.4 2.0 - 4.0 g/dL    A-G Ratio 0.7 (L) 0.8 - 1.7     LIPASE    Collection Time: 06/27/20  4:10 AM   Result Value Ref Range    Lipase 1,849 (H) 73 - 393 U/L   PTT    Collection Time: 06/27/20  4:10 AM   Result Value Ref Range    aPTT >180.0 (HH) 23.0 - 36.4 SEC   CBC WITH AUTOMATED DIFF Collection Time: 06/27/20  4:10 AM   Result Value Ref Range    WBC 3.3 (L) 4.6 - 13.2 K/uL    RBC 3.30 (L) 4.20 - 5.30 M/uL    HGB 10.8 (L) 12.0 - 16.0 g/dL    HCT 33.2 (L) 35.0 - 45.0 %    .6 (H) 74.0 - 97.0 FL    MCH 32.7 24.0 - 34.0 PG    MCHC 32.5 31.0 - 37.0 g/dL    RDW 16.6 (H) 11.6 - 14.5 %    PLATELET 806 682 - 971 K/uL    MPV 9.8 9.2 - 11.8 FL    NEUTROPHILS 58 40 - 73 %    LYMPHOCYTES 33 21 - 52 %    MONOCYTES 7 3 - 10 %    EOSINOPHILS 2 0 - 5 %    BASOPHILS 0 0 - 2 %    ABS. NEUTROPHILS 1.9 1.8 - 8.0 K/UL    ABS. LYMPHOCYTES 1.1 0.9 - 3.6 K/UL    ABS. MONOCYTES 0.2 0.05 - 1.2 K/UL    ABS. EOSINOPHILS 0.1 0.0 - 0.4 K/UL    ABS.  BASOPHILS 0.0 0.0 - 0.1 K/UL    DF AUTOMATED     CALCIUM, IONIZED    Collection Time: 06/27/20  6:10 AM   Result Value Ref Range    Ionized Calcium 1.10 (L) 1.12 - 1.32 MMOL/L   PTT    Collection Time: 06/27/20 10:17 AM   Result Value Ref Range    aPTT 99.7 (H) 23.0 - 36.4 SEC       Procedures/imaging: see electronic medical records for all procedures/Xrays and details which were not copied into this note but were reviewed prior to creation of Amina Chowdary MD   6/27/2020, 11:34 AM

## 2020-06-27 NOTE — ED NOTES
Pt is lying in bed, aox4, breathing is unlabored, speaks in full sentences. Pt reports pain is a 6/10, increases at times, but is comfortable. Bed set to lowest position, call light within reach.

## 2020-06-28 LAB
ALBUMIN SERPL-MCNC: 2.5 G/DL (ref 3.4–5)
ALBUMIN/GLOB SERPL: 0.8 {RATIO} (ref 0.8–1.7)
ALP SERPL-CCNC: 341 U/L (ref 45–117)
ALT SERPL-CCNC: 182 U/L (ref 13–56)
AMYLASE SERPL-CCNC: 84 U/L (ref 25–115)
ANION GAP SERPL CALC-SCNC: 6 MMOL/L (ref 3–18)
APTT PPP: 114.8 SEC (ref 23–36.4)
APTT PPP: 73.3 SEC (ref 23–36.4)
APTT PPP: 80.5 SEC (ref 23–36.4)
APTT PPP: 97.8 SEC (ref 23–36.4)
AST SERPL-CCNC: 523 U/L (ref 10–38)
BASOPHILS # BLD: 0 K/UL (ref 0–0.1)
BASOPHILS NFR BLD: 0 % (ref 0–2)
BILIRUB SERPL-MCNC: 2.6 MG/DL (ref 0.2–1)
BUN SERPL-MCNC: 3 MG/DL (ref 7–18)
BUN/CREAT SERPL: 5 (ref 12–20)
CA-I SERPL-SCNC: 1.1 MMOL/L (ref 1.12–1.32)
CALCIUM SERPL-MCNC: 7.8 MG/DL (ref 8.5–10.1)
CAMPYLOBACTER SPECIES, DNA: NEGATIVE
CHLORIDE SERPL-SCNC: 108 MMOL/L (ref 100–111)
CO2 SERPL-SCNC: 22 MMOL/L (ref 21–32)
CREAT SERPL-MCNC: 0.64 MG/DL (ref 0.6–1.3)
DIFFERENTIAL METHOD BLD: ABNORMAL
ENTEROTOXIGEN E COLI, DNA: NEGATIVE
EOSINOPHIL # BLD: 0.1 K/UL (ref 0–0.4)
EOSINOPHIL NFR BLD: 2 % (ref 0–5)
ERYTHROCYTE [DISTWIDTH] IN BLOOD BY AUTOMATED COUNT: 16.3 % (ref 11.6–14.5)
GLOBULIN SER CALC-MCNC: 3.3 G/DL (ref 2–4)
GLUCOSE SERPL-MCNC: 129 MG/DL (ref 74–99)
HCT VFR BLD AUTO: 32.9 % (ref 35–45)
HGB BLD-MCNC: 10.8 G/DL (ref 12–16)
LIPASE SERPL-CCNC: 1061 U/L (ref 73–393)
LYMPHOCYTES # BLD: 0.9 K/UL (ref 0.9–3.6)
LYMPHOCYTES NFR BLD: 17 % (ref 21–52)
MAGNESIUM SERPL-MCNC: 1.6 MG/DL (ref 1.6–2.6)
MCH RBC QN AUTO: 32.8 PG (ref 24–34)
MCHC RBC AUTO-ENTMCNC: 32.8 G/DL (ref 31–37)
MCV RBC AUTO: 100 FL (ref 74–97)
MONOCYTES # BLD: 0.5 K/UL (ref 0.05–1.2)
MONOCYTES NFR BLD: 9 % (ref 3–10)
NEUTS SEG # BLD: 3.8 K/UL (ref 1.8–8)
NEUTS SEG NFR BLD: 72 % (ref 40–73)
P SHIGELLOIDES DNA STL QL NAA+PROBE: NEGATIVE
PHOSPHATE SERPL-MCNC: 2.3 MG/DL (ref 2.5–4.9)
PLATELET # BLD AUTO: 274 K/UL (ref 135–420)
PMV BLD AUTO: 9 FL (ref 9.2–11.8)
POTASSIUM SERPL-SCNC: 3.7 MMOL/L (ref 3.5–5.5)
PROT SERPL-MCNC: 5.8 G/DL (ref 6.4–8.2)
RBC # BLD AUTO: 3.29 M/UL (ref 4.2–5.3)
SALMONELLA SPECIES, DNA: NEGATIVE
SHIGA TOXIN PRODUCING, DNA: NEGATIVE
SHIGELLA SP+EIEC IPAH STL QL NAA+PROBE: NEGATIVE
SODIUM SERPL-SCNC: 136 MMOL/L (ref 136–145)
VIBRIO SPECIES, DNA: NEGATIVE
WBC # BLD AUTO: 5.4 K/UL (ref 4.6–13.2)
Y. ENTEROCOLITICA, DNA: NEGATIVE

## 2020-06-28 PROCEDURE — 83690 ASSAY OF LIPASE: CPT

## 2020-06-28 PROCEDURE — 80053 COMPREHEN METABOLIC PANEL: CPT

## 2020-06-28 PROCEDURE — C9113 INJ PANTOPRAZOLE SODIUM, VIA: HCPCS | Performed by: FAMILY MEDICINE

## 2020-06-28 PROCEDURE — 74011250637 HC RX REV CODE- 250/637: Performed by: INTERNAL MEDICINE

## 2020-06-28 PROCEDURE — 74011250636 HC RX REV CODE- 250/636: Performed by: PHYSICIAN ASSISTANT

## 2020-06-28 PROCEDURE — 85730 THROMBOPLASTIN TIME PARTIAL: CPT

## 2020-06-28 PROCEDURE — 36415 COLL VENOUS BLD VENIPUNCTURE: CPT

## 2020-06-28 PROCEDURE — 74011000250 HC RX REV CODE- 250: Performed by: FAMILY MEDICINE

## 2020-06-28 PROCEDURE — 82150 ASSAY OF AMYLASE: CPT

## 2020-06-28 PROCEDURE — 74011250637 HC RX REV CODE- 250/637: Performed by: FAMILY MEDICINE

## 2020-06-28 PROCEDURE — 82330 ASSAY OF CALCIUM: CPT

## 2020-06-28 PROCEDURE — 65270000029 HC RM PRIVATE

## 2020-06-28 PROCEDURE — 74011250636 HC RX REV CODE- 250/636: Performed by: FAMILY MEDICINE

## 2020-06-28 PROCEDURE — 84100 ASSAY OF PHOSPHORUS: CPT

## 2020-06-28 PROCEDURE — 83735 ASSAY OF MAGNESIUM: CPT

## 2020-06-28 PROCEDURE — 74011250636 HC RX REV CODE- 250/636: Performed by: INTERNAL MEDICINE

## 2020-06-28 PROCEDURE — 85025 COMPLETE CBC W/AUTO DIFF WBC: CPT

## 2020-06-28 RX ORDER — POLYETHYLENE GLYCOL 3350 17 G/17G
17 POWDER, FOR SOLUTION ORAL DAILY
Status: DISCONTINUED | OUTPATIENT
Start: 2020-06-28 | End: 2020-07-02

## 2020-06-28 RX ORDER — POLYETHYLENE GLYCOL 3350 17 G/17G
17 POWDER, FOR SOLUTION ORAL DAILY
Status: DISCONTINUED | OUTPATIENT
Start: 2020-06-28 | End: 2020-06-28

## 2020-06-28 RX ORDER — TRAZODONE HYDROCHLORIDE 50 MG/1
50 TABLET ORAL
Status: DISCONTINUED | OUTPATIENT
Start: 2020-06-28 | End: 2020-07-03 | Stop reason: HOSPADM

## 2020-06-28 RX ORDER — AMLODIPINE BESYLATE 5 MG/1
10 TABLET ORAL DAILY
Status: DISCONTINUED | OUTPATIENT
Start: 2020-06-28 | End: 2020-06-28 | Stop reason: SDUPTHER

## 2020-06-28 RX ORDER — AMLODIPINE BESYLATE 5 MG/1
10 TABLET ORAL DAILY
Status: DISCONTINUED | OUTPATIENT
Start: 2020-06-28 | End: 2020-07-03 | Stop reason: HOSPADM

## 2020-06-28 RX ADMIN — MORPHINE SULFATE 2 MG: 2 INJECTION, SOLUTION INTRAMUSCULAR; INTRAVENOUS at 17:34

## 2020-06-28 RX ADMIN — FAMOTIDINE 20 MG: 10 INJECTION INTRAVENOUS at 09:39

## 2020-06-28 RX ADMIN — HEPARIN SODIUM 11 UNITS/KG/HR: 10000 INJECTION, SOLUTION INTRAVENOUS at 17:34

## 2020-06-28 RX ADMIN — FAMOTIDINE 20 MG: 10 INJECTION INTRAVENOUS at 20:19

## 2020-06-28 RX ADMIN — MORPHINE SULFATE 2 MG: 2 INJECTION, SOLUTION INTRAMUSCULAR; INTRAVENOUS at 22:56

## 2020-06-28 RX ADMIN — POLYETHYLENE GLYCOL 3350 17 G: 17 POWDER, FOR SOLUTION ORAL at 05:15

## 2020-06-28 RX ADMIN — MORPHINE SULFATE 2 MG: 2 INJECTION, SOLUTION INTRAMUSCULAR; INTRAVENOUS at 12:18

## 2020-06-28 RX ADMIN — ESCITALOPRAM OXALATE 10 MG: 10 TABLET ORAL at 09:38

## 2020-06-28 RX ADMIN — METRONIDAZOLE 500 MG: 500 INJECTION, SOLUTION INTRAVENOUS at 22:56

## 2020-06-28 RX ADMIN — METRONIDAZOLE 500 MG: 500 INJECTION, SOLUTION INTRAVENOUS at 14:21

## 2020-06-28 RX ADMIN — SODIUM CHLORIDE 40 MG: 9 INJECTION, SOLUTION INTRAMUSCULAR; INTRAVENOUS; SUBCUTANEOUS at 09:40

## 2020-06-28 RX ADMIN — AMLODIPINE BESYLATE 10 MG: 5 TABLET ORAL at 09:38

## 2020-06-28 RX ADMIN — ONDANSETRON 4 MG: 2 INJECTION INTRAMUSCULAR; INTRAVENOUS at 09:54

## 2020-06-28 RX ADMIN — TRAZODONE HYDROCHLORIDE 50 MG: 50 TABLET ORAL at 22:56

## 2020-06-28 RX ADMIN — METRONIDAZOLE 500 MG: 500 INJECTION, SOLUTION INTRAVENOUS at 07:01

## 2020-06-28 RX ADMIN — MORPHINE SULFATE 2 MG: 2 INJECTION, SOLUTION INTRAMUSCULAR; INTRAVENOUS at 09:51

## 2020-06-28 NOTE — PROGRESS NOTES
Problem: Falls - Risk of  Goal: *Absence of Falls  Description: Document Ashia Floriston Fall Risk and appropriate interventions in the flowsheet.   Outcome: Progressing Towards Goal  Note: Fall Risk Interventions:  Mobility Interventions: Assess mobility with egress test         Medication Interventions: Teach patient to arise slowly    Elimination Interventions: Call light in reach, Stay With Me (per policy), Patient to call for help with toileting needs    History of Falls Interventions: Door open when patient unattended

## 2020-06-28 NOTE — PROGRESS NOTES
0739-Bedside and Verbal shift change report given to Aidee Escudero RN (oncoming nurse) by Ricco Rogers RN (offgoing nurse). Report included the following information SBAR, Kardex and MAR.     3584- Rate change verified with Ricco Rogers RN.    3290- Page placed for Dr. Roddy Epps to inform him of patient's elevated blood pressure and her request for PTA blood pressure medications. 4860- Received orders for patient's Norvasc. 1320- APTT is 80.5 No change at this time. Lab will redraw at 1800.    1745- Dr. Martha Nickerson notified of patient's request for Trazadone. Orders placed by physician. 1901- APTT resulted of 73.3. No change in rate. Order placed for lab draw at 0000.

## 2020-06-28 NOTE — PROGRESS NOTES
1905 - Assumed care of patient at this time. 2022 - Head to toe assessment completed at this time. Pt denies any chest pain or SOB. Pt denies any numbness or tingling to extremities. Pt lungs sounds clear and bowel sounds active. Pt encouraged to manage pain and to call for assistance. Pt left sitting at the side of the bed with call light within reach, wheels locked and bed in the low position. Will continue to monitor. 4160 - aPTT is 85.3 there is no change to heparin dosage. Shift summary - Pt left with no signs of distress.

## 2020-06-28 NOTE — PROGRESS NOTES
2118 - Head to toe assessment completed at this time. Pt denies any unusual chest pain or SOB. Pt denies any numbness or tingling to extremities. Pt did complain of discomfort due to gas. Pt left in bed with no signs of distress. Pt encouraged to call for assistance. Bed in the low position, wheels locked, and call light within reach. Will continue to monitor. 0020 - aPTT resulted as 97.8. There were no adjustments made to heparin drip. Will continue to monitor. 5978 - Pt complained of abdominal pain. Dr. Katina Fung informed and ordered Miralax daily. Shift summary - Pt concerns addressed. Pt left in room sitting at the side of the bed. Pt left with no signs of distress.

## 2020-06-28 NOTE — PROGRESS NOTES
Hospitalist Progress Note    Patient: Emmie Muller MRN: [id number]  [facility]: [id number]    YOB: 1975  Age: 39 y.o. Sex: female    DOA: 6/26/2020 LOS:  LOS: 2 days              IMPRESSION and Plan:    Emmie Muller is a 39 y.o. female with   Patient Active Problem List    Diagnosis Date Noted    Portal vein thrombosis 06/27/2020    Gastritis and duodenitis 06/27/2020    History of Clostridioides difficile colitis 06/27/2020    Acute pancreatic necrosis 06/26/2020    Hyponatremia 12/03/2019    Status post laparoscopy 09/18/2018    Intestinal malabsorption     Hypertension     Anemia     Anxiety     GERD (gastroesophageal reflux disease)     Wears dentures     Syncopal episodes     Smoker     Status post gastric bypass for obesity 01/01/2000     Principal Problem:    Acute pancreatic necrosis (6/26/2020)    Active Problems:    Portal vein thrombosis (6/27/2020)      Gastritis and duodenitis (6/27/2020)      History of Clostridioides difficile colitis (6/27/2020)        40 y/o female w/ hx of C diff infection in the past as well as gastric bypass surgery w/ revision is admitted for acute pancreatitis w/  necrosis as well as portal vein/SMV thrombosis and gastritis/duodenitis.     Pancreatic necrosis/pancreatitis-recurrent pancreatitis but necrosis may be due to thrombosis -- await GI consult. Pain meds as ordered. IV hep MRI pancreas per GI . Pain meds as ordered. Lipase is improving    Elevated LFt's -- improving      Portal vein/SMV thrombosis  -heparin gtt. Await Gi input     Gastritis/duodenitis  -IV pepcid and protonix       C diff infection -- c.diff is pos. Cont IV Flagyl. Diarrhea improving. Consider po vanc         HTN -- restart Norvasc    Patient's condition is fair        Recommend to continue hospitalization. Discussed with patient. Chief Complaints:   Chief Complaint   Patient presents with    Diarrhea     SUBJECTIVE:  Pt is seen and examined. Pain is improved somewhat. No diarrhea. occnl nausea             Review of systems:    Review of Systems   Constitutional: Positive for malaise/fatigue. HENT: Negative. Eyes: Negative. Respiratory: Negative for shortness of breath. Cardiovascular: Positive for leg swelling. Negative for chest pain, palpitations and orthopnea. Gastrointestinal: Positive for abdominal pain, heartburn, nausea and vomiting. Negative for diarrhea. Genitourinary: Negative for dysuria and hematuria. Skin: Negative. Neurological: Positive for weakness. Psychiatric/Behavioral: Negative for depression, substance abuse and suicidal ideas. The patient is not nervous/anxious. PE:  Patient Vitals for the past 24 hrs:   BP Temp Pulse Resp SpO2   06/28/20 1129 137/89 98 °F (36.7 °C) 90 16 95 %   06/28/20 0800 (!) 149/105 98.7 °F (37.1 °C) 100 16 94 %   06/27/20 1944 146/90 98.7 °F (37.1 °C) 97 16 96 %   06/27/20 1805 148/86       06/27/20 1519 (!) 158/97 98.1 °F (36.7 °C) 89 16 95 %       Intake/Output Summary (Last 24 hours) at 6/28/2020 1200  Last data filed at 6/27/2020 1921  Gross per 24 hour   Intake 2558.8 ml   Output 400 ml   Net 2158.8 ml     Patient Vitals for the past 120 hrs:   Weight   06/26/20 1942 90.7 kg (200 lb)         Physical Exam  Vitals signs and nursing note reviewed. Constitutional:       General: She is in acute distress. HENT:      Nose: No rhinorrhea. Mouth/Throat:      Mouth: Mucous membranes are dry. Pharynx: Oropharynx is clear. Eyes:      Extraocular Movements: Extraocular movements intact. Conjunctiva/sclera: Conjunctivae normal.      Pupils: Pupils are equal, round, and reactive to light. Neck:      Musculoskeletal: Normal range of motion and neck supple. Vascular: No JVD. Cardiovascular:      Rate and Rhythm: Regular rhythm. Tachycardia present. Heart sounds: Normal heart sounds. Pulmonary:      Effort: No respiratory distress. Breath sounds: Normal breath sounds. Abdominal:      General: Bowel sounds are normal. There is no distension. Palpations: Abdomen is soft. There is no mass. Tenderness: There is abdominal tenderness. There is guarding. There is no right CVA tenderness, left CVA tenderness or rebound. Hernia: No hernia is present. Musculoskeletal: Normal range of motion. Skin:     General: Skin is warm and dry. Neurological:      Mental Status: She is alert and oriented to person, place, and time. Psychiatric:         Mood and Affect: Mood and affect normal.         Behavior: Behavior normal.         Thought Content: Thought content normal.             Intake and Output:  Current Shift:  No intake/output data recorded. Last three shifts:  06/26 1901 - 06/28 0700  In: 4198.8 [P.O.:240;  I.V.:3958.8]  Out: 500 [Urine:400]    Lab/Data Reviewed:  Recent Results (from the past 8 hour(s))   PTT    Collection Time: 06/28/20  6:40 AM   Result Value Ref Range    aPTT 114.8 (H) 23.0 - 36.4 SEC     Medications:  Current Facility-Administered Medications   Medication Dose Route Frequency    polyethylene glycol (MIRALAX) packet 17 g  17 g Oral DAILY    amLODIPine (NORVASC) tablet 10 mg  10 mg Oral DAILY    diphenhydrAMINE (BENADRYL) injection 25 mg  25 mg IntraVENous Q6H PRN    pantoprazole (PROTONIX) 40 mg in 0.9% sodium chloride 10 mL injection  40 mg IntraVENous DAILY    famotidine (PF) (PEPCID) 20 mg in 0.9% sodium chloride 10 mL injection  20 mg IntraVENous Q12H    morphine injection 2 mg  2 mg IntraVENous Q2H PRN    escitalopram oxalate (LEXAPRO) tablet 10 mg  10 mg Oral DAILY    metroNIDAZOLE (FLAGYL) IVPB premix 500 mg  500 mg IntraVENous Q8H    ondansetron (ZOFRAN) injection 4 mg  4 mg IntraVENous Q6H PRN    0.9% sodium chloride infusion  200 mL/hr IntraVENous CONTINUOUS    heparin 25,000 units in D5W 250 ml infusion  18-36 Units/kg/hr IntraVENous TITRATE       Recent Results (from the past 24 hour(s))   PTT    Collection Time: 06/27/20 1:10 PM   Result Value Ref Range    aPTT 114.9 (H) 23.0 - 36.4 SEC   PTT    Collection Time: 06/28/20 12:20 AM   Result Value Ref Range    aPTT 97.8 (H) 23.0 - 43.9 SEC   METABOLIC PANEL, COMPREHENSIVE    Collection Time: 06/28/20 12:20 AM   Result Value Ref Range    Sodium 136 136 - 145 mmol/L    Potassium 3.7 3.5 - 5.5 mmol/L    Chloride 108 100 - 111 mmol/L    CO2 22 21 - 32 mmol/L    Anion gap 6 3.0 - 18 mmol/L    Glucose 129 (H) 74 - 99 mg/dL    BUN 3 (L) 7.0 - 18 MG/DL    Creatinine 0.64 0.6 - 1.3 MG/DL    BUN/Creatinine ratio 5 (L) 12 - 20      GFR est AA >60 >60 ml/min/1.73m2    GFR est non-AA >60 >60 ml/min/1.73m2    Calcium 7.8 (L) 8.5 - 10.1 MG/DL    Bilirubin, total 2.6 (H) 0.2 - 1.0 MG/DL    ALT (SGPT) 182 (H) 13 - 56 U/L    AST (SGOT) 523 (H) 10 - 38 U/L    Alk. phosphatase 341 (H) 45 - 117 U/L    Protein, total 5.8 (L) 6.4 - 8.2 g/dL    Albumin 2.5 (L) 3.4 - 5.0 g/dL    Globulin 3.3 2.0 - 4.0 g/dL    A-G Ratio 0.8 0.8 - 1.7     LIPASE    Collection Time: 06/28/20 12:20 AM   Result Value Ref Range    Lipase 1,061 (H) 73 - 393 U/L   CBC WITH AUTOMATED DIFF    Collection Time: 06/28/20 12:20 AM   Result Value Ref Range    WBC 5.4 4.6 - 13.2 K/uL    RBC 3.29 (L) 4.20 - 5.30 M/uL    HGB 10.8 (L) 12.0 - 16.0 g/dL    HCT 32.9 (L) 35.0 - 45.0 %    .0 (H) 74.0 - 97.0 FL    MCH 32.8 24.0 - 34.0 PG    MCHC 32.8 31.0 - 37.0 g/dL    RDW 16.3 (H) 11.6 - 14.5 %    PLATELET 004 129 - 639 K/uL    MPV 9.0 (L) 9.2 - 11.8 FL    NEUTROPHILS 72 40 - 73 %    LYMPHOCYTES 17 (L) 21 - 52 %    MONOCYTES 9 3 - 10 %    EOSINOPHILS 2 0 - 5 %    BASOPHILS 0 0 - 2 %    ABS. NEUTROPHILS 3.8 1.8 - 8.0 K/UL    ABS. LYMPHOCYTES 0.9 0.9 - 3.6 K/UL    ABS. MONOCYTES 0.5 0.05 - 1.2 K/UL    ABS. EOSINOPHILS 0.1 0.0 - 0.4 K/UL    ABS.  BASOPHILS 0.0 0.0 - 0.1 K/UL    DF AUTOMATED     CALCIUM, IONIZED    Collection Time: 06/28/20 12:20 AM   Result Value Ref Range    Ionized Calcium 1.10 (L) 1.12 - 1.32 MMOL/L   MAGNESIUM    Collection Time: 06/28/20 12:20 AM   Result Value Ref Range    Magnesium 1.6 1.6 - 2.6 mg/dL   PHOSPHORUS    Collection Time: 06/28/20 12:20 AM   Result Value Ref Range    Phosphorus 2.3 (L) 2.5 - 4.9 MG/DL   AMYLASE    Collection Time: 06/28/20 12:20 AM   Result Value Ref Range    Amylase 84 25 - 115 U/L   PTT    Collection Time: 06/28/20  6:40 AM   Result Value Ref Range    aPTT 114.8 (H) 23.0 - 36.4 SEC       Procedures/imaging: see electronic medical records for all procedures/Xrays and details which were not copied into this note but were reviewed prior to creation of Stacy eBll MD   6/28/2020, 11:34 AM

## 2020-06-28 NOTE — ROUTINE PROCESS
Bedside and Verbal shift change report given to Wm Torres RN (oncoming nurse) by LAKISHA Donald RN (offgoing nurse). Report included the following information SBAR, Kardex, Intake/Output, MAR and Recent Results.

## 2020-06-29 ENCOUNTER — APPOINTMENT (OUTPATIENT)
Dept: MRI IMAGING | Age: 45
DRG: 282 | End: 2020-06-29
Attending: HOSPITALIST
Payer: MEDICAID

## 2020-06-29 LAB
ALBUMIN SERPL-MCNC: 2.3 G/DL (ref 3.4–5)
ALBUMIN/GLOB SERPL: 0.7 {RATIO} (ref 0.8–1.7)
ALP SERPL-CCNC: 305 U/L (ref 45–117)
ALT SERPL-CCNC: 105 U/L (ref 13–56)
AMYLASE SERPL-CCNC: 41 U/L (ref 25–115)
ANION GAP SERPL CALC-SCNC: 7 MMOL/L (ref 3–18)
APTT PPP: 100.7 SEC (ref 23–36.4)
APTT PPP: 39.4 SEC (ref 23–36.4)
APTT PPP: 85.3 SEC (ref 23–36.4)
AST SERPL-CCNC: 136 U/L (ref 10–38)
BASOPHILS # BLD: 0 K/UL (ref 0–0.1)
BASOPHILS NFR BLD: 0 % (ref 0–2)
BILIRUB SERPL-MCNC: 1.5 MG/DL (ref 0.2–1)
BUN SERPL-MCNC: 2 MG/DL (ref 7–18)
BUN/CREAT SERPL: 3 (ref 12–20)
CA-I SERPL-SCNC: 1.15 MMOL/L (ref 1.12–1.32)
CALCIUM SERPL-MCNC: 8 MG/DL (ref 8.5–10.1)
CHLORIDE SERPL-SCNC: 108 MMOL/L (ref 100–111)
CO2 SERPL-SCNC: 23 MMOL/L (ref 21–32)
CREAT SERPL-MCNC: 0.65 MG/DL (ref 0.6–1.3)
DIFFERENTIAL METHOD BLD: ABNORMAL
EOSINOPHIL # BLD: 0.1 K/UL (ref 0–0.4)
EOSINOPHIL NFR BLD: 2 % (ref 0–5)
ERYTHROCYTE [DISTWIDTH] IN BLOOD BY AUTOMATED COUNT: 15.6 % (ref 11.6–14.5)
GLOBULIN SER CALC-MCNC: 3.3 G/DL (ref 2–4)
GLUCOSE SERPL-MCNC: 91 MG/DL (ref 74–99)
HCT VFR BLD AUTO: 30.7 % (ref 35–45)
HGB BLD-MCNC: 10.2 G/DL (ref 12–16)
LIPASE SERPL-CCNC: 196 U/L (ref 73–393)
LYMPHOCYTES # BLD: 1.3 K/UL (ref 0.9–3.6)
LYMPHOCYTES NFR BLD: 19 % (ref 21–52)
MAGNESIUM SERPL-MCNC: 1.6 MG/DL (ref 1.6–2.6)
MCH RBC QN AUTO: 32.8 PG (ref 24–34)
MCHC RBC AUTO-ENTMCNC: 33.2 G/DL (ref 31–37)
MCV RBC AUTO: 98.7 FL (ref 74–97)
MONOCYTES # BLD: 0.8 K/UL (ref 0.05–1.2)
MONOCYTES NFR BLD: 12 % (ref 3–10)
NEUTS SEG # BLD: 4.6 K/UL (ref 1.8–8)
NEUTS SEG NFR BLD: 67 % (ref 40–73)
PHOSPHATE SERPL-MCNC: 1.9 MG/DL (ref 2.5–4.9)
PLATELET # BLD AUTO: 238 K/UL (ref 135–420)
PMV BLD AUTO: 9.9 FL (ref 9.2–11.8)
POTASSIUM SERPL-SCNC: 3.3 MMOL/L (ref 3.5–5.5)
PROT SERPL-MCNC: 5.6 G/DL (ref 6.4–8.2)
RBC # BLD AUTO: 3.11 M/UL (ref 4.2–5.3)
SODIUM SERPL-SCNC: 138 MMOL/L (ref 136–145)
TSH SERPL DL<=0.05 MIU/L-ACNC: 2.18 UIU/ML (ref 0.36–3.74)
WBC # BLD AUTO: 6.8 K/UL (ref 4.6–13.2)

## 2020-06-29 PROCEDURE — 74011250636 HC RX REV CODE- 250/636: Performed by: PHYSICIAN ASSISTANT

## 2020-06-29 PROCEDURE — 85730 THROMBOPLASTIN TIME PARTIAL: CPT

## 2020-06-29 PROCEDURE — 83690 ASSAY OF LIPASE: CPT

## 2020-06-29 PROCEDURE — 80053 COMPREHEN METABOLIC PANEL: CPT

## 2020-06-29 PROCEDURE — 74011250636 HC RX REV CODE- 250/636: Performed by: FAMILY MEDICINE

## 2020-06-29 PROCEDURE — 77030021566 MRI ABD W MRCP W WO CONT

## 2020-06-29 PROCEDURE — A9575 INJ GADOTERATE MEGLUMI 0.1ML: HCPCS | Performed by: FAMILY MEDICINE

## 2020-06-29 PROCEDURE — 84100 ASSAY OF PHOSPHORUS: CPT

## 2020-06-29 PROCEDURE — 74011250637 HC RX REV CODE- 250/637: Performed by: HOSPITALIST

## 2020-06-29 PROCEDURE — 84443 ASSAY THYROID STIM HORMONE: CPT

## 2020-06-29 PROCEDURE — 74011636320 HC RX REV CODE- 636/320: Performed by: FAMILY MEDICINE

## 2020-06-29 PROCEDURE — 82330 ASSAY OF CALCIUM: CPT

## 2020-06-29 PROCEDURE — 65270000029 HC RM PRIVATE

## 2020-06-29 PROCEDURE — 82150 ASSAY OF AMYLASE: CPT

## 2020-06-29 PROCEDURE — 83735 ASSAY OF MAGNESIUM: CPT

## 2020-06-29 PROCEDURE — 74011250636 HC RX REV CODE- 250/636: Performed by: INTERNAL MEDICINE

## 2020-06-29 PROCEDURE — 74011250636 HC RX REV CODE- 250/636: Performed by: HOSPITALIST

## 2020-06-29 PROCEDURE — 85025 COMPLETE CBC W/AUTO DIFF WBC: CPT

## 2020-06-29 PROCEDURE — 74011250637 HC RX REV CODE- 250/637: Performed by: INTERNAL MEDICINE

## 2020-06-29 PROCEDURE — C9113 INJ PANTOPRAZOLE SODIUM, VIA: HCPCS | Performed by: FAMILY MEDICINE

## 2020-06-29 PROCEDURE — 36415 COLL VENOUS BLD VENIPUNCTURE: CPT

## 2020-06-29 PROCEDURE — 74011000250 HC RX REV CODE- 250: Performed by: FAMILY MEDICINE

## 2020-06-29 RX ORDER — HEPARIN SODIUM 1000 [USP'U]/ML
80 INJECTION, SOLUTION INTRAVENOUS; SUBCUTANEOUS ONCE
Status: COMPLETED | OUTPATIENT
Start: 2020-06-29 | End: 2020-06-29

## 2020-06-29 RX ORDER — POTASSIUM CHLORIDE 20 MEQ/1
40 TABLET, EXTENDED RELEASE ORAL
Status: COMPLETED | OUTPATIENT
Start: 2020-06-29 | End: 2020-06-29

## 2020-06-29 RX ORDER — MAGNESIUM SULFATE HEPTAHYDRATE 40 MG/ML
2 INJECTION, SOLUTION INTRAVENOUS ONCE
Status: COMPLETED | OUTPATIENT
Start: 2020-06-29 | End: 2020-06-29

## 2020-06-29 RX ADMIN — GADOTERATE MEGLUMINE 20 ML: 376.9 INJECTION INTRAVENOUS at 11:49

## 2020-06-29 RX ADMIN — MORPHINE SULFATE 2 MG: 2 INJECTION, SOLUTION INTRAMUSCULAR; INTRAVENOUS at 13:17

## 2020-06-29 RX ADMIN — HEPARIN SODIUM 15.03 UNITS/KG/HR: 10000 INJECTION, SOLUTION INTRAVENOUS at 10:09

## 2020-06-29 RX ADMIN — TRAZODONE HYDROCHLORIDE 50 MG: 50 TABLET ORAL at 23:06

## 2020-06-29 RX ADMIN — POTASSIUM CHLORIDE 40 MEQ: 1500 TABLET, EXTENDED RELEASE ORAL at 08:24

## 2020-06-29 RX ADMIN — MORPHINE SULFATE 2 MG: 2 INJECTION, SOLUTION INTRAMUSCULAR; INTRAVENOUS at 23:06

## 2020-06-29 RX ADMIN — SODIUM CHLORIDE 40 MG: 9 INJECTION, SOLUTION INTRAMUSCULAR; INTRAVENOUS; SUBCUTANEOUS at 10:58

## 2020-06-29 RX ADMIN — ONDANSETRON 4 MG: 2 INJECTION INTRAMUSCULAR; INTRAVENOUS at 13:17

## 2020-06-29 RX ADMIN — FAMOTIDINE 20 MG: 10 INJECTION INTRAVENOUS at 21:44

## 2020-06-29 RX ADMIN — HEPARIN SODIUM 15 UNITS/KG/HR: 10000 INJECTION, SOLUTION INTRAVENOUS at 21:47

## 2020-06-29 RX ADMIN — MAGNESIUM SULFATE IN WATER 2 G: 40 INJECTION, SOLUTION INTRAVENOUS at 21:43

## 2020-06-29 RX ADMIN — AMLODIPINE BESYLATE 10 MG: 5 TABLET ORAL at 08:24

## 2020-06-29 RX ADMIN — FAMOTIDINE 20 MG: 10 INJECTION INTRAVENOUS at 10:59

## 2020-06-29 RX ADMIN — METRONIDAZOLE 500 MG: 500 INJECTION, SOLUTION INTRAVENOUS at 07:27

## 2020-06-29 RX ADMIN — SODIUM CHLORIDE 100 ML/HR: 900 INJECTION, SOLUTION INTRAVENOUS at 00:57

## 2020-06-29 RX ADMIN — HEPARIN SODIUM 7260 UNITS: 1000 INJECTION INTRAVENOUS; SUBCUTANEOUS at 08:17

## 2020-06-29 RX ADMIN — ESCITALOPRAM OXALATE 10 MG: 10 TABLET ORAL at 08:24

## 2020-06-29 RX ADMIN — METRONIDAZOLE 500 MG: 500 INJECTION, SOLUTION INTRAVENOUS at 23:05

## 2020-06-29 RX ADMIN — METRONIDAZOLE 500 MG: 500 INJECTION, SOLUTION INTRAVENOUS at 14:59

## 2020-06-29 NOTE — PROGRESS NOTES
Noted pt is independent. Please encourage ambulation. Anticipate pt will transition home with physician follow up with in the next 24-48 hours with physician follow up. No other transition of care needs have been identified at this time. CM remains available to assist.    Care Management Interventions  PCP Verified by CM: Yes  Current Support Network:  Other  Confirm Follow Up Transport: Family  Discharge Location  Discharge Placement: Home

## 2020-06-29 NOTE — ROUTINE PROCESS
Bedside and Verbal shift change report given to MORA Adams RN (oncoming nurse) by LAKISHA Mayfield RN (offgoing nurse). Report included the following information SBAR, Kardex and Recent Results.

## 2020-06-29 NOTE — PROGRESS NOTES
Problem: Falls - Risk of  Goal: *Absence of Falls  Description: Document Robbi Host Fall Risk and appropriate interventions in the flowsheet.   Outcome: Progressing Towards Goal  Note: Fall Risk Interventions:  Mobility Interventions: Assess mobility with egress test         Medication Interventions: Teach patient to arise slowly, Patient to call before getting OOB    Elimination Interventions: Call light in reach    History of Falls Interventions: Consult care management for discharge planning

## 2020-06-29 NOTE — PROGRESS NOTES
Problem: Falls - Risk of  Goal: *Absence of Falls  Description: Document José Cooper Fall Risk and appropriate interventions in the flowsheet.   Outcome: Progressing Towards Goal  Note: Fall Risk Interventions:  Mobility Interventions: Assess mobility with egress test, Patient to call before getting OOB    Mentation Interventions: Adequate sleep, hydration, pain control    Medication Interventions: Patient to call before getting OOB, Teach patient to arise slowly    Elimination Interventions: Call light in reach    History of Falls Interventions: Consult care management for discharge planning

## 2020-06-29 NOTE — PROGRESS NOTES
Hospitalist Progress Note-critical care note     Patient: Rebecca Álvarez MRN: [id number]  CSN: [id number]    YOB: 1975  Age: 39 y.o. Sex: female    DOA: 6/26/2020 LOS:  LOS: 3 days            Chief complaint: portal vein thrombosis     Assessment/Plan         Hospital Problems  Date Reviewed: 6/27/2020          Codes Class Noted POA    Portal vein thrombosis ICD-10-CM: A75  ICD-9-CM: 379  6/27/2020 Yes        Gastritis and duodenitis ICD-10-CM: K29.90  ICD-9-CM: 535.50  6/27/2020 Yes        History of Clostridioides difficile colitis ICD-10-CM: Z86.19  ICD-9-CM: V12.79  6/27/2020 Yes        * (Principal) Acute pancreatic necrosis ICD-10-CM: K85.91  ICD-9-CM: 417.5  6/26/2020 Yes            Portal vein thrombosis  Continue heparin drip, monitor H&H  Discussed with patient option of anti-coagulant  Waiting for GI input    Acute pancreatitis  MRCP done,  Lipase normal now, advance diet    History of c diff   No diarrhea now     Gastritis   ppi       Hypokalemia   k replacement     Subjective; still abdomen pain         Disposition :today    Review of systems:    General: No fevers or chills. Cardiovascular: No chest pain or pressure. No palpitations. Pulmonary: No shortness of breath. Gastrointestinal: No nausea, vomiting. + abdomen pain     Vital signs/Intake and Output:  Visit Vitals  /65 (BP 1 Location: Right arm)   Pulse 75   Temp 98.7 °F (37.1 °C)   Resp 16   Ht 5' 5\" (1.651 m)   Wt 90.7 kg (200 lb)   SpO2 99%   Breastfeeding No   BMI 33.28 kg/m²     Current Shift:  No intake/output data recorded. Last three shifts:  06/27 1901 - 06/29 0700  In: 5972.3 [P.O.:220; I.V.:5752.3]  Out: -     Physical Exam:  General: WD, WN. Alert, cooperative, no acute distress    HEENT: NC, Atraumatic. PERRLA, anicteric sclerae. Lungs: CTA Bilaterally. No Wheezing/Rhonchi/Rales. Heart:  Regular  rhythm,  No murmur, No Rubs, No Gallops  Abdomen: Soft, Non distended,mild tender.   +Bowel sounds, Extremities: No c/c/e  Psych:   Not anxious or agitated. Neurologic:  No acute neurological deficit. Labs: Results:       Chemistry Recent Labs     06/29/20  0035 06/28/20  0020 06/27/20 0410   GLU 91 129* 100*    136 136   K 3.3* 3.7 3.8    108 106   CO2 23 22 22   BUN 2* 3* 7   CREA 0.65 0.64 0.78   CA 8.0* 7.8* 7.7*   AGAP 7 6 8   BUCR 3* 5* 9*   * 341* 256*   TP 5.6* 5.8* 5.9*   ALB 2.3* 2.5* 2.5*   GLOB 3.3 3.3 3.4   AGRAT 0.7* 0.8 0.7*      CBC w/Diff Recent Labs     06/29/20 0035 06/28/20  0020 06/27/20 0410   WBC 6.8 5.4 3.3*   RBC 3.11* 3.29* 3.30*   HGB 10.2* 10.8* 10.8*   HCT 30.7* 32.9* 33.2*    274 309   GRANS 67 72 58   LYMPH 19* 17* 33   EOS 2 2 2      Cardiac Enzymes No results for input(s): CPK, CKND1, JONATAN in the last 72 hours. No lab exists for component: CKRMB, TROIP   Coagulation Recent Labs     06/29/20  1404 06/29/20  0659  06/26/20 2030   PTP  --   --   --  13.5   INR  --   --   --  1.1   APTT 100.7* 39.4*   < > 30.9    < > = values in this interval not displayed. Lipid Panel Lab Results   Component Value Date/Time    Cholesterol, total 156 06/27/2020 04:10 AM    HDL Cholesterol 47 06/27/2020 04:10 AM    LDL, calculated 86.4 06/27/2020 04:10 AM    VLDL, calculated 22.6 06/27/2020 04:10 AM    Triglyceride 113 06/27/2020 04:10 AM    CHOL/HDL Ratio 3.3 06/27/2020 04:10 AM      BNP No results for input(s): BNPP in the last 72 hours.    Liver Enzymes Recent Labs     06/29/20 0035   TP 5.6*   ALB 2.3*   *      Thyroid Studies Lab Results   Component Value Date/Time    TSH 2.18 06/29/2020 12:35 AM        Procedures/imaging: see electronic medical records for all procedures/Xrays and details which were not copied into this note but were reviewed prior to creation of Plan    Ct Abd Pelv W Cont    Result Date: 6/26/2020  EXAM: CT of the abdomen and pelvis INDICATION: Abdominal pain diarrhea x2 days COMPARISON: December 3, 2019 TECHNIQUE: Axial CT imaging of the abdomen and pelvis was performed with intravenous contrast. Multiplanar reformats were generated. One or more dose reduction techniques were used on this CT: automated exposure control, adjustment of the mAs and/or kVp according to patient size, and iterative reconstruction techniques. The specific techniques used on this CT exam have been documented in the patient's electronic medical record. Digital Imaging and Communications in Medicine (DICOM) format image data are available to nonaffiliated external healthcare facilities or entities on a secure, media free, reciprocally searchable basis with patient authorization for at least a 12-month period after this study. _______________ FINDINGS: LOWER CHEST: Unremarkable. LIVER, BILIARY: Liver is normal. No biliary dilation. Cholecystectomy. PANCREAS: There is diffuse enlargement of the head and uncinate process of the pancreas with extensive peripancreatic inflammation suggesting acute edematous interstitial pancreatitis. There is decreased enhancement of the head of the pancreas measuring 4.3 x 3.3 cm suggesting pancreatic necrosis. There is mild pancreatic duct dilatation of the body measuring 5 mm. Cystic structures are seen in the tail the pancreas measuring up to 14 mm and another cystic duct and the junction of the body and tail measuring 10 mm. These are new from December 2019 and suggestive of pancreatic pseudocyst however side branch IPMN cannot be completely excluded. There is some atrophy of the body and tail the pancreas when compared to prior exam.. SPLEEN: Normal. ADRENALS: Normal. KIDNEYS: Normal. VASCULATURE: There is thrombosis of the superior mesenteric vein at the confluence and partial thrombosis of the portal vein at the confluence. This is best seen on axial image 39. LYMPH NODES: No enlarged lymph nodes.  GASTROINTESTINAL TRACT: There is wall thickening of the duodenum and antrum of the stomach secondary to the adjacent acute pancreatitis. Appendix is normal. Post gastric bypass changes present. PELVIC ORGANS: Urinary bladder is decompressed therefore difficult to evaluate. Hysterectomy. Small amount of free fluid is present in the pelvis. Small midline ventral hernia with fat content measuring 2.8 x 1.7 cm. BONES: No acute or aggressive osseous abnormalities identified. OTHER: None. _______________     IMPRESSION: There appears to be recurrence of acute edematous interstitial pancreatitis involving the head and uncinate process of the pancreas with 4.3 x 3.3 cm area of decreased enhancement in the head of the pancreas concerning for pancreatic necrosis. There is  atrophy of the pancreatic body and tail with pancreatic duct dilatation may be a sequelae of prior pancreatitis. Follow-up MRI advised after appropriate therapy to exclude any underlying neoplasm in the head of the pancreas. There are cystic structures along the body and tail the pancreas which may represent pancreatic pseudocyst versus side branch IPMN. 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 Advise GI consultation. ARNIE Petersen informed 9:58 PM June 26, 2020. There is secondary inflammation of the duodenum and stomach. Mri Abd W Mrcp W Wo Cont    Result Date: 6/29/2020  EXAM: MRI ABDOMEN CLINICAL INDICATION/HISTORY:  acute pancreatitis -Additional: None COMPARISON: 6/26/2020 TECHNIQUE: MRI of the abdomen was performed with and without intravenous contrast. MRCP included. Post processing 3D rendering was done on a separate workstation under concurrent physician supervision. _______________ FINDINGS: LOWER CHEST: Minimal pleural fluid bilaterally. LIVER: Normal liver contour and parenchymal signal. No suspicious lesion. BILIARY: No biliary dilation, irregularity, or filling defects. Cholecystectomy. PANCREAS: Edematous signal abnormality centered at the pancreatic head/neck with associated early hypoenhancement.  No definitive glandular necrosis, with subsequent homogeneous parenchymal enhancement. There is peripancreatic edema but no formed fluid collection identified. The portal splenic confluence appears occluded due to pancreatic inflammation. Distal pancreatic duct is ectatic and irregular. 2 small cysts in the tail, the larger measuring 1 cm. No worrisome features. SPLEEN: Normal. ADRENALS: Normal. KIDNEYS: Normal. LYMPH NODES: No enlarged lymph nodes. GASTROINTESTINAL TRACT: No bowel dilation or wall thickening. Gastric bypass without evidence of complication. VASCULATURE: Unremarkable. BONES: No acute or aggressive osseous abnormalities identified. OTHER: Minimal perihepatic ascites. Mesenteric edema. _______________     IMPRESSION: Findings of acute pancreatitis centered at the head/neck, appears interstitial edematous without convincing glandular necrosis. No formed fluid collection. Pancreatic duct ectasia and irregularity likely sequela of chronic pancreatitis. 2 small benign-appearing cyst in the pancreatic tail noted, also likely sequela of chronic pancreatitis.       Vinay Power MD

## 2020-06-29 NOTE — PROGRESS NOTES
0800-Head to toe assessment performed at this time, see flow sheet. 0842-Held heparin drip per verbal order from Dr. Paige Watters for STAT MRI, MRI screen done. 1009-Restarted heparin drip as STAT MRI delayed per MRI. 1057-Patient left for MRI, heparin drip held. 1230-Patient returned from MRI, heparin drip restarted. 1450-.7, patient is therapeutic so no change and recheck PTT  tomorrow AM.    1825-Patient asked \"so are you ever going to tell me the deal with my MRI? \" This nurse informed the patient that by hospital policy this nurse can not share results of MRIs but let the patient know that her concern would be passes on to the hospitalist.    1835-Informed Dr. Paige Watters by telephone that patient was very anxious about her MRI results and would appreciate a call informing her of the results. 1845-Dr. Stephani Soriano in room with patient. 1940-Verified heparin  Drip with Milvia Bird RN. No rate change needed. Placed order to draw PTT at 0630 on Tuesday 6/30/2020.

## 2020-06-30 LAB
APTT PPP: 40 SEC (ref 23–36.4)
APTT PPP: 66.3 SEC (ref 23–36.4)
APTT PPP: >180 SEC (ref 23–36.4)
BASOPHILS # BLD: 0 K/UL (ref 0–0.1)
BASOPHILS NFR BLD: 0 % (ref 0–2)
DIFFERENTIAL METHOD BLD: ABNORMAL
EOSINOPHIL # BLD: 0.2 K/UL (ref 0–0.4)
EOSINOPHIL NFR BLD: 3 % (ref 0–5)
ERYTHROCYTE [DISTWIDTH] IN BLOOD BY AUTOMATED COUNT: 16.6 % (ref 11.6–14.5)
HCT VFR BLD AUTO: 35.6 % (ref 35–45)
HGB BLD-MCNC: 11.5 G/DL (ref 12–16)
INR PPP: 1.1 (ref 0.8–1.2)
LYMPHOCYTES # BLD: 1.4 K/UL (ref 0.9–3.6)
LYMPHOCYTES NFR BLD: 22 % (ref 21–52)
MCH RBC QN AUTO: 32.4 PG (ref 24–34)
MCHC RBC AUTO-ENTMCNC: 32.3 G/DL (ref 31–37)
MCV RBC AUTO: 100.3 FL (ref 74–97)
MONOCYTES # BLD: 0.7 K/UL (ref 0.05–1.2)
MONOCYTES NFR BLD: 10 % (ref 3–10)
NEUTS SEG # BLD: 4.4 K/UL (ref 1.8–8)
NEUTS SEG NFR BLD: 65 % (ref 40–73)
PLATELET # BLD AUTO: 311 K/UL (ref 135–420)
PMV BLD AUTO: 9.9 FL (ref 9.2–11.8)
PROTHROMBIN TIME: 14.5 SEC (ref 11.5–15.2)
RBC # BLD AUTO: 3.55 M/UL (ref 4.2–5.3)
WBC # BLD AUTO: 6.7 K/UL (ref 4.6–13.2)

## 2020-06-30 PROCEDURE — 74011250636 HC RX REV CODE- 250/636: Performed by: INTERNAL MEDICINE

## 2020-06-30 PROCEDURE — 85610 PROTHROMBIN TIME: CPT

## 2020-06-30 PROCEDURE — 74011250637 HC RX REV CODE- 250/637: Performed by: INTERNAL MEDICINE

## 2020-06-30 PROCEDURE — 65270000029 HC RM PRIVATE

## 2020-06-30 PROCEDURE — 85730 THROMBOPLASTIN TIME PARTIAL: CPT

## 2020-06-30 PROCEDURE — 74011000250 HC RX REV CODE- 250: Performed by: FAMILY MEDICINE

## 2020-06-30 PROCEDURE — 85025 COMPLETE CBC W/AUTO DIFF WBC: CPT

## 2020-06-30 PROCEDURE — 74011250636 HC RX REV CODE- 250/636: Performed by: FAMILY MEDICINE

## 2020-06-30 PROCEDURE — 36415 COLL VENOUS BLD VENIPUNCTURE: CPT

## 2020-06-30 PROCEDURE — 74011250636 HC RX REV CODE- 250/636: Performed by: HOSPITALIST

## 2020-06-30 PROCEDURE — 74011250637 HC RX REV CODE- 250/637: Performed by: HOSPITALIST

## 2020-06-30 PROCEDURE — C9113 INJ PANTOPRAZOLE SODIUM, VIA: HCPCS | Performed by: FAMILY MEDICINE

## 2020-06-30 RX ORDER — HEPARIN SODIUM 10000 [USP'U]/100ML
18-36 INJECTION, SOLUTION INTRAVENOUS
Status: DISCONTINUED | OUTPATIENT
Start: 2020-06-30 | End: 2020-07-03

## 2020-06-30 RX ORDER — HEPARIN SODIUM 1000 [USP'U]/ML
80 INJECTION, SOLUTION INTRAVENOUS; SUBCUTANEOUS ONCE
Status: COMPLETED | OUTPATIENT
Start: 2020-06-30 | End: 2020-06-30

## 2020-06-30 RX ORDER — ENOXAPARIN SODIUM 100 MG/ML
1 INJECTION SUBCUTANEOUS EVERY 12 HOURS
Status: DISCONTINUED | OUTPATIENT
Start: 2020-06-30 | End: 2020-06-30

## 2020-06-30 RX ORDER — POTASSIUM CHLORIDE 20 MEQ/1
40 TABLET, EXTENDED RELEASE ORAL
Status: COMPLETED | OUTPATIENT
Start: 2020-06-30 | End: 2020-06-30

## 2020-06-30 RX ORDER — MAGNESIUM SULFATE HEPTAHYDRATE 40 MG/ML
2 INJECTION, SOLUTION INTRAVENOUS ONCE
Status: COMPLETED | OUTPATIENT
Start: 2020-06-30 | End: 2020-06-30

## 2020-06-30 RX ADMIN — WARFARIN SODIUM 2.5 MG: 1 TABLET ORAL at 18:59

## 2020-06-30 RX ADMIN — HEPARIN SODIUM 18 UNITS/KG/HR: 10000 INJECTION, SOLUTION INTRAVENOUS at 21:26

## 2020-06-30 RX ADMIN — MAGNESIUM SULFATE HEPTAHYDRATE 2 G: 40 INJECTION, SOLUTION INTRAVENOUS at 10:57

## 2020-06-30 RX ADMIN — MORPHINE SULFATE 2 MG: 2 INJECTION, SOLUTION INTRAMUSCULAR; INTRAVENOUS at 21:30

## 2020-06-30 RX ADMIN — MORPHINE SULFATE 2 MG: 2 INJECTION, SOLUTION INTRAMUSCULAR; INTRAVENOUS at 23:33

## 2020-06-30 RX ADMIN — POTASSIUM CHLORIDE 40 MEQ: 20 TABLET, EXTENDED RELEASE ORAL at 10:56

## 2020-06-30 RX ADMIN — TRAZODONE HYDROCHLORIDE 50 MG: 50 TABLET ORAL at 23:33

## 2020-06-30 RX ADMIN — METRONIDAZOLE 500 MG: 500 INJECTION, SOLUTION INTRAVENOUS at 15:43

## 2020-06-30 RX ADMIN — AMLODIPINE BESYLATE 10 MG: 5 TABLET ORAL at 10:56

## 2020-06-30 RX ADMIN — METRONIDAZOLE 500 MG: 500 INJECTION, SOLUTION INTRAVENOUS at 06:20

## 2020-06-30 RX ADMIN — ENOXAPARIN SODIUM 90 MG: 100 INJECTION SUBCUTANEOUS at 10:56

## 2020-06-30 RX ADMIN — FAMOTIDINE 20 MG: 10 INJECTION INTRAVENOUS at 10:56

## 2020-06-30 RX ADMIN — FAMOTIDINE 20 MG: 10 INJECTION INTRAVENOUS at 21:02

## 2020-06-30 RX ADMIN — HEPARIN SODIUM 7540 UNITS: 1000 INJECTION INTRAVENOUS; SUBCUTANEOUS at 21:23

## 2020-06-30 RX ADMIN — METRONIDAZOLE 500 MG: 500 INJECTION, SOLUTION INTRAVENOUS at 23:34

## 2020-06-30 RX ADMIN — SODIUM CHLORIDE 40 MG: 9 INJECTION, SOLUTION INTRAMUSCULAR; INTRAVENOUS; SUBCUTANEOUS at 10:56

## 2020-06-30 NOTE — CONSULTS
[id number] [facility]    Name:  Vishal Gray  MR#:   [id number]  :  1975  ACCOUNT #:  [de-identified]  DATE OF SERVICE:  2020    HISTORY OF PRESENT ILLNESS:  This is a 79-year-old female, who was admitted on 2020 because of pancreatitis. This patient is known to have chronic alcoholic pancreatitis, that was thought to be symptomatic at least 3 to 4 years ago. We know that she had multiple visits to the emergency room, to different places, for abdominal pain and elevated liver enzymes. I happened to see her during an earlier admission to this hospital on 2019, but I notice also that this patient has been seen by different gastroenterologists, at least at [facility]. Dr. Phoebe Mensah has done a colonoscopy on her in 2019, where biopsies throughout the colon showed mild eosinophilic colitis. Unfortunately, I do not have the official report of the colonoscopy. This patient was known to have history of moderate alcohol abuse for at least 13 years, started in her early 35s. She was drinking about a bottle of wine on a daily basis. According to old records in this hospital, she came to the emergency room with acute pancreatitis, at least since  but, according to her, she had only 1 pancreatitis in 2019, which is, in this case, not true. She claimed that she did well since that time until about last Wednesday, where she had severe epigastric pain radiating to the back, associated with nausea and some vomiting. The pain became so excruciating that she had to come to the emergency room. She claimed that a couple of days earlier, she consumed some alcohol during her son's birthday. She apparently used to smoke, but quit, maybe a year ago. She does not take any drugs. She had a previous gastric bypass with elastic bands, done by Dr. Torrie Olmos in 2000. She had a revision of her surgery by Dr. Stormy Gunderson in 2019. She had an endoscopy before and after that procedure. Also Dr. Dio Vasquez did an EGD and colonoscopy in 2019 and put her even temporarily on steroids for about 3 weeks in 2019. Apparently, the prednisone made her constipated and subsequently reduced the dose and stopped it, and this made her diarrhea to come back. She claims that she has between 5 to 6 loose bowel movements every day. She claimed that she had lost 30 pounds since 2019, mostly because she lost her appetite, but also because she was afraid to eat chips and spicy and mostly greasy food. She also has this diarrhea. She claimed that her stools are foul smelling, but it does not contain any oil. She claimed that she used to weigh 360 pounds, and presently she is about 200 pounds. She is not diabetic. She does not have coronary artery disease or history of stroke. She has previous tooth infections, cholecystectomy, two bypass surgeries in 2000 and 2019. She has a partial hysterectomy, depression. She has eosinophilic colitis. Also, has this chronic alcoholic pancreatitis. The pain has improved substantially since her admission and presently it is graded as 4 to 5 over 10. She does not have any fever or chills. Her urine is clear. No chest pain, no shortness of breath, no coughing. FAMILY HISTORY:  Apparently, was strong for Crohn's disease on her father's side. Her father  from a liver cancer and GI bleeding in his 52's. He used to be a heavy alcoholic. Paternal aunt had ovarian cancer. ALLERGIES:  NO KNOWN DRUG ALLERGIES. MEDICATIONS:  At home:  1. Vancomycin by mouth, possibly because of history of C. difficile. 2.  Norvasc 5 mg.  3.  Thiamine 100 mg.  4.  Prilosec 20.  5.  Bentyl 20.  6.  Multivitamins. 7.  Desyrel 100 mg. PHYSICAL EXAMINATION:  GENERAL:  We have 46-year-old [facility] American female, who appears to be in no distress. She is not the best historian, but this day she was much better than the last time I have seen her.   She has some memory problems, because she tells me that her disease started only in 12/2019, where in fact it started way before that. VITAL SIGNS:  She weighs about 200 pounds. Temperature 98.7, pulse 75, blood pressure 172/86, breathing 16, saturation 99% on room air. SKIN:  Normal.  She has a few tattoos. HEENT:  The eyes are unremarkable. The pupils are equal and reactive to light. The sclerae are anicteric. The conjunctivae are relatively pink. The  oropharyngeal cavity is moist and the mucous membranes are moist and pink. She has her own teeth. NECK:  Supple. No palpable mass. LUNGS:  Clear, but there is mild diffuse decreased air entry in both lungs. HEART:  The cardiac rhythm is regular. S1 and S2 normal.  No murmur. ABDOMEN:  Obese, soft. There is no tenderness, but there is mild tenderness in the right renal fossa. Bowel sounds are normal.  EXTREMITIES:  Unremarkable. No pedal edema. No clubbing. No tremor. NEUROLOGIC:  No focal neurological signs. She is alert and oriented. LABORATORY DATA:  Blood Tests: Hemoglobin 10.2, was 10.4 on 12/05/2019; WBC 6.8. Normal MCV, MCH, platelets, and differential.  Urinalysis, negative. Her PT/PTT on 06/26 was normal.  Subsequently, the patient was put on heparin. She did have C. difficile on 12/03/2019 and this was also positive now. When I went to the room, I asked her if she is in isolation and she told me no, which is unfortunately not true. Enteric bacteria panel was negative. Ova and parasites were normal.  There was no evidence of wbc in the stools on 12/03/2019, but this has not been repeated recently. On her complete metabolic panel when she arrived on 06/26, showed a lipase of 1879, , ALT 40, alkaline phosphatase 165. The next day, her ALT rena to 280, AST 2037, and alkaline phosphatase to 256, but presently they are 105, 136, and 305 respectively, and a lipase of 196.   Cholesterol, triglycerides, HDL, and LDL are all normal.  Vitamin B12, folic acid, and iron saturation were normal on 12/03/2019. CT scan of the abdomen and pelvis with intravenous contrast revealed the presence of acute edematous interstitial pancreatitis, involving the head and the uncinate process of the pancreas, with 4.3 x 3.3 cm area of decreased enhancement in the head of the pancreas, concerning for pancreatic necrosis. There is atrophy of the pancreatic body and tail with pancreatic ductal dilatation, suggestive of sequelae of prior pancreatitis. There is portal vein and superior mesenteric vein thrombosis at the confluence, secondary inflammation of the duodenum and the stomach. MRI with MRCP today, acute pancreatitis centered in the head and neck, without convincing glandular necrosis, pancreatic ductal ectasia, and irregularity, likely sequelae of chronic pancreatitis. Two small benign-appearing cysts in the pancreatic tail. There was no mention of portal and superior mesenteric vein thrombosis at the confluence, as substantiated by the CAT scan. I see that her LFT has been often abnormal, but mostly within the last year, AST was always higher than ALT. Also, this was noticed since 01/2019. Drug screen was positive for opiates on 12/03/2019 and alcohol level was less than 3 on 06/26. Acute hepatitis panel in 12/2019 was negative. Pancreatic elastase on 12/03/2019 showed that she has a very low level at 144, suggestive of exocrine pancreatic insufficiency. Copper level was at the lower limit of normal at 18.8, for a normal 19 to 39. Serum copper was 80 for a normal of 72 to 166. GABRIELLA and antismooth muscle antibodies were normal.  Alpha antitrypsin was also normal.  Her vitamin D3 was low at 14.2 in 11/2018. ASSESSMENT:  In conclusion, this is a 29-year-old female who has a history of alcohol abuse for at least 13 years.   She had moderate alcohol intake recently, but she is known to have chronic alcoholic pancreatitis for at least 3 to 4 years, comes with a new exacerbation. She also has exocrine pancreatic insufficiency without diabetes mellitus. She has some pancreatic body and tail atrophy with dilatation of the pancreatic duct, probably related to her chronic pancreatitis. I told the patient that she needs to abstain completely from all alcohol. She needs to eat a healthy diet, continue to lose weight, and exercise. She also had mild chronic elevation of liver enzymes, which are most likely related to the alcohol intake, but there is also possibility of fatty liver or nonalcoholic steatohepatitis. For now, we have to let her pancreatitis settle down. I recommend that we try to repeat an imaging modality or the ultrasound, or an MRI in two months, once the pancreatitis has settled down. For now, she needs to be treated with Coumadin for anticoagulation for at least 6 months because of the portal vein thrombosis. After 6 months, I will do an echo Doppler or a Duplex study of her mesenteric veins. If they are patent, then I will try to stop the Coumadin. The patient should abstain from taking any nonsteroidal antiinflammatory drugs during that time. She appears to have exocrine pancreatic insufficiency where supplementation with Zenpep or Creon are warranted to control her diarrhea. I recommend that we give her 1 or 2 capsules with every meal and 1 capsule with every snack. Hopefully, this will control her diarrhea and malabsorption. I told her the she needs to make an appointment for followup with me at the office. I may have to consider doing a liver biopsy if her enzymes remain elevated for more than twice the normal value. She has to be always reminded, as I did today, never to touch alcohol again. This patient is at risk of having pancreatitis. She also has vitamin D deficiency. She is status post gastric bypass. She needs to take her multivitamins.     Sincerely,      MD CALEB [email or web address]  D: 06/29/2020 20:31  T:  06/30/2020 0:23  JOB #:  [id number]  CC:  MD Fili Anglin NP

## 2020-06-30 NOTE — PROGRESS NOTES
Pharmacy Dosing Services: Warfarin    Consult for Warfarin Dosing by Pharmacy by Dr. Chino Jimenez provided for this 39 y.o.  female , for indication of Venous Thrombosis. Day of Therapy 1  Dose to achieve an INR goal of 2-3    Order entered for  Warfarin 2.5 mg to be given today at 18:00. Significant drug interactions: heparin, flagyl  PT/INR Lab Results   Component Value Date/Time    INR 1.1 06/30/2020 05:55 AM      Platelets Lab Results   Component Value Date/Time    PLATELET 360 51/55/6976 08:38 AM      H/H     Lab Results   Component Value Date/Time    HGB 11.5 (L) 06/30/2020 08:38 AM          Pharmacy to follow daily and will provide subsequent Warfarin dosing based on clinical status.   REGINA Noel  [place] [id number] [email or web address] [phone]

## 2020-06-30 NOTE — PROGRESS NOTES
0710- Bedside and Verbal shift change report given to Glynn Guaman RN (oncoming nurse) by Margoth Harper RN (offgoing nurse). Report included the following information SBAR, Kardex and MAR.       0810- Heparin drip restarted. Verified with Ronda Carpio      3110- Dr Brooke Whitley provided orders to hold Heparin drip until 2100 and to have an APTT drawn at 2000. Orders placed. 1245- Patient tolerated lunch. No episodes of vomiting reported. 1200-Heparin held. 1800-Patient denies pain at this time. Resting quietly in bed. Asked to not be disturbed. Patient in no distress. 1930- Provided verbal, bedside report to Mer ABURTO. Report included SBAR, kardex and MAR.

## 2020-06-30 NOTE — PROGRESS NOTES
2032 - Head to toe assessment completed at this time. 20G Right AC in place. 20G Left FA Heparin infusing. Pt rate pain 6/10. Pt declines pain medications at this time. Pt denies CP and SOB. No distress noted. Call bell and personal belongings within reach. Will continue to monitor. 2306 - Pt rated pain 7/10 and medicated per MAR. Call bell within reach.       9916 - Dr. Dante Morales informed of critical value of PTT greater than 180.0.    8578 - Heparin drip held related to PTT greater than 180.0

## 2020-06-30 NOTE — PROGRESS NOTES
Hospitalist Progress Note-critical care note     Patient: Nickie Rasmussen MRN: [id number]  CSN: [id number]    YOB: 1975  Age: 39 y.o. Sex: female    DOA: 6/26/2020 LOS:  LOS: 4 days            Chief complaint: portal vein thrombosis     Assessment/Plan         Hospital Problems  Date Reviewed: 6/27/2020          Codes Class Noted POA    Portal vein thrombosis ICD-10-CM: N09  ICD-9-CM: 281  6/27/2020 Yes        Gastritis and duodenitis ICD-10-CM: K29.90  ICD-9-CM: 535.50  6/27/2020 Yes        History of Clostridioides difficile colitis ICD-10-CM: Z86.19  ICD-9-CM: V12.79  6/27/2020 Yes        * (Principal) Acute pancreatic necrosis ICD-10-CM: K85.91  ICD-9-CM: 605.0  6/26/2020 Yes            Portal vein thrombosis  Continue heparin drip, start warfarin   Need ac for 6 months   F/u with Dr. Shilpa Fleming     Acute pancreatitis  MRCP done,  Lipase normal now, advance diet   Recommend to stop alcohol drinking     History of c diff   No diarrhea now     Gastritis   ppi       Hypokalemia   k replacement ,replace mg    Subjective, I can not tolerate soup, I want to have try something else , no diarrhea     Discussed with pt side affect of warfarin -bleeding and benefit, she indicated a verbal understanding     All questions have been answered. 35 total min's spent on patient care including >50% on counseling/coordinating care. Discussed the above assessments. also discussed labs, medications and hospital course        Disposition :today    Review of systems:    General: No fevers or chills. Cardiovascular: No chest pain or pressure. No palpitations. Pulmonary: No shortness of breath.    Gastrointestinal: +nausea, no vomiting. + abdomen pain     Vital signs/Intake and Output:  Visit Vitals  /64   Pulse 77   Temp 98.4 °F (36.9 °C)   Resp 18   Ht 5' 5\" (1.651 m)   Wt 94.2 kg (207 lb 10.8 oz)   SpO2 100%   Breastfeeding No   BMI 34.56 kg/m²     Current Shift:  06/30 0701 - 06/30 1900  In: 9487 [P.O.:240; I.V.:1500]  Out: -   Last three shifts:  No intake/output data recorded. Physical Exam:  General: WD, WN. Alert, cooperative, no acute distress    HEENT: NC, Atraumatic. PERRLA, anicteric sclerae. Lungs: CTA Bilaterally. No Wheezing/Rhonchi/Rales. Heart:  Regular  rhythm,  No murmur, No Rubs, No Gallops  Abdomen: Soft, Non distended, no  tender. +Bowel sounds,   Extremities: No c/c/e  Psych:   Not anxious or agitated. Neurologic:  No acute neurological deficit. Labs: Results:       Chemistry Recent Labs     06/29/20  0035 06/28/20  0020   GLU 91 129*    136   K 3.3* 3.7    108   CO2 23 22   BUN 2* 3*   CREA 0.65 0.64   CA 8.0* 7.8*   AGAP 7 6   BUCR 3* 5*   * 341*   TP 5.6* 5.8*   ALB 2.3* 2.5*   GLOB 3.3 3.3   AGRAT 0.7* 0.8      CBC w/Diff Recent Labs     06/30/20  0838 06/29/20  0035 06/28/20  0020   WBC 6.7 6.8 5.4   RBC 3.55* 3.11* 3.29*   HGB 11.5* 10.2* 10.8*   HCT 35.6 30.7* 32.9*    238 274   GRANS 65 67 72   LYMPH 22 19* 17*   EOS 3 2 2      Cardiac Enzymes No results for input(s): CPK, CKND1, JONATAN in the last 72 hours. No lab exists for component: CKRMB, TROIP   Coagulation Recent Labs     06/30/20  0838 06/30/20  0555   APTT 66.3* >180.0*       Lipid Panel Lab Results   Component Value Date/Time    Cholesterol, total 156 06/27/2020 04:10 AM    HDL Cholesterol 47 06/27/2020 04:10 AM    LDL, calculated 86.4 06/27/2020 04:10 AM    VLDL, calculated 22.6 06/27/2020 04:10 AM    Triglyceride 113 06/27/2020 04:10 AM    CHOL/HDL Ratio 3.3 06/27/2020 04:10 AM      BNP No results for input(s): BNPP in the last 72 hours.    Liver Enzymes Recent Labs     06/29/20  0035   TP 5.6*   ALB 2.3*   *      Thyroid Studies Lab Results   Component Value Date/Time    TSH 2.18 06/29/2020 12:35 AM        Procedures/imaging: see electronic medical records for all procedures/Xrays and details which were not copied into this note but were reviewed prior to creation of Plan    Ct Abd Pelv W Cont    Result Date: 6/26/2020  EXAM: CT of the abdomen and pelvis INDICATION: Abdominal pain diarrhea x2 days COMPARISON: December 3, 2019 TECHNIQUE: Axial CT imaging of the abdomen and pelvis was performed with intravenous contrast. Multiplanar reformats were generated. One or more dose reduction techniques were used on this CT: automated exposure control, adjustment of the mAs and/or kVp according to patient size, and iterative reconstruction techniques. The specific techniques used on this CT exam have been documented in the patient's electronic medical record. Digital Imaging and Communications in Medicine (DICOM) format image data are available to nonaffiliated external healthcare facilities or entities on a secure, media free, reciprocally searchable basis with patient authorization for at least a 12-month period after this study. _______________ FINDINGS: LOWER CHEST: Unremarkable. LIVER, BILIARY: Liver is normal. No biliary dilation. Cholecystectomy. PANCREAS: There is diffuse enlargement of the head and uncinate process of the pancreas with extensive peripancreatic inflammation suggesting acute edematous interstitial pancreatitis. There is decreased enhancement of the head of the pancreas measuring 4.3 x 3.3 cm suggesting pancreatic necrosis. There is mild pancreatic duct dilatation of the body measuring 5 mm. Cystic structures are seen in the tail the pancreas measuring up to 14 mm and another cystic duct and the junction of the body and tail measuring 10 mm. These are new from December 2019 and suggestive of pancreatic pseudocyst however side branch IPMN cannot be completely excluded. There is some atrophy of the body and tail the pancreas when compared to prior exam.. SPLEEN: Normal. ADRENALS: Normal. KIDNEYS: Normal. VASCULATURE: There is thrombosis of the superior mesenteric vein at the confluence and partial thrombosis of the portal vein at the confluence.  This is best seen on axial image 39. LYMPH NODES: No enlarged lymph nodes. GASTROINTESTINAL TRACT: There is wall thickening of the duodenum and antrum of the stomach secondary to the adjacent acute pancreatitis. Appendix is normal. Post gastric bypass changes present. PELVIC ORGANS: Urinary bladder is decompressed therefore difficult to evaluate. Hysterectomy. Small amount of free fluid is present in the pelvis. Small midline ventral hernia with fat content measuring 2.8 x 1.7 cm. BONES: No acute or aggressive osseous abnormalities identified. OTHER: None. _______________     IMPRESSION: There appears to be recurrence of acute edematous interstitial pancreatitis involving the head and uncinate process of the pancreas with 4.3 x 3.3 cm area of decreased enhancement in the head of the pancreas concerning for pancreatic necrosis. There is  atrophy of the pancreatic body and tail with pancreatic duct dilatation may be a sequelae of prior pancreatitis. Follow-up MRI advised after appropriate therapy to exclude any underlying neoplasm in the head of the pancreas. There are cystic structures along the body and tail the pancreas which may represent pancreatic pseudocyst versus side branch IPMN. 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 Advise GI consultation. ARNIE Petersen informed 9:58 PM June 26, 2020. There is secondary inflammation of the duodenum and stomach. Mri Abd W Mrcp W Wo Cont    Result Date: 6/29/2020  EXAM: MRI ABDOMEN CLINICAL INDICATION/HISTORY:  acute pancreatitis -Additional: None COMPARISON: 6/26/2020 TECHNIQUE: MRI of the abdomen was performed with and without intravenous contrast. MRCP included. Post processing 3D rendering was done on a separate workstation under concurrent physician supervision. _______________ FINDINGS: LOWER CHEST: Minimal pleural fluid bilaterally. LIVER: Normal liver contour and parenchymal signal. No suspicious lesion.  BILIARY: No biliary dilation, irregularity, or filling defects. Cholecystectomy. PANCREAS: Edematous signal abnormality centered at the pancreatic head/neck with associated early hypoenhancement. No definitive glandular necrosis, with subsequent homogeneous parenchymal enhancement. There is peripancreatic edema but no formed fluid collection identified. The portal splenic confluence appears occluded due to pancreatic inflammation. Distal pancreatic duct is ectatic and irregular. 2 small cysts in the tail, the larger measuring 1 cm. No worrisome features. SPLEEN: Normal. ADRENALS: Normal. KIDNEYS: Normal. LYMPH NODES: No enlarged lymph nodes. GASTROINTESTINAL TRACT: No bowel dilation or wall thickening. Gastric bypass without evidence of complication. VASCULATURE: Unremarkable. BONES: No acute or aggressive osseous abnormalities identified. OTHER: Minimal perihepatic ascites. Mesenteric edema. _______________     IMPRESSION: Findings of acute pancreatitis centered at the head/neck, appears interstitial edematous without convincing glandular necrosis. No formed fluid collection. Pancreatic duct ectasia and irregularity likely sequela of chronic pancreatitis. 2 small benign-appearing cyst in the pancreatic tail noted, also likely sequela of chronic pancreatitis.       Fabiola Benito MD

## 2020-06-30 NOTE — ROUTINE PROCESS
0730 - Bedside and Verbal shift change report given to Tali Lugo RN by Katelyn Unger RN. Report included the following information SBAR and Kardex.

## 2020-07-01 LAB
APTT PPP: 82.2 SEC (ref 23–36.4)
APTT PPP: >180 SEC (ref 23–36.4)
APTT PPP: >180 SEC (ref 23–36.4)
BASOPHILS # BLD: 0 K/UL (ref 0–0.1)
BASOPHILS NFR BLD: 0 % (ref 0–2)
DIFFERENTIAL METHOD BLD: ABNORMAL
EOSINOPHIL # BLD: 0.2 K/UL (ref 0–0.4)
EOSINOPHIL NFR BLD: 2 % (ref 0–5)
ERYTHROCYTE [DISTWIDTH] IN BLOOD BY AUTOMATED COUNT: 16.9 % (ref 11.6–14.5)
HCT VFR BLD AUTO: 31.7 % (ref 35–45)
HGB BLD-MCNC: 10.2 G/DL (ref 12–16)
INR PPP: 1.1 (ref 0.8–1.2)
LYMPHOCYTES # BLD: 2.1 K/UL (ref 0.9–3.6)
LYMPHOCYTES NFR BLD: 26 % (ref 21–52)
MCH RBC QN AUTO: 32.4 PG (ref 24–34)
MCHC RBC AUTO-ENTMCNC: 32.2 G/DL (ref 31–37)
MCV RBC AUTO: 100.6 FL (ref 74–97)
MONOCYTES # BLD: 0.8 K/UL (ref 0.05–1.2)
MONOCYTES NFR BLD: 10 % (ref 3–10)
NEUTS SEG # BLD: 5 K/UL (ref 1.8–8)
NEUTS SEG NFR BLD: 62 % (ref 40–73)
O+P SPEC MICRO: NORMAL
O+P STL CONC: NORMAL
PLATELET # BLD AUTO: 305 K/UL (ref 135–420)
PMV BLD AUTO: 9.7 FL (ref 9.2–11.8)
PROTHROMBIN TIME: 14.2 SEC (ref 11.5–15.2)
RBC # BLD AUTO: 3.15 M/UL (ref 4.2–5.3)
SPECIMEN SOURCE: NORMAL
WBC # BLD AUTO: 8.1 K/UL (ref 4.6–13.2)

## 2020-07-01 PROCEDURE — 85025 COMPLETE CBC W/AUTO DIFF WBC: CPT

## 2020-07-01 PROCEDURE — 74011250636 HC RX REV CODE- 250/636: Performed by: INTERNAL MEDICINE

## 2020-07-01 PROCEDURE — 74011250637 HC RX REV CODE- 250/637: Performed by: INTERNAL MEDICINE

## 2020-07-01 PROCEDURE — C9113 INJ PANTOPRAZOLE SODIUM, VIA: HCPCS | Performed by: FAMILY MEDICINE

## 2020-07-01 PROCEDURE — 74011250637 HC RX REV CODE- 250/637: Performed by: HOSPITALIST

## 2020-07-01 PROCEDURE — 74011250637 HC RX REV CODE- 250/637: Performed by: FAMILY MEDICINE

## 2020-07-01 PROCEDURE — 74011250636 HC RX REV CODE- 250/636: Performed by: HOSPITALIST

## 2020-07-01 PROCEDURE — 74011250636 HC RX REV CODE- 250/636: Performed by: FAMILY MEDICINE

## 2020-07-01 PROCEDURE — 85730 THROMBOPLASTIN TIME PARTIAL: CPT

## 2020-07-01 PROCEDURE — 85610 PROTHROMBIN TIME: CPT

## 2020-07-01 PROCEDURE — 65270000029 HC RM PRIVATE

## 2020-07-01 PROCEDURE — 74011000250 HC RX REV CODE- 250: Performed by: FAMILY MEDICINE

## 2020-07-01 PROCEDURE — 36415 COLL VENOUS BLD VENIPUNCTURE: CPT

## 2020-07-01 RX ORDER — OXYCODONE HYDROCHLORIDE 5 MG/1
5 TABLET ORAL
Status: DISCONTINUED | OUTPATIENT
Start: 2020-07-01 | End: 2020-07-03 | Stop reason: HOSPADM

## 2020-07-01 RX ORDER — WARFARIN SODIUM 5 MG/1
5 TABLET ORAL ONCE
Status: COMPLETED | OUTPATIENT
Start: 2020-07-01 | End: 2020-07-01

## 2020-07-01 RX ORDER — MORPHINE SULFATE 2 MG/ML
1 INJECTION, SOLUTION INTRAMUSCULAR; INTRAVENOUS
Status: DISCONTINUED | OUTPATIENT
Start: 2020-07-01 | End: 2020-07-03 | Stop reason: HOSPADM

## 2020-07-01 RX ORDER — PANTOPRAZOLE SODIUM 40 MG/1
40 TABLET, DELAYED RELEASE ORAL
Status: DISCONTINUED | OUTPATIENT
Start: 2020-07-02 | End: 2020-07-02

## 2020-07-01 RX ORDER — METRONIDAZOLE 250 MG/1
500 TABLET ORAL EVERY 8 HOURS
Status: DISCONTINUED | OUTPATIENT
Start: 2020-07-01 | End: 2020-07-02

## 2020-07-01 RX ORDER — FAMOTIDINE 20 MG/1
20 TABLET, FILM COATED ORAL EVERY 12 HOURS
Status: DISCONTINUED | OUTPATIENT
Start: 2020-07-01 | End: 2020-07-03 | Stop reason: HOSPADM

## 2020-07-01 RX ADMIN — OXYCODONE 5 MG: 5 TABLET ORAL at 21:52

## 2020-07-01 RX ADMIN — TRAZODONE HYDROCHLORIDE 50 MG: 50 TABLET ORAL at 21:49

## 2020-07-01 RX ADMIN — FAMOTIDINE 20 MG: 20 TABLET ORAL at 21:49

## 2020-07-01 RX ADMIN — HEPARIN SODIUM 15 UNITS/KG/HR: 10000 INJECTION, SOLUTION INTRAVENOUS at 05:31

## 2020-07-01 RX ADMIN — WARFARIN SODIUM 5 MG: 5 TABLET ORAL at 17:52

## 2020-07-01 RX ADMIN — METRONIDAZOLE 500 MG: 250 TABLET ORAL at 21:49

## 2020-07-01 RX ADMIN — METRONIDAZOLE 500 MG: 250 TABLET ORAL at 17:52

## 2020-07-01 RX ADMIN — SODIUM CHLORIDE 40 MG: 9 INJECTION, SOLUTION INTRAMUSCULAR; INTRAVENOUS; SUBCUTANEOUS at 08:37

## 2020-07-01 RX ADMIN — METRONIDAZOLE 500 MG: 500 INJECTION, SOLUTION INTRAVENOUS at 06:45

## 2020-07-01 RX ADMIN — FAMOTIDINE 20 MG: 10 INJECTION INTRAVENOUS at 08:37

## 2020-07-01 RX ADMIN — AMLODIPINE BESYLATE 10 MG: 5 TABLET ORAL at 08:47

## 2020-07-01 NOTE — PROGRESS NOTES
Hospitalist Progress Note-critical care note     Patient: Bryant Ferguson MRN: [id number]  CSN: [id number]    YOB: 1975  Age: 39 y.o. Sex: female    DOA: 6/26/2020 LOS:  LOS: 5 days            Chief complaint: portal vein thrombosis     Assessment/Plan         Hospital Problems  Date Reviewed: 6/27/2020          Codes Class Noted POA    Portal vein thrombosis ICD-10-CM: J53  ICD-9-CM: 816  6/27/2020 Yes        Gastritis and duodenitis ICD-10-CM: K29.90  ICD-9-CM: 535.50  6/27/2020 Yes        History of Clostridioides difficile colitis ICD-10-CM: Z86.19  ICD-9-CM: V12.79  6/27/2020 Yes        * (Principal) Acute pancreatic necrosis ICD-10-CM: K85.91  ICD-9-CM: 156.6  6/26/2020 Yes            Portal vein thrombosis  Continue heparin drip,   warfarin per pharm dose-hope will have Inr up in 3-5 days   Need ac for 6 months   F/u with Dr. Nolan More as out -pt     Acute pancreatitis-resolved   MRCP done,  Lipase normal now, advance diet   Recommend to stop alcohol drinking     History of c diff   No diarrhea now     Gastritis   ppi       Hypokalemia   Replaced , will recheck bmp tomorrow     Subjective, still having abdomen pain, but better, so far no n/v, tolerated soup    Will cut down pain meds-she agrees     Disposition :till inr therapeutic level   Review of systems:    General: No fevers or chills. Cardiovascular: No chest pain or pressure. No palpitations. Pulmonary: No shortness of breath. Gastrointestinal: no nausea, no vomiting. + abdomen pain     Vital signs/Intake and Output:  Visit Vitals  /81 (BP 1 Location: Left arm, BP Patient Position: At rest)   Pulse 79   Temp 98.3 °F (36.8 °C)   Resp 17   Ht 5' 5\" (1.651 m)   Wt 90.7 kg (199 lb 15.3 oz)   SpO2 100%   Breastfeeding No   BMI 33.27 kg/m²     Current Shift:  No intake/output data recorded. Last three shifts:  06/29 1901 - 07/01 0700  In: 1920 [P.O.:420; I.V.:1500]  Out: 5 [Urine:5]    Physical Exam:  General: WD, WN.   Alert, cooperative, no acute distress    HEENT: NC, Atraumatic. PERRLA, anicteric sclerae. Lungs: CTA Bilaterally. No Wheezing/Rhonchi/Rales. Heart:  Regular  rhythm,  No murmur, No Rubs, No Gallops  Abdomen: Soft, Non distended, no  tender. +Bowel sounds,   Extremities: No c/c/e  Psych:   Not anxious or agitated. Neurologic:  No acute neurological deficit. Labs: Results:       Chemistry Recent Labs     06/29/20  0035   GLU 91      K 3.3*      CO2 23   BUN 2*   CREA 0.65   CA 8.0*   AGAP 7   BUCR 3*   *   TP 5.6*   ALB 2.3*   GLOB 3.3   AGRAT 0.7*      CBC w/Diff Recent Labs     07/01/20  0300 06/30/20  0838 06/29/20  0035   WBC 8.1 6.7 6.8   RBC 3.15* 3.55* 3.11*   HGB 10.2* 11.5* 10.2*   HCT 31.7* 35.6 30.7*    311 238   GRANS 62 65 67   LYMPH 26 22 19*   EOS 2 3 2      Cardiac Enzymes No results for input(s): CPK, CKND1, JONATAN in the last 72 hours. No lab exists for component: CKRMB, TROIP   Coagulation Recent Labs     07/01/20  1120 07/01/20  0300  06/30/20  0555   PTP  --  14.2  --  14.5   INR  --  1.1  --  1.1   APTT >180.0* >180.0*   < > >180.0*    < > = values in this interval not displayed. Lipid Panel Lab Results   Component Value Date/Time    Cholesterol, total 156 06/27/2020 04:10 AM    HDL Cholesterol 47 06/27/2020 04:10 AM    LDL, calculated 86.4 06/27/2020 04:10 AM    VLDL, calculated 22.6 06/27/2020 04:10 AM    Triglyceride 113 06/27/2020 04:10 AM    CHOL/HDL Ratio 3.3 06/27/2020 04:10 AM      BNP No results for input(s): BNPP in the last 72 hours.    Liver Enzymes Recent Labs     06/29/20  0035   TP 5.6*   ALB 2.3*   *      Thyroid Studies Lab Results   Component Value Date/Time    TSH 2.18 06/29/2020 12:35 AM        Procedures/imaging: see electronic medical records for all procedures/Xrays and details which were not copied into this note but were reviewed prior to creation of Plan    Ct Abd Pelv W Cont    Result Date: 6/26/2020  EXAM: CT of the abdomen and pelvis INDICATION: Abdominal pain diarrhea x2 days COMPARISON: December 3, 2019 TECHNIQUE: Axial CT imaging of the abdomen and pelvis was performed with intravenous contrast. Multiplanar reformats were generated. One or more dose reduction techniques were used on this CT: automated exposure control, adjustment of the mAs and/or kVp according to patient size, and iterative reconstruction techniques. The specific techniques used on this CT exam have been documented in the patient's electronic medical record. Digital Imaging and Communications in Medicine (DICOM) format image data are available to nonaffiliated external healthcare facilities or entities on a secure, media free, reciprocally searchable basis with patient authorization for at least a 12-month period after this study. _______________ FINDINGS: LOWER CHEST: Unremarkable. LIVER, BILIARY: Liver is normal. No biliary dilation. Cholecystectomy. PANCREAS: There is diffuse enlargement of the head and uncinate process of the pancreas with extensive peripancreatic inflammation suggesting acute edematous interstitial pancreatitis. There is decreased enhancement of the head of the pancreas measuring 4.3 x 3.3 cm suggesting pancreatic necrosis. There is mild pancreatic duct dilatation of the body measuring 5 mm. Cystic structures are seen in the tail the pancreas measuring up to 14 mm and another cystic duct and the junction of the body and tail measuring 10 mm. These are new from December 2019 and suggestive of pancreatic pseudocyst however side branch IPMN cannot be completely excluded. There is some atrophy of the body and tail the pancreas when compared to prior exam.. SPLEEN: Normal. ADRENALS: Normal. KIDNEYS: Normal. VASCULATURE: There is thrombosis of the superior mesenteric vein at the confluence and partial thrombosis of the portal vein at the confluence. This is best seen on axial image 39. LYMPH NODES: No enlarged lymph nodes.  GASTROINTESTINAL TRACT: There is wall thickening of the duodenum and antrum of the stomach secondary to the adjacent acute pancreatitis. Appendix is normal. Post gastric bypass changes present. PELVIC ORGANS: Urinary bladder is decompressed therefore difficult to evaluate. Hysterectomy. Small amount of free fluid is present in the pelvis. Small midline ventral hernia with fat content measuring 2.8 x 1.7 cm. BONES: No acute or aggressive osseous abnormalities identified. OTHER: None. _______________     IMPRESSION: There appears to be recurrence of acute edematous interstitial pancreatitis involving the head and uncinate process of the pancreas with 4.3 x 3.3 cm area of decreased enhancement in the head of the pancreas concerning for pancreatic necrosis. There is  atrophy of the pancreatic body and tail with pancreatic duct dilatation may be a sequelae of prior pancreatitis. Follow-up MRI advised after appropriate therapy to exclude any underlying neoplasm in the head of the pancreas. There are cystic structures along the body and tail the pancreas which may represent pancreatic pseudocyst versus side branch IPMN. 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 Advise GI consultation. ARNIE Petersen informed 9:58 PM June 26, 2020. There is secondary inflammation of the duodenum and stomach. Mri Abd W Mrcp W Wo Cont    Result Date: 6/29/2020  EXAM: MRI ABDOMEN CLINICAL INDICATION/HISTORY:  acute pancreatitis -Additional: None COMPARISON: 6/26/2020 TECHNIQUE: MRI of the abdomen was performed with and without intravenous contrast. MRCP included. Post processing 3D rendering was done on a separate workstation under concurrent physician supervision. _______________ FINDINGS: LOWER CHEST: Minimal pleural fluid bilaterally. LIVER: Normal liver contour and parenchymal signal. No suspicious lesion. BILIARY: No biliary dilation, irregularity, or filling defects. Cholecystectomy.  PANCREAS: Edematous signal abnormality centered at the pancreatic head/neck with associated early hypoenhancement. No definitive glandular necrosis, with subsequent homogeneous parenchymal enhancement. There is peripancreatic edema but no formed fluid collection identified. The portal splenic confluence appears occluded due to pancreatic inflammation. Distal pancreatic duct is ectatic and irregular. 2 small cysts in the tail, the larger measuring 1 cm. No worrisome features. SPLEEN: Normal. ADRENALS: Normal. KIDNEYS: Normal. LYMPH NODES: No enlarged lymph nodes. GASTROINTESTINAL TRACT: No bowel dilation or wall thickening. Gastric bypass without evidence of complication. VASCULATURE: Unremarkable. BONES: No acute or aggressive osseous abnormalities identified. OTHER: Minimal perihepatic ascites. Mesenteric edema. _______________     IMPRESSION: Findings of acute pancreatitis centered at the head/neck, appears interstitial edematous without convincing glandular necrosis. No formed fluid collection. Pancreatic duct ectasia and irregularity likely sequela of chronic pancreatitis. 2 small benign-appearing cyst in the pancreatic tail noted, also likely sequela of chronic pancreatitis.       Vijaya Dupree MD

## 2020-07-01 NOTE — PROGRESS NOTES
1920 - Contact. MRSA. Bedside report received from [facility]. Patient bed at this time. Pain 2/10. Plan of care for the day addressed with the patient. White board updated. Pt informed to utilize call bell or this RN's zone phone for and questions, concerns, or needs. Pt verbalized understanding. Call bell, phone, ice water, and personal belongings w/in pt reach. Will continue to monitor. 2020 Assessment complete. See flow sheet. 2011 aPTT 40. 0. Heparin Bolus 7,540 units. 18 units/kg/hr. 2126 Heparin bolus 7,540 units administered. Heparin restarted at 18 units/kg/hr. Gibran Christie RN sign off witness. Order for aPTT timed Q6 to start 7/1/2020 placed. 2130 PRN Morphine 2mg IV administered per pt request for pain. Trazodone 50mg PT refused at this time. Pt would like to take at 2330. Will continue to monitor. 2333 PRN Morphine 2 mg IV administered per pt request for pain. Trazodone 50 mg PO administered at this time. Will continue to monitor. 0543 Call from Ginny Red in lab. Critical result. aPTT  >180. This RN will check protocol and discuss plan of action w/ Gibran Christie RN, charge RN.  4715 Heparin admin stopped per protocol. 1233 Dr Dominic Steele informed of Critical aPTT. Dominic Steele stated continue protocol. .     Bedside and Verbal shift change report given to [facility] by Joselyn Koyanagi, RN. Report included the following information SBAR, Kardex, OR Summary, Intake/Output and MAR.

## 2020-07-01 NOTE — PROGRESS NOTES
CM continuing to follow. Anticipate pt will transition home with physician follow up when medically stable. Anticipate transition home with in the next 24-48 hours. No other transition of care needs have been identified at this time. Please continue to encourage ambulation. CM remains available. Care Management Interventions  PCP Verified by CM: Yes  Current Support Network:  Other  Confirm Follow Up Transport: Family  Discharge Location  Discharge Placement: Home

## 2020-07-01 NOTE — PROGRESS NOTES
3430 Report received from Kranthi Bal RN. Patient in satisfactory condition. Heparin drip infusing, dual verified with RN. VS stable. Pain voiced at 7/10, will medicate per [facility] at patient request. IS educated and encouraged. Shift POC reviewed with patient. Menu/meal times explained. Call/bell phone within reach. Will monitor for changes. 0845 Scheduled medications given. Pain voiced at 7/10 to abdomen but refused IN narcotics at this time. Will call after breakfast will pain persist. Will monitor for changes. 1030 No needs at this time. Will monitor for changes. 1215 Heparin drip stopped per protocol. Will restart in 1 hour. Orders placed to redraw in 6 hours. 1315 Heparin drip restarted per protocol. Dual verified with RN.     8730 Patient sitting on the side on the bed talking on the phone. No needs at this time. 1930 Bedside and Verbal shift change report given to Gricelda Haque RN (oncoming nurse) by Melissa Harris RN (offgoing nurse). Report included the following information SBAR, Kardex, MAR, Recent Results and Med Rec Status. Heparin drip verified.

## 2020-07-01 NOTE — ROUTINE PROCESS
Bedside and Verbal shift change report given to REJI Merritt RN (oncoming nurse) by AZALIA Bettencourt RN (offgoing nurse). Report included the following information SBAR, Kardex, Intake/Output and MAR.

## 2020-07-01 NOTE — PROGRESS NOTES
Pharmacy Dosing Services: Warfarin    Consult for Warfarin Dosing by Pharmacy by Dr. Jade Bal provided for this 39 y.o.  female , for indication of Venous Thrombosis. Day of Therapy 2  Dose to achieve an INR goal of 2-3    Order entered for  Warfarin  5 mg to be given today at 18:00. Pt is also on a Heparin drip  Albumin = 2.3    Significant drug interactions: metronidazole  LABS    PT/INR Lab Results   Component Value Date/Time    INR 1.1 07/01/2020 03:00 AM      Platelets Lab Results   Component Value Date/Time    PLATELET 114 87/37/4687 03:00 AM      H/H     Lab Results   Component Value Date/Time    HGB 10.2 (L) 07/01/2020 03:00 AM        Warfarin Administrations (last 168 hours)       Date/Time Action Medication Dose    06/30/20 1850 Given    warfarin (COUMADIN) tablet 2.5 mg 2.5 mg          Pharmacy to follow daily and will provide subsequent Warfarin dosing based on clinical status.   AZAEL Mondragon  Contact information   [phone]

## 2020-07-01 NOTE — PROGRESS NOTES
conducted an initial consultation and Spiritual Assessment for Alex Fonseca, who is a 39 y.o.,female. The reason the Patient came to the hospital is:   Patient Active Problem List    Diagnosis Date Noted    Portal vein thrombosis 06/27/2020    Gastritis and duodenitis 06/27/2020    History of Clostridioides difficile colitis 06/27/2020    Acute pancreatic necrosis 06/26/2020    Hyponatremia 12/03/2019    Status post laparoscopy 09/18/2018    Intestinal malabsorption     Hypertension     Anemia     Anxiety     GERD (gastroesophageal reflux disease)     Wears dentures     Syncopal episodes     Smoker     Status post gastric bypass for obesity 01/01/2000      Patient shared concerns about her health conditions and the effect it is having on her children, both age 12. Familial support includes step father and mother. Shared vocational goals of being a . Listened empathically. Provided information about Spiritual Care Services. Worships at [employer] in [place] [address] and assurance of continued prayers on patients behalf. Chart reviewed. Patient shared information about their medical narrative, spiritual journey and beliefs. Patient processed feelings about current hospitalization. Patient expressed gratitude for Spiritual Care visit. Chaplains will continue to follow and will provide pastoral care as needed or requested.  recommends bedside caregivers page  on duty if patient shows signs of acute spiritual or emotional distress.     [facility]   [phone]

## 2020-07-01 NOTE — PROGRESS NOTES
Pharmacy Dosing Services: IV - PO Conversion    Patient meets P & T approved criteria for conversion from IV to oral therapy for the following medications:  pantoprazole, famotidine, and metronidazole. Pt has regular diet ordered and is tolerating other medications orally. No recent nausea / vomiting (per RN)    Doses adjusted to: Pantoprazole 40 mg po daily before breakfast                                 Famotidine 20 mg PO q12h                                 Metronidazole 500 mg PO q8h       prior orders:  Pantoprazole 40 mg IV daily                         Famotidine 20 mg IV q12h                        Metronidazole 500 mg IV q8h     Pharmacy will continue to monitor the patient's status and advise the physician if conversion back to IV therapy is recommended.     Signed AZAEL Degroot Contact information: [phone]

## 2020-07-01 NOTE — PROGRESS NOTES
Problem: Risk for Spread of Infection  Goal: Prevent transmission of infectious organism to others  Description: Prevent the transmission of infectious organisms to other patients, staff members, and visitors. Outcome: Progressing Towards Goal     Problem: Falls - Risk of  Goal: *Absence of Falls  Description: Document Neil Javier Fall Risk and appropriate interventions in the flowsheet.   Outcome: Progressing Towards Goal  Note: Fall Risk Interventions:  Mobility Interventions: Strengthening exercises (ROM-active/passive)    Mentation Interventions: Adequate sleep, hydration, pain control    Medication Interventions: Teach patient to arise slowly, Patient to call before getting OOB    Elimination Interventions: Call light in reach    History of Falls Interventions: Consult care management for discharge planning         Problem: Pain  Goal: *Control of Pain  Outcome: Progressing Towards Goal

## 2020-07-02 LAB
APTT PPP: 126.8 SEC (ref 23–36.4)
APTT PPP: 36.8 SEC (ref 23–36.4)
APTT PPP: >180 SEC (ref 23–36.4)
APTT PPP: >180 SEC (ref 23–36.4)
BACTERIA SPEC CULT: NORMAL
BASOPHILS # BLD: 0 K/UL (ref 0–0.1)
BASOPHILS NFR BLD: 0 % (ref 0–2)
DIFFERENTIAL METHOD BLD: ABNORMAL
EOSINOPHIL # BLD: 0.2 K/UL (ref 0–0.4)
EOSINOPHIL NFR BLD: 3 % (ref 0–5)
ERYTHROCYTE [DISTWIDTH] IN BLOOD BY AUTOMATED COUNT: 16.6 % (ref 11.6–14.5)
HCT VFR BLD AUTO: 32.2 % (ref 35–45)
HGB BLD-MCNC: 10.5 G/DL (ref 12–16)
INR PPP: 1 (ref 0.8–1.2)
LYMPHOCYTES # BLD: 1.9 K/UL (ref 0.9–3.6)
LYMPHOCYTES NFR BLD: 27 % (ref 21–52)
MCH RBC QN AUTO: 32.6 PG (ref 24–34)
MCHC RBC AUTO-ENTMCNC: 32.6 G/DL (ref 31–37)
MCV RBC AUTO: 100 FL (ref 74–97)
MONOCYTES # BLD: 0.8 K/UL (ref 0.05–1.2)
MONOCYTES NFR BLD: 11 % (ref 3–10)
NEUTS SEG # BLD: 4 K/UL (ref 1.8–8)
NEUTS SEG NFR BLD: 59 % (ref 40–73)
PLATELET # BLD AUTO: 319 K/UL (ref 135–420)
PMV BLD AUTO: 9.3 FL (ref 9.2–11.8)
PROTHROMBIN TIME: 12.6 SEC (ref 11.5–15.2)
RBC # BLD AUTO: 3.22 M/UL (ref 4.2–5.3)
SERVICE CMNT-IMP: NORMAL
WBC # BLD AUTO: 6.8 K/UL (ref 4.6–13.2)

## 2020-07-02 PROCEDURE — 74011250636 HC RX REV CODE- 250/636: Performed by: HOSPITALIST

## 2020-07-02 PROCEDURE — 74011250637 HC RX REV CODE- 250/637: Performed by: HOSPITALIST

## 2020-07-02 PROCEDURE — 74011250637 HC RX REV CODE- 250/637: Performed by: INTERNAL MEDICINE

## 2020-07-02 PROCEDURE — 65270000029 HC RM PRIVATE

## 2020-07-02 PROCEDURE — 85730 THROMBOPLASTIN TIME PARTIAL: CPT

## 2020-07-02 PROCEDURE — 85025 COMPLETE CBC W/AUTO DIFF WBC: CPT

## 2020-07-02 PROCEDURE — 85610 PROTHROMBIN TIME: CPT

## 2020-07-02 PROCEDURE — 36415 COLL VENOUS BLD VENIPUNCTURE: CPT

## 2020-07-02 PROCEDURE — 74011250636 HC RX REV CODE- 250/636: Performed by: INTERNAL MEDICINE

## 2020-07-02 PROCEDURE — 74011250637 HC RX REV CODE- 250/637: Performed by: FAMILY MEDICINE

## 2020-07-02 RX ORDER — HEPARIN SODIUM 1000 [USP'U]/ML
80 INJECTION, SOLUTION INTRAVENOUS; SUBCUTANEOUS ONCE
Status: COMPLETED | OUTPATIENT
Start: 2020-07-02 | End: 2020-07-02

## 2020-07-02 RX ORDER — WARFARIN SODIUM 5 MG/1
5 TABLET ORAL ONCE
Status: DISCONTINUED | OUTPATIENT
Start: 2020-07-02 | End: 2020-07-02 | Stop reason: DRUGHIGH

## 2020-07-02 RX ORDER — WARFARIN 3 MG/1
7.5 TABLET ORAL ONCE
Status: COMPLETED | OUTPATIENT
Start: 2020-07-02 | End: 2020-07-02

## 2020-07-02 RX ADMIN — TRAZODONE HYDROCHLORIDE 50 MG: 50 TABLET ORAL at 23:34

## 2020-07-02 RX ADMIN — FAMOTIDINE 20 MG: 20 TABLET ORAL at 21:00

## 2020-07-02 RX ADMIN — PANTOPRAZOLE SODIUM 40 MG: 40 TABLET, DELAYED RELEASE ORAL at 06:48

## 2020-07-02 RX ADMIN — HEPARIN SODIUM 7260 UNITS: 1000 INJECTION INTRAVENOUS; SUBCUTANEOUS at 03:27

## 2020-07-02 RX ADMIN — VANCOMYCIN HYDROCHLORIDE 125 MG: 1 INJECTION, POWDER, LYOPHILIZED, FOR SOLUTION INTRAVENOUS at 23:34

## 2020-07-02 RX ADMIN — WARFARIN SODIUM 7.5 MG: 3 TABLET ORAL at 18:36

## 2020-07-02 RX ADMIN — METRONIDAZOLE 500 MG: 250 TABLET ORAL at 13:58

## 2020-07-02 RX ADMIN — FAMOTIDINE 20 MG: 20 TABLET ORAL at 09:32

## 2020-07-02 RX ADMIN — VANCOMYCIN HYDROCHLORIDE 125 MG: 1 INJECTION, POWDER, LYOPHILIZED, FOR SOLUTION INTRAVENOUS at 18:35

## 2020-07-02 RX ADMIN — METRONIDAZOLE 500 MG: 250 TABLET ORAL at 06:48

## 2020-07-02 RX ADMIN — HEPARIN SODIUM 11 UNITS/KG/HR: 10000 INJECTION, SOLUTION INTRAVENOUS at 18:31

## 2020-07-02 RX ADMIN — HEPARIN SODIUM 16 UNITS/KG/HR: 10000 INJECTION, SOLUTION INTRAVENOUS at 03:28

## 2020-07-02 RX ADMIN — AMLODIPINE BESYLATE 10 MG: 5 TABLET ORAL at 09:32

## 2020-07-02 NOTE — PROGRESS NOTES
2149-alert and oriented x 4. Lungs CTA. BS active x 4 quads. Pain rated at 7/10, oxycodone 5 mg given. Heparin drip infusing, will be checked again in the am. On Contact isolation for C-diff, rule out. Continues on Flagyl PO without difficulty. Coumadin started today. 0326-Heparin bolus of 7300 units given per protocol and rate changed to 16/units/kg. 0700-Critical lab for Heparin called. Lab informed that the critical lab was due to lab drawn too early. Order for lab to be drawn at 935 am had been put in at 350 am. Spoke with pharmacy due to the critical lab and pharmacist said to recheck lab at scheduled time and continue with current rate for heparin drip.

## 2020-07-02 NOTE — PROGRESS NOTES
Noted pt is not medically stable to transition from an acute care setting at this time. Anticipate pt will transition home with physician follow up when medically stable with physician follow up. No other transition of care needs have been identified at this time. Please continue to encourage ambulation. CM remains available. Care Management Interventions  PCP Verified by CM: Yes  Current Support Network:  Other  Confirm Follow Up Transport: Family  Discharge Location  Discharge Placement: Home

## 2020-07-02 NOTE — PROGRESS NOTES
1930: Bedside report received from Gelacio Simpson RN. Assumed care of pt at this time. Pt in bed with no signs of distress. Pt left with call light within reach and encouraged to call for assistance. 0142: aPTT was 76.3 -- therapeutic, no change and will repeat aPTT next AM. Patient is stable -- no distress noted. Verified rate and new bag hung. 9791: Bedside shift change report given to Nikita Batres RN (oncoming nurse) by Ching Valiente (offgoing nurse). Report included the following information SBAR, Kardex and MAR.

## 2020-07-02 NOTE — PROGRESS NOTES
Hospitalist Progress Note-critical care note     Patient: David Brewer MRN: [id number]  CSN: [id number]    YOB: 1975  Age: 39 y.o. Sex: female    DOA: 6/26/2020 LOS:  LOS: 6 days            Chief complaint: portal vein thrombosis     Assessment/Plan         Hospital Problems  Date Reviewed: 6/27/2020          Codes Class Noted POA    Portal vein thrombosis ICD-10-CM: O30  ICD-9-CM: 946  6/27/2020 Yes        Gastritis and duodenitis ICD-10-CM: K29.90  ICD-9-CM: 535.50  6/27/2020 Yes        History of Clostridioides difficile colitis ICD-10-CM: Z86.19  ICD-9-CM: V12.79  6/27/2020 Yes        * (Principal) Acute pancreatic necrosis ICD-10-CM: K85.91  ICD-9-CM: 632.9  6/26/2020 Yes            Portal vein thrombosis  Continue heparin drip,   warfarin per pharm dose   Need ac for 6 months   F/u with Dr. Toshia Cisneros as out -pt     Acute pancreatitis-resolved   MRCP done,  Recommend to stop alcohol drinking     History of c diff   Reported diarrhea, will change to po vanc-Dr. Raquel Khan treated as put-pt    Gastritis   ppi       Hypokalemia   Replaced ,     Subjective: still diarrhea,-completed 2 days vanc , tolerated food well     Disposition :till inr therapeutic level   Review of systems:    General: No fevers or chills. Cardiovascular: No chest pain or pressure. No palpitations. Pulmonary: No shortness of breath. Gastrointestinal: no nausea, no vomiting. + abdomen pain     Vital signs/Intake and Output:  Visit Vitals  /85   Pulse 72   Temp 97.9 °F (36.6 °C)   Resp 16   Ht 5' 5\" (1.651 m)   Wt 90.7 kg (199 lb 15.3 oz)   SpO2 100%   Breastfeeding No   BMI 33.27 kg/m²     Current Shift:  No intake/output data recorded. Last three shifts:  06/30 1901 - 07/02 0700  In: 61 [P.O.:60]  Out: 1 [Urine:1]    Physical Exam:  General: WD, WN. Alert, cooperative, no acute distress    HEENT: NC, Atraumatic. PERRLA, anicteric sclerae. Lungs: CTA Bilaterally. No Wheezing/Rhonchi/Rales.   Heart:  Regular rhythm,  No murmur, No Rubs, No Gallops  Abdomen: Soft, Non distended, no  tender. +Bowel sounds,   Extremities: No c/c/e  Psych:   Not anxious or agitated. Neurologic:  No acute neurological deficit. Labs: Results:       Chemistry No results for input(s): GLU, NA, K, CL, CO2, BUN, CREA, CA, AGAP, BUCR, TBIL, AP, TP, ALB, GLOB, AGRAT in the last 72 hours. No lab exists for component: GPT   CBC w/Diff Recent Labs     07/02/20  0048 07/01/20  0300 06/30/20  0838   WBC 6.8 8.1 6.7   RBC 3.22* 3.15* 3.55*   HGB 10.5* 10.2* 11.5*   HCT 32.2* 31.7* 35.6    305 311   GRANS 59 62 65   LYMPH 27 26 22   EOS 3 2 3      Cardiac Enzymes No results for input(s): CPK, CKND1, JONATAN in the last 72 hours. No lab exists for component: CKRMB, TROIP   Coagulation Recent Labs     07/02/20  0955 07/02/20  0632 07/02/20  0048  07/01/20  0300   PTP  --   --  12.6  --  14.2   INR  --   --  1.0  --  1.1   APTT >180.0* >180.0* 36.8*   < > >180.0*    < > = values in this interval not displayed. Lipid Panel Lab Results   Component Value Date/Time    Cholesterol, total 156 06/27/2020 04:10 AM    HDL Cholesterol 47 06/27/2020 04:10 AM    LDL, calculated 86.4 06/27/2020 04:10 AM    VLDL, calculated 22.6 06/27/2020 04:10 AM    Triglyceride 113 06/27/2020 04:10 AM    CHOL/HDL Ratio 3.3 06/27/2020 04:10 AM      BNP No results for input(s): BNPP in the last 72 hours. Liver Enzymes No results for input(s): TP, ALB, TBIL, AP in the last 72 hours.     No lab exists for component: SGOT, GPT, DBIL   Thyroid Studies Lab Results   Component Value Date/Time    TSH 2.18 06/29/2020 12:35 AM        Procedures/imaging: see electronic medical records for all procedures/Xrays and details which were not copied into this note but were reviewed prior to creation of Plan    Ct Abd Pelv W Cont    Result Date: 6/26/2020  EXAM: CT of the abdomen and pelvis INDICATION: Abdominal pain diarrhea x2 days COMPARISON: December 3, 2019 TECHNIQUE: Axial CT imaging of the abdomen and pelvis was performed with intravenous contrast. Multiplanar reformats were generated. One or more dose reduction techniques were used on this CT: automated exposure control, adjustment of the mAs and/or kVp according to patient size, and iterative reconstruction techniques. The specific techniques used on this CT exam have been documented in the patient's electronic medical record. Digital Imaging and Communications in Medicine (DICOM) format image data are available to nonaffiliated external healthcare facilities or entities on a secure, media free, reciprocally searchable basis with patient authorization for at least a 12-month period after this study. _______________ FINDINGS: LOWER CHEST: Unremarkable. LIVER, BILIARY: Liver is normal. No biliary dilation. Cholecystectomy. PANCREAS: There is diffuse enlargement of the head and uncinate process of the pancreas with extensive peripancreatic inflammation suggesting acute edematous interstitial pancreatitis. There is decreased enhancement of the head of the pancreas measuring 4.3 x 3.3 cm suggesting pancreatic necrosis. There is mild pancreatic duct dilatation of the body measuring 5 mm. Cystic structures are seen in the tail the pancreas measuring up to 14 mm and another cystic duct and the junction of the body and tail measuring 10 mm. These are new from December 2019 and suggestive of pancreatic pseudocyst however side branch IPMN cannot be completely excluded. There is some atrophy of the body and tail the pancreas when compared to prior exam.. SPLEEN: Normal. ADRENALS: Normal. KIDNEYS: Normal. VASCULATURE: There is thrombosis of the superior mesenteric vein at the confluence and partial thrombosis of the portal vein at the confluence. This is best seen on axial image 39. LYMPH NODES: No enlarged lymph nodes.  GASTROINTESTINAL TRACT: There is wall thickening of the duodenum and antrum of the stomach secondary to the adjacent acute pancreatitis. Appendix is normal. Post gastric bypass changes present. PELVIC ORGANS: Urinary bladder is decompressed therefore difficult to evaluate. Hysterectomy. Small amount of free fluid is present in the pelvis. Small midline ventral hernia with fat content measuring 2.8 x 1.7 cm. BONES: No acute or aggressive osseous abnormalities identified. OTHER: None. _______________     IMPRESSION: There appears to be recurrence of acute edematous interstitial pancreatitis involving the head and uncinate process of the pancreas with 4.3 x 3.3 cm area of decreased enhancement in the head of the pancreas concerning for pancreatic necrosis. There is  atrophy of the pancreatic body and tail with pancreatic duct dilatation may be a sequelae of prior pancreatitis. Follow-up MRI advised after appropriate therapy to exclude any underlying neoplasm in the head of the pancreas. There are cystic structures along the body and tail the pancreas which may represent pancreatic pseudocyst versus side branch IPMN. 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 Advise GI consultation. ARNIE Petersen informed 9:58 PM June 26, 2020. There is secondary inflammation of the duodenum and stomach. Mri Abd W Mrcp W Wo Cont    Result Date: 6/29/2020  EXAM: MRI ABDOMEN CLINICAL INDICATION/HISTORY:  acute pancreatitis -Additional: None COMPARISON: 6/26/2020 TECHNIQUE: MRI of the abdomen was performed with and without intravenous contrast. MRCP included. Post processing 3D rendering was done on a separate workstation under concurrent physician supervision. _______________ FINDINGS: LOWER CHEST: Minimal pleural fluid bilaterally. LIVER: Normal liver contour and parenchymal signal. No suspicious lesion. BILIARY: No biliary dilation, irregularity, or filling defects. Cholecystectomy. PANCREAS: Edematous signal abnormality centered at the pancreatic head/neck with associated early hypoenhancement.  No definitive glandular necrosis, with subsequent homogeneous parenchymal enhancement. There is peripancreatic edema but no formed fluid collection identified. The portal splenic confluence appears occluded due to pancreatic inflammation. Distal pancreatic duct is ectatic and irregular. 2 small cysts in the tail, the larger measuring 1 cm. No worrisome features. SPLEEN: Normal. ADRENALS: Normal. KIDNEYS: Normal. LYMPH NODES: No enlarged lymph nodes. GASTROINTESTINAL TRACT: No bowel dilation or wall thickening. Gastric bypass without evidence of complication. VASCULATURE: Unremarkable. BONES: No acute or aggressive osseous abnormalities identified. OTHER: Minimal perihepatic ascites. Mesenteric edema. _______________     IMPRESSION: Findings of acute pancreatitis centered at the head/neck, appears interstitial edematous without convincing glandular necrosis. No formed fluid collection. Pancreatic duct ectasia and irregularity likely sequela of chronic pancreatitis. 2 small benign-appearing cyst in the pancreatic tail noted, also likely sequela of chronic pancreatitis.       Gideon Andrade MD

## 2020-07-02 NOTE — PROGRESS NOTES
Pharmacy Dosing Services: Warfarin    Consult for Warfarin Dosing by Pharmacy by Dr. Eric Wolff provided for this 39 y.o.  female , for indication of Venous Thrombosis. Day of Therapy 3  Dose to achieve an INR goal of 2-3    Order entered for  Warfarin 7.5 mg  to be given today at 18:00. (discussed with Dr. Lupillo Foley)  Pt is also on a Heparin drip  Albumin = 2.3    Significant drug interactions: metronidazole   - (discontinued)  LABS    PT/INR Lab Results   Component Value Date/Time    INR 1.0 07/02/2020 12:48 AM      Platelets Lab Results   Component Value Date/Time    PLATELET 369 54/34/6862 12:48 AM      H/H     Lab Results   Component Value Date/Time    HGB 10.5 (L) 07/02/2020 12:48 AM        Warfarin Administrations (last 168 hours)       Date/Time Action Medication Dose    07/01/20 1752 Given    warfarin (COUMADIN) tablet 5 mg 5 mg    06/30/20 1859 Given    warfarin (COUMADIN) tablet 2.5 mg 2.5 mg          Pharmacy to follow daily and will provide subsequent Warfarin dosing based on clinical status.   AZAEL Serrano  Contact information [phone]

## 2020-07-02 NOTE — ROUTINE PROCESS
Bedside and Verbal shift change report given to Mona Zaragoza RN (oncoming nurse) by Gabriela Cassidy RN (offgoing nurse). Report included the following information SBAR, Kardex, Intake/Output and MAR.

## 2020-07-02 NOTE — PROGRESS NOTES
18-  Received verbal/bedside report from [facility]. Report included SBAR, Kardex and MAR.    5587- Contacted pharmacy to discuss concerns regarding patient's Heparin drip and the early draw of APTT. Pharmacist, ROSE, stated to have the APTT redrawn at the 6 hour mega after the restart of the drip, which will be 0930. Order placed for APTT draw at 0930.    [address]- Contacted Lab to discuss patient's APTT redraw. Informed Lab that patient's heparin drip was restarted at 0328 and that an APTT was drawn at 0630. A new APTT will need to be drawn at 0930, as this time is 6hrs from the restart of the heparin drip. 1015- Contacted lab to inquire if patients 0930 APTT draw has resulted. Informed that results are still pending. 1043- Received call from [facility] in Lab with critical results. APTT >180. Heparin held. 65- Notified Dr. Rock Corey of patient's critical lab value    Heparin reduced 2 units. Verified with Namrata Soni. APTT orders placed 6 hrs after rate change    Verbal/bedside report provided to Deidra Correa . Report included SBAR, Kardex and MAR.  Heparin Verified with Pratik Echevarria RN

## 2020-07-03 VITALS
HEIGHT: 65 IN | DIASTOLIC BLOOD PRESSURE: 80 MMHG | HEART RATE: 95 BPM | OXYGEN SATURATION: 99 % | BODY MASS INDEX: 33.57 KG/M2 | TEMPERATURE: 98.7 F | WEIGHT: 201.5 LBS | SYSTOLIC BLOOD PRESSURE: 136 MMHG | RESPIRATION RATE: 16 BRPM

## 2020-07-03 LAB
ANION GAP SERPL CALC-SCNC: 8 MMOL/L (ref 3–18)
APTT PPP: 76.3 SEC (ref 23–36.4)
BASOPHILS # BLD: 0 K/UL (ref 0–0.1)
BASOPHILS NFR BLD: 0 % (ref 0–2)
BUN SERPL-MCNC: 6 MG/DL (ref 7–18)
BUN/CREAT SERPL: 9 (ref 12–20)
CALCIUM SERPL-MCNC: 8.3 MG/DL (ref 8.5–10.1)
CHLORIDE SERPL-SCNC: 105 MMOL/L (ref 100–111)
CO2 SERPL-SCNC: 24 MMOL/L (ref 21–32)
CREAT SERPL-MCNC: 0.7 MG/DL (ref 0.6–1.3)
DIFFERENTIAL METHOD BLD: ABNORMAL
EOSINOPHIL # BLD: 0.2 K/UL (ref 0–0.4)
EOSINOPHIL NFR BLD: 2 % (ref 0–5)
ERYTHROCYTE [DISTWIDTH] IN BLOOD BY AUTOMATED COUNT: 16.7 % (ref 11.6–14.5)
GLUCOSE SERPL-MCNC: 108 MG/DL (ref 74–99)
HCT VFR BLD AUTO: 32.2 % (ref 35–45)
HGB BLD-MCNC: 10.3 G/DL (ref 12–16)
INR PPP: 1.1 (ref 0.8–1.2)
LYMPHOCYTES # BLD: 2 K/UL (ref 0.9–3.6)
LYMPHOCYTES NFR BLD: 31 % (ref 21–52)
MAGNESIUM SERPL-MCNC: 1.7 MG/DL (ref 1.6–2.6)
MCH RBC QN AUTO: 31.9 PG (ref 24–34)
MCHC RBC AUTO-ENTMCNC: 32 G/DL (ref 31–37)
MCV RBC AUTO: 99.7 FL (ref 74–97)
MONOCYTES # BLD: 0.8 K/UL (ref 0.05–1.2)
MONOCYTES NFR BLD: 12 % (ref 3–10)
NEUTS SEG # BLD: 3.5 K/UL (ref 1.8–8)
NEUTS SEG NFR BLD: 55 % (ref 40–73)
PLATELET # BLD AUTO: 390 K/UL (ref 135–420)
PMV BLD AUTO: 10.2 FL (ref 9.2–11.8)
POTASSIUM SERPL-SCNC: 3.3 MMOL/L (ref 3.5–5.5)
PROTHROMBIN TIME: 14 SEC (ref 11.5–15.2)
RBC # BLD AUTO: 3.23 M/UL (ref 4.2–5.3)
SODIUM SERPL-SCNC: 137 MMOL/L (ref 136–145)
WBC # BLD AUTO: 6.4 K/UL (ref 4.6–13.2)

## 2020-07-03 PROCEDURE — 80048 BASIC METABOLIC PNL TOTAL CA: CPT

## 2020-07-03 PROCEDURE — 74011250636 HC RX REV CODE- 250/636: Performed by: HOSPITALIST

## 2020-07-03 PROCEDURE — 85610 PROTHROMBIN TIME: CPT

## 2020-07-03 PROCEDURE — 74011250636 HC RX REV CODE- 250/636: Performed by: FAMILY MEDICINE

## 2020-07-03 PROCEDURE — 74011250637 HC RX REV CODE- 250/637: Performed by: HOSPITALIST

## 2020-07-03 PROCEDURE — 85730 THROMBOPLASTIN TIME PARTIAL: CPT

## 2020-07-03 PROCEDURE — 83735 ASSAY OF MAGNESIUM: CPT

## 2020-07-03 PROCEDURE — 36415 COLL VENOUS BLD VENIPUNCTURE: CPT

## 2020-07-03 PROCEDURE — 74011250637 HC RX REV CODE- 250/637: Performed by: FAMILY MEDICINE

## 2020-07-03 PROCEDURE — 74011250637 HC RX REV CODE- 250/637: Performed by: INTERNAL MEDICINE

## 2020-07-03 PROCEDURE — 85025 COMPLETE CBC W/AUTO DIFF WBC: CPT

## 2020-07-03 RX ORDER — RIVAROXABAN 15 MG-20MG
KIT ORAL
Qty: 1 DOSE PACK | Refills: 0 | Status: SHIPPED | OUTPATIENT
Start: 2020-07-03 | End: 2020-07-03 | Stop reason: SDUPTHER

## 2020-07-03 RX ORDER — VANCOMYCIN HYDROCHLORIDE 125 MG/1
125 CAPSULE ORAL 4 TIMES DAILY
Qty: 36 CAP | Refills: 0 | Status: SHIPPED | OUTPATIENT
Start: 2020-07-03 | End: 2020-07-12

## 2020-07-03 RX ORDER — RIVAROXABAN 15 MG-20MG
KIT ORAL
Qty: 1 DOSE PACK | Refills: 0 | Status: SHIPPED | OUTPATIENT
Start: 2020-07-03 | End: 2020-08-31

## 2020-07-03 RX ORDER — VANCOMYCIN HYDROCHLORIDE 125 MG/1
125 CAPSULE ORAL 4 TIMES DAILY
Qty: 36 CAP | Refills: 0 | Status: SHIPPED | OUTPATIENT
Start: 2020-07-03 | End: 2020-07-03 | Stop reason: SDUPTHER

## 2020-07-03 RX ORDER — POTASSIUM CHLORIDE 20 MEQ/1
40 TABLET, EXTENDED RELEASE ORAL
Status: COMPLETED | OUTPATIENT
Start: 2020-07-03 | End: 2020-07-03

## 2020-07-03 RX ORDER — WARFARIN 3 MG/1
7.5 TABLET ORAL ONCE
Status: DISCONTINUED | OUTPATIENT
Start: 2020-07-03 | End: 2020-07-03

## 2020-07-03 RX ADMIN — POTASSIUM CHLORIDE 40 MEQ: 1500 TABLET, EXTENDED RELEASE ORAL at 12:58

## 2020-07-03 RX ADMIN — RIVAROXABAN 15 MG: 10 TABLET, FILM COATED ORAL at 15:52

## 2020-07-03 RX ADMIN — HEPARIN SODIUM 11 UNITS/KG/HR: 10000 INJECTION, SOLUTION INTRAVENOUS at 01:42

## 2020-07-03 RX ADMIN — ONDANSETRON 4 MG: 2 INJECTION INTRAMUSCULAR; INTRAVENOUS at 17:02

## 2020-07-03 RX ADMIN — AMLODIPINE BESYLATE 10 MG: 5 TABLET ORAL at 09:23

## 2020-07-03 RX ADMIN — VANCOMYCIN HYDROCHLORIDE 125 MG: 1 INJECTION, POWDER, LYOPHILIZED, FOR SOLUTION INTRAVENOUS at 13:01

## 2020-07-03 RX ADMIN — VANCOMYCIN HYDROCHLORIDE 125 MG: 1 INJECTION, POWDER, LYOPHILIZED, FOR SOLUTION INTRAVENOUS at 06:18

## 2020-07-03 RX ADMIN — FAMOTIDINE 20 MG: 20 TABLET ORAL at 09:23

## 2020-07-03 NOTE — PROGRESS NOTES
INITIAL NUTRITION ASSESSMENT     RECOMMENDATIONS/PLAN:   Recheck phos and replete. Replete all lytes  Will order ensure clear TID to aid in meeting estimated needs  Monitor diet tolerance, labs/lytes, fluid status/weight, bowel function, skin integrity  REASON FOR ASSESSMENT:     [x] LOS    NUTRITION ASSESSMENT:   Client History: 39 yrs old Female admitted with c/o pain and diarrhea. Per MD note pt with portal vein thrombosis, awaiting therapeutic INR at this time. Pt also with acute pancreatitis and hx of cdiff per MD note. Currently tolerating diet per MD note.      PMHx: anemia, c diff, endometrial cancer, HTN, anxiety/depression, smoker, s/p gastric bypass surgery, syncopal apisodes,   Cultural/Buddhism Food Preferences: None Identified    FOOD/NUTRITION HISTORY  Diet History: some nutritional deficiencies likely   Food Allergies:  [x] NKFA     Pertinent PTA Medications: prilosec, MVI, thiamine     NUTRITION INTAKE   Diet Order:  regular      Average PO Intake:       Patient Vitals for the past 100 hrs:   % Diet Eaten   07/02/20 1854 50 %   07/02/20 1200 50 %   07/02/20 0810 50 %   06/30/20 1904 25 %   06/30/20 1200 25 %      Pertinent Medications:  [x] Reviewed; pepcid  Electrolyte Replacement Protocol: []K  []Mg  []PO4    Insulin:  [] SSI  [] Pre-meal   []  Basal   [] Drip  [x] None  Pt expected to meet estimated nutrient needs through next review:          [x]  Yes    ANTHROPOMETRICS  Height: 5' 5\" (165.1 cm)       Weight: 91.4 kg (201 lb 8 oz)    BMI: 33.5 kg/m^2  -  obese (30%-39.9% BMI)        Weight change:  Per chart review possible wt loss of 14lbs x 7 months or 6.5% body weight, not c/w significant wt loss                                 Comparison to Reference Standards:  IBW: 125 lbs      %IBW: 161%      AdjBW: 65 kg    NUTRITION-FOCUSED PHYSICAL ASSESSMENT  Skin: no pressure area per documentation    GI: BM 7/1    BIOCHEMICAL DATA & MEDICAL TESTS  Pertinent Labs:  [x] Reviewed; K-3.3; phos-1.9    NUTRITION PRESCRIPTION  Calories: 1625 kcal/day based on 25kcal/kg adj BW  Protein: 85 g/day based on 1.3 g/kg adj BW  Fluid: 1625 ml/day based on 1 kcal/ml      NUTRITION DIAGNOSES:   1. At risk for inadequate oral intake related to decreased appetite/abdominal pain as evidenced by recent medical conditions as noted above and PO Intakes 25-50% x 3 days    NUTRITION INTERVENTIONS:   INTERVENTIONS:        GOALS:  1. Oral nutritional supplement: ensure clear 1. PO intakes of ensure clear >75% throughout the next 5 days     LEARNING NEEDS (Diet, Supplementation, Food/Nutrient-Drug Interaction):   [x] None Identified  [] Inpatient education provided/documented    [] Identified and patient:  [] Declined     [] Was not appropriate/indicated  NUTRITION MONITORING /EVALUATION:   Follow PO intake  Monitor wt  Monitor renal labs, electrolytes, fluid status  Monitor for additional supplement needs    [] Participated in Interdisciplinary Rounds  [x] Interdisciplinary Care Plan Reviewed/Documented  DISCHARGE NUTRITION RECOMMENDATIONS ADDRESSED:     [x] Yes- recommend diet per MD    [] To be determined closer to discharge    NUTRITION RISK:     [x]  At risk                     []  Not currently at risk     Will follow-up per policy.   Camron Hernandez 1

## 2020-07-03 NOTE — DISCHARGE INSTRUCTIONS
Patient Education        Gastritis: Care Instructions  Your Care Instructions     Gastritis is a sore and upset stomach. It happens when something irritates the stomach lining. Many things can cause it. These include an infection such as the flu or something you ate or drank. Medicines or a sore on the lining of the stomach (ulcer) also can cause it. Your belly may bloat and ache. You may belch, vomit, and feel sick to your stomach. You should be able to relieve the problem by taking medicine. And it may help to change your diet. If gastritis lasts, your doctor may prescribe medicine. Follow-up care is a key part of your treatment and safety. Be sure to make and go to all appointments, and call your doctor if you are having problems. It's also a good idea to know your test results and keep a list of the medicines you take. How can you care for yourself at home? · If your doctor prescribed antibiotics, take them as directed. Do not stop taking them just because you feel better. You need to take the full course of antibiotics. · Be safe with medicines. If your doctor prescribed medicine to decrease stomach acid, take it as directed. Call your doctor if you think you are having a problem with your medicine. · Do not take any other medicine, including over-the-counter pain relievers, without talking to your doctor first.  · If your doctor recommends over-the-counter medicine to reduce stomach acid, such as Pepcid AC (famotidine), Prilosec (omeprazole), or Tagamet HB (cimetidine) follow the directions on the label. · Drink plenty of fluids (enough so that your urine is light yellow or clear like water) to prevent dehydration. Choose water and other caffeine-free clear liquids. If you have kidney, heart, or liver disease and have to limit fluids, talk with your doctor before you increase the amount of fluids you drink. · Limit how much alcohol you drink.   · Avoid coffee, tea, cola drinks, chocolate, and other foods with caffeine. They increase stomach acid. When should you call for help? [id number] anytime you think you may need emergency care. For example, call if:  · You vomit blood or what looks like coffee grounds. · You pass maroon or very bloody stools. Call your doctor now or seek immediate medical care if:  · You start breathing fast and have not produced urine in the last 8 hours. · You cannot keep fluids down. Watch closely for changes in your health, and be sure to contact your doctor if:  · You do not get better as expected. Where can you learn more? Go to http://www.weiss.com/  Enter Z536 in the search box to learn more about \"Gastritis: Care Instructions. \"  Current as of: August 12, 2019               Content Version: 12.5  © 5782-4800 [employer]. Care instructions adapted under license by [employer] (which disclaims liability or warranty for this information). If you have questions about a medical condition or this instruction, always ask your healthcare professional. [address] any warranty or liability for your use of this information.

## 2020-07-03 NOTE — PROGRESS NOTES
Noted pt is not medically stable to transition from an acute care setting at this time.  Anticipate pt will transition home with physician follow up when medically stable with physician follow up. Soha Hooper other transition of care needs have been identified at this time. Anticipate possible weekend discharge. Please continue to encourage ambulation.  CM remains available. Care Management Interventions  PCP Verified by CM: Yes  Current Support Network:  Other  Confirm Follow Up Transport: Family  Discharge Location  Discharge Placement: Home

## 2020-07-03 NOTE — PROGRESS NOTES
S: MD contacted floor and stated that she was unable to speak to patient at this time/ Patient notified of MD response. B: Patient had concerns in reference to whether there was something personally she could do to assist with being therapeutic in accordance to her anticoagulation therapy. She stated that she spoke with Dr. Ramona Rouse in reference to concerns but stated she needed further clarification of blood work and prospective discharge orders. At 12:07 pm, patient was informed that Dr. Ramona Rouse would be paged by . Patient verbalized understanding. A: At 12:22 pm, Dr. Patricia Ayala contacted unit and was informed about patient's concerns of speaking with him in reference to providing clarification for bloodwork and whether there was anything personally she could do to assist with anticoagulation therapy. Dr. Patricia Ayala stated that he would not present to unit at this time to speak to patient and stated that he would follow up with patient on 07/4/2020. Dr. Patricia Ayala was informed that patient wanted to speak to him today in reference to clarification but he stated that he was too busy at this time to speak to patient but would possibly present to unit the latter part of shift. Patient will be notified of MD response as directed. Additionally, patient will be provided educational handout about anticoagulation dietary management from nursing staff to assist with further clarification. Charge RN Ronni Calvin was notified. R: Will continue with prescribed plan of care as directed.    Deny Prabhakar  7/3/2020  12:32 PM

## 2020-07-03 NOTE — PROGRESS NOTES
1205-Pt agitated came in hallway saying she needed to see the doctor and know what going on with her because she could not understand a lot of language used said she is not a medical professional. Told pt we would try to contact MD for clarification. 1546-MD said pt can be given xarelto and heparin drip can be stopped in 2 hours and pt can go home. 1607-Called [facility] and they have xarelto in stock but said that to have it transferred from the other pharmacy could take up to 24hrs, and asked if MD could transfer it to them.  Called MD Ashley Pérez and asked him to transfer the medication to [facility] on [address], he said ok

## 2020-07-03 NOTE — PROGRESS NOTES
S: Patient indicated that she needed clarification from MD in reference to labs and prospective discharge home/ MD paged. B: At 12:07 pm, patient presented to nurse's station and inquired as to whether her physician could come and talk to her again and provide further clarification about her current lab work levels and prospective date of discharge. Patient was informed that on call hospitalist Dr. Jaskaran Hebert would be paged and notified. A: On call provider Dr. Thuy Mckeon was paged at 12:07 pm via . Attending Nurse Jhonathan Jones was notified of MD page and patient's concerns as well as patient. Awaiting MD response. R: Will continue with prescribed plan of care as directed.    Doe Prabhakar  7/3/2020  12:13 PM

## 2020-07-03 NOTE — DISCHARGE SUMMARY
Discharge Summary    Patient: Bryant Ferguson MRN: [id number]  CSN: [id number]    YOB: 1975  Age: 39 y.o. Sex: female    DOA: 6/26/2020 LOS:  LOS: 7 days   Discharge Date:      Primary Care Provider:  Darien Muñoz NP    Admission Diagnoses: Acute pancreatitis [K85.90]    Discharge Diagnoses:    Problem List as of 7/3/2020 Date Reviewed: 6/27/2020          Codes Class Noted - Resolved    Portal vein thrombosis ICD-10-CM: I92  ICD-9-CM: 480  6/27/2020 - Present        Gastritis and duodenitis ICD-10-CM: K29.90  ICD-9-CM: 535.50  6/27/2020 - Present        Hypertension ICD-10-CM: I10  ICD-9-CM: 401.9  Unknown - Present        Anemia ICD-10-CM: D64.9  ICD-9-CM: 285. 9  Unknown - Present        Anxiety ICD-10-CM: F41.9  ICD-9-CM: 300.00  Unknown - Present        GERD (gastroesophageal reflux disease) ICD-10-CM: K21.9  ICD-9-CM: 530.81  Unknown - Present        Smoker ICD-10-CM: F17.200  ICD-9-CM: 305.1  Unknown - Present    Overview Signed 6/20/2018  8:40 AM by ARNIE Desir     smokes 5 cigarettes per day             Status post gastric bypass for obesity ICD-10-CM: Z98.84  ICD-9-CM: V45.86  1/1/2000 - Present    Overview Signed 6/20/2018  8:30 AM by ARNIE Desir / librado hermosillo             RESOLVED: Fever ICD-10-CM: R50.9  ICD-9-CM: 780.60  9/21/2018 - 12/4/2019        RESOL[facility]ED: [facility] ICD-10-CM: C80.1  ICD-9-CM: 199.1  Unknown - 12/4/2019    Overview Signed 6/20/2018  8:29 AM by ARNIE Desir      history of endometrial cancer             RESOLVED: Severe obesity with body mass index (BMI) of 35.0 to 39.9 with comorbidity ([facility] ICD-10-CM: E66.01  ICD-9-CM: 278.01  Unknown - 10/2/2018        RESOLVED: Diarrhea ICD-10-CM: R19.7  ICD-9-CM: 787.91  Unknown - 12/4/2019              Discharge Medications:     Current Discharge Medication List      START taking these medications    Details   rivaroxaban (Xarelto) 15 mg (42)- 20 mg (9) DsPk Take one 15 mg tablet twice a day with food for the first 21 days. Then, take one 20 mg tablet once a day with food for 9 days. Qty: 1 Dose Pack, Refills: 0         CONTINUE these medications which have CHANGED    Details   vancomycin (VANCOCIN) 125 mg capsule Take 1 Cap by mouth four (4) times daily for 9 days. Qty: 36 Cap, Refills: 0         CONTINUE these medications which have NOT CHANGED    Details   amLODIPine (NORVASC) 10 mg tablet Take 0.5 Tabs by mouth daily. Qty: 30 Tab, Refills: 0      thiamine mononitrate (B-1) 100 mg tablet Take 1 Tab by mouth daily. Qty: 30 Tab, Refills: 0      omeprazole (PRILOSEC OTC) 20 mg tablet Take 1 Tab by mouth daily. Qty: 30 Tab, Refills: 0      traZODone (DESYREL) 100 mg tablet Take  by mouth nightly. Pt is unsure of the dose for this medication      multivitamin (ONE A DAY) tablet Take 1 Tab by mouth daily. STOP taking these medications       dicyclomine (BENTYL) 20 mg tablet Comments:   Reason for Stopping:               Discharge Condition: Good    Procedures : None    Consults: Gastroenterology      PHYSICAL EXAM   Visit Vitals  /80   Pulse 95   Temp 98.7 °F (37.1 °C)   Resp 16   Ht 5' 5\" (1.651 m)   Wt 91.4 kg (201 lb 8 oz)   SpO2 99%   Breastfeeding No   BMI 33.53 kg/m²     General: Awake, cooperative, no acute distress    HEENT: NC, Atraumatic. PERRLA, EOMI. Anicteric sclerae. Lungs:  CTA Bilaterally. No Wheezing/Rhonchi/Rales. Heart:  Regular  rhythm,  No murmur, No Rubs, No Gallops  Abdomen: Soft, Non distended, Non tender. +Bowel sounds,   Extremities: No c/c/e  Psych:   Not anxious or agitated. Neurologic:  No acute neurological deficits. Admission HPI :   Armani Wilson is a 39 y.o. female who has a history of recurrent pancreatitis and as well as history of C diff infection (last in Dec 2019) who came to the ER for abdominal pain and diarrhea that started 2 days ago. She has also had N/V and noticed foul smelling stool.  Dr. Charles Altamirano (GI) prescribed oral vancomycin but she has had no relief with this so she came to the ER. No fever. Hospital Course :   Ms. Zaida Medina was admitted to medical floor, she was seen by gastroenterology. She did not had any acute events during hospitalization. Acute pancreatitis-  She was initially kept n.p.o., once her symptoms improved she was started on clear liquid diet and now she is tolerating regular diet. MRI abdomen with MRCP done showed findings of acute pancreatitis centered at the head/neck appears interstitial edema without convincing glandular necrosis. No formed fluid collection. Pancreatic duct ectasia and irregularly likely sequela of chronic pancreatitis. Her lipase now improved. In normal range. She was seen by gastroenterology who recommended stopping alcohol use. She will follow-up with GI as outpatient. Portal vein thrombosis-  CT scan showed portal vein thrombosis. She was started on heparin drip. At discharge we will switch to Xarelto. She will need anticoagulation for 6 months. She will follow-up with GI. History of C. difficile-  Her stool for C. difficile positive. She was started on oral vancomycin. Will discharge to complete 10-day course. Patient is eager to go home she will require follow-up with PCP and GI. Activity: Activity as tolerated    Diet: Regular Diet    Follow-up: PCP, GI    Disposition: home    Minutes spent on discharge: 45       Labs: Results:       Chemistry Recent Labs     07/03/20  0031   *      K 3.3*      CO2 24   BUN 6*   CREA 0.70   CA 8.3*   AGAP 8   BUCR 9*      CBC w/Diff Recent Labs     07/03/20  0031 07/02/20  0048 07/01/20  0300   WBC 6.4 6.8 8.1   RBC 3.23* 3.22* 3.15*   HGB 10.3* 10.5* 10.2*   HCT 32.2* 32.2* 31.7*    319 305   GRANS 55 59 62   LYMPH 31 27 26   EOS 2 3 2      Cardiac Enzymes No results for input(s): CPK, CKND1, JONATAN in the last 72 hours.     No lab exists for component: Kimmy Nina Coagulation Recent Labs     07/03/20  0031 07/02/20  1734  07/02/20  0048   PTP 14.0  --   --  12.6   INR 1.1  --   --  1.0   APTT 76.3* 126.8*   < > 36.8*    < > = values in this interval not displayed. Lipid Panel Lab Results   Component Value Date/Time    Cholesterol, total 156 06/27/2020 04:10 AM    HDL Cholesterol 47 06/27/2020 04:10 AM    LDL, calculated 86.4 06/27/2020 04:10 AM    VLDL, calculated 22.6 06/27/2020 04:10 AM    Triglyceride 113 06/27/2020 04:10 AM    CHOL/HDL Ratio 3.3 06/27/2020 04:10 AM      BNP No results for input(s): BNPP in the last 72 hours. Liver Enzymes No results for input(s): TP, ALB, TBIL, AP in the last 72 hours. No lab exists for component: SGOT, GPT, DBIL   Thyroid Studies Lab Results   Component Value Date/Time    TSH 2.18 06/29/2020 12:35 AM            Significant Diagnostic Studies: Ct Abd Pelv W Cont    Result Date: 6/26/2020  EXAM: CT of the abdomen and pelvis INDICATION: Abdominal pain diarrhea x2 days COMPARISON: December 3, 2019 TECHNIQUE: Axial CT imaging of the abdomen and pelvis was performed with intravenous contrast. Multiplanar reformats were generated. One or more dose reduction techniques were used on this CT: automated exposure control, adjustment of the mAs and/or kVp according to patient size, and iterative reconstruction techniques. The specific techniques used on this CT exam have been documented in the patient's electronic medical record. Digital Imaging and Communications in Medicine (DICOM) format image data are available to nonaffiliated external healthcare facilities or entities on a secure, media free, reciprocally searchable basis with patient authorization for at least a 12-month period after this study. _______________ FINDINGS: LOWER CHEST: Unremarkable. LIVER, BILIARY: Liver is normal. No biliary dilation. Cholecystectomy.  PANCREAS: There is diffuse enlargement of the head and uncinate process of the pancreas with extensive peripancreatic inflammation suggesting acute edematous interstitial pancreatitis. There is decreased enhancement of the head of the pancreas measuring 4.3 x 3.3 cm suggesting pancreatic necrosis. There is mild pancreatic duct dilatation of the body measuring 5 mm. Cystic structures are seen in the tail the pancreas measuring up to 14 mm and another cystic duct and the junction of the body and tail measuring 10 mm. These are new from December 2019 and suggestive of pancreatic pseudocyst however side branch IPMN cannot be completely excluded. There is some atrophy of the body and tail the pancreas when compared to prior exam.. SPLEEN: Normal. ADRENALS: Normal. KIDNEYS: Normal. VASCULATURE: There is thrombosis of the superior mesenteric vein at the confluence and partial thrombosis of the portal vein at the confluence. This is best seen on axial image 39. LYMPH NODES: No enlarged lymph nodes. GASTROINTESTINAL TRACT: There is wall thickening of the duodenum and antrum of the stomach secondary to the adjacent acute pancreatitis. Appendix is normal. Post gastric bypass changes present. PELVIC ORGANS: Urinary bladder is decompressed therefore difficult to evaluate. Hysterectomy. Small amount of free fluid is present in the pelvis. Small midline ventral hernia with fat content measuring 2.8 x 1.7 cm. BONES: No acute or aggressive osseous abnormalities identified. OTHER: None. _______________     IMPRESSION: There appears to be recurrence of acute edematous interstitial pancreatitis involving the head and uncinate process of the pancreas with 4.3 x 3.3 cm area of decreased enhancement in the head of the pancreas concerning for pancreatic necrosis. There is  atrophy of the pancreatic body and tail with pancreatic duct dilatation may be a sequelae of prior pancreatitis. Follow-up MRI advised after appropriate therapy to exclude any underlying neoplasm in the head of the pancreas.  There are cystic structures along the body and tail the pancreas which may represent pancreatic pseudocyst versus side branch IPMN. 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 Advise GI consultation. ARNIE Petersen informed 9:58 PM June 26, 2020. There is secondary inflammation of the duodenum and stomach. Mri Abd W Mrcp W Wo Cont    Result Date: 6/29/2020  EXAM: MRI ABDOMEN CLINICAL INDICATION/HISTORY:  acute pancreatitis -Additional: None COMPARISON: 6/26/2020 TECHNIQUE: MRI of the abdomen was performed with and without intravenous contrast. MRCP included. Post processing 3D rendering was done on a separate workstation under concurrent physician supervision. _______________ FINDINGS: LOWER CHEST: Minimal pleural fluid bilaterally. LIVER: Normal liver contour and parenchymal signal. No suspicious lesion. BILIARY: No biliary dilation, irregularity, or filling defects. Cholecystectomy. PANCREAS: Edematous signal abnormality centered at the pancreatic head/neck with associated early hypoenhancement. No definitive glandular necrosis, with subsequent homogeneous parenchymal enhancement. There is peripancreatic edema but no formed fluid collection identified. The portal splenic confluence appears occluded due to pancreatic inflammation. Distal pancreatic duct is ectatic and irregular. 2 small cysts in the tail, the larger measuring 1 cm. No worrisome features. SPLEEN: Normal. ADRENALS: Normal. KIDNEYS: Normal. LYMPH NODES: No enlarged lymph nodes. GASTROINTESTINAL TRACT: No bowel dilation or wall thickening. Gastric bypass without evidence of complication. VASCULATURE: Unremarkable. BONES: No acute or aggressive osseous abnormalities identified. OTHER: Minimal perihepatic ascites. Mesenteric edema. _______________     IMPRESSION: Findings of acute pancreatitis centered at the head/neck, appears interstitial edematous without convincing glandular necrosis. No formed fluid collection.  Pancreatic duct ectasia and irregularity likely sequela of chronic pancreatitis. 2 small benign-appearing cyst in the pancreatic tail noted, also likely sequela of chronic pancreatitis. No results found for this or any previous visit. Please note that this dictation was completed with SMRxT, the computer voice recognition software. Quite often unanticipated grammatical, syntax, homophones, and other interpretive errors are inadvertently transcribed by the computer software. Please disregard these errors. Please excuse any errors that have escaped final proofreading.      CC: Rochelle Medrano NP

## 2020-07-03 NOTE — PROGRESS NOTES
Pharmacy Dosing Services: Warfarin    Consult for Warfarin Dosing by Pharmacy by Dr. Miguel Amador provided for this 39 y.o.  female , for indication of Venous Thrombosis. Day of Therapy 4  Dose to achieve an INR goal of 2-3    Order entered for  Warfarin 7.5 mg to be given today at 18:00. Pt is also on a Heparin drip  Albumin = 2.3      LABS    PT/INR Lab Results   Component Value Date/Time    INR 1.1 07/03/2020 12:31 AM      Platelets Lab Results   Component Value Date/Time    PLATELET 905 94/33/5662 12:31 AM      H/H     Lab Results   Component Value Date/Time    HGB 10.3 (L) 07/03/2020 12:31 AM        Warfarin Administrations (last 168 hours)       Date/Time Action Medication Dose    07/02/20 1836 Given    warfarin (COUMADIN) tablet 7.5 mg 7.5 mg    07/01/20 1752 Given    warfarin (COUMADIN) tablet 5 mg 5 mg    06/30/20 1859 Given    warfarin (COUMADIN) tablet 2.5 mg 2.5 mg          Pharmacy to follow daily and will provide subsequent Warfarin dosing based on clinical status.   Meghna Parks [place]  Contact information [phone]

## 2020-07-27 ENCOUNTER — HOSPITAL ENCOUNTER (EMERGENCY)
Dept: CT IMAGING | Age: 45
Discharge: HOME | DRG: 282 | End: 2020-07-27
Attending: EMERGENCY MEDICINE
Payer: MEDICAID

## 2020-07-27 ENCOUNTER — HOSPITAL ENCOUNTER (INPATIENT)
Age: 45
LOS: 2 days | Discharge: HOME | DRG: 282 | End: 2020-07-29
Attending: EMERGENCY MEDICINE | Admitting: FAMILY MEDICINE
Payer: MEDICAID

## 2020-07-27 ENCOUNTER — APPOINTMENT (OUTPATIENT)
Dept: GENERAL RADIOLOGY | Age: 45
DRG: 282 | End: 2020-07-27
Attending: EMERGENCY MEDICINE
Payer: MEDICAID

## 2020-07-27 DIAGNOSIS — K86.1 ACUTE ON CHRONIC PANCREATITIS (HCC): ICD-10-CM

## 2020-07-27 DIAGNOSIS — K85.90 ACUTE PANCREATITIS, UNSPECIFIED COMPLICATION STATUS, UNSPECIFIED PANCREATITIS TYPE: Primary | ICD-10-CM

## 2020-07-27 DIAGNOSIS — K85.90 ACUTE ON CHRONIC PANCREATITIS (HCC): ICD-10-CM

## 2020-07-27 LAB
ALBUMIN SERPL-MCNC: 3.2 G/DL (ref 3.4–5)
ALBUMIN/GLOB SERPL: 0.7 {RATIO} (ref 0.8–1.7)
ALP SERPL-CCNC: 121 U/L (ref 45–117)
ALT SERPL-CCNC: 13 U/L (ref 13–56)
ANION GAP SERPL CALC-SCNC: 9 MMOL/L (ref 3–18)
AST SERPL-CCNC: 12 U/L (ref 10–38)
BASOPHILS # BLD: 0 K/UL (ref 0–0.1)
BASOPHILS NFR BLD: 0 % (ref 0–2)
BILIRUB SERPL-MCNC: 1.1 MG/DL (ref 0.2–1)
BNP SERPL-MCNC: 88 PG/ML (ref 0–450)
BUN SERPL-MCNC: 9 MG/DL (ref 7–18)
BUN/CREAT SERPL: 10 (ref 12–20)
CALCIUM SERPL-MCNC: 8.5 MG/DL (ref 8.5–10.1)
CHLORIDE SERPL-SCNC: 103 MMOL/L (ref 100–111)
CK MB CFR SERPL CALC: NORMAL % (ref 0–4)
CK MB SERPL-MCNC: <1 NG/ML (ref 5–25)
CK SERPL-CCNC: 31 U/L (ref 26–192)
CO2 SERPL-SCNC: 22 MMOL/L (ref 21–32)
CREAT SERPL-MCNC: 0.92 MG/DL (ref 0.6–1.3)
DIFFERENTIAL METHOD BLD: ABNORMAL
EOSINOPHIL # BLD: 0.3 K/UL (ref 0–0.4)
EOSINOPHIL NFR BLD: 3 % (ref 0–5)
ERYTHROCYTE [DISTWIDTH] IN BLOOD BY AUTOMATED COUNT: 16.4 % (ref 11.6–14.5)
GLOBULIN SER CALC-MCNC: 4.4 G/DL (ref 2–4)
GLUCOSE SERPL-MCNC: 141 MG/DL (ref 74–99)
HCG SERPL QL: NEGATIVE
HCT VFR BLD AUTO: 36.1 % (ref 35–45)
HGB BLD-MCNC: 11.8 G/DL (ref 12–16)
LIPASE SERPL-CCNC: 355 U/L (ref 73–393)
LYMPHOCYTES # BLD: 1.2 K/UL (ref 0.9–3.6)
LYMPHOCYTES NFR BLD: 14 % (ref 21–52)
MAGNESIUM SERPL-MCNC: 1.8 MG/DL (ref 1.6–2.6)
MCH RBC QN AUTO: 31.1 PG (ref 24–34)
MCHC RBC AUTO-ENTMCNC: 32.7 G/DL (ref 31–37)
MCV RBC AUTO: 95 FL (ref 74–97)
MONOCYTES # BLD: 0.7 K/UL (ref 0.05–1.2)
MONOCYTES NFR BLD: 7 % (ref 3–10)
NEUTS SEG # BLD: 6.7 K/UL (ref 1.8–8)
NEUTS SEG NFR BLD: 76 % (ref 40–73)
PLATELET # BLD AUTO: 379 K/UL (ref 135–420)
PMV BLD AUTO: 9.2 FL (ref 9.2–11.8)
POTASSIUM SERPL-SCNC: 3.6 MMOL/L (ref 3.5–5.5)
PROT SERPL-MCNC: 7.6 G/DL (ref 6.4–8.2)
RBC # BLD AUTO: 3.8 M/UL (ref 4.2–5.3)
SODIUM SERPL-SCNC: 134 MMOL/L (ref 136–145)
TROPONIN I SERPL-MCNC: <0.02 NG/ML (ref 0–0.04)
WBC # BLD AUTO: 8.9 K/UL (ref 4.6–13.2)

## 2020-07-27 PROCEDURE — 83690 ASSAY OF LIPASE: CPT

## 2020-07-27 PROCEDURE — 96375 TX/PRO/DX INJ NEW DRUG ADDON: CPT

## 2020-07-27 PROCEDURE — 71045 X-RAY EXAM CHEST 1 VIEW: CPT

## 2020-07-27 PROCEDURE — 74011636320 HC RX REV CODE- 636/320: Performed by: EMERGENCY MEDICINE

## 2020-07-27 PROCEDURE — 83735 ASSAY OF MAGNESIUM: CPT

## 2020-07-27 PROCEDURE — 74011250636 HC RX REV CODE- 250/636: Performed by: FAMILY MEDICINE

## 2020-07-27 PROCEDURE — 74011250636 HC RX REV CODE- 250/636: Performed by: EMERGENCY MEDICINE

## 2020-07-27 PROCEDURE — 85025 COMPLETE CBC W/AUTO DIFF WBC: CPT

## 2020-07-27 PROCEDURE — 74177 CT ABD & PELVIS W/CONTRAST: CPT

## 2020-07-27 PROCEDURE — 84703 CHORIONIC GONADOTROPIN ASSAY: CPT

## 2020-07-27 PROCEDURE — 83880 ASSAY OF NATRIURETIC PEPTIDE: CPT

## 2020-07-27 PROCEDURE — 74011000258 HC RX REV CODE- 258: Performed by: EMERGENCY MEDICINE

## 2020-07-27 PROCEDURE — 82550 ASSAY OF CK (CPK): CPT

## 2020-07-27 PROCEDURE — 65270000029 HC RM PRIVATE

## 2020-07-27 PROCEDURE — 74011250637 HC RX REV CODE- 250/637: Performed by: FAMILY MEDICINE

## 2020-07-27 PROCEDURE — 71275 CT ANGIOGRAPHY CHEST: CPT

## 2020-07-27 PROCEDURE — 99285 EMERGENCY DEPT VISIT HI MDM: CPT

## 2020-07-27 PROCEDURE — 80053 COMPREHEN METABOLIC PANEL: CPT

## 2020-07-27 PROCEDURE — 93005 ELECTROCARDIOGRAM TRACING: CPT

## 2020-07-27 PROCEDURE — 96374 THER/PROPH/DIAG INJ IV PUSH: CPT

## 2020-07-27 RX ORDER — FAMOTIDINE 10 MG/1
10 TABLET ORAL 2 TIMES DAILY
COMMUNITY
End: 2020-08-31

## 2020-07-27 RX ORDER — FAMOTIDINE 10 MG/ML
20 INJECTION INTRAVENOUS
Status: COMPLETED | OUTPATIENT
Start: 2020-07-27 | End: 2020-07-27

## 2020-07-27 RX ORDER — ONDANSETRON 2 MG/ML
4 INJECTION INTRAMUSCULAR; INTRAVENOUS
Status: COMPLETED | OUTPATIENT
Start: 2020-07-27 | End: 2020-07-27

## 2020-07-27 RX ORDER — ONDANSETRON 2 MG/ML
4 INJECTION INTRAMUSCULAR; INTRAVENOUS
Status: DISCONTINUED | OUTPATIENT
Start: 2020-07-27 | End: 2020-07-29 | Stop reason: HOSPADM

## 2020-07-27 RX ORDER — SODIUM CHLORIDE 0.9 % (FLUSH) 0.9 %
5-40 SYRINGE (ML) INJECTION EVERY 8 HOURS
Status: DISCONTINUED | OUTPATIENT
Start: 2020-07-27 | End: 2020-07-29 | Stop reason: HOSPADM

## 2020-07-27 RX ORDER — HYDROMORPHONE HYDROCHLORIDE 1 MG/ML
1 INJECTION, SOLUTION INTRAMUSCULAR; INTRAVENOUS; SUBCUTANEOUS
Status: DISCONTINUED | OUTPATIENT
Start: 2020-07-27 | End: 2020-07-28

## 2020-07-27 RX ORDER — SODIUM CHLORIDE 0.9 % (FLUSH) 0.9 %
5-40 SYRINGE (ML) INJECTION AS NEEDED
Status: DISCONTINUED | OUTPATIENT
Start: 2020-07-27 | End: 2020-07-29 | Stop reason: HOSPADM

## 2020-07-27 RX ORDER — DIPHENHYDRAMINE HCL 25 MG
25 CAPSULE ORAL
Status: DISCONTINUED | OUTPATIENT
Start: 2020-07-27 | End: 2020-07-28

## 2020-07-27 RX ORDER — ESCITALOPRAM OXALATE 20 MG/1
40 TABLET ORAL DAILY
COMMUNITY

## 2020-07-27 RX ADMIN — Medication 10 ML: at 23:29

## 2020-07-27 RX ADMIN — IOPAMIDOL 100 ML: 755 INJECTION, SOLUTION INTRAVENOUS at 14:37

## 2020-07-27 RX ADMIN — HYDROMORPHONE HYDROCHLORIDE 1 MG: 1 INJECTION, SOLUTION INTRAMUSCULAR; INTRAVENOUS; SUBCUTANEOUS at 23:29

## 2020-07-27 RX ADMIN — HYDROMORPHONE HYDROCHLORIDE 1 MG: 1 INJECTION, SOLUTION INTRAMUSCULAR; INTRAVENOUS; SUBCUTANEOUS at 19:08

## 2020-07-27 RX ADMIN — FAMOTIDINE 20 MG: 10 INJECTION, SOLUTION INTRAVENOUS at 13:26

## 2020-07-27 RX ADMIN — DIPHENHYDRAMINE HYDROCHLORIDE 25 MG: 25 CAPSULE ORAL at 23:29

## 2020-07-27 RX ADMIN — LIDOCAINE HYDROCHLORIDE 86 MG: 20 INJECTION INTRAVENOUS at 15:07

## 2020-07-27 RX ADMIN — ONDANSETRON 4 MG: 2 INJECTION INTRAMUSCULAR; INTRAVENOUS at 15:49

## 2020-07-27 NOTE — ED NOTES
1st attempt made to call report to receiving RN made.  Advised the RN busy and will call to ED in 5 minutes to receive report

## 2020-07-27 NOTE — PROGRESS NOTES
Patient presents compliant to all plan of care measures at this time.    Doe Prabhakar  7/27/2020  6:20 pm.

## 2020-07-27 NOTE — ED PROVIDER NOTES
EMERGENCY DEPARTMENT HISTORY AND PHYSICAL EXAM    Date: 7/27/2020  Patient Name: Aimee Eric    History of Presenting Illness     Chief Complaint   Patient presents with    Shortness of Breath         History Provided By: Patient    Additional History (Context):   Aimee Eric is a 39 y.o. female with PMHX alcohol abuse, history of recurrent pancreatitis, history of portal vein thrombosis on Xarelto, status post gastric bypass presents to the emergency department C/O epigastric abdominal pain x3 days. Also reports shortness of breath. Patient seen by her primary care physician and sent here for further evaluation. Pt denies fever, nausea, vomiting, and any other sxs or complaints. States that this pain is not consistent with her prior history of pancreatitis. PCP: Charleen RAMOS NP    Current Facility-Administered Medications   Medication Dose Route Frequency Provider Last Rate Last Dose    lidocaine (PF) (XYLOCAINE) 1 mg/kg = 86 mg in 0.9% sodium chloride 50 mL IVPB  1 mg/kg IntraVENous ONCE Mary Ellen Starr, DO   86 mg at 07/27/20 1507     Current Outpatient Medications   Medication Sig Dispense Refill    rivaroxaban (Xarelto) 15 mg (42)- 20 mg (9) DsPk Take one 15 mg tablet twice a day with food for the first 21 days. Then, take one 20 mg tablet once a day with food for 9 days. 1 Dose Pack 0    amLODIPine (NORVASC) 10 mg tablet Take 0.5 Tabs by mouth daily. 30 Tab 0    thiamine mononitrate (B-1) 100 mg tablet Take 1 Tab by mouth daily. 30 Tab 0    omeprazole (PRILOSEC OTC) 20 mg tablet Take 1 Tab by mouth daily. 30 Tab 0    traZODone (DESYREL) 100 mg tablet Take  by mouth nightly. Pt is unsure of the dose for this medication      multivitamin (ONE A DAY) tablet Take 1 Tab by mouth daily.          Past History     Past Medical History:  Past Medical History:   Diagnosis Date    Anemia     C. difficile diarrhea     Cancer ([facility] [address] 2011     history of endometrial cancer    Diarrhea  GERD (gastroesophageal reflux disease)     Hypertension 2017    Intestinal malabsorption     Psychiatric disorder     Anxiety and depression    Severe obesity with body mass index (BMI) of 35.0 to 39.9 with comorbidity ([address]     Smoker     smokes 5 cigarettes per day    Status post gastric bypass for obesity     daryl / librado hermosillo    Syncopal episodes     has plans to see a neurologist     Wears dentures     teeth extraction due to vomiting and vitamin deficiencies       Past Surgical History:  Past Surgical History:   Procedure Laterality Date    HX CHOLECYSTECTOMY      HX GASTRIC BYPASS      open / librado [place]    HX GASTRIC BYPASS  2018    HX GI  2017    EGD    HX HYSTERECTOMY      HX OTHER SURGICAL Right     resection of benign breast mass       Family History:  Family History   Problem Relation Age of Onset    Diabetes Mother        Social History:  Social History     Tobacco Use    Smoking status: Former Smoker     Types: Cigarettes     Last attempt to quit: 2018     Years since quittin.9    Smokeless tobacco: Never Used   Substance Use Topics    Alcohol use: No     Alcohol/week: 1.0 standard drinks     Types: 1 Cans of beer per week     Frequency: Never    Drug use: No       Allergies:  No Known Allergies      Review of Systems   Review of Systems   Constitutional: Negative for chills and fever. HENT: Negative for congestion, ear pain, sinus pain and sore throat. Eyes: Negative for pain and visual disturbance. Respiratory: Positive for shortness of breath. Negative for cough. Cardiovascular: Negative for chest pain and leg swelling. Gastrointestinal: Positive for abdominal pain. Negative for constipation, diarrhea, nausea and vomiting. Genitourinary: Negative for dysuria, hematuria, vaginal bleeding and vaginal discharge. Musculoskeletal: Negative for back pain and neck pain. Skin: Negative for rash and wound.    Neurological: Negative for dizziness, tremors, weakness, light-headedness and numbness. All other systems reviewed and are negative. Physical Exam     Vitals:    07/27/20 1345 07/27/20 1400 07/27/20 1415 07/27/20 1500   BP: (!) 145/97 (!) 145/99 (!) 140/94 (!) 136/99   Pulse: 86 82 84 83   Resp: 10 17 19 17   Temp:       SpO2: 100% 100% 100% 100%   Weight:       Height:         Physical Exam    Nursing note and vitals reviewed    Constitutional: Middle-aged -American female, no acute distress  Head: Normocephalic, Atraumatic  Eyes: Pupils are equal, round, and reactive to light, EOMI  Neck: Supple, non-tender  Cardiovascular: Tachycardic, no murmurs, rubs, or gallops, + 2 radial pulses bilaterally  Chest: Normal work of breathing and chest excursion bilaterally  Lungs: Clear to ausculation bilaterally, no wheezes, no rhonchi  Abdomen: Soft, non tender, non distended, normoactive bowel sounds  Back: No evidence of trauma or deformity  Extremities: No evidence of trauma or deformity, no LE edema.  No streaking erythema, vesicular lesions, ulcerations or bulla  Skin: Warm and dry, normal cap refill  Neuro: Alert and appropriate, CN intact, normal speech, moving all 4 extremities freely and symmetrically  Psychiatric: Normal mood and affect       Diagnostic Study Results     Labs -     Recent Results (from the past 12 hour(s))   EKG, 12 LEAD, INITIAL    Collection Time: 07/27/20 12:34 PM   Result Value Ref Range    Ventricular Rate 102 BPM    Atrial Rate 102 BPM    P-R Interval 132 ms    QRS Duration 78 ms    Q-T Interval 350 ms    QTC Calculation (Bezet) 456 ms    Calculated P Axis 58 degrees    Calculated T Axis 51 degrees    Diagnosis       Sinus tachycardia  Otherwise normal ECG  When compared with ECG of 03-DEC-2019 03:43,  No significant change was found     CBC WITH AUTOMATED DIFF    Collection Time: 07/27/20 12:40 PM   Result Value Ref Range    WBC 8.9 4.6 - 13.2 K/uL    RBC 3.80 (L) 4.20 - 5.30 M/uL    HGB 11.8 (L) 12.0 - 16.0 g/dL HCT 36.1 35.0 - 45.0 %    MCV 95.0 74.0 - 97.0 FL    MCH 31.1 24.0 - 34.0 PG    MCHC 32.7 31.0 - 37.0 g/dL    RDW 16.4 (H) 11.6 - 14.5 %    PLATELET 767 958 - 044 K/uL    MPV 9.2 9.2 - 11.8 FL    NEUTROPHILS 76 (H) 40 - 73 %    LYMPHOCYTES 14 (L) 21 - 52 %    MONOCYTES 7 3 - 10 %    EOSINOPHILS 3 0 - 5 %    BASOPHILS 0 0 - 2 %    ABS. NEUTROPHILS 6.7 1.8 - 8.0 K/UL    ABS. LYMPHOCYTES 1.2 0.9 - 3.6 K/UL    ABS. MONOCYTES 0.7 0.05 - 1.2 K/UL    ABS. EOSINOPHILS 0.3 0.0 - 0.4 K/UL    ABS. BASOPHILS 0.0 0.0 - 0.1 K/UL    DF AUTOMATED     METABOLIC PANEL, COMPREHENSIVE    Collection Time: 07/27/20 12:40 PM   Result Value Ref Range    Sodium 134 (L) 136 - 145 mmol/L    Potassium 3.6 3.5 - 5.5 mmol/L    Chloride 103 100 - 111 mmol/L    CO2 22 21 - 32 mmol/L    Anion gap 9 3.0 - 18 mmol/L    Glucose 141 (H) 74 - 99 mg/dL    BUN 9 7.0 - 18 MG/DL    Creatinine 0.92 0.6 - 1.3 MG/DL    BUN/Creatinine ratio 10 (L) 12 - 20      GFR est AA >60 >60 ml/min/1.73m2    GFR est non-AA >60 >60 ml/min/1.73m2    Calcium 8.5 8.5 - 10.1 MG/DL    Bilirubin, total 1.1 (H) 0.2 - 1.0 MG/DL    ALT (SGPT) 13 13 - 56 U/L    AST (SGOT) 12 10 - 38 U/L    Alk.  phosphatase 121 (H) 45 - 117 U/L    Protein, total 7.6 6.4 - 8.2 g/dL    Albumin 3.2 (L) 3.4 - 5.0 g/dL    Globulin 4.4 (H) 2.0 - 4.0 g/dL    A-G Ratio 0.7 (L) 0.8 - 1.7     NT-PRO BNP    Collection Time: 07/27/20 12:40 PM   Result Value Ref Range    NT pro-BNP 88 0 - 450 PG/ML   CARDIAC PANEL,(CK, CKMB & TROPONIN)    Collection Time: 07/27/20 12:40 PM   Result Value Ref Range    CK - MB <1.0 <3.6 ng/ml    CK-MB Index  0.0 - 4.0 %     CALCULATION NOT PERFORMED WHEN RESULT IS BELOW LINEAR LIMIT    CK 31 26 - 192 U/L    Troponin-I, QT <0.02 0.0 - 0.045 NG/ML   LIPASE    Collection Time: 07/27/20 12:40 PM   Result Value Ref Range    Lipase 355 73 - 393 U/L   MAGNESIUM    Collection Time: 07/27/20 12:40 PM   Result Value Ref Range    Magnesium 1.8 1.6 - 2.6 mg/dL   HCG QL SERUM    Collection Time: 07/27/20 12:40 PM   Result Value Ref Range    HCG, Ql. Negative NEG         Radiologic Studies -   CTA CHEST W OR W WO CONT   Final Result   IMPRESSION:      1. No evidence of pulmonary embolism. 2.  Mild bronchial wall thickening without evidence of endobronchial mucoid   impaction, findings to suggest pneumonia, or pulmonary edema. 3. No evidence of pleural effusion. 4. Partial visualization of postoperative changes within the upper abdomen as   well as likely imaging sequela of pancreatitis, (to be more completely described   on contemporaneously performed abdominal/pelvic CT). CT ABD PELV W CONT   Final Result   IMPRESSION:      1.  CT findings in keeping with acute edematous interstitial pancreatitis,   predominantly involving the pancreaticoduodenal groove.      > Small quantity of reactive fluid within the pancreaticoduodenal groove   without evidence of an organized or drainable peripancreatic fluid collection.      > No evidence of glandular necrosis. Small low attenuating structures within   the pancreatic tail noted, favored to reflect small pseudocysts as seen on prior   MRCP study. 2. Prior gastric bypass surgical procedure. Very likely reactive inflammatory   change involving the distal stomach body and antrum as well as the proximal   portion of the duodenum. No morphology of bowel obstruction. XR CHEST PORT   Final Result   IMPRESSION:      No acute radiographic cardiopulmonary abnormality. CT Results  (Last 48 hours)               07/27/20 1501  CTA CHEST W OR W WO CONT Final result    Impression:  IMPRESSION:       1. No evidence of pulmonary embolism. 2.  Mild bronchial wall thickening without evidence of endobronchial mucoid   impaction, findings to suggest pneumonia, or pulmonary edema. 3. No evidence of pleural effusion.        4. Partial visualization of postoperative changes within the upper abdomen as   well as likely imaging sequela of pancreatitis, (to be more completely described   on contemporaneously performed abdominal/pelvic CT). Narrative:  EXAM: CTA Chest       INDICATION: Patient with history of portal vein thrombosis, shortness of breath,   currently anticoagulation. COMPARISON:   Several prior abdominal/pelvic CT exams, most recently 6/26/2020; no prior CT   imaging the chest available for direct comparison. TECHNIQUE: Axial CT imaging from the thoracic inlet through the diaphragm with   intravenous contrast utilizing CTA study for pulmonary artery evaluation. Coronal and sagittal MIP reformations were generated at a separate workstation. One or more dose reduction techniques were used on this CT: automated exposure   control, adjustment of the mAs and/or kVp according to patient size, and   iterative reconstruction techniques. The specific techniques used on this CT   exam have been documented in the patient's electronic medical record. Digital   Imaging and Communications in Medicine (DICOM) format image data are available   to nonaffiliated external healthcare facilities or entities on a secure, media   free, reciprocally searchable basis with patient authorization for at least a   12-month period after this study. _______________       FINDINGS:       EXAM QUALITY: Overall exam quality is adequate. Pulmonary arterial enhancement   is adequate. The breath hold is satisfactory. PULMONARY ARTERIES: No convincing evidence of pulmonary embolism. MEDIASTINUM: Included thyroid gland unremarkable. Normal cardiac size. No   pericardial effusion. Thoracic aorta is of normal course and caliber. LYMPH NODES: No enlarged mediastinal or hilar nodes by size criteria. AIRWAY: Mild bronchial wall thickening without evidence of endobronchial mucoid   impaction. LUNGS: Lungs are well aerated. No alveolar consolidation. No significant   groundglass abnormality or septal line thickening. PLEURA: No pleural effusion or pneumothorax. UPPER ABDOMEN: Partial visualization of postoperative changes from gastric   bypass surgical procedure with significant edematous infiltration of the   pancreatic head, transmural thickening of the adjacent stomach, fluid extending   into the excluded portion of the stomach. .       OTHER: No acute or aggressive osseous abnormalities identified. _______________           07/27/20 1501  CT ABD PELV W CONT Final result    Impression:  IMPRESSION:       1.  CT findings in keeping with acute edematous interstitial pancreatitis,   predominantly involving the pancreaticoduodenal groove.      > Small quantity of reactive fluid within the pancreaticoduodenal groove   without evidence of an organized or drainable peripancreatic fluid collection.      > No evidence of glandular necrosis. Small low attenuating structures within   the pancreatic tail noted, favored to reflect small pseudocysts as seen on prior   MRCP study. 2. Prior gastric bypass surgical procedure. Very likely reactive inflammatory   change involving the distal stomach body and antrum as well as the proximal   portion of the duodenum. No morphology of bowel obstruction. Narrative:  EXAM: CT of the abdomen and pelvis       INDICATION: Epigastric abdominal pain, history of portal vein thrombosis. Pancreatitis. Prior gastric bypass surgical procedure. COMPARISON: No prior studies, most recently CT scan of the abdomen and pelvis   6/26/2020; MRCP 6/29/2020. TECHNIQUE: Axial CT imaging of the abdomen and pelvis was performed with   intravenous contrast. Multiplanar reformats were generated. One or more dose reduction techniques were used on this CT: automated exposure   control, adjustment of the mAs and/or kVp according to patient size, and   iterative reconstruction techniques.   The specific techniques used on this CT   exam have been documented in the patient's electronic medical record. Digital   Imaging and Communications in Medicine (DICOM) format image data are available   to nonaffiliated external healthcare facilities or entities on a secure, media   free, reciprocally searchable basis with patient authorization for at least a   12-month period after this study. _______________       FINDINGS:       LOWER CHEST: Clear lung bases. Normal cardiac size. No pericardial effusion. LIVER, BILIARY: Hepatic parenchymal enhancement is uniform. No biliary dilation. Cholecystectomy. PANCREAS: Moderate peripancreatic edematous infiltration with dominant   involvement of the pancreatic head as well as the pancreaticoduodenal groove. Pancreatic ductal ectasia and tiny low attenuating structure within the   pancreatic tail, unchanged. No evidence of an organized or drainable   peripancreatic fluid collection. SPLEEN: Normal.       ADRENALS: Normal.       KIDNEYS/URETERS/BLADDER: Symmetric renal enhancement without evidence of   hydronephrosis or nephrolithiasis. Urinary bladder normal in CT appearance. PELVIC ORGANS: Hysterectomy       VASCULATURE: Abdominal aorta is of normal course and caliber. Redemonstration of   significant narrowing of the superior mesenteric vein and proximal portion of   the portal vein adjacent to the pancreatic head/pancreaticoduodenal groove, as   previously. LYMPH NODES: No enlarged lymph nodes. GASTROINTESTINAL TRACT: Significant thickening of the distal stomach body and   antrum noted with additional transmural thickening adjacent first and second   portions duodenum. Fluid noted to be present within the excluded portion of the   stomach. Prior gastric bypass surgical procedure. No morphology of bowel   obstruction. No free intraperitoneal gas. Normal appendix. BONES: No acute or aggressive osseous abnormalities identified.        OTHER: None.       _______________               CXR Results  (Last 50 hours)               07/27/20 1329  XR CHEST PORT Final result    Impression:  IMPRESSION:       No acute radiographic cardiopulmonary abnormality. Narrative:  EXAM: XR CHEST PORT       CLINICAL INDICATION/HISTORY: SOB   -Additional: None       COMPARISON: March 23, 2019       TECHNIQUE: Frontal view of the chest       _______________       FINDINGS:       HEART AND MEDIASTINUM: Normal cardiac size and mediastinal contours. LUNGS AND PLEURAL SPACES: No focal pneumonic consolidation, pneumothorax, or   pleural effusion. BONY THORAX AND SOFT TISSUES: No acute osseous abnormality       _______________                   Medical Decision Making   I am the first provider for this patient. I reviewed the vital signs, available nursing notes, past medical history, past surgical history, family history and social history. Vital Signs-Reviewed the patient's vital signs. Pulse Oximetry Analysis -99 % on room air    Cardiac Monitor:  Rate: 119 bpm  Rhythm: Regular    EKG interpretation: (Preliminary)  12:29 PM   Sinus tachycardia 102 beats minute. QTc 456 ms. No acute ST elevation    Records Reviewed: Nursing Notes and Old Medical Records    Provider Notes:   39 y.o. female with a history of alcohol abuse, history of pancreatitis, history of portal vein thrombosis, status post gastric bypass presenting with epigastric abdominal pain and shortness of breath. Due to her history of coagulopathy, will obtain a CTA to evaluate for possible PE as patient was just started on Xarelto starter pack with her recent diagnosis of portal vein thrombosis. With her history of recurrent pancreatitis, will also check lipase and CT imaging to evaluate. Procedures:  Procedures    ED Course:   12:29 PM   Initial assessment performed. The patients presenting problems have been discussed, and they are in agreement with the care plan formulated and outlined with them.   I have encouraged them to ask questions as they arise throughout their visit.    3:26 PM  CTA negative for PE. Patient's lipase within normal limits however CT scan showing acute pancreatitis. Diagnosis and Disposition         Core Measures:  For Hospitalized Patients:    1. Hospitalization Decision Time:  The decision to hospitalize the patient was made by Timothy Trevino DO at 3:26 PM on 7/27/2020    2. Aspirin: Aspirin was not given because the patient did not present with a stroke at the time of their Emergency Department evaluation    3:25 PM  Patient is being admitted to the hospital by Dr. Arlen Cano. The results of their tests and reasons for their admission have been discussed with them and/or available family. They convey agreement and understanding for the need to be admitted and for their admission diagnosis. CONDITIONS ON ADMISSION:  Sepsis is not present at the time of admission. Pneumonia is not present at the time of admission. MRSA is not present at the time of admission. Wound infection is not present at the time of admission. Pressure Ulcer is not present at the time of admission. CLINICAL IMPRESSION:    1. Acute pancreatitis, unspecified complication status, unspecified pancreatitis type      ____________________________________     Please note that this dictation was completed with [employer], the computer voice recognition software. Quite often unanticipated grammatical, syntax, homophones, and other interpretive errors are inadvertently transcribed by the computer software. Please disregard these errors. Please excuse any errors that have escaped final proofreading.

## 2020-07-27 NOTE — PROGRESS NOTES
07/27/20 1615 07/27/20 1907 07/27/20 1910   Vital Signs   Temp 99 °F (37.2 °C)  --  98.8 °F (37.1 °C)   Temp Source Oral  --  Oral   Pulse (Heart Rate) 75  --  88   Heart Rate Source  --   --  Monitor   Resp Rate 18  --  16   Level of Consciousness Alert  --  Alert   /90  --  (!) 131/105   MAP (Monitor) 105  --   --    MAP (Calculated)  --   --  114   BP 1 Method  --   --  Automatic   BP 1 Location  --   --  Right arm   BP Patient Position  --   --  Sitting   MEWS Score 1  --  1   Pain 1   Pain Scale 1  --  Numeric (0 - 10)  --    Pain Intensity 1  --  9  --    Patient Stated Pain Goal  --  0  --    Pain Onset 1  --  Acute  --    Pain Location 1  --  Abdomen  --    Pain Orientation 1  --  Mid  --    Pain Description 1  --  Aching; Sharp  --    Pain Intervention(s) 1  --  Medication (see MAR)  --       07/27/20 1912   Vital Signs   Temp  --    Temp Source  --    Pulse (Heart Rate)  --    Heart Rate Source  --    Resp Rate  --    Level of Consciousness  --    /88   MAP (Monitor)  --    MAP (Calculated) 104   BP 1 Method Automatic   BP 1 Location Left arm   BP Patient Position Sitting   MEWS Score  --    Pain 1   Pain Scale 1  --    Pain Intensity 1  --    Patient Stated Pain Goal  --    Pain Onset 1  --    Pain Location 1  --    Pain Orientation 1  --    Pain Description 1  --    Pain Intervention(s) 1  --        S: Patient admission completed/ MD notified about elevated blood pressure for management and possible intervention/[facility] Nursing staff notified. B: At 1612 pm, ED nurse Mer Hough provided admission telephone report for patient and verbalized that patient was admitted for diagnosis of Acute pancreatitis as presented by epigastric pain. She verbalized that patient had a past medical history of Hypertension, Anxiety, GERD, Smoking history, Alcohol Abuse history, Gastritis & Duodentitis, Portal Vein Thrombosis and status post Gastric Bypass.  Nurse Norma Osborne stated that patient was on Enteric precautions for active C Diff diagnosis. She verbalized that patient was ambulatory and presented with abdominal pain in which she was administered IV lidocaine for management in ED. She also stated that patient additionally received Famotidine and Zofran after presenting with active vomiting after receiving IV Lidocaine. She stated that patient presented awake, alert and responsive with the last set of vital signs listed above around 1612 pm. Understanding and acceptance of admission report was verbalized. A: Patient was brought to unit via stretcher by medical transport around 1700 pm and patient presented awake, alert and responsive. Patient was assisted with getting settled within room. Patient's admission profile and assessment were completed as directed. Patient was given IV dilaudid 1 mg at 1908 pm for severe abdominal pain. Patient's medication profile was also completed and home hypertension medications were added to [facility] At 1907 pm and 1910 pm, patient presented with the following vital signs listed above. Patient was informed that MD would be notified as directed. At 1920 pm, Attending/Admitting provider Dr. Rachelle Larson was conducting evening rounds and was notified about patient's blood pressure readings listed above and was informed that patient's home medication list advising her home blood pressure medications was completed for her review. Dr. Rachelle Larson verbalized understanding and stated that she would order an intervention. Understanding was verbalized. R: Will continue with prescribed plan of care as directed.    Doe Prabhakar  7/27/2020  7:48 pm

## 2020-07-27 NOTE — H&P
History & Physical    Patient: Nickie Rasmussen MRN: [id number]  [facility]: [id number]    YOB: 1975  Age: 39 y.o. Sex: female      DOA: 7/27/2020  Primary Care Provider:  Alejandra Iraheta NP      Assessment/Plan   Nickie Rasmussen is a 39 y.o. female with h/o recurrent pancreatitis, h/o ethanol abuse, h/o portal vein thrombosis on xarelto presents with abdominal pain with nausea and vomiting, admitted for acute pancreatitis. Acute pancreatitis -  Bowel rest, n.p.o. Antiemetics  Pain control  IV fluid rehydration  IV PPI    Alcohol abuse -  States that last alcohol intake was before July 3, 2020    Uncontrolled hypertension -  Elevated blood pressure likely secondary to pain  Pain control  Resume home BP meds  Monitor blood pressure      DVT prophylaxis SCDs  GI prophylaxis covered by IV PPI      Patient Active Problem List   Diagnosis Code    Status post gastric bypass for obesity Z98.84    Hypertension I10    Anemia D64.9    Anxiety F41.9    GERD (gastroesophageal reflux disease) K21.9    Smoker F17.200    Portal vein thrombosis I81    Gastritis and duodenitis K29.90    Acute pancreatitis K85.90       Estimated length of stay : 2 to 3 days    CC: abdominal pain, nausea and vomiting        HPI:     Nikcie Rasmussen is a 39 y.o. female with h/o recurrent pancreatitis, h/o ethanol abuse, h/o portal vein thrombosis on xarelto presents with abdominal pain with nausea and vomiting for the past 3 days. In the emergency room, her lipase is normal, but her CT scan demonstrates acute edematous interstitial pancreatitis. Patient has a history of alcohol abuse states but states that her last drink was before July 3, 2020. She states that Dr. Cipriano Torres told her that she needed to stop drinking or her symptoms would get worse, so she stopped. She is s/p cholecystectomy in 2000.  She wonders is if her symptoms are related to all of the surgical revisions that Dr. Padmini Rehman had to do with regard to her weight -loss surgery. Past Medical History:   Diagnosis Date    Anemia     C. difficile diarrhea     [facility] 2011     history of endometrial cancer    Diarrhea     GERD (gastroesophageal reflux disease)     Hypertension 2017    Intestinal malabsorption     Psychiatric disorder     Anxiety and depression    Severe obesity with body mass index (BMI) of 35.0 to 39.9 with comorbidity ([facility]     Smoker     smokes 5 cigarettes per day    Status post gastric bypass for obesity     daryl / librado hermosillo    Syncopal episodes     has plans to see a neurologist     Wears dentures     teeth extraction due to vomiting and vitamin deficiencies       Past Surgical History:   Procedure Laterality Date    HX CHOLECYSTECTOMY      HX GASTRIC BYPASS      open / librado hermosillo    HX GASTRIC BYPASS  2018    HX GI  2017    EGD    HX HYSTERECTOMY      HX OTHER SURGICAL Right     resection of benign breast mass       Family History   Problem Relation Age of Onset    Diabetes Mother        Social History     Socioeconomic History    Marital status: SINGLE     Spouse name: Not on file    Number of children: Not on file    Years of education: Not on file    Highest education level: Not on file   Tobacco Use    Smoking status: Former Smoker     Types: Cigarettes     Last attempt to quit: 2018     Years since quittin.9    Smokeless tobacco: Never Used   Substance and Sexual Activity    Alcohol use: No     Alcohol/week: 1.0 standard drinks     Types: 1 Cans of beer per week     Frequency: Never    Drug use: No    Sexual activity: Not Currently     Birth control/protection: Surgical       Prior to Admission medications    Medication Sig Start Date End Date Taking? Authorizing Provider   rivaroxaban (Xarelto) 15 mg (42)- 20 mg (9) DsPk Take one 15 mg tablet twice a day with food for the first 21 days. Then, take one 20 mg tablet once a day with food for 9 days.  7/3/20   Michaela Ramey MD   amLODIPine (NORVASC) 10 mg tablet Take 0.5 Tabs by mouth daily. 19   Alexis Pagan MD   thiamine mononitrate (B-1) 100 mg tablet Take 1 Tab by mouth daily. 19   Alexis Pagan MD   omeprazole (PRILOSEC OTC) 20 mg tablet Take 1 Tab by mouth daily. 19   Alexis Pagan MD   traZODone (DESYREL) 100 mg tablet Take  by mouth nightly. Pt is unsure of the dose for this medication    Provider, Historical   multivitamin (ONE A DAY) tablet Take 1 Tab by mouth daily. Provider, Historical       No Known Allergies    Review of Systems  Gen: No fever, chills, malaise, weight loss/gain. Heent: No headache, rhinorrhea, epistaxis, ear pain, hearing loss, sinus pain, neck pain/stiffness, sore throat. Heart: No chest pain, palpitations, HAYES, pnd, or orthopnea. Resp: No cough, hemoptysis, wheezing and shortness of breath. GI: Positive nausea, vomiting, and abdominal pain, no diarrhea, constipation, melena or hematochezia. : No urinary obstruction, dysuria or hematuria. Derm: No rash, new skin lesion or pruritis. Musc/skeletal: no bone or joint complains. Vasc: No edema, cyanosis or claudication. Endo: No heat/cold intolerance, no polyuria,polydipsia or polyphagia. Neuro: No unilateral weakness, numbness, tingling. No seizures. Heme: No easy bruising or bleeding. Physical Exam:     Physical Exam:  Visit Vitals  BP (!) 149/106 (BP 1 Location: Right arm, BP Patient Position: Sitting;Post activity)   Pulse 88   Temp 98.8 °F (37.1 °C)   Resp 18   Ht 5' 5\" (1.651 m)   Wt 85.3 kg (188 lb)   SpO2 100%   BMI 31.28 kg/m²      O2 Device: Room air    Temp (24hrs), Av.7 °F (37.1 °C), Min:97.9 °F (36.6 °C), Max:99 °F (37.2 °C)    No intake/output data recorded. No intake/output data recorded. General:  Awake, cooperative, no distress. Head:  Normocephalic, without obvious abnormality, atraumatic. Eyes:  Conjunctivae/corneas clear, sclera anicteric, PERRL, EOMs intact.    Nose: Nares normal. No drainage or sinus tenderness. Throat: Lips, mucosa, and tongue normal.    Neck: Supple, symmetrical, trachea midline, no adenopathy. Lungs:   Clear to auscultation bilaterally. Heart:  Regular rate and rhythm, S1, S2 normal, no murmur, click, rub or gallop. Abdomen: Soft, non-tender. Bowel sounds normal. No masses,  No organomegaly. Extremities: Extremities normal, atraumatic, no cyanosis or edema. Capillary refill normal.   Pulses: 2+ and symmetric all extremities. Skin: Skin color is appropriate for ethnicity, turgor normal. No rashes or lesions   Neurologic: CNII-XII intact. No focal motor or sensory deficit. Labs Reviewed:  Recent Results (from the past 24 hour(s))   EKG, 12 LEAD, INITIAL    Collection Time: 07/27/20 12:34 PM   Result Value Ref Range    Ventricular Rate 102 BPM    Atrial Rate 102 BPM    P-R Interval 132 ms    QRS Duration 78 ms    Q-T Interval 350 ms    QTC Calculation (Bezet) 456 ms    Calculated P Axis 58 degrees    Calculated T Axis 51 degrees    Diagnosis       Sinus tachycardia  Otherwise normal ECG  When compared with ECG of 03-DEC-2019 03:43,  No significant change was found     CBC WITH AUTOMATED DIFF    Collection Time: 07/27/20 12:40 PM   Result Value Ref Range    WBC 8.9 4.6 - 13.2 K/uL    RBC 3.80 (L) 4.20 - 5.30 M/uL    HGB 11.8 (L) 12.0 - 16.0 g/dL    HCT 36.1 35.0 - 45.0 %    MCV 95.0 74.0 - 97.0 FL    MCH 31.1 24.0 - 34.0 PG    MCHC 32.7 31.0 - 37.0 g/dL    RDW 16.4 (H) 11.6 - 14.5 %    PLATELET 710 440 - 249 K/uL    MPV 9.2 9.2 - 11.8 FL    NEUTROPHILS 76 (H) 40 - 73 %    LYMPHOCYTES 14 (L) 21 - 52 %    MONOCYTES 7 3 - 10 %    EOSINOPHILS 3 0 - 5 %    BASOPHILS 0 0 - 2 %    ABS. NEUTROPHILS 6.7 1.8 - 8.0 K/UL    ABS. LYMPHOCYTES 1.2 0.9 - 3.6 K/UL    ABS. MONOCYTES 0.7 0.05 - 1.2 K/UL    ABS. EOSINOPHILS 0.3 0.0 - 0.4 K/UL    ABS.  BASOPHILS 0.0 0.0 - 0.1 K/UL    DF AUTOMATED     METABOLIC PANEL, COMPREHENSIVE    Collection Time: 07/27/20 12:40 PM Result Value Ref Range    Sodium 134 (L) 136 - 145 mmol/L    Potassium 3.6 3.5 - 5.5 mmol/L    Chloride 103 100 - 111 mmol/L    CO2 22 21 - 32 mmol/L    Anion gap 9 3.0 - 18 mmol/L    Glucose 141 (H) 74 - 99 mg/dL    BUN 9 7.0 - 18 MG/DL    Creatinine 0.92 0.6 - 1.3 MG/DL    BUN/Creatinine ratio 10 (L) 12 - 20      GFR est AA >60 >60 ml/min/1.73m2    GFR est non-AA >60 >60 ml/min/1.73m2    Calcium 8.5 8.5 - 10.1 MG/DL    Bilirubin, total 1.1 (H) 0.2 - 1.0 MG/DL    ALT (SGPT) 13 13 - 56 U/L    AST (SGOT) 12 10 - 38 U/L    Alk.  phosphatase 121 (H) 45 - 117 U/L    Protein, total 7.6 6.4 - 8.2 g/dL    Albumin 3.2 (L) 3.4 - 5.0 g/dL    Globulin 4.4 (H) 2.0 - 4.0 g/dL    A-G Ratio 0.7 (L) 0.8 - 1.7     NT-PRO BNP    Collection Time: 07/27/20 12:40 PM   Result Value Ref Range    NT pro-BNP 88 0 - 450 PG/ML   CARDIAC PANEL,(CK, CKMB & TROPONIN)    Collection Time: 07/27/20 12:40 PM   Result Value Ref Range    CK - MB <1.0 <3.6 ng/ml    CK-MB Index  0.0 - 4.0 %     CALCULATION NOT PERFORMED WHEN RESULT IS BELOW LINEAR LIMIT    CK 31 26 - 192 U/L    Troponin-I, QT <0.02 0.0 - 0.045 NG/ML   LIPASE    Collection Time: 07/27/20 12:40 PM   Result Value Ref Range    Lipase 355 73 - 393 U/L   MAGNESIUM    Collection Time: 07/27/20 12:40 PM   Result Value Ref Range    Magnesium 1.8 1.6 - 2.6 mg/dL   HCG QL SERUM    Collection Time: 07/27/20 12:40 PM   Result Value Ref Range    HCG, Ql. Negative NEG         Procedures/imaging: see electronic medical records for all procedures/Xrays and details which were not copied into this note but were reviewed prior to creation of Plan        CC: Merritt Kyle NP

## 2020-07-27 NOTE — ED TRIAGE NOTES
Patient with complaints of shortness of breath.  Patient with recent blood clots and was sent here by PCP for further evaluation

## 2020-07-27 NOTE — ED NOTES
TRANSFER - OUT REPORT:    Verbal report given to [employer] on Ashwini Webster  being transferred to [phone] for routine progression of care       Report consisted of patients Situation, Background, Assessment and   Recommendations(SBAR). Information from the following report(s) SBAR, ED Summary and MAR was reviewed with the receiving nurse. Lines:   Peripheral IV 07/27/20 Left Antecubital (Active)   Site Assessment Clean, dry, & intact 07/27/20 1240   Hub Color/Line Status Pink;Flushed;Patent 07/27/20 1240        Opportunity for questions and clarification was provided.       Patient transported with:   Car reviews

## 2020-07-28 PROBLEM — K86.1 ACUTE ON CHRONIC PANCREATITIS (HCC): Status: ACTIVE | Noted: 2020-07-27

## 2020-07-28 LAB
ALBUMIN SERPL-MCNC: 2.9 G/DL (ref 3.4–5)
ALBUMIN/GLOB SERPL: 0.8 {RATIO} (ref 0.8–1.7)
ALP SERPL-CCNC: 133 U/L (ref 45–117)
ALT SERPL-CCNC: 13 U/L (ref 13–56)
ANION GAP SERPL CALC-SCNC: 9 MMOL/L (ref 3–18)
AST SERPL-CCNC: 20 U/L (ref 10–38)
BASOPHILS # BLD: 0 K/UL (ref 0–0.1)
BASOPHILS NFR BLD: 0 % (ref 0–2)
BILIRUB DIRECT SERPL-MCNC: 0.2 MG/DL (ref 0–0.2)
BILIRUB SERPL-MCNC: 0.6 MG/DL (ref 0.2–1)
BUN SERPL-MCNC: 8 MG/DL (ref 7–18)
BUN/CREAT SERPL: 10 (ref 12–20)
CALCIUM SERPL-MCNC: 8.2 MG/DL (ref 8.5–10.1)
CHLORIDE SERPL-SCNC: 101 MMOL/L (ref 100–111)
CHOLEST SERPL-MCNC: 169 MG/DL
CO2 SERPL-SCNC: 25 MMOL/L (ref 21–32)
CREAT SERPL-MCNC: 0.8 MG/DL (ref 0.6–1.3)
DIFFERENTIAL METHOD BLD: ABNORMAL
EOSINOPHIL # BLD: 0.3 K/UL (ref 0–0.4)
EOSINOPHIL NFR BLD: 4 % (ref 0–5)
ERYTHROCYTE [DISTWIDTH] IN BLOOD BY AUTOMATED COUNT: 17.1 % (ref 11.6–14.5)
GLOBULIN SER CALC-MCNC: 3.8 G/DL (ref 2–4)
GLUCOSE SERPL-MCNC: 110 MG/DL (ref 74–99)
HCT VFR BLD AUTO: 34 % (ref 35–45)
HDLC SERPL-MCNC: 37 MG/DL (ref 40–60)
HDLC SERPL: 4.6 {RATIO} (ref 0–5)
HGB BLD-MCNC: 10.7 G/DL (ref 12–16)
LDLC SERPL CALC-MCNC: 97.2 MG/DL (ref 0–100)
LIPID PROFILE,FLP: ABNORMAL
LYMPHOCYTES # BLD: 1.3 K/UL (ref 0.9–3.6)
LYMPHOCYTES NFR BLD: 16 % (ref 21–52)
MCH RBC QN AUTO: 31.1 PG (ref 24–34)
MCHC RBC AUTO-ENTMCNC: 31.5 G/DL (ref 31–37)
MCV RBC AUTO: 98.8 FL (ref 74–97)
MONOCYTES # BLD: 0.4 K/UL (ref 0.05–1.2)
MONOCYTES NFR BLD: 6 % (ref 3–10)
NEUTS SEG # BLD: 5.8 K/UL (ref 1.8–8)
NEUTS SEG NFR BLD: 74 % (ref 40–73)
PLATELET # BLD AUTO: 350 K/UL (ref 135–420)
PMV BLD AUTO: 8.9 FL (ref 9.2–11.8)
POTASSIUM SERPL-SCNC: 3.8 MMOL/L (ref 3.5–5.5)
PROT SERPL-MCNC: 6.7 G/DL (ref 6.4–8.2)
RBC # BLD AUTO: 3.44 M/UL (ref 4.2–5.3)
SODIUM SERPL-SCNC: 135 MMOL/L (ref 136–145)
TRIGL SERPL-MCNC: 174 MG/DL (ref ?–150)
VLDLC SERPL CALC-MCNC: 34.8 MG/DL
WBC # BLD AUTO: 7.9 K/UL (ref 4.6–13.2)

## 2020-07-28 PROCEDURE — 74011250637 HC RX REV CODE- 250/637: Performed by: FAMILY MEDICINE

## 2020-07-28 PROCEDURE — 36415 COLL VENOUS BLD VENIPUNCTURE: CPT

## 2020-07-28 PROCEDURE — 74011250637 HC RX REV CODE- 250/637: Performed by: HOSPITALIST

## 2020-07-28 PROCEDURE — 80061 LIPID PANEL: CPT

## 2020-07-28 PROCEDURE — 65270000029 HC RM PRIVATE

## 2020-07-28 PROCEDURE — 85025 COMPLETE CBC W/AUTO DIFF WBC: CPT

## 2020-07-28 PROCEDURE — 80048 BASIC METABOLIC PNL TOTAL CA: CPT

## 2020-07-28 PROCEDURE — 74011250636 HC RX REV CODE- 250/636: Performed by: HOSPITALIST

## 2020-07-28 PROCEDURE — 74011250636 HC RX REV CODE- 250/636: Performed by: FAMILY MEDICINE

## 2020-07-28 PROCEDURE — 80076 HEPATIC FUNCTION PANEL: CPT

## 2020-07-28 RX ORDER — TRAZODONE HYDROCHLORIDE 100 MG/1
100 TABLET ORAL
Status: DISCONTINUED | OUTPATIENT
Start: 2020-07-28 | End: 2020-07-29 | Stop reason: HOSPADM

## 2020-07-28 RX ORDER — OXYCODONE AND ACETAMINOPHEN 5; 325 MG/1; MG/1
1 TABLET ORAL
Status: DISCONTINUED | OUTPATIENT
Start: 2020-07-28 | End: 2020-07-29 | Stop reason: HOSPADM

## 2020-07-28 RX ORDER — PANTOPRAZOLE SODIUM 40 MG/1
40 TABLET, DELAYED RELEASE ORAL
Status: DISCONTINUED | OUTPATIENT
Start: 2020-07-28 | End: 2020-07-29 | Stop reason: HOSPADM

## 2020-07-28 RX ORDER — DIPHENHYDRAMINE HYDROCHLORIDE 50 MG/ML
12.5 INJECTION, SOLUTION INTRAMUSCULAR; INTRAVENOUS
Status: DISCONTINUED | OUTPATIENT
Start: 2020-07-28 | End: 2020-07-29 | Stop reason: HOSPADM

## 2020-07-28 RX ORDER — HYDROMORPHONE HYDROCHLORIDE 1 MG/ML
1 INJECTION, SOLUTION INTRAMUSCULAR; INTRAVENOUS; SUBCUTANEOUS
Status: DISCONTINUED | OUTPATIENT
Start: 2020-07-28 | End: 2020-07-29 | Stop reason: HOSPADM

## 2020-07-28 RX ORDER — AMLODIPINE BESYLATE 5 MG/1
5 TABLET ORAL DAILY
Status: DISCONTINUED | OUTPATIENT
Start: 2020-07-28 | End: 2020-07-29 | Stop reason: HOSPADM

## 2020-07-28 RX ORDER — ASPIRIN 325 MG/1
100 TABLET, FILM COATED ORAL DAILY
Status: DISCONTINUED | OUTPATIENT
Start: 2020-07-28 | End: 2020-07-29 | Stop reason: HOSPADM

## 2020-07-28 RX ORDER — ESCITALOPRAM OXALATE 10 MG/1
20 TABLET ORAL DAILY
Status: DISCONTINUED | OUTPATIENT
Start: 2020-07-28 | End: 2020-07-29 | Stop reason: HOSPADM

## 2020-07-28 RX ORDER — SODIUM CHLORIDE 9 MG/ML
125 INJECTION, SOLUTION INTRAVENOUS CONTINUOUS
Status: DISCONTINUED | OUTPATIENT
Start: 2020-07-28 | End: 2020-07-29 | Stop reason: HOSPADM

## 2020-07-28 RX ADMIN — HYDROMORPHONE HYDROCHLORIDE 1 MG: 1 INJECTION, SOLUTION INTRAMUSCULAR; INTRAVENOUS; SUBCUTANEOUS at 07:18

## 2020-07-28 RX ADMIN — TRAZODONE HYDROCHLORIDE 100 MG: 100 TABLET ORAL at 22:18

## 2020-07-28 RX ADMIN — AMLODIPINE BESYLATE 5 MG: 5 TABLET ORAL at 09:22

## 2020-07-28 RX ADMIN — DIPHENHYDRAMINE HYDROCHLORIDE 12.5 MG: 50 INJECTION, SOLUTION INTRAMUSCULAR; INTRAVENOUS at 07:18

## 2020-07-28 RX ADMIN — ESCITALOPRAM OXALATE 20 MG: 10 TABLET ORAL at 09:21

## 2020-07-28 RX ADMIN — SODIUM CHLORIDE 125 ML/HR: 900 INJECTION, SOLUTION INTRAVENOUS at 11:31

## 2020-07-28 RX ADMIN — PANTOPRAZOLE SODIUM 40 MG: 40 TABLET, DELAYED RELEASE ORAL at 14:18

## 2020-07-28 RX ADMIN — OXYCODONE HYDROCHLORIDE AND ACETAMINOPHEN 1 TABLET: 5; 325 TABLET ORAL at 11:46

## 2020-07-28 RX ADMIN — Medication 10 ML: at 07:20

## 2020-07-28 RX ADMIN — TRAZODONE HYDROCHLORIDE 100 MG: 100 TABLET ORAL at 01:30

## 2020-07-28 RX ADMIN — ONDANSETRON 4 MG: 2 INJECTION INTRAMUSCULAR; INTRAVENOUS at 04:46

## 2020-07-28 RX ADMIN — OXYCODONE HYDROCHLORIDE AND ACETAMINOPHEN 1 TABLET: 5; 325 TABLET ORAL at 22:18

## 2020-07-28 RX ADMIN — Medication 10 ML: at 22:19

## 2020-07-28 RX ADMIN — THIAMINE HCL TAB 100 MG 100 MG: 100 TAB at 09:21

## 2020-07-28 NOTE — PROGRESS NOTES
Bedside and Verbal shift change report given to PATRICK PolancoRN and TOSHIA Mirza RN (oncoming nurse) by Grant Joe (offgoing nurse). Report included the following information SBAR, Kardex, Intake/Output and MAR. Jose Mayes assuming care of this patient. Bedside and Verbal shift change report given to Wisam Duron (oncoming nurse) by Jose Mayes (offgoing nurse). Report included the following information SBAR, Kardex, Intake/Output and MAR.

## 2020-07-28 NOTE — PROGRESS NOTES
0730- Bedside and Verbal shift change report given to Abraham Reis, RN by ALEK Guerrero RN. Report included the following information SBAR, Kardex, Intake/Output and MAR.     0800- Shift assessment completed. Patient reported pain 6/10 in the upper mid abdomen, especially tender to palpation. Hypoactive bowel sounds, last reported BM 7/24/20. Patient education completed regarding lab results. Patient described mild tingling in bilateral lower extremities since the beginning of July after starting Xarelto. She denied any changes since hospitalization. 1100- Patient education regarding coming off IV pain medication and goal of being discharged 7/28. Dr. Kanchan Gonzales ordered Percocet 5-325 mg and clear liquid diet and continuous NS 0.9% infusion 125 mL/hr, which was started. 1146- Percocet administered with water and apple juice. 1230- Patient denied nausea with drinking clear fluids and rated abdominal pain 0/10. She is resting comfortably. Will continue to monitor. 1420- Gave Protonix 40 mg and Xarelto 15mg. Patient took both tablets with sips of water. Patient denied pain or discomfort at this time. She denied having any bowel movements. 1600- Patient reported abdominal pain 4/10, but declined pain medication at this time. She can ambulate to the bathroom independently, and reported doing so about 4 times since she woke up this morning. 1830- Patient reported that pain hadn't changed since 1600, she is waiting to have her next dose of Percocet at bedtime. She reported drinking another 100 mL of water and was preparing to eat clear liquid dinner. NS 0.9% bag changed and restarted. Patient verbalized she was comfortable and had no needs at this time. Bedside and Verbal shift change report given to MORA Roger, RN by Abraham Reis RN. Report included the following information SBAR, Kardex, Intake/Output and MAR.

## 2020-07-28 NOTE — PROGRESS NOTES
Reason for Admission:   abdominal pain, nausea and vomiting                    RUR Score:   24%                  Plan for utilizing home health:    Unlikely       PCP: First and Last name:  Zac Reid   Name of Practice:    Are you a current patient: Yes/No: yes    Approximate date of last visit:  Less than a week ago   Can you participate in a virtual visit with your PCP: yes                     Current Advanced Directive/Advance Care Plan:  Not on file                         Transition of Care Plan:  Home with physician follow up                     Chart reviewed. Per H&P Audie Dorado is a 39 y.o. female with h/o recurrent pancreatitis, h/o ethanol abuse, h/o portal vein thrombosis on xarelto presents with abdominal pain with nausea and vomiting for the past 3 days. In the emergency room, her lipase is normal, but her CT scan demonstrates acute edematous interstitial pancreatitis. Patient has a history of alcohol abuse states but states that her last drink was before July 3, 2020. She states that Dr. Kristina Khan told her that she needed to stop drinking or her symptoms would get worse, so she stopped. She is s/p cholecystectomy in 2000. She wonders is if her symptoms are related to all of the surgical revisions that Dr. Amy Brown had to do with regard to her weight -loss surgery. \"    CM met with pt at bedside to discuss transition of care. Pt is a readmit and was directed to return to the hospital by her outpt PCP. Pt lives with 3 teenage children that can assist as needed. Pt is IADLs, denies use of DME. Anticipate pt will transition home within the next 24-48 hours with physician follow up. Pt's family will trans port pt home upon discharge. CM to continue to follow and assist.    Care Management Interventions  PCP Verified by CM: Yes  Mode of Transport at Discharge:  Other (see comment)  Transition of Care Consult (CM Consult): Discharge Planning  Health Maintenance Reviewed: Yes  Current Support Network: Own Home(teenage children live with pt)  Confirm Follow Up Transport: Self  The Plan for Transition of Care is Related to the Following Treatment Goals : Home with physician follow up vs [facility] and physician follow up  Discharge Location  Discharge Placement: Home with family assistance

## 2020-07-28 NOTE — PROGRESS NOTES
Bedside and Verbal shift change report given to KetanDilan Vicki Caldwell (oncoming nurse) by Daniela Peña BSN, RN (offgoing nurse). Report included the following information SBAR, Kardex and MAR. SHIFT UPDATES: Patient presented to unit towards the latter part of shift with acute pancreatitis. Patient received IV Dilaudid 1 mg via IV around 1908 pm and receives medication PRN every 4 hours for management. Patient presented with elevated blood pressure readings in which patient's home medication record with high blood pressure medications was completed and Dr. Jaclyn Snider was notified for possible intervention to be ordered. Nightshift nursing staff will be notified to follow up in accordance to blood pressure monitoring, management and intervention. ABNORMAL LAB:   Results for Abdelrahman Ramirez (MRN [id number]) as of 7/27/2020 20:23   Ref. Range 7/27/2020 12:40   WBC Latest Ref Range: 4.6 - 13.2 K/uL 8.9   RBC Latest Ref Range: 4.20 - 5.30 M/uL 3.80 (L)   HGB Latest Ref Range: 12.0 - 16.0 g/dL 11.8 (L)   HCT Latest Ref Range: 35.0 - 45.0 % 36.1   MCV Latest Ref Range: 74.0 - 97.0 FL 95.0   MCH Latest Ref Range: 24.0 - 34.0 PG 31.1   MCHC Latest Ref Range: 31.0 - 37.0 g/dL 32.7   RDW Latest Ref Range: 11.6 - 14.5 % 16.4 (H)   PLATELET Latest Ref Range: 135 - 420 K/uL 379   MPV Latest Ref Range: 9.2 - 11.8 FL 9.2   NEUTROPHILS Latest Ref Range: 40 - 73 % 76 (H)   LYMPHOCYTES Latest Ref Range: 21 - 52 % 14 (L)   MONOCYTES Latest Ref Range: 3 - 10 % 7   EOSINOPHILS Latest Ref Range: 0 - 5 % 3   BASOPHILS Latest Ref Range: 0 - 2 % 0   DF Latest Units:   AUTOMATED   ABS. NEUTROPHILS Latest Ref Range: 1.8 - 8.0 K/UL 6.7   ABS. LYMPHOCYTES Latest Ref Range: 0.9 - 3.6 K/UL 1.2   ABS. MONOCYTES Latest Ref Range: 0.05 - 1.2 K/UL 0.7   ABS. EOSINOPHILS Latest Ref Range: 0.0 - 0.4 K/UL 0.3   ABS.  BASOPHILS Latest Ref Range: 0.0 - 0.1 K/UL 0.0   Sodium Latest Ref Range: 136 - 145 mmol/L 134 (L)   Potassium Latest Ref Range: 3.5 - 5.5 mmol/L 3.6   Chloride Latest Ref Range: 100 - 111 mmol/L 103   CO2 Latest Ref Range: 21 - 32 mmol/L 22   Anion gap Latest Ref Range: 3.0 - 18 mmol/L 9   Glucose Latest Ref Range: 74 - 99 mg/dL 141 (H)   BUN Latest Ref Range: 7.0 - 18 MG/DL 9   Creatinine Latest Ref Range: 0.6 - 1.3 MG/DL 0.92   BUN/Creatinine ratio Latest Ref Range: 12 - 20   10 (L)   Calcium Latest Ref Range: 8.5 - 10.1 MG/DL 8.5   Magnesium Latest Ref Range: 1.6 - 2.6 mg/dL 1.8   GFR est non-AA Latest Ref Range: >60 ml/min/1.73m2 >60   GFR est AA Latest Ref Range: >60 ml/min/1.73m2 >60   Bilirubin, total Latest Ref Range: 0.2 - 1.0 MG/DL 1.1 (H)   Protein, total Latest Ref Range: 6.4 - 8.2 g/dL 7.6   Albumin Latest Ref Range: 3.4 - 5.0 g/dL 3.2 (L)   Globulin Latest Ref Range: 2.0 - 4.0 g/dL 4.4 (H)   A-G Ratio Latest Ref Range: 0.8 - 1.7   0.7 (L)   ALT Latest Ref Range: 13 - 56 U/L 13   AST Latest Ref Range: 10 - 38 U/L 12   Alk. phosphatase Latest Ref Range: 45 - 117 U/L 121 (H)   Lipase Latest Ref Range: 73 - 393 U/L 355   CK Latest Ref Range: 26 - 192 U/L 31   CK-MB Index Latest Ref Range: 0.0 - 4.0 % CALCULATION NOT PERFORMED WHEN RESULT IS BELOW LINEAR LIMIT   CK - MB Latest Ref Range: <3.6 ng/ml <1.0   Troponin-I, Qt. Latest Ref Range: 0.0 - 0.045 NG/ML <0.02   NT pro-BNP Latest Ref Range: 0 - 450 PG/ML 88   HCG, Ql. Latest Ref Range: NEG   Negative     Wounds? None. Central Lines? None      Last BM:  07/26/2020      Pending Labs for AM Draw: CBC with diff, BMP & Lipid Panel on 07/28/2020 at 4 am.     Discharge plan:  Patient was just admitted today (07/27/2020) and has no pending discharge plan at this time.      Doe Prabhakar   7/27/2020  8:25 PM

## 2020-07-28 NOTE — PROGRESS NOTES
Hospitalist Progress Note-critical care note     Patient: Jovani Lou MRN: [id number]  CSN: [id number]    YOB: 1975  Age: 39 y.o. Sex: female    DOA: 7/27/2020 LOS:  LOS: 1 day            Chief complaint: Acute on chronic pancreatitis, hypertension, GERD    Assessment/Plan         Hospital Problems  Date Reviewed: 6/27/2020          Codes Class Noted POA    * (Principal) Acute on chronic pancreatitis ([facility] [address] ICD-10-CM: K85.90, K86.1  ICD-9-CM: 577.0, 577.1  7/27/2020 Yes        Portal vein thrombosis ICD-10-CM: Y78  ICD-9-CM: 259  6/27/2020 Yes        Hypertension ICD-10-CM: I10  ICD-9-CM: 401.9  Unknown Yes        GERD (gastroesophageal reflux disease) ICD-10-CM: K21.9  ICD-9-CM: 530.81  Unknown Yes              Acute on chronic pancreatitis  MRCP done on 6/29-chronic pancreatitis  Avoid  alcohol drinking   F/u with Dr. Celso Gan earlier as out -pt   Give some hydration   Switch to po pain meds   Clear diet     Portal vein thrombosis  Continue xarelto    On day 16 xarelto pack          HTN   Continue home meds     Gerd   ppi       subjective; hungary , want to eat, on last 15 mg twice a day pack, just start the pack yesterday   Rn: want to eat   Disposition :1-2 days   Review of systems:    General: No fevers or chills. Cardiovascular: No chest pain or pressure. No palpitations. Pulmonary: No shortness of breath. Gastrointestinal: No nausea, vomiting. Abdomen pain     Vital signs/Intake and Output:  Visit Vitals  /57 (BP 1 Location: Left arm, BP Patient Position: At rest)   Pulse 79   Temp 98 °F (36.7 °C)   Resp 16   Ht 5' 5\" (1.651 m)   Wt 86.9 kg (191 lb 9.6 oz)   SpO2 98%   Breastfeeding No   BMI 31.88 kg/m²     Current Shift:  No intake/output data recorded. Last three shifts:  No intake/output data recorded. Physical Exam:  General: WD, WN. Alert, cooperative, no acute distress    HEENT: NC, Atraumatic. PERRLA, anicteric sclerae. Lungs: CTA Bilaterally.  No Wheezing/Rhonchi/Rales. Heart:  Regular  rhythm,  No murmur, No Rubs, No Gallops  Abdomen: Soft, Non distended, mild  Tender-epigastric area +Bowel sounds,   Extremities: No c/c/e  Psych:   Not anxious or agitated. Neurologic:  No acute neurological deficit. Labs: Results:       Chemistry Recent Labs     07/28/20  0205 07/27/20  1240   * 141*   * 134*   K 3.8 3.6    103   CO2 25 22   BUN 8 9   CREA 0.80 0.92   CA 8.2* 8.5   AGAP 9 9   BUCR 10* 10*   * 121*   TP 6.7 7.6   ALB 2.9* 3.2*   GLOB 3.8 4.4*   AGRAT 0.8 0.7*      CBC w/Diff Recent Labs     07/28/20  0205 07/27/20  1240   WBC 7.9 8.9   RBC 3.44* 3.80*   HGB 10.7* 11.8*   HCT 34.0* 36.1    379   GRANS 74* 76*   LYMPH 16* 14*   EOS 4 3      Cardiac Enzymes Recent Labs     07/27/20  1240   CPK 31   CKND1 CALCULATION NOT PERFORMED WHEN RESULT IS BELOW LINEAR LIMIT      Coagulation No results for input(s): PTP, INR, APTT, INREXT, INREXT in the last 72 hours. Lipid Panel Lab Results   Component Value Date/Time    Cholesterol, total 169 07/28/2020 02:05 AM    HDL Cholesterol 37 (L) 07/28/2020 02:05 AM    LDL, calculated 97.2 07/28/2020 02:05 AM    VLDL, calculated 34.8 07/28/2020 02:05 AM    Triglyceride 174 (H) 07/28/2020 02:05 AM    CHOL/HDL Ratio 4.6 07/28/2020 02:05 AM      BNP No results for input(s): BNPP in the last 72 hours. Liver Enzymes Recent Labs     07/28/20  0205   TP 6.7   ALB 2.9*   *      Thyroid Studies Lab Results   Component Value Date/Time    TSH 2.18 06/29/2020 12:35 AM        Procedures/imaging: see electronic medical records for all procedures/Xrays and details which were not copied into this note but were reviewed prior to creation of Plan    Cta Chest W Or W Wo Cont    Result Date: 7/27/2020  EXAM: CTA Chest INDICATION: Patient with history of portal vein thrombosis, shortness of breath, currently anticoagulation.  COMPARISON: Several prior abdominal/pelvic CT exams, most recently 6/26/2020; no prior CT imaging the chest available for direct comparison. TECHNIQUE: Axial CT imaging from the thoracic inlet through the diaphragm with intravenous contrast utilizing CTA study for pulmonary artery evaluation. Coronal and sagittal MIP reformations were generated at a separate workstation. One or more dose reduction techniques were used on this CT: automated exposure control, adjustment of the mAs and/or kVp according to patient size, and iterative reconstruction techniques. The specific techniques used on this CT exam have been documented in the patient's electronic medical record. Digital Imaging and Communications in Medicine (DICOM) format image data are available to nonaffiliated external healthcare facilities or entities on a secure, media free, reciprocally searchable basis with patient authorization for at least a 12-month period after this study. _______________ FINDINGS: EXAM QUALITY: Overall exam quality is adequate. Pulmonary arterial enhancement is adequate. The breath hold is satisfactory. PULMONARY ARTERIES: No convincing evidence of pulmonary embolism. MEDIASTINUM: Included thyroid gland unremarkable. Normal cardiac size. No pericardial effusion. Thoracic aorta is of normal course and caliber. LYMPH NODES: No enlarged mediastinal or hilar nodes by size criteria. AIRWAY: Mild bronchial wall thickening without evidence of endobronchial mucoid impaction. LUNGS: Lungs are well aerated. No alveolar consolidation. No significant groundglass abnormality or septal line thickening. PLEURA: No pleural effusion or pneumothorax. UPPER ABDOMEN: Partial visualization of postoperative changes from gastric bypass surgical procedure with significant edematous infiltration of the pancreatic head, transmural thickening of the adjacent stomach, fluid extending into the excluded portion of the stomach. . OTHER: No acute or aggressive osseous abnormalities identified. _______________     IMPRESSION: 1. No evidence of pulmonary embolism. 2.  Mild bronchial wall thickening without evidence of endobronchial mucoid impaction, findings to suggest pneumonia, or pulmonary edema. 3. No evidence of pleural effusion. 4. Partial visualization of postoperative changes within the upper abdomen as well as likely imaging sequela of pancreatitis, (to be more completely described on contemporaneously performed abdominal/pelvic CT). Ct Abd Pelv W Cont    Result Date: 7/27/2020  EXAM: CT of the abdomen and pelvis INDICATION: Epigastric abdominal pain, history of portal vein thrombosis. Pancreatitis. Prior gastric bypass surgical procedure. COMPARISON: No prior studies, most recently CT scan of the abdomen and pelvis 6/26/2020; MRCP 6/29/2020. TECHNIQUE: Axial CT imaging of the abdomen and pelvis was performed with intravenous contrast. Multiplanar reformats were generated. One or more dose reduction techniques were used on this CT: automated exposure control, adjustment of the mAs and/or kVp according to patient size, and iterative reconstruction techniques. The specific techniques used on this CT exam have been documented in the patient's electronic medical record. Digital Imaging and Communications in Medicine (DICOM) format image data are available to nonaffiliated external healthcare facilities or entities on a secure, media free, reciprocally searchable basis with patient authorization for at least a 12-month period after this study. _______________ FINDINGS: LOWER CHEST: Clear lung bases. Normal cardiac size. No pericardial effusion. LIVER, BILIARY: Hepatic parenchymal enhancement is uniform. No biliary dilation. Cholecystectomy. PANCREAS: Moderate peripancreatic edematous infiltration with dominant involvement of the pancreatic head as well as the pancreaticoduodenal groove. Pancreatic ductal ectasia and tiny low attenuating structure within the pancreatic tail, unchanged.  No evidence of an organized or drainable peripancreatic fluid collection. SPLEEN: Normal. ADRENALS: Normal. KIDNEYS/URETERS/BLADDER: Symmetric renal enhancement without evidence of hydronephrosis or nephrolithiasis. Urinary bladder normal in CT appearance. PELVIC ORGANS: Hysterectomy VASCULATURE: Abdominal aorta is of normal course and caliber. Redemonstration of significant narrowing of the superior mesenteric vein and proximal portion of the portal vein adjacent to the pancreatic head/pancreaticoduodenal groove, as previously. LYMPH NODES: No enlarged lymph nodes. GASTROINTESTINAL TRACT: Significant thickening of the distal stomach body and antrum noted with additional transmural thickening adjacent first and second portions duodenum. Fluid noted to be present within the excluded portion of the stomach. Prior gastric bypass surgical procedure. No morphology of bowel obstruction. No free intraperitoneal gas. Normal appendix. BONES: No acute or aggressive osseous abnormalities identified. OTHER: None. _______________     IMPRESSION: 1.  CT findings in keeping with acute edematous interstitial pancreatitis, predominantly involving the pancreaticoduodenal groove.   > Small quantity of reactive fluid within the pancreaticoduodenal groove without evidence of an organized or drainable peripancreatic fluid collection.   > No evidence of glandular necrosis. Small low attenuating structures within the pancreatic tail noted, favored to reflect small pseudocysts as seen on prior MRCP study. 2. Prior gastric bypass surgical procedure. Very likely reactive inflammatory change involving the distal stomach body and antrum as well as the proximal portion of the duodenum. No morphology of bowel obstruction.      Xr Chest Port    Result Date: 7/27/2020  EXAM: XR CHEST PORT CLINICAL INDICATION/HISTORY: SOB -Additional: None COMPARISON: March 23, 2019 TECHNIQUE: Frontal view of the chest _______________ FINDINGS: HEART AND MEDIASTINUM: Normal cardiac size and mediastinal contours. LUNGS AND PLEURAL SPACES: No focal pneumonic consolidation, pneumothorax, or pleural effusion. BONY THORAX AND SOFT TISSUES: No acute osseous abnormality _______________     IMPRESSION: No acute radiographic cardiopulmonary abnormality. Mri Abd W Mrcp W Wo Cont    Result Date: 6/29/2020  EXAM: MRI ABDOMEN CLINICAL INDICATION/HISTORY:  acute pancreatitis -Additional: None COMPARISON: 6/26/2020 TECHNIQUE: MRI of the abdomen was performed with and without intravenous contrast. MRCP included. Post processing 3D rendering was done on a separate workstation under concurrent physician supervision. _______________ FINDINGS: LOWER CHEST: Minimal pleural fluid bilaterally. LIVER: Normal liver contour and parenchymal signal. No suspicious lesion. BILIARY: No biliary dilation, irregularity, or filling defects. Cholecystectomy. PANCREAS: Edematous signal abnormality centered at the pancreatic head/neck with associated early hypoenhancement. No definitive glandular necrosis, with subsequent homogeneous parenchymal enhancement. There is peripancreatic edema but no formed fluid collection identified. The portal splenic confluence appears occluded due to pancreatic inflammation. Distal pancreatic duct is ectatic and irregular. 2 small cysts in the tail, the larger measuring 1 cm. No worrisome features. SPLEEN: Normal. ADRENALS: Normal. KIDNEYS: Normal. LYMPH NODES: No enlarged lymph nodes. GASTROINTESTINAL TRACT: No bowel dilation or wall thickening. Gastric bypass without evidence of complication. VASCULATURE: Unremarkable. BONES: No acute or aggressive osseous abnormalities identified. OTHER: Minimal perihepatic ascites. Mesenteric edema. _______________     IMPRESSION: Findings of acute pancreatitis centered at the head/neck, appears interstitial edematous without convincing glandular necrosis. No formed fluid collection. Pancreatic duct ectasia and irregularity likely sequela of chronic pancreatitis.  2 small benign-appearing cyst in the pancreatic tail noted, also likely sequela of chronic pancreatitis.       Lauri Vogt MD

## 2020-07-28 NOTE — PROGRESS NOTES
Comprehensive Nutrition Assessment    Type and Reason for Visit: Positive nutrition screen, Initial    Nutrition Recommendations/Plan: Diet: Adv diet per MD; Avoid prolonged NPO diet order as it does not meet estimated needs     Nutrition Assessment:  acute pancreatitis, on bowel rest, hx of ETOH abuse, uncontrolled HTN        Estimated Daily Nutrient Needs:  Energy (kcal):  2009  Protein (g):  45-57       Fluid (ml/day):  2009    Nutrition Related Findings:         Wounds:    None       Current Nutrition Therapies:   DIET CLEAR LIQUID    Anthropometric Measures:  Height:  5' 5\" (165.1 cm)  Current Body Wt:  86.6 kg (191 lb)   Admission Body Wt:       Usual Body Wt:  211 lb(12/11/19)     Ideal Body Wt:   :      Adjusted Body Weight:   ; Weight Adjustment for:     Adjusted BMI:       BMI Categories:  Obese class 1 (BMI 30.0-34. 9)       Nutrition Diagnosis:   (P) Inadequate energy intake related to (P) altered GI function as evidenced by (P) NPO or clear liquid status due to medical condition, poor intake prior to admission    Nutrition Interventions:   Food and/or Nutrient Delivery: (P) Start oral diet  Nutrition Education and Counseling: (P) No recommendations at this time  Coordination of Nutrition Care: (P) Continued inpatient monitoring, Interdisciplinary rounds    Goals:  (P) Adv diet per MD in the next 3days       Nutrition Monitoring and Evaluation:   Behavioral-Environmental Outcomes:    Food/Nutrient Intake Outcomes: (P) Diet advancement/tolerance  Physical Signs/Symptoms Outcomes: (P) Biochemical data, Nutrition focused physical findings, Skin, Weight, GI status, Nausea/vomiting, Fluid status or edema    Discharge Planning:    (P) Too soon to determine     Electronically signed by Flor Bustillos on 7/28/2020 at 12:42 PM

## 2020-07-28 NOTE — PROGRESS NOTES
Bedside and verbal report given to ALEK Cadet RN (oncoming nurse) by Benjie Tapia RN  (off going nurse). Report included the following information SBAR, Kardex, OR Summary, Intake/Output, and MAR. Pt was medicated for pain x2 and requested Benadryl x2. Bedside and verbal report given by (off going nurse) ALEK Cadet RN to (oncoming nurse) Oswaldo Nickerson RN. Report included the following information SBAR, Kardex, OR Summary, Intake/Output, and MAR.

## 2020-07-28 NOTE — PROGRESS NOTES
Patient states that pain is well controlled with current prn pain medications. Patient fluid status is being maintained by continuous NS 0.9% infusion and clear liquid diet.

## 2020-07-28 NOTE — PROGRESS NOTES
Problem: General Medical Care Plan  Goal: *Vital signs within specified parameters  Outcome: Progressing Towards Goal  Goal: *Labs within defined limits  Outcome: Progressing Towards Goal  Goal: *Absence of infection signs and symptoms  Outcome: Progressing Towards Goal  Goal: *Optimal pain control at patient's stated goal  Outcome: Progressing Towards Goal  Goal: *Skin integrity maintained  Outcome: Progressing Towards Goal  Goal: *Fluid volume balance  Outcome: Progressing Towards Goal  Goal: *Optimize nutritional status  Outcome: Progressing Towards Goal  Goal: *Anxiety reduced or absent  Outcome: Progressing Towards Goal  Goal: *Progressive mobility and function (eg: ADL's)  Outcome: Progressing Towards Goal     Problem: Patient Education: Go to Patient Education Activity  Goal: Patient/Family Education  Outcome: Progressing Towards Goal     Problem: Falls - Risk of  Goal: *Absence of Falls  Description: Document Lucila Fall Risk and appropriate interventions in the flowsheet.   Outcome: Progressing Towards Goal  Note: Fall Risk Interventions:            Medication Interventions: Teach patient to arise slowly                   Problem: Patient Education: Go to Patient Education Activity  Goal: Patient/Family Education  Outcome: Progressing Towards Goal     Problem: Pain  Goal: *Control of Pain  Outcome: Progressing Towards Goal  Goal: *PALLIATIVE CARE:  Alleviation of Pain  Outcome: Progressing Towards Goal

## 2020-07-29 VITALS
OXYGEN SATURATION: 100 % | DIASTOLIC BLOOD PRESSURE: 73 MMHG | TEMPERATURE: 98 F | RESPIRATION RATE: 18 BRPM | BODY MASS INDEX: 31.92 KG/M2 | HEIGHT: 65 IN | SYSTOLIC BLOOD PRESSURE: 101 MMHG | WEIGHT: 191.6 LBS | HEART RATE: 76 BPM

## 2020-07-29 LAB
ALBUMIN SERPL-MCNC: 2.4 G/DL (ref 3.4–5)
ALBUMIN/GLOB SERPL: 0.7 {RATIO} (ref 0.8–1.7)
ALP SERPL-CCNC: 107 U/L (ref 45–117)
ALT SERPL-CCNC: 10 U/L (ref 13–56)
ANION GAP SERPL CALC-SCNC: 8 MMOL/L (ref 3–18)
AST SERPL-CCNC: 14 U/L (ref 10–38)
BILIRUB DIRECT SERPL-MCNC: 0.1 MG/DL (ref 0–0.2)
BILIRUB SERPL-MCNC: 0.4 MG/DL (ref 0.2–1)
BUN SERPL-MCNC: 5 MG/DL (ref 7–18)
BUN/CREAT SERPL: 7 (ref 12–20)
CALCIUM SERPL-MCNC: 7.8 MG/DL (ref 8.5–10.1)
CHLORIDE SERPL-SCNC: 108 MMOL/L (ref 100–111)
CO2 SERPL-SCNC: 23 MMOL/L (ref 21–32)
CREAT SERPL-MCNC: 0.7 MG/DL (ref 0.6–1.3)
GLOBULIN SER CALC-MCNC: 3.5 G/DL (ref 2–4)
GLUCOSE SERPL-MCNC: 77 MG/DL (ref 74–99)
POTASSIUM SERPL-SCNC: 3.8 MMOL/L (ref 3.5–5.5)
PROT SERPL-MCNC: 5.9 G/DL (ref 6.4–8.2)
SODIUM SERPL-SCNC: 139 MMOL/L (ref 136–145)

## 2020-07-29 PROCEDURE — 80076 HEPATIC FUNCTION PANEL: CPT

## 2020-07-29 PROCEDURE — 80048 BASIC METABOLIC PNL TOTAL CA: CPT

## 2020-07-29 PROCEDURE — 74011250637 HC RX REV CODE- 250/637: Performed by: HOSPITALIST

## 2020-07-29 PROCEDURE — 74011250637 HC RX REV CODE- 250/637: Performed by: FAMILY MEDICINE

## 2020-07-29 PROCEDURE — 36415 COLL VENOUS BLD VENIPUNCTURE: CPT

## 2020-07-29 RX ORDER — OXYCODONE AND ACETAMINOPHEN 5; 325 MG/1; MG/1
1 TABLET ORAL
Qty: 8 TAB | Refills: 0 | Status: SHIPPED | OUTPATIENT
Start: 2020-07-29 | End: 2020-07-31

## 2020-07-29 RX ADMIN — PANTOPRAZOLE SODIUM 40 MG: 40 TABLET, DELAYED RELEASE ORAL at 06:51

## 2020-07-29 RX ADMIN — RIVAROXABAN 15 MG: 10 TABLET, FILM COATED ORAL at 09:17

## 2020-07-29 RX ADMIN — ESCITALOPRAM OXALATE 20 MG: 10 TABLET ORAL at 09:16

## 2020-07-29 RX ADMIN — THIAMINE HCL TAB 100 MG 100 MG: 100 TAB at 09:16

## 2020-07-29 RX ADMIN — OXYCODONE HYDROCHLORIDE AND ACETAMINOPHEN 1 TABLET: 5; 325 TABLET ORAL at 09:17

## 2020-07-29 RX ADMIN — AMLODIPINE BESYLATE 5 MG: 5 TABLET ORAL at 09:17

## 2020-07-29 RX ADMIN — Medication 10 ML: at 06:51

## 2020-07-29 NOTE — PROGRESS NOTES
Problem: Falls - Risk of  Goal: *Absence of Falls  Description: Document Robbi Host Fall Risk and appropriate interventions in the flowsheet.   Outcome: Progressing Towards Goal  Note: Fall Risk Interventions:            Medication Interventions: Teach patient to arise slowly                   Problem: Pain  Goal: *Control of Pain  Outcome: Progressing Towards Goal

## 2020-07-29 NOTE — DISCHARGE INSTRUCTIONS
Patient Education   Patient Education   Patient Education        Pancreatitis: Care Instructions  Your Care Instructions     The pancreas is an organ behind the stomach. It makes hormones and enzymes to help your body digest food. But if these enzymes attack the pancreas, it can get inflamed. This is called pancreatitis. Most cases are caused by gallstones or by heavy alcohol use. If you take care of yourself at home, it will help you get better. It will also help you avoid more problems with your pancreas. Follow-up care is a key part of your treatment and safety. Be sure to make and go to all appointments, and call your doctor if you are having problems. It's also a good idea to know your test results and keep a list of the medicines you take. How can you care for yourself at home? · Drink clear liquids and eat bland foods until you feel better. [place] foods include rice, dry toast, and crackers. They also include bananas and applesauce. · Eat a low-fat diet until your doctor says your pancreas is healed. · Do not drink alcohol. Tell your doctor if you need help to quit. Counseling, support groups, and sometimes medicines can help you stay sober. · Be safe with medicines. Read and follow all instructions on the label. ? If the doctor gave you a prescription medicine for pain, take it as prescribed. ? If you are not taking a prescription pain medicine, ask your doctor if you can take an over-the-counter medicine. · If your doctor prescribed antibiotics, take them as directed. Do not stop taking them just because you feel better. You need to take the full course of antibiotics. · Get extra rest until you feel better. To prevent future problems with your pancreas  · Do not drink alcohol. · Tell your doctors and pharmacist that you've had pancreatitis. They can help you avoid medicines that may cause this problem again. When should you call for help?    [id number] anytime you think you may need emergency care. For example, call if:  · You vomit blood or what looks like coffee grounds. · Your stools are maroon or very bloody. Call your doctor now or seek immediate medical care if:  · You have new or worse belly pain. · Your stools are black and look like tar, or they have streaks of blood. · You are vomiting. Watch closely for changes in your health, and be sure to contact your doctor if:  · You do not get better as expected. Where can you learn more? Go to http://sandra-abel.info/  Enter J381 in the search box to learn more about \"Pancreatitis: Care Instructions. \"  Current as of: August 12, 2019               Content Version: 12.5  © 8060-7631 [employer]. Care instructions adapted under license by [employer] (which disclaims liability or warranty for this information). If you have questions about a medical condition or this instruction, always ask your healthcare professional. [place] [address] any warranty or liability for your use of this information. High Blood Pressure: Care Instructions  Overview     It's normal for blood pressure to go up and down throughout the day. But if it stays up, you have high blood pressure. Another name for high blood pressure is hypertension. Despite what a lot of people think, high blood pressure usually doesn't cause headaches or make you feel dizzy or lightheaded. It usually has no symptoms. But it does increase your risk of stroke, heart attack, and other problems. You and your doctor will talk about your risks of these problems based on your blood pressure. Your doctor will give you a goal for your blood pressure. Your goal will be based on your health and your age. Lifestyle changes, such as eating healthy and being active, are always important to help lower blood pressure.  You might also take medicine to reach your blood pressure goal.  Follow-up care is a key part of your treatment and safety. Be sure to make and go to all appointments, and call your doctor if you are having problems. It's also a good idea to know your test results and keep a list of the medicines you take. How can you care for yourself at home? Medical treatment  · If you stop taking your medicine, your blood pressure will go back up. You may take one or more types of medicine to lower your blood pressure. Be safe with medicines. Take your medicine exactly as prescribed. Call your doctor if you think you are having a problem with your medicine. · Talk to your doctor before you start taking aspirin every day. Aspirin can help certain people lower their risk of a heart attack or stroke. But taking aspirin isn't right for everyone, because it can cause serious bleeding. · See your doctor regularly. You may need to see the doctor more often at first or until your blood pressure comes down. · If you are taking blood pressure medicine, talk to your doctor before you take decongestants or anti-inflammatory medicine, such as ibuprofen. Some of these medicines can raise blood pressure. · Learn how to check your blood pressure at home. Lifestyle changes  · Stay at a healthy weight. This is especially important if you put on weight around the waist. Losing even 10 pounds can help you lower your blood pressure. · If your doctor recommends it, get more exercise. Walking is a good choice. Bit by bit, increase the amount you walk every day. Try for at least 30 minutes on most days of the week. You also may want to swim, bike, or do other activities. · Avoid or limit alcohol. Talk to your doctor about whether you can drink any alcohol. · Try to limit how much sodium you eat to less than 2,300 milligrams (mg) a day. Your doctor may ask you to try to eat less than 1,500 mg a day. · Eat plenty of fruits (such as bananas and oranges), vegetables, legumes, whole grains, and low-fat dairy products.   · Lower the amount of saturated fat in your diet. Saturated fat is found in animal products such as milk, cheese, and meat. Limiting these foods may help you lose weight and also lower your risk for heart disease. · Do not smoke. Smoking increases your risk for heart attack and stroke. If you need help quitting, talk to your doctor about stop-smoking programs and medicines. These can increase your chances of quitting for good. When should you call for help? Call  911 anytime you think you may need emergency care. This may mean having symptoms that suggest that your blood pressure is causing a serious heart or blood vessel problem. Your blood pressure may be over 180/120. For example, call 911 if:  · You have symptoms of a heart attack. These may include:  ? Chest pain or pressure, or a strange feeling in the chest.  ? Sweating. ? Shortness of breath. ? Nausea or vomiting. ? Pain, pressure, or a strange feeling in the back, neck, jaw, or upper belly or in one or both shoulders or arms. ? Lightheadedness or sudden weakness. ? A fast or irregular heartbeat. · You have symptoms of a stroke. These may include:  ? Sudden numbness, tingling, weakness, or loss of movement in your face, arm, or leg, especially on only one side of your body. ? Sudden vision changes. ? Sudden trouble speaking. ? Sudden confusion or trouble understanding simple statements. ? Sudden problems with walking or balance. ? A sudden, severe headache that is different from past headaches. · You have severe back or belly pain. Do not wait until your blood pressure comes down on its own. Get help right away. Call your doctor now or seek immediate care if:  · Your blood pressure is much higher than normal (such as 180/120 or higher), but you don't have symptoms. · You think high blood pressure is causing symptoms, such as:  ? Severe headache.  ? Blurry vision.   Watch closely for changes in your health, and be sure to contact your doctor if:  · Your blood pressure measures higher than your doctor recommends at least 2 times. That means the top number is higher or the bottom number is higher, or both. · You think you may be having side effects from your blood pressure medicine. Where can you learn more? Go to http://sandra-abel.info/  Enter [id number] in the search box to learn more about \"High Blood Pressure: Care Instructions. \"  Current as of: December 16, 2019               Content Version: 12.5  © 2006-2020 [employer]. Care instructions adapted under license by [employer] (which disclaims liability or warranty for this information). If you have questions about a medical condition or this instruction, always ask your healthcare professional. [address] any warranty or liability for your use of this information. Gastroesophageal Reflux Disease (GERD): Care Instructions  Your Care Instructions     Gastroesophageal reflux disease (GERD) is the backward flow of stomach acid into the esophagus. The esophagus is the tube that leads from your throat to your stomach. A one-way valve prevents the stomach acid from backing up into this tube. When you have GERD, this valve does not close tightly enough. This can also cause pain and swelling in your esophagus (esophagitis). If you have mild GERD symptoms including heartburn, you may be able to control the problem with antacids or over-the-counter medicine. Changing your diet and eating habits, such as not eating late at night, losing weight, and making other lifestyle changes can also help reduce symptoms. Follow-up care is a key part of your treatment and safety. Be sure to make and go to all appointments, and call your doctor if you are having problems. It's also a good idea to know your test results and keep a list of the medicines you take. How can you care for yourself at home? · Take your medicines exactly as prescribed.  Call your doctor if you think you are having a problem with your medicine. · Your doctor may recommend over-the-counter medicine. For mild or occasional indigestion, antacids, such as Tums, Gaviscon, Mylanta, or Maalox, may help. Your doctor also may recommend over-the-counter acid reducers, such as Pepcid AC (famotidine), Tagamet HB (cimetidine), or Prilosec (omeprazole). Read and follow all instructions on the label. If you use these medicines often, talk with your doctor. · Change your eating habits. ? It's best to eat several small meals instead of two or three large meals. ? After you eat, wait 2 to 3 hours before you lie down. ? Chocolate, mint, and alcohol can make GERD worse. ? Spicy foods, foods that have a lot of acid (like tomatoes and oranges), and coffee can make GERD symptoms worse in some people. If your symptoms are worse after you eat a certain food, you may want to stop eating that food to see if your symptoms get better. · Do not smoke or chew tobacco. Smoking can make GERD worse. If you need help quitting, talk to your doctor about stop-smoking programs and medicines. These can increase your chances of quitting for good. · If you have GERD symptoms at night, raise the head of your bed 6 to 8 inches by putting the frame on blocks or placing a foam wedge under the head of your mattress. (Adding extra pillows does not work.)  · Do not wear tight clothing around your middle. · Lose weight if you need to. Losing just 5 to 10 pounds can help. When should you call for help? Call your doctor now or seek immediate medical care if:  · You have new or different belly pain. · Your stools are black and tarlike or have streaks of blood. Watch closely for changes in your health, and be sure to contact your doctor if:  · Your symptoms have not improved after 2 days. · Food seems to catch in your throat or chest.  Where can you learn more?   Go to http://www.gray.com/  Enter A218 in the search box to learn more about \"Gastroesophageal Reflux Disease (GERD): Care Instructions. \"  Current as of: August 12, 2019               Content Version: 12.5  © 3925-9026 [employer]. Care instructions adapted under license by [employer] (which disclaims liability or warranty for this information). If you have questions about a medical condition or this instruction, always ask your healthcare professional. [address] any warranty or liability for your use of this information.

## 2020-07-29 NOTE — PROGRESS NOTES
Problem: General Medical Care Plan  Goal: *Vital signs within specified parameters  Outcome: Progressing Towards Goal  Goal: *Labs within defined limits  Outcome: Progressing Towards Goal  Goal: *Absence of infection signs and symptoms  Outcome: Progressing Towards Goal  Goal: *Optimal pain control at patient's stated goal  Outcome: Progressing Towards Goal  Goal: *Skin integrity maintained  Outcome: Progressing Towards Goal  Goal: *Fluid volume balance  Outcome: Progressing Towards Goal  Goal: *Optimize nutritional status  Outcome: Progressing Towards Goal  Goal: *Anxiety reduced or absent  Outcome: Progressing Towards Goal  Goal: *Progressive mobility and function (eg: ADL's)  Outcome: Progressing Towards Goal     Problem: Patient Education: Go to Patient Education Activity  Goal: Patient/Family Education  Outcome: Progressing Towards Goal     Problem: Falls - Risk of  Goal: *Absence of Falls  Description: Document Lucila Fall Risk and appropriate interventions in the flowsheet.   Outcome: Progressing Towards Goal     Problem: Patient Education: Go to Patient Education Activity  Goal: Patient/Family Education  Outcome: Progressing Towards Goal     Problem: Pain  Goal: *Control of Pain  Outcome: Progressing Towards Goal

## 2020-07-29 NOTE — PROGRESS NOTES
Discharge instructions reviewed with the patient. Patient verbalized understanding and verified by teach back. All questions answered. IV discontinued by primary staff, no redness, swelling or pain noted. Patient awaiting family for transportation home, with an ETA of 4 hours. Patient armband remain intact until prior to patient discharge.

## 2020-07-29 NOTE — PROGRESS NOTES
1900- Bedside and Verbal shift change report given to SUE Flores RN (oncoming nurse) by AZALIA Merchant and KATINA Mirza RN (offgoing nurse). Report included the following information SBAR, Kardex, Intake/Output, MAR and Recent Results. 2041- VSS. Assessment complete. Pt confirms NKA. AAOx4. Pain 4/10, declines interventions, will continue to monitor. Educated on pain management and medications available, plan of care, signs and symptoms to report, and keeping gripper socks on while ambulating. Whiteboard updated. Breath sounds clear bilaterally. Pt denies any headache, visual disturbances, or epigastric pain. Pt states last BM to be 7/23/2020. Pt states she has been voiding and flatus without difficulty. Pt denies any nausea or vomiting. Pt declines use of SCDs, educated on benefits of use. Pt educated on infection control and prevention. No further questions on concerns at this time. Assessment complete. Call bell within reach. Bed in lowest position. 2220- VSS. Pain 9/10, medication administered per order. 2259- Pain reassess, rated 4/10, declines additional interventions. 0220- Pt resting comfortable supine in bed, awake and alert. No needs expressed at this time. Bed in lowest position. Call bell within reach. 0340- Pt resting comfortable supine in bed, awake and alert. No needs expressed at this time. Bed in lowest position. Call bell within reach. 0622- Pt resting comfortable supine in bed, awake and alert. No needs expressed at this time. Bed in lowest position. Call bell within reach. 0715- Bedside and Verbal shift change report given to AZALIA Mares (oncoming nurse) by Dimple Fitzgerald RN (offgoing nurse). Report included the following information SBAR, Kardex, Intake/Output, MAR and Recent Results. Opportunities for questions was provided.      Patient Vitals for the past 12 hrs:   Temp Pulse Resp BP SpO2   07/29/20 0307 97.8 °F (36.6 °C) 62 19 96/56 100 %   07/28/20 2220 98 °F (36.7 °C) 84 19 119/69 100 %   07/28/20 1945 98 °F (36.7 °C) 95 16 114/74 98 %

## 2020-07-29 NOTE — PROGRESS NOTES
0712 Assumed care of patient from Carlitos Mckeon RN. Patient in satisfactory condition. VS stable. Pain voiced at 4/10, denies the need for pain medication at this time. IS educated and encouraged. Shift POC reviewed with patient. Menu/meal times explained. Call/bell phone within reach. Will monitor for changes. Plans to be d/c home later today. 5880 Scheduled medications given. No needs at this time. Pain voiced at 6/10, medicated per MAR.  Preparing for discharge

## 2020-07-29 NOTE — DISCHARGE SUMMARY
Discharge Summary    Patient: Jovani Lou MRN: [id number]  CSN: [id number]    YOB: 1975  Age: 39 y.o. Sex: female    DOA: 7/27/2020 LOS:  LOS: 2 days   Discharge Date:      Primary Care Provider:  Moises Nath NP    Admission Diagnoses: Acute pancreatitis [K85.90]    Discharge Diagnoses:    Hospital Problems  Date Reviewed: 6/27/2020          Codes Class Noted POA    * (Principal) Acute on chronic pancreatitis [facility]) ICD-10-CM: K85.90, K86.1  ICD-9-CM: 313.2, 577.1  7/27/2020 Yes        Portal vein thrombosis ICD-10-CM: J52  ICD-9-CM: 923  6/27/2020 Yes        Hypertension ICD-10-CM: I10  ICD-9-CM: 401.9  Unknown Yes        GERD (gastroesophageal reflux disease) ICD-10-CM: K21.9  ICD-9-CM: 530.81  Unknown Yes              Discharge Condition: stable     Discharge Medications:     Current Discharge Medication List      START taking these medications    Details   oxyCODONE-acetaminophen (PERCOCET) 5-325 mg per tablet Take 1 Tab by mouth every six (6) hours as needed for Pain for up to 2 days. Max Daily Amount: 4 Tabs. Qty: 8 Tab, Refills: 0    Associated Diagnoses: Acute on chronic pancreatitis ([facility] [address]         CONTINUE these medications which have NOT CHANGED    Details   escitalopram oxalate (LEXAPRO) 20 mg tablet Take 20 mg by mouth daily. Indications: anxiousness associated with depression      famotidine (PEPCID) 10 mg tablet Take 10 mg by mouth two (2) times a day. rivaroxaban (Xarelto) 15 mg (42)- 20 mg (9) DsPk Take one 15 mg tablet twice a day with food for the first 21 days. Then, take one 20 mg tablet once a day with food for 9 days. Qty: 1 Dose Pack, Refills: 0      omeprazole (PRILOSEC OTC) 20 mg tablet Take 1 Tab by mouth daily. Qty: 30 Tab, Refills: 0      traZODone (DESYREL) 100 mg tablet Take  by mouth nightly. Pt is unsure of the dose for this medication      multivitamin (ONE A DAY) tablet Take 1 Tab by mouth daily.       amLODIPine (NORVASC) 10 mg tablet Take 0.5 Tabs by mouth daily. Qty: 30 Tab, Refills: 0      thiamine mononitrate (B-1) 100 mg tablet Take 1 Tab by mouth daily. Qty: 30 Tab, Refills: 0             Procedures : none     Consults: None      PHYSICAL EXAM   Visit Vitals  /82 (BP 1 Location: Right arm, BP Patient Position: At rest)   Pulse 76   Temp 98.4 °F (36.9 °C)   Resp 18   Ht 5' 5\" (1.651 m)   Wt 86.9 kg (191 lb 9.6 oz)   SpO2 100%   Breastfeeding No   BMI 31.88 kg/m²     General: Awake, cooperative, no acute distress    HEENT: NC, Atraumatic. PERRLA, EOMI. Anicteric sclerae. Lungs:  CTA Bilaterally. No Wheezing/Rhonchi/Rales. Heart:  Regular  rhythm,  No murmur, No Rubs, No Gallops  Abdomen: Soft, Non distended, mild tender. +Bowel sounds,   Extremities: No c/c/e  Psych:   Not anxious or agitated. Neurologic:  No acute neurological deficits. Admission HPI :     Dylan Chisholm is a 39 y.o. female with h/o recurrent pancreatitis, h/o ethanol abuse, h/o portal vein thrombosis on xarelto presents with abdominal pain with nausea and vomiting for the past 3 days. In the emergency room, her lipase is normal, but her CT scan demonstrates acute edematous interstitial pancreatitis. Patient has a history of alcohol abuse states but states that her last drink was before July 3, 2020. She states that Dr. Belen Barillas told her that she needed to stop drinking or her symptoms would get worse, so she stopped. She is s/p cholecystectomy in 2000. She wonders is if her symptoms are related to all of the surgical revisions that Dr. Trung Salgado had to do with regard to her weight -loss surgery. Hospital Course :   Dylan Chisholm is a 39 y.o. female with h/o recurrent pancreatitis, h/o ethanol abuse, h/o portal vein thrombosis on xarelto was admitted due to acute on chronic pancreatitis. IV hydration and iv narcotics were administrated. She tolerated clear diet very well no nausea no vomiting. Abdominal pain was controlled by p.o. pain medication. She was supposed to follow-up with Dr. Dinorah Agustin her recurrent problem. She had no chance to see GI since last discharge. CM scheduled appointment with GI at the beginning of August.  Also recommended stop drinking alcohol. Patient -denies alcohol drinking. We also continue Xarelto for her portal vein thrombosis. Recommend to continue hydration her self. Discharge planning discussed with patient, pt agrees  with the plan and no questions and concerns at this point. Activity: Activity as tolerated    Diet: Clear liquids, advance as tolerated    Follow-up: PCP and Dr. Terrence Mejia     Disposition: home     Minutes spent on discharge: 45 min       Labs: Results:       Chemistry Recent Labs     07/29/20  0240 07/28/20  0205 07/27/20  1240   GLU 77 110* 141*    135* 134*   K 3.8 3.8 3.6    101 103   CO2 23 25 22   BUN 5* 8 9   CREA 0.70 0.80 0.92   CA 7.8* 8.2* 8.5   AGAP 8 9 9   BUCR 7* 10* 10*    133* 121*   TP 5.9* 6.7 7.6   ALB 2.4* 2.9* 3.2*   GLOB 3.5 3.8 4.4*   AGRAT 0.7* 0.8 0.7*      CBC w/Diff Recent Labs     07/28/20  0205 07/27/20  1240   WBC 7.9 8.9   RBC 3.44* 3.80*   HGB 10.7* 11.8*   HCT 34.0* 36.1    379   GRANS 74* 76*   LYMPH 16* 14*   EOS 4 3      Cardiac Enzymes Recent Labs     07/27/20  1240   CPK 31   CKND1 CALCULATION NOT PERFORMED WHEN RESULT IS BELOW LINEAR LIMIT      Coagulation No results for input(s): PTP, INR, APTT, INREXT in the last 72 hours. Lipid Panel Lab Results   Component Value Date/Time    Cholesterol, total 169 07/28/2020 02:05 AM    HDL Cholesterol 37 (L) 07/28/2020 02:05 AM    LDL, calculated 97.2 07/28/2020 02:05 AM    VLDL, calculated 34.8 07/28/2020 02:05 AM    Triglyceride 174 (H) 07/28/2020 02:05 AM    CHOL/HDL Ratio 4.6 07/28/2020 02:05 AM      BNP No results for input(s): BNPP in the last 72 hours.    Liver Enzymes Recent Labs     07/29/20  0240   TP 5.9*   ALB 2.4*         Thyroid Studies Lab Results   Component Value Date/Time    TSH 2.18 06/29/2020 12:35 AM          @micro    Significant Diagnostic Studies: Cta Chest W Or W Wo Cont    Result Date: 7/27/2020  EXAM: CTA Chest INDICATION: Patient with history of portal vein thrombosis, shortness of breath, currently anticoagulation. COMPARISON: Several prior abdominal/pelvic CT exams, most recently 6/26/2020; no prior CT imaging the chest available for direct comparison. TECHNIQUE: Axial CT imaging from the thoracic inlet through the diaphragm with intravenous contrast utilizing CTA study for pulmonary artery evaluation. Coronal and sagittal MIP reformations were generated at a separate workstation. One or more dose reduction techniques were used on this CT: automated exposure control, adjustment of the mAs and/or kVp according to patient size, and iterative reconstruction techniques. The specific techniques used on this CT exam have been documented in the patient's electronic medical record. Digital Imaging and Communications in Medicine (DICOM) format image data are available to nonaffiliated external healthcare facilities or entities on a secure, media free, reciprocally searchable basis with patient authorization for at least a 12-month period after this study. _______________ FINDINGS: EXAM QUALITY: Overall exam quality is adequate. Pulmonary arterial enhancement is adequate. The breath hold is satisfactory. PULMONARY ARTERIES: No convincing evidence of pulmonary embolism. MEDIASTINUM: Included thyroid gland unremarkable. Normal cardiac size. No pericardial effusion. Thoracic aorta is of normal course and caliber. LYMPH NODES: No enlarged mediastinal or hilar nodes by size criteria. AIRWAY: Mild bronchial wall thickening without evidence of endobronchial mucoid impaction. LUNGS: Lungs are well aerated. No alveolar consolidation. No significant groundglass abnormality or septal line thickening. PLEURA: No pleural effusion or pneumothorax.  UPPER ABDOMEN: Partial visualization of postoperative changes from gastric bypass surgical procedure with significant edematous infiltration of the pancreatic head, transmural thickening of the adjacent stomach, fluid extending into the excluded portion of the stomach. . OTHER: No acute or aggressive osseous abnormalities identified. _______________     IMPRESSION: 1. No evidence of pulmonary embolism. 2.  Mild bronchial wall thickening without evidence of endobronchial mucoid impaction, findings to suggest pneumonia, or pulmonary edema. 3. No evidence of pleural effusion. 4. Partial visualization of postoperative changes within the upper abdomen as well as likely imaging sequela of pancreatitis, (to be more completely described on contemporaneously performed abdominal/pelvic CT). Ct Abd Pelv W Cont    Result Date: 7/27/2020  EXAM: CT of the abdomen and pelvis INDICATION: Epigastric abdominal pain, history of portal vein thrombosis. Pancreatitis. Prior gastric bypass surgical procedure. COMPARISON: No prior studies, most recently CT scan of the abdomen and pelvis 6/26/2020; MRCP 6/29/2020. TECHNIQUE: Axial CT imaging of the abdomen and pelvis was performed with intravenous contrast. Multiplanar reformats were generated. One or more dose reduction techniques were used on this CT: automated exposure control, adjustment of the mAs and/or kVp according to patient size, and iterative reconstruction techniques. The specific techniques used on this CT exam have been documented in the patient's electronic medical record. Digital Imaging and Communications in Medicine (DICOM) format image data are available to nonaffiliated external healthcare facilities or entities on a secure, media free, reciprocally searchable basis with patient authorization for at least a 12-month period after this study. _______________ FINDINGS: LOWER CHEST: Clear lung bases. Normal cardiac size. No pericardial effusion.  LIVER, BILIARY: Hepatic parenchymal enhancement is uniform. No biliary dilation. Cholecystectomy. PANCREAS: Moderate peripancreatic edematous infiltration with dominant involvement of the pancreatic head as well as the pancreaticoduodenal groove. Pancreatic ductal ectasia and tiny low attenuating structure within the pancreatic tail, unchanged. No evidence of an organized or drainable peripancreatic fluid collection. SPLEEN: Normal. ADRENALS: Normal. KIDNEYS/URETERS/BLADDER: Symmetric renal enhancement without evidence of hydronephrosis or nephrolithiasis. Urinary bladder normal in CT appearance. PELVIC ORGANS: Hysterectomy VASCULATURE: Abdominal aorta is of normal course and caliber. Redemonstration of significant narrowing of the superior mesenteric vein and proximal portion of the portal vein adjacent to the pancreatic head/pancreaticoduodenal groove, as previously. LYMPH NODES: No enlarged lymph nodes. GASTROINTESTINAL TRACT: Significant thickening of the distal stomach body and antrum noted with additional transmural thickening adjacent first and second portions duodenum. Fluid noted to be present within the excluded portion of the stomach. Prior gastric bypass surgical procedure. No morphology of bowel obstruction. No free intraperitoneal gas. Normal appendix. BONES: No acute or aggressive osseous abnormalities identified. OTHER: None. _______________     IMPRESSION: 1.  CT findings in keeping with acute edematous interstitial pancreatitis, predominantly involving the pancreaticoduodenal groove.   > Small quantity of reactive fluid within the pancreaticoduodenal groove without evidence of an organized or drainable peripancreatic fluid collection.   > No evidence of glandular necrosis. Small low attenuating structures within the pancreatic tail noted, favored to reflect small pseudocysts as seen on prior MRCP study. 2. Prior gastric bypass surgical procedure.  Very likely reactive inflammatory change involving the distal stomach body and antrum as well as the proximal portion of the duodenum. No morphology of bowel obstruction. Xr Chest Port    Result Date: 7/27/2020  EXAM: XR CHEST PORT CLINICAL INDICATION/HISTORY: SOB -Additional: None COMPARISON: March 23, 2019 TECHNIQUE: Frontal view of the chest _______________ FINDINGS: HEART AND MEDIASTINUM: Normal cardiac size and mediastinal contours. LUNGS AND PLEURAL SPACES: No focal pneumonic consolidation, pneumothorax, or pleural effusion. BONY THORAX AND SOFT TISSUES: No acute osseous abnormality _______________     IMPRESSION: No acute radiographic cardiopulmonary abnormality. Mri Abd W Mrcp W Wo Cont    Result Date: 6/29/2020  EXAM: MRI ABDOMEN CLINICAL INDICATION/HISTORY:  acute pancreatitis -Additional: None COMPARISON: 6/26/2020 TECHNIQUE: MRI of the abdomen was performed with and without intravenous contrast. MRCP included. Post processing 3D rendering was done on a separate workstation under concurrent physician supervision. _______________ FINDINGS: LOWER CHEST: Minimal pleural fluid bilaterally. LIVER: Normal liver contour and parenchymal signal. No suspicious lesion. BILIARY: No biliary dilation, irregularity, or filling defects. Cholecystectomy. PANCREAS: Edematous signal abnormality centered at the pancreatic head/neck with associated early hypoenhancement. No definitive glandular necrosis, with subsequent homogeneous parenchymal enhancement. There is peripancreatic edema but no formed fluid collection identified. The portal splenic confluence appears occluded due to pancreatic inflammation. Distal pancreatic duct is ectatic and irregular. 2 small cysts in the tail, the larger measuring 1 cm. No worrisome features. SPLEEN: Normal. ADRENALS: Normal. KIDNEYS: Normal. LYMPH NODES: No enlarged lymph nodes. GASTROINTESTINAL TRACT: No bowel dilation or wall thickening. Gastric bypass without evidence of complication. VASCULATURE: Unremarkable. BONES: No acute or aggressive osseous abnormalities identified. OTHER: Minimal perihepatic ascites. Mesenteric edema. _______________     IMPRESSION: Findings of acute pancreatitis centered at the head/neck, appears interstitial edematous without convincing glandular necrosis. No formed fluid collection. Pancreatic duct ectasia and irregularity likely sequela of chronic pancreatitis. 2 small benign-appearing cyst in the pancreatic tail noted, also likely sequela of chronic pancreatitis.             [facility]     CC: David Red NP

## 2020-07-30 LAB
ATRIAL RATE: 102 BPM
CALCULATED P AXIS, ECG09: 58 DEGREES
CALCULATED T AXIS, ECG11: 51 DEGREES
DIAGNOSIS, 93000: NORMAL
P-R INTERVAL, ECG05: 132 MS
Q-T INTERVAL, ECG07: 350 MS
QRS DURATION, ECG06: 78 MS
QTC CALCULATION (BEZET), ECG08: 456 MS
VENTRICULAR RATE, ECG03: 102 BPM

## 2020-08-13 ENCOUNTER — HOSPITAL ENCOUNTER (OUTPATIENT)
Dept: PREADMISSION TESTING | Age: 45
Discharge: HOME | End: 2020-08-13
Payer: MEDICAID

## 2020-08-13 PROCEDURE — 87635 SARS-COV-2 COVID-19 AMP PRB: CPT

## 2020-08-14 LAB — SARS-COV-2, COV2NT: NOT DETECTED

## 2020-08-14 RX ORDER — EPINEPHRINE 0.1 MG/ML
1 INJECTION INTRACARDIAC; INTRAVENOUS
Status: CANCELLED | OUTPATIENT
Start: 2020-08-14 | End: 2020-08-15

## 2020-08-14 RX ORDER — ATROPINE SULFATE 0.1 MG/ML
0.5 INJECTION INTRAVENOUS
Status: CANCELLED | OUTPATIENT
Start: 2020-08-14 | End: 2020-08-15

## 2020-08-14 RX ORDER — SODIUM CHLORIDE 0.9 % (FLUSH) 0.9 %
5-40 SYRINGE (ML) INJECTION AS NEEDED
Status: CANCELLED | OUTPATIENT
Start: 2020-08-14

## 2020-08-14 RX ORDER — DIPHENHYDRAMINE HYDROCHLORIDE 50 MG/ML
50 INJECTION, SOLUTION INTRAMUSCULAR; INTRAVENOUS ONCE
Status: CANCELLED | OUTPATIENT
Start: 2020-08-14 | End: 2020-08-14

## 2020-08-14 RX ORDER — SODIUM CHLORIDE 0.9 % (FLUSH) 0.9 %
5-40 SYRINGE (ML) INJECTION EVERY 8 HOURS
Status: CANCELLED | OUTPATIENT
Start: 2020-08-14

## 2020-08-14 RX ORDER — DEXTROMETHORPHAN/PSEUDOEPHED 2.5-7.5/.8
1.2 DROPS ORAL
Status: CANCELLED | OUTPATIENT
Start: 2020-08-14

## 2020-08-17 ENCOUNTER — HOSPITAL ENCOUNTER (OUTPATIENT)
Age: 45
Setting detail: OUTPATIENT SURGERY
Discharge: HOME | End: 2020-08-17
Attending: INTERNAL MEDICINE | Admitting: INTERNAL MEDICINE
Payer: MEDICAID

## 2020-08-17 VITALS
WEIGHT: 186.31 LBS | BODY MASS INDEX: 31.04 KG/M2 | HEIGHT: 65 IN | RESPIRATION RATE: 16 BRPM | OXYGEN SATURATION: 100 % | HEART RATE: 89 BPM | TEMPERATURE: 98.6 F | DIASTOLIC BLOOD PRESSURE: 79 MMHG | SYSTOLIC BLOOD PRESSURE: 111 MMHG

## 2020-08-17 PROCEDURE — 77030031670 HC DEV INFL ENDOTEK BIG60 MRTM -B: Performed by: INTERNAL MEDICINE

## 2020-08-17 PROCEDURE — 77030040361 HC SLV COMPR DVT MDII -B: Performed by: INTERNAL MEDICINE

## 2020-08-17 PROCEDURE — 74011250636 HC RX REV CODE- 250/636: Performed by: INTERNAL MEDICINE

## 2020-08-17 PROCEDURE — 76040000007: Performed by: INTERNAL MEDICINE

## 2020-08-17 PROCEDURE — 77030039961 HC KT CUST COLON BSC -D: Performed by: INTERNAL MEDICINE

## 2020-08-17 PROCEDURE — G0500 MOD SEDAT ENDO SERVICE >5YRS: HCPCS

## 2020-08-17 PROCEDURE — C1726 CATH, BAL DIL, NON-VASCULAR: HCPCS | Performed by: INTERNAL MEDICINE

## 2020-08-17 RX ORDER — DIPHENHYDRAMINE HYDROCHLORIDE 50 MG/ML
INJECTION, SOLUTION INTRAMUSCULAR; INTRAVENOUS AS NEEDED
Status: DISCONTINUED | OUTPATIENT
Start: 2020-08-17 | End: 2020-08-17 | Stop reason: HOSPADM

## 2020-08-17 RX ORDER — OXYCODONE AND ACETAMINOPHEN 5; 325 MG/1; MG/1
1 TABLET ORAL
COMMUNITY
End: 2020-08-31

## 2020-08-17 RX ORDER — FLUMAZENIL 0.1 MG/ML
0.2 INJECTION INTRAVENOUS
Status: DISCONTINUED | OUTPATIENT
Start: 2020-08-17 | End: 2020-08-17 | Stop reason: HOSPADM

## 2020-08-17 RX ORDER — FENTANYL CITRATE 50 UG/ML
100 INJECTION, SOLUTION INTRAMUSCULAR; INTRAVENOUS
Status: DISCONTINUED | OUTPATIENT
Start: 2020-08-17 | End: 2020-08-17 | Stop reason: HOSPADM

## 2020-08-17 RX ORDER — NALOXONE HYDROCHLORIDE 0.4 MG/ML
0.4 INJECTION, SOLUTION INTRAMUSCULAR; INTRAVENOUS; SUBCUTANEOUS
Status: DISCONTINUED | OUTPATIENT
Start: 2020-08-17 | End: 2020-08-17 | Stop reason: HOSPADM

## 2020-08-17 RX ORDER — SODIUM CHLORIDE 9 MG/ML
1000 INJECTION, SOLUTION INTRAVENOUS CONTINUOUS
Status: DISPENSED | OUTPATIENT
Start: 2020-08-17 | End: 2020-08-17

## 2020-08-17 RX ORDER — ACETAMINOPHEN 500 MG
1000 TABLET ORAL
COMMUNITY
End: 2022-08-27

## 2020-08-17 RX ORDER — MIDAZOLAM HYDROCHLORIDE 1 MG/ML
.25-5 INJECTION, SOLUTION INTRAMUSCULAR; INTRAVENOUS
Status: DISCONTINUED | OUTPATIENT
Start: 2020-08-17 | End: 2020-08-17 | Stop reason: HOSPADM

## 2020-08-17 RX ADMIN — SODIUM CHLORIDE 1000 ML: 900 INJECTION, SOLUTION INTRAVENOUS at 11:09

## 2020-08-17 NOTE — DISCHARGE INSTRUCTIONS
Salima Rivera  [id number]  1975    EGD DISCHARGE INSTRUCTIONS  Discomfort:  Sore throat- throat lozenges or warm salt water gargle  redness at IV site- apply warm compress to area; if redness or soreness persist- contact your physician  Gaseous discomfort- walking, belching will help relieve any discomfort  You may not operate a vehicle until the next day  You may not engage in an occupation involving machinery or appliances until the next day  You may not drink alcoholic beverages until the next day  Avoid making any critical decisions for at least 24 hour    DIET   You may not resume your regular diet. Antireflux diet. ACTIVITY  You may not resume your normal daily activities   Spend the remainder of the day resting -  avoid any strenuous activity. CALL M.D. ANY SIGN OF   Increasing pain, nausea, vomiting  Abdominal distension (swelling)  New increased bleeding (oral or rectal)  Fever (chills)  Pain in chest area  Bloody discharge from nose or mouth  Shortness of breath     You may never take any Advil, Aspirin, Ibuprofen, Motrin, Aleve, or Goodys ONLY  Tylenol as needed for pain. Follow-up Instructions: Follow-up in the office as scheduled or make a follow-up appointment in 2 weeks.      Warren Marin MD  August 17, 2020      DISCHARGE SUMMARY from Nurse    The following personal items collected during your admission are returned to you:   Dental Appliance: Dental Appliances: None  Vision: Visual Aid: None  Hearing Aid:    Jewelry:    Clothing:    Other Valuables:    Valuables sent to safe:              PATIENT INSTRUCTIONS:    After general anesthesia or intravenous sedation, for 24 hours or while taking prescription Narcotics:  · Limit your activities  · Do not drive and operate hazardous machinery  · Do not make important personal or business decisions  · Do  not drink alcoholic beverages  · If you have not urinated within 8 hours after discharge, please contact your surgeon on call.    Report the following to your surgeon:  · Excessive pain, swelling, redness or odor of or around the surgical area  · Temperature over 100.5  · Nausea and vomiting lasting longer than 4 hours or if unable to take medications  · Any signs of decreased circulation or nerve impairment to extremity: change in color, persistent  numbness, tingling, coldness or increase pain  · Any questions      No orders of the defined types were placed in this encounter. What to do at Home:  Recommended activity: as above,     If you experience any of the following symptoms as above, please follow up with Dr. Yumiko Mckeon. *  Please give a list of your current medications to your Primary Care Provider. *  Please update this list whenever your medications are discontinued, doses are      changed, or new medications (including over-the-counter products) are added. *  Please carry medication information at all times in case of emergency situations. These are general instructions for a healthy lifestyle:    No smoking/ No tobacco products/ Avoid exposure to second hand smoke    Surgeon General's Warning:  Quitting smoking now greatly reduces serious risk to your health. Obesity, smoking, and sedentary lifestyle greatly increases your risk for illness    A healthy diet, regular physical exercise & weight monitoring are important for maintaining a healthy lifestyle    You may be retaining fluid if you have a history of heart failure or if you experience any of the following symptoms:  Weight gain of 3 pounds or more overnight or 5 pounds in a week, increased swelling in our hands or feet or shortness of breath while lying flat in bed. Please call your doctor as soon as you notice any of these symptoms; do not wait until your next office visit.     Recognize signs and symptoms of STROKE:    F-face looks uneven    A-arms unable to move or move unevenly    S-speech slurred or non-existent    T-time-call 911 as soon as signs and symptoms begin-DO NOT go       Back to bed or wait to see if you get better-TIME IS BRAIN. The discharge information has been reviewed with the patient and caregiver. The patient and caregiver verbalized understanding. Warning Signs of HEART ATTACK     Call 911 if you have these symptoms:   Chest discomfort. Most heart attacks involve discomfort in the center of the chest that lasts more than a few minutes, or that goes away and comes back. It can feel like uncomfortable pressure, squeezing, fullness, or pain.  Discomfort in other areas of the upper body. Symptoms can include pain or discomfort in one or both arms, the back, neck, jaw, or stomach.  Shortness of breath with or without chest discomfort.  Other signs may include breaking out in a cold sweat, nausea, or lightheadedness. Don't wait more than five minutes to call 911 - MINUTES MATTER! Fast action can save your life. Calling 911 is almost always the fastest way to get lifesaving treatment. Emergency Medical Services staff can begin treatment when they arrive -- up to an hour sooner than if someone gets to the hospital by car. The discharge information has been reviewed with the patient and caregiver. The patient and caregiver verbalized understanding. Discharge medications reviewed with the patient and guardian and appropriate educational materials and side effects teaching were provided.     Patient armband removed and shredded

## 2020-08-17 NOTE — PROCEDURES
(EGD) Esophagogastroduodenoscopy (UPPER ENDOSCOPY) Procedure Note  [facility] [place]  __________________________________________________________________________________________________________________________      8/17/2020     Patient: Carol Anthony YOB: 1975 Gender: female Age: 39 y.o. INDICATION:  referred by Dr. Toribio Esparza seen for abdominal pain.  had gastric bypass in 2000  with cholecystectomy revision in 2018 by Dr Keturah eVrnon. had first pancreatitis 4 years ago. She went to see a GI twice and told her that they didn't find anything. This time she was sent to Dr Keturah Vernon. She was admitted for 2 weeks after her last surgery in [id number] that got complicated by Acute renal failure. She used to abuse alcohol but quit a year ago. She has 4 to 6 liquid stools/ day since before the surgery. She had C difficile colitis . Dring her last  admission she has been on Vancomycin for a month. She was on Xeralto 20 mg daily she is on Aligne, pepcid Ac, Omeprazole magnesium by Her PCP for GERD  She has a lote of nausea and have frequent vomiting every 1 to 2 days. She is only on liquid and not hungry to eat Her last colonoscopy done by Dr Tatiana Paget Handy 3, 2019 was normal. She smokes 3to 4 cigarets / day. CT scan with contrast on July 27, 2020: Redemonstration of  significant narrowing of the superior mesenteric vein and proximal portion of  the portal vein adjacent to the pancreatic head/pancreaticoduodenal groove, as  Previously. Significant thickening of the distal stomach body and  antrum noted with additional transmural thickening adjacent first and second  portions duodenum.  Fluid noted to be present within the excluded portion of the  stomach.    1.  CT findings in keeping with acute edematous interstitial pancreatitis,  predominantly involving the pancreaticoduodenal groove.     > Small quantity of reactive fluid within the pancreaticoduodenal groove  without evidence of an organized or drainable peripancreatic fluid collection.     > No evidence of glandular necrosis. Small low attenuating structures within  the pancreatic tail noted, favored to reflect small pseudocysts as seen on prior  MRCP study. 2. Prior gastric bypass surgical procedure. Very likely reactive inflammatory  change involving the distal stomach body and antrum as well as the proximal  portion of the duodenum. No morphology of bowel obstruction. : Quincy Rao MD    Referring Provider:  Javi Beasley NP    Sedation:  Versed 10 mg IV, Fentanyl 200 mcg IV, Benadryl 50 mg IV    Procedure Details:  After infomed consent was obtained for the procedure, with all risks and benefits of procedure explained to the family the patient was taken to the endoscopy suite and placed in the left lateral decubitus position. Following sequential administration of sedation as per above, the endoscope was inserted into the mouth and advanced under direct vision to efferent jejunal loop. A careful inspection was made as the gastroscope was withdrawn, findings and interventions are described below. EGD  OROPHARYNX: The vocal Cords and the larynx are normal.   ESOPHAGUS: The proximal, mid, and distal oesophagus are normal. The Z-Line is slightly undulating located at 39 cm. No  Hiatal hernia. STOMACH: sp gastric bypass with slightly narrowed gastrojejunal anastomosis located at 47 cm. Empiric anastomosis dilation to 15 mm balloon without mucosal trauma  JEJUNUM: The short afferent and efferent loops are normal.  Therapies:  GASTROJEJUNAL ANASTOMOSIS DILATION WITH 15 MM BALLOON    Specimen: none           Complications:   None    EBL:  Nil. IMPRESSION: The Z-Line is slightly undulating located at 39 cm. No  Hiatal hernia. sp gastric bypass with slightly narrowed gastrojejunal anastomosis located at 47 cm. Empiric anastomosis dilation to 15 mm balloon without mucosal trauma.  The nausea and vomiting may  Be related to the Chronic alcoholic pancreatitis? RECOMMENDATION:  may resume low fat diet. Avoid all NSAID's. Make a FU appointment at the office.  Continue  taking the Omeprazole 20 mg  daily     Assistant: None    --Eden Marin MD on 8/17/2020 at 11:07 AM

## 2020-08-17 NOTE — H&P
Assessment/Plan  # Detail Type Description    1. Assessment Abdominal pain (R10.9). Patient Plan 39year old female referred by Dr. Wyatt Patterson seen for abdominal pain.  had gastric bypass in 2000  with cholecystectomy revision in 2018 by Dr Stan Webster. had first pancreatitis 4 years ago. went to see a GI twice and told her I didn't see anything. was sent to Dr Stan Webster she was admitted for 2 weeks for her last surgery in 3378 complicated by Acute renal failure. She used to abuse alcohol but quit a year ago. She has 4 to 6 liquid stools/ day since before the surgery   She had C difficile colitis du. she smokes 3to 4 cigarets / day. Dring her last  admission she has been on Vancomycin for a month. She was on Xeralto 20 mg daily she is on Aligne, pepcid Ac, Omeprazole magnesium by Her PCP for GERD  She has a lote of nausea and have frequent vomiting every 1 to 2 days  She is only on liquid and not hungry to eat  Last By Dr Malcolm Collins 1.5 years ago  Tobacco/ETOH still smoking 4 cigarets daily and quit drinking a year ago    No Fam Hx Crohns, IBS, U/C, Celiac, Diverticulosis, Colon Ca      PLAN: EGD   I explained to her the procedure of upper endoscopy or EGD, the alternative and the risks involved which include but not limited to aspiration, bleeding perforation or reaction to sedation. She was agreeable to this. 2. 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 Patient Plan apparently she used to drink heavvily before but she quits about a year ago but she doesn't think that her pancreatitis is related to alcohol as she claims that she still has pain despite stopping  the alcohol a year ago. I explained to her that is very common in chronic alcoholic pancreatitis and she should never get tempted to drink again any alcohol even if she is doing well  I would like in 6 months to repeat  the MRI of the pancreas to evaluate the residual pancreas         3. Assessment Diarrhea (R19.7).     Patient Plan we need to r/o pancreatic exocrine  pancreatic deficiency. She had Hx of C difficile. for now told her to stop the vancomycin  investigate the diarrhea repeat the c difficile antigene in the stool and check for exocrine pancreatic insufficiency where in this case she may require pancreatic enzyme supplementation with her food such as Creon or Zenpep         4. Assessment Paresthesia of skin (R20.2). Patient Plan lately she has been having skin numbness in her legs She has not been taking any MVI. I told her that most likely she micronutrient and vitamine deficiency. she needs to take MVI with iron on regular basis  and probably needs to eat better  but because of the daily nausea and vomiting. I need to check her stomach. She should not have acid reflux with the gastric bypass. She has been already on PPI                 This 39year old female presents for follow up of Abdominal Pain. History of Present Illness:  1. Follow Up of Abdominal Pain   Onset: 2 years ago. Severity is moderate-severe. It occurs daily. The problem is improving. Location is epigastric. There is radiation to back. The patient describes it as burning. Context: no pattern noted. Symptom is aggravated by brassica vegetables (citrus) and stress. Relieving factors include Align. Associated symptoms include back pain, bloating, change in appetite, diarrhea, flatulence, heartburn, nausea, reflux, vomiting and weight loss. Pertinent negatives include blood in stool, constipation, diaphoresis, dizziness, dyspnea, eructation, fatigue, fever, flank pain, flushing, hematuria, jaundice, lightheadedness, menstruation, myalgia, rash, vaginal bleeding, vaginal discharge and weight gain. Additional information: BM 5x daily.           PROBLEM LIST:     Problem Description Onset Date Chronic Clinical Status Notes   Noninfectious gastroenteritis 11/19/2019 N     History of gastrointestinal tract bypass 11/19/2019 N     Localized abdominal pain 2019 N               PAST MEDICAL/SURGICAL HISTORY   (Detailed)    Disease/disorder Onset Date Management Date Comments     ventilation tubes, bilateral       nasal/sinus       gastric bypass           Family History  (Detailed)  Relationship Family Member Name  Age at Death Condition Onset Age Cause of Death       Family history of Crohn's disease  N   Father  Y  Cancer, colon  N   Mother  N  Allergies  N         Social History:  (Detailed)  The patient is right-handed. Preferred language is [place]. MARITAL STATUS/FAMILY/SOCIAL SUPPORT  Marital status: Single   Smoking status: Current some day smoker. ALCOHOL  There is no history of alcohol use. CAFFEINE  The patient does not use caffeine. Medications (active prior to today)  Medication Name Sig Description Start Date Stop Date Refilled Rx Elsewhere   dicyclomine 20 mg tablet take 1 tablet by oral route 4 times every day 2019   N   vancomycin 125 mg capsule take 1 capsule by oral route  every 6 hours 2020 N     Patient Status   Completed with information received for patient in a summary of care record. Medications (Added, Continued or Stopped today)  Start Date Medication Directions PRN Status PRN Reason Instruction Stop Date   2019 dicyclomine 20 mg tablet take 1 tablet by oral route 4 times every day N      2020 vancomycin 125 mg capsule take 1 capsule by oral route  every 6 hours N      2020 Xarelto 20 mg tablet take 1 tablet by oral route  every day with the evening meal N        Allergies:  Ingredient Reaction (Severity) Medication Name Comment   NO KNOWN ALLERGIES      Reviewed, no changes. Review of System  System Neg/Pos Details   Constitutional Positive Weight loss. Constitutional Negative Chills, Fatigue, Fever, Flushing, Malaise and Weight gain. ENMT Negative Ear infections, Nasal congestion, Sinus Infection and Sore throat.    Eyes Negative Double vision and Eye pain. Respiratory Negative Asthma, Chronic cough, Dyspnea, Pleuritic pain and Wheezing. Cardio Negative Chest pain, Edema and Irregular heartbeat/palpitations. GI Positive Bloating, Change in appetite, Diarrhea, Flatulence, Heartburn, Nausea, Reflux, Vomiting. GI Negative Abdominal pain, Blood in stool, Change in bowel habits, Constipation, Decreased appetite, Dysphagia, Eructation, Hematemesis, Hematochezia, Jaundice and Melena.  Positive Back pain.  Negative Dysuria, Flank pain, Hematuria, Menstruation, Urinary frequency, Urinary incontinence, Urinary retention and Vaginal bleeding. Endocrine Negative Cold intolerance, Diaphoresis, Heat intolerance and Increased thirst.   Neuro Negative Dizziness, Headache, Lightheadedness, Numbness, Tremors and Vertigo. Psych Negative Anxiety, Depression and Increased stress. Integumentary Negative Hives, Pruritus and Rash. MS Negative Back pain, Joint pain and Myalgia. Hema/Lymph Negative Easy bleeding, Easy bruising and Lymphadenopathy. Allergic/Immuno Negative Chemicals in work place, Contact allergy, Food allergies, Immunosuppression and Seasonal allergies. Reproductive Negative Breast lumps, Breast pain and Vaginal discharge. Vital Signs   Height  Time ft in cm Last Measured Height Position   4:36 PM 5.0 6.00 167.64 08/11/2020 0     Date/Time  Temp  Pulse  BP  MAP (Calculated)  Arterial Line 1 BP (mmHg)  BP Patient Position  Resp  SpO2  O2 Device  O2 Flow Rate (L/min)  Pre/Post Ductal  Weight    08/17/20 1035  97.6 °F (36.4 °C)  110Abnormal    105/78  87  --  --  16  100 %  Room air  --  --  84.5 kg (186 lb 5 oz)          PHYSICAL EXAM:  Exam Findings Details   Constitutional Normal No acute distress. Well Nourished. Well developed.    Eyes Normal General - Right: Normal, Left: Normal. Conjunctiva - Right: Normal, Left: Normal. Sclera - Right: Normal, Left: Normal. Cornea - Right: Normal, Left: Normal. Pupil - Right: Normal, Left: Normal.   Nose/Mouth/Throat Normal Lips/teeth/gums - Normal. Tongue - Normal. Buccal mucosa - Normal. Palate & uvula - Normal.   Neck Exam Normal Inspection - Normal. Palpation - Normal. Thyroid gland - Normal. Cervical lymph nodes - Normal.   Respiratory Normal Inspection - Normal. Auscultation - Normal. Percussion - Normal. Cough - Absent. Effort - Normal.   Cardiovascular Normal Heart rate - Regular rate. Heart sounds - Normal S1, Normal S2. Murmurs - None. Extremities - No edema. Abdomen * Abdominal tenderness - moderate in the epigastric. Abdomen Normal Patient is not obese. Inspection - Normal. Appliance(s) - None. Abdominal muscles - Normal. Auscultation - Normal. Percussion - Normal. Anterior palpation - No guarding, No rebound. CVA tenderness - None. Umbilicus - Normal. Abdominal reflexes - Normal. No hepatic enlargement. No spleen enlargement. No hernia. No ascites. No palpable mass. Tuttle's sign - Normal.   Skin Normal Inspection - Normal.   Musculoskeletal Normal Hands - Left: Normal, Right: Normal.   Extremity Normal No cyanosis. No edema. Clubbing - Absent. Neurological Normal Level of consciousness - Normal. Orientation - Normal. Memory - Normal. Motor - Normal. Balance & gait - Normal. Coordination - Normal. Fine motor skills - Normal. DTRs - Normal.   Psychiatric * Inappropriate mood and affect - irritable. Deficient fund of knowledge, Awareness of current event: normal, Awareness of past history: fair, Vocabulary: normal. Flight of ideas. Poor insight. Psychiatric Normal Orientation - Oriented to time, place, person & situation. Not anxious. Behavior is appropriate for age. Sufficient language. Not euphoric. Not fearful. No grandiosity. No hallucinations. Not hopeless. No memory loss. No suicidal ideation.      No change in H&P

## 2020-08-23 ENCOUNTER — APPOINTMENT (OUTPATIENT)
Dept: GENERAL RADIOLOGY | Age: 45
DRG: 282 | End: 2020-08-23
Attending: EMERGENCY MEDICINE
Payer: MEDICAID

## 2020-08-23 ENCOUNTER — HOSPITAL ENCOUNTER (INPATIENT)
Age: 45
LOS: 8 days | Discharge: HOME | DRG: 282 | End: 2020-08-31
Attending: EMERGENCY MEDICINE | Admitting: FAMILY MEDICINE
Payer: MEDICAID

## 2020-08-23 ENCOUNTER — APPOINTMENT (OUTPATIENT)
Dept: CT IMAGING | Age: 45
DRG: 282 | End: 2020-08-23
Attending: EMERGENCY MEDICINE
Payer: MEDICAID

## 2020-08-23 DIAGNOSIS — R74.8 ELEVATED LIVER ENZYMES: ICD-10-CM

## 2020-08-23 DIAGNOSIS — K86.1 ACUTE ON CHRONIC PANCREATITIS (HCC): Primary | ICD-10-CM

## 2020-08-23 DIAGNOSIS — K85.90 ACUTE ON CHRONIC PANCREATITIS (HCC): Primary | ICD-10-CM

## 2020-08-23 DIAGNOSIS — R11.2 NON-INTRACTABLE VOMITING WITH NAUSEA, UNSPECIFIED VOMITING TYPE: ICD-10-CM

## 2020-08-23 LAB
ALBUMIN SERPL-MCNC: 2.2 G/DL (ref 3.4–5)
ALBUMIN SERPL-MCNC: 2.3 G/DL (ref 3.4–5)
ALBUMIN/GLOB SERPL: 0.6 {RATIO} (ref 0.8–1.7)
ALBUMIN/GLOB SERPL: 0.6 {RATIO} (ref 0.8–1.7)
ALP SERPL-CCNC: 678 U/L (ref 45–117)
ALP SERPL-CCNC: 686 U/L (ref 45–117)
ALT SERPL-CCNC: 87 U/L (ref 13–56)
ALT SERPL-CCNC: 89 U/L (ref 13–56)
ANION GAP SERPL CALC-SCNC: 7 MMOL/L (ref 3–18)
APPEARANCE UR: NORMAL
APTT PPP: 38.8 SEC (ref 23–36.4)
AST SERPL-CCNC: 281 U/L (ref 10–38)
AST SERPL-CCNC: 284 U/L (ref 10–38)
ATRIAL RATE: 121 BPM
BASOPHILS # BLD: 0 K/UL (ref 0–0.1)
BASOPHILS NFR BLD: 0 % (ref 0–2)
BILIRUB DIRECT SERPL-MCNC: 0.2 MG/DL (ref 0–0.2)
BILIRUB SERPL-MCNC: 0.5 MG/DL (ref 0.2–1)
BILIRUB SERPL-MCNC: 0.5 MG/DL (ref 0.2–1)
BILIRUB UR QL: NEGATIVE
BUN SERPL-MCNC: 8 MG/DL (ref 7–18)
BUN/CREAT SERPL: 10 (ref 12–20)
CALCIUM SERPL-MCNC: 8.2 MG/DL (ref 8.5–10.1)
CALCULATED P AXIS, ECG09: 53 DEGREES
CALCULATED R AXIS, ECG10: 11 DEGREES
CALCULATED T AXIS, ECG11: 17 DEGREES
CHLORIDE SERPL-SCNC: 102 MMOL/L (ref 100–111)
CK MB CFR SERPL CALC: ABNORMAL % (ref 0–4)
CK MB SERPL-MCNC: <1 NG/ML (ref 5–25)
CK SERPL-CCNC: 24 U/L (ref 26–192)
CO2 SERPL-SCNC: 23 MMOL/L (ref 21–32)
COLOR UR: YELLOW
CREAT SERPL-MCNC: 0.78 MG/DL (ref 0.6–1.3)
DIAGNOSIS, 93000: NORMAL
DIFFERENTIAL METHOD BLD: ABNORMAL
EOSINOPHIL # BLD: 0 K/UL (ref 0–0.4)
EOSINOPHIL NFR BLD: 0 % (ref 0–5)
ERYTHROCYTE [DISTWIDTH] IN BLOOD BY AUTOMATED COUNT: 17.5 % (ref 11.6–14.5)
ETHANOL SERPL-MCNC: 8 MG/DL (ref 0–3)
GLOBULIN SER CALC-MCNC: 3.8 G/DL (ref 2–4)
GLOBULIN SER CALC-MCNC: 3.9 G/DL (ref 2–4)
GLUCOSE SERPL-MCNC: 131 MG/DL (ref 74–99)
GLUCOSE UR STRIP.AUTO-MCNC: NEGATIVE MG/DL
HCT VFR BLD AUTO: 37 % (ref 35–45)
HGB BLD-MCNC: 12.1 G/DL (ref 12–16)
HGB UR QL STRIP: NEGATIVE
INR PPP: 1.4 (ref 0.8–1.2)
KETONES UR QL STRIP.AUTO: NEGATIVE MG/DL
LEUKOCYTE ESTERASE UR QL STRIP.AUTO: NEGATIVE
LIPASE SERPL-CCNC: 734 U/L (ref 73–393)
LYMPHOCYTES # BLD: 0.6 K/UL (ref 0.9–3.6)
LYMPHOCYTES NFR BLD: 10 % (ref 21–52)
MAGNESIUM SERPL-MCNC: 1.7 MG/DL (ref 1.6–2.6)
MCH RBC QN AUTO: 30.7 PG (ref 24–34)
MCHC RBC AUTO-ENTMCNC: 32.7 G/DL (ref 31–37)
MCV RBC AUTO: 93.9 FL (ref 74–97)
MONOCYTES # BLD: 0.6 K/UL (ref 0.05–1.2)
MONOCYTES NFR BLD: 9 % (ref 3–10)
NEUTS SEG # BLD: 5.2 K/UL (ref 1.8–8)
NEUTS SEG NFR BLD: 81 % (ref 40–73)
NITRITE UR QL STRIP.AUTO: NEGATIVE
P-R INTERVAL, ECG05: 130 MS
PH UR STRIP: 5 [PH] (ref 5–8)
PLATELET # BLD AUTO: 399 K/UL (ref 135–420)
PMV BLD AUTO: 8.9 FL (ref 9.2–11.8)
POTASSIUM SERPL-SCNC: 4.4 MMOL/L (ref 3.5–5.5)
PROT SERPL-MCNC: 6.1 G/DL (ref 6.4–8.2)
PROT SERPL-MCNC: 6.1 G/DL (ref 6.4–8.2)
PROT UR STRIP-MCNC: NEGATIVE MG/DL
PROTHROMBIN TIME: 16.6 SEC (ref 11.5–15.2)
Q-T INTERVAL, ECG07: 328 MS
QRS DURATION, ECG06: 78 MS
QTC CALCULATION (BEZET), ECG08: 465 MS
RBC # BLD AUTO: 3.94 M/UL (ref 4.2–5.3)
SODIUM SERPL-SCNC: 132 MMOL/L (ref 136–145)
SP GR UR REFRACTOMETRY: 1.03 (ref 1–1.03)
TRIGL SERPL-MCNC: 81 MG/DL (ref ?–150)
TROPONIN I SERPL-MCNC: <0.02 NG/ML (ref 0–0.04)
UROBILINOGEN UR QL STRIP.AUTO: 1 EU/DL (ref 0.2–1)
VENTRICULAR RATE, ECG03: 121 BPM
WBC # BLD AUTO: 6.4 K/UL (ref 4.6–13.2)

## 2020-08-23 PROCEDURE — 99285 EMERGENCY DEPT VISIT HI MDM: CPT

## 2020-08-23 PROCEDURE — 83690 ASSAY OF LIPASE: CPT

## 2020-08-23 PROCEDURE — 74011250636 HC RX REV CODE- 250/636: Performed by: EMERGENCY MEDICINE

## 2020-08-23 PROCEDURE — 96361 HYDRATE IV INFUSION ADD-ON: CPT

## 2020-08-23 PROCEDURE — 85730 THROMBOPLASTIN TIME PARTIAL: CPT

## 2020-08-23 PROCEDURE — 80053 COMPREHEN METABOLIC PANEL: CPT

## 2020-08-23 PROCEDURE — 81003 URINALYSIS AUTO W/O SCOPE: CPT

## 2020-08-23 PROCEDURE — 74011250637 HC RX REV CODE- 250/637: Performed by: FAMILY MEDICINE

## 2020-08-23 PROCEDURE — 74177 CT ABD & PELVIS W/CONTRAST: CPT

## 2020-08-23 PROCEDURE — 96375 TX/PRO/DX INJ NEW DRUG ADDON: CPT

## 2020-08-23 PROCEDURE — 96374 THER/PROPH/DIAG INJ IV PUSH: CPT

## 2020-08-23 PROCEDURE — 85025 COMPLETE CBC W/AUTO DIFF WBC: CPT

## 2020-08-23 PROCEDURE — 74011250636 HC RX REV CODE- 250/636: Performed by: FAMILY MEDICINE

## 2020-08-23 PROCEDURE — 74011000636 HC RX REV CODE- 636: Performed by: EMERGENCY MEDICINE

## 2020-08-23 PROCEDURE — 93005 ELECTROCARDIOGRAM TRACING: CPT

## 2020-08-23 PROCEDURE — 80307 DRUG TEST PRSMV CHEM ANLYZR: CPT

## 2020-08-23 PROCEDURE — 71045 X-RAY EXAM CHEST 1 VIEW: CPT

## 2020-08-23 PROCEDURE — 84478 ASSAY OF TRIGLYCERIDES: CPT

## 2020-08-23 PROCEDURE — 82550 ASSAY OF CK (CPK): CPT

## 2020-08-23 PROCEDURE — 96376 TX/PRO/DX INJ SAME DRUG ADON: CPT

## 2020-08-23 PROCEDURE — 36415 COLL VENOUS BLD VENIPUNCTURE: CPT

## 2020-08-23 PROCEDURE — 80076 HEPATIC FUNCTION PANEL: CPT

## 2020-08-23 PROCEDURE — 83735 ASSAY OF MAGNESIUM: CPT

## 2020-08-23 PROCEDURE — 74011250637 HC RX REV CODE- 250/637: Performed by: EMERGENCY MEDICINE

## 2020-08-23 PROCEDURE — 65270000029 HC RM PRIVATE

## 2020-08-23 PROCEDURE — 85610 PROTHROMBIN TIME: CPT

## 2020-08-23 RX ORDER — LORAZEPAM 1 MG/1
1 TABLET ORAL
Status: DISCONTINUED | OUTPATIENT
Start: 2020-08-23 | End: 2020-08-31 | Stop reason: HOSPADM

## 2020-08-23 RX ORDER — DICYCLOMINE HYDROCHLORIDE 10 MG/1
20 CAPSULE ORAL
Status: COMPLETED | OUTPATIENT
Start: 2020-08-23 | End: 2020-08-23

## 2020-08-23 RX ORDER — MORPHINE SULFATE 4 MG/ML
6 INJECTION INTRAVENOUS
Status: COMPLETED | OUTPATIENT
Start: 2020-08-23 | End: 2020-08-23

## 2020-08-23 RX ORDER — ONDANSETRON 2 MG/ML
4 INJECTION INTRAMUSCULAR; INTRAVENOUS
Status: COMPLETED | OUTPATIENT
Start: 2020-08-23 | End: 2020-08-23

## 2020-08-23 RX ORDER — FAMOTIDINE 10 MG/ML
20 INJECTION INTRAVENOUS
Status: COMPLETED | OUTPATIENT
Start: 2020-08-23 | End: 2020-08-23

## 2020-08-23 RX ORDER — SODIUM CHLORIDE 0.9 % (FLUSH) 0.9 %
5-40 SYRINGE (ML) INJECTION EVERY 8 HOURS
Status: DISCONTINUED | OUTPATIENT
Start: 2020-08-23 | End: 2020-08-24

## 2020-08-23 RX ORDER — DICYCLOMINE HYDROCHLORIDE 10 MG/1
10 CAPSULE ORAL 4 TIMES DAILY
Status: DISCONTINUED | OUTPATIENT
Start: 2020-08-23 | End: 2020-08-23

## 2020-08-23 RX ORDER — LACTOBACILLUS ACIDOPHILUS 20B CELL
CAPSULE ORAL
COMMUNITY
End: 2020-08-31

## 2020-08-23 RX ORDER — MORPHINE SULFATE 4 MG/ML
4 INJECTION INTRAVENOUS
Status: COMPLETED | OUTPATIENT
Start: 2020-08-23 | End: 2020-08-23

## 2020-08-23 RX ORDER — ONDANSETRON 2 MG/ML
4 INJECTION INTRAMUSCULAR; INTRAVENOUS
Status: DISCONTINUED | OUTPATIENT
Start: 2020-08-23 | End: 2020-08-31 | Stop reason: HOSPADM

## 2020-08-23 RX ORDER — MORPHINE SULFATE 10 MG/ML
4 INJECTION, SOLUTION INTRAMUSCULAR; INTRAVENOUS
Status: DISCONTINUED | OUTPATIENT
Start: 2020-08-23 | End: 2020-08-24

## 2020-08-23 RX ORDER — SODIUM CHLORIDE 0.9 % (FLUSH) 0.9 %
5-40 SYRINGE (ML) INJECTION AS NEEDED
Status: DISCONTINUED | OUTPATIENT
Start: 2020-08-23 | End: 2020-08-31 | Stop reason: HOSPADM

## 2020-08-23 RX ADMIN — LORAZEPAM 1 MG: 1 TABLET ORAL at 22:08

## 2020-08-23 RX ADMIN — DICYCLOMINE HYDROCHLORIDE 20 MG: 10 CAPSULE ORAL at 06:05

## 2020-08-23 RX ADMIN — Medication 10 ML: at 14:00

## 2020-08-23 RX ADMIN — MORPHINE SULFATE 6 MG: 4 INJECTION INTRAVENOUS at 06:04

## 2020-08-23 RX ADMIN — SODIUM CHLORIDE 1000 ML: 900 INJECTION, SOLUTION INTRAVENOUS at 06:04

## 2020-08-23 RX ADMIN — MORPHINE SULFATE 4 MG: 4 INJECTION INTRAVENOUS at 08:25

## 2020-08-23 RX ADMIN — ONDANSETRON 4 MG: 2 INJECTION INTRAMUSCULAR; INTRAVENOUS at 22:08

## 2020-08-23 RX ADMIN — IOPAMIDOL 100 ML: 612 INJECTION, SOLUTION INTRAVENOUS at 08:44

## 2020-08-23 RX ADMIN — MORPHINE SULFATE 4 MG: 10 INJECTION INTRAVENOUS at 17:03

## 2020-08-23 RX ADMIN — ONDANSETRON 4 MG: 2 INJECTION INTRAMUSCULAR; INTRAVENOUS at 06:04

## 2020-08-23 RX ADMIN — ONDANSETRON 4 MG: 2 INJECTION INTRAMUSCULAR; INTRAVENOUS at 17:03

## 2020-08-23 RX ADMIN — IOHEXOL: 240 INJECTION, SOLUTION INTRATHECAL; INTRAVASCULAR; INTRAVENOUS; ORAL at 07:30

## 2020-08-23 RX ADMIN — MORPHINE SULFATE 4 MG: 10 INJECTION INTRAVENOUS at 22:08

## 2020-08-23 RX ADMIN — Medication 10 ML: at 22:09

## 2020-08-23 RX ADMIN — MORPHINE SULFATE 4 MG: 10 INJECTION INTRAVENOUS at 12:04

## 2020-08-23 RX ADMIN — ONDANSETRON 4 MG: 2 INJECTION INTRAMUSCULAR; INTRAVENOUS at 12:04

## 2020-08-23 RX ADMIN — SODIUM CHLORIDE 1000 ML: 900 INJECTION, SOLUTION INTRAVENOUS at 08:25

## 2020-08-23 RX ADMIN — FAMOTIDINE 20 MG: 10 INJECTION, SOLUTION INTRAVENOUS at 06:04

## 2020-08-23 NOTE — ED TRIAGE NOTES
Pt co CP that began yesterday night, states she had an endoscopy tuesday. Pt describes pain as constant sharp, radiates to her back, rates 10/10, reports SOB. Pt reports she has hx of GERD, gastric bypass, and tends to have abd pain, but was concerned due to the increase in abd pain, N/V x 4 days, V x 10 in last 24 hrs.

## 2020-08-23 NOTE — PROGRESS NOTES
Telephone/Verbal shift change report given to Tanya Shi (oncoming nurse) by Delia Perales RN (offgoing nurse). Report included the following information SBAR, Kardex, Intake/Output and MAR.     1150-Patient arrived on the unit. Patient A/OX4. Patient in bed resting quietly. Oriented to call light and phone system     1204-Patient requested something for pain and nausea. Administered Zofran 4 MG IV and Morphine 4 MG IV. Pain 9/10. Left AC IV removed due to leaking and not being patent, patient has other access. Patient refused SCD's. Patient requesting to rest at this time. 1705-Patient requested something for pain and nausea. Administered Zofran 4 MG IV and Morphine 4 MG IV. Pain 6/10. SHIFT SUMMARY:  Pain medication 2x  Nausea medication 2x  Bedside and Verbal shift change report given to Emiliana Goldsmith RN (oncoming nurse) by Tanya Shi (offgoing nurse). Report included the following information SBAR, Kardex, Intake/Output and MAR.

## 2020-08-23 NOTE — ED NOTES
TRANSFER - OUT REPORT:    Verbal report given to LAMONTE Osborn(name) on Mani Payan  being transferred to Medical (unit) for routine progression of care       Report consisted of patients Situation, Background, Assessment and   Recommendations(SBAR). Information from the following report(s) SBAR, ED Summary and Recent Results was reviewed with the receiving nurse. Lines:   Peripheral IV 08/23/20 Left Antecubital (Active)   Site Assessment Clean, dry, & intact 08/23/20 0538   Phlebitis Assessment 0 08/23/20 0538   Infiltration Assessment 0 08/23/20 0538   Dressing Status Clean, dry, & intact 08/23/20 0538   Dressing Type Transparent 08/23/20 0538       Peripheral IV 08/23/20 Right Antecubital (Active)   Site Assessment Clean, dry, & intact 08/23/20 0811   Phlebitis Assessment 0 08/23/20 0811   Infiltration Assessment 0 08/23/20 0811   Dressing Status Clean, dry, & intact 08/23/20 0811   Hub Color/Line Status Pink 08/23/20 0811   Alcohol Cap Used Yes 08/23/20 1925        Opportunity for questions and clarification was provided.       Patient transported with:   [employer]

## 2020-08-23 NOTE — H&P
History & Physical    Patient: Suha De Leon MRN: [id number]  CSN: [id number]    YOB: 1975  Age: 39 y.o. Sex: female      DOA: 8/23/2020  Primary Care Provider:  Barbara Simmons NP      Assessment/Plan   Suha De Leon is a 39 y.o. female with past history of gastric bariatric surgery with revision first gastric surgery 2000, last date was 2018, cholecystectomy, Thrombo-embolism of the iliac artery, Intestinal absorption, hypertension, history of endometrial cancer dated 2011, and recent diagnosis of C. difficile diarrhea who presents to the emergency department C/O abdominal pain.     Ad[facility] to [facility] floor    Acute on chronic pancreatitis  -  Dr. Jeana Cerrato consulting, gastroenterology  Clear liquid diet  Anti-emetics  Pain control  Possible MRCP in the future    Elevated liver enzymes -  Recheck in a.m. after fluid rehydration    Nausea and vomiting -  Bowel rest to clear liquid diet  Antiemetics  Rehydration    Hypertension -  Monitor blood pressures  Resume home medication    Portal vein thrombosis -  Resume home Xarelto    DVT/GI prophylaxis ordered    Patient Active Problem List   Diagnosis Code    Status post gastric bypass for obesity Z98.84    Hypertension I10    Anemia D64.9    Anxiety F41.9    GERD (gastroesophageal reflux disease) K21.9    Smoker F17.200    Portal vein thrombosis I81    Gastritis and duodenitis K29.90    Acute on chronic pancreatitis (HCC) K85.90, K86.1    Pancreatitis K85.90    Nausea & vomiting R11.2    Elevated liver enzymes R74.8     Estimated length of stay : 2-3 days    CC: Abdominal pain with nausea and vomiting       HPI:     Suha De Leon is a 39 y.o. female with past medical history of gastric bariatric surgery with revision first gastric surgery 2000, last date was 2018, cholecystectomy, Thrombo-embolism of the iliac artery, Intestinal absorption, hypertension, history of endometrial cancer dated 2011, and recent diagnosis of C. difficile diarrhea who presents to the emergency department complaining of abdominal pain. Patient states that ever since she had a recent dilatation of the esophagus and placed on Xarelto she has been having achy abdominal pain. Her pain has increased this evening causing her to call 911. She says is very similar to her previous pancreatitis attacks sharp and stabbing radiating to her back. Her doctor both primary care and also GI doctor, Dr. Fang Mcfadden, have been managing her. She denies any current diarrhea or fevers or chills. She has nausea with vomiting with worsening abdominal pain. She described the pain is near 10 out of 10.     Social History  She denies any alcohol use states she quit over a year ago. She does continue however to smoke cigarettes restfully for 5 cigarettes a day, she denies any illicit drug use.   Past Medical History:   Diagnosis Date    Anemia     C. difficile diarrhea     Cancer ([facility] [address] 2011     history of endometrial cancer    Chronic pain     Diarrhea     GERD (gastroesophageal reflux disease)     Hypertension 2017    Intestinal malabsorption     Psychiatric disorder     Anxiety and depression    Severe obesity with body mass index (BMI) of 35.0 to 39.9 with comorbidity ([address]     Smoker     smokes 5 cigarettes per day    Status post gastric bypass for obesity 2000    open / [place]    Syncopal episodes     has plans to see a neurologist     Thromboembolism of internal iliac artery ([address]     Wears dentures     teeth extraction due to vomiting and vitamin deficiencies       Past Surgical History:   Procedure Laterality Date    HX CHOLECYSTECTOMY      HX GASTRIC BYPASS  2000    gastric bypass    HX GASTRIC BYPASS  09/2018    HX GI  2017    EGD    HX HYSTERECTOMY      HX OTHER SURGICAL Right     resection of benign breast mass       Family History   Problem Relation Age of Onset    Diabetes Mother        Social History     Socioeconomic History    Marital status: SINGLE     Spouse name: Not on file    Number of children: Not on file    Years of education: Not on file    Highest education level: Not on file   Tobacco Use    Smoking status: Former Smoker     Types: Cigarettes     Last attempt to quit: 2018     Years since quittin.9    Smokeless tobacco: Never Used   Substance and Sexual Activity    Alcohol use: No     Alcohol/week: 1.0 standard drinks     Types: 1 Cans of beer per week     Frequency: Never    Drug use: No    Sexual activity: Not Currently     Birth control/protection: Surgical       Prior to Admission medications    Medication Sig Start Date End Date Taking? Authorizing Provider   oxyCODONE-acetaminophen (Percocet) 5-325 mg per tablet Take 1 Tab by mouth every four (4) hours as needed for Pain. Yes Provider, Historical   acetaminophen (Tylenol Extra Strength) 500 mg tablet Take 1,000 mg by mouth every six (6) hours as needed for Pain. Yes Provider, Historical   escitalopram oxalate (LEXAPRO) 20 mg tablet Take 20 mg by mouth daily. Indications: anxiousness associated with depression   Yes Provider, Historical   famotidine (PEPCID) 10 mg tablet Take 10 mg by mouth two (2) times a day. Yes Provider, Historical   rivaroxaban (Xarelto) 15 mg (42)- 20 mg (9) DsPk Take one 15 mg tablet twice a day with food for the first 21 days. Then, take one 20 mg tablet once a day with food for 9 days. Patient taking differently: Take 20 mg by mouth daily. Take one 15 mg tablet twice a day with food for the first 21 days. Then, take one 20 mg tablet once a day with food for 9 days. 7/3/20  Yes Acacia Lopez MD   amLODIPine (NORVASC) 10 mg tablet Take 0.5 Tabs by mouth daily. 19  Yes Anthony Calhoun MD   thiamine mononitrate (B-1) 100 mg tablet Take 1 Tab by mouth daily. 19  Yes Anthony Calhoun MD   omeprazole (PRILOSEC OTC) 20 mg tablet Take 1 Tab by mouth daily.  19  Yes Anthony Calhoun MD   traZODone (DESYREL) 100 mg tablet Take  by mouth nightly. Pt is unsure of the dose for this medication   Yes Provider, Historical   multivitamin (ONE A DAY) tablet Take 1 Tab by mouth daily. Yes Provider, Historical       No Known Allergies    Review of Systems  Gen: No fever, chills, malaise, weight loss/gain. Heent: No headache, rhinorrhea, epistaxis, ear pain, hearing loss, sinus pain, neck pain/stiffness, sore throat. Heart: No chest pain, palpitations, HAYES, pnd, or orthopnea. Resp: No cough, hemoptysis, wheezing and shortness of breath. GI: No nausea, vomiting, diarrhea, constipation, melena or hematochezia. : No urinary obstruction, dysuria or hematuria. Derm: No rash, new skin lesion or pruritis. Musc/skeletal: no bone or joint complains. Vasc: No edema, cyanosis or claudication. Endo: No heat/cold intolerance, no polyuria,polydipsia or polyphagia. Neuro: No unilateral weakness, numbness, tingling. No seizures. Heme: No easy bruising or bleeding. Physical Exam:     Physical Exam:  Visit Vitals  /88   Pulse (!) 114   Temp 97.7 °F (36.5 °C)   Resp 23   Ht 5' 5\" (1.651 m)   Wt 83.9 kg (185 lb)   SpO2 100%   BMI 30.79 kg/m²      O2 Device: Room air    Temp (24hrs), Av.7 °F (36.5 °C), Min:97.7 °F (36.5 °C), Max:97.7 °F (36.5 °C)    No intake/output data recorded. No intake/output data recorded. General:  Awake, cooperative, no distress. Head:  Normocephalic, without obvious abnormality, atraumatic. Eyes:  Conjunctivae/corneas clear, sclera anicteric, PERRL, EOMs intact. Nose: Nares normal. No drainage or sinus tenderness. Throat: Lips, mucosa, and tongue normal.    Neck: Supple, symmetrical, trachea midline, no adenopathy. Lungs:   Clear to auscultation bilaterally. Heart:  Regular rate and rhythm, S1, S2 normal, no murmur, click, rub or gallop. Abdomen: Soft, global tenderness to palpation, no rebound tenderness. Bowel sounds normal. No masses,  No organomegaly.    Extremities: Extremities normal, atraumatic, no cyanosis or edema. Capillary refill normal.   Pulses: 2+ and symmetric all extremities. Skin: Skin color as per ethnicity, turgor normal. No rashes or lesions   Neurologic: CNII-XII intact. No focal motor or sensory deficit. Labs Reviewed:    Recent Results (from the past 24 hour(s))   EKG, 12 LEAD, INITIAL    Collection Time: 08/23/20  5:09 AM   Result Value Ref Range    Ventricular Rate 121 BPM    Atrial Rate 121 BPM    P-R Interval 130 ms    QRS Duration 78 ms    Q-T Interval 328 ms    QTC Calculation (Bezet) 465 ms    Calculated P Axis 53 degrees    Calculated R Axis 11 degrees    Calculated T Axis 17 degrees    Diagnosis       Sinus tachycardia  Possible Left atrial enlargement  T wave abnormality, consider inferior ischemia  Abnormal ECG  When compared with ECG of 27-JUL-2020 12:34,  T wave inversion more evident in Inferior leads  Nonspecific T wave abnormality now evident in Lateral leads     PROTHROMBIN TIME + INR    Collection Time: 08/23/20  6:13 AM   Result Value Ref Range    Prothrombin time 16.6 (H) 11.5 - 15.2 sec    INR 1.4 (H) 0.8 - 1.2     PTT    Collection Time: 08/23/20  6:13 AM   Result Value Ref Range    aPTT 38.8 (H) 23.0 - 36.4 SEC   CBC WITH AUTOMATED DIFF    Collection Time: 08/23/20  6:13 AM   Result Value Ref Range    WBC 6.4 4.6 - 13.2 K/uL    RBC 3.94 (L) 4.20 - 5.30 M/uL    HGB 12.1 12.0 - 16.0 g/dL    HCT 37.0 35.0 - 45.0 %    MCV 93.9 74.0 - 97.0 FL    MCH 30.7 24.0 - 34.0 PG    MCHC 32.7 31.0 - 37.0 g/dL    RDW 17.5 (H) 11.6 - 14.5 %    PLATELET 967 664 - 023 K/uL    MPV 8.9 (L) 9.2 - 11.8 FL    NEUTROPHILS 81 (H) 40 - 73 %    LYMPHOCYTES 10 (L) 21 - 52 %    MONOCYTES 9 3 - 10 %    EOSINOPHILS 0 0 - 5 %    BASOPHILS 0 0 - 2 %    ABS. NEUTROPHILS 5.2 1.8 - 8.0 K/UL    ABS. LYMPHOCYTES 0.6 (L) 0.9 - 3.6 K/UL    ABS. MONOCYTES 0.6 0.05 - 1.2 K/UL    ABS. EOSINOPHILS 0.0 0.0 - 0.4 K/UL    ABS.  BASOPHILS 0.0 0.0 - 0.1 K/UL    DF AUTOMATED     METABOLIC PANEL, COMPREHENSIVE    Collection Time: 08/23/20  6:37 AM   Result Value Ref Range    Sodium 132 (L) 136 - 145 mmol/L    Potassium 4.4 3.5 - 5.5 mmol/L    Chloride 102 100 - 111 mmol/L    CO2 23 21 - 32 mmol/L    Anion gap 7 3.0 - 18 mmol/L    Glucose 131 (H) 74 - 99 mg/dL    BUN 8 7.0 - 18 MG/DL    Creatinine 0.78 0.6 - 1.3 MG/DL    BUN/Creatinine ratio 10 (L) 12 - 20      GFR est AA >60 >60 ml/min/1.73m2    GFR est non-AA >60 >60 ml/min/1.73m2    Calcium 8.2 (L) 8.5 - 10.1 MG/DL    Bilirubin, total 0.5 0.2 - 1.0 MG/DL    ALT (SGPT) 89 (H) 13 - 56 U/L    AST (SGOT) 281 (H) 10 - 38 U/L    Alk.  phosphatase 678 (H) 45 - 117 U/L    Protein, total 6.1 (L) 6.4 - 8.2 g/dL    Albumin 2.2 (L) 3.4 - 5.0 g/dL    Globulin 3.9 2.0 - 4.0 g/dL    A-G Ratio 0.6 (L) 0.8 - 1.7     LIPASE    Collection Time: 08/23/20  6:37 AM   Result Value Ref Range    Lipase 734 (H) 73 - 393 U/L   ETHYL ALCOHOL    Collection Time: 08/23/20  6:37 AM   Result Value Ref Range    ALCOHOL(ETHYL),SERUM 8 (H) 0 - 3 MG/DL   CARDIAC PANEL,(CK, CKMB & TROPONIN)    Collection Time: 08/23/20  6:37 AM   Result Value Ref Range    CK - MB <1.0 <3.6 ng/ml    CK-MB Index  0.0 - 4.0 %     CALCULATION NOT PERFORMED WHEN RESULT IS BELOW LINEAR LIMIT    CK 24 (L) 26 - 192 U/L    Troponin-I, QT <0.02 0.0 - 0.045 NG/ML   MAGNESIUM    Collection Time: 08/23/20  6:37 AM   Result Value Ref Range    Magnesium 1.7 1.6 - 2.6 mg/dL   URINALYSIS W/ RFLX MICROSCOPIC    Collection Time: 08/23/20 10:12 AM   Result Value Ref Range    Color YELLOW      Appearance CLOUDY      Specific gravity 1.030 1.005 - 1.030      pH (UA) 5.0 5.0 - 8.0      Protein Negative NEG mg/dL    Glucose Negative NEG mg/dL    Ketone Negative NEG mg/dL    Bilirubin Negative NEG      Blood Negative NEG      Urobilinogen 1.0 0.2 - 1.0 EU/dL    Nitrites Negative NEG      Leukocyte Esterase Negative NEG         Procedures/imaging: see electronic medical records for all procedures/Xrays and details which were not copied into this note but were reviewed prior to creation of Plan        CC: Jory Medrano NP

## 2020-08-23 NOTE — ED PROVIDER NOTES
EMERGENCY DEPARTMENT HISTORY AND PHYSICAL EXAM    Date: 8/23/2020  Patient Name: Tan Gutierres    History of Presenting Illness     Chief Complaint   Patient presents with    Chest Pain         History Provided By: Patient and EMS    Additional History (Context):   5:59 AM  Tan Gutierres is a 39 y.o. female with PMHX of gastric bariatric surgery with revision first gastric surgery 2000, last date was 2018, cholecystectomy, Thrombo-embolism of the iliac artery, , Intestinal absorption, hypertension, history of endometrial cancer dated 2011, and recent diagnosis of C. difficile diarrhea who presents to the emergency department C/O abdominal pain. Patient states that ever since she had a recent dilatation of the esophagus and placed on Xarelto she has been having achy abdominal pain. Her pain is slowly crescendoed this evening causing her to call 911. She says is very similar to her previous pancreatitis attacks sharp and stabbing radiating to her back. Her doctor both primary care and also Bernardo have been managing her with medications and even outpatient Percocets. She denies any current diarrhea or fevers or chills. She has nausea with vomiting and the emesis came after the onset of pain. She described the pain is near 10 out of 10. She denies any urinary symptoms of pain or discomfort only that it is dark orange to yellowish. She denies any blood per stool or blood per emesis. She denies any melena. 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 Social History  She denies any alcohol use states she quit over a year ago. She does continue however to smoke cigarettes restfully for 5 cigarettes a day, she denies any illicit drug use. Family History  Positive for diabetes in her mother.       PCP: Helga Hardy NP    Current Outpatient Medications   Medication Sig Dispense Refill    oxyCODONE-acetaminophen (Percocet) 5-325 mg per tablet Take 1 Tab by mouth every four (4) hours as needed for Pain.  acetaminophen (Tylenol Extra Strength) 500 mg tablet Take 1,000 mg by mouth every six (6) hours as needed for Pain.  escitalopram oxalate (LEXAPRO) 20 mg tablet Take 20 mg by mouth daily. Indications: anxiousness associated with depression      famotidine (PEPCID) 10 mg tablet Take 10 mg by mouth two (2) times a day.  rivaroxaban (Xarelto) 15 mg (42)- 20 mg (9) DsPk Take one 15 mg tablet twice a day with food for the first 21 days. Then, take one 20 mg tablet once a day with food for 9 days. (Patient taking differently: Take 20 mg by mouth daily. Take one 15 mg tablet twice a day with food for the first 21 days. Then, take one 20 mg tablet once a day with food for 9 days. ) 1 Dose Pack 0    amLODIPine (NORVASC) 10 mg tablet Take 0.5 Tabs by mouth daily. 30 Tab 0    thiamine mononitrate (B-1) 100 mg tablet Take 1 Tab by mouth daily. 30 Tab 0    omeprazole (PRILOSEC OTC) 20 mg tablet Take 1 Tab by mouth daily. 30 Tab 0    traZODone (DESYREL) 100 mg tablet Take  by mouth nightly. Pt is unsure of the dose for this medication      multivitamin (ONE A DAY) tablet Take 1 Tab by mouth daily.          Past History     Past Medical History:  Past Medical History:   Diagnosis Date    Anemia     C. difficile diarrhea     Cancer ([facility] [address] 2011     history of endometrial cancer    Chronic pain     Diarrhea     GERD (gastroesophageal reflux disease)     Hypertension 2017    Intestinal malabsorption     Psychiatric disorder     Anxiety and depression    Severe obesity with body mass index (BMI) of 35.0 to 39.9 with comorbidity ([facility] [address]     Smoker     smokes 5 cigarettes per day    Status post gastric bypass for obesity 2000    open / librado hermosillo    Syncopal episodes     has plans to see a neurologist     Thromboembolism of internal iliac artery (HCC)     Wears dentures     teeth extraction due to vomiting and vitamin deficiencies       Past Surgical History:  Past Surgical History:   Procedure Laterality Date    HX CHOLECYSTECTOMY      HX GASTRIC BYPASS  2000    gastric bypass    HX GASTRIC BYPASS  2018    HX GI  2017    EGD    HX HYSTERECTOMY      HX OTHER SURGICAL Right     resection of benign breast mass       Family History:  Family History   Problem Relation Age of Onset    Diabetes Mother        Social History:  Social History     Tobacco Use    Smoking status: Former Smoker     Types: Cigarettes     Last attempt to quit: 2018     Years since quittin.9    Smokeless tobacco: Never Used   Substance Use Topics    Alcohol use: No     Alcohol/week: 1.0 standard drinks     Types: 1 Cans of beer per week     Frequency: Never    Drug use: No       Allergies:  No Known Allergies      Review of Systems   Review of Systems   Constitutional: Negative for chills and fever. Respiratory: Negative for shortness of breath. Cardiovascular: Positive for chest pain. Gastrointestinal: Positive for abdominal pain, nausea and vomiting. Musculoskeletal: Positive for back pain. All other systems reviewed and are negative. Physical Exam     Vitals:    20 0701 20 0900 20 0902 20 0923   BP: (!) 141/99 (!) 139/99 132/88    Pulse: (!) 109 (!) 119 (!) 116 (!) 114   Resp: 14 28 (!) 31 23   Temp:       SpO2: 100% 100% 98% 100%   Weight:       Height:         Physical Exam  Vitals signs and nursing note reviewed. Constitutional:       Appearance: She is well-developed. She is obese. HENT:      Head: Normocephalic and atraumatic. Nose: No rhinorrhea. Mouth/Throat:      Mouth: Mucous membranes are moist.   Eyes:      Extraocular Movements: Extraocular movements intact. Pupils: Pupils are equal, round, and reactive to light.    Neck:      Musculoskeletal: Normal range of motion and neck supple. Cardiovascular:      Rate and Rhythm: Regular rhythm. Tachycardia present. No extrasystoles are present. Heart sounds: No murmur. Pulmonary:      Effort: Pulmonary effort is normal. No tachypnea, accessory muscle usage or respiratory distress. Breath sounds: Normal breath sounds. No wheezing, rhonchi or rales. Abdominal:      General: Bowel sounds are normal.      Tenderness: There is abdominal tenderness in the epigastric area. There is no guarding or rebound. Musculoskeletal: Normal range of motion. Lymphadenopathy:      Cervical: No cervical adenopathy. Skin:     General: Skin is warm and dry. Findings: No rash. Neurological:      General: No focal deficit present. Mental Status: She is alert and oriented to person, place, and time.    Psychiatric:         Mood and Affect: Mood normal.         Behavior: Behavior normal.         Diagnostic Study Results     Labs -     Recent Results (from the past 12 hour(s))   EKG, 12 LEAD, INITIAL    Collection Time: 08/23/20  5:09 AM   Result Value Ref Range    Ventricular Rate 121 BPM    Atrial Rate 121 BPM    P-R Interval 130 ms    QRS Duration 78 ms    Q-T Interval 328 ms    QTC Calculation (Bezet) 465 ms    Calculated P Axis 53 degrees    Calculated R Axis 11 degrees    Calculated T Axis 17 degrees    Diagnosis       Sinus tachycardia  Possible Left atrial enlargement  T wave abnormality, consider inferior ischemia  Abnormal ECG  When compared with ECG of 27-JUL-2020 12:34,  T wave inversion more evident in Inferior leads  Nonspecific T wave abnormality now evident in Lateral leads     PROTHROMBIN TIME + INR    Collection Time: 08/23/20  6:13 AM   Result Value Ref Range    Prothrombin time 16.6 (H) 11.5 - 15.2 sec    INR 1.4 (H) 0.8 - 1.2     PTT    Collection Time: 08/23/20  6:13 AM   Result Value Ref Range    aPTT 38.8 (H) 23.0 - 36.4 SEC   CBC WITH AUTOMATED DIFF    Collection Time: 08/23/20  6:13 AM   Result Value Ref Range WBC 6.4 4.6 - 13.2 K/uL    RBC 3.94 (L) 4.20 - 5.30 M/uL    HGB 12.1 12.0 - 16.0 g/dL    HCT 37.0 35.0 - 45.0 %    MCV 93.9 74.0 - 97.0 FL    MCH 30.7 24.0 - 34.0 PG    MCHC 32.7 31.0 - 37.0 g/dL    RDW 17.5 (H) 11.6 - 14.5 %    PLATELET 035 456 - 684 K/uL    MPV 8.9 (L) 9.2 - 11.8 FL    NEUTROPHILS 81 (H) 40 - 73 %    LYMPHOCYTES 10 (L) 21 - 52 %    MONOCYTES 9 3 - 10 %    EOSINOPHILS 0 0 - 5 %    BASOPHILS 0 0 - 2 %    ABS. NEUTROPHILS 5.2 1.8 - 8.0 K/UL    ABS. LYMPHOCYTES 0.6 (L) 0.9 - 3.6 K/UL    ABS. MONOCYTES 0.6 0.05 - 1.2 K/UL    ABS. EOSINOPHILS 0.0 0.0 - 0.4 K/UL    ABS. BASOPHILS 0.0 0.0 - 0.1 K/UL    DF AUTOMATED     METABOLIC PANEL, COMPREHENSIVE    Collection Time: 08/23/20  6:37 AM   Result Value Ref Range    Sodium 132 (L) 136 - 145 mmol/L    Potassium 4.4 3.5 - 5.5 mmol/L    Chloride 102 100 - 111 mmol/L    CO2 23 21 - 32 mmol/L    Anion gap 7 3.0 - 18 mmol/L    Glucose 131 (H) 74 - 99 mg/dL    BUN 8 7.0 - 18 MG/DL    Creatinine 0.78 0.6 - 1.3 MG/DL    BUN/Creatinine ratio 10 (L) 12 - 20      GFR est AA >60 >60 ml/min/1.73m2    GFR est non-AA >60 >60 ml/min/1.73m2    Calcium 8.2 (L) 8.5 - 10.1 MG/DL    Bilirubin, total 0.5 0.2 - 1.0 MG/DL    ALT (SGPT) 89 (H) 13 - 56 U/L    AST (SGOT) 281 (H) 10 - 38 U/L    Alk.  phosphatase 678 (H) 45 - 117 U/L    Protein, total 6.1 (L) 6.4 - 8.2 g/dL    Albumin 2.2 (L) 3.4 - 5.0 g/dL    Globulin 3.9 2.0 - 4.0 g/dL    A-G Ratio 0.6 (L) 0.8 - 1.7     LIPASE    Collection Time: 08/23/20  6:37 AM   Result Value Ref Range    Lipase 734 (H) 73 - 393 U/L   ETHYL ALCOHOL    Collection Time: 08/23/20  6:37 AM   Result Value Ref Range    ALCOHOL(ETHYL),SERUM 8 (H) 0 - 3 MG/DL   CARDIAC PANEL,(CK, CKMB & TROPONIN)    Collection Time: 08/23/20  6:37 AM   Result Value Ref Range    CK - MB <1.0 <3.6 ng/ml    CK-MB Index  0.0 - 4.0 %     CALCULATION NOT PERFORMED WHEN RESULT IS BELOW LINEAR LIMIT    CK 24 (L) 26 - 192 U/L    Troponin-I, QT <0.02 0.0 - 0.045 NG/ML   MAGNESIUM Collection Time: 08/23/20  6:37 AM   Result Value Ref Range    Magnesium 1.7 1.6 - 2.6 mg/dL   URINALYSIS W/ RFLX MICROSCOPIC    Collection Time: 08/23/20 10:12 AM   Result Value Ref Range    Color YELLOW      Appearance CLOUDY      Specific gravity 1.030 1.005 - 1.030      pH (UA) 5.0 5.0 - 8.0      Protein Negative NEG mg/dL    Glucose Negative NEG mg/dL    Ketone Negative NEG mg/dL    Bilirubin Negative NEG      Blood Negative NEG      Urobilinogen 1.0 0.2 - 1.0 EU/dL    Nitrites Negative NEG      Leukocyte Esterase Negative NEG         Radiologic Studies -   CT ABD PELV W CONT   Final Result   IMPRESSION:         1. Ongoing acute pancreatitis with interval progression of peripancreatic   inflammatory changes and secondary involvement of the excluded stomach and   proximal to mid duodenum. No evidence of pancreatic necrosis. No abscess. 2. Stable 9 mm rounded low density in the pancreatic tail, nonspecific cyst,   possibly pseudocyst.      * **  *      XR CHEST PORT   Final Result   IMPRESSION:      No acute pulmonary process identified. CT Results  (Last 48 hours)               08/23/20 0853  CT ABD PELV W CONT Final result    Impression:  IMPRESSION:           1. Ongoing acute pancreatitis with interval progression of peripancreatic   inflammatory changes and secondary involvement of the excluded stomach and   proximal to mid duodenum. No evidence of pancreatic necrosis. No abscess. 2. Stable 9 mm rounded low density in the pancreatic tail, nonspecific cyst,   possibly pseudocyst.       * **  *       Narrative:  EXAM: CT of the abdomen and pelvis       INDICATION: Epigastric abdominal pain. COMPARISON: 7/27/2020       TECHNIQUE: Helical volumetric scanning through the abdomen and pelvis was   performed after oral, with nonionic intravenous contrast.  Axial, coronal and   sagittal reconstructions were created.  One or more dose reduction techniques   were used on this CT: automated exposure control, adjustment of the mAs and/or   kVp according to patient size, and iterative reconstruction techniques. The   specific techniques used on this CT exam have been documented in the patient's   electronic medical record. [employer] in [employer]   (DICOM) format image data are available to nonaffiliated external healthcare   facilities or entities on a secure, media free, reciprocally searchable basis   with patient authorization for at least a 12-month period after this study. _______________       FINDINGS:       LOWER CHEST: Within normal limits. LIVER, BILIARY: Liver is normal. No biliary dilation. Cholecystectomy. PANCREAS: Interval progression of low density fat stranding adjacent to the   pancreas, predominantly pancreatic head and body extending towards the duodenum   and the distal excluded stomach, without a definite organized fluid collection. Pancreatic tissue is slightly heterogeneous/edematous, however, without farzana   nonenhancement to suggest necrosis. 9 mm rounded low density within the   pancreatic tail is unchanged. SPLEEN: Normal.       ADRENALS: Normal.       KIDNEYS: Normal.       LYMPH NODES: No enlarged lymph nodes. GASTROINTESTINAL TRACT: Gastric bypass. Again, the excluded stomach demonstrates   considerable wall thickening at the antrum and pylorus considerable wall   thickening of the first and second portions of the duodenum, extending to the   proximal third portion of the duodenum, with slight progression. No bowel   dilatation. Normal appendix. PELVIC ORGANS: Hysterectomy. VASCULATURE: No arterial vascular abnormality. Significant narrowing superior   portion of the SMV and the main portal vein amongst the inflammatory changes,   similar to the prior exam.       BONES: No acute or aggressive osseous abnormalities identified. OTHER: Small fat-containing midline ventral hernia in the epigastrium. _______________               CXR Results  (Last 48 hours)               08/23/20 0526  XR CHEST PORT Final result    Impression:  IMPRESSION:       No acute pulmonary process identified. Narrative:  EXAM: One-view chest       CLINICAL HISTORY: chest pain ,       COMPARISON: None       FINDINGS:       Frontal view of the chest demonstrate clear lungs. Cardiac silhouette is normal   in size and contour. No acute bony or soft tissue abnormality. Medications given in the ED-  Medications   morphine injection 6 mg (6 mg IntraVENous Given 8/23/20 0604)   ondansetron (ZOFRAN) injection 4 mg (4 mg IntraVENous Given 8/23/20 0604)   famotidine (PF) (PEPCID) injection 20 mg (20 mg IntraVENous Given 8/23/20 0604)   sodium chloride 0.9 % bolus infusion 1,000 mL (0 mL IntraVENous IV Completed 8/23/20 0809)   dicyclomine (BENTYL) capsule 20 mg (20 mg Oral Given 8/23/20 0605)   iohexol (OMNIPAQUE) ORAL mixture ( Oral Given 8/23/20 0730)   sodium chloride 0.9 % bolus infusion 1,000 mL (0 mL IntraVENous IV Completed 8/23/20 1013)   morphine injection 4 mg (4 mg IntraVENous Given 8/23/20 0825)   iopamidoL (ISOVUE 300) 61 % contrast injection 100 mL (100 mL IntraVENous Given 8/23/20 0844)         Medical Decision Making   I am the first provider for this patient. I reviewed the vital signs, available nursing notes, past medical history, past surgical history, family history and social history. Vital Signs-Reviewed the patient's vital signs. Pulse Oximetry Analysis -100 % on room air    Cardiac Monitor:  Rate: 104 bpm  Rhythm: Sinus tach    EKG interpretation: (Preliminary)  5:59 AM   Sinus tachycardia, rate 121, nonspecific ST changes with slurring ST segments in the inferior leads specifically to 3 and aVF,   EKG read by Emily.  Nickola Canavan, MD at 5:10 AM    Records Reviewed: NURSING NOTES AND PREVIOUS MEDICAL RECORDS  Extensive review was undertaken in order to ascertain recurrent pancreatitis versus alcohol versus relationship associated with her previous gastric bypass. She is also currently taking blood thinners which is a new change in her regimen affecting her significantly risk for GI bleeding. Provider Notes (Medical Decision Making):   Patient was recently readmitted to the hospital with acute exacerbation of her chronic pancreatitis. She had a lot of. Pancreatic inflammation and a little bit of edema with secretion of fluid. More concerning she had thrombosis thus requiring the institution of Xarelto on top of her current medications. She smokes cigarettes continually but at a much decreased amount and denies any use of alcohol as this sets her up at significant risk for having exacerbation of bleeding from her gastric bypass and gastritis. She denies any melena. First and foremost is unlikely this represents ACS EKG shows some inferior lead  Possible ischemia or strain pattern however does not look as if she is having any acute STEMI she is never had a heart attack for the past.,  She is already on blood thinners therefore less likely to be ACS but still possible as is pulmonary embolism. We will obtain x-rays to look for bleeding or perforation within her GI system as a screening tool. Followed by  Extensive blood work especially assessing her pancreas as well as inflammation within the liver. We will also need to consider repeating her CAT scan however wish to review her studies and her previous medications before we undertake this and contact her specialist to see should he need another repeat of the studies at such a short time frame. She will be given fluids pain medication as well as antiemetics and assessment of urine for kidney stones dehydration or infections. Cardiac enzymes will be also assessed. Procedures:  Procedures    ED Course:   5:59 AM: Initial assessment performed.  The patients presenting problems have been discussed, and they are in agreement with the care plan formulated and outlined with them. I have encouraged them to ask questions as they arise throughout their visit. 7:33 AM   Patient care will be transferred to Kacy Dela Cruz MD from Dr. Michael Dupree. Discussed available diagnostic results and care plan at length. Pt made aware of provider change. 10:42 AM  Discussed with Dr. Annel Moseley who recommends clear liquid diet, checking triglycerides and admission to the hospitalist for pain control. Consideration of an MRCP in the future. 10:52 AM  Discussed with Dr. Erika Morejon for inpatient admission      Diagnosis and Disposition     Critical Care Time: 0 minutes    Core Measures:  For Hospitalized Patients:    1. Hospitalization Decision Time:  The decision to hospitalize the patient was made by Kacy Dela Cruz MD, MD at 10:26 AM on 8/23/2020    2. Aspirin: Aspirin was not given because the patient did not present with a stroke at the time of their Emergency Department evaluation    10:25 AM Patient is being admitted to the hospital by Dr. Erika Morejon. The results of their tests and reasons for their admission have been discussed with them and/or available family. They convey agreement and understanding for the need to be admitted and for their admission diagnosis. CONDITIONS ON ADMISSION:  Sepsis is not present at the time of admission. Deep Vein Thrombosis is not present at the time of admission. Thrombosis is not present at the time of admission. Urinary Tract Infection is not present at the time of admission. Pneumonia is not present at the time of admission. MRSA is not present at the time of admission. Wound infection is not present at the time of admission. Pressure Ulcer is not present at the time of admission. CLINICAL IMPRESSION:    1. Acute on chronic pancreatitis (HCC)    2. Elevated liver enzymes    3.  Non-intractable vomiting with nausea, unspecified vomiting type _______________________________    This note was partially transcribed via voice recognition software. Although efforts have been made to catch any discrepancies, it may contain sound alike words, grammatical errors, or nonsensical words.

## 2020-08-23 NOTE — PROGRESS NOTES
Problem: Falls - Risk of  Goal: *Absence of Falls  Description: Document Destiny Rdz Fall Risk and appropriate interventions in the flowsheet.   Outcome: Progressing Towards Goal  Note: Fall Risk Interventions:            Medication Interventions: Teach patient to arise slowly                   Problem: Patient Education: Go to Patient Education Activity  Goal: Patient/Family Education  Outcome: Progressing Towards Goal

## 2020-08-23 NOTE — ROUTINE PROCESS
TRANSFER - IN REPORT: 
 
Verbal report received from Doreen RN(name) on Salima Rivera  being received from ED(unit) for routine progression of care Report consisted of patients Situation, Background, Assessment and  
Recommendations(SBAR). Information from the following report(s) SBAR, Kardex, Intake/Output and MAR was reviewed with the receiving nurse. Opportunity for questions and clarification was provided. Assessment completed upon patients arrival to unit and care assumed.

## 2020-08-24 LAB
ALBUMIN SERPL-MCNC: 2.1 G/DL (ref 3.4–5)
ALBUMIN/GLOB SERPL: 0.6 {RATIO} (ref 0.8–1.7)
ALP SERPL-CCNC: 664 U/L (ref 45–117)
ALT SERPL-CCNC: 158 U/L (ref 13–56)
ANION GAP SERPL CALC-SCNC: 5 MMOL/L (ref 3–18)
AST SERPL-CCNC: 321 U/L (ref 10–38)
BASOPHILS # BLD: 0 K/UL (ref 0–0.1)
BASOPHILS NFR BLD: 0 % (ref 0–2)
BILIRUB DIRECT SERPL-MCNC: 0.4 MG/DL (ref 0–0.2)
BILIRUB SERPL-MCNC: 0.8 MG/DL (ref 0.2–1)
BUN SERPL-MCNC: 4 MG/DL (ref 7–18)
BUN/CREAT SERPL: 6 (ref 12–20)
CALCIUM SERPL-MCNC: 7.9 MG/DL (ref 8.5–10.1)
CHLORIDE SERPL-SCNC: 105 MMOL/L (ref 100–111)
CO2 SERPL-SCNC: 26 MMOL/L (ref 21–32)
CREAT SERPL-MCNC: 0.72 MG/DL (ref 0.6–1.3)
DIFFERENTIAL METHOD BLD: ABNORMAL
EOSINOPHIL # BLD: 0 K/UL (ref 0–0.4)
EOSINOPHIL NFR BLD: 0 % (ref 0–5)
ERYTHROCYTE [DISTWIDTH] IN BLOOD BY AUTOMATED COUNT: 17.8 % (ref 11.6–14.5)
GLOBULIN SER CALC-MCNC: 3.4 G/DL (ref 2–4)
GLUCOSE SERPL-MCNC: 105 MG/DL (ref 74–99)
HCT VFR BLD AUTO: 30.5 % (ref 35–45)
HGB BLD-MCNC: 10.4 G/DL (ref 12–16)
LYMPHOCYTES # BLD: 1.2 K/UL (ref 0.9–3.6)
LYMPHOCYTES NFR BLD: 14 % (ref 21–52)
MCH RBC QN AUTO: 31.9 PG (ref 24–34)
MCHC RBC AUTO-ENTMCNC: 34.1 G/DL (ref 31–37)
MCV RBC AUTO: 93.6 FL (ref 74–97)
MONOCYTES # BLD: 1.3 K/UL (ref 0.05–1.2)
MONOCYTES NFR BLD: 14 % (ref 3–10)
NEUTS SEG # BLD: 6.5 K/UL (ref 1.8–8)
NEUTS SEG NFR BLD: 72 % (ref 40–73)
PLATELET # BLD AUTO: 288 K/UL (ref 135–420)
PMV BLD AUTO: 8.3 FL (ref 9.2–11.8)
POTASSIUM SERPL-SCNC: 4 MMOL/L (ref 3.5–5.5)
PROT SERPL-MCNC: 5.5 G/DL (ref 6.4–8.2)
RBC # BLD AUTO: 3.26 M/UL (ref 4.2–5.3)
SODIUM SERPL-SCNC: 136 MMOL/L (ref 136–145)
WBC # BLD AUTO: 9 K/UL (ref 4.6–13.2)

## 2020-08-24 PROCEDURE — 74011250636 HC RX REV CODE- 250/636: Performed by: HOSPITALIST

## 2020-08-24 PROCEDURE — C9113 INJ PANTOPRAZOLE SODIUM, VIA: HCPCS | Performed by: FAMILY MEDICINE

## 2020-08-24 PROCEDURE — 80048 BASIC METABOLIC PNL TOTAL CA: CPT

## 2020-08-24 PROCEDURE — 80076 HEPATIC FUNCTION PANEL: CPT

## 2020-08-24 PROCEDURE — 36415 COLL VENOUS BLD VENIPUNCTURE: CPT

## 2020-08-24 PROCEDURE — 85025 COMPLETE CBC W/AUTO DIFF WBC: CPT

## 2020-08-24 PROCEDURE — 74011000258 HC RX REV CODE- 258: Performed by: HOSPITALIST

## 2020-08-24 PROCEDURE — 74011000250 HC RX REV CODE- 250: Performed by: FAMILY MEDICINE

## 2020-08-24 PROCEDURE — 74011250637 HC RX REV CODE- 250/637: Performed by: FAMILY MEDICINE

## 2020-08-24 PROCEDURE — 65270000029 HC RM PRIVATE

## 2020-08-24 PROCEDURE — 74011250636 HC RX REV CODE- 250/636: Performed by: FAMILY MEDICINE

## 2020-08-24 PROCEDURE — 74011000250 HC RX REV CODE- 250: Performed by: HOSPITALIST

## 2020-08-24 RX ORDER — SODIUM CHLORIDE 9 MG/ML
75 INJECTION, SOLUTION INTRAVENOUS CONTINUOUS
Status: DISCONTINUED | OUTPATIENT
Start: 2020-08-24 | End: 2020-08-28

## 2020-08-24 RX ORDER — LORAZEPAM 2 MG/ML
3 INJECTION INTRAMUSCULAR
Status: DISCONTINUED | OUTPATIENT
Start: 2020-08-24 | End: 2020-08-28

## 2020-08-24 RX ORDER — SODIUM CHLORIDE 0.9 % (FLUSH) 0.9 %
5-40 SYRINGE (ML) INJECTION EVERY 8 HOURS
Status: DISCONTINUED | OUTPATIENT
Start: 2020-08-24 | End: 2020-08-24

## 2020-08-24 RX ORDER — MORPHINE SULFATE 4 MG/ML
4 INJECTION INTRAVENOUS
Status: DISCONTINUED | OUTPATIENT
Start: 2020-08-24 | End: 2020-08-31 | Stop reason: HOSPADM

## 2020-08-24 RX ORDER — LORAZEPAM 2 MG/ML
1 INJECTION INTRAMUSCULAR
Status: DISCONTINUED | OUTPATIENT
Start: 2020-08-24 | End: 2020-08-28

## 2020-08-24 RX ORDER — ACETAMINOPHEN 500 MG
1000 TABLET ORAL
Status: DISCONTINUED | OUTPATIENT
Start: 2020-08-24 | End: 2020-08-31 | Stop reason: HOSPADM

## 2020-08-24 RX ORDER — SODIUM CHLORIDE 0.9 % (FLUSH) 0.9 %
5-40 SYRINGE (ML) INJECTION AS NEEDED
Status: DISCONTINUED | OUTPATIENT
Start: 2020-08-24 | End: 2020-08-31 | Stop reason: HOSPADM

## 2020-08-24 RX ORDER — LORAZEPAM 1 MG/1
1 TABLET ORAL
Status: DISCONTINUED | OUTPATIENT
Start: 2020-08-24 | End: 2020-08-28

## 2020-08-24 RX ORDER — LORAZEPAM 2 MG/ML
2 INJECTION INTRAMUSCULAR
Status: DISCONTINUED | OUTPATIENT
Start: 2020-08-24 | End: 2020-08-28

## 2020-08-24 RX ORDER — LORAZEPAM 1 MG/1
2 TABLET ORAL
Status: DISCONTINUED | OUTPATIENT
Start: 2020-08-24 | End: 2020-08-28

## 2020-08-24 RX ADMIN — MORPHINE SULFATE 4 MG: 4 INJECTION INTRAVENOUS at 13:27

## 2020-08-24 RX ADMIN — ONDANSETRON 4 MG: 2 INJECTION INTRAMUSCULAR; INTRAVENOUS at 21:56

## 2020-08-24 RX ADMIN — Medication 10 ML: at 21:55

## 2020-08-24 RX ADMIN — SODIUM CHLORIDE 40 MG: 9 INJECTION, SOLUTION INTRAMUSCULAR; INTRAVENOUS; SUBCUTANEOUS at 09:05

## 2020-08-24 RX ADMIN — RIVAROXABAN 20 MG: 10 TABLET, FILM COATED ORAL at 09:06

## 2020-08-24 RX ADMIN — SODIUM CHLORIDE 100 ML/HR: 900 INJECTION, SOLUTION INTRAVENOUS at 10:00

## 2020-08-24 RX ADMIN — MORPHINE SULFATE 4 MG: 4 INJECTION INTRAVENOUS at 09:06

## 2020-08-24 RX ADMIN — FOLIC ACID: 5 INJECTION, SOLUTION INTRAMUSCULAR; INTRAVENOUS; SUBCUTANEOUS at 11:00

## 2020-08-24 RX ADMIN — ONDANSETRON 4 MG: 2 INJECTION INTRAMUSCULAR; INTRAVENOUS at 09:06

## 2020-08-24 RX ADMIN — ONDANSETRON 4 MG: 2 INJECTION INTRAMUSCULAR; INTRAVENOUS at 13:27

## 2020-08-24 RX ADMIN — SODIUM CHLORIDE 100 ML/HR: 900 INJECTION, SOLUTION INTRAVENOUS at 23:57

## 2020-08-24 RX ADMIN — MORPHINE SULFATE 4 MG: 4 INJECTION INTRAVENOUS at 17:52

## 2020-08-24 RX ADMIN — LORAZEPAM 1 MG: 1 TABLET ORAL at 21:55

## 2020-08-24 RX ADMIN — Medication 10 ML: at 10:00

## 2020-08-24 RX ADMIN — MORPHINE SULFATE 4 MG: 4 INJECTION INTRAVENOUS at 21:58

## 2020-08-24 RX ADMIN — Medication 10 ML: at 06:00

## 2020-08-24 RX ADMIN — ONDANSETRON 4 MG: 2 INJECTION INTRAMUSCULAR; INTRAVENOUS at 17:52

## 2020-08-24 RX ADMIN — ACETAMINOPHEN 1000 MG: 500 TABLET ORAL at 04:10

## 2020-08-24 NOTE — PROGRESS NOTES
Problem: Falls - Risk of  Goal: *Absence of Falls  Description: Document Jim Lemus Fall Risk and appropriate interventions in the flowsheet.   8/24/2020 0452 by Geneva Estrada  Outcome: Progressing Towards Goal  Note: Fall Risk Interventions:            Medication Interventions: Evaluate medications/consider consulting pharmacy                8/23/2020 2050 by Geneva Estrada  Outcome: Progressing Towards Goal  Note: Fall Risk Interventions:            Medication Interventions: Evaluate medications/consider consulting pharmacy

## 2020-08-24 NOTE — PROGRESS NOTES
Reason for Admission:   Abdominal pain with nausea and vomiting                  RUR Score:   19%               PCP: First and Last name:     Name of Practice:    Are you a current patient: Yes/No:    Approximate date of last visit:    Can you participate in a virtual visit if needed:     Do you (patient/family) have any concerns for transition/discharge? Plan for utilizing home health:   Unlikely     Current Advanced Directive/Advance Care Plan:  Not on file            Transition of Care Plan:   Home with physician follow up        Chart reviewed. Per H&P Venkatesh Morales is a 39 y.o. female with past medical history of gastric bariatric surgery with revision first gastric surgery 2000, last date was 2018, cholecystectomy, Thrombo-embolism of the iliac artery, Intestinal absorption, hypertension, history of endometrial cancer dated 2011, and recent diagnosis of C. difficile diarrhea who presents to the emergency department complaining of abdominal pain.  Patient states that ever since she had a recent dilatation of the esophagus and placed on Xarelto she has been having achy abdominal pain.  Her pain has increased this evening causing her to call 911.  She says is very similar to her previous pancreatitis attacks sharp and stabbing radiating to her back. Adolfo Christie doctor both primary care and also GI doctor, Dr. Reji Clark, have been managing her. Alexandria Armando denies any current diarrhea or fevers or chills.  She has nausea with vomiting with worsening abdominal pain.  She described the pain is near 10 out of 10.     Social History  She denies any alcohol use states she quit over a year ago. Alexandria Armando does continue however to smoke cigarettes restfully for 5 cigarettes a day, she denies any illicit drug use. \"    Please encourage ambulation to assist with identifying potential transition of care needs. CM to continue to follow and assist.    1200:  CM met with pt at bedside to discuss transition of care.   Pt is independent and her teenage children live with her. Pt's mother also lives near by and will assist as needed. Please encourage ambulation as appropriate to assist with identifying potential transition of care needs. Pt with recurrent/unresolved issues that have bought her back to the hospital. Anticipate pt will transition home with physician follow up. No needs have been identified at this time. CM to continue to follow and assist.    Care Management Interventions  PCP Verified by CM: Yes  Mode of Transport at Discharge:  Other (see comment)(Family)  Transition of Care Consult (CM Consult): Discharge Planning  Health Maintenance Reviewed: Yes  Current Support Network: Own Home(teenage children live with pt)  Confirm Follow Up Transport: Self  The Plan for Transition of Care is Related to the Following Treatment Goals : Home with physician follow up  Discharge Location  Discharge Placement: Home with family assistance      Readmission Assessment  Number of days since last admission?: 8-30 days  Previous disposition: Home with Family  Who is being interviewed?: Patient  What was the patient's/caregiver's perception as to why they think they needed to return back to the hospital?: Other (Comment)(unresolved/recurrent issue)  Did you visit your Primary Care Physician after you left the hospital, before you returned this time?: Yes  Did you see a specialist, such as Cardiac, Pulmonary, Orthopedic Physician, etc. after you left the hospital?: No  Who advised the patient to return to the hospital?: Self-referral  Does the patient report anything that got in the way of taking their medications?: No  In our efforts to provide the best possible care to you and others like you, can you think of anything that we could have done to help you after you left the hospital the first time, so that you might not have needed to return so soon?: (resolve issue)

## 2020-08-24 NOTE — PROGRESS NOTES
Patient requested her xerelto that she takes at home along with ativan and something for GERD. Messaged doctor for orders orders were put in. Explained to the patient about Dimple Reeve starting at bedtime and the others to start in AM. She did want something for heartburn now. Gave some ginger ale until I hear back form

## 2020-08-24 NOTE — PROGRESS NOTES
Problem: Falls - Risk of  Goal: *Absence of Falls  Description: Document Emerald Zavala Fall Risk and appropriate interventions in the flowsheet.   Outcome: Progressing Towards Goal  Note: Fall Risk Interventions:            Medication Interventions: Evaluate medications/consider consulting pharmacy                   Problem: Patient Education: Go to Patient Education Activity  Goal: Patient/Family Education  Outcome: Progressing Towards Goal

## 2020-08-24 NOTE — PROGRESS NOTES
Until proven otherwise this patient has acute on chronic alcoholic pancreatitis. She should never drink alcohol.

## 2020-08-24 NOTE — PROGRESS NOTES
Patient HR was elevated she was having anxiety so I gave her ativan and she went to sleep. Then she was woken by nursing assistant for vitals which startled her so her HR was elevated.

## 2020-08-24 NOTE — ROUTINE PROCESS
SHIFT SUMMARY: 
 
Patient has some anxiety beginning  of shift Ativan was given Patient complained of GERD meds ordered for morning but ginger ale was given for the air pockets she had complained of 
 
Patient was given meds for pain Patient has been resting all evening

## 2020-08-24 NOTE — ROUTINE PROCESS
Bedside and Verbal shift change report given to Giovanny Leon RN (oncoming nurse) by KATINA Chavez RN (offgoing nurse). Report included the following information SBAR, Kardex, Intake/Output and MAR.

## 2020-08-24 NOTE — PROGRESS NOTES
Hospitalist Progress Note-critical care note     Patient: Ilana Pineda MRN: [id number]  CSN: [id number]    YOB: 1975  Age: 39 y.o. Sex: female    DOA: 8/23/2020 LOS:  LOS: 1 day            Chief complaint: pancreatitis, elevated lft ,     Assessment/Plan         Hospital Problems  Date Reviewed: 6/27/2020          Codes Class Noted POA    * (Principal) Pancreatitis ICD-10-CM: K85.90  ICD-9-CM: 027.3  8/23/2020 Unknown        Nausea & vomiting ICD-10-CM: R11.2  ICD-9-CM: 787.01  8/23/2020 Unknown        Elevated liver enzymes ICD-10-CM: R74.8  ICD-9-CM: 790.5  8/23/2020 Unknown        Portal vein thrombosis ICD-10-CM: S64  ICD-9-CM: 670  6/27/2020 Yes        Hypertension ICD-10-CM: I10  ICD-9-CM: 401.9  Unknown Yes        GERD (gastroesophageal reflux disease) ICD-10-CM: K21.9  ICD-9-CM: 530.81  Unknown Yes              Acute on chronic pancreatitis  Denies drinking alcohol   Avoid  alcohol drinking   F/u with Dr. Niño Route iv  hydration   Switch to po pain meds      Portal vein thrombosis  Continue xarelto             HTN   Continue home meds      Gerd   ppi     alcohol drinking   Alcohol level was 8. Banana bag       Subjective: I have not drink alcohol, why every one say I drink ? Rn: ask to advance diet, and ask for pain meds    Disposition :tbd,   Review of systems:    General: No fevers or chills. Cardiovascular: No chest pain or pressure. No palpitations. Pulmonary: No shortness of breath. Gastrointestinal: No nausea, vomiting. + abdomen pain     Vital signs/Intake and Output:  Visit Vitals  /83 (BP 1 Location: Left arm, BP Patient Position: At rest)   Pulse 94   Temp 99.1 °F (37.3 °C)   Resp 18   Ht 5' 5\" (1.651 m)   Wt 83.9 kg (185 lb)   SpO2 100%   BMI 30.79 kg/m²     Current Shift:  No intake/output data recorded. Last three shifts:  08/22 1901 - 08/24 0700  In: -   Out: 800 [Urine:800]    Physical Exam:  General: WD, WN.   Alert, cooperative, no acute distress HEENT: NC, Atraumatic. PERRLA, anicteric sclerae. Lungs: CTA Bilaterally. No Wheezing/Rhonchi/Rales. Heart:  Regular  rhythm,  No murmur, No Rubs, No Gallops  Abdomen: Soft, Non distended, Non tender. +Bowel sounds, mild tenderness   Extremities: No c/c/e  Psych:   Not anxious or agitated. Neurologic:  No acute neurological deficit. Labs: Results:       Chemistry Recent Labs     08/24/20 0528 08/23/20 0637   * 131*    132*   K 4.0 4.4    102   CO2 26 23   BUN 4* 8   CREA 0.72 0.78   CA 7.9* 8.2*   AGAP 5 7   BUCR 6* 10*   * 686*  678*   TP 5.5* 6.1*  6.1*   ALB 2.1* 2.3*  2.2*   GLOB 3.4 3.8  3.9   AGRAT 0.6* 0.6*  0.6*      CBC w/Diff Recent Labs     08/24/20 0528 08/23/20  0613   WBC 9.0 6.4   RBC 3.26* 3.94*   HGB 10.4* 12.1   HCT 30.5* 37.0    399   GRANS 72 81*   LYMPH 14* 10*   EOS 0 0      Cardiac Enzymes Recent Labs     08/23/20  0637   CPK 24*   CKND1 CALCULATION NOT PERFORMED WHEN RESULT IS BELOW LINEAR LIMIT      Coagulation Recent Labs     08/23/20  0613   PTP 16.6*   INR 1.4*   APTT 38.8*       Lipid Panel Lab Results   Component Value Date/Time    Cholesterol, total 169 07/28/2020 02:05 AM    HDL Cholesterol 37 (L) 07/28/2020 02:05 AM    LDL, calculated 97.2 07/28/2020 02:05 AM    VLDL, calculated 34.8 07/28/2020 02:05 AM    Triglyceride 81 08/23/2020 06:37 AM    CHOL/HDL Ratio 4.6 07/28/2020 02:05 AM      BNP No results for input(s): BNPP in the last 72 hours.    Liver Enzymes Recent Labs     08/24/20 0528   TP 5.5*   ALB 2.1*   *      Thyroid Studies Lab Results   Component Value Date/Time    TSH 2.18 06/29/2020 12:35 AM        Procedures/imaging: see electronic medical records for all procedures/Xrays and details which were not copied into this note but were reviewed prior to creation of Plan    Cta Chest W Or W Wo Cont    Result Date: 7/27/2020  EXAM: CTA Chest INDICATION: Patient with history of portal vein thrombosis, shortness of breath, currently anticoagulation. COMPARISON: Several prior abdominal/pelvic CT exams, most recently 6/26/2020; no prior CT imaging the chest available for direct comparison. TECHNIQUE: Axial CT imaging from the thoracic inlet through the diaphragm with intravenous contrast utilizing CTA study for pulmonary artery evaluation. Coronal and sagittal MIP reformations were generated at a separate workstation. One or more dose reduction techniques were used on this CT: automated exposure control, adjustment of the mAs and/or kVp according to patient size, and iterative reconstruction techniques. The specific techniques used on this CT exam have been documented in the patient's electronic medical record. Digital Imaging and Communications in Medicine (DICOM) format image data are available to nonaffiliated external healthcare facilities or entities on a secure, media free, reciprocally searchable basis with patient authorization for at least a 12-month period after this study. _______________ FINDINGS: EXAM QUALITY: Overall exam quality is adequate. Pulmonary arterial enhancement is adequate. The breath hold is satisfactory. PULMONARY ARTERIES: No convincing evidence of pulmonary embolism. MEDIASTINUM: Included thyroid gland unremarkable. Normal cardiac size. No pericardial effusion. Thoracic aorta is of normal course and caliber. LYMPH NODES: No enlarged mediastinal or hilar nodes by size criteria. AIRWAY: Mild bronchial wall thickening without evidence of endobronchial mucoid impaction. LUNGS: Lungs are well aerated. No alveolar consolidation. No significant groundglass abnormality or septal line thickening. PLEURA: No pleural effusion or pneumothorax. UPPER ABDOMEN: Partial visualization of postoperative changes from gastric bypass surgical procedure with significant edematous infiltration of the pancreatic head, transmural thickening of the adjacent stomach, fluid extending into the excluded portion of the stomach. Brittni Leslie OTHER: No acute or aggressive osseous abnormalities identified. _______________     IMPRESSION: 1. No evidence of pulmonary embolism. 2.  Mild bronchial wall thickening without evidence of endobronchial mucoid impaction, findings to suggest pneumonia, or pulmonary edema. 3. No evidence of pleural effusion. 4. Partial visualization of postoperative changes within the upper abdomen as well as likely imaging sequela of pancreatitis, (to be more completely described on contemporaneously performed abdominal/pelvic CT). Ct Abd Pelv W Cont    Result Date: 8/23/2020  EXAM: CT of the abdomen and pelvis INDICATION: Epigastric abdominal pain. COMPARISON: 0/29/4072 TECHNIQUE: Helical volumetric scanning through the abdomen and pelvis was performed after oral, with nonionic intravenous contrast.  Axial, coronal and sagittal reconstructions were created. One or more dose reduction techniques were used on this CT: automated exposure control, adjustment of the mAs and/or kVp according to patient size, and iterative reconstruction techniques. The specific techniques used on this CT exam have been documented in the patient's electronic medical record. Digital Imaging and Communications in Medicine (DICOM) format image data are available to nonaffiliated external healthcare facilities or entities on a secure, media free, reciprocally searchable basis with patient authorization for at least a 12-month period after this study. _______________ FINDINGS: LOWER CHEST: Within normal limits. LIVER, BILIARY: Liver is normal. No biliary dilation. Cholecystectomy. PANCREAS: Interval progression of low density fat stranding adjacent to the pancreas, predominantly pancreatic head and body extending towards the duodenum and the distal excluded stomach, without a definite organized fluid collection. Pancreatic tissue is slightly heterogeneous/edematous, however, without farzana nonenhancement to suggest necrosis.  9 mm rounded low density within the pancreatic tail is unchanged. SPLEEN: Normal. ADRENALS: Normal. KIDNEYS: Normal. LYMPH NODES: No enlarged lymph nodes. GASTROINTESTINAL TRACT: Gastric bypass. Again, the excluded stomach demonstrates considerable wall thickening at the antrum and pylorus considerable wall thickening of the first and second portions of the duodenum, extending to the proximal third portion of the duodenum, with slight progression. No bowel dilatation. Normal appendix. PELVIC ORGANS: Hysterectomy. VASCULATURE: No arterial vascular abnormality. Significant narrowing superior portion of the SMV and the main portal vein amongst the inflammatory changes, similar to the prior exam. BONES: No acute or aggressive osseous abnormalities identified. OTHER: Small fat-containing midline ventral hernia in the epigastrium. _______________     IMPRESSION: 1. Ongoing acute pancreatitis with interval progression of peripancreatic inflammatory changes and secondary involvement of the excluded stomach and proximal to mid duodenum. No evidence of pancreatic necrosis. No abscess. 2. Stable 9 mm rounded low density in the pancreatic tail, nonspecific cyst, possibly pseudocyst. * **  *    Ct Abd Pelv W Cont    Result Date: 7/27/2020  EXAM: CT of the abdomen and pelvis INDICATION: Epigastric abdominal pain, history of portal vein thrombosis. Pancreatitis. Prior gastric bypass surgical procedure. COMPARISON: No prior studies, most recently CT scan of the abdomen and pelvis 6/26/2020; MRCP 6/29/2020. TECHNIQUE: Axial CT imaging of the abdomen and pelvis was performed with intravenous contrast. Multiplanar reformats were generated. One or more dose reduction techniques were used on this CT: automated exposure control, adjustment of the mAs and/or kVp according to patient size, and iterative reconstruction techniques. The specific techniques used on this CT exam have been documented in the patient's electronic medical record.   Digital Imaging and Communications in Medicine (DICOM) format image data are available to nonaffiliated external healthcare facilities or entities on a secure, media free, reciprocally searchable basis with patient authorization for at least a 12-month period after this study. _______________ FINDINGS: LOWER CHEST: Clear lung bases. Normal cardiac size. No pericardial effusion. LIVER, BILIARY: Hepatic parenchymal enhancement is uniform. No biliary dilation. Cholecystectomy. PANCREAS: Moderate peripancreatic edematous infiltration with dominant involvement of the pancreatic head as well as the pancreaticoduodenal groove. Pancreatic ductal ectasia and tiny low attenuating structure within the pancreatic tail, unchanged. No evidence of an organized or drainable peripancreatic fluid collection. SPLEEN: Normal. ADRENALS: Normal. KIDNEYS/URETERS/BLADDER: Symmetric renal enhancement without evidence of hydronephrosis or nephrolithiasis. Urinary bladder normal in CT appearance. PELVIC ORGANS: Hysterectomy VASCULATURE: Abdominal aorta is of normal course and caliber. Redemonstration of significant narrowing of the superior mesenteric vein and proximal portion of the portal vein adjacent to the pancreatic head/pancreaticoduodenal groove, as previously. LYMPH NODES: No enlarged lymph nodes. GASTROINTESTINAL TRACT: Significant thickening of the distal stomach body and antrum noted with additional transmural thickening adjacent first and second portions duodenum. Fluid noted to be present within the excluded portion of the stomach. Prior gastric bypass surgical procedure. No morphology of bowel obstruction. No free intraperitoneal gas. Normal appendix. BONES: No acute or aggressive osseous abnormalities identified.  OTHER: None. _______________     IMPRESSION: 1.  CT findings in keeping with acute edematous interstitial pancreatitis, predominantly involving the pancreaticoduodenal groove.   > Small quantity of reactive fluid within the pancreaticoduodenal groove without evidence of an organized or drainable peripancreatic fluid collection.   > No evidence of glandular necrosis. Small low attenuating structures within the pancreatic tail noted, favored to reflect small pseudocysts as seen on prior MRCP study. 2. Prior gastric bypass surgical procedure. Very likely reactive inflammatory change involving the distal stomach body and antrum as well as the proximal portion of the duodenum. No morphology of bowel obstruction. Xr Chest Port    Result Date: 8/23/2020  EXAM: One-view chest CLINICAL HISTORY: chest pain , COMPARISON: None FINDINGS: Frontal view of the chest demonstrate clear lungs. Cardiac silhouette is normal in size and contour. No acute bony or soft tissue abnormality. IMPRESSION: No acute pulmonary process identified. Xr Chest Port    Result Date: 7/27/2020  EXAM: XR CHEST PORT CLINICAL INDICATION/HISTORY: SOB -Additional: None COMPARISON: March 23, 2019 TECHNIQUE: Frontal view of the chest _______________ FINDINGS: HEART AND MEDIASTINUM: Normal cardiac size and mediastinal contours. LUNGS AND PLEURAL SPACES: No focal pneumonic consolidation, pneumothorax, or pleural effusion. BONY THORAX AND SOFT TISSUES: No acute osseous abnormality _______________     IMPRESSION: No acute radiographic cardiopulmonary abnormality.        Elan Jacome MD

## 2020-08-25 LAB
ALBUMIN SERPL-MCNC: 1.9 G/DL (ref 3.4–5)
ALBUMIN/GLOB SERPL: 0.4 {RATIO} (ref 0.8–1.7)
ALP SERPL-CCNC: 574 U/L (ref 45–117)
ALT SERPL-CCNC: 114 U/L (ref 13–56)
ANION GAP SERPL CALC-SCNC: 7 MMOL/L (ref 3–18)
AST SERPL-CCNC: 153 U/L (ref 10–38)
BASOPHILS # BLD: 0 K/UL (ref 0–0.1)
BASOPHILS NFR BLD: 0 % (ref 0–2)
BILIRUB DIRECT SERPL-MCNC: 0.2 MG/DL (ref 0–0.2)
BILIRUB SERPL-MCNC: 1.1 MG/DL (ref 0.2–1)
BUN SERPL-MCNC: 6 MG/DL (ref 7–18)
BUN/CREAT SERPL: 9 (ref 12–20)
CALCIUM SERPL-MCNC: 8.1 MG/DL (ref 8.5–10.1)
CHLORIDE SERPL-SCNC: 104 MMOL/L (ref 100–111)
CO2 SERPL-SCNC: 23 MMOL/L (ref 21–32)
CREAT SERPL-MCNC: 0.66 MG/DL (ref 0.6–1.3)
DIFFERENTIAL METHOD BLD: ABNORMAL
EOSINOPHIL # BLD: 0.1 K/UL (ref 0–0.4)
EOSINOPHIL NFR BLD: 2 % (ref 0–5)
ERYTHROCYTE [DISTWIDTH] IN BLOOD BY AUTOMATED COUNT: 17.1 % (ref 11.6–14.5)
GLOBULIN SER CALC-MCNC: 4.6 G/DL (ref 2–4)
GLUCOSE SERPL-MCNC: 85 MG/DL (ref 74–99)
HCT VFR BLD AUTO: 32.1 % (ref 35–45)
HGB BLD-MCNC: 10.9 G/DL (ref 12–16)
LYMPHOCYTES # BLD: 1.1 K/UL (ref 0.9–3.6)
LYMPHOCYTES NFR BLD: 17 % (ref 21–52)
MCH RBC QN AUTO: 31.3 PG (ref 24–34)
MCHC RBC AUTO-ENTMCNC: 34 G/DL (ref 31–37)
MCV RBC AUTO: 92.2 FL (ref 74–97)
MONOCYTES # BLD: 0.8 K/UL (ref 0.05–1.2)
MONOCYTES NFR BLD: 13 % (ref 3–10)
NEUTS SEG # BLD: 4.2 K/UL (ref 1.8–8)
NEUTS SEG NFR BLD: 68 % (ref 40–73)
PLATELET # BLD AUTO: 301 K/UL (ref 135–420)
PMV BLD AUTO: 10.1 FL (ref 9.2–11.8)
POTASSIUM SERPL-SCNC: 4.9 MMOL/L (ref 3.5–5.5)
PROT SERPL-MCNC: 6.5 G/DL (ref 6.4–8.2)
RBC # BLD AUTO: 3.48 M/UL (ref 4.2–5.3)
SODIUM SERPL-SCNC: 134 MMOL/L (ref 136–145)
WBC # BLD AUTO: 6.2 K/UL (ref 4.6–13.2)

## 2020-08-25 PROCEDURE — 74011000250 HC RX REV CODE- 250: Performed by: HOSPITALIST

## 2020-08-25 PROCEDURE — 65270000029 HC RM PRIVATE

## 2020-08-25 PROCEDURE — C9113 INJ PANTOPRAZOLE SODIUM, VIA: HCPCS | Performed by: FAMILY MEDICINE

## 2020-08-25 PROCEDURE — 74011250636 HC RX REV CODE- 250/636: Performed by: FAMILY MEDICINE

## 2020-08-25 PROCEDURE — 74011000258 HC RX REV CODE- 258: Performed by: HOSPITALIST

## 2020-08-25 PROCEDURE — 74011250637 HC RX REV CODE- 250/637: Performed by: FAMILY MEDICINE

## 2020-08-25 PROCEDURE — 74011250637 HC RX REV CODE- 250/637: Performed by: HOSPITALIST

## 2020-08-25 PROCEDURE — 80076 HEPATIC FUNCTION PANEL: CPT

## 2020-08-25 PROCEDURE — 74011250637 HC RX REV CODE- 250/637: Performed by: INTERNAL MEDICINE

## 2020-08-25 PROCEDURE — 36415 COLL VENOUS BLD VENIPUNCTURE: CPT

## 2020-08-25 PROCEDURE — 74011250636 HC RX REV CODE- 250/636: Performed by: HOSPITALIST

## 2020-08-25 PROCEDURE — 85025 COMPLETE CBC W/AUTO DIFF WBC: CPT

## 2020-08-25 PROCEDURE — 80048 BASIC METABOLIC PNL TOTAL CA: CPT

## 2020-08-25 RX ORDER — TRAZODONE HYDROCHLORIDE 100 MG/1
100 TABLET ORAL
Status: DISCONTINUED | OUTPATIENT
Start: 2020-08-25 | End: 2020-08-31 | Stop reason: HOSPADM

## 2020-08-25 RX ORDER — OXYCODONE AND ACETAMINOPHEN 5; 325 MG/1; MG/1
1 TABLET ORAL
Status: DISCONTINUED | OUTPATIENT
Start: 2020-08-25 | End: 2020-08-25

## 2020-08-25 RX ORDER — HYDROMORPHONE HYDROCHLORIDE 1 MG/ML
1 INJECTION, SOLUTION INTRAMUSCULAR; INTRAVENOUS; SUBCUTANEOUS ONCE
Status: COMPLETED | OUTPATIENT
Start: 2020-08-25 | End: 2020-08-25

## 2020-08-25 RX ORDER — OXYCODONE HYDROCHLORIDE 5 MG/1
5 TABLET ORAL
Status: DISCONTINUED | OUTPATIENT
Start: 2020-08-25 | End: 2020-08-26

## 2020-08-25 RX ORDER — DIPHENHYDRAMINE HYDROCHLORIDE 50 MG/ML
12.5 INJECTION, SOLUTION INTRAMUSCULAR; INTRAVENOUS ONCE
Status: COMPLETED | OUTPATIENT
Start: 2020-08-25 | End: 2020-08-25

## 2020-08-25 RX ORDER — DOCUSATE SODIUM 100 MG/1
100 CAPSULE, LIQUID FILLED ORAL DAILY
Status: DISCONTINUED | OUTPATIENT
Start: 2020-08-25 | End: 2020-08-31 | Stop reason: HOSPADM

## 2020-08-25 RX ADMIN — ONDANSETRON 4 MG: 2 INJECTION INTRAMUSCULAR; INTRAVENOUS at 21:30

## 2020-08-25 RX ADMIN — LORAZEPAM 1 MG: 1 TABLET ORAL at 22:00

## 2020-08-25 RX ADMIN — PROMETHAZINE HYDROCHLORIDE 12.5 MG: 25 INJECTION, SOLUTION INTRAMUSCULAR; INTRAVENOUS at 15:15

## 2020-08-25 RX ADMIN — DOCUSATE SODIUM 100 MG: 100 CAPSULE, LIQUID FILLED ORAL at 15:18

## 2020-08-25 RX ADMIN — SODIUM CHLORIDE 100 ML/HR: 900 INJECTION, SOLUTION INTRAVENOUS at 09:58

## 2020-08-25 RX ADMIN — OXYCODONE 5 MG: 5 TABLET ORAL at 23:57

## 2020-08-25 RX ADMIN — ONDANSETRON 4 MG: 2 INJECTION INTRAMUSCULAR; INTRAVENOUS at 03:05

## 2020-08-25 RX ADMIN — TRAZODONE HYDROCHLORIDE 100 MG: 100 TABLET ORAL at 23:00

## 2020-08-25 RX ADMIN — OXYCODONE HYDROCHLORIDE AND ACETAMINOPHEN 1 TABLET: 5; 325 TABLET ORAL at 12:03

## 2020-08-25 RX ADMIN — HYDROMORPHONE HYDROCHLORIDE 1 MG: 1 INJECTION, SOLUTION INTRAMUSCULAR; INTRAVENOUS; SUBCUTANEOUS at 15:15

## 2020-08-25 RX ADMIN — FOLIC ACID: 5 INJECTION, SOLUTION INTRAMUSCULAR; INTRAVENOUS; SUBCUTANEOUS at 12:03

## 2020-08-25 RX ADMIN — MORPHINE SULFATE 4 MG: 4 INJECTION INTRAVENOUS at 03:07

## 2020-08-25 RX ADMIN — SODIUM CHLORIDE 40 MG: 9 INJECTION, SOLUTION INTRAMUSCULAR; INTRAVENOUS; SUBCUTANEOUS at 09:58

## 2020-08-25 RX ADMIN — RIVAROXABAN 20 MG: 10 TABLET, FILM COATED ORAL at 09:59

## 2020-08-25 RX ADMIN — DIPHENHYDRAMINE HYDROCHLORIDE 12.5 MG: 50 INJECTION, SOLUTION INTRAMUSCULAR; INTRAVENOUS at 17:00

## 2020-08-25 RX ADMIN — OXYCODONE 5 MG: 5 TABLET ORAL at 19:56

## 2020-08-25 NOTE — ROUTINE PROCESS
SHIFT SUMMARY: 
 
Patient has had some pain meds given she stated that her diet she had today made her pain worse. She had some nausea meds given CIWA score has been below a 5 all evening

## 2020-08-25 NOTE — PROGRESS NOTES
Hospitalist Progress Note-critical care note     Patient: Aimee Eric MRN: [id number]  CSN: [id number]    YOB: 1975  Age: 39 y.o. Sex: female    DOA: 8/23/2020 LOS:  LOS: 2 days            Chief complaint: pancreatitis, elevated lft ,     Assessment/Plan         Hospital Problems  Date Reviewed: 6/27/2020          Codes Class Noted POA    * (Principal) Pancreatitis ICD-10-CM: K85.90  ICD-9-CM: 028.0  8/23/2020 Unknown        Nausea & vomiting ICD-10-CM: R11.2  ICD-9-CM: 787.01  8/23/2020 Unknown        Elevated liver enzymes ICD-10-CM: R74.8  ICD-9-CM: 790.5  8/23/2020 Unknown        Portal vein thrombosis ICD-10-CM: W86  ICD-9-CM: 260  6/27/2020 Yes        Hypertension ICD-10-CM: I10  ICD-9-CM: 401.9  Unknown Yes        GERD (gastroesophageal reflux disease) ICD-10-CM: K21.9  ICD-9-CM: 530.81  Unknown Yes              Acute on chronic pancreatitis  Reported pain   Denies drinking alcohol   Avoid  alcohol drinking   F/u with Dr. Rodo Burch iv  hydration      Portal vein thrombosis  Continue xarelto             HTN   Continue home meds      Gerd   ppi     alcohol drinking   Alcohol level was 8 on admission    Banana bag -will continue       Subjective: I have headache, Dr. Daphnie Fuentes told because of IV morphine    Discussed with pt will put on percocet for her if GI thinking it is iv morphine makes her headache  She agrees. Also asked pt if she has bm-discussed with her narcotics can cause constipation-will put medication for bowel management. She told me she will have a testing done per gi recommend      Rn: pt needs pain medication urgent       Disposition :tbd,   Review of systems:    General: No fevers or chills. Cardiovascular: No chest pain or pressure. No palpitations. Pulmonary: No shortness of breath.    Gastrointestinal: No nausea, vomiting. + abdomen pain     Vital signs/Intake and Output:  Visit Vitals  /86 (BP 1 Location: Left arm, BP Patient Position: At rest)   Pulse 94   Temp 99.1 °F (37.3 °C)   Resp 17   Ht 5' 5\" (1.651 m)   Wt 83.9 kg (185 lb)   SpO2 98%   BMI 30.79 kg/m²     Current Shift:  08/25 0701 - 08/25 1900  In: 240 [P.O.:240]  Out: -   Last three shifts:  08/23 1901 - 08/25 0700  In: 1715.1 [I.V.:1715.1]  Out: 1576 [Urine:1550]    Physical Exam:  General: WD, WN. Alert, cooperative, no acute distress    HEENT: NC, Atraumatic. PERRLA, anicteric sclerae. Lungs: CTA Bilaterally. No Wheezing/Rhonchi/Rales. Heart:  Regular  rhythm,  No murmur, No Rubs, No Gallops  Abdomen: Soft, Non distended, +tenderness   +Bowel sounds, mild tenderness   Extremities: No c/c/e  Psych:   Not anxious or agitated. Neurologic:  No acute neurological deficit. Labs: Results:       Chemistry Recent Labs     08/25/20 0415 08/24/20  0528 08/23/20  0637   GLU 85 105* 131*   * 136 132*   K 4.9 4.0 4.4    105 102   CO2 23 26 23   BUN 6* 4* 8   CREA 0.66 0.72 0.78   CA 8.1* 7.9* 8.2*   AGAP 7 5 7   BUCR 9* 6* 10*   * 664* 686*  678*   TP 6.5 5.5* 6.1*  6.1*   ALB 1.9* 2.1* 2.3*  2.2*   GLOB 4.6* 3.4 3.8  3.9   AGRAT 0.4* 0.6* 0.6*  0.6*      CBC w/Diff Recent Labs     08/25/20 0415 08/24/20  0528 08/23/20  0613   WBC 6.2 9.0 6.4   RBC 3.48* 3.26* 3.94*   HGB 10.9* 10.4* 12.1   HCT 32.1* 30.5* 37.0    288 399   GRANS 68 72 81*   LYMPH 17* 14* 10*   EOS 2 0 0      Cardiac Enzymes Recent Labs     08/23/20  0637   CPK 24*   CKND1 CALCULATION NOT PERFORMED WHEN RESULT IS BELOW LINEAR LIMIT      Coagulation Recent Labs     08/23/20  0613   PTP 16.6*   INR 1.4*   APTT 38.8*       Lipid Panel Lab Results   Component Value Date/Time    Cholesterol, total 169 07/28/2020 02:05 AM    HDL Cholesterol 37 (L) 07/28/2020 02:05 AM    LDL, calculated 97.2 07/28/2020 02:05 AM    VLDL, calculated 34.8 07/28/2020 02:05 AM    Triglyceride 81 08/23/2020 06:37 AM    CHOL/HDL Ratio 4.6 07/28/2020 02:05 AM      BNP No results for input(s): BNPP in the last 72 hours.    Liver Enzymes Recent Labs     08/25/20  0415   TP 6.5   ALB 1.9*   *      Thyroid Studies Lab Results   Component Value Date/Time    TSH 2.18 06/29/2020 12:35 AM        Procedures/imaging: see electronic medical records for all procedures/Xrays and details which were not copied into this note but were reviewed prior to creation of Plan    Cta Chest W Or W Wo Cont    Result Date: 7/27/2020  EXAM: CTA Chest INDICATION: Patient with history of portal vein thrombosis, shortness of breath, currently anticoagulation. COMPARISON: Several prior abdominal/pelvic CT exams, most recently 6/26/2020; no prior CT imaging the chest available for direct comparison. TECHNIQUE: Axial CT imaging from the thoracic inlet through the diaphragm with intravenous contrast utilizing CTA study for pulmonary artery evaluation. Coronal and sagittal MIP reformations were generated at a separate workstation. One or more dose reduction techniques were used on this CT: automated exposure control, adjustment of the mAs and/or kVp according to patient size, and iterative reconstruction techniques. The specific techniques used on this CT exam have been documented in the patient's electronic medical record. Digital Imaging and Communications in Medicine (DICOM) format image data are available to nonaffiliated external healthcare facilities or entities on a secure, media free, reciprocally searchable basis with patient authorization for at least a 12-month period after this study. _______________ FINDINGS: EXAM QUALITY: Overall exam quality is adequate. Pulmonary arterial enhancement is adequate. The breath hold is satisfactory. PULMONARY ARTERIES: No convincing evidence of pulmonary embolism. MEDIASTINUM: Included thyroid gland unremarkable. Normal cardiac size. No pericardial effusion. Thoracic aorta is of normal course and caliber. LYMPH NODES: No enlarged mediastinal or hilar nodes by size criteria.  AIRWAY: Mild bronchial wall thickening without evidence of endobronchial mucoid impaction. LUNGS: Lungs are well aerated. No alveolar consolidation. No significant groundglass abnormality or septal line thickening. PLEURA: No pleural effusion or pneumothorax. UPPER ABDOMEN: Partial visualization of postoperative changes from gastric bypass surgical procedure with significant edematous infiltration of the pancreatic head, transmural thickening of the adjacent stomach, fluid extending into the excluded portion of the stomach. . OTHER: No acute or aggressive osseous abnormalities identified. _______________     IMPRESSION: 1. No evidence of pulmonary embolism. 2.  Mild bronchial wall thickening without evidence of endobronchial mucoid impaction, findings to suggest pneumonia, or pulmonary edema. 3. No evidence of pleural effusion. 4. Partial visualization of postoperative changes within the upper abdomen as well as likely imaging sequela of pancreatitis, (to be more completely described on contemporaneously performed abdominal/pelvic CT). Ct Abd Pelv W Cont    Result Date: 8/23/2020  EXAM: CT of the abdomen and pelvis INDICATION: Epigastric abdominal pain. COMPARISON: 6/04/5824 TECHNIQUE: Helical volumetric scanning through the abdomen and pelvis was performed after oral, with nonionic intravenous contrast.  Axial, coronal and sagittal reconstructions were created. One or more dose reduction techniques were used on this CT: automated exposure control, adjustment of the mAs and/or kVp according to patient size, and iterative reconstruction techniques. The specific techniques used on this CT exam have been documented in the patient's electronic medical record. Digital Imaging and Communications in Medicine (DICOM) format image data are available to nonaffiliated external healthcare facilities or entities on a secure, media free, reciprocally searchable basis with patient authorization for at least a 12-month period after this study.  _______________ FINDINGS: LOWER CHEST: Within normal limits. LIVER, BILIARY: Liver is normal. No biliary dilation. Cholecystectomy. PANCREAS: Interval progression of low density fat stranding adjacent to the pancreas, predominantly pancreatic head and body extending towards the duodenum and the distal excluded stomach, without a definite organized fluid collection. Pancreatic tissue is slightly heterogeneous/edematous, however, without farzana nonenhancement to suggest necrosis. 9 mm rounded low density within the pancreatic tail is unchanged. SPLEEN: Normal. ADRENALS: Normal. KIDNEYS: Normal. LYMPH NODES: No enlarged lymph nodes. GASTROINTESTINAL TRACT: Gastric bypass. Again, the excluded stomach demonstrates considerable wall thickening at the antrum and pylorus considerable wall thickening of the first and second portions of the duodenum, extending to the proximal third portion of the duodenum, with slight progression. No bowel dilatation. Normal appendix. PELVIC ORGANS: Hysterectomy. VASCULATURE: No arterial vascular abnormality. Significant narrowing superior portion of the SMV and the main portal vein amongst the inflammatory changes, similar to the prior exam. BONES: No acute or aggressive osseous abnormalities identified. OTHER: Small fat-containing midline ventral hernia in the epigastrium. _______________     IMPRESSION: 1. Ongoing acute pancreatitis with interval progression of peripancreatic inflammatory changes and secondary involvement of the excluded stomach and proximal to mid duodenum. No evidence of pancreatic necrosis. No abscess. 2. Stable 9 mm rounded low density in the pancreatic tail, nonspecific cyst, possibly pseudocyst. * **  *    Ct Abd Pelv W Cont    Result Date: 7/27/2020  EXAM: CT of the abdomen and pelvis INDICATION: Epigastric abdominal pain, history of portal vein thrombosis. Pancreatitis. Prior gastric bypass surgical procedure.  COMPARISON: No prior studies, most recently CT scan of the abdomen and pelvis 6/26/2020; MRCP 6/29/2020. TECHNIQUE: Axial CT imaging of the abdomen and pelvis was performed with intravenous contrast. Multiplanar reformats were generated. One or more dose reduction techniques were used on this CT: automated exposure control, adjustment of the mAs and/or kVp according to patient size, and iterative reconstruction techniques. The specific techniques used on this CT exam have been documented in the patient's electronic medical record. Digital Imaging and Communications in Medicine (DICOM) format image data are available to nonaffiliated external healthcare facilities or entities on a secure, media free, reciprocally searchable basis with patient authorization for at least a 12-month period after this study. _______________ FINDINGS: LOWER CHEST: Clear lung bases. Normal cardiac size. No pericardial effusion. LIVER, BILIARY: Hepatic parenchymal enhancement is uniform. No biliary dilation. Cholecystectomy. PANCREAS: Moderate peripancreatic edematous infiltration with dominant involvement of the pancreatic head as well as the pancreaticoduodenal groove. Pancreatic ductal ectasia and tiny low attenuating structure within the pancreatic tail, unchanged. No evidence of an organized or drainable peripancreatic fluid collection. SPLEEN: Normal. ADRENALS: Normal. KIDNEYS/URETERS/BLADDER: Symmetric renal enhancement without evidence of hydronephrosis or nephrolithiasis. Urinary bladder normal in CT appearance. PELVIC ORGANS: Hysterectomy VASCULATURE: Abdominal aorta is of normal course and caliber. Redemonstration of significant narrowing of the superior mesenteric vein and proximal portion of the portal vein adjacent to the pancreatic head/pancreaticoduodenal groove, as previously. LYMPH NODES: No enlarged lymph nodes. GASTROINTESTINAL TRACT: Significant thickening of the distal stomach body and antrum noted with additional transmural thickening adjacent first and second portions duodenum.  Fluid noted to be present within the excluded portion of the stomach. Prior gastric bypass surgical procedure. No morphology of bowel obstruction. No free intraperitoneal gas. Normal appendix. BONES: No acute or aggressive osseous abnormalities identified. OTHER: None. _______________     IMPRESSION: 1.  CT findings in keeping with acute edematous interstitial pancreatitis, predominantly involving the pancreaticoduodenal groove.   > Small quantity of reactive fluid within the pancreaticoduodenal groove without evidence of an organized or drainable peripancreatic fluid collection.   > No evidence of glandular necrosis. Small low attenuating structures within the pancreatic tail noted, favored to reflect small pseudocysts as seen on prior MRCP study. 2. Prior gastric bypass surgical procedure. Very likely reactive inflammatory change involving the distal stomach body and antrum as well as the proximal portion of the duodenum. No morphology of bowel obstruction. Xr Chest Port    Result Date: 8/23/2020  EXAM: One-view chest CLINICAL HISTORY: chest pain , COMPARISON: None FINDINGS: Frontal view of the chest demonstrate clear lungs. Cardiac silhouette is normal in size and contour. No acute bony or soft tissue abnormality. IMPRESSION: No acute pulmonary process identified. Xr Chest Port    Result Date: 7/27/2020  EXAM: XR CHEST PORT CLINICAL INDICATION/HISTORY: SOB -Additional: None COMPARISON: March 23, 2019 TECHNIQUE: Frontal view of the chest _______________ FINDINGS: HEART AND MEDIASTINUM: Normal cardiac size and mediastinal contours. LUNGS AND PLEURAL SPACES: No focal pneumonic consolidation, pneumothorax, or pleural effusion. BONY THORAX AND SOFT TISSUES: No acute osseous abnormality _______________     IMPRESSION: No acute radiographic cardiopulmonary abnormality.        Hugo Holliday MD

## 2020-08-25 NOTE — CONSULTS
[id number] [facility]    Name:  Yola Tomlin  MR#:   [id number]  :  1975  ACCOUNT #:  [de-identified]  DATE OF SERVICE:  2020    HISTORY OF PRESENT ILLNESS:  This is a 82-year-old female who was readmitted yesterday because of acute on chronic alcoholic pancreatitis. She claims that she has been having symptoms similar to this pancreatitis for at least five years, but was found to have pancreatitis only a year ago. She came to the emergency room with elevated liver enzymes on many occasions. The patient did have history of alcohol abuse on regular basis for the past, at least 13 years, when she started in her early 35s and claims that she used to drink a bottle of wine daily, but stopped almost completely about a year ago and since that time, she has been drinking sporadically without abuse. She claims that recently she has not drank any alcohol. The patient had a history of C. difficile colitis, for which she has been taking probiotic. Recently, she has been taking Florastor. This patient had a previous gastric bypass in 2000 by Dr. Nadeem Khan with cholecystectomy. It was revised by Dr. Efraín Mayfield in 2019. An upper endoscopy was done by myself just a week ago on Monday, where she was found to have normal anatomy. The patient had been complaining of severe heartburns. 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 She claims that she has 5 to 6 loose bowel movement per day, but before that she used to be very constipated, where she could have a bowel movement every two weeks. She continued to lose weight. She watches her diet. She eats little carbohydrates. The patient used to be diabetic. The patient used to have high blood pressure, but no longer since she lost a lot of weight. She had depression, eosinophilic colitis, GERD, chronic alcoholic pancreatitis. She stopped drinking about a year ago.   She had a previous hysterectomy, cholecystectomy, gastric bypass in 07/2000 and also in 09/2019. 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 The patient was started on Xarelto about three weeks ago. For that reason, at this time, she came with recurrence of epigastric pain radiating to the back. The pain was severe, subsided gradually last Wednesday, and then became unbearable on Thursday. She came to the emergency room yesterday. CT scan of the abdomen and pelvis at that time showed that she has edematous pancreatitis, affecting predominately the pancreatic head, the body, and extending towards the duodenum and the distal excluded stomach. There was no non-enhancement to suggest necrosis. There was a 9 mm cyst in the pigtail. There was no splenomegaly, liver abnormality, or bile duct dilation. There was evidence of previous gastric bypass surgery, with considerable wall thickening of the antrum and the pylorus, extending onto the first, second, and third portion of the duodenum, and most likely related to the pancreatitis. She had previous hysterectomy. At this time also, they mentioned that there was significant narrowing at the superior mesenteric vein and the main portal vein, similar to the prior exam in 06/2020 and 07/27/2020. Today, the patient is feeling better. Initially, she was having nausea and dry heaving, mostly small amount of clear liquid, but now she is better. She is able to tolerate clear liquid diet and she is hungry to eat more. She has not had a bowel movement but regularly she has three to five loose bowel movements per day. SOCIAL HISTORY:  She does not smoke, does not take NSAIDs. She resumed taking multivitamins. FAMILY HISTORY:  She claimed that her father had multiple cancers, but no colon cancer. She has no family history of pancreatitis.   She denies having any abdominal trauma. PHYSICAL EXAMINATION:  GENERAL:  We have a 46-year-old [facility] American female, who appears to be, at this time, in no distress. VITAL SIGNS:  She weighs 185 pounds. Temperature 98.3, pulse 94, blood pressure 122/85, breathing 17, saturation 100% on room air. The patient did have a fever at 100.7 at 03:44 this morning. SKIN:  The skin is normal.  No evidence of any rash. HEENT:  The eyes are unremarkable. The pupils are equal and reactive to light. The sclerae are anicteric. The conjunctivae are pink. The oropharyngeal cavity is normal with moist and pink mucous membranes. Normal teeth. NECK:  Supple. No palpable mass. No enlarged thyroid. LUNGS:  Clear to auscultation. HEART:  The cardiac rhythm is regular. S1, S2 normal.  No murmur. ABDOMEN:  Not distended, soft. There is moderate epigastric tenderness, bowel sounds are normal.  EXTREMITIES:  Unremarkable. No pedal edema. No clubbing. No tremor. NEUROLOGIC:  No focal neurological signs. LABORATORY DATA:  Blood tests; we have a hemoglobin of 10.4, it was 10.7 on 07/28; WBC and differential are normal; platelets 168. INR 1.4 with normal PTT. Complete metabolic panel: We have glucose 131, sodium 132, albumin 2.2, ALT 89, , alkaline phosphatase 678, lipase 734, triglycerides 81 and on 07/28 of 174. CPK, CPK-MB, troponin are normal.  Today , , alkaline phosphatase 664, which appears to be worse than previously. The total bilirubin is normal at 0.8 and direct 0.4. Her alcohol level was surprisingly 8, on 03/23/2019 was 17, and on 06/26 was less than 3. ASSESSMENT AND PLAN:  The patient told me absolutely she does not take any alcohol. In this case, I presume that it must be related to some fermentation. This may increased by the intake of probiotics. There is no lactic acidosis, but it is better in this case to avoid all the probiotics.   I think we can increase her diet to low-fat diet especially that she has a bypass. I am going to request Radiology opinion regarding that stenosis at the superior mesenteric vein. and whether we should do anything about it, but I have the feeling that this is related to a clot, and in this case we need to continue with the Xarelto, reevaluate with an MRI in six months, and see whether we can stop that medication. For now, I told the patient that she cannot drink any quantity, even small of alcohol. She should stay on low-fat diet. I told that pancreatitis can still progress even if she stops alcohol drinking, but for sure it will get worse by drinking. I would like to monitor these liver enzymes, and may have to do an MRI or ultrasound. We note that her liver enzymes were completely normal on 07/29/2020. Therefore, I may request an MRI just to make sure that we are not dealing with a bile duct stone. The last MRI was done on 06/29/2020, spoke about acute pancreatitis. There was a mention of pancreatic duct ectasia and irregularity, likely sequelae of chronic pancreatitis, but there was no mention of dilated bile duct or stone. There was mention of mesenteric edema. We know for sure that this patient has chronic alcoholic pancreatitis and she is getting flare-ups. She keeps getting these flare-ups and she has dilated pancreatic duct and she would benefit from drainage procedure. This has been done either by Radiology or by Surgery, but this will be not amenable to endoscopic therapy. She also has chronic gastroesophageal reflux disease despite the gastric bypass. Her medications at home include thiamine, multivitamins, Desyrel 100, omeprazole 20, Pepcid 10, Norvasc 10, Lexapro 20, Tylenol, oxycodone, lactobacillus acidophilus. For now, I told her that she has to be patient. We have to allow that pancreatis to settle down and eventually if her LFTs remain abnormal, then we may have to do another MRI.       QUANG MARIANO, MD [email or web address]  D:  08/24/2020 17:26  T:  08/24/2020 21:30  JOB #:  [id number]  CC:  Adama Fermin MD

## 2020-08-25 NOTE — PROGRESS NOTES
Nutrition Note    Pt did not understand Bariatric >6 week soft diet; provided pt with handout and what over diet with pt; let pt know that if she needed further help that we were here    Let dietary staff know that pt will order frequent meals     Electronically signed by Faiza Gr on 8/25/2020 at 1:42 PM

## 2020-08-25 NOTE — PROGRESS NOTES
Patient presents compliant to current plan of care as directed.    Doe Prabhakar  8/25/2020  10:04 AM

## 2020-08-25 NOTE — ROUTINE PROCESS
Bedside and Verbal shift change report given to [place] (oncoming nurse) by KATINA Arrieta RN (offgoing nurse). Report included the following information SBAR, Kardex, Intake/Output and MAR.

## 2020-08-25 NOTE — PROGRESS NOTES
S: Patient acknowledgement of IV morphine not effectively managing headache pain/ MD notified. B: Patient verbalized to dayshift and nightshift RN staff that the patient's IV morphine 4 mg every 4 hours PRN severe pain was not effectively managing her headache pain. Understanding was verbalized and patient was informed that covering provider Dr. Cristobal Araya would be notified. A: At 1032 am, Dr. Cristobal Araya presented on unit conducting AM rounds and was verbally notified about patient's consistent, sharp headaches that were not being managed by current PRN dose of IV Morphine 4 mg every 4 hours. Dr. Arellano Linear verbalized that she would input a different pain medication regimen to assist with headache management. Understanding was verbalized and patient was notified of MD notification and response and verbalized understanding. R: Will continue with prescribed plan of care as directed.    Doe Prabhakar  8/25/2020  10:37 AM

## 2020-08-25 NOTE — CONSULTS
VASCULAR AND INTERVENTIONAL RADIOLOGY    IR consulted for possible intervention    40 yo female with severe pancreatitis with prior CT scan 6/20/2020 showing portal vein thrombosis and repeat recent scan showing marked attenuation. Patient on anticoagulation. Reviewed imaging. Discussed case with Dr. Shilpa Bryant. Assessment: marked attenuation of the portal vein confluence and adjacent SMV. No intraluminal thrombus. Attenuation likely 2/2 marked pancreatic inflammation.  -no intervention  -repeat scan upon resolution of acute inflammation and assess portal veins.     [address] [facility]

## 2020-08-25 NOTE — PROGRESS NOTES
Bedside and Verbal shift change report given to [facility] (oncoming nurse) by Marita CISNEROS, RN  (offgoing nurse). Report included the following information SBAR, Kardex and MAR. SHIFT UPDATES:  Patient requested from Dr. Melita Randolph to receive IV dilaudid and diphenhydramine for persistent headache management and itching associated with IV dilaudid administration due to pain not relieved by PRN IV morphine. Dr. Toshia Kenny did not order IV Dilaudid or Diphenhydramine as directed and ordered Percocet 5-235 mg by mouth PRN every 6 hours in which a dose was given. Nursing manager Rabia Jones was informed that Dr. Toshia Kenny was informed about patient's request for IV dilaudid for headache management but Dr. Toshia Kenny did not order medication. Patient requested another physician and was assigned to DR. Pankaj Esparza. Patient was then ordered a one time dose of IV Dilaudid 1 mg which was given at 1515 PM and one times of IV diphenhydramine 12.5 at 1700 PM. Additionally, patient received one time dose of IV Phenergan 12.5 mg at 1515 PM. Patient presented stable. ABNORMAL LAB:   Results for Korey Menon (MRN [id number]) as of 8/25/2020 19:54   Ref. Range 8/25/2020 04:15   WBC Latest Ref Range: 4.6 - 13.2 K/uL 6.2   RBC Latest Ref Range: 4.20 - 5.30 M/uL 3.48 (L)   HGB Latest Ref Range: 12.0 - 16.0 g/dL 10.9 (L)   HCT Latest Ref Range: 35.0 - 45.0 % 32.1 (L)   MCV Latest Ref Range: 74.0 - 97.0 FL 92.2   MCH Latest Ref Range: 24.0 - 34.0 PG 31.3   MCHC Latest Ref Range: 31.0 - 37.0 g/dL 34.0   RDW Latest Ref Range: 11.6 - 14.5 % 17.1 (H)   PLATELET Latest Ref Range: 135 - 420 K/uL 301   MPV Latest Ref Range: 9.2 - 11.8 FL 10.1   NEUTROPHILS Latest Ref Range: 40 - 73 % 68   LYMPHOCYTES Latest Ref Range: 21 - 52 % 17 (L)   MONOCYTES Latest Ref Range: 3 - 10 % 13 (H)   EOSINOPHILS Latest Ref Range: 0 - 5 % 2   BASOPHILS Latest Ref Range: 0 - 2 % 0   DF Latest Units:   AUTOMATED   ABS.  NEUTROPHILS Latest Ref Range: 1.8 - 8.0 K/UL 4.2   ABS. LYMPHOCYTES Latest Ref Range: 0.9 - 3.6 K/UL 1.1   ABS. MONOCYTES Latest Ref Range: 0.05 - 1.2 K/UL 0.8   ABS. EOSINOPHILS Latest Ref Range: 0.0 - 0.4 K/UL 0.1   ABS. BASOPHILS Latest Ref Range: 0.0 - 0.1 K/UL 0.0   Sodium Latest Ref Range: 136 - 145 mmol/L 134 (L)   Potassium Latest Ref Range: 3.5 - 5.5 mmol/L 4.9   Chloride Latest Ref Range: 100 - 111 mmol/L 104   CO2 Latest Ref Range: 21 - 32 mmol/L 23   Anion gap Latest Ref Range: 3.0 - 18 mmol/L 7   Glucose Latest Ref Range: 74 - 99 mg/dL 85   BUN Latest Ref Range: 7.0 - 18 MG/DL 6 (L)   Creatinine Latest Ref Range: 0.6 - 1.3 MG/DL 0.66   BUN/Creatinine ratio Latest Ref Range: 12 - 20   9 (L)   Calcium Latest Ref Range: 8.5 - 10.1 MG/DL 8.1 (L)   GFR est non-AA Latest Ref Range: >60 ml/min/1.73m2 >60   GFR est AA Latest Ref Range: >60 ml/min/1.73m2 >60   Bilirubin, total Latest Ref Range: 0.2 - 1.0 MG/DL 1.1 (H)   Bilirubin, direct Latest Ref Range: 0.0 - 0.2 MG/DL 0.2   Protein, total Latest Ref Range: 6.4 - 8.2 g/dL 6.5   Albumin Latest Ref Range: 3.4 - 5.0 g/dL 1.9 (L)   Globulin Latest Ref Range: 2.0 - 4.0 g/dL 4.6 (H)   A-G Ratio Latest Ref Range: 0.8 - 1.7   0.4 (L)   ALT Latest Ref Range: 13 - 56 U/L 114 (H)   AST Latest Ref Range: 10 - 38 U/L 153 (H)   Alk. phosphatase Latest Ref Range: 45 - 117 U/L 574 (H)     Wounds? Skin intact. Central Lines? None. Last BM:  08/18/2020 (Given oral colace today as scheduled for constipation management). Pending Labs for AM Draw: CMP & CBC with diff at 4 am on 08/26/2020      Discharge plan:  As of 08/24/2020 at 842 am, Case management note states that patient would discharge home upon being found medically stable without any transition needs from case management.        Doe Prabhakar  8/25/2020  7:57 PM

## 2020-08-25 NOTE — PROGRESS NOTES
Problem: Falls - Risk of  Goal: *Absence of Falls  Description: Document Nabeel Inman Fall Risk and appropriate interventions in the flowsheet.   Outcome: Progressing Towards Goal  Note: Fall Risk Interventions:            Medication Interventions: Evaluate medications/consider consulting pharmacy                   Problem: Patient Education: Go to Patient Education Activity  Goal: Patient/Family Education  Outcome: Progressing Towards Goal

## 2020-08-26 LAB
ALBUMIN SERPL-MCNC: 2 G/DL (ref 3.4–5)
ALBUMIN/GLOB SERPL: 0.6 {RATIO} (ref 0.8–1.7)
ALP SERPL-CCNC: 636 U/L (ref 45–117)
ALT SERPL-CCNC: 199 U/L (ref 13–56)
AMYLASE SERPL-CCNC: 26 U/L (ref 25–115)
ANION GAP SERPL CALC-SCNC: 8 MMOL/L (ref 3–18)
AST SERPL-CCNC: 507 U/L (ref 10–38)
BASOPHILS # BLD: 0 K/UL (ref 0–0.1)
BASOPHILS NFR BLD: 0 % (ref 0–2)
BILIRUB DIRECT SERPL-MCNC: 0.3 MG/DL (ref 0–0.2)
BILIRUB SERPL-MCNC: 0.6 MG/DL (ref 0.2–1)
BUN SERPL-MCNC: 4 MG/DL (ref 7–18)
BUN/CREAT SERPL: 5 (ref 12–20)
CALCIUM SERPL-MCNC: 7.8 MG/DL (ref 8.5–10.1)
CHLORIDE SERPL-SCNC: 106 MMOL/L (ref 100–111)
CO2 SERPL-SCNC: 23 MMOL/L (ref 21–32)
CREAT SERPL-MCNC: 0.76 MG/DL (ref 0.6–1.3)
CRP SERPL-MCNC: 9.3 MG/DL (ref 0–0.3)
DIFFERENTIAL METHOD BLD: ABNORMAL
EOSINOPHIL # BLD: 0.1 K/UL (ref 0–0.4)
EOSINOPHIL NFR BLD: 1 % (ref 0–5)
ERYTHROCYTE [DISTWIDTH] IN BLOOD BY AUTOMATED COUNT: 17.7 % (ref 11.6–14.5)
ETHANOL SERPL-MCNC: <3 MG/DL (ref 0–3)
GLOBULIN SER CALC-MCNC: 3.6 G/DL (ref 2–4)
GLUCOSE SERPL-MCNC: 131 MG/DL (ref 74–99)
HCT VFR BLD AUTO: 28.1 % (ref 35–45)
HGB BLD-MCNC: 9.3 G/DL (ref 12–16)
LIPASE SERPL-CCNC: 94 U/L (ref 73–393)
LYMPHOCYTES # BLD: 1.1 K/UL (ref 0.9–3.6)
LYMPHOCYTES NFR BLD: 19 % (ref 21–52)
MCH RBC QN AUTO: 30.6 PG (ref 24–34)
MCHC RBC AUTO-ENTMCNC: 33.1 G/DL (ref 31–37)
MCV RBC AUTO: 92.4 FL (ref 74–97)
MONOCYTES # BLD: 0.8 K/UL (ref 0.05–1.2)
MONOCYTES NFR BLD: 15 % (ref 3–10)
NEUTS SEG # BLD: 3.7 K/UL (ref 1.8–8)
NEUTS SEG NFR BLD: 65 % (ref 40–73)
PLATELET # BLD AUTO: 368 K/UL (ref 135–420)
PMV BLD AUTO: 8.6 FL (ref 9.2–11.8)
POTASSIUM SERPL-SCNC: 3.9 MMOL/L (ref 3.5–5.5)
PROT SERPL-MCNC: 5.6 G/DL (ref 6.4–8.2)
RBC # BLD AUTO: 3.04 M/UL (ref 4.2–5.3)
SODIUM SERPL-SCNC: 137 MMOL/L (ref 136–145)
WBC # BLD AUTO: 5.6 K/UL (ref 4.6–13.2)

## 2020-08-26 PROCEDURE — 74011250637 HC RX REV CODE- 250/637: Performed by: FAMILY MEDICINE

## 2020-08-26 PROCEDURE — 74011250636 HC RX REV CODE- 250/636: Performed by: FAMILY MEDICINE

## 2020-08-26 PROCEDURE — 74011250637 HC RX REV CODE- 250/637: Performed by: INTERNAL MEDICINE

## 2020-08-26 PROCEDURE — 80307 DRUG TEST PRSMV CHEM ANLYZR: CPT

## 2020-08-26 PROCEDURE — 74011250636 HC RX REV CODE- 250/636: Performed by: HOSPITALIST

## 2020-08-26 PROCEDURE — 85025 COMPLETE CBC W/AUTO DIFF WBC: CPT

## 2020-08-26 PROCEDURE — 80076 HEPATIC FUNCTION PANEL: CPT

## 2020-08-26 PROCEDURE — 74011000250 HC RX REV CODE- 250: Performed by: FAMILY MEDICINE

## 2020-08-26 PROCEDURE — 82150 ASSAY OF AMYLASE: CPT

## 2020-08-26 PROCEDURE — 74011000250 HC RX REV CODE- 250: Performed by: HOSPITALIST

## 2020-08-26 PROCEDURE — 83690 ASSAY OF LIPASE: CPT

## 2020-08-26 PROCEDURE — 74011250637 HC RX REV CODE- 250/637: Performed by: HOSPITALIST

## 2020-08-26 PROCEDURE — C9113 INJ PANTOPRAZOLE SODIUM, VIA: HCPCS | Performed by: FAMILY MEDICINE

## 2020-08-26 PROCEDURE — 65270000029 HC RM PRIVATE

## 2020-08-26 PROCEDURE — 74011000258 HC RX REV CODE- 258: Performed by: HOSPITALIST

## 2020-08-26 PROCEDURE — 86140 C-REACTIVE PROTEIN: CPT

## 2020-08-26 PROCEDURE — 36415 COLL VENOUS BLD VENIPUNCTURE: CPT

## 2020-08-26 PROCEDURE — 80048 BASIC METABOLIC PNL TOTAL CA: CPT

## 2020-08-26 RX ORDER — DIPHENHYDRAMINE HYDROCHLORIDE 50 MG/ML
12.5 INJECTION, SOLUTION INTRAMUSCULAR; INTRAVENOUS
Status: DISCONTINUED | OUTPATIENT
Start: 2020-08-26 | End: 2020-08-31 | Stop reason: HOSPADM

## 2020-08-26 RX ORDER — HYDROMORPHONE HYDROCHLORIDE 1 MG/ML
1 INJECTION, SOLUTION INTRAMUSCULAR; INTRAVENOUS; SUBCUTANEOUS
Status: DISCONTINUED | OUTPATIENT
Start: 2020-08-26 | End: 2020-08-31 | Stop reason: HOSPADM

## 2020-08-26 RX ADMIN — ONDANSETRON 4 MG: 2 INJECTION INTRAMUSCULAR; INTRAVENOUS at 18:58

## 2020-08-26 RX ADMIN — ONDANSETRON 4 MG: 2 INJECTION INTRAMUSCULAR; INTRAVENOUS at 14:41

## 2020-08-26 RX ADMIN — DIPHENHYDRAMINE HYDROCHLORIDE 12.5 MG: 50 INJECTION, SOLUTION INTRAMUSCULAR; INTRAVENOUS at 10:24

## 2020-08-26 RX ADMIN — SODIUM CHLORIDE 40 MG: 9 INJECTION, SOLUTION INTRAMUSCULAR; INTRAVENOUS; SUBCUTANEOUS at 10:24

## 2020-08-26 RX ADMIN — HYDROMORPHONE HYDROCHLORIDE 1 MG: 1 INJECTION, SOLUTION INTRAMUSCULAR; INTRAVENOUS; SUBCUTANEOUS at 10:24

## 2020-08-26 RX ADMIN — SODIUM CHLORIDE 100 ML/HR: 900 INJECTION, SOLUTION INTRAVENOUS at 21:48

## 2020-08-26 RX ADMIN — HYDROMORPHONE HYDROCHLORIDE 1 MG: 1 INJECTION, SOLUTION INTRAMUSCULAR; INTRAVENOUS; SUBCUTANEOUS at 23:00

## 2020-08-26 RX ADMIN — FOLIC ACID: 5 INJECTION, SOLUTION INTRAMUSCULAR; INTRAVENOUS; SUBCUTANEOUS at 10:24

## 2020-08-26 RX ADMIN — HYDROMORPHONE HYDROCHLORIDE 1 MG: 1 INJECTION, SOLUTION INTRAMUSCULAR; INTRAVENOUS; SUBCUTANEOUS at 14:41

## 2020-08-26 RX ADMIN — ONDANSETRON 4 MG: 2 INJECTION INTRAMUSCULAR; INTRAVENOUS at 10:24

## 2020-08-26 RX ADMIN — Medication 10 ML: at 21:49

## 2020-08-26 RX ADMIN — RIVAROXABAN 20 MG: 10 TABLET, FILM COATED ORAL at 10:24

## 2020-08-26 RX ADMIN — LORAZEPAM 1 MG: 1 TABLET ORAL at 21:48

## 2020-08-26 RX ADMIN — TRAZODONE HYDROCHLORIDE 100 MG: 100 TABLET ORAL at 23:00

## 2020-08-26 RX ADMIN — DIPHENHYDRAMINE HYDROCHLORIDE 12.5 MG: 50 INJECTION, SOLUTION INTRAMUSCULAR; INTRAVENOUS at 18:58

## 2020-08-26 RX ADMIN — HYDROMORPHONE HYDROCHLORIDE 1 MG: 1 INJECTION, SOLUTION INTRAMUSCULAR; INTRAVENOUS; SUBCUTANEOUS at 18:58

## 2020-08-26 RX ADMIN — DOCUSATE SODIUM 100 MG: 100 CAPSULE, LIQUID FILLED ORAL at 10:23

## 2020-08-26 NOTE — PROGRESS NOTES
Bedside and Verbal shift change report given to Catherine Wiley RN (oncoming nurse) by Darcy Odonnell RN (offgoing nurse). Report included the following information SBAR, Kardex, Procedure Summary, Intake/Output, MAR, and Recent Results. 0900- Pt requesting pain medication changed to dilaudid for pain relief. Pt states she will need Benadryl if they change her medication. Spoke with Dr. Yuli Casarez, she will access patient when she rounds. 1010- Pt called for pain medication for 10/10 pain. MD changed pain medication. 1024- Dilaudid given for abdominal pain 10/10. Reassessed pain and patient verbalized relief. 1441- Dilaudid given for 10/10 abdominal pain. Reassessed pain and verbalized relief. Bedside and Verbal shift change report given to Hamlet Albrecht RN (oncoming nurse) by Catherine Wiley RN (offgoing nurse). Report included the following information SBAR, Kardex, Procedure Summary, Intake/Output, MAR, and Recent Results.

## 2020-08-26 NOTE — PROGRESS NOTES
Bedside shift change report given to Cristina Howell RN (oncoming nurse) by Nga Tidwell RN (offgoing nurse). Report included the following information SBAR, Kardex, ED Summary, MAR, Recent Results, Alarm Parameters  and Quality Measures. 1956 Oxicodone prn was given for abdominal pain score of 8/10. Reassessed pain and verbalized relief. 2119 Pt requested Trazadone because she gets it at home at night. Dr. Karolina Phillips was paged and ordered Trazadone 100mg nightly. 2130 Pt reported she had episode of nausea and vomiting, Zofran prn was given. [address] prn was given for abdominal pain score of 9/10. Reassessed pain and verbalized relief. Visit Vitals  /74 (BP 1 Location: Left arm, BP Patient Position: At rest)   Pulse 76   Temp 98.3 °F (36.8 °C)   Resp 17   Ht 5' 5\" (1.651 m)   Wt 83.9 kg (185 lb)   SpO2 100%   BMI 30.79 kg/m²       0400 Assessed pain and offered PRN pain meds but refused. Bedside shift change report given to Shun Gregory RN (oncoming nurse) by [employer] RN (offgoing nurse). Report included the following information SBAR, Kardex, ED Summary, Procedure Summary, MAR, Med Rec Status, Alarm Parameters  and Quality Measures.

## 2020-08-26 NOTE — PROGRESS NOTES
Hospitalist Progress Note    Patient: Donny Campos MRN: [id number]  CSN: [id number]    YOB: 1975  Age: 39 y.o.   Sex: female    DOA: 8/23/2020 LOS:  LOS: 3 days          Chief Complaint:      pancreatitis    Assessment/Plan     Acute on chronic pancreatitis  gastritis  Hx of etoh abuse  Gastric bypass and hx of revision  GERD  PV thrombosis, on xarelto  Hypertension  History of c diff  transaminitis    Repeat LFT and lipase this am-GI may repeat her MRI during this stay  Bariatric soft diet, low fat  IV protonix daily    Daily LFT's, lipase is normal    Due to her concerns that PO narcotics and food advancement made her nauseated and vomited yesterday will order IV pain medicine for now as needed    IVF hydration  Stool softener    Reviewed all labs with her, discussed paain medicines, reviewed plans and meds altogether  Total time: 35 min  Counseling / coordination time:   > 50% counseling / coordination        Disposition :  Patient Active Problem List   Diagnosis Code    Status post gastric bypass for obesity Z98.84    Hypertension I10    Anemia D64.9    Anxiety F41.9    GERD (gastroesophageal reflux disease) K21.9    Smoker F17.200    Portal vein thrombosis I81    Gastritis and duodenitis K29.90    Acute on chronic pancreatitis (HCC) K85.90, K86.1    Pancreatitis K85.90    Nausea & vomiting R11.2    Elevated liver enzymes R74.8       Subjective:    Pain better this am, with IV medicine  Feels PO meds plus food yesterday made her sick, she threw up and pain worsened after that  She wants no oral narcotic, she states it disagrees with her gastric bypass surgery and makes her sick, plus she has to drink water with the pills and then eat which is what upsets her stomach also    No constipation  No vomiting today  On soft diet, tolerated this am    Review of systems:    Constitutional: denies fevers, chills, myalgias  Respiratory: denies SOB  Cardiovascular: denies chest pain, palpitations        Vital signs/Intake and Output:  Visit Vitals  /74 (BP 1 Location: Left arm, BP Patient Position: At rest)   Pulse 76   Temp 98.3 °F (36.8 °C)   Resp 17   Ht 5' 5\" (1.651 m)   Wt 83.9 kg (185 lb)   SpO2 100%   BMI 30.79 kg/m²     Current Shift:  No intake/output data recorded. Last three shifts:  08/24 1901 - 08/26 0700  In: 3343.5 [P.O.:600; I.V.:2743.5]  Out: -     Exam:    General: Well developed, alert, NAD, OX3  CVS:Regular rate and rhythm, no M/R/G, S1/S2 heard, no thrill  Lungs:Clear to auscultation bilaterally, no wheezes, rhonchi, or rales  Abdomen: Soft, Nontender, No distention, Normal Bowel sounds, No hepatomegaly  Extremities: No C/C/E, pulses palpable 2+  Skin:normal texture and turgor, no rashes, no lesions  Neuro:grossly normal , follows commands  Psych:appropriate                Labs: Results:       Chemistry Recent Labs     08/26/20 0136 08/25/20 0415 08/24/20  0528   * 85 105*    134* 136   K 3.9 4.9 4.0    104 105   CO2 23 23 26   BUN 4* 6* 4*   CREA 0.76 0.66 0.72   CA 7.8* 8.1* 7.9*   AGAP 8 7 5   BUCR 5* 9* 6*   AP  --  574* 664*   TP  --  6.5 5.5*   ALB  --  1.9* 2.1*   GLOB  --  4.6* 3.4   AGRAT  --  0.4* 0.6*      CBC w/Diff Recent Labs     08/26/20 0136 08/25/20 0415 08/24/20  0528   WBC 5.6 6.2 9.0   RBC 3.04* 3.48* 3.26*   HGB 9.3* 10.9* 10.4*   HCT 28.1* 32.1* 30.5*    301 288   GRANS 65 68 72   LYMPH 19* 17* 14*   EOS 1 2 0      Cardiac Enzymes No results for input(s): CPK, CKND1, JONATAN in the last 72 hours. No lab exists for component: CKRMB, TROIP   Coagulation No results for input(s): PTP, INR, APTT, INREXT in the last 72 hours.     Lipid Panel Lab Results   Component Value Date/Time    Cholesterol, total 169 07/28/2020 02:05 AM    HDL Cholesterol 37 (L) 07/28/2020 02:05 AM    LDL, calculated 97.2 07/28/2020 02:05 AM    VLDL, calculated 34.8 07/28/2020 02:05 AM    Triglyceride 81 08/23/2020 06:37 AM    CHOL/HDL Ratio 4.6 07/28/2020 02:05 AM      BNP No results for input(s): BNPP in the last 72 hours.    Liver Enzymes Recent Labs     08/25/20  0415   TP 6.5   ALB 1.9*   *      Thyroid Studies Lab Results   Component Value Date/Time    TSH 2.18 06/29/2020 12:35 AM        Procedures/imaging: see electronic medical records for all procedures/Xrays and details which were not copied into this note but were reviewed prior to creation of Caryn Dallas MD

## 2020-08-26 NOTE — PROGRESS NOTES
Problem: Falls - Risk of  Goal: *Absence of Falls  Description: Document Robbi Renae Fall Risk and appropriate interventions in the flowsheet.   Outcome: Progressing Towards Goal  Note: Fall Risk Interventions:            Medication Interventions: Evaluate medications/consider consulting pharmacy, Teach patient to arise slowly                   Problem: Patient Education: Go to Patient Education Activity  Goal: Patient/Family Education  Outcome: Progressing Towards Goal     Problem: Pain  Goal: *Control of Pain  Outcome: Progressing Towards Goal  Goal: *PALLIATIVE CARE:  Alleviation of Pain  Outcome: Progressing Towards Goal     Problem: Patient Education: Go to Patient Education Activity  Goal: Patient/Family Education  Outcome: Progressing Towards Goal     Problem: Nausea/Vomiting (Adult)  Goal: *Absence of nausea/vomiting  Outcome: Progressing Towards Goal  Goal: *Palliation of nausea/vomiting (Palliative Care)  Outcome: Progressing Towards Goal     Problem: Patient Education: Go to Patient Education Activity  Goal: Patient/Family Education  Outcome: Progressing Towards Goal

## 2020-08-26 NOTE — PROGRESS NOTES
Pt is independent and her teenage children live with her.  Pt's mother also lives near by and will assist as needed.  Please encourage ambulation as appropriate to assist with identifying potential transition of care needs.  Pt with recurrent/unresolved issues that have bought her back to the hospital. Anticipate pt will transition home with physician follow up when medically stable. No needs have been identified at this time.  CM to continue to follow and assist.    Care Management Interventions  PCP Verified by CM: Yes  Mode of Transport at Discharge:  Other (see comment)(Family)  Transition of Care Consult (CM Consult): Discharge Planning  Health Maintenance Reviewed: Yes  Current Support Network: Own Home(teenage children live with pt)  Confirm Follow Up Transport: Self  The Plan for Transition of Care is Related to the Following Treatment Goals : Home with physician follow up  Discharge Location  Discharge Placement: Home with family assistance

## 2020-08-26 NOTE — PROGRESS NOTES
Gastrointestinal Progress Note    Patient Name: Katelynn Perez    UNZKF'S Date: 8/26/2020    Admit Date: 8/23/2020    Subjective:     Diet: Patient is on Regular bariatric diet and is not tolerating. Nausea is present. Vomiting is present yesterday was severe. Pain: Patient does complain of abdominal pain. The pain is located in the epigastrium. The pain is described as constant, pressure, sharp and stabbing, and is 10/10 in intensity. Bowel Movements: Normal    Bleeding:  None    Current Facility-Administered Medications   Medication Dose Route Frequency    oxyCODONE IR (ROXICODONE) tablet 5 mg  5 mg Oral Q4H PRN    docusate sodium (COLACE) capsule 100 mg  100 mg Oral DAILY    traZODone (DESYREL) tablet 100 mg  100 mg Oral QHS    acetaminophen (TYLENOL) tablet 1,000 mg  1,000 mg Oral Q8H PRN    morphine injection 4 mg  4 mg IntraVENous Q4H PRN    0.9% sodium chloride infusion  100 mL/hr IntraVENous CONTINUOUS    sodium chloride (NS) flush 5-40 mL  5-40 mL IntraVENous PRN    LORazepam (ATIVAN) tablet 1 mg  1 mg Oral Q1H PRN    Or    LORazepam (ATIVAN) injection 1 mg  1 mg IntraVENous Q1H PRN    LORazepam (ATIVAN) tablet 2 mg  2 mg Oral Q1H PRN    Or    LORazepam (ATIVAN) injection 2 mg  2 mg IntraVENous Q1H PRN    LORazepam (ATIVAN) injection 3 mg  3 mg IntraVENous Q15MIN PRN    dextrose 5 % - 0.45% NaCl 1,000 mL with magnesium sulfate 2 g, folic acid 1 mg, thiamine 100 mg, mvi, adult no. 4 with vit K 10 mL infusion   IntraVENous DAILY    sodium chloride (NS) flush 5-40 mL  5-40 mL IntraVENous PRN    ondansetron (ZOFRAN) injection 4 mg  4 mg IntraVENous Q4H PRN    rivaroxaban (XARELTO) tablet 20 mg  20 mg Oral DAILY    pantoprazole (PROTONIX) 40 mg in 0.9% sodium chloride 10 mL injection  40 mg IntraVENous DAILY    LORazepam (ATIVAN) tablet 1 mg  1 mg Oral QHS          Objective:     Visit Vitals  /78   Pulse 96   Temp 98.9 °F (37.2 °C)   Resp 18   Ht 5' 5\" (1.651 m)   Wt 83.9 kg (185 lb)   SpO2 100%   BMI 30.79 kg/m²       08/24 1901 - 08/26 0700  In: 3595.1 [P.O.:600; I.V.:2995.1]  Out: -     General appearance: alert, cooperative, moderate distress, appears stated age  Abdomen: soft, significant tenderness in the upper  abdomen. Bowel sounds normal. No masses,  no organomegaly    Data Review:    Labs: Results:       Chemistry Recent Labs     08/26/20 0136 08/25/20 0415 08/24/20  0528   * 85 105*    134* 136   K 3.9 4.9 4.0    104 105   CO2 23 23 26   BUN 4* 6* 4*   CREA 0.76 0.66 0.72   CA 7.8* 8.1* 7.9*   AGAP 8 7 5   BUCR 5* 9* 6*   * 574* 664*   TP 5.6* 6.5 5.5*   ALB 2.0* 1.9* 2.1*   GLOB 3.6 4.6* 3.4   AGRAT 0.6* 0.4* 0.6*      CBC w/Diff Recent Labs     08/26/20 0136 08/25/20 0415 08/24/20  0528   WBC 5.6 6.2 9.0   RBC 3.04* 3.48* 3.26*   HGB 9.3* 10.9* 10.4*   HCT 28.1* 32.1* 30.5*    301 288   GRANS 65 68 72   LYMPH 19* 17* 14*   EOS 1 2 0      Coagulation No results for input(s): PTP, INR, APTT, INREXT in the last 72 hours. Liver Enzymes Recent Labs     08/26/20 0136   TP 5.6*   ALB 2.0*   *          Assessment:     Principal Problem:    Pancreatitis (8/23/2020)    Active Problems:    Hypertension ()      GERD (gastroesophageal reflux disease) ()      Portal vein thrombosis (6/27/2020)      Nausea & vomiting (8/23/2020)      Elevated liver enzymes (8/23/2020)        Plan:     reoccurring Acute on chronic alcoholic pancreatitis with presently exacerbation after eating large solid food. The patient was hungry and was begging me to feed her. Plan -I told her that I have to put her on full liquid diet but was not happy. So I told her she could try smaller portion of her bariatric diet and if not well then she would have no choice but to put her on clear of full liquid diet. She asked for IV narcotics to control her pain.  Told her she has to negotiate that with the hospitalist. For now I told her she has to be patient to let the inflammation settle down. She stopped drinking completely few months ago? I would like to repeat her alcohol level. We told her to stop the probiotics    Worsening LFT elevation is most likely related to the severe pancreatitis. Bilirubin is normal. I may have to request tomorrow MRI/ MRCP. 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 Recommend to keep the [facility] for 6 months then do a CT with contrast or MRI  Anemia down to 9.2 without obvious bleeding  Sp gastric bypass in 2002 and revision in 2018. EGD from just a week ago looked fine.   Hx of C difficile colitis              Cristian Prakash MD  August 26, 2020

## 2020-08-27 ENCOUNTER — APPOINTMENT (OUTPATIENT)
Dept: MRI IMAGING | Age: 45
DRG: 282 | End: 2020-08-27
Attending: HOSPITALIST
Payer: MEDICAID

## 2020-08-27 LAB
ALBUMIN SERPL-MCNC: 1.9 G/DL (ref 3.4–5)
ALBUMIN/GLOB SERPL: 0.5 {RATIO} (ref 0.8–1.7)
ALP SERPL-CCNC: 492 U/L (ref 45–117)
ALT SERPL-CCNC: 115 U/L (ref 13–56)
ANION GAP SERPL CALC-SCNC: 7 MMOL/L (ref 3–18)
AST SERPL-CCNC: 107 U/L (ref 10–38)
BASOPHILS # BLD: 0 K/UL (ref 0–0.1)
BASOPHILS NFR BLD: 0 % (ref 0–2)
BILIRUB SERPL-MCNC: 0.3 MG/DL (ref 0.2–1)
BUN SERPL-MCNC: 4 MG/DL (ref 7–18)
BUN/CREAT SERPL: 6 (ref 12–20)
CALCIUM SERPL-MCNC: 7.6 MG/DL (ref 8.5–10.1)
CHLORIDE SERPL-SCNC: 110 MMOL/L (ref 100–111)
CO2 SERPL-SCNC: 23 MMOL/L (ref 21–32)
CREAT SERPL-MCNC: 0.66 MG/DL (ref 0.6–1.3)
DIFFERENTIAL METHOD BLD: ABNORMAL
EOSINOPHIL # BLD: 0.1 K/UL (ref 0–0.4)
EOSINOPHIL NFR BLD: 2 % (ref 0–5)
ERYTHROCYTE [DISTWIDTH] IN BLOOD BY AUTOMATED COUNT: 17.6 % (ref 11.6–14.5)
GLOBULIN SER CALC-MCNC: 3.6 G/DL (ref 2–4)
GLUCOSE SERPL-MCNC: 103 MG/DL (ref 74–99)
HCT VFR BLD AUTO: 27.6 % (ref 35–45)
HGB BLD-MCNC: 8.8 G/DL (ref 12–16)
LIPASE SERPL-CCNC: 84 U/L (ref 73–393)
LYMPHOCYTES # BLD: 1.5 K/UL (ref 0.9–3.6)
LYMPHOCYTES NFR BLD: 29 % (ref 21–52)
MCH RBC QN AUTO: 30.4 PG (ref 24–34)
MCHC RBC AUTO-ENTMCNC: 31.9 G/DL (ref 31–37)
MCV RBC AUTO: 95.5 FL (ref 74–97)
MONOCYTES # BLD: 0.7 K/UL (ref 0.05–1.2)
MONOCYTES NFR BLD: 14 % (ref 3–10)
NEUTS SEG # BLD: 2.9 K/UL (ref 1.8–8)
NEUTS SEG NFR BLD: 55 % (ref 40–73)
PLATELET # BLD AUTO: 449 K/UL (ref 135–420)
PMV BLD AUTO: 8.7 FL (ref 9.2–11.8)
POTASSIUM SERPL-SCNC: 3.8 MMOL/L (ref 3.5–5.5)
PROT SERPL-MCNC: 5.5 G/DL (ref 6.4–8.2)
RBC # BLD AUTO: 2.89 M/UL (ref 4.2–5.3)
SODIUM SERPL-SCNC: 140 MMOL/L (ref 136–145)
WBC # BLD AUTO: 5.2 K/UL (ref 4.6–13.2)

## 2020-08-27 PROCEDURE — 80053 COMPREHEN METABOLIC PANEL: CPT

## 2020-08-27 PROCEDURE — 74011250637 HC RX REV CODE- 250/637: Performed by: HOSPITALIST

## 2020-08-27 PROCEDURE — 74011250637 HC RX REV CODE- 250/637: Performed by: FAMILY MEDICINE

## 2020-08-27 PROCEDURE — C9113 INJ PANTOPRAZOLE SODIUM, VIA: HCPCS | Performed by: FAMILY MEDICINE

## 2020-08-27 PROCEDURE — 85025 COMPLETE CBC W/AUTO DIFF WBC: CPT

## 2020-08-27 PROCEDURE — 83690 ASSAY OF LIPASE: CPT

## 2020-08-27 PROCEDURE — 74011250637 HC RX REV CODE- 250/637: Performed by: INTERNAL MEDICINE

## 2020-08-27 PROCEDURE — A9575 INJ GADOTERATE MEGLUMI 0.1ML: HCPCS | Performed by: FAMILY MEDICINE

## 2020-08-27 PROCEDURE — 74011250636 HC RX REV CODE- 250/636: Performed by: HOSPITALIST

## 2020-08-27 PROCEDURE — 74011000250 HC RX REV CODE- 250: Performed by: FAMILY MEDICINE

## 2020-08-27 PROCEDURE — 74011636320 HC RX REV CODE- 636/320: Performed by: FAMILY MEDICINE

## 2020-08-27 PROCEDURE — 77030021566 MRI ABD W MRCP W WO CONT

## 2020-08-27 PROCEDURE — 74011250636 HC RX REV CODE- 250/636: Performed by: FAMILY MEDICINE

## 2020-08-27 PROCEDURE — 36415 COLL VENOUS BLD VENIPUNCTURE: CPT

## 2020-08-27 PROCEDURE — 65270000029 HC RM PRIVATE

## 2020-08-27 RX ADMIN — SODIUM CHLORIDE 40 MG: 9 INJECTION, SOLUTION INTRAMUSCULAR; INTRAVENOUS; SUBCUTANEOUS at 10:04

## 2020-08-27 RX ADMIN — HYDROMORPHONE HYDROCHLORIDE 1 MG: 1 INJECTION, SOLUTION INTRAMUSCULAR; INTRAVENOUS; SUBCUTANEOUS at 18:57

## 2020-08-27 RX ADMIN — TRAZODONE HYDROCHLORIDE 100 MG: 100 TABLET ORAL at 22:53

## 2020-08-27 RX ADMIN — DIPHENHYDRAMINE HYDROCHLORIDE 12.5 MG: 50 INJECTION, SOLUTION INTRAMUSCULAR; INTRAVENOUS at 03:15

## 2020-08-27 RX ADMIN — RIVAROXABAN 20 MG: 10 TABLET, FILM COATED ORAL at 18:00

## 2020-08-27 RX ADMIN — DIPHENHYDRAMINE HYDROCHLORIDE 12.5 MG: 50 INJECTION, SOLUTION INTRAMUSCULAR; INTRAVENOUS at 10:03

## 2020-08-27 RX ADMIN — HYDROMORPHONE HYDROCHLORIDE 1 MG: 1 INJECTION, SOLUTION INTRAMUSCULAR; INTRAVENOUS; SUBCUTANEOUS at 14:08

## 2020-08-27 RX ADMIN — HYDROMORPHONE HYDROCHLORIDE 1 MG: 1 INJECTION, SOLUTION INTRAMUSCULAR; INTRAVENOUS; SUBCUTANEOUS at 22:54

## 2020-08-27 RX ADMIN — GADOTERATE MEGLUMINE 15 ML: 376.9 INJECTION INTRAVENOUS at 12:00

## 2020-08-27 RX ADMIN — HYDROMORPHONE HYDROCHLORIDE 1 MG: 1 INJECTION, SOLUTION INTRAMUSCULAR; INTRAVENOUS; SUBCUTANEOUS at 03:15

## 2020-08-27 RX ADMIN — DIPHENHYDRAMINE HYDROCHLORIDE 12.5 MG: 50 INJECTION, SOLUTION INTRAMUSCULAR; INTRAVENOUS at 18:56

## 2020-08-27 RX ADMIN — HYDROMORPHONE HYDROCHLORIDE 1 MG: 1 INJECTION, SOLUTION INTRAMUSCULAR; INTRAVENOUS; SUBCUTANEOUS at 10:04

## 2020-08-27 RX ADMIN — DOCUSATE SODIUM 100 MG: 100 CAPSULE, LIQUID FILLED ORAL at 10:04

## 2020-08-27 RX ADMIN — LORAZEPAM 1 MG: 1 TABLET ORAL at 22:21

## 2020-08-27 NOTE — PROGRESS NOTES
Pt contacted dietitian regarding bariatric diet. Pt on >6 week bariatric soft diet. Pt with no swallowing issues and bariatric surgery x few years ago per pt report. Diet changed to bariatric maintenance. Added fat restriction for pancreatitis and conversation with pt, MD wants low fat diet.     Saman Guerra RDN

## 2020-08-27 NOTE — PROGRESS NOTES
Bedside shift change report given to Cristina Howell RN (oncoming nurse) by Rasheeda Gibson RN (offgoing nurse). Dilaudid prn x2. Benadryl prn 1x. BM 1x. No nausea and vomiting. Visit Vitals  /82 (BP 1 Location: Right arm, BP Patient Position: At rest)   Pulse 98   Temp 98.9 °F (37.2 °C)   Resp 16   Ht 5' 5\" (1.651 m)   Wt 83.9 kg (185 lb)   SpO2 100%   BMI 30.79 kg/m²       Bedside shift change report given to Eusebia Johnson RN (oncoming nurse) by [employer] RN (offgoing nurse). Report included the following information SBAR, Kardex, ED Summary, Procedure Summary, Recent Results, Alarm Parameters  and Quality Measures.

## 2020-08-27 NOTE — PROGRESS NOTES
Hospitalist Progress Note    Patient: Vidhya Tellez MRN: [id number]  CSN: [id number]    YOB: 1975  Age: 39 y.o. Sex: female    DOA: 8/23/2020 LOS:  LOS: 4 days          Chief Complaint:      pancreatitis    Assessment/Plan     Acute on chronic pancreatitis  gastritis  Hx of etoh abuse-cautioned complete abstinence  Gastric bypass and hx of revision  GERD  PV thrombosis, on xarelto  Hypertension  History of c diff  transaminitis-improved  anemia    MRI abdomen  Judicious dilaudid for severe pain  PPI  Light bariatric diet  She will meet with bariatric nutritionist after d/c about diet questions    Ambulate in halls    Await MRI results, her LFTs are down today, possible d/c tomorrow if doing better         Disposition :  Patient Active Problem List   Diagnosis Code    Status post gastric bypass for obesity Z98.84    Hypertension I10    Anemia D64.9    Anxiety F41.9    GERD (gastroesophageal reflux disease) K21.9    Smoker F17.200    Portal vein thrombosis I81    Gastritis and duodenitis K29.90    Acute on chronic pancreatitis ([facility] [address] K85.90, K86.1    Pancreatitis K85.90    Nausea & vomiting R11.2    Elevated liver enzymes R74.8       Subjective:  I am ok  No new issues this am  pain better overall in abdomen  Had a BM  No nausea      Review of systems:    Constitutional: denies fevers, chills, myalgias      Gastrointestinal: denies nausea, vomiting, diarrhea      Vital signs/Intake and Output:  Visit Vitals  /82 (BP 1 Location: Right arm, BP Patient Position: At rest)   Pulse 98   Temp 98.9 °F (37.2 °C)   Resp 16   Ht 5' 5\" (1.651 m)   Wt 83.9 kg (185 lb)   SpO2 100%   BMI 30.79 kg/m²     Current Shift:  08/27 0701 - 08/27 1900  In: 425 [I.V.:425]  Out: -   Last three shifts:  08/25 1901 - 08/27 0700  In: 2645 [P.O.:720;  I.V.:1925]  Out: 1900 [Urine:1900]    Exam:    General: Well developed, alert, NAD, OX3  CVS:Regular rate and rhythm, no M/R/G, S1/S2 heard, no thrill  Lungs:Clear to auscultation bilaterally, no wheezes, rhonchi, or rales  Abdomen: Soft, Nontender, No distention, Normal Bowel sounds, No hepatomegaly  Extremities: No C/C/E, pulses palpable 2+  Neuro:grossly normal , follows commands  Psych:appropriate                Labs: Results:       Chemistry Recent Labs     08/27/20 0019 08/26/20 0136 08/25/20  0415   * 131* 85    137 134*   K 3.8 3.9 4.9    106 104   CO2 23 23 23   BUN 4* 4* 6*   CREA 0.66 0.76 0.66   CA 7.6* 7.8* 8.1*   AGAP 7 8 7   BUCR 6* 5* 9*   * 636* 574*   TP 5.5* 5.6* 6.5   ALB 1.9* 2.0* 1.9*   GLOB 3.6 3.6 4.6*   AGRAT 0.5* 0.6* 0.4*      CBC w/Diff Recent Labs     08/27/20 0019 08/26/20 0136 08/25/20 0415   WBC 5.2 5.6 6.2   RBC 2.89* 3.04* 3.48*   HGB 8.8* 9.3* 10.9*   HCT 27.6* 28.1* 32.1*   * 368 301   GRANS 55 65 68   LYMPH 29 19* 17*   EOS 2 1 2      Cardiac Enzymes No results for input(s): CPK, CKND1, JONATAN in the last 72 hours. No lab exists for component: CKRMB, TROIP   Coagulation No results for input(s): PTP, INR, APTT, INREXT in the last 72 hours. Lipid Panel Lab Results   Component Value Date/Time    Cholesterol, total 169 07/28/2020 02:05 AM    HDL Cholesterol 37 (L) 07/28/2020 02:05 AM    LDL, calculated 97.2 07/28/2020 02:05 AM    VLDL, calculated 34.8 07/28/2020 02:05 AM    Triglyceride 81 08/23/2020 06:37 AM    CHOL/HDL Ratio 4.6 07/28/2020 02:05 AM      BNP No results for input(s): BNPP in the last 72 hours.    Liver Enzymes Recent Labs     08/27/20 0019   TP 5.5*   ALB 1.9*   *      Thyroid Studies Lab Results   Component Value Date/Time    TSH 2.18 06/29/2020 12:35 AM        Procedures/imaging: see electronic medical records for all procedures/Xrays and details which were not copied into this note but were reviewed prior to creation of Betty Moscoso MD

## 2020-08-28 LAB
ALBUMIN SERPL-MCNC: 1.9 G/DL (ref 3.4–5)
ALBUMIN/GLOB SERPL: 0.5 {RATIO} (ref 0.8–1.7)
ALP SERPL-CCNC: 434 U/L (ref 45–117)
ALT SERPL-CCNC: 80 U/L (ref 13–56)
ANION GAP SERPL CALC-SCNC: 5 MMOL/L (ref 3–18)
AST SERPL-CCNC: 40 U/L (ref 10–38)
BASOPHILS # BLD: 0 K/UL (ref 0–0.1)
BASOPHILS NFR BLD: 0 % (ref 0–2)
BILIRUB SERPL-MCNC: 0.3 MG/DL (ref 0.2–1)
BUN SERPL-MCNC: 5 MG/DL (ref 7–18)
BUN/CREAT SERPL: 9 (ref 12–20)
CALCIUM SERPL-MCNC: 8 MG/DL (ref 8.5–10.1)
CHLORIDE SERPL-SCNC: 110 MMOL/L (ref 100–111)
CO2 SERPL-SCNC: 24 MMOL/L (ref 21–32)
CREAT SERPL-MCNC: 0.58 MG/DL (ref 0.6–1.3)
DIFFERENTIAL METHOD BLD: ABNORMAL
EOSINOPHIL # BLD: 0.1 K/UL (ref 0–0.4)
EOSINOPHIL NFR BLD: 2 % (ref 0–5)
ERYTHROCYTE [DISTWIDTH] IN BLOOD BY AUTOMATED COUNT: 17.6 % (ref 11.6–14.5)
GLOBULIN SER CALC-MCNC: 3.6 G/DL (ref 2–4)
GLUCOSE SERPL-MCNC: 76 MG/DL (ref 74–99)
HCT VFR BLD AUTO: 28.3 % (ref 35–45)
HGB BLD-MCNC: 9 G/DL (ref 12–16)
LIPASE SERPL-CCNC: 87 U/L (ref 73–393)
LYMPHOCYTES # BLD: 1.5 K/UL (ref 0.9–3.6)
LYMPHOCYTES NFR BLD: 31 % (ref 21–52)
MCH RBC QN AUTO: 30.5 PG (ref 24–34)
MCHC RBC AUTO-ENTMCNC: 31.8 G/DL (ref 31–37)
MCV RBC AUTO: 95.9 FL (ref 74–97)
MONOCYTES # BLD: 0.7 K/UL (ref 0.05–1.2)
MONOCYTES NFR BLD: 15 % (ref 3–10)
NEUTS SEG # BLD: 2.6 K/UL (ref 1.8–8)
NEUTS SEG NFR BLD: 52 % (ref 40–73)
PLATELET # BLD AUTO: 518 K/UL (ref 135–420)
PMV BLD AUTO: 8.7 FL (ref 9.2–11.8)
POTASSIUM SERPL-SCNC: 4.3 MMOL/L (ref 3.5–5.5)
PROT SERPL-MCNC: 5.5 G/DL (ref 6.4–8.2)
RBC # BLD AUTO: 2.95 M/UL (ref 4.2–5.3)
SODIUM SERPL-SCNC: 139 MMOL/L (ref 136–145)
WBC # BLD AUTO: 5 K/UL (ref 4.6–13.2)

## 2020-08-28 PROCEDURE — 74011250637 HC RX REV CODE- 250/637: Performed by: HOSPITALIST

## 2020-08-28 PROCEDURE — 65270000029 HC RM PRIVATE

## 2020-08-28 PROCEDURE — 80053 COMPREHEN METABOLIC PANEL: CPT

## 2020-08-28 PROCEDURE — 74011250637 HC RX REV CODE- 250/637: Performed by: FAMILY MEDICINE

## 2020-08-28 PROCEDURE — 74011000250 HC RX REV CODE- 250: Performed by: FAMILY MEDICINE

## 2020-08-28 PROCEDURE — 85025 COMPLETE CBC W/AUTO DIFF WBC: CPT

## 2020-08-28 PROCEDURE — 83690 ASSAY OF LIPASE: CPT

## 2020-08-28 PROCEDURE — 36415 COLL VENOUS BLD VENIPUNCTURE: CPT

## 2020-08-28 PROCEDURE — 74011250636 HC RX REV CODE- 250/636: Performed by: FAMILY MEDICINE

## 2020-08-28 PROCEDURE — 74011250636 HC RX REV CODE- 250/636: Performed by: HOSPITALIST

## 2020-08-28 PROCEDURE — C9113 INJ PANTOPRAZOLE SODIUM, VIA: HCPCS | Performed by: FAMILY MEDICINE

## 2020-08-28 PROCEDURE — 77030027138 HC INCENT SPIROMETER -A

## 2020-08-28 PROCEDURE — 74011250637 HC RX REV CODE- 250/637: Performed by: INTERNAL MEDICINE

## 2020-08-28 RX ORDER — HYDROMORPHONE HYDROCHLORIDE 2 MG/1
1 TABLET ORAL
Status: DISCONTINUED | OUTPATIENT
Start: 2020-08-28 | End: 2020-08-31 | Stop reason: HOSPADM

## 2020-08-28 RX ADMIN — DIPHENHYDRAMINE HYDROCHLORIDE 12.5 MG: 50 INJECTION, SOLUTION INTRAMUSCULAR; INTRAVENOUS at 04:23

## 2020-08-28 RX ADMIN — HYDROMORPHONE HYDROCHLORIDE 1 MG: 2 TABLET ORAL at 21:45

## 2020-08-28 RX ADMIN — HYDROMORPHONE HYDROCHLORIDE 1 MG: 1 INJECTION, SOLUTION INTRAMUSCULAR; INTRAVENOUS; SUBCUTANEOUS at 04:23

## 2020-08-28 RX ADMIN — DIPHENHYDRAMINE HYDROCHLORIDE 12.5 MG: 50 INJECTION, SOLUTION INTRAMUSCULAR; INTRAVENOUS at 11:14

## 2020-08-28 RX ADMIN — HYDROMORPHONE HYDROCHLORIDE 1 MG: 2 TABLET ORAL at 13:18

## 2020-08-28 RX ADMIN — RIVAROXABAN 20 MG: 10 TABLET, FILM COATED ORAL at 17:18

## 2020-08-28 RX ADMIN — TRAZODONE HYDROCHLORIDE 100 MG: 100 TABLET ORAL at 22:41

## 2020-08-28 RX ADMIN — HYDROMORPHONE HYDROCHLORIDE 1 MG: 2 TABLET ORAL at 17:18

## 2020-08-28 RX ADMIN — DIPHENHYDRAMINE HYDROCHLORIDE 12.5 MG: 50 INJECTION, SOLUTION INTRAMUSCULAR; INTRAVENOUS at 17:20

## 2020-08-28 RX ADMIN — HYDROMORPHONE HYDROCHLORIDE 1 MG: 1 INJECTION, SOLUTION INTRAMUSCULAR; INTRAVENOUS; SUBCUTANEOUS at 09:18

## 2020-08-28 RX ADMIN — DIPHENHYDRAMINE HYDROCHLORIDE 12.5 MG: 50 INJECTION, SOLUTION INTRAMUSCULAR; INTRAVENOUS at 22:40

## 2020-08-28 RX ADMIN — LORAZEPAM 1 MG: 1 TABLET ORAL at 21:44

## 2020-08-28 RX ADMIN — SODIUM CHLORIDE 40 MG: 9 INJECTION, SOLUTION INTRAMUSCULAR; INTRAVENOUS; SUBCUTANEOUS at 09:12

## 2020-08-28 RX ADMIN — DOCUSATE SODIUM 100 MG: 100 CAPSULE, LIQUID FILLED ORAL at 09:12

## 2020-08-28 NOTE — PROGRESS NOTES
Pt is independent and her teenage children live with her.  Pt's mother also lives near by and will assist as needed. [facility] encourage ambulation as appropriate to assist with identifying potential transition of care needs.  Pt with recurrent/unresolved issues that have bought her back to the hospital. Anticipate pt will transition home with physician follow up within the next 24-48 hours. No other needs have been identified at this time.  CM to continue to follow and assist.    Care Management Interventions  PCP Verified by CM: Yes  Mode of Transport at Discharge:  Other (see comment)(Family)  Transition of Care Consult (CM Consult): Discharge Planning  Health Maintenance Reviewed: Yes  Current Support Network: Own Home(teenage children live with pt)  Confirm Follow Up Transport: Self  The Plan for Transition of Care is Related to the Following Treatment Goals : Home with physician follow up  Discharge Location  Discharge Placement: Home with family assistance

## 2020-08-28 NOTE — ROUTINE PROCESS
2011- Bedside and Verbal shift change report given to JAD Petersen RN (oncoming nurse) by Isaiah Dunn RN (offgoing nurse). Report included the following information SBAR, Kardex, Intake/Output, MAR and Recent Results. Introduced self to pt, updated whiteboard. Shift Assessment completed. No complaints at this time. Call bell within reach. Will continue to monitor. Uneventful shift. Pain medication given x2.  
 
0805- Bedside and Verbal shift change report given to JAD Victor RN (oncoming nurse) by Aziza Loya. Gregg Petersen RN (offgoing nurse). Report included the following information SBAR, Kardex, Intake/Output, MAR and Recent Results.

## 2020-08-28 NOTE — PROGRESS NOTES
1118-IV leaking, was removed and new 22 gauge placed on right hand. Benadryl 12.5 mg given as requested.

## 2020-08-28 NOTE — PROGRESS NOTES
Problem: Falls - Risk of  Goal: *Absence of Falls  Description: Document Joy Edmondson Fall Risk and appropriate interventions in the flowsheet.   Outcome: Progressing Towards Goal  Note: Fall Risk Interventions:            Medication Interventions: Evaluate medications/consider consulting pharmacy, Teach patient to arise slowly, Patient to call before getting OOB                   Problem: Pain  Goal: *Control of Pain  Outcome: Progressing Towards Goal

## 2020-08-28 NOTE — PROGRESS NOTES
Gastrointestinal Progress Note    Patient Name: Salima ZARAGOZA Date: 8/28/2020    Admit Date: 8/23/2020    Subjective:     Diet: Patient is on Regular bariatric diet and is not tolerating. Has to eat only very small quantities of low fat food    Nausea is present. Vomiting is present yesterday but not today. Pain: Patient does complain of abdominal pain. The pain is located in the epigastrium. The pain is described as constant, pressure, sharp and stabbing, and is 7 to 8 /10 in intensity at most still requiring Dilaudid IV.      Bowel Movements: None for 2 days    Bleeding:  None    Current Facility-Administered Medications   Medication Dose Route Frequency    HYDROmorphone (PF) (DILAUDID) injection 1 mg  1 mg IntraVENous Q4H PRN    diphenhydrAMINE (BENADRYL) injection 12.5 mg  12.5 mg IntraVENous Q6H PRN    docusate sodium (COLACE) capsule 100 mg  100 mg Oral DAILY    traZODone (DESYREL) tablet 100 mg  100 mg Oral QHS    acetaminophen (TYLENOL) tablet 1,000 mg  1,000 mg Oral Q8H PRN    morphine injection 4 mg  4 mg IntraVENous Q4H PRN    0.9% sodium chloride infusion  75 mL/hr IntraVENous CONTINUOUS    sodium chloride (NS) flush 5-40 mL  5-40 mL IntraVENous PRN    LORazepam (ATIVAN) tablet 1 mg  1 mg Oral Q1H PRN    Or    LORazepam (ATIVAN) injection 1 mg  1 mg IntraVENous Q1H PRN    LORazepam (ATIVAN) tablet 2 mg  2 mg Oral Q1H PRN    Or    LORazepam (ATIVAN) injection 2 mg  2 mg IntraVENous Q1H PRN    LORazepam (ATIVAN) injection 3 mg  3 mg IntraVENous Q15MIN PRN    sodium chloride (NS) flush 5-40 mL  5-40 mL IntraVENous PRN    ondansetron (ZOFRAN) injection 4 mg  4 mg IntraVENous Q4H PRN    rivaroxaban (XARELTO) tablet 20 mg  20 mg Oral DAILY    pantoprazole (PROTONIX) 40 mg in 0.9% sodium chloride 10 mL injection  40 mg IntraVENous DAILY    LORazepam (ATIVAN) tablet 1 mg  1 mg Oral QHS          Objective:     Visit Vitals  /89 (BP 1 Location: Left arm, BP Patient Position: At rest;Sitting)   Pulse 85   Temp 98 °F (36.7 °C)   Resp 16   Ht 5' 5\" (1.651 m)   Wt 83.9 kg (185 lb)   SpO2 100%   BMI 30.79 kg/m²       08/26 1901 - 08/28 0700  In: 1310 [P.O.:240; I.V.:1070]  Out: -     General appearance: alert, cooperative, No distress, appears stated age  Abdomen: soft, Moderate tenderness in the upper  Abdomen and both CVA. Bowel sounds normal. No masses,  no organomegaly. Extremities are normal    Data Review:    Labs: Results:       Chemistry Recent Labs     08/28/20 0100 08/27/20  0019 08/26/20  0136   GLU 76 103* 131*    140 137   K 4.3 3.8 3.9    110 106   CO2 24 23 23   BUN 5* 4* 4*   CREA 0.58* 0.66 0.76   CA 8.0* 7.6* 7.8*   AGAP 5 7 8   BUCR 9* 6* 5*   * 492* 636*   TP 5.5* 5.5* 5.6*   ALB 1.9* 1.9* 2.0*   GLOB 3.6 3.6 3.6   AGRAT 0.5* 0.5* 0.6*      CBC w/Diff Recent Labs     08/28/20 0100 08/27/20 0019 08/26/20  0136   WBC 5.0 5.2 5.6   RBC 2.95* 2.89* 3.04*   HGB 9.0* 8.8* 9.3*   HCT 28.3* 27.6* 28.1*   * 449* 368   GRANS 52 55 65   LYMPH 31 29 19*   EOS 2 2 1      Coagulation No results for input(s): PTP, INR, APTT, INREXT, INREXT in the last 72 hours. Liver Enzymes Recent Labs     08/28/20 0100   TP 5.5*   ALB 1.9*   *          Assessment:     Principal Problem:    Pancreatitis (8/23/2020)    Active Problems:    Hypertension ()      GERD (gastroesophageal reflux disease) ()      Portal vein thrombosis (6/27/2020)      Nausea & vomiting (8/23/2020)      Elevated liver enzymes (8/23/2020)    MRI on August 27, 2020  1. Acute pancreatitis. No evidence of pancreatic necrosis. 2.  Rim-enhancing 2.2 x 1.4 cm fluid collection adjacent to the pancreatic neck  likely represents a developing pseudocyst.  3.  Pancreatic inflammation produces severe narrowing/short segment occlusion of  the SMV near the confluence. Please note this places the patient at risk for  thrombosis.   4.  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 Plan -Stay on smaller portions of low fat bariatric diet. Discharge when she is comfortable enough to manage her pain without IV narcotics. For now I told her she has to be patient to let the inflammation settle down. She stopped drinking completely few months ago? I would like to repeat her alcohol level which was normal. We told her to stop the probiotics and she assured me that she won't drink anymore    LFT elevation is fortunately coming down. It appears to be related to the severe pancreatitis as the MRI was negative. Bilirubin is normal. She already had cholecystocolotomy in 2002 with her first gastric bypass.    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 Recommend to keep the Isai Mckeon for 6 months then do a CT with contrast or MRI  Anemia down to 9.2 without obvious bleeding  Sp gastric bypass in 2002 and revision in 2018. EGD August 17, 2020 looked fine. Empiric gastrojejunal anastomosis dilated with the 15 mm balloon. Hx of C difficile colitis.  Presently doing well  Anemia at 9.0 no evidence of any visible internal or external bleeding    Eden Marin MD  August 28, 2020

## 2020-08-28 NOTE — PROGRESS NOTES
Hospitalist Progress Note    Patient: Piedad Chao MRN: [id number]  CSN: [id number]    YOB: 1975  Age: 39 y.o. Sex: female    DOA: 8/23/2020 LOS:  LOS: 5 days          Chief Complaint:    pancreatitis      Assessment/Plan     Acute on chronic pancreatitis  gastritis  Hx of etoh abuse-cautioned complete abstinence  Gastric bypass and hx of revision  GERD  PV thrombosis, on xarelto  Hypertension  History of c diff  transaminitis-improved  Anemia    MRI yesterday with expected changes    LFT are improved  Lipase normal  Stop IVF  Pain still  She is very anxious about her pain and control for home    Start PO dilaudid as she declines other pain meds PO and use IV VERY sparingly  She can be discharged no later than Sunday (this is her request), as she is eating    No new recs per GI  Stay off etoh  Bariatric diet  ambulate      Disposition :home Sun am  Patient Active Problem List   Diagnosis Code    Status post gastric bypass for obesity Z98.84    Hypertension I10    Anemia D64.9    Anxiety F41.9    GERD (gastroesophageal reflux disease) K21.9    Smoker F17.200    Portal vein thrombosis I81    Gastritis and duodenitis K29.90    [facility] K85.90, K86.1    Pancreatitis K85.90    Nausea & vomiting R11.2    Elevated liver enzymes R74.8       Subjective:    Pain moderate at times in mid abdomen  Worse after eating  Dilaudid helps \"smooth it out\"    Review of systems:    Constitutional: denies fevers, chills  Respiratory: denies SOB  Gastrointestinal: denies nausea, vomiting, diarrhea      Vital signs/Intake and Output:  Visit Vitals  /89 (BP 1 Location: Left arm, BP Patient Position: At rest;Sitting)   Pulse 85   Temp 98 °F (36.7 °C)   Resp 16   Ht 5' 5\" (1.651 m)   Wt 83.9 kg (185 lb)   SpO2 100%   BMI 30.79 kg/m²     Current Shift:  08/28 0701 - 08/28 1900  In: 6725.2 [P.O.:240;  I.V.:6485.2]  Out: -   Last three shifts:  08/26 1901 - 08/28 0700  In: 1310 [P.O.:240; I.V.:1070]  Out: -     Exam:    General: Well developed, alert, NAD, OX3  Head/Neck: NCAT, supple, No masses, No lymphadenopathy  CVS:Regular rate and rhythm, no M/R/G, S1/S2 heard, no thrill  Lungs:Clear to auscultation bilaterally, no wheezes, rhonchi, or rales  Abdomen: Soft, Nontender, No distention, Normal Bowel sounds, No hepatomegaly  Extremities: No C/C/E, pulses palpable 2+  Neuro:grossly normal , follows commands  Psych:appropriate                Labs: Results:       Chemistry Recent Labs     08/28/20 0100 08/27/20  0019 08/26/20  0136   GLU 76 103* 131*    140 137   K 4.3 3.8 3.9    110 106   CO2 24 23 23   BUN 5* 4* 4*   CREA 0.58* 0.66 0.76   CA 8.0* 7.6* 7.8*   AGAP 5 7 8   BUCR 9* 6* 5*   * 492* 636*   TP 5.5* 5.5* 5.6*   ALB 1.9* 1.9* 2.0*   GLOB 3.6 3.6 3.6   AGRAT 0.5* 0.5* 0.6*      CBC w/Diff Recent Labs     08/28/20 0100 08/27/20  0019 08/26/20  0136   WBC 5.0 5.2 5.6   RBC 2.95* 2.89* 3.04*   HGB 9.0* 8.8* 9.3*   HCT 28.3* 27.6* 28.1*   * 449* 368   GRANS 52 55 65   LYMPH 31 29 19*   EOS 2 2 1      Cardiac Enzymes No results for input(s): CPK, CKND1, JONATAN in the last 72 hours. No lab exists for component: CKRMB, TROIP   Coagulation No results for input(s): PTP, INR, APTT, INREXT in the last 72 hours. Lipid Panel Lab Results   Component Value Date/Time    Cholesterol, total 169 07/28/2020 02:05 AM    HDL Cholesterol 37 (L) 07/28/2020 02:05 AM    LDL, calculated 97.2 07/28/2020 02:05 AM    VLDL, calculated 34.8 07/28/2020 02:05 AM    Triglyceride 81 08/23/2020 06:37 AM    CHOL/HDL Ratio 4.6 07/28/2020 02:05 AM      BNP No results for input(s): BNPP in the last 72 hours.    Liver Enzymes Recent Labs     08/28/20  0100   TP 5.5*   ALB 1.9*   *      Thyroid Studies Lab Results   Component Value Date/Time    TSH 2.18 06/29/2020 12:35 AM        Procedures/imaging: see electronic medical records for all procedures/Xrays and details which were not copied into this note but were reviewed prior to creation of Mauro Carbajal MD

## 2020-08-28 NOTE — PROGRESS NOTES
Comprehensive Nutrition Assessment    Type and Reason for Visit: (P) Initial, RD nutrition re-screen/LOS    Nutrition Recommendations/Plan: Coordination of Care: RDN referral to bariatric dietitian for outpatient resources      Nutrition Assessment:  (P) pt admitted w/ acute on chronic pancreatitis, gastritis, transaminitis, HTN, anemia, hx of gastric bypass and ETOH abuse        Estimated Daily Nutrient Needs:  Energy (kcal):  (P) 1973  Protein (g):  (P) 45-57       Fluid (ml/day):  (P) 1973    Nutrition Related Findings:  (P) +BM 8/28/20-colace, labs reviewed, diet was changed to be more appropriate to her needs, manual fat restriction at dietary office     Spoke w/ pt regarding adding MVI at this time pt does not want to take her MVI while at hospital     Wounds:    (P) None       Current Nutrition Therapies:   DIET BARIATRIC MAINTENANCE    Anthropometric Measures:  · Height:  (P) 5' 5\" (165.1 cm)  · Current Body Wt:  (P) 83.9 kg (185 lb)   · Admission Body Wt:       · Usual Body Wt:  (P) 191 lb(7/28/20)     · Ideal Body Wt:  (P) 125 lbs:  (P) 148 %   · Adjusted Body Weight:   ; Weight Adjustment for: (P) No adjustment   · Adjusted BMI:       · BMI Category:  (P) Obese class 1 (BMI 30.0-34. 9)       Nutrition Diagnosis:   · (P) Inadequate oral intake related to (P) altered GI function as evidenced by (P) poor intake prior to admission    Nutrition Interventions:   Food and/or Nutrient Delivery: (P) Continue current diet  Nutrition Education and Counseling: (P) Education needed([facility] diet education w/ MD office at d/c)  Coordination of Nutrition Care: (P) Continued inpatient monitoring, Interdisciplinary rounds    Goals:  (P) Encourage PO intake >50% at most meals in the next 5-7days       Nutrition Monitoring and Evaluation:   Behavioral-Environmental Outcomes:    Food/Nutrient Intake Outcomes: (P) Diet advancement/tolerance, Food and nutrient intake, Vitamin/mineral intake  Physical Signs/Symptoms Outcomes: (P) Biochemical data, Skin, Weight, GI status    Discharge Planning:    (P) Recommend pursue outpatient nutrition counseling     Electronically signed by aJred Suazo on 8/28/2020 at 11:40 AM

## 2020-08-29 LAB
ALBUMIN SERPL-MCNC: 1.9 G/DL (ref 3.4–5)
ALBUMIN/GLOB SERPL: 0.6 {RATIO} (ref 0.8–1.7)
ALP SERPL-CCNC: 355 U/L (ref 45–117)
ALT SERPL-CCNC: 55 U/L (ref 13–56)
ANION GAP SERPL CALC-SCNC: 7 MMOL/L (ref 3–18)
AST SERPL-CCNC: 23 U/L (ref 10–38)
BILIRUB SERPL-MCNC: 0.2 MG/DL (ref 0.2–1)
BUN SERPL-MCNC: 5 MG/DL (ref 7–18)
BUN/CREAT SERPL: 8 (ref 12–20)
CALCIUM SERPL-MCNC: 7.9 MG/DL (ref 8.5–10.1)
CHLORIDE SERPL-SCNC: 112 MMOL/L (ref 100–111)
CO2 SERPL-SCNC: 24 MMOL/L (ref 21–32)
CREAT SERPL-MCNC: 0.62 MG/DL (ref 0.6–1.3)
GLOBULIN SER CALC-MCNC: 3.3 G/DL (ref 2–4)
GLUCOSE SERPL-MCNC: 91 MG/DL (ref 74–99)
POTASSIUM SERPL-SCNC: 4.3 MMOL/L (ref 3.5–5.5)
PROT SERPL-MCNC: 5.2 G/DL (ref 6.4–8.2)
SODIUM SERPL-SCNC: 143 MMOL/L (ref 136–145)

## 2020-08-29 PROCEDURE — 74011250636 HC RX REV CODE- 250/636: Performed by: FAMILY MEDICINE

## 2020-08-29 PROCEDURE — 74011250637 HC RX REV CODE- 250/637: Performed by: HOSPITALIST

## 2020-08-29 PROCEDURE — C9113 INJ PANTOPRAZOLE SODIUM, VIA: HCPCS | Performed by: FAMILY MEDICINE

## 2020-08-29 PROCEDURE — 74011000250 HC RX REV CODE- 250: Performed by: FAMILY MEDICINE

## 2020-08-29 PROCEDURE — 74011250637 HC RX REV CODE- 250/637: Performed by: FAMILY MEDICINE

## 2020-08-29 PROCEDURE — 74011250636 HC RX REV CODE- 250/636: Performed by: HOSPITALIST

## 2020-08-29 PROCEDURE — 36415 COLL VENOUS BLD VENIPUNCTURE: CPT

## 2020-08-29 PROCEDURE — 74011250637 HC RX REV CODE- 250/637: Performed by: INTERNAL MEDICINE

## 2020-08-29 PROCEDURE — 65270000029 HC RM PRIVATE

## 2020-08-29 PROCEDURE — 80053 COMPREHEN METABOLIC PANEL: CPT

## 2020-08-29 RX ORDER — METOCLOPRAMIDE HYDROCHLORIDE 5 MG/5ML
5 SOLUTION ORAL
Status: DISCONTINUED | OUTPATIENT
Start: 2020-08-29 | End: 2020-08-31 | Stop reason: HOSPADM

## 2020-08-29 RX ORDER — SUCRALFATE 1 G/1
1 TABLET ORAL
Status: DISCONTINUED | OUTPATIENT
Start: 2020-08-29 | End: 2020-08-31 | Stop reason: HOSPADM

## 2020-08-29 RX ADMIN — SUCRALFATE 1 G: 1 TABLET ORAL at 16:53

## 2020-08-29 RX ADMIN — DOCUSATE SODIUM 100 MG: 100 CAPSULE, LIQUID FILLED ORAL at 08:07

## 2020-08-29 RX ADMIN — DIPHENHYDRAMINE HYDROCHLORIDE 12.5 MG: 50 INJECTION, SOLUTION INTRAMUSCULAR; INTRAVENOUS at 14:19

## 2020-08-29 RX ADMIN — METOCLOPRAMIDE HYDROCHLORIDE 5 MG: 5 SOLUTION ORAL at 21:19

## 2020-08-29 RX ADMIN — HYDROMORPHONE HYDROCHLORIDE 1 MG: 2 TABLET ORAL at 21:19

## 2020-08-29 RX ADMIN — RIVAROXABAN 20 MG: 10 TABLET, FILM COATED ORAL at 16:53

## 2020-08-29 RX ADMIN — TRAZODONE HYDROCHLORIDE 100 MG: 100 TABLET ORAL at 22:13

## 2020-08-29 RX ADMIN — SODIUM CHLORIDE 40 MG: 9 INJECTION, SOLUTION INTRAMUSCULAR; INTRAVENOUS; SUBCUTANEOUS at 08:07

## 2020-08-29 RX ADMIN — HYDROMORPHONE HYDROCHLORIDE 1 MG: 2 TABLET ORAL at 08:08

## 2020-08-29 RX ADMIN — DIPHENHYDRAMINE HYDROCHLORIDE 12.5 MG: 50 INJECTION, SOLUTION INTRAMUSCULAR; INTRAVENOUS at 03:47

## 2020-08-29 RX ADMIN — SUCRALFATE 1 G: 1 TABLET ORAL at 21:18

## 2020-08-29 RX ADMIN — DIPHENHYDRAMINE HYDROCHLORIDE 12.5 MG: 50 INJECTION, SOLUTION INTRAMUSCULAR; INTRAVENOUS at 21:18

## 2020-08-29 RX ADMIN — METOCLOPRAMIDE HYDROCHLORIDE 5 MG: 5 SOLUTION ORAL at 16:53

## 2020-08-29 RX ADMIN — HYDROMORPHONE HYDROCHLORIDE 1 MG: 2 TABLET ORAL at 12:40

## 2020-08-29 RX ADMIN — HYDROMORPHONE HYDROCHLORIDE 1 MG: 2 TABLET ORAL at 03:46

## 2020-08-29 RX ADMIN — DIPHENHYDRAMINE HYDROCHLORIDE 12.5 MG: 50 INJECTION, SOLUTION INTRAMUSCULAR; INTRAVENOUS at 08:07

## 2020-08-29 RX ADMIN — HYDROMORPHONE HYDROCHLORIDE 1 MG: 2 TABLET ORAL at 16:53

## 2020-08-29 RX ADMIN — LORAZEPAM 1 MG: 1 TABLET ORAL at 21:18

## 2020-08-29 NOTE — ROUTINE PROCESS
1940- Bedside and Verbal shift change report given to JAD Neville RN (oncoming nurse) by Dandy Cummins. Getachew Mejia RN (offgoing nurse). Report included the following information SBAR, Kardex, Intake/Output, MAR and Recent Results. Itroduced self to pt, updated whiteboard. Shift Assessment completed. NAD noted. Will continue to monitor. Pain meds x2 Benadryl x2 Uneventful shiftw 
 
0723- Bedside and Verbal shift change report given to Yas Fuentes RN (oncoming nurse) by Dandy Cummins. Kim Neville RN (offgoing nurse). Report included the following information SBAR, Kardex, Intake/Output, MAR and Recent Results.

## 2020-08-29 NOTE — PROGRESS NOTES
Hospitalist Progress Note    Patient: Aimee Eric MRN: [id number]  CSN: [id number]    YOB: 1975  Age: 39 y.o. Sex: female    DOA: 8/23/2020 LOS:  LOS: 6 days                Assessment and Plan:    Principal Problem:    Pancreatitis (8/23/2020)    Active Problems:    Hypertension ()      GERD (gastroesophageal reflux disease) ()      Portal vein thrombosis (6/27/2020)      Nausea & vomiting (8/23/2020)      Elevated liver enzymes (8/23/2020)        Acute on CHronic pancreatitis:  Will continue pain meds. Labs look better. Will try and give liquid forms of meds so as to not irritate the dysphagia    GERD: add Reglan and carafate in liquid form as this will help with the stricture and the symptoms    History of PE: On Xarelto      Chief complaint:  Pancreatitis       Subjective:    Feels like pills  stick in her throat and make things worse       Review of systems:    General: No fevers or chills. Cardiovascular: No chest pain or pressure. No palpitations. Pulmonary: No shortness of breath. Gastrointestinal: No nausea, vomiting. Objective:    Vital signs/Intake and Output:  Visit Vitals  BP (!) 144/97 (BP 1 Location: Left arm, BP Patient Position: At rest)   Pulse 78   Temp 98.9 °F (37.2 °C)   Resp 16   Ht 5' 5\" (1.651 m)   Wt 83.9 kg (185 lb)   SpO2 96%   BMI 30.79 kg/m²     Current Shift:  No intake/output data recorded. Last three shifts:  08/27 1901 - 08/29 0700  In: 7385.2 [P.O.:900; I.V.:6485.2]  Out: -     Physical Exam:  General: NAD, AAOx3. Non-toxic. HEENT: NC/AT. PERRLA, EOMI.  MMM. Lungs: Nml inspection. CTA B/L. No wheezing, rales or rhonchi. Heart:  S1S2 RRR,  PMI mid 5th IC space. No M/RG. Abdomen: Soft, NT/ND.  BS+. No peritoneal signs. Extremities: No C/C/E. Psych:   Nml affect. Neurologic:  2-12 intact. Strength 5/5 throughout.   Sensation symmetrical.          Labs: Results:       Chemistry Recent Labs     08/29/20  0450 08/28/20  0100 08/27/20  0019   GLU 91 76 103*    139 140   K 4.3 4.3 3.8   * 110 110   CO2 24 24 23   BUN 5* 5* 4*   CREA 0.62 0.58* 0.66   CA 7.9* 8.0* 7.6*   AGAP 7 5 7   BUCR 8* 9* 6*   * 434* 492*   TP 5.2* 5.5* 5.5*   ALB 1.9* 1.9* 1.9*   GLOB 3.3 3.6 3.6   AGRAT 0.6* 0.5* 0.5*      CBC w/Diff Recent Labs     08/28/20  0100 08/27/20  0019   WBC 5.0 5.2   RBC 2.95* 2.89*   HGB 9.0* 8.8*   HCT 28.3* 27.6*   * 449*   GRANS 52 55   LYMPH 31 29   EOS 2 2      Cardiac Enzymes No results for input(s): CPK, CKND1, JONATAN in the last 72 hours. No lab exists for component: CKRMB, TROIP   Coagulation No results for input(s): PTP, INR, APTT, INREXT in the last 72 hours. Lipid Panel Lab Results   Component Value Date/Time    Cholesterol, total 169 07/28/2020 02:05 AM    HDL Cholesterol 37 (L) 07/28/2020 02:05 AM    LDL, calculated 97.2 07/28/2020 02:05 AM    VLDL, calculated 34.8 07/28/2020 02:05 AM    Triglyceride 81 08/23/2020 06:37 AM    CHOL/HDL Ratio 4.6 07/28/2020 02:05 AM      BNP No results for input(s): BNPP in the last 72 hours.    Liver Enzymes Recent Labs     08/29/20  0450   TP 5.2*   ALB 1.9*   *      Thyroid Studies Lab Results   Component Value Date/Time    TSH 2.18 06/29/2020 12:35 AM        Procedures/imaging: see electronic medical records for all procedures/Xrays and details which were not copied into this note but were reviewed prior to creation of Plan

## 2020-08-29 NOTE — PROGRESS NOTES
Bedside and verbal report given to ALEK Vargas RN (oncoming nurse) by Frank Deluca RN  (off going nurse). Report included the following information SBAR, Kardex, OR Summary, Intake/Output, and MAR. Pt had an uneventful shift and NAD. Pt received Dilaudid x2 and Benadryl x2. Bedside and verbal report given by (off going nurse) ALEK Vargas RN to (oncoming nurse) LAMONTE Murphy. Report included the following information SBAR, Kardex, OR Summary, Intake/Output, and MAR.

## 2020-08-30 PROCEDURE — 74011250636 HC RX REV CODE- 250/636: Performed by: FAMILY MEDICINE

## 2020-08-30 PROCEDURE — 74011250637 HC RX REV CODE- 250/637: Performed by: FAMILY MEDICINE

## 2020-08-30 PROCEDURE — 74011250637 HC RX REV CODE- 250/637: Performed by: INTERNAL MEDICINE

## 2020-08-30 PROCEDURE — 74011250637 HC RX REV CODE- 250/637: Performed by: HOSPITALIST

## 2020-08-30 PROCEDURE — 65270000029 HC RM PRIVATE

## 2020-08-30 PROCEDURE — 74011250636 HC RX REV CODE- 250/636: Performed by: HOSPITALIST

## 2020-08-30 PROCEDURE — C9113 INJ PANTOPRAZOLE SODIUM, VIA: HCPCS | Performed by: FAMILY MEDICINE

## 2020-08-30 RX ADMIN — MORPHINE SULFATE 4 MG: 4 INJECTION INTRAVENOUS at 10:53

## 2020-08-30 RX ADMIN — METOCLOPRAMIDE HYDROCHLORIDE 5 MG: 5 SOLUTION ORAL at 06:49

## 2020-08-30 RX ADMIN — SODIUM CHLORIDE 40 MG: 9 INJECTION, SOLUTION INTRAMUSCULAR; INTRAVENOUS; SUBCUTANEOUS at 10:53

## 2020-08-30 RX ADMIN — DOCUSATE SODIUM 100 MG: 100 CAPSULE, LIQUID FILLED ORAL at 10:55

## 2020-08-30 RX ADMIN — RIVAROXABAN 20 MG: 10 TABLET, FILM COATED ORAL at 16:53

## 2020-08-30 RX ADMIN — HYDROMORPHONE HYDROCHLORIDE 1 MG: 1 INJECTION, SOLUTION INTRAMUSCULAR; INTRAVENOUS; SUBCUTANEOUS at 21:07

## 2020-08-30 RX ADMIN — DIPHENHYDRAMINE HYDROCHLORIDE 12.5 MG: 50 INJECTION, SOLUTION INTRAMUSCULAR; INTRAVENOUS at 10:54

## 2020-08-30 RX ADMIN — HYDROMORPHONE HYDROCHLORIDE 1 MG: 2 TABLET ORAL at 03:51

## 2020-08-30 RX ADMIN — TRAZODONE HYDROCHLORIDE 100 MG: 100 TABLET ORAL at 22:25

## 2020-08-30 RX ADMIN — METOCLOPRAMIDE HYDROCHLORIDE 5 MG: 5 SOLUTION ORAL at 16:30

## 2020-08-30 RX ADMIN — MORPHINE SULFATE 4 MG: 4 INJECTION INTRAVENOUS at 16:53

## 2020-08-30 RX ADMIN — SUCRALFATE 1 G: 1 TABLET ORAL at 06:49

## 2020-08-30 RX ADMIN — DIPHENHYDRAMINE HYDROCHLORIDE 12.5 MG: 50 INJECTION, SOLUTION INTRAMUSCULAR; INTRAVENOUS at 21:08

## 2020-08-30 RX ADMIN — HYDROMORPHONE HYDROCHLORIDE 1 MG: 1 INJECTION, SOLUTION INTRAMUSCULAR; INTRAVENOUS; SUBCUTANEOUS at 12:44

## 2020-08-30 RX ADMIN — DIPHENHYDRAMINE HYDROCHLORIDE 12.5 MG: 50 INJECTION, SOLUTION INTRAMUSCULAR; INTRAVENOUS at 03:51

## 2020-08-30 RX ADMIN — SUCRALFATE 1 G: 1 TABLET ORAL at 21:07

## 2020-08-30 RX ADMIN — LORAZEPAM 1 MG: 1 TABLET ORAL at 21:07

## 2020-08-30 RX ADMIN — SUCRALFATE 1 G: 1 TABLET ORAL at 12:44

## 2020-08-30 RX ADMIN — METOCLOPRAMIDE HYDROCHLORIDE 5 MG: 5 SOLUTION ORAL at 21:07

## 2020-08-30 RX ADMIN — METOCLOPRAMIDE HYDROCHLORIDE 5 MG: 5 SOLUTION ORAL at 12:44

## 2020-08-30 RX ADMIN — SUCRALFATE 1 G: 1 TABLET ORAL at 16:53

## 2020-08-30 NOTE — PROGRESS NOTES
Patient presents compliant to current plan of care as directed.    Doe Prabhakar  8/30/2020  12:24 PM

## 2020-08-30 NOTE — PROGRESS NOTES
Bedside and verbal report given to ALEK Askew, RN (oncoming nurse) by Zulay Cook RN  (off going nurse). Report included the following information SBAR, Kardex, OR Summary, Intake/Output, and MAR. Bedside and verbal report given by (off going nurse) ALEK Askew, RN to (oncoming nurse) Thom Gutiérrez RN. Report included the following information SBAR, Kardex, OR Summary, Intake/Output, and MAR.

## 2020-08-30 NOTE — PROGRESS NOTES
Hospitalist Progress Note    Patient: Armani Wilson MRN: [id number]  CSN: [id number]    YOB: 1975  Age: 39 y.o. Sex: female    DOA: 8/23/2020 LOS:  LOS: 7 days                Assessment and Plan:    Principal Problem:    Pancreatitis (8/23/2020)    Active Problems:    Hypertension ()      GERD (gastroesophageal reflux disease) ()      Portal vein thrombosis (6/27/2020)      Nausea & vomiting (8/23/2020)      Elevated liver enzymes (8/23/2020)        Acute on CHronic pancreatitis:  Will continue pain meds. Labs look better. Will try and give liquid forms of meds so as to not irritate the dysphagia     GERD: adding  Reglan and carafate in liquid form did actually  help with the stricture and the symptoms.     History of PE: On Xarelto    HOpefully dc tomorrow am  If does well      Chief complaint:  pancreatitis      Subjective:    GERD much better overall         Review of systems:    General: No fevers or chills. Cardiovascular: No chest pain or pressure. No palpitations. Pulmonary: No shortness of breath. Gastrointestinal: No nausea, vomiting. Objective:    Vital signs/Intake and Output:  Visit Vitals  BP (!) 144/96 (BP 1 Location: Left arm, BP Patient Position: At rest)   Pulse 78   Temp 98.9 °F (37.2 °C)   Resp 16   Ht 5' 5\" (1.651 m)   Wt 93.8 kg (206 lb 12.7 oz)   SpO2 94%   BMI 34.41 kg/m²     Current Shift:  08/30 0701 - 08/30 1900  In: 240 [P.O.:240]  Out: -   Last three shifts:  08/28 1901 - 08/30 0700  In: 360 [P.O.:360]  Out: -     Physical Exam:  General: NAD, AAOx3. Non-toxic. HEENT: NC/AT. PERRLA, EOMI.  MMM. Lungs: Nml inspection. CTA B/L. No wheezing, rales or rhonchi. Heart:  S1S2 RRR,  PMI mid 5th IC space. No M/RG. Abdomen: Soft, NT/ND.  BS+. No peritoneal signs. Extremities: No C/C/E. Psych:   Nml affect. Neurologic:  2-12 intact. Strength 5/5 throughout.   Sensation symmetrical.          Labs: Results:       Chemistry Recent Labs     08/29/20  4845 08/28/20  0100   GLU 91 76    139   K 4.3 4.3   * 110   CO2 24 24   BUN 5* 5*   CREA 0.62 0.58*   CA 7.9* 8.0*   AGAP 7 5   BUCR 8* 9*   * 434*   TP 5.2* 5.5*   ALB 1.9* 1.9*   GLOB 3.3 3.6   AGRAT 0.6* 0.5*      CBC w/Diff Recent Labs     08/28/20  0100   WBC 5.0   RBC 2.95*   HGB 9.0*   HCT 28.3*   *   GRANS 52   LYMPH 31   EOS 2      Cardiac Enzymes No results for input(s): CPK, CKND1, JONATAN in the last 72 hours. No lab exists for component: CKRMB, TROIP   Coagulation No results for input(s): PTP, INR, APTT, INREXT in the last 72 hours. Lipid Panel Lab Results   Component Value Date/Time    Cholesterol, total 169 07/28/2020 02:05 AM    HDL Cholesterol 37 (L) 07/28/2020 02:05 AM    LDL, calculated 97.2 07/28/2020 02:05 AM    VLDL, calculated 34.8 07/28/2020 02:05 AM    Triglyceride 81 08/23/2020 06:37 AM    CHOL/HDL Ratio 4.6 07/28/2020 02:05 AM      BNP No results for input(s): BNPP in the last 72 hours.    Liver Enzymes Recent Labs     08/29/20  0450   TP 5.2*   ALB 1.9*   *      Thyroid Studies Lab Results   Component Value Date/Time    TSH 2.18 06/29/2020 12:35 AM        Procedures/imaging: see electronic medical records for all procedures/Xrays and details which were not copied into this note but were reviewed prior to creation of Plan

## 2020-08-31 VITALS
TEMPERATURE: 98.3 F | SYSTOLIC BLOOD PRESSURE: 124 MMHG | WEIGHT: 205.25 LBS | HEIGHT: 65 IN | BODY MASS INDEX: 34.2 KG/M2 | HEART RATE: 87 BPM | DIASTOLIC BLOOD PRESSURE: 80 MMHG | OXYGEN SATURATION: 99 % | RESPIRATION RATE: 16 BRPM

## 2020-08-31 LAB
ALBUMIN SERPL-MCNC: 2 G/DL (ref 3.4–5)
ALBUMIN/GLOB SERPL: 0.6 {RATIO} (ref 0.8–1.7)
ALP SERPL-CCNC: 301 U/L (ref 45–117)
ALT SERPL-CCNC: 38 U/L (ref 13–56)
ANION GAP SERPL CALC-SCNC: 9 MMOL/L (ref 3–18)
AST SERPL-CCNC: 22 U/L (ref 10–38)
BASOPHILS # BLD: 0 K/UL (ref 0–0.1)
BASOPHILS NFR BLD: 0 % (ref 0–2)
BILIRUB SERPL-MCNC: 0.2 MG/DL (ref 0.2–1)
BUN SERPL-MCNC: 8 MG/DL (ref 7–18)
BUN/CREAT SERPL: 11 (ref 12–20)
CALCIUM SERPL-MCNC: 8.2 MG/DL (ref 8.5–10.1)
CHLORIDE SERPL-SCNC: 108 MMOL/L (ref 100–111)
CO2 SERPL-SCNC: 24 MMOL/L (ref 21–32)
CREAT SERPL-MCNC: 0.71 MG/DL (ref 0.6–1.3)
DIFFERENTIAL METHOD BLD: ABNORMAL
EOSINOPHIL # BLD: 0.2 K/UL (ref 0–0.4)
EOSINOPHIL NFR BLD: 3 % (ref 0–5)
ERYTHROCYTE [DISTWIDTH] IN BLOOD BY AUTOMATED COUNT: 17.5 % (ref 11.6–14.5)
GLOBULIN SER CALC-MCNC: 3.6 G/DL (ref 2–4)
GLUCOSE SERPL-MCNC: 87 MG/DL (ref 74–99)
HCT VFR BLD AUTO: 28.1 % (ref 35–45)
HGB BLD-MCNC: 9.3 G/DL (ref 12–16)
LIPASE SERPL-CCNC: 84 U/L (ref 73–393)
LYMPHOCYTES # BLD: 2 K/UL (ref 0.9–3.6)
LYMPHOCYTES NFR BLD: 36 % (ref 21–52)
MCH RBC QN AUTO: 30.9 PG (ref 24–34)
MCHC RBC AUTO-ENTMCNC: 33.1 G/DL (ref 31–37)
MCV RBC AUTO: 93.4 FL (ref 74–97)
MONOCYTES # BLD: 0.6 K/UL (ref 0.05–1.2)
MONOCYTES NFR BLD: 11 % (ref 3–10)
NEUTS SEG # BLD: 2.9 K/UL (ref 1.8–8)
NEUTS SEG NFR BLD: 50 % (ref 40–73)
PLATELET # BLD AUTO: 588 K/UL (ref 135–420)
PMV BLD AUTO: 8.4 FL (ref 9.2–11.8)
POTASSIUM SERPL-SCNC: 4.1 MMOL/L (ref 3.5–5.5)
PROT SERPL-MCNC: 5.6 G/DL (ref 6.4–8.2)
RBC # BLD AUTO: 3.01 M/UL (ref 4.2–5.3)
SODIUM SERPL-SCNC: 141 MMOL/L (ref 136–145)
WBC # BLD AUTO: 5.7 K/UL (ref 4.6–13.2)

## 2020-08-31 PROCEDURE — 36415 COLL VENOUS BLD VENIPUNCTURE: CPT

## 2020-08-31 PROCEDURE — 74011000250 HC RX REV CODE- 250: Performed by: FAMILY MEDICINE

## 2020-08-31 PROCEDURE — 74011250637 HC RX REV CODE- 250/637: Performed by: HOSPITALIST

## 2020-08-31 PROCEDURE — 74011250636 HC RX REV CODE- 250/636: Performed by: FAMILY MEDICINE

## 2020-08-31 PROCEDURE — 83690 ASSAY OF LIPASE: CPT

## 2020-08-31 PROCEDURE — 80053 COMPREHEN METABOLIC PANEL: CPT

## 2020-08-31 PROCEDURE — 74011250636 HC RX REV CODE- 250/636: Performed by: HOSPITALIST

## 2020-08-31 PROCEDURE — C9113 INJ PANTOPRAZOLE SODIUM, VIA: HCPCS | Performed by: FAMILY MEDICINE

## 2020-08-31 PROCEDURE — 85025 COMPLETE CBC W/AUTO DIFF WBC: CPT

## 2020-08-31 PROCEDURE — 74011250637 HC RX REV CODE- 250/637: Performed by: INTERNAL MEDICINE

## 2020-08-31 RX ORDER — SUCRALFATE 1 G/1
1 TABLET ORAL
Qty: 120 TAB | Refills: 0 | Status: SHIPPED | OUTPATIENT
Start: 2020-08-31 | End: 2020-09-14 | Stop reason: SDUPTHER

## 2020-08-31 RX ORDER — HYDROMORPHONE HYDROCHLORIDE 2 MG/1
1 TABLET ORAL
Qty: 8 TAB | Refills: 0 | Status: SHIPPED | OUTPATIENT
Start: 2020-08-31 | End: 2020-08-31 | Stop reason: SDUPTHER

## 2020-08-31 RX ORDER — METOCLOPRAMIDE HYDROCHLORIDE 5 MG/5ML
5 SOLUTION ORAL
Qty: 600 ML | Refills: 0 | Status: SHIPPED | OUTPATIENT
Start: 2020-08-31 | End: 2020-09-22 | Stop reason: SDUPTHER

## 2020-08-31 RX ORDER — HYDROMORPHONE HYDROCHLORIDE 2 MG/1
1 TABLET ORAL
Qty: 6 TAB | Refills: 0 | Status: SHIPPED | OUTPATIENT
Start: 2020-08-31 | End: 2020-09-03

## 2020-08-31 RX ADMIN — SUCRALFATE 1 G: 1 TABLET ORAL at 07:26

## 2020-08-31 RX ADMIN — DIPHENHYDRAMINE HYDROCHLORIDE 12.5 MG: 50 INJECTION, SOLUTION INTRAMUSCULAR; INTRAVENOUS at 11:00

## 2020-08-31 RX ADMIN — SUCRALFATE 1 G: 1 TABLET ORAL at 10:57

## 2020-08-31 RX ADMIN — MORPHINE SULFATE 4 MG: 4 INJECTION INTRAVENOUS at 02:05

## 2020-08-31 RX ADMIN — HYDROMORPHONE HYDROCHLORIDE 1 MG: 1 INJECTION, SOLUTION INTRAMUSCULAR; INTRAVENOUS; SUBCUTANEOUS at 10:57

## 2020-08-31 RX ADMIN — DIPHENHYDRAMINE HYDROCHLORIDE 12.5 MG: 50 INJECTION, SOLUTION INTRAMUSCULAR; INTRAVENOUS at 05:17

## 2020-08-31 RX ADMIN — HYDROMORPHONE HYDROCHLORIDE 1 MG: 1 INJECTION, SOLUTION INTRAMUSCULAR; INTRAVENOUS; SUBCUTANEOUS at 05:17

## 2020-08-31 RX ADMIN — METOCLOPRAMIDE HYDROCHLORIDE 5 MG: 5 SOLUTION ORAL at 07:26

## 2020-08-31 RX ADMIN — SODIUM CHLORIDE 40 MG: 9 INJECTION, SOLUTION INTRAMUSCULAR; INTRAVENOUS; SUBCUTANEOUS at 09:37

## 2020-08-31 RX ADMIN — DOCUSATE SODIUM 100 MG: 100 CAPSULE, LIQUID FILLED ORAL at 09:37

## 2020-08-31 RX ADMIN — METOCLOPRAMIDE HYDROCHLORIDE 5 MG: 5 SOLUTION ORAL at 10:57

## 2020-08-31 NOTE — PROGRESS NOTES
Bedside and Verbal shift change report given to Jay Gonzalez (oncoming nurse) by Vy CISNEROS, RN (offgoing nurse). Report included the following information SBAR, Kardex and MAR. SHIFT UPDATES:  Patient presented stable for duration of shift and tolerated 50% or more of all Bariatric maintenance dietary orders as directed. Patient still presented with persistent headache pain and received the following pain regimen PRN orders for management:  Dilaudid 1 mg IV every 4 hours PRN for severe pain, Dilaudid 1 mg PO every 4 hours PRN for moderate pain and Morphine 4 mg IV every 4 hours PRN for moderate & severe pain. Additionally, patient received IV dilaudid 12.5 mg every 6 hours PRN itching associated with pain medication regimen. ABNORMAL LAB:     Results for Neisha Nieves (MRN [id number]) as of 8/30/2020 20:36   Ref. Range 8/28/2020 01:00 8/29/2020 04:50   WBC Latest Ref Range: 4.6 - 13.2 K/uL 5.0    RBC Latest Ref Range: 4.20 - 5.30 M/uL 2.95 (L)    HGB Latest Ref Range: 12.0 - 16.0 g/dL 9.0 (L)    HCT Latest Ref Range: 35.0 - 45.0 % 28.3 (L)    MCV Latest Ref Range: 74.0 - 97.0 FL 95.9    MCH Latest Ref Range: 24.0 - 34.0 PG 30.5    MCHC Latest Ref Range: 31.0 - 37.0 g/dL 31.8    RDW Latest Ref Range: 11.6 - 14.5 % 17.6 (H)    PLATELET Latest Ref Range: 135 - 420 K/uL 518 (H)    MPV Latest Ref Range: 9.2 - 11.8 FL 8.7 (L)    NEUTROPHILS Latest Ref Range: 40 - 73 % 52    LYMPHOCYTES Latest Ref Range: 21 - 52 % 31    MONOCYTES Latest Ref Range: 3 - 10 % 15 (H)    EOSINOPHILS Latest Ref Range: 0 - 5 % 2    BASOPHILS Latest Ref Range: 0 - 2 % 0    DF Latest Units:   AUTOMATED    ABS. NEUTROPHILS Latest Ref Range: 1.8 - 8.0 K/UL 2.6    ABS. LYMPHOCYTES Latest Ref Range: 0.9 - 3.6 K/UL 1.5    ABS. MONOCYTES Latest Ref Range: 0.05 - 1.2 K/UL 0.7    ABS. EOSINOPHILS Latest Ref Range: 0.0 - 0.4 K/UL 0.1    ABS.  BASOPHILS Latest Ref Range: 0.0 - 0.1 K/UL 0.0    Sodium Latest Ref Range: 136 - 145 mmol/L 139 143   Potassium Latest Ref Range: 3.5 - 5.5 mmol/L 4.3 4.3   Chloride Latest Ref Range: 100 - 111 mmol/L 110 112 (H)   CO2 Latest Ref Range: 21 - 32 mmol/L 24 24   Anion gap Latest Ref Range: 3.0 - 18 mmol/L 5 7   Glucose Latest Ref Range: 74 - 99 mg/dL 76 91   BUN Latest Ref Range: 7.0 - 18 MG/DL 5 (L) 5 (L)   Creatinine Latest Ref Range: 0.6 - 1.3 MG/DL 0.58 (L) 0.62   BUN/Creatinine ratio Latest Ref Range: 12 - 20   9 (L) 8 (L)   Calcium Latest Ref Range: 8.5 - 10.1 MG/DL 8.0 (L) 7.9 (L)   GFR est non-AA Latest Ref Range: >60 ml/min/1.73m2 >60 >60   GFR est AA Latest Ref Range: >60 ml/min/1.73m2 >60 >60   Bilirubin, total Latest Ref Range: 0.2 - 1.0 MG/DL 0.3 0.2   Protein, total Latest Ref Range: 6.4 - 8.2 g/dL 5.5 (L) 5.2 (L)   Albumin Latest Ref Range: 3.4 - 5.0 g/dL 1.9 (L) 1.9 (L)   Globulin Latest Ref Range: 2.0 - 4.0 g/dL 3.6 3.3   A-G Ratio Latest Ref Range: 0.8 - 1.7   0.5 (L) 0.6 (L)   ALT Latest Ref Range: 13 - 56 U/L 80 (H) 55   AST Latest Ref Range: 10 - 38 U/L 40 (H) 23   Alk. phosphatase Latest Ref Range: 45 - 117 U/L 434 (H) 355 (H)   Lipase Latest Ref Range: 73 - 393 U/L 87        Wounds? None. Central Lines? None. Last BM:  08/26/2020 (Patient has been receiving oral Colace the past few days for management. May need implementation of Miralax). Pending Labs for AM Draw: 08/31/2020 at 4 am, CBC with diff, Lipase and CMP levels. Discharge plan:  As of 08/28/2020 at 1527 pm case management note, patient will discharge home without any transition needs in the next 24-48 hours and will receive post-discharge assistance from family.      Doe Prabhakar  8/30/2020  7:55 PM

## 2020-08-31 NOTE — PROGRESS NOTES
Ankit Storm RN noted patient left without receiving signed script for xarelto. Ankit Storm RN contacted patient and made her aware that she left hospital without receiving her signed prescription. Patient made Ankit Storm RN that it was already at her pharmacy and ready for pickup. Ankit Storm RN made primary nurse Antonia Wolf RN aware.

## 2020-08-31 NOTE — PROGRESS NOTES
3301  Bedside and verbal shift change report given to Travis Child RN (on coming nurse) by ALEK Kee RN (off going nurse). Report included the following information SBAR, Kardex, OR Summary, Intake/Output and MAR.    1136  DEAN Schultz RN-  AVS review with pt, opportunity for questions and concerns at this time. D/c IV clean and dry. Properly discarded armbands.

## 2020-08-31 NOTE — PROGRESS NOTES
Discharge instructions reviewed with the patient. Patient verbalized understanding and verified by teach back. All questions answered. IV discontinued, no redness, swelling or pain noted. Patient awaiting signed prescription and friend for transportation home, with an ETA of 1 to 2 hours. Patient armband removed and shredded.

## 2020-08-31 NOTE — PROGRESS NOTES
Problem: Falls - Risk of  Goal: *Absence of Falls  Description: Document Kristina Patches Fall Risk and appropriate interventions in the flowsheet.   Outcome: Progressing Towards Goal  Note: Fall Risk Interventions:            Medication Interventions: Teach patient to arise slowly         History of Falls Interventions: Bed/chair exit alarm, Assess for delayed presentation/identification of injury for 48 hrs (comment for end date)

## 2020-08-31 NOTE — PROGRESS NOTES
Pt is independent and her teenage children live with her.  Pt's mother also lives near by and will assist as needed. Joi Hartmann encourage ambulation as appropriate to assist with identifying potential transition of care needs.  Pt with recurrent/unresolved issues that have bought her back to the hospital. Anticipate pt will transition home with physician follow up within the next 24-48 hours. No other needs have been identified at this time.  CM to continue to follow and assist.      Care Management Interventions  PCP Verified by CM: Yes  Mode of Transport at Discharge:  Other (see comment)(Family)  Transition of Care Consult (CM Consult): Discharge Planning  Health Maintenance Reviewed: Yes  Current Support Network: Own Home(teenage children live with pt)  Confirm Follow Up Transport: Self  The Plan for Transition of Care is Related to the Following Treatment Goals : Home with physician follow up  Discharge Location  Discharge Placement: Home with family assistance

## 2020-08-31 NOTE — DISCHARGE SUMMARY
[address] [facility] SUMMARY    Name:  Nilton Werner  MR#:   [id number]  :  1975  ACCOUNT #:  [de-identified]  ADMIT DATE:  2020  DISCHARGE DATE:    DATE OF DISCHARGE:  2020    CONSULTANTS:  Lenny Wasserman MD, Gastroenterology. DISCHARGE DIAGNOSES:  1. Acute-on-chronic pancreatitis. 2.  History of gastric bypass revision. 3.  Gastroesophageal reflux disease. 4.  Superior mesenteric vein thrombosis. 5.  Hypertension. 6.  History of Clostridium difficile. 7.  Transaminitis. 8.  Anemia. HOSPITAL CONSULTANTS:  As noted. HOSPITAL SUMMARY:  This is a 53-year-old [facility] [place] female with a distant history of gastric bypass that required revision in 2017. She has a history of prior pancreatitis. She has had a cholecystectomy. She has a superior mesenteric vein thrombosis and is on Xarelto. She came in with increasing abdominal pain and required admission to the hospital.  She has subsequently been seen by Gastroenterology, had hydration, pain control, overall improvement of her elevated LFTs, treatment of her acute pancreatitis which is better. She had notable requirement for pain medication during her hospitalization. Recent history of C. diff which has not been recurrent here. She was on probiotics at home. She has been advised to stop that. She had severe GERD contributory to her symptoms. She is better now on Reglan and Carafate. MRI of her abdomen shows changes consistent with pancreatitis; rim-enhancing 2.2 x 1.4 cm fluid collection adjacent to the pancreatic neck, representing a developing pseudocyst.  Overall, the patient has adjusted her diet to bariatric maintenance here, she is doing better with that. She is currently on Reglan syrup and Carafate four times daily. She is taking her Xarelto, there is no evidence for bleeding. Her LFTs have come down nicely, with last noted ALT of 38, AST at 22, alk phos is 301. Lipase is normal at 84. BUN and creatinine 8 and 0.71. Her H and H is 9.3 and 28.1, platelets of 009. She is overall improved, ready to go home today. Had an uneventful weekend, except for the GERD symptoms. She has no new questions at this point. We discussed the plan as to her care for followup and medications for discharge. Her discharge instructions are to continue her bariatric diet. She is going to follow up with Dr. Jim Donnelly nutritionist this week as well as her PCP, who is Gio Eduardo, on 09/02. She should see Dr. Lars Bright in 2 weeks and Dr. Isabelle Keene in 2 weeks as well. Her exam is unchanged. Today, she is awake, alert, in good spirits, sitting up easily in the bed. Abdomen is benign. Her vital signs; blood pressure 124/80, pulse 87, temp 98.3, respiratory rate 16, SaO2 is 99%. Mentation is appropriate. She is oriented x3. She denies any cardiopulmonary symptoms. No nausea, vomiting, or diarrhea. She has been tolerating her diet. She should discharge home today with her follow-ups as noted. DISCHARGE MEDICATIONS:  Include the following,  1. Norvasc 10 mg daily. 2.  Prilosec 20 mg daily. 3.  Lexapro 20 mg daily. 4.  Multivitamin once a day. 5.  Tylenol Extra Strength 1000 mg every 6 hours as needed for moderate pain. 6.  Trazodone 100 mg at night. 7.  Dilaudid tablets 1 mg every 6 hours as needed for severe pain, #6 tablets only of 2 mg size, no refill. 8.  We have sent in her Reglan syrup 5 ml before breakfast, lunch, and dinner. 9.  She will continue her Xarelto 20 mg daily. 10.  Added Carafate 1 tab before breakfast, lunch, and dinner. She is stable for release from the hospital today. DISPOSITION:  Home. DIET:  Bariatric maintenance. Medications are as noted. I have discussed the plan of care with her. She has no questions. Time spent on discharge today, 36 minutes.       Maroc Peoples MD      [id number]  D:  08/31/2020 10:22  T:  08/31/2020 11:13  JOB #: [id number]  CC:  Gayathri Levi NP

## 2020-08-31 NOTE — DISCHARGE INSTRUCTIONS
Patient Education        Learning About Acute Pancreatitis  What is acute pancreatitis? The pancreas is an organ behind the stomach. It makes hormones like insulin that help control how your body uses sugar. It also makes enzymes that help you digest food. Usually these enzymes flow from the pancreas to the intestines. But if they leak into the pancreas, they can irritate it and cause pain and swelling. When this happens suddenly, it's called acute pancreatitis. What causes it? Most of the time, acute pancreatitis is caused by gallstones or by heavy alcohol use. But several other things can cause it, such as:  · High levels of fats in the blood. · An injury. · A problem after a surgery or a procedure. · Certain medicines. What are the symptoms? The main symptom is pain in the upper belly. The pain can be severe. You also may have a fever, nausea, or vomiting. Some people get so sick that they have problems breathing. They also may have low blood pressure. How is it diagnosed? Your doctor will diagnose pancreatitis with an exam and by looking at your lab tests. Your doctor may think that you have this problem based on your symptoms and where you have pain in your belly. You may have blood tests of enzymes called amylase and lipase. In pancreatitis, the level of these enzymes is usually much higher than normal.  You also may have imaging tests of the belly. These may include an ultrasound, a CT scan, or an MRI. A special MRI called MRCP can show images of the bile ducts. This test can be very helpful when gallstones are causing the problem. How is it treated? Treatment of acute pancreatitis usually takes place in the hospital. It focuses on taking care of pain and supporting your body while your pancreas heals. In severe cases, treatment may occur in an intensive care unit to support breathing, treat serious infections, or help raise very low blood pressure.   If a gallstone is causing the problem, the gallstone may need to be removed. This is done during a procedure called ERCP. The doctor puts a scope in your mouth and moves it gently through the stomach and into the ducts from the pancreas and gallbladder. The doctor is then able to see a stone and remove it. Sometimes the gallbladder, which makes gallstones, needs to be removed by surgery. People with pancreatitis often need a lot of fluid to help support their other organs and their blood pressure. They get fluids through a vein (intravenous, or IV). Instead of food by mouth, nutrition is sometimes given through a vein while the pancreas heals. Follow-up care is a key part of your treatment and safety. Be sure to make and go to all appointments, and call your doctor if you are having problems. It's also a good idea to know your test results and keep a list of the medicines you take. Where can you learn more? Go to http://www.gray.com/  Enter H057 in the search box to learn more about \"Learning About Acute Pancreatitis. \"  Current as of: August 12, 2019               Content Version: 12.5  © 3574-3132 [employer]. Care instructions adapted under license by [employer] (which disclaims liability or warranty for this information). If you have questions about a medical condition or this instruction, always ask your healthcare professional. [address] any warranty or liability for your use of this information.        Bariatric diet

## 2020-09-10 ENCOUNTER — HOSPITAL ENCOUNTER (INPATIENT)
Age: 45
LOS: 3 days | Discharge: HOME | DRG: 282 | End: 2020-09-13
Attending: EMERGENCY MEDICINE | Admitting: HOSPITALIST
Payer: MEDICAID

## 2020-09-10 ENCOUNTER — APPOINTMENT (OUTPATIENT)
Dept: GENERAL RADIOLOGY | Age: 45
DRG: 282 | End: 2020-09-10
Attending: EMERGENCY MEDICINE
Payer: MEDICAID

## 2020-09-10 DIAGNOSIS — K85.22 ALCOHOL-INDUCED ACUTE PANCREATITIS WITH INFECTED NECROSIS: ICD-10-CM

## 2020-09-10 DIAGNOSIS — K85.20 ALCOHOL-INDUCED ACUTE PANCREATITIS WITHOUT INFECTION OR NECROSIS: Primary | ICD-10-CM

## 2020-09-10 LAB
ALBUMIN SERPL-MCNC: 3.2 G/DL (ref 3.4–5)
ALBUMIN/GLOB SERPL: 0.7 {RATIO} (ref 0.8–1.7)
ALP SERPL-CCNC: 209 U/L (ref 45–117)
ALT SERPL-CCNC: 33 U/L (ref 13–56)
ANION GAP SERPL CALC-SCNC: 9 MMOL/L (ref 3–18)
APPEARANCE UR: NORMAL
AST SERPL-CCNC: 39 U/L (ref 10–38)
BASOPHILS # BLD: 0 K/UL (ref 0–0.1)
BASOPHILS NFR BLD: 0 % (ref 0–2)
BILIRUB SERPL-MCNC: 0.4 MG/DL (ref 0.2–1)
BILIRUB UR QL: NEGATIVE
BUN SERPL-MCNC: 8 MG/DL (ref 7–18)
BUN/CREAT SERPL: 8 (ref 12–20)
CALCIUM SERPL-MCNC: 9.1 MG/DL (ref 8.5–10.1)
CHLORIDE SERPL-SCNC: 105 MMOL/L (ref 100–111)
CK MB CFR SERPL CALC: NORMAL % (ref 0–4)
CK MB SERPL-MCNC: <1 NG/ML (ref 5–25)
CK SERPL-CCNC: 67 U/L (ref 26–192)
CO2 SERPL-SCNC: 20 MMOL/L (ref 21–32)
COLOR UR: YELLOW
CREAT SERPL-MCNC: 1.01 MG/DL (ref 0.6–1.3)
DIFFERENTIAL METHOD BLD: ABNORMAL
EOSINOPHIL # BLD: 0.1 K/UL (ref 0–0.4)
EOSINOPHIL NFR BLD: 1 % (ref 0–5)
ERYTHROCYTE [DISTWIDTH] IN BLOOD BY AUTOMATED COUNT: 17.1 % (ref 11.6–14.5)
ETHANOL SERPL-MCNC: 36 MG/DL (ref 0–3)
GLOBULIN SER CALC-MCNC: 4.4 G/DL (ref 2–4)
GLUCOSE SERPL-MCNC: 88 MG/DL (ref 74–99)
GLUCOSE UR STRIP.AUTO-MCNC: NEGATIVE MG/DL
HCG SERPL QL: NEGATIVE
HCT VFR BLD AUTO: 34.2 % (ref 35–45)
HGB BLD-MCNC: 11.1 G/DL (ref 12–16)
HGB UR QL STRIP: NEGATIVE
KETONES UR QL STRIP.AUTO: NEGATIVE MG/DL
LEUKOCYTE ESTERASE UR QL STRIP.AUTO: NEGATIVE
LIPASE SERPL-CCNC: 482 U/L (ref 73–393)
LYMPHOCYTES # BLD: 1.7 K/UL (ref 0.9–3.6)
LYMPHOCYTES NFR BLD: 19 % (ref 21–52)
MCH RBC QN AUTO: 29.6 PG (ref 24–34)
MCHC RBC AUTO-ENTMCNC: 32.5 G/DL (ref 31–37)
MCV RBC AUTO: 91.2 FL (ref 74–97)
MONOCYTES # BLD: 0.5 K/UL (ref 0.05–1.2)
MONOCYTES NFR BLD: 5 % (ref 3–10)
NEUTS SEG # BLD: 6.8 K/UL (ref 1.8–8)
NEUTS SEG NFR BLD: 75 % (ref 40–73)
NITRITE UR QL STRIP.AUTO: NEGATIVE
PH UR STRIP: 5 [PH] (ref 5–8)
PLATELET # BLD AUTO: 451 K/UL (ref 135–420)
PMV BLD AUTO: 9.1 FL (ref 9.2–11.8)
POTASSIUM SERPL-SCNC: 4.2 MMOL/L (ref 3.5–5.5)
PROT SERPL-MCNC: 7.6 G/DL (ref 6.4–8.2)
PROT UR STRIP-MCNC: NEGATIVE MG/DL
RBC # BLD AUTO: 3.75 M/UL (ref 4.2–5.3)
SODIUM SERPL-SCNC: 134 MMOL/L (ref 136–145)
SP GR UR REFRACTOMETRY: 1.01 (ref 1–1.03)
TROPONIN I SERPL-MCNC: <0.02 NG/ML (ref 0–0.04)
UROBILINOGEN UR QL STRIP.AUTO: 0.2 EU/DL (ref 0.2–1)
WBC # BLD AUTO: 9 K/UL (ref 4.6–13.2)

## 2020-09-10 PROCEDURE — 74011250636 HC RX REV CODE- 250/636: Performed by: INTERNAL MEDICINE

## 2020-09-10 PROCEDURE — 96361 HYDRATE IV INFUSION ADD-ON: CPT

## 2020-09-10 PROCEDURE — 74011250636 HC RX REV CODE- 250/636: Performed by: EMERGENCY MEDICINE

## 2020-09-10 PROCEDURE — 80053 COMPREHEN METABOLIC PANEL: CPT

## 2020-09-10 PROCEDURE — 93005 ELECTROCARDIOGRAM TRACING: CPT

## 2020-09-10 PROCEDURE — 85025 COMPLETE CBC W/AUTO DIFF WBC: CPT

## 2020-09-10 PROCEDURE — 74011250636 HC RX REV CODE- 250/636: Performed by: HOSPITALIST

## 2020-09-10 PROCEDURE — 96374 THER/PROPH/DIAG INJ IV PUSH: CPT

## 2020-09-10 PROCEDURE — 96375 TX/PRO/DX INJ NEW DRUG ADDON: CPT

## 2020-09-10 PROCEDURE — 82550 ASSAY OF CK (CPK): CPT

## 2020-09-10 PROCEDURE — 80061 LIPID PANEL: CPT

## 2020-09-10 PROCEDURE — 84703 CHORIONIC GONADOTROPIN ASSAY: CPT

## 2020-09-10 PROCEDURE — 71045 X-RAY EXAM CHEST 1 VIEW: CPT

## 2020-09-10 PROCEDURE — 99285 EMERGENCY DEPT VISIT HI MDM: CPT

## 2020-09-10 PROCEDURE — 80307 DRUG TEST PRSMV CHEM ANLYZR: CPT

## 2020-09-10 PROCEDURE — 81003 URINALYSIS AUTO W/O SCOPE: CPT

## 2020-09-10 PROCEDURE — 65270000029 HC RM PRIVATE

## 2020-09-10 PROCEDURE — 83690 ASSAY OF LIPASE: CPT

## 2020-09-10 PROCEDURE — 74011250637 HC RX REV CODE- 250/637: Performed by: INTERNAL MEDICINE

## 2020-09-10 RX ORDER — FAMOTIDINE 10 MG/ML
20 INJECTION INTRAVENOUS
Status: COMPLETED | OUTPATIENT
Start: 2020-09-10 | End: 2020-09-10

## 2020-09-10 RX ORDER — DIPHENHYDRAMINE HYDROCHLORIDE 50 MG/ML
25 INJECTION, SOLUTION INTRAMUSCULAR; INTRAVENOUS
Status: DISCONTINUED | OUTPATIENT
Start: 2020-09-10 | End: 2020-09-13 | Stop reason: HOSPADM

## 2020-09-10 RX ORDER — TRAZODONE HYDROCHLORIDE 100 MG/1
100 TABLET ORAL
Status: DISCONTINUED | OUTPATIENT
Start: 2020-09-10 | End: 2020-09-10

## 2020-09-10 RX ORDER — PANTOPRAZOLE SODIUM 40 MG/1
40 TABLET, DELAYED RELEASE ORAL
Status: DISCONTINUED | OUTPATIENT
Start: 2020-09-11 | End: 2020-09-13 | Stop reason: HOSPADM

## 2020-09-10 RX ORDER — TRAZODONE HYDROCHLORIDE 100 MG/1
100 TABLET ORAL
Status: DISCONTINUED | OUTPATIENT
Start: 2020-09-10 | End: 2020-09-13 | Stop reason: HOSPADM

## 2020-09-10 RX ORDER — SODIUM CHLORIDE 9 MG/ML
150 INJECTION, SOLUTION INTRAVENOUS CONTINUOUS
Status: DISCONTINUED | OUTPATIENT
Start: 2020-09-10 | End: 2020-09-13 | Stop reason: HOSPADM

## 2020-09-10 RX ORDER — AMLODIPINE BESYLATE 5 MG/1
5 TABLET ORAL DAILY
Status: DISCONTINUED | OUTPATIENT
Start: 2020-09-11 | End: 2020-09-11

## 2020-09-10 RX ORDER — ESCITALOPRAM OXALATE 10 MG/1
20 TABLET ORAL DAILY
Status: DISCONTINUED | OUTPATIENT
Start: 2020-09-11 | End: 2020-09-13 | Stop reason: HOSPADM

## 2020-09-10 RX ORDER — MORPHINE SULFATE 4 MG/ML
4 INJECTION INTRAVENOUS
Status: COMPLETED | OUTPATIENT
Start: 2020-09-10 | End: 2020-09-10

## 2020-09-10 RX ORDER — MORPHINE SULFATE 2 MG/ML
2 INJECTION, SOLUTION INTRAMUSCULAR; INTRAVENOUS
Status: DISCONTINUED | OUTPATIENT
Start: 2020-09-10 | End: 2020-09-11

## 2020-09-10 RX ORDER — ONDANSETRON 2 MG/ML
4 INJECTION INTRAMUSCULAR; INTRAVENOUS
Status: COMPLETED | OUTPATIENT
Start: 2020-09-10 | End: 2020-09-10

## 2020-09-10 RX ORDER — METOCLOPRAMIDE HYDROCHLORIDE 5 MG/5ML
5 SOLUTION ORAL
Status: DISCONTINUED | OUTPATIENT
Start: 2020-09-11 | End: 2020-09-13 | Stop reason: HOSPADM

## 2020-09-10 RX ORDER — SUCRALFATE 1 G/1
1 TABLET ORAL
Status: DISCONTINUED | OUTPATIENT
Start: 2020-09-11 | End: 2020-09-13 | Stop reason: HOSPADM

## 2020-09-10 RX ORDER — MORPHINE SULFATE 2 MG/ML
8 INJECTION, SOLUTION INTRAMUSCULAR; INTRAVENOUS
Status: COMPLETED | OUTPATIENT
Start: 2020-09-10 | End: 2020-09-10

## 2020-09-10 RX ADMIN — MORPHINE SULFATE 4 MG: 4 INJECTION INTRAVENOUS at 19:36

## 2020-09-10 RX ADMIN — ONDANSETRON 4 MG: 2 INJECTION INTRAMUSCULAR; INTRAVENOUS at 19:35

## 2020-09-10 RX ADMIN — DIPHENHYDRAMINE HYDROCHLORIDE 25 MG: 50 INJECTION, SOLUTION INTRAMUSCULAR; INTRAVENOUS at 22:01

## 2020-09-10 RX ADMIN — SODIUM CHLORIDE 150 ML/HR: 900 INJECTION, SOLUTION INTRAVENOUS at 20:51

## 2020-09-10 RX ADMIN — SODIUM CHLORIDE 1000 ML: 900 INJECTION, SOLUTION INTRAVENOUS at 17:26

## 2020-09-10 RX ADMIN — MORPHINE SULFATE 2 MG: 2 INJECTION, SOLUTION INTRAMUSCULAR; INTRAVENOUS at 22:01

## 2020-09-10 RX ADMIN — TRAZODONE HYDROCHLORIDE 100 MG: 100 TABLET ORAL at 22:51

## 2020-09-10 RX ADMIN — MORPHINE SULFATE 8 MG: 2 INJECTION, SOLUTION INTRAMUSCULAR; INTRAVENOUS at 17:20

## 2020-09-10 RX ADMIN — FAMOTIDINE 20 MG: 10 INJECTION, SOLUTION INTRAVENOUS at 17:17

## 2020-09-10 RX ADMIN — ONDANSETRON 4 MG: 2 INJECTION INTRAMUSCULAR; INTRAVENOUS at 17:18

## 2020-09-10 NOTE — PROGRESS NOTES
2142- Pt complaining of itching that she states is related to morphine,    Shift Summary-   Patient Vitals for the past 12 hrs:   Temp Pulse Resp BP SpO2   09/11/20 0335 97.7 °F (36.5 °C) 81 16 130/84 96 %   09/10/20 2244 98.3 °F (36.8 °C) 77 16 (!) 153/101 100 %   09/10/20 2025 98.5 °F (36.9 °C) 91 16 (!) 162/105 100 %   09/10/20 1948    (!) 163/105    09/10/20 1855 98.2 °F (36.8 °C) 82 16 (!) 160/102 100 %    Pt A&Ox4, lungs clear bilaterally, skin intact. Pt denied nausea/vomiting. Pt given PRN morphine x 2 for pain rated 9/10 to mid abdomen. 0710- Bedside and Verbal shift change report given to KALEN Garg (oncoming nurse) by Clemencia Skelton (offgoing nurse). Report included the following information SBAR, Kardex, Intake/Output, MAR and Recent Results.

## 2020-09-10 NOTE — ED PROVIDER NOTES
EMERGENCY DEPARTMENT HISTORY AND PHYSICAL EXAM    Date: 9/10/2020  Patient Name: Tan Gutierres    History of Presenting Illness     Chief Complaint   Patient presents with    Abdominal Pain         History Provided By: Patient and EMS    4:55 PM  Tan Gutierres is a 39 y.o. female with PMHX of bypass with revision in 2019 by Dr. Isabelle Keene, recurrent episodes of pancreatitis complicated by blood clots for which she is being treated with Xarelto who presents to the emergency department C/O of gastric pain with nausea and vomiting. Patient states that this is similar to prior episodes of pancreatitis and \"issues with the blood clots\". She states the symptoms started around 5 AM.  No clear leaving or exacerbating factors and she has been unable to tolerate p.o. She denies any chest pain, fever, cough, bowel or urinary complaints, lower extremity edema, sick contacts, recent travel. She reports compliance with her medications including her Xarelto. PCP: Lizeth RAMOS NP    Current Facility-Administered Medications   Medication Dose Route Frequency Provider Last Rate Last Dose    morphine injection 4 mg  4 mg IntraVENous NOW Thuy Holley MD        ondansetron [facility]) injection 4 mg  4 mg IntraVENous NOW Thuy Holley MD         Current Outpatient Medications   Medication Sig Dispense Refill    rivaroxaban (XARELTO) 20 mg tab tablet Take 1 Tab by mouth daily. 30 Tab 0    metoclopramide (REGLAN) 5 mg/5 mL syrup Take 5 mL by mouth Before breakfast, lunch, dinner and at bedtime for 30 days. 600 mL 0    sucralfate (CARAFATE) 1 gram tablet Take 1 Tab by mouth Before breakfast, lunch, dinner and at bedtime for 30 days. 120 Tab 0    acetaminophen (Tylenol Extra Strength) 500 mg tablet Take 1,000 mg by mouth every six (6) hours as needed for Pain.  escitalopram oxalate (LEXAPRO) 20 mg tablet Take 20 mg by mouth daily.  Indications: anxiousness associated with depression      amLODIPine (NORVASC) 10 mg tablet Take 0.5 Tabs by mouth daily. 30 Tab 0    omeprazole (PRILOSEC OTC) 20 mg tablet Take 1 Tab by mouth daily. 30 Tab 0    traZODone (DESYREL) 100 mg tablet Take  by mouth nightly. Pt is unsure of the dose for this medication      multivitamin (ONE A DAY) tablet Take 1 Tab by mouth daily. Past History     Past Medical History:  Past Medical History:   Diagnosis Date    Anemia     C. difficile diarrhea     Cancer ([facility]      history of endometrial cancer    Chronic pain     Diarrhea     GERD (gastroesophageal reflux disease)     Hypertension 2017    Intestinal malabsorption     Psychiatric disorder     Anxiety and depression    Severe obesity with body mass index (BMI) of 35.0 to 39.9 with comorbidity ([facility]     Smoker     smokes 5 cigarettes per day    Status post gastric bypass for obesity     open / [place]    Syncopal episodes     has plans to see a neurologist     Thromboembolism of internal iliac artery ([facility]     Wears dentures     teeth extraction due to vomiting and vitamin deficiencies       Past Surgical History:  Past Surgical History:   Procedure Laterality Date    HX CHOLECYSTECTOMY      HX GASTRIC BYPASS      gastric bypass    HX GASTRIC BYPASS  2018    HX GI  2017    EGD    HX HYSTERECTOMY      HX OTHER SURGICAL Right     resection of benign breast mass       Family History:  Family History   Problem Relation Age of Onset    Diabetes Mother        Social History:  Social History     Tobacco Use    Smoking status: Former Smoker     Types: Cigarettes     Last attempt to quit: 2018     Years since quittin.0    Smokeless tobacco: Never Used   Substance Use Topics    Alcohol use: No     Alcohol/week: 1.0 standard drinks     Types: 1 Cans of beer per week     Frequency: Never    Drug use: No       Allergies:  No Known Allergies      Review of Systems   Review of Systems   Constitutional: Negative for fatigue.    Respiratory: Negative for cough. Gastrointestinal: Positive for abdominal pain, nausea and vomiting. Genitourinary: Negative for difficulty urinating. All other systems reviewed and are negative. Physical Exam     Vitals:    09/10/20 1655 09/10/20 1714 09/10/20 1745   BP: (!) 173/113  (!) 172/101   Pulse: 94  78   Resp: 22  14   Temp: 98.4 °F (36.9 °C)     SpO2: 100% 100% 100%   Weight: 38 kg (83 lb 11.2 oz)     Height: 5' 5\" (1.651 m)       Physical Exam    Nursing notes and vital signs reviewed    Constitutional: Non toxic appearing, moderate distress  Head: Normocephalic, Atraumatic  Eyes: EOMI  Neck: Supple  Cardiovascular: Regular rate and rhythm, no murmurs, rubs, or gallops  Chest: Normal work of breathing and chest excursion bilaterally  Lungs: Clear to ausculation bilaterally  Abdomen: Soft, tender across the upper abdomen with guarding in the epigastrium, non distended, normoactive bowel sounds  Back: No evidence of trauma or deformity  Extremities: No evidence of trauma or deformity, no LE edema  Skin: Warm and dry, normal cap refill  Neuro: Alert and appropriate  Psychiatric: Normal mood and affect      Diagnostic Study Results     Labs -     Recent Results (from the past 12 hour(s))   EKG, 12 LEAD, INITIAL    Collection Time: 09/10/20  5:08 PM   Result Value Ref Range    Ventricular Rate 77 BPM    Atrial Rate 77 BPM    P-R Interval 148 ms    QRS Duration 76 ms    Q-T Interval 390 ms    QTC Calculation (Bezet) 441 ms    Calculated P Axis 52 degrees    Calculated R Axis 16 degrees    Calculated T Axis 36 degrees    Diagnosis       Normal sinus rhythm  Normal ECG  When compared with ECG of 23-AUG-2020 05:09,  Vent.  rate has decreased BY  44 BPM  T wave inversion no longer evident in Inferior leads  Nonspecific T wave abnormality no longer evident in Lateral leads     CBC WITH AUTOMATED DIFF    Collection Time: 09/10/20  5:20 PM   Result Value Ref Range    WBC 9.0 4.6 - 13.2 K/uL    RBC 3.75 (L) 4.20 - 5.30 M/uL    HGB 11.1 (L) 12.0 - 16.0 g/dL    HCT 34.2 (L) 35.0 - 45.0 %    MCV 91.2 74.0 - 97.0 FL    MCH 29.6 24.0 - 34.0 PG    MCHC 32.5 31.0 - 37.0 g/dL    RDW 17.1 (H) 11.6 - 14.5 %    PLATELET 241 (H) 561 - 420 K/uL    MPV 9.1 (L) 9.2 - 11.8 FL    NEUTROPHILS 75 (H) 40 - 73 %    LYMPHOCYTES 19 (L) 21 - 52 %    MONOCYTES 5 3 - 10 %    EOSINOPHILS 1 0 - 5 %    BASOPHILS 0 0 - 2 %    ABS. NEUTROPHILS 6.8 1.8 - 8.0 K/UL    ABS. LYMPHOCYTES 1.7 0.9 - 3.6 K/UL    ABS. MONOCYTES 0.5 0.05 - 1.2 K/UL    ABS. EOSINOPHILS 0.1 0.0 - 0.4 K/UL    ABS. BASOPHILS 0.0 0.0 - 0.1 K/UL    DF AUTOMATED     METABOLIC PANEL, COMPREHENSIVE    Collection Time: 09/10/20  5:20 PM   Result Value Ref Range    Sodium 134 (L) 136 - 145 mmol/L    Potassium 4.2 3.5 - 5.5 mmol/L    Chloride 105 100 - 111 mmol/L    CO2 20 (L) 21 - 32 mmol/L    Anion gap 9 3.0 - 18 mmol/L    Glucose 88 74 - 99 mg/dL    BUN 8 7.0 - 18 MG/DL    Creatinine 1.01 0.6 - 1.3 MG/DL    BUN/Creatinine ratio 8 (L) 12 - 20      GFR est AA >60 >60 ml/min/1.73m2    GFR est non-AA 60 (L) >60 ml/min/1.73m2    Calcium 9.1 8.5 - 10.1 MG/DL    Bilirubin, total 0.4 0.2 - 1.0 MG/DL    ALT (SGPT) 33 13 - 56 U/L    AST (SGOT) 39 (H) 10 - 38 U/L    Alk.  phosphatase 209 (H) 45 - 117 U/L    Protein, total 7.6 6.4 - 8.2 g/dL    Albumin 3.2 (L) 3.4 - 5.0 g/dL    Globulin 4.4 (H) 2.0 - 4.0 g/dL    A-G Ratio 0.7 (L) 0.8 - 1.7     LIPASE    Collection Time: 09/10/20  5:20 PM   Result Value Ref Range    Lipase 482 (H) 73 - 393 U/L   ETHYL ALCOHOL    Collection Time: 09/10/20  5:20 PM   Result Value Ref Range    ALCOHOL(ETHYL),SERUM 36 (H) 0 - 3 MG/DL   CARDIAC PANEL,(CK, CKMB & TROPONIN)    Collection Time: 09/10/20  5:20 PM   Result Value Ref Range    CK - MB <1.0 <3.6 ng/ml    CK-MB Index  0.0 - 4.0 %     CALCULATION NOT PERFORMED WHEN RESULT IS BELOW LINEAR LIMIT    CK 67 26 - 192 U/L    Troponin-I, QT <0.02 0.0 - 0.045 NG/ML   HCG QL SERUM    Collection Time: 09/10/20  5:20 PM   Result Value Ref Range HCG, Ql. Negative NEG         Radiologic Studies -   XR CHEST PORT    (Results Pending)     CT Results  (Last 48 hours)    None        CXR Results  (Last 48 hours)    None          Medications given in the ED-  Medications   morphine injection 4 mg (has no administration in time range)   ondansetron (ZOFRAN) injection 4 mg (has no administration in time range)   sodium chloride 0.9 % bolus infusion 1,000 mL (1,000 mL IntraVENous New Bag 9/10/20 1726)   ondansetron (ZOFRAN) injection 4 mg (4 mg IntraVENous Given 9/10/20 1718)   morphine injection 8 mg (8 mg IntraVENous Given 9/10/20 1720)   famotidine (PF) (PEPCID) injection 20 mg (20 mg IntraVENous Given 9/10/20 1717)         Medical Decision Making   I am the first provider for this patient. I reviewed the vital signs, available nursing notes, past medical history, past surgical history, family history and social history. Vital Signs-Reviewed the patient's vital signs. Pulse Oximetry Analysis - 100% on room air, not hypoxic    Cardiac Monitor:  Rate: 79 bpm  Rhythm: Normal sinus    EKG interpretation: (Preliminary)  EKG read by Dr. Bernard Dumont at 5:17 PM  Normal sinus rhythm at a rate of 77 bpm, MO interval of 148 ms, QRS duration of 76 ms    Records Reviewed: Nursing Notes, Old Medical Records and Previous electrocardiograms    Provider Notes (Medical Decision Making): Dot Lopez is a 39 y.o. female presenting for abdominal pain with nausea and vomiting in the setting of recurrent episodes of pancreatitis. Exam, labs, imaging consistent with another episode of pancreatitis. When asked about the alcohol level patient endorses that she had drank some red wine 2 days ago denies any recent alcohol intake. No evidence of infection or toxicity at this time.   Patient continues to have significant pain and nausea after IV medications and will require further in-hospital management with hospitalist.    Procedures:  Procedures    ED Course:   6:18 PM  Updated patient on all results and plan. All questions answered. Reports that her pain is still out of control so additional pain management was ordered. CONSULT NOTE:   6:36 PM  Dr. Kiko Sewell spoke with Dr. Renzo Gautam   Specialty: Hospitalist  Discussed pt's hx, disposition, and available diagnostic and imaging results over the telephone. Reviewed care plans. Accepts to medical.         Diagnosis and Disposition     Critical Care Time: None    Core Measures:  For Hospitalized Patients:    1. Hospitalization Decision Time:  The decision to hospitalize the patient was made by Dr. Kiko Sewell at 6:15 p.m. on 9/10/2020    2. Aspirin: Aspirin was not given because the patient did not present with a stroke at the time of their Emergency Department evaluation    6:28 PM  Patient is being admitted to the hospital by Dr. Renzo Gautam. The results of their tests and reasons for their admission have been discussed with them and/or available family. They convey agreement and understanding for the need to be admitted and for their admission diagnosis. CONDITIONS ON ADMISSION:  Sepsis is not present at the time of admission. Deep Vein Thrombosis is not present at the time of admission. Thrombosis is not present at the time of admission. Urinary Tract Infection is not present at the time of admission. Pneumonia is not present at the time of admission. MRSA is not present at the time of admission. Wound infection is not present at the time of admission. Pressure Ulcer is not present at the time of admission. CLINICAL IMPRESSION:    1. Alcohol-induced acute pancreatitis without infection or necrosis      _______________________________      Please note that this dictation was completed with [employer], the [employer] voice recognition software. Quite often unanticipated grammatical, syntax, homophones, and other interpretive errors are inadvertently transcribed by the computer software. Please disregard these errors.   Please excuse any errors that have escaped final proofreading.

## 2020-09-10 NOTE — ROUTINE PROCESS
TRANSFER - OUT REPORT: 
 
Verbal report given to JACQUELYN Acuna RN(name) on Nickie Rasmussen  being transferred to [facility](unit) for routine progression of care Report consisted of patients Situation, Background, Assessment and  
Recommendations(SBAR). Information from the following report(s) SBAR, Kardex, ED Summary, [facility] and Recent Results was reviewed with the receiving nurse. Lines:  
Peripheral IV 09/10/20 Left Antecubital (Active) Site Assessment Clean, dry, & intact 09/10/20 1719 Phlebitis Assessment 0 09/10/20 1719 Infiltration Assessment 0 09/10/20 1719 Dressing Status Clean, dry, & intact 09/10/20 1719 Dressing Type Transparent 09/10/20 1719 Opportunity for questions and clarification was provided. Patient transported with: 
 [email or web address]

## 2020-09-10 NOTE — ED NOTES
Report given to [facility]. Per RN, unable to take patient due to elevated blood pressure. Patient has been hypertensive since arrival to ED but states it is only because of her pancreatitis. Patient with no headache, dizziness, lightheadedness, or change in LOC. AOx4. RN Supervisor called and made aware of situation.

## 2020-09-10 NOTE — ED TRIAGE NOTES
Patient with complaints of abdominal pain that started after eating salad last night. Patient with a history of pancreatitis.

## 2020-09-10 NOTE — ROUTINE PROCESS
1950- TRANSFER - IN REPORT: 
 
Verbal report received from DEAN Mo(name) on Rafa Darling  being received from ED(unit) for routine progression of care Report consisted of patients Situation, Background, Assessment and  
Recommendations(SBAR). Information from the following report(s) SBAR, Kardex, ED Summary, [facility] and Recent Results was reviewed with the receiving nurse. Opportunity for questions and clarification was provided. Assessment completed upon patients arrival to unit and care assumed.

## 2020-09-11 LAB
ALBUMIN SERPL-MCNC: 2.4 G/DL (ref 3.4–5)
ALBUMIN/GLOB SERPL: 0.7 {RATIO} (ref 0.8–1.7)
ALP SERPL-CCNC: 190 U/L (ref 45–117)
ALT SERPL-CCNC: 37 U/L (ref 13–56)
ANION GAP SERPL CALC-SCNC: 8 MMOL/L (ref 3–18)
AST SERPL-CCNC: 67 U/L (ref 10–38)
ATRIAL RATE: 77 BPM
BILIRUB SERPL-MCNC: 0.7 MG/DL (ref 0.2–1)
BUN SERPL-MCNC: 5 MG/DL (ref 7–18)
BUN/CREAT SERPL: 6 (ref 12–20)
CALCIUM SERPL-MCNC: 8.1 MG/DL (ref 8.5–10.1)
CALCULATED P AXIS, ECG09: 52 DEGREES
CALCULATED R AXIS, ECG10: 16 DEGREES
CALCULATED T AXIS, ECG11: 36 DEGREES
CHLORIDE SERPL-SCNC: 112 MMOL/L (ref 100–111)
CHOLEST SERPL-MCNC: 187 MG/DL
CO2 SERPL-SCNC: 19 MMOL/L (ref 21–32)
CREAT SERPL-MCNC: 0.83 MG/DL (ref 0.6–1.3)
DIAGNOSIS, 93000: NORMAL
ERYTHROCYTE [DISTWIDTH] IN BLOOD BY AUTOMATED COUNT: 17.3 % (ref 11.6–14.5)
GLOBULIN SER CALC-MCNC: 3.5 G/DL (ref 2–4)
GLUCOSE SERPL-MCNC: 74 MG/DL (ref 74–99)
HCT VFR BLD AUTO: 31.4 % (ref 35–45)
HDLC SERPL-MCNC: 54 MG/DL (ref 40–60)
HDLC SERPL: 3.5 {RATIO} (ref 0–5)
HGB BLD-MCNC: 10.2 G/DL (ref 12–16)
LDLC SERPL CALC-MCNC: 106.6 MG/DL (ref 0–100)
LIPASE SERPL-CCNC: 171 U/L (ref 73–393)
LIPID PROFILE,FLP: ABNORMAL
MCH RBC QN AUTO: 29.3 PG (ref 24–34)
MCHC RBC AUTO-ENTMCNC: 32.5 G/DL (ref 31–37)
MCV RBC AUTO: 90.2 FL (ref 74–97)
P-R INTERVAL, ECG05: 148 MS
PLATELET # BLD AUTO: 375 K/UL (ref 135–420)
PMV BLD AUTO: 8.8 FL (ref 9.2–11.8)
POTASSIUM SERPL-SCNC: 4.3 MMOL/L (ref 3.5–5.5)
PROT SERPL-MCNC: 5.9 G/DL (ref 6.4–8.2)
Q-T INTERVAL, ECG07: 390 MS
QRS DURATION, ECG06: 76 MS
QTC CALCULATION (BEZET), ECG08: 441 MS
RBC # BLD AUTO: 3.48 M/UL (ref 4.2–5.3)
SODIUM SERPL-SCNC: 139 MMOL/L (ref 136–145)
TRIGL SERPL-MCNC: 132 MG/DL (ref ?–150)
VENTRICULAR RATE, ECG03: 77 BPM
VLDLC SERPL CALC-MCNC: 26.4 MG/DL
WBC # BLD AUTO: 5.2 K/UL (ref 4.6–13.2)

## 2020-09-11 PROCEDURE — 65270000029 HC RM PRIVATE

## 2020-09-11 PROCEDURE — 83690 ASSAY OF LIPASE: CPT

## 2020-09-11 PROCEDURE — 74011250636 HC RX REV CODE- 250/636: Performed by: HOSPITALIST

## 2020-09-11 PROCEDURE — 74011250637 HC RX REV CODE- 250/637: Performed by: INTERNAL MEDICINE

## 2020-09-11 PROCEDURE — 36415 COLL VENOUS BLD VENIPUNCTURE: CPT

## 2020-09-11 PROCEDURE — 85027 COMPLETE CBC AUTOMATED: CPT

## 2020-09-11 PROCEDURE — 74011250636 HC RX REV CODE- 250/636: Performed by: INTERNAL MEDICINE

## 2020-09-11 PROCEDURE — 80053 COMPREHEN METABOLIC PANEL: CPT

## 2020-09-11 PROCEDURE — 74011250637 HC RX REV CODE- 250/637: Performed by: HOSPITALIST

## 2020-09-11 RX ORDER — HYDROMORPHONE HYDROCHLORIDE 1 MG/ML
1 INJECTION, SOLUTION INTRAMUSCULAR; INTRAVENOUS; SUBCUTANEOUS
Status: DISCONTINUED | OUTPATIENT
Start: 2020-09-11 | End: 2020-09-12

## 2020-09-11 RX ORDER — AMLODIPINE BESYLATE 5 MG/1
10 TABLET ORAL DAILY
Status: DISCONTINUED | OUTPATIENT
Start: 2020-09-12 | End: 2020-09-13 | Stop reason: HOSPADM

## 2020-09-11 RX ADMIN — ESCITALOPRAM OXALATE 20 MG: 10 TABLET ORAL at 08:47

## 2020-09-11 RX ADMIN — SODIUM CHLORIDE 150 ML/HR: 900 INJECTION, SOLUTION INTRAVENOUS at 10:01

## 2020-09-11 RX ADMIN — MORPHINE SULFATE 2 MG: 2 INJECTION, SOLUTION INTRAMUSCULAR; INTRAVENOUS at 13:45

## 2020-09-11 RX ADMIN — DIPHENHYDRAMINE HYDROCHLORIDE 25 MG: 50 INJECTION, SOLUTION INTRAMUSCULAR; INTRAVENOUS at 23:29

## 2020-09-11 RX ADMIN — HYDROMORPHONE HYDROCHLORIDE 1 MG: 1 INJECTION, SOLUTION INTRAMUSCULAR; INTRAVENOUS; SUBCUTANEOUS at 20:33

## 2020-09-11 RX ADMIN — METOCLOPRAMIDE HYDROCHLORIDE 5 MG: 5 SOLUTION ORAL at 11:48

## 2020-09-11 RX ADMIN — DIPHENHYDRAMINE HYDROCHLORIDE 25 MG: 50 INJECTION, SOLUTION INTRAMUSCULAR; INTRAVENOUS at 17:51

## 2020-09-11 RX ADMIN — MORPHINE SULFATE 2 MG: 2 INJECTION, SOLUTION INTRAMUSCULAR; INTRAVENOUS at 03:05

## 2020-09-11 RX ADMIN — AMLODIPINE BESYLATE 5 MG: 5 TABLET ORAL at 08:47

## 2020-09-11 RX ADMIN — SODIUM CHLORIDE 150 ML/HR: 900 INJECTION, SOLUTION INTRAVENOUS at 17:16

## 2020-09-11 RX ADMIN — MORPHINE SULFATE 2 MG: 2 INJECTION, SOLUTION INTRAMUSCULAR; INTRAVENOUS at 17:52

## 2020-09-11 RX ADMIN — DIPHENHYDRAMINE HYDROCHLORIDE 25 MG: 50 INJECTION, SOLUTION INTRAMUSCULAR; INTRAVENOUS at 08:48

## 2020-09-11 RX ADMIN — METOCLOPRAMIDE HYDROCHLORIDE 5 MG: 5 SOLUTION ORAL at 16:34

## 2020-09-11 RX ADMIN — SUCRALFATE 1 G: 1 TABLET ORAL at 21:51

## 2020-09-11 RX ADMIN — MORPHINE SULFATE 2 MG: 2 INJECTION, SOLUTION INTRAMUSCULAR; INTRAVENOUS at 08:47

## 2020-09-11 RX ADMIN — METOCLOPRAMIDE HYDROCHLORIDE 5 MG: 5 SOLUTION ORAL at 21:51

## 2020-09-11 RX ADMIN — RIVAROXABAN 20 MG: 10 TABLET, FILM COATED ORAL at 08:47

## 2020-09-11 RX ADMIN — SUCRALFATE 1 G: 1 TABLET ORAL at 06:32

## 2020-09-11 RX ADMIN — PANTOPRAZOLE SODIUM 40 MG: 40 TABLET, DELAYED RELEASE ORAL at 06:32

## 2020-09-11 RX ADMIN — DIPHENHYDRAMINE HYDROCHLORIDE 25 MG: 50 INJECTION, SOLUTION INTRAMUSCULAR; INTRAVENOUS at 03:05

## 2020-09-11 RX ADMIN — METOCLOPRAMIDE HYDROCHLORIDE 5 MG: 5 SOLUTION ORAL at 06:32

## 2020-09-11 RX ADMIN — SUCRALFATE 1 G: 1 TABLET ORAL at 11:48

## 2020-09-11 RX ADMIN — SUCRALFATE 1 G: 1 TABLET ORAL at 16:34

## 2020-09-11 RX ADMIN — SODIUM CHLORIDE 150 ML/HR: 900 INJECTION, SOLUTION INTRAVENOUS at 21:58

## 2020-09-11 RX ADMIN — TRAZODONE HYDROCHLORIDE 100 MG: 100 TABLET ORAL at 21:52

## 2020-09-11 NOTE — PROGRESS NOTES
Hospitalist Progress Note    Patient: Suha De Leon MRN: [id number]  CSN: [id number]    YOB: 1975  Age: 39 y.o. Sex: female    DOA: 9/10/2020 LOS:  LOS: 1 day                Assessment/Plan     Patient Active Problem List   Diagnosis Code    Status post gastric bypass for obesity Z98.84    Hypertension I10    Anemia D64.9    Anxiety F41.9    GERD (gastroesophageal reflux disease) K21.9    Smoker F17.200    Portal vein thrombosis I81    Gastritis and duodenitis K29.90    Acute on chronic pancreatitis (HCC) K85.90, K86.1    Pancreatitis K85.90    Nausea & vomiting R11.2    Elevated liver enzymes R74.8    Pancreatitis, alcoholic, acute D63.40            39 y.o. female with hypertension, recurrent pancreatitis, GERD, gastric bypass surgery, portal vein thrombosis presents to ER with concerns of abdominal pain. Still with pain but better    Acute pancreatitis-  Continue with IVF, clear liquid diet. If she is tolerating clear liquid diet then can advance to bariatric diet. Lipase in normal range. PVT -  Continue with xarelto     HTN -  Continue with home meds, monitor blood pressure.     GERD-  Continue with sucralfate, reglan     History of gastric bypass    Disposition : 1-2 days    Review of systems  General: No fevers or chills. Cardiovascular: No chest pain or pressure. No palpitations. Pulmonary: No shortness of breath. Gastrointestinal: No nausea, vomiting. Physical Exam:  General: Awake, cooperative, no acute distress    HEENT: NC, Atraumatic. PERRLA, anicteric sclerae. Lungs: CTA Bilaterally. No Wheezing/Rhonchi/Rales. Heart:  S1 S2,  No murmur, No Rubs, No Gallops  Abdomen: Soft, Non distended, epigastric tender.  +Bowel sounds,   Extremities: No c/c/e  Psych:   Not anxious or agitated. Neurologic:  No acute neurological deficit.            Vital signs/Intake and Output:  Visit Vitals  BP (!) 138/95   Pulse 93   Temp 98.3 °F (36.8 °C)   Resp 16   Ht 5' 5\" (1.651 m)   Wt 82.1 kg (181 lb)   SpO2 100%   BMI 30.12 kg/m²     Current Shift:  09/11 0701 - 09/11 1900  In: 900 [I.V.:900]  Out: -   Last three shifts:  09/09 1901 - 09/11 0700  In: 2502.5 [I.V.:2502.5]  Out: 1700 [Urine:1700]            Labs: Results:       Chemistry Recent Labs     09/11/20  0500 09/10/20  1720   GLU 74 88    134*   K 4.3 4.2   * 105   CO2 19* 20*   BUN 5* 8   CREA 0.83 1.01   CA 8.1* 9.1   AGAP 8 9   BUCR 6* 8*   * 209*   TP 5.9* 7.6   ALB 2.4* 3.2*   GLOB 3.5 4.4*   AGRAT 0.7* 0.7*      CBC w/Diff Recent Labs     09/11/20  0500 09/10/20  1720   WBC 5.2 9.0   RBC 3.48* 3.75*   HGB 10.2* 11.1*   HCT 31.4* 34.2*    451*   GRANS  --  75*   LYMPH  --  19*   EOS  --  1      Cardiac Enzymes Recent Labs     09/10/20  1720   CPK 67   CKND1 CALCULATION NOT PERFORMED WHEN RESULT IS BELOW LINEAR LIMIT      Coagulation No results for input(s): PTP, INR, APTT, INREXT in the last 72 hours. Lipid Panel Lab Results   Component Value Date/Time    Cholesterol, total 187 09/10/2020 05:20 PM    HDL Cholesterol 54 09/10/2020 05:20 PM    LDL, calculated 106.6 (H) 09/10/2020 05:20 PM    VLDL, calculated 26.4 09/10/2020 05:20 PM    Triglyceride 132 09/10/2020 05:20 PM    CHOL/HDL Ratio 3.5 09/10/2020 05:20 PM      BNP No results for input(s): BNPP in the last 72 hours.    Liver Enzymes Recent Labs     09/11/20  0500   TP 5.9*   ALB 2.4*   *      Thyroid Studies Lab Results   Component Value Date/Time    TSH 2.18 06/29/2020 12:35 AM        Procedures/imaging: see electronic medical records for all procedures/Xrays and details which were not copied into this note but were reviewed prior to creation of Plan

## 2020-09-11 NOTE — H&P
History & Physical    Patient: Rafa Darling MRN: [id number]  CSN: [id number]    YOB: 1975  Age: 39 y.o. Sex: female      DOA: 9/10/2020  Primary Care Provider:  David Red NP      Assessment/Plan     Patient Active Problem List   Diagnosis Code    Status post gastric bypass for obesity Z98.84    Hypertension I10    Anemia D64.9    Anxiety F41.9    GERD (gastroesophageal reflux disease) K21.9    Smoker F17.200    Portal vein thrombosis I81    Gastritis and duodenitis K29.90    Acute on chronic pancreatitis (HCC) K85.90, K86.1    Pancreatitis K85.90    Nausea & vomiting R11.2    Elevated liver enzymes R74.8    Pancreatitis, alcoholic, acute R41.56       Admit to medical floor    Acute pancreatitis -  Patient has h/o alcohol   Patient not to have high cholesterol / high TG   Check triglyceride level   Clear liquids, Would not advance diet until painfree off narcotics   Increase IV Fluids to 150cc/hr   Serial  lipase and LFTs   Strict I & O and daily wts. & monitor for ARF   O2 sats Q4 hrs and notify MD if <92%, and monitor for ARDS   Close monitoring of VS   Incentive spirometer Q2hrs. While awake   Analgesics and antiemetics prn   If persistant N/V consider NGTube to LIWS     Counseled on alcohol use    PVT -  Continue with xarelto    HTN -  Continue with home meds, monitor blood pressure. GERD-  Continue with sucralfate, reglan    History of gastric bypass          Estimated length of stay : 2-3 days    CC: abdominal pain       HPI:     Rafa Darling is a 39 y.o. female with hypertension, recurrent pancreatitis, GERD, gastric bypass surgery, portal vein thrombosis presents to ER with concerns of abdominal pain. Patient reports that she had wine yesterday. She started having abdominal pain after eating salad today. She took sucralfate however her pain continues to get worse. Initially she thought she had heartburn.   However her pain did not ease up and she presented to ER.  She tried to drink water and had vomiting. She denies any chest pain, shortness of breath, fever, chills. In ER she is noted to have elevated lipase, she is being admitted for pancreatitis. Past Medical History:   Diagnosis Date    Anemia     C. difficile diarrhea     Cancer ([facility]      history of endometrial cancer    Chronic pain     Diarrhea     GERD (gastroesophageal reflux disease)     Hypertension 2017    Intestinal malabsorption     Psychiatric disorder     Anxiety and depression    Severe obesity with body mass index (BMI) of 35.0 to 39.9 with comorbidity ([facility]     Smoker     smokes 5 cigarettes per day    Status post gastric bypass for obesity     open / librado hermosillo    Syncopal episodes     has plans to see a neurologist     Thromboembolism of internal iliac artery ([facility]     Wears dentures     teeth extraction due to vomiting and vitamin deficiencies       Past Surgical History:   Procedure Laterality Date    HX CHOLECYSTECTOMY      HX GASTRIC BYPASS      gastric bypass    HX GASTRIC BYPASS  2018    HX GI  2017    EGD    HX HYSTERECTOMY      HX OTHER SURGICAL Right     resection of benign breast mass       Family History   Problem Relation Age of Onset    Diabetes Mother        Social History     Socioeconomic History    Marital status: SINGLE     Spouse name: Not on file    Number of children: Not on file    Years of education: Not on file    Highest education level: Not on file   Tobacco Use    Smoking status: Former Smoker     Types: Cigarettes     Last attempt to quit: 2018     Years since quittin.0    Smokeless tobacco: Never Used   Substance and Sexual Activity    Alcohol use: No     Alcohol/week: 1.0 standard drinks     Types: 1 Cans of beer per week     Frequency: Never    Drug use: No    Sexual activity: Not Currently     Birth control/protection: Surgical       Prior to Admission medications    Medication Sig Start Date End Date Taking? Authorizing Provider   rivaroxaban (XARELTO) 20 mg tab tablet Take 1 Tab by mouth daily. 8/31/20   Alexis Pagan MD   metoclopramide (REGLAN) 5 mg/5 mL syrup Take 5 mL by mouth Before breakfast, lunch, dinner and at bedtime for 30 days. 8/31/20 9/30/20  Alexis Pagan MD   sucralfate (CARAFATE) 1 gram tablet Take 1 Tab by mouth Before breakfast, lunch, dinner and at bedtime for 30 days. 8/31/20 9/30/20  Alexis Pagan MD   acetaminophen (Tylenol Extra Strength) 500 mg tablet Take 1,000 mg by mouth every six (6) hours as needed for Pain. Provider, Historical   escitalopram oxalate (LEXAPRO) 20 mg tablet Take 20 mg by mouth daily. Indications: anxiousness associated with depression    Provider, Historical   amLODIPine (NORVASC) 10 mg tablet Take 0.5 Tabs by mouth daily. 12/5/19   Alexis Pagan MD   omeprazole (PRILOSEC OTC) 20 mg tablet Take 1 Tab by mouth daily. 12/5/19   Aelxis Pagan MD   traZODone (DESYREL) 100 mg tablet Take  by mouth nightly. Pt is unsure of the dose for this medication    Provider, Historical   multivitamin (ONE A DAY) tablet Take 1 Tab by mouth daily. Provider, Historical       No Known Allergies    Review of Systems  Gen: No fever, chills, malaise, weight loss/gain. Heent: No headache, rhinorrhea, epistaxis, ear pain, hearing loss, sinus pain, neck pain/stiffness, sore throat. Heart: No chest pain, palpitations, HAYES, pnd, or orthopnea. Resp: No cough, hemoptysis, wheezing and shortness of breath. GI: see above  : No urinary obstruction, dysuria or hematuria. Derm: No rash, new skin lesion or pruritis. Musc/skeletal: no bone or joint complains. Vasc: No edema, cyanosis or claudication. Endo: No heat/cold intolerance, no polyuria,polydipsia or polyphagia. Neuro: No unilateral weakness, numbness, tingling. No seizures. Heme: No easy bruising or bleeding.           Physical Exam:     Physical Exam:  Visit Vitals  BP (!) 163/105 (BP 1 Location: Right arm, BP Patient Position: At rest)   Pulse 82   Temp 98.2 °F (36.8 °C)   Resp 16   Ht 5' 5\" (1.651 m)   Wt 38 kg (83 lb 11.2 oz)   SpO2 100%   BMI 13.93 kg/m²      O2 Device: Room air    Temp (24hrs), Av.3 °F (36.8 °C), Min:98.2 °F (36.8 °C), Max:98.4 °F (36.9 °C)    No intake/output data recorded.  07 - 09/10 1900  In: 1000 [I.V.:1000]  Out: -     General:  Awake, cooperative, no distress. Head:  Normocephalic, without obvious abnormality, atraumatic. Eyes:  Conjunctivae/corneas clear, sclera anicteric, PERRL, EOMs intact. Nose: Nares normal. No drainage or sinus tenderness. Throat: Lips, mucosa, and tongue normal.    Neck: Supple, symmetrical, trachea midline, no adenopathy. Lungs:   Clear to auscultation bilaterally. Heart:   S1, S2, no murmur, click, rub or gallop. Abdomen: Soft, epigastric-tender. Bowel sounds normal. No masses,  No organomegaly. Extremities: Extremities normal, atraumatic, no cyanosis or edema. Capillary refill normal.   Pulses: 2+ and symmetric all extremities. Skin: Skin color pink, turgor normal. No rashes or lesions   Neurologic: CNII-XII intact. No focal motor or sensory deficit.        Labs Reviewed:    CMP:   Lab Results   Component Value Date/Time     (L) 09/10/2020 05:20 PM    K 4.2 09/10/2020 05:20 PM     09/10/2020 05:20 PM    CO2 20 (L) 09/10/2020 05:20 PM    AGAP 9 09/10/2020 05:20 PM    GLU 88 09/10/2020 05:20 PM    BUN 8 09/10/2020 05:20 PM    CREA 1.01 09/10/2020 05:20 PM    GFRAA >60 09/10/2020 05:20 PM    GFRNA 60 (L) 09/10/2020 05:20 PM    CA 9.1 09/10/2020 05:20 PM    ALB 3.2 (L) 09/10/2020 05:20 PM    TP 7.6 09/10/2020 05:20 PM    GLOB 4.4 (H) 09/10/2020 05:20 PM    AGRAT 0.7 (L) 09/10/2020 05:20 PM    ALT 33 09/10/2020 05:20 PM     CBC:   Lab Results   Component Value Date/Time    WBC 9.0 09/10/2020 05:20 PM    HGB 11.1 (L) 09/10/2020 05:20 PM    HCT 34.2 (L) 09/10/2020 05:20 PM     (H) 09/10/2020 05:20 PM     Pancreatic Markers: Lab Results   Component Value Date/Time    LPSE 482 (H) 09/10/2020 05:20 PM         Procedures/imaging: see electronic medical records for all procedures/Xrays and details which were not copied into this note but were reviewed prior to creation of Plan    Please note that this dictation was completed with [employer], the computer voice recognition software. Quite often unanticipated grammatical, syntax, homophones, and other interpretive errors are inadvertently transcribed by the computer software. Please disregard these errors. Please excuse any errors that have escaped final proofreading.         CC: Kacy Signs, NP

## 2020-09-11 NOTE — PROGRESS NOTES
Reason for Admission:                     RUR Score:     19%             PCP: First and Last name:  Ita Velez NP   Name of Practice:    Are you a current patient: Yes/No:    Approximate date of last visit:    Can you participate in a virtual visit if needed:     Do you (patient/family) have any concerns for transition/discharge? Plan for utilizing home health:   tbd    Current Advanced Directive/Advance Care Plan:              Transition of Care Plan:      Chart reviewed, Readmission assessment completed. Pt admitted from home, follow up with PCP after discharge, has upcoming appt with MD Tomlin per pt. Will ask CMS to confirm and place on AVS. Pt lives with her 3 teenaged children, she has family to check on them and to assist her as needed once home. No DME needs identified, CM will continue to follow. Care Management Interventions  PCP Verified by CM: Yes  Transition of Care Consult (CM Consult): Discharge Planning  Current Support Network: Own Home  Confirm Follow Up Transport: Family  Discharge Location  Discharge Placement: Home with family assistance        .

## 2020-09-11 NOTE — PROGRESS NOTES
0045 Received report from [address] [place] [address]. Report included the following information SBAR, Kardex, Intake/Output, MAR and Recent Results. 1150 Pt said that she does have the number to reach Dr. Matthew Standing office so she can get an appt with the Venkata Jo of pt recent /70. Also informed Dr. Medardo Jo that pt says she does not normally take BP meds as her primary care provider took her off. Dr. Medardo Jo advised to keep monitoring and inform her if systolic > 895.    7295 Gave bedside shift report to oncoming nurse Chas Brooke RN. Report included the following information SBAR, Kardex, Intake/Output, MAR and Recent Results.

## 2020-09-12 LAB
ALBUMIN SERPL-MCNC: 2.4 G/DL (ref 3.4–5)
ALBUMIN/GLOB SERPL: 0.7 {RATIO} (ref 0.8–1.7)
ALP SERPL-CCNC: 173 U/L (ref 45–117)
ALT SERPL-CCNC: 25 U/L (ref 13–56)
ANION GAP SERPL CALC-SCNC: 9 MMOL/L (ref 3–18)
AST SERPL-CCNC: 21 U/L (ref 10–38)
BILIRUB SERPL-MCNC: 0.7 MG/DL (ref 0.2–1)
BUN SERPL-MCNC: 4 MG/DL (ref 7–18)
BUN/CREAT SERPL: 5 (ref 12–20)
CALCIUM SERPL-MCNC: 7.9 MG/DL (ref 8.5–10.1)
CHLORIDE SERPL-SCNC: 111 MMOL/L (ref 100–111)
CO2 SERPL-SCNC: 19 MMOL/L (ref 21–32)
CREAT SERPL-MCNC: 0.77 MG/DL (ref 0.6–1.3)
ERYTHROCYTE [DISTWIDTH] IN BLOOD BY AUTOMATED COUNT: 17.3 % (ref 11.6–14.5)
GLOBULIN SER CALC-MCNC: 3.5 G/DL (ref 2–4)
GLUCOSE SERPL-MCNC: 90 MG/DL (ref 74–99)
HCT VFR BLD AUTO: 30.9 % (ref 35–45)
HGB BLD-MCNC: 9.9 G/DL (ref 12–16)
LIPASE SERPL-CCNC: 99 U/L (ref 73–393)
MCH RBC QN AUTO: 29.2 PG (ref 24–34)
MCHC RBC AUTO-ENTMCNC: 32 G/DL (ref 31–37)
MCV RBC AUTO: 91.2 FL (ref 74–97)
PLATELET # BLD AUTO: 346 K/UL (ref 135–420)
PMV BLD AUTO: 8.7 FL (ref 9.2–11.8)
POTASSIUM SERPL-SCNC: 4 MMOL/L (ref 3.5–5.5)
PROT SERPL-MCNC: 5.9 G/DL (ref 6.4–8.2)
RBC # BLD AUTO: 3.39 M/UL (ref 4.2–5.3)
SODIUM SERPL-SCNC: 139 MMOL/L (ref 136–145)
WBC # BLD AUTO: 7.4 K/UL (ref 4.6–13.2)

## 2020-09-12 PROCEDURE — 85027 COMPLETE CBC AUTOMATED: CPT

## 2020-09-12 PROCEDURE — 74011250637 HC RX REV CODE- 250/637: Performed by: INTERNAL MEDICINE

## 2020-09-12 PROCEDURE — 65270000029 HC RM PRIVATE

## 2020-09-12 PROCEDURE — 74011250636 HC RX REV CODE- 250/636: Performed by: HOSPITALIST

## 2020-09-12 PROCEDURE — 83690 ASSAY OF LIPASE: CPT

## 2020-09-12 PROCEDURE — 74011250637 HC RX REV CODE- 250/637: Performed by: HOSPITALIST

## 2020-09-12 PROCEDURE — 80053 COMPREHEN METABOLIC PANEL: CPT

## 2020-09-12 PROCEDURE — 36415 COLL VENOUS BLD VENIPUNCTURE: CPT

## 2020-09-12 PROCEDURE — 74011250636 HC RX REV CODE- 250/636: Performed by: INTERNAL MEDICINE

## 2020-09-12 RX ORDER — HYDROCODONE BITARTRATE AND ACETAMINOPHEN 5; 325 MG/1; MG/1
1-2 TABLET ORAL
Status: DISCONTINUED | OUTPATIENT
Start: 2020-09-12 | End: 2020-09-13 | Stop reason: HOSPADM

## 2020-09-12 RX ADMIN — DIPHENHYDRAMINE HYDROCHLORIDE 25 MG: 50 INJECTION, SOLUTION INTRAMUSCULAR; INTRAVENOUS at 06:46

## 2020-09-12 RX ADMIN — SUCRALFATE 1 G: 1 TABLET ORAL at 21:04

## 2020-09-12 RX ADMIN — HYDROMORPHONE HYDROCHLORIDE 1 MG: 1 INJECTION, SOLUTION INTRAMUSCULAR; INTRAVENOUS; SUBCUTANEOUS at 14:43

## 2020-09-12 RX ADMIN — RIVAROXABAN 20 MG: 10 TABLET, FILM COATED ORAL at 08:58

## 2020-09-12 RX ADMIN — ESCITALOPRAM OXALATE 20 MG: 10 TABLET ORAL at 08:58

## 2020-09-12 RX ADMIN — HYDROMORPHONE HYDROCHLORIDE 1 MG: 1 INJECTION, SOLUTION INTRAMUSCULAR; INTRAVENOUS; SUBCUTANEOUS at 02:38

## 2020-09-12 RX ADMIN — METOCLOPRAMIDE HYDROCHLORIDE 5 MG: 5 SOLUTION ORAL at 06:39

## 2020-09-12 RX ADMIN — SUCRALFATE 1 G: 1 TABLET ORAL at 11:09

## 2020-09-12 RX ADMIN — HYDROMORPHONE HYDROCHLORIDE 1 MG: 1 INJECTION, SOLUTION INTRAMUSCULAR; INTRAVENOUS; SUBCUTANEOUS at 11:09

## 2020-09-12 RX ADMIN — AMLODIPINE BESYLATE 10 MG: 5 TABLET ORAL at 08:58

## 2020-09-12 RX ADMIN — PANTOPRAZOLE SODIUM 40 MG: 40 TABLET, DELAYED RELEASE ORAL at 06:39

## 2020-09-12 RX ADMIN — TRAZODONE HYDROCHLORIDE 100 MG: 100 TABLET ORAL at 21:04

## 2020-09-12 RX ADMIN — SUCRALFATE 1 G: 1 TABLET ORAL at 17:31

## 2020-09-12 RX ADMIN — DIPHENHYDRAMINE HYDROCHLORIDE 25 MG: 50 INJECTION, SOLUTION INTRAMUSCULAR; INTRAVENOUS at 14:43

## 2020-09-12 RX ADMIN — METOCLOPRAMIDE HYDROCHLORIDE 5 MG: 5 SOLUTION ORAL at 11:09

## 2020-09-12 RX ADMIN — METOCLOPRAMIDE HYDROCHLORIDE 5 MG: 5 SOLUTION ORAL at 21:04

## 2020-09-12 RX ADMIN — SUCRALFATE 1 G: 1 TABLET ORAL at 06:39

## 2020-09-12 RX ADMIN — HYDROMORPHONE HYDROCHLORIDE 1 MG: 1 INJECTION, SOLUTION INTRAMUSCULAR; INTRAVENOUS; SUBCUTANEOUS at 06:39

## 2020-09-12 RX ADMIN — SODIUM CHLORIDE 150 ML/HR: 900 INJECTION, SOLUTION INTRAVENOUS at 06:45

## 2020-09-12 RX ADMIN — METOCLOPRAMIDE HYDROCHLORIDE 5 MG: 5 SOLUTION ORAL at 17:31

## 2020-09-12 RX ADMIN — SODIUM CHLORIDE 150 ML/HR: 900 INJECTION, SOLUTION INTRAVENOUS at 21:05

## 2020-09-12 RX ADMIN — DIPHENHYDRAMINE HYDROCHLORIDE 25 MG: 50 INJECTION, SOLUTION INTRAMUSCULAR; INTRAVENOUS at 21:09

## 2020-09-12 RX ADMIN — HYDROCODONE BITARTRATE AND ACETAMINOPHEN 1 TABLET: 5; 325 TABLET ORAL at 19:58

## 2020-09-12 NOTE — PROGRESS NOTES
Hospitalist Progress Note    Patient: Madhu Mcnally MRN: [id number]  CSN: [id number]    YOB: 1975  Age: 39 y.o. Sex: female    DOA: 9/10/2020 LOS:  LOS: 2 days            Assessment/Plan   1. Acute pancreatitis  2. Portal vein thrombosis on anticoagluation  3. HTN  4. GERD    Plan:  - if tolerates diet with out need for IV analgesia can DC today    Addendum 5:34 PM  Had lunch but required IV dilaudid. Can not DC home today  If tolerates dinner & breakfast with out need for IV opiates can DC home tomorrow. Patient Active Problem List   Diagnosis Code    Status post gastric bypass for obesity Z98.84    Hypertension I10    Anemia D64.9    Anxiety F41.9    GERD (gastroesophageal reflux disease) K21.9    Smoker F17.200    Portal vein thrombosis I81    Gastritis and duodenitis K29.90    Acute on chronic pancreatitis (HCC) K85.90, K86.1    Pancreatitis K85.90    Nausea & vomiting R11.2    Elevated liver enzymes R74.8    Pancreatitis, alcoholic, acute T82.46               Subjective:    cc: abdominal pain  Pt reoprts pain is controlled w IV dilaudid  Diet is progressing      REVIEW OF SYSTEMS:  General: No fevers or chills. Cardiovascular: No chest pain or pressure. No palpitations. Pulmonary: No shortness of breath. Gastrointestinal: No nausea, vomiting. Objective:        Vital signs/Intake and Output:  Visit Vitals  /72 (BP 1 Location: Right arm, BP Patient Position: At rest)   Pulse 100   Temp 98.6 °F (37 °C)   Resp 16   Ht 5' 5\" (1.651 m)   Wt 82.1 kg (181 lb)   SpO2 100%   BMI 30.12 kg/m²     Current Shift:  No intake/output data recorded. Last three shifts:  09/10 1901 - 09/12 0700  In: 4242.5 [P.O.:590; I.V.:3652.5]  Out: 3850 [Urine:3850]    Body mass index is 30.12 kg/m².     Physical Exam:  GEN: Alert and oriented times three NAD  EYES: conjunctiva normal, lids with out lesions  HEENT: MMM, No thyromegaly, no lymphadenopathy  HEART: RRR +S1 +S2, no JVD, pulses 2+ distally  LUNGS: CTA B/L no wheezes, rales or rhonchi  ABDOMEN: + BS, soft NT/ND no organomegaly,  No rebound  EXTREMITIES: No edema cyanosis, cap refill normal   SKIN: no rashes or skin breakdown, no nodules, normal turgor  Current Facility-Administered Medications   Medication Dose Route Frequency    amLODIPine (NORVASC) tablet 10 mg  10 mg Oral DAILY    HYDROmorphone (PF) (DILAUDID) injection 1 mg  1 mg IntraVENous Q4H PRN    escitalopram oxalate (LEXAPRO) tablet 20 mg  20 mg Oral DAILY    metoclopramide (REGLAN) 5 mg/5 mL oral syrup 5 mg  5 mg Oral AC&HS    pantoprazole (PROTONIX) tablet 40 mg  40 mg Oral ACB    rivaroxaban (XARELTO) tablet 20 mg  20 mg Oral DAILY WITH BREAKFAST    sucralfate (CARAFATE) tablet 1 g  1 g Oral AC&HS    0.9% sodium chloride infusion  150 mL/hr IntraVENous CONTINUOUS    diphenhydrAMINE (BENADRYL) injection 25 mg  25 mg IntraVENous Q6H PRN    traZODone (DESYREL) tablet 100 mg  100 mg Oral QHS         All the patient's labs over the past 24 hours were reviewed both during my initial daily workflow process and at the time notated as \"note time\" in [facility]. (It is not time stamped separately in this workflow.)  Select labs are listed below.         Labs: Results:       Chemistry Recent Labs     09/12/20  0256 09/11/20  0500 09/10/20  1720   GLU 90 74 88    139 134*   K 4.0 4.3 4.2    112* 105   CO2 19* 19* 20*   BUN 4* 5* 8   CREA 0.77 0.83 1.01   CA 7.9* 8.1* 9.1   AGAP 9 8 9   BUCR 5* 6* 8*   * 190* 209*   TP 5.9* 5.9* 7.6   ALB 2.4* 2.4* 3.2*   GLOB 3.5 3.5 4.4*   AGRAT 0.7* 0.7* 0.7*      CBC w/Diff Recent Labs     09/12/20 0256 09/11/20  0500 09/10/20  1720   WBC 7.4 5.2 9.0   RBC 3.39* 3.48* 3.75*   HGB 9.9* 10.2* 11.1*   HCT 30.9* 31.4* 34.2*    375 451*   GRANS  --   --  75*   LYMPH  --   --  19*   EOS  --   --  1      Cardiac Enzymes Recent Labs     09/10/20  1720   CPK 67   CKND1 CALCULATION NOT PERFORMED WHEN RESULT IS BELOW LINEAR LIMIT          Lipid Panel Lab Results   Component Value Date/Time    Cholesterol, total 187 09/10/2020 05:20 PM    HDL Cholesterol 54 09/10/2020 05:20 PM    LDL, calculated 106.6 (H) 09/10/2020 05:20 PM    VLDL, calculated 26.4 09/10/2020 05:20 PM    Triglyceride 132 09/10/2020 05:20 PM    CHOL/HDL Ratio 3.5 09/10/2020 05:20 PM          Liver Enzymes Recent Labs     09/12/20  0256   TP 5.9*   ALB 2.4*   *      Thyroid Studies Lab Results   Component Value Date/Time    TSH 2.18 06/29/2020 12:35 AM        Procedures/imaging: see electronic medical records for all procedures/Xrays and details which were not copied into this note but were reviewed prior to creation of Tamir Rea DO  Internal Medicine/Geriatrics

## 2020-09-12 NOTE — PROGRESS NOTES
Patient c/o generalized itching. Benadryl 25 mg IV given, which helped. 0697 - Patient c/o pain to abdomen rating at 8/10. Dilaudid 1 mg IV given, which provided relief. 6673 - Patient received Dilaudid 1 mg IV for pain to abdomen rating at 8/10, which provided relief. Lipase 99 this a.m. & patient denied n/v. Advanced patient's diet to full liquid bariatric diet as per Dr. Jalen Burgos note on 9/11/20. Will continue to advance to bariatric diet as patient tolerates. 0730-  Bedside and Verbal shift change report given to [address] (oncoming nurse) by Angie Pizarro (offgoing nurse). Report included the following information SBAR, Kardex, Intake/Output and MAR.

## 2020-09-12 NOTE — PROGRESS NOTES
Shift summary  Received patient,alert and oriented on room air,ambulatory a nd reports abdominal pain rated 10/1.  notified and morphine orders switched to dilaudid. Also notified of elevated blood pressure with current orders to start Norvasc in the morning. Dilaudid given and was effective. No other acute changes in status noted. Patient Vitals for the past 8 hrs:   Temp Pulse BP SpO2   09/11/20 2156 98.9 °F (37.2 °C) 98 126/89 100 %   09/11/20 1926 99 °F (37.2 °C) (!) 102 (!) 147/106 99 %   09/11/20 1757  (!) 102 (!) 145/70        Bedside, Verbal and Written shift change report given to Varun Girard (oncoming nurse) by Elyse Rocha RN   (offgoing nurse). Report included the following information SBAR, Kardex and MAR.

## 2020-09-13 VITALS
WEIGHT: 181 LBS | DIASTOLIC BLOOD PRESSURE: 87 MMHG | HEART RATE: 89 BPM | TEMPERATURE: 99.7 F | HEIGHT: 65 IN | OXYGEN SATURATION: 100 % | SYSTOLIC BLOOD PRESSURE: 139 MMHG | BODY MASS INDEX: 30.16 KG/M2 | RESPIRATION RATE: 18 BRPM

## 2020-09-13 LAB
ALBUMIN SERPL-MCNC: 2 G/DL (ref 3.4–5)
ALBUMIN/GLOB SERPL: 0.6 {RATIO} (ref 0.8–1.7)
ALP SERPL-CCNC: 149 U/L (ref 45–117)
ALT SERPL-CCNC: 16 U/L (ref 13–56)
ANION GAP SERPL CALC-SCNC: 8 MMOL/L (ref 3–18)
AST SERPL-CCNC: 12 U/L (ref 10–38)
BILIRUB SERPL-MCNC: 0.3 MG/DL (ref 0.2–1)
BUN SERPL-MCNC: 4 MG/DL (ref 7–18)
BUN/CREAT SERPL: 6 (ref 12–20)
CALCIUM SERPL-MCNC: 7.7 MG/DL (ref 8.5–10.1)
CHLORIDE SERPL-SCNC: 114 MMOL/L (ref 100–111)
CO2 SERPL-SCNC: 19 MMOL/L (ref 21–32)
CREAT SERPL-MCNC: 0.67 MG/DL (ref 0.6–1.3)
ERYTHROCYTE [DISTWIDTH] IN BLOOD BY AUTOMATED COUNT: 17.7 % (ref 11.6–14.5)
GLOBULIN SER CALC-MCNC: 3.2 G/DL (ref 2–4)
GLUCOSE SERPL-MCNC: 81 MG/DL (ref 74–99)
HCT VFR BLD AUTO: 29.5 % (ref 35–45)
HGB BLD-MCNC: 9.3 G/DL (ref 12–16)
LIPASE SERPL-CCNC: 49 U/L (ref 73–393)
MCH RBC QN AUTO: 29.4 PG (ref 24–34)
MCHC RBC AUTO-ENTMCNC: 31.5 G/DL (ref 31–37)
MCV RBC AUTO: 93.4 FL (ref 74–97)
PLATELET # BLD AUTO: 318 K/UL (ref 135–420)
PMV BLD AUTO: 9.4 FL (ref 9.2–11.8)
POTASSIUM SERPL-SCNC: 4 MMOL/L (ref 3.5–5.5)
PROT SERPL-MCNC: 5.2 G/DL (ref 6.4–8.2)
RBC # BLD AUTO: 3.16 M/UL (ref 4.2–5.3)
SODIUM SERPL-SCNC: 141 MMOL/L (ref 136–145)
WBC # BLD AUTO: 7.5 K/UL (ref 4.6–13.2)

## 2020-09-13 PROCEDURE — 80053 COMPREHEN METABOLIC PANEL: CPT

## 2020-09-13 PROCEDURE — 74011250637 HC RX REV CODE- 250/637: Performed by: HOSPITALIST

## 2020-09-13 PROCEDURE — 83690 ASSAY OF LIPASE: CPT

## 2020-09-13 PROCEDURE — 36415 COLL VENOUS BLD VENIPUNCTURE: CPT

## 2020-09-13 PROCEDURE — 85027 COMPLETE CBC AUTOMATED: CPT

## 2020-09-13 RX ORDER — HYDROCODONE BITARTRATE AND ACETAMINOPHEN 5; 325 MG/1; MG/1
1-2 TABLET ORAL
Qty: 10 TAB | Refills: 0 | Status: SHIPPED | OUTPATIENT
Start: 2020-09-13 | End: 2020-09-16

## 2020-09-13 RX ADMIN — PANTOPRAZOLE SODIUM 40 MG: 40 TABLET, DELAYED RELEASE ORAL at 06:35

## 2020-09-13 RX ADMIN — AMLODIPINE BESYLATE 10 MG: 5 TABLET ORAL at 09:25

## 2020-09-13 RX ADMIN — RIVAROXABAN 20 MG: 10 TABLET, FILM COATED ORAL at 09:25

## 2020-09-13 RX ADMIN — METOCLOPRAMIDE HYDROCHLORIDE 5 MG: 5 SOLUTION ORAL at 06:35

## 2020-09-13 RX ADMIN — SUCRALFATE 1 G: 1 TABLET ORAL at 06:35

## 2020-09-13 RX ADMIN — ESCITALOPRAM OXALATE 20 MG: 10 TABLET ORAL at 09:25

## 2020-09-13 NOTE — DISCHARGE SUMMARY
Discharge Summary  Subjective:       Admit Date: 9/10/2020  Discharge Date:  9/13/2020, 7:17 AM    Discharging Physician: Deangelo Leyva pager [phone]  Primary Care Provider:  Angeline Vides NP    Patient ID:  Mani Payan  39 y.o. female  1975    Reason for Admission:  Pancreatitis, alcoholic, acute [V69.37]     Discharge Diagnosis:   1. Acute pancreatitis  2. Portal vein thrombosis on anticoagluation  3. HTN  4. GERD      Patient Active Problem List   Diagnosis Code    Status post gastric bypass for obesity Z98.84    Hypertension I10    Anemia D64.9    Anxiety F41.9    GERD (gastroesophageal reflux disease) K21.9    Smoker F17.200    Portal vein thrombosis I81    Gastritis and duodenitis K29.90    Acute on chronic pancreatitis (HCC) K85.90, K86.1    Pancreatitis K85.90    Nausea & vomiting R11.2    Elevated liver enzymes R74.8    Pancreatitis, alcoholic, acute B19.19       Consultants:    none    Hospital Course:   Reason for admission ( Admitting HPI): \" Mani Payan is a 39 y.o. female with hypertension, recurrent pancreatitis, GERD, gastric bypass surgery, portal vein thrombosis presents to ER with concerns of abdominal pain. Patient reports that she had wine yesterday. She started having abdominal pain after eating salad today. She took sucralfate however her pain continues to get worse. Initially she thought she had heartburn. However her pain did not ease up and she presented to ER. She tried to drink water and had vomiting. She denies any chest pain, shortness of breath, fever, chills. In ER she is noted to have elevated lipase, she is being admitted for pancreatitis\"    She was started on bowel rest, fluids and analgesia given. She had improvemetn in her pain and her idet was advanced and she tolerated. She is stable for DC home w outpt follow up. She has appointments w GI and bariatric surgery this week.          Pertinent Results:    BMP:   Lab Results   Component Value Date/Time     09/13/2020 04:10 AM    K 4.0 09/13/2020 04:10 AM     (H) 09/13/2020 04:10 AM    CO2 19 (L) 09/13/2020 04:10 AM    AGAP 8 09/13/2020 04:10 AM    GLU 81 09/13/2020 04:10 AM    BUN 4 (L) 09/13/2020 04:10 AM    CREA 0.67 09/13/2020 04:10 AM    GFRAA >60 09/13/2020 04:10 AM    GFRNA >60 09/13/2020 04:10 AM     Pancreatic Markers:   Lab Results   Component Value Date/Time    LPSE 49 (L) 09/13/2020 04:10 AM         Physical Exam on Discharge:  Visit Vitals  /87 (BP 1 Location: Right arm, BP Patient Position: At rest)   Pulse 89   Temp 99.7 °F (37.6 °C)   Resp 18   Ht 5' 5\" (1.651 m)   Wt 82.1 kg (181 lb)   SpO2 100%   BMI 30.12 kg/m²     Body mass index is 30.12 kg/m². GEN: NAD  HEART: S1 S2   NEURO: nonfocal   Condition at discharge: stable    Discharge Medications  Current Discharge Medication List      START taking these medications    Details   HYDROcodone-acetaminophen (NORCO) 5-325 mg per tablet Take 1-2 Tabs by mouth every four (4) hours as needed for Pain for up to 3 days. Max Daily Amount: 12 Tabs. Qty: 10 Tab, Refills: 0    Associated Diagnoses: Alcohol-induced acute pancreatitis with infected necrosis         CONTINUE these medications which have NOT CHANGED    Details   rivaroxaban (XARELTO) 20 mg tab tablet Take 1 Tab by mouth daily. Qty: 30 Tab, Refills: 0      metoclopramide (REGLAN) 5 mg/5 mL syrup Take 5 mL by mouth Before breakfast, lunch, dinner and at bedtime for 30 days. Qty: 600 mL, Refills: 0      sucralfate (CARAFATE) 1 gram tablet Take 1 Tab by mouth Before breakfast, lunch, dinner and at bedtime for 30 days. Qty: 120 Tab, Refills: 0      acetaminophen (Tylenol Extra Strength) 500 mg tablet Take 1,000 mg by mouth every six (6) hours as needed for Pain.      escitalopram oxalate (LEXAPRO) 20 mg tablet Take 20 mg by mouth daily. Indications: anxiousness associated with depression      omeprazole (PRILOSEC OTC) 20 mg tablet Take 1 Tab by mouth daily.   Qty: 30 Tab, Refills: 0      traZODone (DESYREL) 100 mg tablet Take  by mouth nightly. Pt is unsure of the dose for this medication      multivitamin (ONE A DAY) tablet Take 1 Tab by mouth daily. amLODIPine (NORVASC) 10 mg tablet Take 0.5 Tabs by mouth daily.   Qty: 30 Tab, Refills: 0             Disposition: home   Follow up:  pcp  Restrictions: none    Diagnostic Imaging/Labs pending at discharge: none      Aristeo Talley, [address] [facility]  Internal Medicine/Geriatrics    Time Spent 40 minutes with >50% coordination of care          CC: Jim Kiran, NP

## 2020-09-13 NOTE — PROGRESS NOTES
Problem: Falls - Risk of  Goal: *Absence of Falls  Description: Document Quinton Patino Fall Risk and appropriate interventions in the flowsheet.   Outcome: Resolved/Met     Problem: Nausea/Vomiting (Adult)  Goal: *Absence of nausea/vomiting  Outcome: Resolved/Met  Goal: *Palliation of nausea/vomiting (Palliative Care)  Outcome: Resolved/Met

## 2020-09-13 NOTE — DISCHARGE INSTRUCTIONS
Patient Education        Pancreatitis: Care Instructions  Your Care Instructions     The pancreas is an organ behind the stomach. It makes hormones and enzymes to help your body digest food. But if these enzymes attack the pancreas, it can get inflamed. This is called pancreatitis. Most cases are caused by gallstones or by heavy alcohol use. If you take care of yourself at home, it will help you get better. It will also help you avoid more problems with your pancreas. Follow-up care is a key part of your treatment and safety. Be sure to make and go to all appointments, and call your doctor if you are having problems. It's also a good idea to know your test results and keep a list of the medicines you take. How can you care for yourself at home? · Drink clear liquids and eat bland foods until you feel better. Hayes foods include rice, dry toast, and crackers. They also include bananas and applesauce. · Eat a low-fat diet until your doctor says your pancreas is healed. · Do not drink alcohol. Tell your doctor if you need help to quit. Counseling, support groups, and sometimes medicines can help you stay sober. · Be safe with medicines. Read and follow all instructions on the label. ? If the doctor gave you a prescription medicine for pain, take it as prescribed. ? If you are not taking a prescription pain medicine, ask your doctor if you can take an over-the-counter medicine. · If your doctor prescribed antibiotics, take them as directed. Do not stop taking them just because you feel better. You need to take the full course of antibiotics. · Get extra rest until you feel better. To prevent future problems with your pancreas  · Do not drink alcohol. · Tell your doctors and pharmacist that you've had pancreatitis. They can help you avoid medicines that may cause this problem again. When should you call for help? Call 911 anytime you think you may need emergency care.  For example, call if:    · You vomit blood or what looks like coffee grounds.     · Your stools are maroon or very bloody. Call your doctor now or seek immediate medical care if:    · You have new or worse belly pain.     · Your stools are black and look like tar, or they have streaks of blood.     · You are vomiting. Watch closely for changes in your health, and be sure to contact your doctor if:    · You do not get better as expected. Where can you learn more? Go to http://sandra-abel.info/  Enter J381 in the search box to learn more about \"Pancreatitis: Care Instructions. \"  Current as of: April 15, 2020               Content Version: 12.6  © 1608-5686 [employer]. Care instructions adapted under license by [employer] (which disclaims liability or warranty for this information). If you have questions about a medical condition or this instruction, always ask your healthcare professional. [address] any warranty or liability for your use of this information.

## 2020-09-13 NOTE — PROGRESS NOTES
2145- Bedside and Verbal shift change report given to [address] (oncoming nurse) by Rufina Morris (offgoing nurse). Report included the following information SBAR, Kardex, Intake/Output, MAR and Recent Results. Shift Summary- Pt received PRN hydrocodone x1 for pain rated 5/10 to abdomen. VS stable overnight. 0710- Bedside and Verbal shift change report given to Rufina Morris (oncoming nurse) by [address] (offgoing nurse). Report included the following information SBAR, Kardex, Intake/Output, MAR and Recent Results.

## 2020-09-14 ENCOUNTER — OFFICE VISIT (OUTPATIENT)
Dept: SURGERY | Age: 45
End: 2020-09-14

## 2020-09-14 ENCOUNTER — PATIENT OUTREACH (OUTPATIENT)
Dept: CASE MANAGEMENT | Age: 45
End: 2020-09-14

## 2020-09-14 VITALS
WEIGHT: 190 LBS | BODY MASS INDEX: 31.65 KG/M2 | OXYGEN SATURATION: 99 % | HEART RATE: 80 BPM | DIASTOLIC BLOOD PRESSURE: 76 MMHG | SYSTOLIC BLOOD PRESSURE: 135 MMHG | HEIGHT: 65 IN | TEMPERATURE: 98.4 F

## 2020-09-14 DIAGNOSIS — I81 PORTAL VEIN THROMBOSIS: ICD-10-CM

## 2020-09-14 DIAGNOSIS — K85.90 ACUTE PANCREATITIS, UNSPECIFIED COMPLICATION STATUS, UNSPECIFIED PANCREATITIS TYPE: Primary | ICD-10-CM

## 2020-09-14 RX ORDER — SUCRALFATE 1 G/1
1 TABLET ORAL
Qty: 120 TAB | Refills: 5 | Status: SHIPPED | OUTPATIENT
Start: 2020-09-14 | End: 2021-03-13

## 2020-09-14 NOTE — PROGRESS NOTES
2 years follow-up / in-office encounter    Subjective:     Hansa Banks  is a 39 y.o. female who presents for follow-up about 2 years following removal of eroded intragastric silastic band from a [place] gastric bypass. She has lost a total of 46 pounds since surgery. Body mass index is 31.62 kg/m². . EBWL is (45%). The patient presents today to assess their progress   toward their goal of weight loss and to address any issues that may be present. Today the patient and I have reviewed their diet and how appropriate their   food choices are. The following issues have been identified - discharged from 3 day hospital admission for the following; Admit Date: 9/10/2020  Discharge Date:  9/13/2020, 7:17 AM     Discharging Physician: Bobbi Aviles pager [phone]  Primary Care Provider:  Lila Neville NP     Patient ID:  Hansa Banks  39 y.o. female  1975     Reason for Admission:  Pancreatitis, alcoholic, acute [H16.46]      Discharge Diagnosis:   1.  Acute pancreatitis  2. Portal vein thrombosis on anticoagluation  3. HTN  4. GERD    Pain assessment - 0/10  . Surgery related complication: NA - did require EGD / dilation by Dr. Jay Shore last month       She reports dysphagia and denies abdominal pain. The patient's exercise level: not active. Changes in her medical history and medications have been reviewed.     Patient Active Problem List   Diagnosis Code    Status post gastric bypass for obesity Z98.84    Hypertension I10    Anemia D64.9    Anxiety F41.9    GERD (gastroesophageal reflux disease) K21.9    Smoker F17.200    Portal vein thrombosis I81    Gastritis and duodenitis K29.90    Acute on chronic pancreatitis (HCC) K85.90, K86.1    Pancreatitis K85.90    Nausea & vomiting R11.2    Elevated liver enzymes R74.8    Pancreatitis, alcoholic, acute E30.37     Past Medical History:   Diagnosis Date    Anemia     C. difficile diarrhea     Cancer ([facility] [address] 2011     history of endometrial cancer    Chronic pain     Diarrhea     GERD (gastroesophageal reflux disease)     Hypertension 2017    Intestinal malabsorption     Psychiatric disorder     Anxiety and depression    Severe obesity with body mass index (BMI) of 35.0 to 39.9 with comorbidity ([facility] [address]     Smoker     smokes 5 cigarettes per day    Status post gastric bypass for obesity 2000    open / [place]    Syncopal episodes     has plans to see a neurologist     Thromboembolism of internal iliac artery ([facility] [address]     Wears dentures     teeth extraction due to vomiting and vitamin deficiencies     Past Surgical History:   Procedure Laterality Date    HX CHOLECYSTECTOMY      HX GASTRIC BYPASS  2000    gastric bypass    HX GASTRIC BYPASS  09/2018    HX GI  2017    EGD    HX HYSTERECTOMY      HX OTHER SURGICAL Right     resection of benign breast mass     Current Outpatient Medications   Medication Sig Dispense Refill    sucralfate (CARAFATE) 1 gram tablet Take 1 Tab by mouth Before breakfast, lunch, dinner and at bedtime for 180 days. 120 Tab 5    HYDROcodone-acetaminophen (NORCO) 5-325 mg per tablet Take 1-2 Tabs by mouth every four (4) hours as needed for Pain for up to 3 days. Max Daily Amount: 12 Tabs. 10 Tab 0    rivaroxaban (XARELTO) 20 mg tab tablet Take 1 Tab by mouth daily. 30 Tab 0    metoclopramide (REGLAN) 5 mg/5 mL syrup Take 5 mL by mouth Before breakfast, lunch, dinner and at bedtime for 30 days. 600 mL 0    acetaminophen (Tylenol Extra Strength) 500 mg tablet Take 1,000 mg by mouth every six (6) hours as needed for Pain.  escitalopram oxalate (LEXAPRO) 20 mg tablet Take 20 mg by mouth daily. Indications: anxiousness associated with depression      traZODone (DESYREL) 100 mg tablet Take  by mouth nightly. Pt is unsure of the dose for this medication      amLODIPine (NORVASC) 10 mg tablet Take 0.5 Tabs by mouth daily. 30 Tab 0    omeprazole (PRILOSEC OTC) 20 mg tablet Take 1 Tab by mouth daily. 30 Tab 0    multivitamin (ONE A DAY) tablet Take 1 Tab by mouth daily. Review of Symptoms:     General - No history or complaints of unexpected fever or chills  Head/Neck - No history or complaints of headache or dizziness  Cardiac - No history or complaints of chest pain, palpitations, or shortness of breath  Pulmonary - No history or complaints of shortness of breath or productive cough  Gastrointestinal - as noted above  Genitourinary - No history or complaints of hematuria/dysuria or renal lithiasis  Musculoskeletal - No history or complaints of joint  muscular weakness  Hematologic - No history of any bleeding episodes  Neurologic - No history or complaints of  migraine headaches or neurologic symptoms    Objective:     Visit Vitals  /76 (BP 1 Location: Right arm, BP Patient Position: Sitting)   Pulse 80   Temp 98.4 °F (36.9 °C)   Ht 5' 5\" (1.651 m)   Wt 86.2 kg (190 lb)   SpO2 99%   BMI 31.62 kg/m²        Physical Exam:    General:  alert, cooperative, no distress, appears stated age   Lungs:   clear to auscultation bilaterally   Heart:  Regular rate and rhythm   Abdomen:   abdomen is soft without significant tenderness, masses, organomegaly or guarding; Incisions: healing well, no significant drainage         Labs:       Assessment:     1. History of Morbid obesity, status post  prior open gastric bypass via [facility] with need for removal of eroded siliastic band via this office 2 years ago. The patient was recently discharged from the hospital for a diagnosis of pancreatitis as noted above. She has an appt with Dr. Ha Moya in 24 hours. She is to alert him that I would like to be present for her next EGD if possible to help evaluate her gastric anatomy and offer any possible insight into her symptoms . Plan:     1. As above      Addendum: 9/16/2020    I had a conversation with the patient today regarding 2 issues. 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 It was unclear as to whether or not she was on Xarelto but indeed she is currently. This certainly might be a causative factor in her symptoms. Secondly I have confirmed that the patient is undergoing an endoscopy next Wednesday by Dr. Ry Joel and the nurse will page me prior to starting the procedure so I can be there for the procedure if feasible.

## 2020-09-14 NOTE — PROGRESS NOTES
Notice of patient's discharge from THE [facility] 9/13/2020 received for Transition of Care Follow Up & Covid-19 risk information. Upon chart review, there are no increased risk factors for Covid-19 education/information review at this time. Manager for the Care Transitions Team consulted. Patient was not contacted and episode closed.

## 2020-09-16 ENCOUNTER — HOSPITAL ENCOUNTER (OUTPATIENT)
Dept: PREADMISSION TESTING | Age: 45
Discharge: HOME | End: 2020-09-16
Payer: MEDICAID

## 2020-09-16 PROCEDURE — 87635 SARS-COV-2 COVID-19 AMP PRB: CPT

## 2020-09-16 NOTE — PATIENT INSTRUCTIONS
Patient Instructions 1. Continue to monitor carbohydrate and protein intake- remember to keep your           total  carbohydrates to 50 grams or less per day for best results. 2. Remember hydration goals - usually 48 to 64 ounces of liquids per day 3. Continue to work towards exercise goals - minimum 3 days per week of 45          minutes to  1 hour at a time. 4. Remember to take vitamins as directed Supplement Resource Guide Importance of Protein:  
Maintains lean body mass, produces antibodies to fight off infections, heals wounds, minimizes hair loss, helps to give you energy, helps with satiety, and keeping you full between meals. Importance of Calcium: 
Needed for healthy bones and teeth, normal blood clotting, and nervous system functioning, higher risk of osteoporosis and bone disease with non-compliance. Importance of Multivitamins: Many functions. Supply you with extra nutrients that you may be missing from food. May lead to iron deficiency anemia, weakness, fatigue, and many other symptoms with non-compliance. Importance of B Vitamins: 
Important for red blood cell formation, metabolism, energy, and helps to maintain a healthy nervous system. Protein Supplement Find one you like now. Use immediately after surgery. Look for: 
35-50g protein each day from your protein supplement once you reach the progression diet. 0-3 g fat per serving 0-3 g sugar per serving Protein drinks should be split in separate dosages. Recommend: Lifelong 1 year + Calcium Supplement:  
 
Start taking within a month after surgery. Look for: Calcium Citrate Plus D (1500 mg per day) Recommend: Citracal 
 
 . Avoid chocolate chewable calcium. Can use chewable bariatric or [employer] brand or similar chewable. The body cannot absorb more than 500-600 mg @ a time. Take for Life Multi-vitamin Supplement: 1st Month After Surgery: Any complete chewable, such as: [employer]s Complete chewables. Avoid [place] sours or gummies. They lack iron and other important nutrients and also have added sugar. Continue with chewable vitamin or change to adult complete multivitamin one month after surgery. Menstruating women can take a prenatal vitamin. Make sure has at least 18 mg iron and [phone] mcg folic acid): Vitamin B12, B Complex Vitamin, and Biotin Start taking within a month after surgery. Vitamin B12:  1000 mcg of Vitamin B12 three times weekly Must take sublingually (meaning you take it under your tongue) or in a liquid drop form for easy absorption. B Complex Vitamin: Take a pill or liquid drop form once daily. Biotin: This vitamin can help prevent hair loss. Recommend 5mg  
(5000 mcg) a day Biotin is Optional

## 2020-09-17 LAB — SARS-COV-2, COV2NT: NOT DETECTED

## 2020-09-19 RX ORDER — DEXTROMETHORPHAN/PSEUDOEPHED 2.5-7.5/.8
1.2 DROPS ORAL
Status: CANCELLED | OUTPATIENT
Start: 2020-09-19

## 2020-09-19 RX ORDER — ATROPINE SULFATE 0.1 MG/ML
0.5 INJECTION INTRAVENOUS
Status: CANCELLED | OUTPATIENT
Start: 2020-09-19 | End: 2020-09-20

## 2020-09-19 RX ORDER — SODIUM CHLORIDE 0.9 % (FLUSH) 0.9 %
5-40 SYRINGE (ML) INJECTION EVERY 8 HOURS
Status: CANCELLED | OUTPATIENT
Start: 2020-09-19

## 2020-09-19 RX ORDER — EPINEPHRINE 0.1 MG/ML
1 INJECTION INTRACARDIAC; INTRAVENOUS
Status: CANCELLED | OUTPATIENT
Start: 2020-09-19 | End: 2020-09-20

## 2020-09-19 RX ORDER — SODIUM CHLORIDE 0.9 % (FLUSH) 0.9 %
5-40 SYRINGE (ML) INJECTION AS NEEDED
Status: CANCELLED | OUTPATIENT
Start: 2020-09-19

## 2020-09-21 NOTE — ADT AUTH CERT NOTES
Utilization Reviews DOS 9/12/20 by Lukasz Bradshaw, LAMONTE Review Entered  Review Status 9/21/2020 15:10  In Primary Criteria Review Hospitalist Internal Medicine Progress Notes Date of Service: 09/12/20 1209 Assessment/Plan 1. Acute pancreatitis 2. Portal vein thrombosis on anticoagluation 3. HTN  
4. GERD Plan:  
- if tolerates diet with out need for IV analgesia can DC today Addendum 5:34 PM  
Had lunch but required IV dilaudid. [address] [address] home today If tolerates dinner & breakfast without need for IV opiates can DC home tomorrow. Subjective:  
cc: abdominal pain Pt reoprts pain is controlled w IV dilaudid Diet is progressing Objective:  
Vital signs/Intake and Output:  
/72 (BP 1 Location: Right arm, BP Patient Position: At rest) Pulse 100 Temp 98.6 °F (37 °C) Resp 16 Ht 5' 5\" (1.651 m) Wt 82.1 kg (181 lb) SpO2 100% BMI 30.12 kg/m² Current Shift: No intake/output data recorded. Last three shifts: 09/10 1901 - 09/12 0700 In: 4242.5 [P.O.:590; I.V.:3652.5] Out: [facility] [EYSAT:7724] Body mass index is 30.12 kg/m². Physical Exam:  
GEN: Alert and oriented times three NAD  
EYES: conjunctiva normal, lids with out lesions HEENT: MMM, No thyromegaly, no lymphadenopathy HEART: RRR +S1 +S2, no JVD, pulses 2+ distally LUNGS: CTA B/L no wheezes, rales or rhonchi ABDOMEN: + BS, soft NT/ND no organomegaly, No rebound EXTREMITIES: No edema cyanosis, cap refill normal  
SKIN: no rashes or skin breakdown, no nodules, normal turgor Recent Labs   
 09/12/20  
0256 09/11/20  
0500 09/10/20  
1720 GLU 90 74 88  139 134* K 4.0 4.3 4.2  112* 105 CO2 19* 19* 20* BUN 4* 5* 8   
CREA 0.77 0.83 1.01   
CA 7.9* 8.1* 9.1 AGAP 9 8 9 BUCR 5* 6* 8* * 190* 209* TP 5.9* 5.9* 7.6 ALB 2.4* 2.4* 3.2*   
GLOB 3.5 3.5 4.4* AGRAT 0.7* 0.7* 0.7* Recent Labs   
 09/12/20  
0256 09/11/20  
0500 09/10/20 [address] WBC 7.4 5.2 9.0   
RBC 3.39* 3.48* 3.75* HGB 9.9* 10.2* 11.1*   
HCT 30.9* 31.4* 34.2*   
 375 451* GRANS --  --  75* LYMPH --  --  19* EOS --  --  1 Medications  09/07/20  09/08/20  09/09/20  09/10/20  09/11/20  09/12/20  09/13/20 Completed Medications    
famotidine (PF) (PEPCID) injection 20 mg  
Dose: 20 mg 
Freq: NOW Route: IV Start: 09/10/20 1704 End: 09/10/20 1717      17 (20 mg) Admin Instructions:            
IV Push over minimum of 2 minutes-Dilute w/ 10ml NSS Order specific questions:            
H2RA INDICATION Gastritis Order ID: [id number]           
morphine injection 4 mg Dose: 4 mg Freq: NOW Route: IV Start: 09/10/20 1818 End: 09/10/20 1936      19 (4 mg) Order ID: [id number]           
morphine injection 8 mg Dose: 8 mg Freq: NOW Route: IV Start: 09/10/20 1702 End: 09/10/20 1720      17 (8 mg) Order ID: [id number]           
ondansetron [facility]) injection 4 mg Dose: 4 mg Freq: NOW Route: IV Start: 09/10/20 1818 End: 09/10/20 1935      19 (4 mg) Order ID: [id number]           
ondansetron [facility]) injection 4 mg Dose: 4 mg Freq: NOW Route: IV Start: 09/10/20 1702 End: 09/10/20 1718      17 (4 mg) Order ID: [id number]           
sodium chloride 0.9 % bolus infusion 1,000 mL Dose: 1,000 mL Freq: ONCE Route: IV Start: 09/10/20 1702 End: 09/10/20 1857      17 (1,000 mL) Order ID: [id number]     19 Discontinued Medications Medications  09/07/20  09/08/20  09/09/20  09/10/20  09/11/20  09/12/20  09/13/20    
0.9% sodium chloride infusion Rate: 150 mL/hr Dose: 150 mL/hr Freq: CONTINUOUS Route: IV Start: 09/10/20 2010 End: 09/13/20 1301      20 (150 mL/hr) 
 10 (150 mL/hr) 06 (150 mL/hr) 
    
      17 (150 mL/hr) 
  21 (150 mL/hr) Order ID: [id number]      38 (150 mL/hr) amLODIPine (NORVASC) tablet 10 mg  
Dose: 10 mg 
Freq: DAILY Route: PO 
 Start: 09/12/20 0900 End: 09/13/20 1301        08 (10 mg) 09 (10 mg) Admin Instructions:            
. Order ID: [id number]           
amLODIPine (NORVASC) tablet 5 mg Dose: 5 mg Freq: DAILY Route: PO 
Start: 09/11/20 0900 End: 09/11/20 1807       08 (5 mg) Admin Instructions:            
. Order ID: [id number]           
diphenhydrAMINE (BENADRYL) injection 25 mg  
Dose: 25 mg 
Freq: EVERY 6 HOURS AS NEEDED Route: IV 
PRN Reasons: Itching,Allergic Response Start: 09/10/20 2147 End: 09/13/20 1301      22 (25 mg) 03 (25 mg) 06 (25 mg) 08 (25 mg) 14 (25 mg) 17 (25 mg) 21 (25 mg) Order ID: [id number]      23 (25 mg) 
      
escitalopram oxalate (LEXAPRO) tablet 20 mg  
Dose: 20 mg 
Freq: DAILY Route: PO Indications of Use: anxiety with depression Start: 09/11/20 0900 End: 09/13/20 1301       08 (20 mg) 08 (20 mg) 09 (20 mg) Order ID: [id number] HYDROcodone-acetaminophen (NORCO) 5-325 mg per tablet 1-2 Tab Dose: 1-2 Tab Freq: EVERY 4 HOURS AS NEEDED Route: PO 
PRN Reason: Moderate Pain Start: 09/12/20 1544 End: 09/13/20 1301        19 (1 Tab) Admin Instructions:            
. Order ID: [id number] HYDROmorphone (PF) (DILAUDID) injection 1 mg Dose: 1 mg Freq: EVERY 4 HOURS AS NEEDED Route: IV 
PRN Reason: Moderate Pain Start: 09/11/20 1931 End: 09/12/20 1544       20 (1 mg) 02 (1 mg) 06 (1 mg) 11 (1 mg) Order ID: [id number]       04 (1 mg) 
    
metoclopramide (REGLAN) 5 mg/5 mL oral syrup 5 mg Dose: 5 mg Freq: 4 TIMES DAILY BEFORE MEALS & NIGHTLY Route: PO 
Start: 09/11/20 0730 End: 09/13/20 1301       06 (5 mg) 06 (5 mg) 06 (5 mg) 11 (5 mg) 11 (5 mg) 16 (5 mg) 17 (5 mg) Order ID: [id number]      42 (5 mg) 21 (5 mg) morphine injection 2 mg Dose: 2 mg Freq: EVERY 4 HOURS AS NEEDED Route: IV 
 PRN Reason: Severe Pain Start: 09/10/20 2005 End: 09/11/20 1930      22 (2 mg) 03 (2 mg) 08 (2 mg) 13 (2 mg) Order ID: [id number]      39 (2 mg) 
      
pantoprazole (PROTONIX) tablet 40 mg  
Dose: 40 mg 
Freq: DAILY BEFORE BREAKFAST Route: PO 
Start: 09/11/20 0730 End: 09/13/20 1301       06 (40 mg) 06 (40 mg) 06 (40 mg) Order specific questions: PPI INDICATION Symptomatic GERD Order ID: [id number]           
rivaroxaban (XARELTO) tablet 20 mg  
Dose: 20 mg 
Freq: DAILY WITH BREAKFAST Route: PO 
Start: 09/11/20 0800 End: 09/13/20 1301       08 (20 mg) 08 (20 mg) 09 (20 mg) Admin Instructions: DVT/PE Maintenance: 20mg daily with food following initial 21-day treatment course Order ID: [id number]           
sucralfate (CARAFATE) tablet 1 g Dose: 1 g 
Freq: 4 TIMES DAILY BEFORE MEALS & NIGHTLY Route: PO 
Start: 09/11/20 0730 End: 09/13/20 1301       06 (1 g) 06 (1 g) 06 (1 g) Admin Instructions:       11 (1 g) 
  11 (1 g) Do not crush Sucralfate tablets. Tablets may be cut in half to help facilitate oral intake. To make a slurry for oral administration, place Sucralfate tablets in 20mL of water, let stand for 5 minutes, and administer via mouth or feeding tube. Sucralfate slurry should not be given with enteral feedings. If continuous tube feeds, flush the tube and hold the feeds for 1-2 hours before and after slurry administration. 16 (1 g) 
  17 (1 g) Order ID: [id number]      21 (1 g) 
  21 (1 g) 
    
traZODone (DESYREL) tablet 100 mg Dose: 100 mg Freq: EVERY BEDTIME Route: PO 
Start: 09/10/20 2300 End: 09/13/20 1301      22 (100 mg) 21 (100 mg) 21 (100 mg) Order ID: [id number]           
traZODone (DESYREL) tablet 100 mg Dose: 100 mg Freq: EVERY BEDTIME Route: PO 
Start: 09/10/20 2300 End: 09/10/20 2247 Order ID: [id number] Medications  09/07/20  09/08/20  09/09/20  09/10/20  09/11/20  09/12/20  09/13/20 Pancreatitis - Care Day 2 (9/11/2020) by Sree Monaco Review Entered  Review Status 9/11/2020 13:24  Completed Criteria Review Care Day: 2 Care Date: 9/11/2020 Level of Care: Inpatient Floor Guideline Day 2 Clinical Status  
  
(X) * Hemodynamic stability 9/11/2020 13:24:05 EDT by [facility] Pt with BP still high but improved from yesterday: 150/100 at 0855, 138/95 at 1209. Tmax 98.3, 96, 18, RA sat 100%; 82.1kg. (X) * Pain absent or reduced 9/11/2020 13:24:05 EDT by [facility] Pt still with pain but better. Requiring IV morphine 2mg IV Q4H PRN x2 in addition to IV benedryl 25mg Q6H PRN x2 Activity (X) Activity as tolerated 9/11/2020 13:24:05 EDT by [facility] [place]  
  
ad tyrese Routes (X) Possible oral hydration and medications 9/11/2020 13:24:05 EDT by [facility] [place] NS at 150cc/hr; norvasc 5mg po qd, lexapro 20mg po qd, reglan 5mg po QAC and HS; xarelto 20mg po qd, carafate 1gram PO qAC and hs: desyrel 100mg po qhs; protonix 40mg po qd (X) Oral diet as tolerated 9/11/2020 13:24:05 EDT by [facility] [place] If tolerates clear liquids, then can advance to bariatric diet today Interventions  
  
(X) * NG tube absent 9/11/2020 13:24:05 EDT by [facility] [place] No NGT  
  
(X) Laboratory tests 9/11/2020 13:24:05 EDT by [facility] [place] Chl 112, co2 19, bun 5, namrata 8.1, tprot 5.9, alb 2.4, alt 37, ast 67, alk phos 190. Lipase improved to 171. rbc 3.48, hgb 10.2, hct 31.4, rdw 17.3 Medications (X) Parenteral analgesics 9/11/2020 13:24:05 EDT by [facility] [place] IV morphine 2mg IV Q4H PRN x2 in addition to IV benedryl 25mg Q6H PRN x2  
  
* Milestone Additional Notes Assessment/Plan:   
Acute pancreatitis-   
 Continue with IVF, clear liquid diet. If she is tolerating clear liquid diet then can advance to bariatric diet. Lipase in normal range. PVT - Continue with xarelto HTN -   
Continue with home meds, monitor blood pressure. GERD- Continue with sucralfate, reglan Physical Exam:   
General: Awake, cooperative, no acute distress HEENT: NC, Atraumatic. PERRLA, anicteric sclerae. Lungs: CTA Bilaterally. No Wheezing/Rhonchi/Rales. Heart: S1 S2, No murmur, No Rubs, No Gallops Abdomen: Soft, Non distended, epigastric tender. +Bowel sounds, Extremities: No c/c/e Psych: Not anxious or agitated. Neurologic: No acute neurological deficit

## 2020-09-22 ENCOUNTER — TELEPHONE (OUTPATIENT)
Dept: SURGERY | Age: 45
End: 2020-09-22

## 2020-09-22 RX ORDER — METOCLOPRAMIDE HYDROCHLORIDE 5 MG/5ML
5 SOLUTION ORAL
Qty: 600 ML | Refills: 0 | Status: ON HOLD | OUTPATIENT
Start: 2020-09-22 | End: 2020-09-23 | Stop reason: CLARIF

## 2020-09-22 RX ORDER — METOCLOPRAMIDE HYDROCHLORIDE 5 MG/5ML
1 SOLUTION ORAL
Qty: 600 ML | Refills: 1 | Status: ON HOLD | OUTPATIENT
Start: 2020-09-22 | End: 2021-02-07 | Stop reason: SDUPTHER

## 2020-09-23 ENCOUNTER — HOSPITAL ENCOUNTER (OUTPATIENT)
Age: 45
Setting detail: OUTPATIENT SURGERY
Discharge: HOME | End: 2020-09-23
Attending: INTERNAL MEDICINE | Admitting: INTERNAL MEDICINE
Payer: MEDICAID

## 2020-09-23 VITALS
SYSTOLIC BLOOD PRESSURE: 137 MMHG | DIASTOLIC BLOOD PRESSURE: 85 MMHG | HEIGHT: 65 IN | TEMPERATURE: 99.4 F | RESPIRATION RATE: 18 BRPM | BODY MASS INDEX: 30.55 KG/M2 | HEART RATE: 79 BPM | OXYGEN SATURATION: 98 % | WEIGHT: 183.38 LBS

## 2020-09-23 PROCEDURE — 74011250636 HC RX REV CODE- 250/636: Performed by: INTERNAL MEDICINE

## 2020-09-23 PROCEDURE — 77030040361 HC SLV COMPR DVT MDII -B: Performed by: INTERNAL MEDICINE

## 2020-09-23 PROCEDURE — 2709999900 HC NON-CHARGEABLE SUPPLY: Performed by: INTERNAL MEDICINE

## 2020-09-23 PROCEDURE — 76040000007: Performed by: INTERNAL MEDICINE

## 2020-09-23 PROCEDURE — G0500 MOD SEDAT ENDO SERVICE >5YRS: HCPCS | Performed by: INTERNAL MEDICINE

## 2020-09-23 PROCEDURE — 77030039961 HC KT CUST COLON BSC -D: Performed by: INTERNAL MEDICINE

## 2020-09-23 RX ORDER — NALOXONE HYDROCHLORIDE 0.4 MG/ML
0.4 INJECTION, SOLUTION INTRAMUSCULAR; INTRAVENOUS; SUBCUTANEOUS
Status: DISCONTINUED | OUTPATIENT
Start: 2020-09-23 | End: 2020-09-23 | Stop reason: HOSPADM

## 2020-09-23 RX ORDER — DIPHENHYDRAMINE HYDROCHLORIDE 50 MG/ML
50 INJECTION, SOLUTION INTRAMUSCULAR; INTRAVENOUS ONCE
Status: COMPLETED | OUTPATIENT
Start: 2020-09-23 | End: 2020-09-23

## 2020-09-23 RX ORDER — FLUMAZENIL 0.1 MG/ML
0.2 INJECTION INTRAVENOUS
Status: DISCONTINUED | OUTPATIENT
Start: 2020-09-23 | End: 2020-09-23 | Stop reason: HOSPADM

## 2020-09-23 RX ORDER — MIDAZOLAM HYDROCHLORIDE 1 MG/ML
.25-5 INJECTION, SOLUTION INTRAMUSCULAR; INTRAVENOUS
Status: DISCONTINUED | OUTPATIENT
Start: 2020-09-23 | End: 2020-09-23 | Stop reason: HOSPADM

## 2020-09-23 RX ORDER — FENTANYL CITRATE 50 UG/ML
100 INJECTION, SOLUTION INTRAMUSCULAR; INTRAVENOUS
Status: DISCONTINUED | OUTPATIENT
Start: 2020-09-23 | End: 2020-09-23 | Stop reason: HOSPADM

## 2020-09-23 RX ORDER — SODIUM CHLORIDE 9 MG/ML
1000 INJECTION, SOLUTION INTRAVENOUS CONTINUOUS
Status: DISCONTINUED | OUTPATIENT
Start: 2020-09-23 | End: 2020-09-23 | Stop reason: HOSPADM

## 2020-09-23 RX ADMIN — SODIUM CHLORIDE 1000 ML: 900 INJECTION, SOLUTION INTRAVENOUS at 11:44

## 2020-09-23 NOTE — PERIOP NOTES
Reviewed PTA medication list with patient/caregiver and patient/caregiver denies any additional medications. Patient admits to having a responsible adult care for them for at least 24 hours after surgery.   Doctor Bernard Heredia is requesting to be in the room during the procedure. Pt had has a hx gastric bypass 2018. Paged Doctor Bernard Heredia.

## 2020-09-23 NOTE — DISCHARGE INSTRUCTIONS
Aimee Eric  [id number]  1975    EGD DISCHARGE INSTRUCTIONS  Discomfort:  Sore throat- throat lozenges or warm salt water gargle  redness at IV site- apply warm compress to area; if redness or soreness persist- contact your physician  Gaseous discomfort- walking, belching will help relieve any discomfort  You may not operate a vehicle until the next day  You may not engage in an occupation involving machinery or appliances until the next day  You may not drink alcoholic beverages until the next day  Avoid making any critical decisions for at least 24 hour    DIET   You may not resume your regular diet. Antireflux diet. ACTIVITY  You may not resume your normal daily activities   Spend the remainder of the day resting -  avoid any strenuous activity. CALL M.D. ANY SIGN OF   Increasing pain, nausea, vomiting  Abdominal distension (swelling)  New increased bleeding (oral or rectal)  Fever (chills)  Pain in chest area  Bloody discharge from nose or mouth  Shortness of breath     You may never take any Advil, Aspirin, Ibuprofen, Motrin, Aleve, or Goodys ONLY  Tylenol as needed for pain. Resume the [employer] today     Follow-up Instructions: Follow-up in the office as scheduled or make a follow-up appointment in 2 weeks. Meliton Dandy, MD  September 23, 2020      DISCHARGE SUMMARY from Nurse    PATIENT INSTRUCTIONS:    After general anesthesia or intravenous sedation, for 24 hours or while taking prescription Narcotics:  · Limit your activities  · Do not drive and operate hazardous machinery  · Do not make important personal or business decisions  · Do  not drink alcoholic beverages  · If you have not urinated within 8 hours after discharge, please contact your surgeon on call.     Report the following to your surgeon:  · Excessive pain, swelling, redness or odor of or around the surgical area  · Temperature over 100.5  · Nausea and vomiting lasting longer than 4 hours or if unable to take medications  · Any signs of decreased circulation or nerve impairment to extremity: change in color, persistent  numbness, tingling, coldness or increase pain  · Any questions    What to do at Home:  Recommended activity: Activity as tolerated and no driving for today    *  Please give a list of your current medications to your Primary Care Provider. *  Please update this list whenever your medications are discontinued, doses are      changed, or new medications (including over-the-counter products) are added. *  Please carry medication information at all times in case of emergency situations. These are general instructions for a healthy lifestyle:    No smoking/ No tobacco products/ Avoid exposure to second hand smoke  Surgeon General's Warning:  Quitting smoking now greatly reduces serious risk to your health. Obesity, smoking, and sedentary lifestyle greatly increases your risk for illness    A healthy diet, regular physical exercise & weight monitoring are important for maintaining a healthy lifestyle    You may be retaining fluid if you have a history of heart failure or if you experience any of the following symptoms:  Weight gain of 3 pounds or more overnight or 5 pounds in a week, increased swelling in our hands or feet or shortness of breath while lying flat in bed. Please call your doctor as soon as you notice any of these symptoms; do not wait until your next office visit. The discharge information has been reviewed with the patient and caregiver. The patient and caregiver verbalized understanding. Discharge medications reviewed with the patient and caregiver and appropriate educational materials and side effects teaching were provided.   ___________________________________________________________________________________________________________________________________    Patient armband removed and shredded

## 2020-09-23 NOTE — PROCEDURES
(EGD) Esophagogastroduodenoscopy (UPPER ENDOSCOPY) Procedure Note  [facility] [place]  __________________________________________________________________________________________________________________________      9/23/2020     Patient: Bryant Ferguson YOB: 1975 Gender: female Age: 39 y.o. INDICATION:  sp gastric bypass x 2000 with revision in 2016. 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 was started on Xeralto 3 months ago in June 2020. She has been admitted many times to the hospital. Last was just a week ago for 2 days no CT scan this time. Pain radiating to the back with severe nausea and vomiting. She lost her appetite and Kept on loosing weight gradually. She has one bm every day. She claims that her pain improved with Carafate qid and Reglan 5 mg tid. Dr Tanya Pablo want me to repeat her EGD and he is willing to come and watch. I don't think we need to dilate her anastomosis for more than the 15 mm that was done 3 months ago. : Karis Cardoso MD    Referring Provider:  Darien Muñoz NP    Sedation:  Versed 10 mg IV, Fentanyl 200 mcg IV, Benadryl 50 mg IV. Procedure Details:  After infomed consent was obtained for the procedure, with all risks and benefits of procedure explained to the patient was taken to the endoscopy suite and placed in the left lateral decubitus position. Following sequential administration of sedation as per above, the endoscope was inserted into the mouth and advanced under direct vision to efferent loop. A careful inspection was made as the gastroscope was withdrawn, including a retroflexed view of the proximal jejunum in the afferent and efferent loop; findings and interventions are described below. OROPHARYNX: The vocal Cords and the larynx could not seen.  pyriform recesses normal.   ESOPHAGUS: The proximal, mid, and distal oesophagus are normal. slightly irregular Z line and discretely congested suggestive of some GERD at least in the past. The diaphragmatic opening is located at 40 cm. No hiatal hernia or esophagitis. STOMACH: sp gastric bypass with a  5 cm long gastric pouch. The gastro-jejunal anastomosis is of good size permitting freely the passage of the gastroscope, located at 45 cm. JEJUNUM: The AFFERENT loop is short 4 cm. The EFFERENT loop is examined over 40 cm and appears normal.  Therapies:  Nil    Specimen: none           Complications:   None    EBL:  Nil. IMPRESSION: slightly irregular Z line and discretely congested suggestive of some GERD at least in the past. sp adequate gastric bypass with 5 cm gastric pouch, normal gastro-jejunal anastomosis. High tolerance to sedation          RECOMMENDATION:  may resume Bariatric diet. Avoid all NSAID's and Alcohol. Continue medications. May want to take Reglan as needed! Make a FU appointment with Dr Diane Diallo.    Assistant: None    --Aurora Ferrara MD on 9/23/2020 at 12:42 PM

## 2020-09-23 NOTE — H&P
Assessment/Plan  # Detail Type Description    1. Assessment Abdominal pain (R10.9). 2. 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 Patient Plan sp gastric bypass x 2000 with revision in 2016. 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 was started on Xeralto 2 months ago. She has been admitted many times to the hospital. Last was just a week ago for 2 days no CT scan this time. Pain radiating to the back with severe nausea and vomiting. lost her appetite. Keeps on loosing weight gradually. She has one bm every day. She claims that her pain improved with Carafate qid and Reglan tid. Dr Nisreen Mcknight want me to repeat her EGD and he is willing to come and watch. I don't think we need to dilate her anastomosis for more than the 15 mm that was done 3 months ago         3. Assessment Gastric bypass status for obesity (Z98.84). 4. Assessment GERD (gastroesophageal reflux disease) (K21.9). This 39year old female presents for hospital Follow up.   History of Present Illness:  1.  hospital Follow up       PROBLEM LIST:     Problem Description Onset Date Chronic Clinical Status Notes   Hemolytic uremic syndrome 08/12/2020 N     Chronic pancreatitis due to chronic alcoholism 08/12/2020 N     Numbness and tingling sensation of skin 08/12/2020 N     Noninfectious gastroenteritis 11/19/2019 N     History of gastrointestinal tract bypass 11/19/2019 N     Localized abdominal pain 12/11/2019 N     Elevated liver enzymes 08/23/2020 N     Nausea &amp; vomiting 08/23/2020 N     Acute on chronic pancreatitis 07/27/2020 N     Gastritis and duodenitis 06/27/2020 N     GERD (gastroesophageal reflux disease) 08/24/2020 N     Anemia 06/20/2018 N     Status post gastric bypass for obesity 01/01/2000 N     Portal vein thrombosis 06/27/2020 N     Smoker 06/20/2018 N     Pancreatitis, alcoholic, acute 89/94/3134 N     Anxiety 2018 N     Pancreatitis 2020 N     Hypertension 2020 N               PAST MEDICAL/SURGICAL HISTORY   (Detailed)    Disease/disorder Onset Date Management Date Comments     ventilation tubes, bilateral       nasal/sinus       gastric bypass           Family History  (Detailed)  Relationship Family Member Name  Age at Death Condition Onset Age Cause of Death       Family history of Crohn's disease  N   Father  Y  Cancer, colon  N   Mother  N  Allergies  N         Social History:  (Detailed)  The patient is right-handed. Preferred language is [place]. MARITAL STATUS/FAMILY/SOCIAL SUPPORT  Marital status: Single   Tobacco use status: Current smoker. Smoking status: Former smoker. ALCOHOL  There is no history of alcohol use. CAFFEINE  The patient does not use caffeine. Medications (active prior to today)  Medication Name Sig Description Start Date Stop Date Refilled Rx Elsewhere   dicyclomine 20 mg tablet take 1 tablet by oral route 4 times every day 2019   N   vancomycin 125 mg capsule take 1 capsule by oral route  every 6 hours 2020 N   Xarelto 20 mg tablet take 1 tablet by oral route  every day with the evening meal 2020   N   Pain Relief (acetaminophen) 500 mg tablet Take 1,000 mg by mouth every six (6) hours as needed for Pain. //   Y   amlodipine 10 mg tablet Take 0.5 Tabs by mouth daily. 2019   Y   escitalopram 20 mg tablet Take 20 mg by mouth daily. Indications: anxiousness associated with depression //   Y   metoclopramide 5 mg/5 mL oral solution Take 5 mL by mouth Before breakfast, lunch, dinner and at bedtime for 30 days. 2020 10/01/2020  Y   multivitamin tablet Take 1 Tab by mouth daily. //   Y   omeprazole 20 mg tablet,delayed release Take 1 Tab by mouth daily. 2019   Y   sucralfate 1 gram tablet Take 1 Tab by mouth Before breakfast, lunch, dinner and at bedtime for 30 days.  2020 10/01/2020  Y trazodone 100 mg tablet Take by mouth nightly. Pt is unsure of the dose for this medication //   Y   Norco 5 mg-325 mg tablet Take 1-2 Tabs by mouth every four (4) hours as needed for Pain for up to 3 days. Max Daily Amount: 12 Tabs. 09/13/2020 09/17/2020  Y     Patient Status   Completed with information received for patient in a summary of care record. Medications (Added, Continued or Stopped today)  Start Date Medication Directions PRN Status PRN Reason Instruction Stop Date   12/05/2019 amlodipine 10 mg tablet Take 0.5 Tabs by mouth daily. N      11/19/2019 dicyclomine 20 mg tablet take 1 tablet by oral route 4 times every day N       escitalopram 20 mg tablet Take 20 mg by mouth daily. Indications: anxiousness associated with depression N      08/31/2020 metoclopramide 5 mg/5 mL oral solution Take 5 mL by mouth Before breakfast, lunch, dinner and at bedtime for 30 days. N   10/01/2020    multivitamin tablet Take 1 Tab by mouth daily. N      09/13/2020 Norco 5 mg-325 mg tablet Take 1-2 Tabs by mouth every four (4) hours as needed for Pain for up to 3 days. Max Daily Amount: 12 Tabs. N   09/17/2020 12/05/2019 omeprazole 20 mg tablet,delayed release Take 1 Tab by mouth daily. N       Pain Relief (acetaminophen) 500 mg tablet Take 1,000 mg by mouth every six (6) hours as needed for Pain. N      08/31/2020 sucralfate 1 gram tablet Take 1 Tab by mouth Before breakfast, lunch, dinner and at bedtime for 30 days. N   10/01/2020    trazodone 100 mg tablet Take by mouth nightly. Pt is unsure of the dose for this medication N      06/23/2020 vancomycin 125 mg capsule take 1 capsule by oral route  every 6 hours N      08/11/2020 Xarelto 20 mg tablet take 1 tablet by oral route  every day with the evening meal N        Allergies:  Ingredient Reaction (Severity) Medication Name Comment   NO KNOWN ALLERGIES      Reviewed, no changes.       Review of System  System Neg/Pos Details   Constitutional Negative Chills, Fever, Malaise and Weight loss. ENMT Negative Ear infections, Nasal congestion, Sinus Infection and Sore throat. Eyes Negative Double vision and Eye pain. Respiratory Negative Asthma, Chronic cough, Dyspnea, Pleuritic pain and Wheezing. Cardio Negative Chest pain, Edema and Irregular heartbeat/palpitations. GI Negative Abdominal pain, Change in bowel habits, Constipation, Decreased appetite, Diarrhea, Dysphagia, Heartburn, Hematemesis, Hematochezia, Melena, Nausea, Reflux and Vomiting.  Negative Dysuria, Hematuria, Urinary frequency, Urinary incontinence and Urinary retention. Endocrine Negative Cold intolerance, Heat intolerance and Increased thirst.   Neuro Negative Dizziness, Headache, Numbness, Tremors and Vertigo. Psych Negative Anxiety, Depression and Increased stress. Integumentary Negative Hives, Pruritus and Rash. MS Negative Back pain, Joint pain and Myalgia. Hema/Lymph Negative Easy bleeding, Easy bruising and Lymphadenopathy. Allergic/Immuno Negative Chemicals in work place, Contact allergy, Food allergies, Immunosuppression and Seasonal allergies. Reproductive Negative Breast lumps, Breast pain and Vaginal discharge. Date/Time  Temp  Pulse  BP  MAP (Calculated)  Arterial Line 1 BP (mmHg)  BP Patient Position  Resp  SpO2  O2 Device  O2 Flow Rate (L/min)  Pre/Post Ductal  Weight    09/23/20 1127  98.7 °F (37.1 °C)  89  153/92Abnormal    112      16  99 %  Room air      83.2 kg (183 lb 6 oz)          PHYSICAL EXAM:  Exam Findings Details   Constitutional Normal No acute distress. Well Nourished. Well developed.    Eyes Normal General - Right: Normal, Left: Normal. Conjunctiva - Right: Normal, Left: Normal. Sclera - Right: Normal, Left: Normal. Cornea - Right: Normal, Left: Normal. Pupil - Right: Normal, Left: Normal.   Nose/Mouth/Throat Normal Lips/teeth/gums - Normal. Tongue - Normal. Buccal mucosa - Normal. Palate & uvula - Normal.   Neck Exam Normal Inspection - Normal. Palpation - Normal. Thyroid gland - Normal. Cervical lymph nodes - Normal.   Respiratory Normal Inspection - Normal. Auscultation - Normal. Percussion - Normal. Cough - Absent. Effort - Normal.   Cardiovascular Normal Heart rate - Regular rate. Heart sounds - Normal S1, Normal S2. Murmurs - None. Extremities - No edema. Abdomen Normal Inspection - Normal. Appliance(s) - None. Abdominal muscles - Normal. Auscultation - Normal. Percussion - Normal. Anterior palpation - Normal, No guarding, No rebound. CVA tenderness - None. Umbilicus - Normal. Abdominal reflexes - Normal. No abdominal tenderness. No hepatic enlargement. No splenic enlargement. No hernia. No Ascites. No palpable mass. Tuttle's sign - Negative. Skin Normal Inspection - Normal.   Musculoskeletal Normal Hands - Left: Normal, Right: Normal.   Extremity Normal No cyanosis. No edema. Clubbing - Absent. Neurological Normal Level of consciousness - Normal. Orientation - Normal. Memory - Normal. Motor - Normal. Balance & gait - Normal. Coordination - Normal. Fine motor skills - Normal. DTRs - Normal.   Psychiatric Normal Orientation - Oriented to time, place, person & situation. Not anxious. Appropriate mood and affect. Behavior appropriate for age. Sufficient language. No memory loss.        No change in H&P

## 2021-02-02 ENCOUNTER — HOSPITAL ENCOUNTER (INPATIENT)
Age: 46
LOS: 5 days | Discharge: HOME | DRG: 282 | End: 2021-02-07
Attending: EMERGENCY MEDICINE | Admitting: FAMILY MEDICINE
Payer: MEDICAID

## 2021-02-02 ENCOUNTER — APPOINTMENT (OUTPATIENT)
Dept: CT IMAGING | Age: 46
DRG: 282 | End: 2021-02-02
Attending: PHYSICIAN ASSISTANT
Payer: MEDICAID

## 2021-02-02 ENCOUNTER — APPOINTMENT (OUTPATIENT)
Dept: GENERAL RADIOLOGY | Age: 46
DRG: 282 | End: 2021-02-02
Attending: PHYSICIAN ASSISTANT
Payer: MEDICAID

## 2021-02-02 DIAGNOSIS — R31.9 HEMATURIA, UNSPECIFIED TYPE: ICD-10-CM

## 2021-02-02 DIAGNOSIS — K85.90 ACUTE ON CHRONIC PANCREATITIS (HCC): Primary | ICD-10-CM

## 2021-02-02 DIAGNOSIS — K85.90 ACUTE PANCREATITIS, UNSPECIFIED COMPLICATION STATUS, UNSPECIFIED PANCREATITIS TYPE: ICD-10-CM

## 2021-02-02 DIAGNOSIS — K86.1 ACUTE ON CHRONIC PANCREATITIS (HCC): Primary | ICD-10-CM

## 2021-02-02 DIAGNOSIS — R10.84 GENERALIZED ABDOMINAL PAIN: ICD-10-CM

## 2021-02-02 LAB
ALBUMIN SERPL-MCNC: 3.2 G/DL (ref 3.4–5)
ALBUMIN/GLOB SERPL: 0.8 {RATIO} (ref 0.8–1.7)
ALP SERPL-CCNC: 201 U/L (ref 45–117)
ALT SERPL-CCNC: 136 U/L (ref 13–56)
AMORPH CRY URNS QL MICRO: ABNORMAL
AMYLASE SERPL-CCNC: 47 U/L (ref 25–115)
ANION GAP SERPL CALC-SCNC: 9 MMOL/L (ref 3–18)
APPEARANCE UR: ABNORMAL
APTT PPP: 27.9 SEC (ref 23–36.4)
AST SERPL-CCNC: 379 U/L (ref 10–38)
ATRIAL RATE: 82 BPM
BACTERIA URNS QL MICRO: ABNORMAL /HPF
BASOPHILS # BLD: 0 K/UL (ref 0–0.1)
BASOPHILS NFR BLD: 0 % (ref 0–2)
BILIRUB SERPL-MCNC: 0.6 MG/DL (ref 0.2–1)
BILIRUB UR QL: NEGATIVE
BUN SERPL-MCNC: 7 MG/DL (ref 7–18)
BUN/CREAT SERPL: 8 (ref 12–20)
C DIFF GDH STL QL: NEGATIVE
C DIFF TOX A+B STL QL IA: NEGATIVE
CALCIUM SERPL-MCNC: 8.2 MG/DL (ref 8.5–10.1)
CALCULATED P AXIS, ECG09: 64 DEGREES
CALCULATED R AXIS, ECG10: 45 DEGREES
CALCULATED T AXIS, ECG11: 46 DEGREES
CHLORIDE SERPL-SCNC: 105 MMOL/L (ref 100–111)
CK MB CFR SERPL CALC: NORMAL % (ref 0–4)
CK MB SERPL-MCNC: <1 NG/ML (ref 5–25)
CK SERPL-CCNC: 79 U/L (ref 26–192)
CO2 SERPL-SCNC: 23 MMOL/L (ref 21–32)
COLOR UR: YELLOW
CREAT SERPL-MCNC: 0.86 MG/DL (ref 0.6–1.3)
DIAGNOSIS, 93000: NORMAL
DIFFERENTIAL METHOD BLD: ABNORMAL
EOSINOPHIL # BLD: 0 K/UL (ref 0–0.4)
EOSINOPHIL NFR BLD: 0 % (ref 0–5)
EPITH CASTS URNS QL MICRO: ABNORMAL /LPF (ref 0–5)
ERYTHROCYTE [DISTWIDTH] IN BLOOD BY AUTOMATED COUNT: 21.4 % (ref 11.6–14.5)
GLOBULIN SER CALC-MCNC: 4 G/DL (ref 2–4)
GLUCOSE SERPL-MCNC: 107 MG/DL (ref 74–99)
GLUCOSE UR STRIP.AUTO-MCNC: NEGATIVE MG/DL
HCT VFR BLD AUTO: 36.3 % (ref 35–45)
HGB BLD-MCNC: 12.3 G/DL (ref 12–16)
HGB UR QL STRIP: ABNORMAL
INR PPP: 1.1 (ref 0.8–1.2)
INTERPRETATION: NORMAL
KETONES UR QL STRIP.AUTO: NEGATIVE MG/DL
LEUKOCYTE ESTERASE UR QL STRIP.AUTO: NEGATIVE
LIPASE SERPL-CCNC: 203 U/L (ref 73–393)
LYMPHOCYTES # BLD: 1.2 K/UL (ref 0.9–3.6)
LYMPHOCYTES NFR BLD: 17 % (ref 21–52)
MCH RBC QN AUTO: 38 PG (ref 24–34)
MCHC RBC AUTO-ENTMCNC: 33.9 G/DL (ref 31–37)
MCV RBC AUTO: 112 FL (ref 74–97)
MONOCYTES # BLD: 0.4 K/UL (ref 0.05–1.2)
MONOCYTES NFR BLD: 6 % (ref 3–10)
NEUTS SEG # BLD: 5.3 K/UL (ref 1.8–8)
NEUTS SEG NFR BLD: 77 % (ref 40–73)
NITRITE UR QL STRIP.AUTO: NEGATIVE
P-R INTERVAL, ECG05: 144 MS
PH UR STRIP: 5.5 [PH] (ref 5–8)
PLATELET # BLD AUTO: 311 K/UL (ref 135–420)
PMV BLD AUTO: 9.2 FL (ref 9.2–11.8)
POTASSIUM SERPL-SCNC: 4.2 MMOL/L (ref 3.5–5.5)
PROT SERPL-MCNC: 7.2 G/DL (ref 6.4–8.2)
PROT UR STRIP-MCNC: 100 MG/DL
PROTHROMBIN TIME: 14.1 SEC (ref 11.5–15.2)
Q-T INTERVAL, ECG07: 404 MS
QRS DURATION, ECG06: 80 MS
QTC CALCULATION (BEZET), ECG08: 472 MS
RBC # BLD AUTO: 3.24 M/UL (ref 4.2–5.3)
RBC #/AREA URNS HPF: ABNORMAL /HPF (ref 0–5)
SODIUM SERPL-SCNC: 137 MMOL/L (ref 136–145)
SP GR UR REFRACTOMETRY: 1.02 (ref 1–1.03)
TROPONIN I SERPL-MCNC: <0.02 NG/ML (ref 0–0.04)
UROBILINOGEN UR QL STRIP.AUTO: 0.2 EU/DL (ref 0.2–1)
VENTRICULAR RATE, ECG03: 82 BPM
WBC # BLD AUTO: 6.9 K/UL (ref 4.6–13.2)
WBC URNS QL MICRO: ABNORMAL /HPF (ref 0–5)

## 2021-02-02 PROCEDURE — 65270000029 HC RM PRIVATE

## 2021-02-02 PROCEDURE — 93005 ELECTROCARDIOGRAM TRACING: CPT

## 2021-02-02 PROCEDURE — 85610 PROTHROMBIN TIME: CPT

## 2021-02-02 PROCEDURE — 99285 EMERGENCY DEPT VISIT HI MDM: CPT

## 2021-02-02 PROCEDURE — 74177 CT ABD & PELVIS W/CONTRAST: CPT

## 2021-02-02 PROCEDURE — 96375 TX/PRO/DX INJ NEW DRUG ADDON: CPT

## 2021-02-02 PROCEDURE — 85025 COMPLETE CBC W/AUTO DIFF WBC: CPT

## 2021-02-02 PROCEDURE — 74011250636 HC RX REV CODE- 250/636: Performed by: PHYSICIAN ASSISTANT

## 2021-02-02 PROCEDURE — 87177 OVA AND PARASITES SMEARS: CPT

## 2021-02-02 PROCEDURE — 87506 IADNA-DNA/RNA PROBE TQ 6-11: CPT

## 2021-02-02 PROCEDURE — 82150 ASSAY OF AMYLASE: CPT

## 2021-02-02 PROCEDURE — 85730 THROMBOPLASTIN TIME PARTIAL: CPT

## 2021-02-02 PROCEDURE — 82553 CREATINE MB FRACTION: CPT

## 2021-02-02 PROCEDURE — 71045 X-RAY EXAM CHEST 1 VIEW: CPT

## 2021-02-02 PROCEDURE — 96374 THER/PROPH/DIAG INJ IV PUSH: CPT

## 2021-02-02 PROCEDURE — 87324 CLOSTRIDIUM AG IA: CPT

## 2021-02-02 PROCEDURE — 83690 ASSAY OF LIPASE: CPT

## 2021-02-02 PROCEDURE — 74011000636 HC RX REV CODE- 636: Performed by: PHYSICIAN ASSISTANT

## 2021-02-02 PROCEDURE — 80053 COMPREHEN METABOLIC PANEL: CPT

## 2021-02-02 PROCEDURE — 96372 THER/PROPH/DIAG INJ SC/IM: CPT

## 2021-02-02 PROCEDURE — 81001 URINALYSIS AUTO W/SCOPE: CPT

## 2021-02-02 PROCEDURE — 74011000636 HC RX REV CODE- 636: Performed by: EMERGENCY MEDICINE

## 2021-02-02 RX ORDER — MORPHINE SULFATE 4 MG/ML
4 INJECTION INTRAVENOUS
Status: COMPLETED | OUTPATIENT
Start: 2021-02-02 | End: 2021-02-02

## 2021-02-02 RX ORDER — DICYCLOMINE HYDROCHLORIDE 10 MG/ML
20 INJECTION INTRAMUSCULAR
Status: COMPLETED | OUTPATIENT
Start: 2021-02-02 | End: 2021-02-02

## 2021-02-02 RX ORDER — ONDANSETRON 2 MG/ML
4 INJECTION INTRAMUSCULAR; INTRAVENOUS
Status: COMPLETED | OUTPATIENT
Start: 2021-02-02 | End: 2021-02-02

## 2021-02-02 RX ADMIN — ONDANSETRON 4 MG: 2 INJECTION INTRAMUSCULAR; INTRAVENOUS at 23:04

## 2021-02-02 RX ADMIN — SODIUM CHLORIDE 1000 ML: 900 INJECTION, SOLUTION INTRAVENOUS at 18:52

## 2021-02-02 RX ADMIN — MORPHINE SULFATE 4 MG: 4 INJECTION INTRAVENOUS at 23:04

## 2021-02-02 RX ADMIN — IOPAMIDOL 100 ML: 612 INJECTION, SOLUTION INTRAVENOUS at 21:59

## 2021-02-02 RX ADMIN — DICYCLOMINE HYDROCHLORIDE 20 MG: 20 INJECTION INTRAMUSCULAR at 18:53

## 2021-02-02 RX ADMIN — SODIUM CHLORIDE 1000 ML: 900 INJECTION, SOLUTION INTRAVENOUS at 23:04

## 2021-02-02 RX ADMIN — ONDANSETRON 4 MG: 2 INJECTION INTRAMUSCULAR; INTRAVENOUS at 18:53

## 2021-02-02 RX ADMIN — IOHEXOL: 240 INJECTION, SOLUTION INTRATHECAL; INTRAVASCULAR; INTRAVENOUS; ORAL at 21:10

## 2021-02-02 NOTE — ED PROVIDER NOTES
EMERGENCY DEPARTMENT HISTORY AND PHYSICAL EXAM    Date: 2/2/2021  Patient Name: Bashir Jain    History of Presenting Illness     Chief Complaint   Patient presents with    Abdominal Pain       History Provided By: Patient    Chief Complaint: abd pain     Additional History (Context):   5:21 PM  Bashir Jain is a 39 y.o. female with PMHX anemia, hypertension, thromboembolism of internal iliac artery, pancreatitis, colitis, C. difficile, gastric bypass in 2000 with revision in 2018 presents to the emergency department C/O nausea vomiting and diarrhea for the past couple days with associated abdominal pain. Patient denies fevers. No chest pain or shortness of breath. Patient has been on Macrobid for UTI for the past several days. No black tarry stool. PCP: Liana RAMOS NP    Current Facility-Administered Medications   Medication Dose Route Frequency Provider Last Rate Last Admin    morphine injection 4 mg  4 mg IntraVENous NOW Melissa Pop PA        ondansetron [facility]) injection 4 mg  4 mg IntraVENous NOW Kailee Pop PA        sodium chloride 0.9 % bolus infusion 1,000 mL  1,000 mL IntraVENous ONCE ARNIE Alexander 1,000 mL/hr at 02/02/21 2304 1,000 mL at 02/02/21 2304     Current Outpatient Medications   Medication Sig Dispense Refill    metoclopramide (REGLAN) 5 mg/5 mL syrup Take 5 mL by mouth Before breakfast, lunch, dinner and at bedtime. 600 mL 1    sucralfate (CARAFATE) 1 gram tablet Take 1 Tab by mouth Before breakfast, lunch, dinner and at bedtime for 180 days. (Patient taking differently: Take 1 g by mouth four (4) times daily.) 120 Tab 5    rivaroxaban (XARELTO) 20 mg tab tablet Take 1 Tab by mouth daily. 30 Tab 0    acetaminophen (Tylenol Extra Strength) 500 mg tablet Take 1,000 mg by mouth every six (6) hours as needed for Pain.  escitalopram oxalate (LEXAPRO) 20 mg tablet Take 20 mg by mouth daily.  Indications: anxiousness associated with depression      traZODone (DESYREL) 100 mg tablet Take  by mouth nightly. Pt is unsure of the dose for this medication      multivitamin (ONE A DAY) tablet Take 1 Tab by mouth daily.          Past History     Past Medical History:  Past Medical History:   Diagnosis Date    Anemia     C. difficile diarrhea     Cancer ([facility]      history of endometrial cancer    Chronic pain     Colitis     Diarrhea     GERD (gastroesophageal reflux disease)     Hypertension 2017    Ill-defined condition     mass in  breast    Ill-defined condition     pancreatitis    Intestinal malabsorption     Pancreatitis     Psychiatric disorder     Anxiety and depression    Severe obesity with body mass index (BMI) of 35.0 to 39.9 with comorbidity ([facility]     Smoker     smokes 5 cigarettes per day    Status post gastric bypass for obesity     open / librado hermosillo    Syncopal episodes     has plans to see a neurologist     Thromboembolism of internal iliac artery ([facility]     Wears dentures     teeth extraction due to vomiting and vitamin deficiencies       Past Surgical History:  Past Surgical History:   Procedure Laterality Date    HX CHOLECYSTECTOMY      HX GASTRIC BYPASS      gastric bypass    HX GASTRIC BYPASS  2018    HX GI  2017    EGD    HX GI      colonoscopy    HX HYSTERECTOMY      HX OTHER SURGICAL Right     resection of benign breast mass       Family History:  Family History   Problem Relation Age of Onset    Diabetes Mother        Social History:  Social History     Tobacco Use    Smoking status: Former Smoker     Types: Cigarettes     Quit date: 2018     Years since quittin.4    Smokeless tobacco: Never Used   Substance Use Topics    Alcohol use: No     Alcohol/week: 1.0 standard drinks     Types: 1 Cans of beer per week     Frequency: Never     Comment: rare    Drug use: Yes     Types: Marijuana       Allergies:  No Known Allergies    Review of Systems   Review of Systems   Constitutional: Negative for chills and fever. Respiratory: Negative for shortness of breath. Cardiovascular: Negative for chest pain. Gastrointestinal: Positive for abdominal pain, diarrhea, nausea and vomiting. Genitourinary: Negative for hematuria. Musculoskeletal: Negative for back pain. Neurological: Negative for seizures, weakness and numbness. All other systems reviewed and are negative. Physical Exam     Vitals:    02/02/21 1945 02/02/21 2000 02/02/21 2030 02/02/21 2100   BP: (!) 161/116 (!) 168/114 (!) 162/105 (!) 143/91   Pulse:       Resp:       Temp:       SpO2: 100%      Weight:       Height:         Physical Exam  Vitals signs and nursing note reviewed. Constitutional:       Appearance: She is well-developed. Comments: Alert, lying on stretcher   HENT:      Head: Normocephalic and atraumatic. Neck:      Musculoskeletal: Normal range of motion and neck supple. Cardiovascular:      Rate and Rhythm: Normal rate and regular rhythm. Heart sounds: Normal heart sounds. No murmur. Pulmonary:      Effort: Pulmonary effort is normal. No respiratory distress. Breath sounds: Normal breath sounds. No wheezing or rales. Abdominal:      General: Bowel sounds are normal.      Palpations: Abdomen is soft. Tenderness: There is generalized abdominal tenderness. There is no right CVA tenderness or left CVA tenderness. Neurological:      Mental Status: She is alert and oriented to person, place, and time.    Psychiatric:         Judgment: Judgment normal.           Diagnostic Study Results     Labs:     Recent Results (from the past 12 hour(s))   URINALYSIS W/ RFLX MICROSCOPIC    Collection Time: 02/02/21  5:30 PM   Result Value Ref Range    Color YELLOW      Appearance CLOUDY      Specific gravity 1.025 1.005 - 1.030      pH (UA) 5.5 5.0 - 8.0      Protein 100 (A) NEG mg/dL    Glucose Negative NEG mg/dL    Ketone Negative NEG mg/dL    Bilirubin Negative NEG      Blood MODERATE (A) NEG Urobilinogen 0.2 0.2 - 1.0 EU/dL    Nitrites Negative NEG      Leukocyte Esterase Negative NEG     URINE MICROSCOPIC ONLY    Collection Time: 02/02/21  5:30 PM   Result Value Ref Range    WBC 0 to 3 0 - 5 /hpf    RBC 0 to 3 0 - 5 /hpf    Epithelial cells 1+ 0 - 5 /lpf    Bacteria FEW (A) NEG /hpf    Amorphous Crystals FEW (A) NEG     CBC WITH AUTOMATED DIFF    Collection Time: 02/02/21  6:10 PM   Result Value Ref Range    WBC 6.9 4.6 - 13.2 K/uL    RBC 3.24 (L) 4.20 - 5.30 M/uL    HGB 12.3 12.0 - 16.0 g/dL    HCT 36.3 35.0 - 45.0 %    .0 (H) 74.0 - 97.0 FL    MCH 38.0 (H) 24.0 - 34.0 PG    MCHC 33.9 31.0 - 37.0 g/dL    RDW 21.4 (H) 11.6 - 14.5 %    PLATELET 242 800 - 099 K/uL    MPV 9.2 9.2 - 11.8 FL    NEUTROPHILS 77 (H) 40 - 73 %    LYMPHOCYTES 17 (L) 21 - 52 %    MONOCYTES 6 3 - 10 %    EOSINOPHILS 0 0 - 5 %    BASOPHILS 0 0 - 2 %    ABS. NEUTROPHILS 5.3 1.8 - 8.0 K/UL    ABS. LYMPHOCYTES 1.2 0.9 - 3.6 K/UL    ABS. MONOCYTES 0.4 0.05 - 1.2 K/UL    ABS. EOSINOPHILS 0.0 0.0 - 0.4 K/UL    ABS.  BASOPHILS 0.0 0.0 - 0.1 K/UL    DF AUTOMATED     PROTHROMBIN TIME + INR    Collection Time: 02/02/21  6:17 PM   Result Value Ref Range    Prothrombin time 14.1 11.5 - 15.2 sec    INR 1.1 0.8 - 1.2     PTT    Collection Time: 02/02/21  6:17 PM   Result Value Ref Range    aPTT 27.9 23.0 - 36.4 SEC   C. DIFFICILE AG & TOXIN A/B    Collection Time: 02/02/21  6:42 PM   Result Value Ref Range    GDH ANTIGEN Negative NEG      C. difficile toxin Negative NEG      INTERPRETATION NEGATIVE FOR TOXIGENIC C. DIFFICILE NTXCD     EKG, 12 LEAD, INITIAL    Collection Time: 02/02/21  6:43 PM   Result Value Ref Range    Ventricular Rate 82 BPM    Atrial Rate 82 BPM    P-R Interval 144 ms    QRS Duration 80 ms    Q-T Interval 404 ms    QTC Calculation (Bezet) 472 ms    Calculated P Axis 64 degrees    Calculated R Axis 45 degrees    Calculated T Axis 46 degrees    Diagnosis       Poor data quality, interpretation may be adversely affected  Normal sinus rhythm  Poor R Wave Progression  Abnormal ECG  Confirmed by Hayley Vasques MD, Shasha Aliciaorestes ([id number]) on 2/2/2021 10:04:29 PM     LIPASE    Collection Time: 02/02/21  7:15 PM   Result Value Ref Range    Lipase 203 73 - 393 U/L   CARDIAC PANEL,(CK, CKMB & TROPONIN)    Collection Time: 02/02/21  7:15 PM   Result Value Ref Range    CK - MB <1.0 <3.6 ng/ml    CK-MB Index  0.0 - 4.0 %     CALCULATION NOT PERFORMED WHEN RESULT IS BELOW LINEAR LIMIT    CK 79 26 - 192 U/L    Troponin-I, QT <0.02 0.0 - 8.891 NG/ML   METABOLIC PANEL, COMPREHENSIVE    Collection Time: 02/02/21  7:15 PM   Result Value Ref Range    Sodium 137 136 - 145 mmol/L    Potassium 4.2 3.5 - 5.5 mmol/L    Chloride 105 100 - 111 mmol/L    CO2 23 21 - 32 mmol/L    Anion gap 9 3.0 - 18 mmol/L    Glucose 107 (H) 74 - 99 mg/dL    BUN 7 7.0 - 18 MG/DL    Creatinine 0.86 0.6 - 1.3 MG/DL    BUN/Creatinine ratio 8 (L) 12 - 20      GFR est AA >60 >60 ml/min/1.73m2    GFR est non-AA >60 >60 ml/min/1.73m2    Calcium 8.2 (L) 8.5 - 10.1 MG/DL    Bilirubin, total 0.6 0.2 - 1.0 MG/DL    ALT (SGPT) 136 (H) 13 - 56 U/L    AST (SGOT) 379 (H) 10 - 38 U/L    Alk. phosphatase 201 (H) 45 - 117 U/L    Protein, total 7.2 6.4 - 8.2 g/dL    Albumin 3.2 (L) 3.4 - 5.0 g/dL    Globulin 4.0 2.0 - 4.0 g/dL    A-G Ratio 0.8 0.8 - 1.7     AMYLASE    Collection Time: 02/02/21  7:15 PM   Result Value Ref Range    Amylase 47 25 - 115 U/L       Radiologic Studies:   CT ABD PELV W CONT   Final Result      There is acute edematous interstitial pancreatitis. There is a fluid collection   in the head of the pancreas measuring 12 mm which may represent focal area of   pancreatic necrosis and intrapancreatic fluid collection/pseudocyst. There is   secondary inflammation of the duodenum. Pericystic stranding around the urinary bladder. Correlate with urinalysis to   exclude cystitis.       XR CHEST PORT   Final Result   No acute process        CT Results  (Last 48 hours) 02/02/21 2206  CT ABD PELV W CONT Final result    Impression:      There is acute edematous interstitial pancreatitis. There is a fluid collection   in the head of the pancreas measuring 12 mm which may represent focal area of   pancreatic necrosis and intrapancreatic fluid collection/pseudocyst. There is   secondary inflammation of the duodenum. Pericystic stranding around the urinary bladder. Correlate with urinalysis to   exclude cystitis. Narrative:  EXAM: CT of the abdomen and pelvis       INDICATION: Upper abdominal pain nausea vomiting       COMPARISON: August 23, 2020       TECHNIQUE: Axial CT imaging of the abdomen and pelvis was performed with   intravenous contrast. Multiplanar reformats were generated. One or more dose   reduction techniques were used on this CT: automated exposure control,   adjustment of the mAs and/or kVp according to patient size, and iterative   reconstruction techniques. The specific techniques used on this CT exam have   been documented in the patient's electronic medical record. Digital Imaging and   Communications in Medicine (DICOM) format image data are available to   nonaffiliated external healthcare facilities or entities on a secure, media   free, reciprocally searchable basis with patient authorization for at least a   12-month period after this study. _______________       FINDINGS:       LOWER CHEST: Unremarkable. LIVER, BILIARY: Liver is normal. No biliary dilation. Cholecystectomy       PANCREAS: There is peripancreatic inflammation suggesting acute edematous   interstitial pancreatitis. There is 11 mm low-attenuation in the head of the   pancreas seen on axial image 43 suggesting intrapancreatic fluid collection       SPLEEN: Normal.       ADRENALS: Normal.       KIDNEYS: Normal.       VASCULATURE: Unremarkable       LYMPH NODES: No enlarged lymph nodes. GASTROINTESTINAL TRACT: Post gastric bypass changes present.  There is wall thickening of the second portion the duodenum secondary to the adjacent acute   pancreatitis. PELVIC ORGANS: Urinary bladder is under distended and there is pericystic   stranding concerning for cystitis. Hysterectomy. No free fluid. BONES: No acute or aggressive osseous abnormalities identified. OTHER:       _______________               CXR Results  (Last 48 hours)               02/02/21 1827  XR CHEST PORT Final result    Impression:  No acute process       Narrative:  EXAM:  AP Portable Chest X-ray 1 view        INDICATION: Abdominal pain for one week       COMPARISON: September 10, 2020       _______________       FINDINGS:  Heart and mediastinal contours are within normal limits for portable   radiograph. Lungs are clear of active disease. There are no pleural effusions. No acute osseous findings. ________________                     Medical Decision Making   I am the first provider for this patient. I reviewed the vital signs, available nursing notes, past medical history, past surgical history, family history and social history. Vital Signs: Reviewed the patient's vital signs. Pulse Oximetry Analysis: 100% on RA       EKG interpretation: (Preliminary)  5:21 PM   Normal sinus rhythm rate 82 bpm no STEMI   EKG read by Kirby Arevalo PA-C   at 6:57 PM       Records Reviewed: Nursing Notes and Old Medical Records    Procedures:  Procedures    ED Course:   5:21 PM Initial assessment performed. The patients presenting problems have been discussed, and they are in agreement with the care plan formulated and outlined with them. I have encouraged them to ask questions as they arise throughout their visit. Core Measures:  For Hospitalized Patients:    1. Hospitalization Decision Time:  The decision to hospitalize the patient was made by Kirby Arevalo PA-C   at 11:05 PM   on 2/2/2021    2.  Aspirin: Aspirin was not given because the patient did not present with a stroke at the time of their Emergency Department evaluation    11:05 PM  Patient is being admitted to the hospital by [facility]. The results of their tests and reasons for their admission have been discussed with them and/or available family. They convey agreement and understanding for the need to be admitted and for their admission diagnosis. CONDITIONS ON ADMISSION:  Sepsis is not present at the time of admission. Deep Vein Thrombosis is not present at the time of admission. Thrombosis is not present at the time of admission. Urinary Tract Infection is not present at the time of admission. Pneumonia is not present at the time of admission. MRSA is not present at the time of admission. Wound infection is not present at the time of admission. Pressure Ulcer is not present at the time of admission. Pt with Long history of colitis, C. difficile, pancreatitis, gastric bypass presents with abdominal pain nausea vomiting and diarrhea. Recent UTI treatment. Stool cultures pending. CT of abdomen shows acute pancreatitis with fluid collection or pseudocyst.  Some evidence of cystitis. No active infection on UA but she does have hematuria. Will admit for pain control and hydration with possible GI consult if needed    CLINICAL IMPRESSION:    1. Acute on chronic pancreatitis ([address]    2. Hematuria, unspecified type            Diagnosis and Disposition       CLINICAL IMPRESSION:    1. Acute on chronic pancreatitis ([address]    2. Hematuria, unspecified type        PLAN:  1. admit         Please note that this dictation was completed with [employer], the computer voice recognition software. Quite often unanticipated grammatical, syntax, homophones, and other interpretive errors are inadvertently transcribed by the computer software. Please disregard these errors. Please excuse any errors that have escaped final proofreading.

## 2021-02-02 NOTE — Clinical Note
Status[de-identified] INPATIENT [101]   Type of Bed: Medical [8]   Inpatient Hospitalization Certified Necessary for the Following Reasons: 3.  Patient receiving treatment that can only be provided in an inpatient setting (further clarification in H&P documentation)   Admitting Diagnosis: Pancreatitis [613516]   Admitting Diagnosis: Hematuria [[id number]]   Admitting Physician: Nuvia Hopkins [[id number]]   Attending Physician: Nuvia Hopkins [[id number]]   Estimated Length of Stay: 2 Midnights   Discharge Plan[de-identified] Home with Office Follow-up

## 2021-02-03 ENCOUNTER — APPOINTMENT (OUTPATIENT)
Dept: GENERAL RADIOLOGY | Age: 46
DRG: 282 | End: 2021-02-03
Attending: FAMILY MEDICINE
Payer: MEDICAID

## 2021-02-03 ENCOUNTER — APPOINTMENT (OUTPATIENT)
Dept: MRI IMAGING | Age: 46
DRG: 282 | End: 2021-02-03
Attending: INTERNAL MEDICINE
Payer: MEDICAID

## 2021-02-03 PROBLEM — R74.01 TRANSAMINITIS: Status: ACTIVE | Noted: 2021-02-03

## 2021-02-03 PROBLEM — N39.0 UTI (URINARY TRACT INFECTION): Status: ACTIVE | Noted: 2021-02-03

## 2021-02-03 PROBLEM — R10.9 ABDOMINAL PAIN: Status: ACTIVE | Noted: 2021-02-03

## 2021-02-03 PROBLEM — S59.901A INJURY OF RIGHT ELBOW: Status: ACTIVE | Noted: 2021-02-03

## 2021-02-03 LAB
ALBUMIN SERPL-MCNC: 2.5 G/DL (ref 3.4–5)
ALBUMIN/GLOB SERPL: 0.8 {RATIO} (ref 0.8–1.7)
ALP SERPL-CCNC: 226 U/L (ref 45–117)
ALT SERPL-CCNC: 150 U/L (ref 13–56)
ANION GAP SERPL CALC-SCNC: 8 MMOL/L (ref 3–18)
AST SERPL-CCNC: 678 U/L (ref 10–38)
BASOPHILS # BLD: 0 K/UL (ref 0–0.1)
BASOPHILS NFR BLD: 0 % (ref 0–2)
BILIRUB DIRECT SERPL-MCNC: 0.7 MG/DL (ref 0–0.2)
BILIRUB SERPL-MCNC: 1.3 MG/DL (ref 0.2–1)
BUN SERPL-MCNC: 3 MG/DL (ref 7–18)
BUN/CREAT SERPL: 4 (ref 12–20)
CALCIUM SERPL-MCNC: 7.3 MG/DL (ref 8.5–10.1)
CAMPYLOBACTER SPECIES, DNA: NEGATIVE
CHLORIDE SERPL-SCNC: 105 MMOL/L (ref 100–111)
CHOLEST SERPL-MCNC: 139 MG/DL
CO2 SERPL-SCNC: 23 MMOL/L (ref 21–32)
CREAT SERPL-MCNC: 0.68 MG/DL (ref 0.6–1.3)
DIFFERENTIAL METHOD BLD: ABNORMAL
ENTEROTOXIGEN E COLI, DNA: NEGATIVE
EOSINOPHIL # BLD: 0 K/UL (ref 0–0.4)
EOSINOPHIL NFR BLD: 1 % (ref 0–5)
ERYTHROCYTE [DISTWIDTH] IN BLOOD BY AUTOMATED COUNT: 21.3 % (ref 11.6–14.5)
FOLATE SERPL-MCNC: 4.9 NG/ML (ref 3.1–17.5)
GLOBULIN SER CALC-MCNC: 3.1 G/DL (ref 2–4)
GLUCOSE SERPL-MCNC: 128 MG/DL (ref 74–99)
HCT VFR BLD AUTO: 29.9 % (ref 35–45)
HDLC SERPL-MCNC: 60 MG/DL (ref 40–60)
HDLC SERPL: 2.3 {RATIO} (ref 0–5)
HGB BLD-MCNC: 10 G/DL (ref 12–16)
LDLC SERPL CALC-MCNC: 54.2 MG/DL (ref 0–100)
LIPID PROFILE,FLP: NORMAL
LYMPHOCYTES # BLD: 0.6 K/UL (ref 0.9–3.6)
LYMPHOCYTES NFR BLD: 22 % (ref 21–52)
MCH RBC QN AUTO: 37.7 PG (ref 24–34)
MCHC RBC AUTO-ENTMCNC: 33.4 G/DL (ref 31–37)
MCV RBC AUTO: 112.8 FL (ref 74–97)
MONOCYTES # BLD: 0.2 K/UL (ref 0.05–1.2)
MONOCYTES NFR BLD: 8 % (ref 3–10)
NEUTS SEG # BLD: 2.1 K/UL (ref 1.8–8)
NEUTS SEG NFR BLD: 69 % (ref 40–73)
P SHIGELLOIDES DNA STL QL NAA+PROBE: NEGATIVE
PLATELET # BLD AUTO: 232 K/UL (ref 135–420)
PMV BLD AUTO: 9.3 FL (ref 9.2–11.8)
POTASSIUM SERPL-SCNC: 3.6 MMOL/L (ref 3.5–5.5)
PROT SERPL-MCNC: 5.6 G/DL (ref 6.4–8.2)
RBC # BLD AUTO: 2.65 M/UL (ref 4.2–5.3)
SALMONELLA SPECIES, DNA: POSITIVE
SHIGA TOXIN PRODUCING, DNA: NEGATIVE
SHIGELLA SP+EIEC IPAH STL QL NAA+PROBE: NEGATIVE
SODIUM SERPL-SCNC: 136 MMOL/L (ref 136–145)
TRIGL SERPL-MCNC: 124 MG/DL (ref ?–150)
VIBRIO SPECIES, DNA: NEGATIVE
VIT B12 SERPL-MCNC: 433 PG/ML (ref 211–911)
VLDLC SERPL CALC-MCNC: 24.8 MG/DL
WBC # BLD AUTO: 3 K/UL (ref 4.6–13.2)
Y. ENTEROCOLITICA, DNA: NEGATIVE

## 2021-02-03 PROCEDURE — 65270000029 HC RM PRIVATE

## 2021-02-03 PROCEDURE — 74011000250 HC RX REV CODE- 250: Performed by: FAMILY MEDICINE

## 2021-02-03 PROCEDURE — 80048 BASIC METABOLIC PNL TOTAL CA: CPT

## 2021-02-03 PROCEDURE — 74011250637 HC RX REV CODE- 250/637: Performed by: FAMILY MEDICINE

## 2021-02-03 PROCEDURE — 73080 X-RAY EXAM OF ELBOW: CPT

## 2021-02-03 PROCEDURE — 85025 COMPLETE CBC W/AUTO DIFF WBC: CPT

## 2021-02-03 PROCEDURE — 74011250637 HC RX REV CODE- 250/637: Performed by: INTERNAL MEDICINE

## 2021-02-03 PROCEDURE — 82607 VITAMIN B-12: CPT

## 2021-02-03 PROCEDURE — 87086 URINE CULTURE/COLONY COUNT: CPT

## 2021-02-03 PROCEDURE — 74011636320 HC RX REV CODE- 636/320: Performed by: FAMILY MEDICINE

## 2021-02-03 PROCEDURE — 36415 COLL VENOUS BLD VENIPUNCTURE: CPT

## 2021-02-03 PROCEDURE — 80076 HEPATIC FUNCTION PANEL: CPT

## 2021-02-03 PROCEDURE — 74011250636 HC RX REV CODE- 250/636: Performed by: FAMILY MEDICINE

## 2021-02-03 PROCEDURE — 77030021566 MRI ABD W WO CONT

## 2021-02-03 PROCEDURE — A9575 INJ GADOTERATE MEGLUMI 0.1ML: HCPCS | Performed by: FAMILY MEDICINE

## 2021-02-03 PROCEDURE — 80061 LIPID PANEL: CPT

## 2021-02-03 RX ORDER — SODIUM CHLORIDE 9 MG/ML
75 INJECTION, SOLUTION INTRAVENOUS CONTINUOUS
Status: DISCONTINUED | OUTPATIENT
Start: 2021-02-03 | End: 2021-02-05

## 2021-02-03 RX ORDER — OXYCODONE HYDROCHLORIDE 5 MG/1
5 TABLET ORAL
Status: DISCONTINUED | OUTPATIENT
Start: 2021-02-03 | End: 2021-02-07 | Stop reason: HOSPADM

## 2021-02-03 RX ORDER — MORPHINE SULFATE 10 MG/ML
4 INJECTION, SOLUTION INTRAMUSCULAR; INTRAVENOUS
Status: DISCONTINUED | OUTPATIENT
Start: 2021-02-02 | End: 2021-02-03

## 2021-02-03 RX ORDER — HYDROMORPHONE HYDROCHLORIDE 1 MG/ML
1 INJECTION, SOLUTION INTRAMUSCULAR; INTRAVENOUS; SUBCUTANEOUS
Status: DISCONTINUED | OUTPATIENT
Start: 2021-02-03 | End: 2021-02-07 | Stop reason: HOSPADM

## 2021-02-03 RX ORDER — TRAZODONE HYDROCHLORIDE 100 MG/1
100 TABLET ORAL
Status: DISCONTINUED | OUTPATIENT
Start: 2021-02-03 | End: 2021-02-07 | Stop reason: HOSPADM

## 2021-02-03 RX ORDER — ONDANSETRON 2 MG/ML
4 INJECTION INTRAMUSCULAR; INTRAVENOUS
Status: DISCONTINUED | OUTPATIENT
Start: 2021-02-03 | End: 2021-02-07 | Stop reason: HOSPADM

## 2021-02-03 RX ORDER — SODIUM CHLORIDE 0.9 % (FLUSH) 0.9 %
5-40 SYRINGE (ML) INJECTION EVERY 8 HOURS
Status: DISCONTINUED | OUTPATIENT
Start: 2021-02-03 | End: 2021-02-07 | Stop reason: HOSPADM

## 2021-02-03 RX ORDER — DIPHENHYDRAMINE HYDROCHLORIDE 50 MG/ML
25 INJECTION, SOLUTION INTRAMUSCULAR; INTRAVENOUS
Status: DISCONTINUED | OUTPATIENT
Start: 2021-02-03 | End: 2021-02-07 | Stop reason: HOSPADM

## 2021-02-03 RX ORDER — SODIUM CHLORIDE 0.9 % (FLUSH) 0.9 %
5-40 SYRINGE (ML) INJECTION AS NEEDED
Status: DISCONTINUED | OUTPATIENT
Start: 2021-02-02 | End: 2021-02-07 | Stop reason: HOSPADM

## 2021-02-03 RX ADMIN — DIPHENHYDRAMINE HYDROCHLORIDE 25 MG: 50 INJECTION, SOLUTION INTRAMUSCULAR; INTRAVENOUS at 14:10

## 2021-02-03 RX ADMIN — MORPHINE SULFATE 4 MG: 10 INJECTION INTRAVENOUS at 01:34

## 2021-02-03 RX ADMIN — OXYCODONE HYDROCHLORIDE 5 MG: 5 TABLET ORAL at 20:03

## 2021-02-03 RX ADMIN — DIPHENHYDRAMINE HYDROCHLORIDE 25 MG: 50 INJECTION, SOLUTION INTRAMUSCULAR; INTRAVENOUS at 20:43

## 2021-02-03 RX ADMIN — MORPHINE SULFATE 4 MG: 10 INJECTION INTRAVENOUS at 05:32

## 2021-02-03 RX ADMIN — GADOTERATE MEGLUMINE 20 ML: 376.9 INJECTION INTRAVENOUS at 10:43

## 2021-02-03 RX ADMIN — HYDROMORPHONE HYDROCHLORIDE 1 MG: 1 INJECTION, SOLUTION INTRAMUSCULAR; INTRAVENOUS; SUBCUTANEOUS at 14:11

## 2021-02-03 RX ADMIN — DIPHENHYDRAMINE HYDROCHLORIDE 25 MG: 50 INJECTION, SOLUTION INTRAMUSCULAR; INTRAVENOUS at 07:57

## 2021-02-03 RX ADMIN — CEFTRIAXONE 1 G: 1 INJECTION, POWDER, FOR SOLUTION INTRAMUSCULAR; INTRAVENOUS at 07:42

## 2021-02-03 RX ADMIN — HYDROMORPHONE HYDROCHLORIDE 1 MG: 1 INJECTION, SOLUTION INTRAMUSCULAR; INTRAVENOUS; SUBCUTANEOUS at 07:43

## 2021-02-03 RX ADMIN — SODIUM CHLORIDE 125 ML/HR: 900 INJECTION, SOLUTION INTRAVENOUS at 00:48

## 2021-02-03 RX ADMIN — SODIUM CHLORIDE 125 ML/HR: 900 INJECTION, SOLUTION INTRAVENOUS at 07:43

## 2021-02-03 RX ADMIN — Medication 10 ML: at 00:47

## 2021-02-03 RX ADMIN — SODIUM CHLORIDE 125 ML/HR: 900 INJECTION, SOLUTION INTRAVENOUS at 17:35

## 2021-02-03 RX ADMIN — TRAZODONE HYDROCHLORIDE 100 MG: 100 TABLET ORAL at 22:10

## 2021-02-03 RX ADMIN — TRAZODONE HYDROCHLORIDE 100 MG: 100 TABLET ORAL at 01:34

## 2021-02-03 RX ADMIN — DIPHENHYDRAMINE HYDROCHLORIDE 25 MG: 50 INJECTION, SOLUTION INTRAMUSCULAR; INTRAVENOUS at 01:35

## 2021-02-03 RX ADMIN — Medication 10 ML: at 08:01

## 2021-02-03 NOTE — ED NOTES
Patient resting, no change in assessment from previous RN - comfort measures offered and assisted to bathroom, will continue to monitor.

## 2021-02-03 NOTE — PROGRESS NOTES
Hospitalist Progress Note    Patient: Juan C Shane MRN: [id number]  CSN: [id number]    YOB: 1975  Age: 39 y.o. Sex: female    DOA: 2/2/2021 LOS:  LOS: 1 day          Chief Complaint:    Still with abdominal pain  Complains of itching on dilaudid       Assessment/Plan   Acute pancreatitis -  CT scan abd/pelvis: acute edematous interstitial pancreatitis.  There is a fluid collection  in the head of the pancreas measuring 12 mm which may represent focal area of  pancreatic necrosis and intrapancreatic fluid collection/pseudocyst  Lipase normal > expected for h/o chronic pancreatitis   Pain control  Bowel rest with clear liquid diet   Antiemetics    Discussed with GI conservative treatment for now   Will check MRI     Abdominal pain associated with nausea, vomiting and diarrhea -  Pain control, clear liquid diet, anti-emetics      Transaminitis -  Will follow with daily labs      UTI -  Recent treatment with macrobid   Pericystic stranding around the urinary bladder on CT abd/pelvis   Order urine culture pending   Now on Rocephin      Alcohol use -  Consider CIWA protocol   Patient last drank saturday     Possible C-Diff infection -C difficile toxin NEGATIVE     Right olecranon injury -order xray      DVT protocol: SCDs/GI prophylaxis: Pepcid    Patient Active Problem List   Diagnosis Code    Status post gastric bypass for obesity Z98.84    Hypertension I10    Anemia D64.9    Anxiety F41.9    GERD (gastroesophageal reflux disease) K21.9    Smoker F17.200    Portal vein thrombosis I81    Gastritis and duodenitis K29.90    Acute on chronic pancreatitis (HCC) K85.90, K86.1    Pancreatitis K85.90    Nausea & vomiting R11.2    Elevated liver enzymes R74.8    Pancreatitis, alcoholic, acute I62.42    Hematuria R31.9    UTI (urinary tract infection) N39.0    Abdominal pain R10.9    Transaminitis R74.01    Injury of right elbow S59.901A       Subjective:        Review of systems:    Constitutional: denies fevers, chills, myalgias  Respiratory: denies SOB, cough  Cardiovascular: denies chest pain, palpitations  Gastrointestinal: denies nausea, vomiting, diarrhea      Vital signs/Intake and Output:  Visit Vitals  /74 (BP 1 Location: Right upper arm, BP Patient Position: Lying left side)   Pulse (!) 101   Temp 99.2 °F (37.3 °C)   Resp 17   Ht 5' 5\" (1.651 m)   Wt 95.7 kg (211 lb)   SpO2 99%   Breastfeeding No   BMI 35.11 kg/m²     Current Shift:  No intake/output data recorded.   Last three shifts:  02/01 1901 - 02/03 0700  In: 2000 [I.V.:2000]  Out: -     Exam:    General: Well developed, alert, NAD, OX3  Head/Neck: NCAT, supple, No masses, No lymphadenopathy  CVS:Regular rate and rhythm, no M/R/G, S1/S2 heard, no thrill  Lungs:Clear to auscultation bilaterally, no wheezes, rhonchi, or rales  Abdomen:  Nausea /abdominal pain   Extremities: No C/C/E, pulses palpable 2+  Skin:normal texture and turgor, no rashes, no lesions  Neuro:grossly normal , follows commands  Psych:appropriate                Labs: Results:       Chemistry Recent Labs     02/02/21 1915   *      K 4.2      CO2 23   BUN 7   CREA 0.86   CA 8.2*   AGAP 9   BUCR 8*   *   TP 7.2   ALB 3.2*   GLOB 4.0   AGRAT 0.8      CBC w/Diff Recent Labs     02/02/21 1810   WBC 6.9   RBC 3.24*   HGB 12.3   HCT 36.3      GRANS 77*   LYMPH 17*   EOS 0      Cardiac Enzymes Recent Labs     02/02/21 1915   CPK 79   CKND1 CALCULATION NOT PERFORMED WHEN RESULT IS BELOW LINEAR LIMIT      Coagulation Recent Labs     02/02/21 1817   PTP 14.1   INR 1.1   APTT 27.9       Lipid Panel Lab Results   Component Value Date/Time    Cholesterol, total 187 09/10/2020 05:20 PM    HDL Cholesterol 54 09/10/2020 05:20 PM    LDL, calculated 106.6 (H) 09/10/2020 05:20 PM    VLDL, calculated 26.4 09/10/2020 05:20 PM    Triglyceride 132 09/10/2020 05:20 PM    CHOL/HDL Ratio 3.5 09/10/2020 05:20 PM      BNP No results for input(s): BNPP in the last 72 hours.    Liver Enzymes Recent Labs     02/02/21  1915   TP 7.2   ALB 3.2*   *      Thyroid Studies Lab Results   Component Value Date/Time    TSH 2.18 06/29/2020 12:35 AM        Procedures/imaging: see electronic medical records for all procedures/Xrays and details which were not copied into this note but were reviewed prior to creation of Sarina Mcclelland MD

## 2021-02-03 NOTE — ED NOTES
Verbal shift change report received from Abhijit Gibson RN (offgoing nurse). Report included the following information SBAR, Kardex, ED Summary, [facility] and Recent Results.

## 2021-02-03 NOTE — PROGRESS NOTES
Reason for Admission:   abdominal pain with NVD                   RUR Score:    Low; 16                 Plan for utilizing home health:   [facility]       PCP: First and Last name:     Name of Practice:    Are you a current patient: Yes/No:    Approximate date of last visit:    Can you participate in a virtual visit with your PCP:                     Current Advanced Directive/Advance Care Plan:                          Transition of Care Plan:      Home with physician follow up vs [facility] and physician follow up                 Chart reviewed. Per H&P Ronel Nunes is a 39 y.o. female with past medical history of anemia, hypertension, thromboembolism of internal iliac artery, pancreatitis, colitis, C. difficile, gastric bypass in 2000 with revision in 2018 presents to the emergency department C/O nausea vomiting and diarrhea for the past couple days with associated abdominal pain.  Patient denies fevers.  No chest pain or shortness of breath.  Patient has been on Macrobid for UTI for the past several days.  No black tarry stool.    Reports that symptoms have been increasing actually over the past 1 to 2 weeks but it got acutely worse in the last 4 days. Initially just had heartburn-like symptoms. States that her symptoms may be related to increasing stressors in her life including family issues with her sibling and homeschooling her children etc.  She states that she has increased her alcohol intake but only admits to drinking Moscato because she does not drink beer or hard liquor. Has not taken any new supplements or vitamins except for Metamucil. Also reports falling off her bed and injuring her right elbow. She reports that her bed is very high off the floor and that she has a step on and off of a stool on and off of the bed.   She notes that a day ago while she was trying to step down from her bed she missed her stool entirely lost her balance and fell striking her right elbow she denies hitting her head she also denies any  dizziness lightheadedness or loss of consciousness. Takes Trazodone to sleep and wants to make sure she gets it. \"    Please encourage ambulation to assist with identifying potential transition of care needs. Anticipate pt will transition home with physician follow up vs [facility] and physician follow up. CM to continue to follow an assist.    1140:  CM met with pt at bedside to discuss transition of care. Pt is independent and has family in the area to assist as needed. Anticipate pt will transition home with physician follow up when medically stable. Please encourage ambulation to assist with identifying potential transition of care needs. Care Management Interventions  Mode of Transport at Discharge:  Other (see comment)(Family)  Transition of Care Consult (CM Consult): Discharge Planning  Health Maintenance Reviewed: Yes  Current Support Network: Family Lives Nearby  The Plan for Transition of Care is Related to the Following Treatment Goals : Home with physician follow up   Discharge Location  Discharge Placement: Home with family assistance

## 2021-02-03 NOTE — ED NOTES
Report called to Gideon Canada on [address] - will prepare for transport to inpatient floor.

## 2021-02-03 NOTE — ED NOTES
Medications given as ordered, patient states pain 7/10 at this time. Comfort measures offered and declined, will continue to monitor.

## 2021-02-03 NOTE — ED NOTES
PO Contrast given as ordered per bariatric protocol. Patient resting, states pain 9/10 at this time, no further changes in assessment - comfort measures offered and declined, will continue to monitor.

## 2021-02-03 NOTE — H&P
History & Physical    Patient: Leda Olszewski MRN: [id number]  CSN: [id number]    YOB: 1975  Age: 39 y.o. Sex: female      DOA: 2/2/2021  Primary Care Provider:  Hilaria Delaney NP      Assessment/Plan   Acute pancreatitis -  CT scan abd/pelvis: acute edematous interstitial pancreatitis.  There is a fluid collection  in the head of the pancreas measuring 12 mm which may represent focal area of  pancreatic necrosis and intrapancreatic fluid collection/pseudocyst  Lipase normal > expected for h/o chronic pancreatitis   Pain control  Bowel rest with clear liquid diet   Antiemetics     Abdominal pain associated with nausea, vomiting and diarrhea -  Pain control, clear liquid diet, anti-emetics     Transaminitis -  Will follow with daily labs     UTI -  Recent treatment with macrobid   Pericystic stranding around the urinary bladder on CT abd/pelvis   Order urine culture     Alcohol use -  Consider CIWA protocol     Possible C-Diff infection -C difficile toxin NEGATIVE    Right olecranon injury -order xray     DVT protocol: SCDs/GI prophylaxis: Pepcid      Patient Active Problem List   Diagnosis Code    Status post gastric bypass for obesity Z98.84    Hypertension I10    Anemia D64.9    Anxiety F41.9    GERD (gastroesophageal reflux disease) K21.9    Smoker F17.200    Portal vein thrombosis I81    Gastritis and duodenitis K29.90    Acute on chronic pancreatitis (HCC) K85.90, K86.1    Pancreatitis K85.90    Nausea & vomiting R11.2    Elevated liver enzymes R74.8    Pancreatitis, alcoholic, acute Y24.94    Hematuria R31.9     Estimated length of stay: 2-3 days     CC: abdominal pain with NVD       HPI:     Leda Olszewski is a 39 y.o. female with past medical history of anemia, hypertension, thromboembolism of internal iliac artery, pancreatitis, colitis, C. difficile, gastric bypass in 2000 with revision in 2018 presents to the emergency department C/O nausea vomiting and diarrhea for the past couple days with associated abdominal pain. Patient denies fevers. No chest pain or shortness of breath. Patient has been on Macrobid for UTI for the past several days. No black tarry stool. Reports that symptoms have been increasing actually over the past 1 to 2 weeks but it got acutely worse in the last 4 days. Initially just had heartburn-like symptoms. States that her symptoms may be related to increasing stressors in her life including family issues with her sibling and homeschooling her children etc.  She states that she has increased her alcohol intake but only admits to drinking Moscato because she does not drink beer or hard liquor. Has not taken any new supplements or vitamins except for Metamucil. Also reports falling off her bed and injuring her right elbow. She reports that her bed is very high off the floor and that she has a step on and off of a stool on and off of the bed. She notes that a day ago while she was trying to step down from her bed she missed her stool entirely lost her balance and fell striking her right elbow she denies hitting her head she also denies any  dizziness lightheadedness or loss of consciousness.    Takes Trazodone to sleep and wants to make sure she gets it.        Past Medical History:   Diagnosis Date    Anemia     C. difficile diarrhea     Cancer ([facility] [address] 2011     history of endometrial cancer    Chronic pain     Colitis     Diarrhea     GERD (gastroesophageal reflux disease)     Hypertension 2017    Ill-defined condition     mass in  breast    Ill-defined condition     pancreatitis    Intestinal malabsorption     Pancreatitis     Psychiatric disorder     Anxiety and depression    Severe obesity with body mass index (BMI) of 35.0 to 39.9 with comorbidity ([facility] [address]     Smoker     smokes 5 cigarettes per day    Status post gastric bypass for obesity 2000    open / librado hermosillo    Syncopal episodes     has plans to see a neurologist     Thromboembolism of internal iliac artery (HCC)     Wears dentures     teeth extraction due to vomiting and vitamin deficiencies       Past Surgical History:   Procedure Laterality Date    HX CHOLECYSTECTOMY      HX GASTRIC BYPASS      gastric bypass    HX GASTRIC BYPASS  2018    HX GI  2017    EGD    HX GI      colonoscopy    HX HYSTERECTOMY      HX OTHER SURGICAL Right     resection of benign breast mass       Family History   Problem Relation Age of Onset    Diabetes Mother        Social History     Socioeconomic History    Marital status: SINGLE     Spouse name: Not on file    Number of children: Not on file    Years of education: Not on file    Highest education level: Not on file   Tobacco Use    Smoking status: Former Smoker     Types: Cigarettes     Quit date: 2018     Years since quittin.4    Smokeless tobacco: Never Used   Substance and Sexual Activity    Alcohol use: No     Alcohol/week: 1.0 standard drinks     Types: 1 Cans of beer per week     Frequency: Never     Comment: rare    Drug use: Yes     Types: Marijuana    Sexual activity: Not Currently     Birth control/protection: Surgical       Prior to Admission medications    Medication Sig Start Date End Date Taking? Authorizing Provider   metoclopramide (REGLAN) 5 mg/5 mL syrup Take 5 mL by mouth Before breakfast, lunch, dinner and at bedtime. 20   Jin Hayden MD   sucralfate (CARAFATE) 1 gram tablet Take 1 Tab by mouth Before breakfast, lunch, dinner and at bedtime for 180 days. Patient taking differently: Take 1 g by mouth four (4) times daily. 9/14/20 3/13/21  Jin Hayden MD   rivaroxaban (XARELTO) 20 mg tab tablet Take 1 Tab by mouth daily. 20   Tung Callaway MD   acetaminophen (Tylenol Extra Strength) 500 mg tablet Take 1,000 mg by mouth every six (6) hours as needed for Pain. Provider, Historical   escitalopram oxalate (LEXAPRO) 20 mg tablet Take 20 mg by mouth daily. Indications: anxiousness associated with depression    Provider, Historical   traZODone (DESYREL) 100 mg tablet Take  by mouth nightly. Pt is unsure of the dose for this medication    Provider, Historical   multivitamin (ONE A DAY) tablet Take 1 Tab by mouth daily. Provider, Historical       No Known Allergies    Review of Systems  Gen: No fever, chills, malaise, weight loss/gain. Heent: No headache, rhinorrhea, epistaxis, ear pain, hearing loss, sinus pain, neck pain/stiffness, sore throat. Heart: No chest pain, palpitations, HAYES, pnd, or orthopnea. Resp: No cough, hemoptysis, wheezing and shortness of breath. GI: +nausea, vomiting, diarrhea, no constipation, melena or hematochezia. : No urinary obstruction, dysuria or hematuria. Derm: No rash, new skin lesion or pruritis. Musc/skeletal: right elbow injury and pain with ROM   Vasc: No edema, cyanosis or claudication. Endo: No heat/cold intolerance, no polyuria,polydipsia or polyphagia. Neuro: No unilateral weakness, numbness, tingling. No seizures. Heme: No easy bruising or bleeding. Physical Exam:     Physical Exam:  Visit Vitals  BP (!) 162/100   Pulse 97   Temp 97.1 °F (36.2 °C)   Resp 16   Ht 5' 5\" (1.651 m)   Wt 88.9 kg (196 lb)   SpO2 100%   BMI 32.62 kg/m²      O2 Device: Room air    Temp (24hrs), Av.1 °F (36.2 °C), Min:97.1 °F (36.2 °C), Max:97.1 °F (36.2 °C)     1901 -  0700  In: 1000 [I.V.:1000]  Out: -    No intake/output data recorded. General:  Awake, cooperative, no distress. Head:  Normocephalic, without obvious abnormality, atraumatic. Eyes:  Conjunctivae/corneas clear, sclera anicteric, PERRL, EOMs intact. Nose: Nares normal. No drainage or sinus tenderness. Throat: Lips, mucosa, and tongue normal.    Neck: Supple, symmetrical, trachea midline, no adenopathy. Lungs:   Clear to auscultation bilaterally.        Heart:  Regular rate and rhythm, S1, S2 normal, no murmur, click, rub or gallop. Abdomen: Soft, non-tender. Bowel sounds normal. No masses,  No organomegaly. Extremities: Extremities normal, atraumatic, no cyanosis or edema. Capillary refill normal, right olecranon ecchymosis   Pulses: 2+ and symmetric all extremities. Skin: Skin color as per ethnicity, turgor normal. No rashes or lesions   Neurologic: CNII-XII intact. No focal motor or sensory deficit. Labs Reviewed:  Recent Results (from the past 24 hour(s))   URINALYSIS W/ RFLX MICROSCOPIC    Collection Time: 02/02/21  5:30 PM   Result Value Ref Range    Color YELLOW      Appearance CLOUDY      Specific gravity 1.025 1.005 - 1.030      pH (UA) 5.5 5.0 - 8.0      Protein 100 (A) NEG mg/dL    Glucose Negative NEG mg/dL    Ketone Negative NEG mg/dL    Bilirubin Negative NEG      Blood MODERATE (A) NEG      Urobilinogen 0.2 0.2 - 1.0 EU/dL    Nitrites Negative NEG      Leukocyte Esterase Negative NEG     URINE MICROSCOPIC ONLY    Collection Time: 02/02/21  5:30 PM   Result Value Ref Range    WBC 0 to 3 0 - 5 /hpf    RBC 0 to 3 0 - 5 /hpf    Epithelial cells 1+ 0 - 5 /lpf    Bacteria FEW (A) NEG /hpf    Amorphous Crystals FEW (A) NEG     CBC WITH AUTOMATED DIFF    Collection Time: 02/02/21  6:10 PM   Result Value Ref Range    WBC 6.9 4.6 - 13.2 K/uL    RBC 3.24 (L) 4.20 - 5.30 M/uL    HGB 12.3 12.0 - 16.0 g/dL    HCT 36.3 35.0 - 45.0 %    .0 (H) 74.0 - 97.0 FL    MCH 38.0 (H) 24.0 - 34.0 PG    MCHC 33.9 31.0 - 37.0 g/dL    RDW 21.4 (H) 11.6 - 14.5 %    PLATELET 490 908 - 495 K/uL    MPV 9.2 9.2 - 11.8 FL    NEUTROPHILS 77 (H) 40 - 73 %    LYMPHOCYTES 17 (L) 21 - 52 %    MONOCYTES 6 3 - 10 %    EOSINOPHILS 0 0 - 5 %    BASOPHILS 0 0 - 2 %    ABS. NEUTROPHILS 5.3 1.8 - 8.0 K/UL    ABS. LYMPHOCYTES 1.2 0.9 - 3.6 K/UL    ABS. MONOCYTES 0.4 0.05 - 1.2 K/UL    ABS. EOSINOPHILS 0.0 0.0 - 0.4 K/UL    ABS.  BASOPHILS 0.0 0.0 - 0.1 K/UL    DF AUTOMATED     PROTHROMBIN TIME + INR    Collection Time: 02/02/21  6:17 PM   Result Value Ref Range    Prothrombin time 14.1 11.5 - 15.2 sec    INR 1.1 0.8 - 1.2     PTT    Collection Time: 02/02/21  6:17 PM   Result Value Ref Range    aPTT 27.9 23.0 - 36.4 SEC   C. DIFFICILE AG & TOXIN A/B    Collection Time: 02/02/21  6:42 PM   Result Value Ref Range    GDH ANTIGEN Negative NEG      C. difficile toxin Negative NEG      INTERPRETATION NEGATIVE FOR TOXIGENIC C. DIFFICILE NTXCD     EKG, 12 LEAD, INITIAL    Collection Time: 02/02/21  6:43 PM   Result Value Ref Range    Ventricular Rate 82 BPM    Atrial Rate 82 BPM    P-R Interval 144 ms    QRS Duration 80 ms    Q-T Interval 404 ms    QTC Calculation (Bezet) 472 ms    Calculated P Axis 64 degrees    Calculated R Axis 45 degrees    Calculated T Axis 46 degrees    Diagnosis       Poor data quality, interpretation may be adversely affected  Normal sinus rhythm  Poor R Wave Progression  Abnormal ECG  Confirmed by Celia Hermosillo MD, Gideon Mills ([id number]) on 2/2/2021 10:04:29 PM     LIPASE    Collection Time: 02/02/21  7:15 PM   Result Value Ref Range    Lipase 203 73 - 393 U/L   CARDIAC PANEL,(CK, CKMB & TROPONIN)    Collection Time: 02/02/21  7:15 PM   Result Value Ref Range    CK - MB <1.0 <3.6 ng/ml    CK-MB Index  0.0 - 4.0 %     CALCULATION NOT PERFORMED WHEN RESULT IS BELOW LINEAR LIMIT    CK 79 26 - 192 U/L    Troponin-I, QT <0.02 0.0 - 7.271 NG/ML   METABOLIC PANEL, COMPREHENSIVE    Collection Time: 02/02/21  7:15 PM   Result Value Ref Range    Sodium 137 136 - 145 mmol/L    Potassium 4.2 3.5 - 5.5 mmol/L    Chloride 105 100 - 111 mmol/L    CO2 23 21 - 32 mmol/L    Anion gap 9 3.0 - 18 mmol/L    Glucose 107 (H) 74 - 99 mg/dL    BUN 7 7.0 - 18 MG/DL    Creatinine 0.86 0.6 - 1.3 MG/DL    BUN/Creatinine ratio 8 (L) 12 - 20      GFR est AA >60 >60 ml/min/1.73m2    GFR est non-AA >60 >60 ml/min/1.73m2    Calcium 8.2 (L) 8.5 - 10.1 MG/DL    Bilirubin, total 0.6 0.2 - 1.0 MG/DL    ALT (SGPT) 136 (H) 13 - 56 U/L    AST (SGOT) 379 (H) 10 - 38 U/L    Alk.  phosphatase 201 (H) 45 - 117 U/L    Protein, total 7.2 6.4 - 8.2 g/dL    Albumin 3.2 (L) 3.4 - 5.0 g/dL    Globulin 4.0 2.0 - 4.0 g/dL    A-G Ratio 0.8 0.8 - 1.7     AMYLASE    Collection Time: 02/02/21  7:15 PM   Result Value Ref Range    Amylase 47 25 - 115 U/L       Procedures/imaging: see electronic medical records for all procedures/Xrays and details which were not copied into this note but were reviewed prior to creation of Plan      CC: Shima Jones NP

## 2021-02-04 LAB
ALBUMIN SERPL-MCNC: 2.2 G/DL (ref 3.4–5)
ALBUMIN/GLOB SERPL: 0.8 {RATIO} (ref 0.8–1.7)
ALP SERPL-CCNC: 176 U/L (ref 45–117)
ALT SERPL-CCNC: 81 U/L (ref 13–56)
ANION GAP SERPL CALC-SCNC: 6 MMOL/L (ref 3–18)
AST SERPL-CCNC: 139 U/L (ref 10–38)
BACTERIA SPEC CULT: NORMAL
BASOPHILS # BLD: 0 K/UL (ref 0–0.1)
BASOPHILS NFR BLD: 0 % (ref 0–2)
BILIRUB DIRECT SERPL-MCNC: 0.2 MG/DL (ref 0–0.2)
BILIRUB SERPL-MCNC: 0.5 MG/DL (ref 0.2–1)
BUN SERPL-MCNC: 2 MG/DL (ref 7–18)
BUN/CREAT SERPL: 4 (ref 12–20)
CALCIUM SERPL-MCNC: 7.8 MG/DL (ref 8.5–10.1)
CC UR VC: NORMAL
CHLORIDE SERPL-SCNC: 112 MMOL/L (ref 100–111)
CO2 SERPL-SCNC: 24 MMOL/L (ref 21–32)
CREAT SERPL-MCNC: 0.53 MG/DL (ref 0.6–1.3)
DIFFERENTIAL METHOD BLD: ABNORMAL
EOSINOPHIL # BLD: 0.1 K/UL (ref 0–0.4)
EOSINOPHIL NFR BLD: 4 % (ref 0–5)
ERYTHROCYTE [DISTWIDTH] IN BLOOD BY AUTOMATED COUNT: 20.7 % (ref 11.6–14.5)
GLOBULIN SER CALC-MCNC: 2.9 G/DL (ref 2–4)
GLUCOSE SERPL-MCNC: 77 MG/DL (ref 74–99)
HCT VFR BLD AUTO: 28.6 % (ref 35–45)
HGB BLD-MCNC: 9.7 G/DL (ref 12–16)
LYMPHOCYTES # BLD: 1.2 K/UL (ref 0.9–3.6)
LYMPHOCYTES NFR BLD: 32 % (ref 21–52)
MCH RBC QN AUTO: 38.6 PG (ref 24–34)
MCHC RBC AUTO-ENTMCNC: 33.9 G/DL (ref 31–37)
MCV RBC AUTO: 113.9 FL (ref 74–97)
MONOCYTES # BLD: 0.4 K/UL (ref 0.05–1.2)
MONOCYTES NFR BLD: 9 % (ref 3–10)
NEUTS SEG # BLD: 2.1 K/UL (ref 1.8–8)
NEUTS SEG NFR BLD: 55 % (ref 40–73)
PLATELET # BLD AUTO: 238 K/UL (ref 135–420)
PMV BLD AUTO: 9.4 FL (ref 9.2–11.8)
POTASSIUM SERPL-SCNC: 3.7 MMOL/L (ref 3.5–5.5)
PROT SERPL-MCNC: 5.1 G/DL (ref 6.4–8.2)
RBC # BLD AUTO: 2.51 M/UL (ref 4.2–5.3)
SERVICE CMNT-IMP: NORMAL
SODIUM SERPL-SCNC: 142 MMOL/L (ref 136–145)
WBC # BLD AUTO: 3.9 K/UL (ref 4.6–13.2)

## 2021-02-04 PROCEDURE — 74011250637 HC RX REV CODE- 250/637: Performed by: INTERNAL MEDICINE

## 2021-02-04 PROCEDURE — 74011000250 HC RX REV CODE- 250: Performed by: FAMILY MEDICINE

## 2021-02-04 PROCEDURE — 74011250637 HC RX REV CODE- 250/637: Performed by: HOSPITALIST

## 2021-02-04 PROCEDURE — 85025 COMPLETE CBC W/AUTO DIFF WBC: CPT

## 2021-02-04 PROCEDURE — 80048 BASIC METABOLIC PNL TOTAL CA: CPT

## 2021-02-04 PROCEDURE — 77030040361 HC SLV COMPR DVT MDII -B

## 2021-02-04 PROCEDURE — 65270000029 HC RM PRIVATE

## 2021-02-04 PROCEDURE — 36415 COLL VENOUS BLD VENIPUNCTURE: CPT

## 2021-02-04 PROCEDURE — 74011250637 HC RX REV CODE- 250/637: Performed by: FAMILY MEDICINE

## 2021-02-04 PROCEDURE — 80076 HEPATIC FUNCTION PANEL: CPT

## 2021-02-04 PROCEDURE — 74011250636 HC RX REV CODE- 250/636: Performed by: FAMILY MEDICINE

## 2021-02-04 RX ORDER — PANTOPRAZOLE SODIUM 40 MG/1
40 TABLET, DELAYED RELEASE ORAL
Status: DISCONTINUED | OUTPATIENT
Start: 2021-02-04 | End: 2021-02-07 | Stop reason: HOSPADM

## 2021-02-04 RX ADMIN — OXYCODONE HYDROCHLORIDE 5 MG: 5 TABLET ORAL at 04:53

## 2021-02-04 RX ADMIN — Medication 10 ML: at 08:38

## 2021-02-04 RX ADMIN — TRAZODONE HYDROCHLORIDE 100 MG: 100 TABLET ORAL at 22:13

## 2021-02-04 RX ADMIN — OXYCODONE HYDROCHLORIDE 5 MG: 5 TABLET ORAL at 15:02

## 2021-02-04 RX ADMIN — PANTOPRAZOLE SODIUM 40 MG: 40 TABLET, DELAYED RELEASE ORAL at 08:39

## 2021-02-04 RX ADMIN — SODIUM CHLORIDE 125 ML/HR: 900 INJECTION, SOLUTION INTRAVENOUS at 01:54

## 2021-02-04 RX ADMIN — RIVAROXABAN 20 MG: 10 TABLET, FILM COATED ORAL at 08:38

## 2021-02-04 RX ADMIN — DIPHENHYDRAMINE HYDROCHLORIDE 25 MG: 50 INJECTION, SOLUTION INTRAMUSCULAR; INTRAVENOUS at 17:07

## 2021-02-04 RX ADMIN — CEFTRIAXONE 1 G: 1 INJECTION, POWDER, FOR SOLUTION INTRAMUSCULAR; INTRAVENOUS at 11:54

## 2021-02-04 RX ADMIN — OXYCODONE HYDROCHLORIDE 5 MG: 5 TABLET ORAL at 09:36

## 2021-02-04 RX ADMIN — PANTOPRAZOLE SODIUM 40 MG: 40 TABLET, DELAYED RELEASE ORAL at 17:08

## 2021-02-04 RX ADMIN — OXYCODONE HYDROCHLORIDE 5 MG: 5 TABLET ORAL at 19:42

## 2021-02-04 RX ADMIN — Medication 10 ML: at 17:00

## 2021-02-04 NOTE — CONSULTS
[id number] [facility]    Name:  Tiffanie Carpenter  MR#:   [id number]  :  1975  ACCOUNT #:  [de-identified]  DATE OF SERVICE:  2021    HISTORY OF PRESENT ILLNESS:  This is a 82-year-old female who came yesterday complaining of upper abdominal pain that started in the left lower quadrant, radiating to the back, that started about a few days ago, possibly 10. The pain was as severe as 8/10. She also started to have UTI with dysuria and hematuria that started during last week. She was started on antibiotics, but did not get better and decided to come to the emergency room where, on her labs, her lipase was 203 and her LFT's had been acutely elevated, , , total bilirubin 0.6. Urinalysis showed that she has moderate blood, but only 0-3 rbc's, negative for infection. CT scan of the abdomen and pelvis with contrast showed that she has acute edematous interstitial pancreatitis, a 12-mm fluid collection in the head of the pancreas with secondary inflammation of the duodenum. There was pericystic stranding around the urinary bladder, suggestive of cystitis. Today, her LFT's increased even further. Her AST went up to 678, , alkaline phosphatase 226, when previously on 2020, her LFT's were normal except for an alkaline phosphatase of 149. Her total bilirubin increased to 1.3, direct 0.7. Her alcohol level on arrival was 36. This patient has a previous gastric bypass surgery, first in  and that was redone in 2018, by Dr. Rocco Parks. She had a previous , hysterectomy, cholecystectomy in 2000. She had a negative colonoscopy about three years ago. She has been having this recurring acute on chronic pancreatitis for the past year of unclear etiology, but there was a mention of alcohol consumption that, according to the patient, she does not think it is excessive.   She has a history of C difficile colitis, history of portal vein thrombosis requiring anticoagulation. She has been on Xarelto for less than a year. She does not smoke. Denies abusing alcohol. She has a history of morbid obesity. She has a history of anxiety, depression. She has thromboembolism of the anterior iliac artery. She has tooth extraction due to vomiting and vitamin deficiency. She claimed that she had hysterectomy for endometrial benign condition, but no cancer. Her father had colon cancer at age 48, her paternal uncle has colon cancer, and her paternal aunt had ovarian cancer. The patient has resection of a breast mass apparently on the right side, that was benign. Her mother is diabetic. ALLERGIES:  SHE IS INTOLERANT TO DILAUDID, THAT GIVES HER DIFFUSE ITCHING; OTHERWISE, NO KNOWN DRUG ALLERGIES. MEDICATIONS AT HOME:  We have Carafate 1 g q.i.d., Xarelto 20 mg, Tylenol Extra Strength, Lexapro 20, Desyrel 100, Reglan 5 mg syrup four times a day, multivitamins one tablet daily. This patient claims that she has been having significant diarrhea in the last 10 days. Before that, it used to happen intermittently, that she attributed to dumping syndrome. She has no rectal bleeding. She claims that she can go up to 10 times a day, but does not wake up from her sleep. She has some nausea, but no vomiting. No fever, chills, or shiver. She claimed that she lost 9 pounds in the last couple of months. REVIEW OF SYSTEMS:  She denies having chest pain, stroke, paralysis, numbness, loss of consciousness. She did fall, however, recently and bruised herself apparently from orthostatic hypotension. PHYSICAL EXAMINATION:  GENERAL:  We have a 44-year-old -American female, who appears to be in no distress. VITAL SIGNS:  She is 211 pounds, temperature 98.6, pulse of 87, blood pressure 153/111, breathing 17, saturation 99% to 100% on room air. SKIN:  Normal.  No evidence of any rash. HEENT:  The eyes are unremarkable. The pupils are equal and reactive to light.   The sclerae are anicteric. The conjunctivae are slightly pale. Oropharyngeal cavity; she has moist and slightly pale mucous membranes. She has upper and lower dentures. NECK:  Supple. No palpable mass or enlarged thyroid. LUNGS:  Clear to auscultation. HEART:  The cardiac rhythm is regular. S1, S2 normal.  No murmur. ABDOMEN:  Obese, but soft. There is mild diffuse upper abdominal tenderness, but there is no guarding, mass, or organomegaly. Bowel sounds are normal.  EXTREMITIES:  Unremarkable. No pedal edema. No clubbing. NEUROLOGIC:  No tremor. No focal neurological signs. She is alert and oriented. Moves all her four extremities. LABORATORY DATA:  Blood tests; hemoglobin 10, it was 9.3 on 12/13. WBC dropped to 3 when it was 7.5 in September. Her MCV is 112.8, when it was 93.4 before. Platelets 610. Normal differential.  Complete metabolic panel; today we have a BUN of 3, creatinine 0.68. Normal electrolytes. Total bilirubin 1.3, direct 0.7, albumin 2.5 after hydration. , , alkaline phosphatase 226, triglycerides 124, cholesterol 139. Normal CPK, CPK-MB, troponin. Stool for C difficile, ova and parasite, and enteric bacteria so far has been negative or pending, but her C difficile for sure is negative. CT scan of the abdomen and pelvis, as I mentioned, did show acute pancreatitis with tiny 14-mm cyst in the head of the pancreas. MRI with and without contrast shows the previous cholecystectomy. There is mild dilatation of the common bile duct. It appears to be tortuous, but does not appear to be containing any obstruction. There is a related narrowing of the superior mesenteric vein, appears improved in the interval.  There are decreased inflammatory changes centered at the hepatic head, but there is some signal abnormality and peripancreatic edema which suggests a component of acute pancreatitis.   The small rim enhancing peripancreatic collection has decreased in size and appears inflammatory, interval development of a small cyst within the pancreatic neck parenchyma, which is likely a pancreatic related collection. Upstream ductal ectasia has decreased. ASSESSMENT:  In conclusion, this is a 68-year-old female who comes with re-occurring acute on chronic pancreatitis. The etiology, in my opinion, in this case, is related to alcohol consumption even if it is small quantities, but in people with gastric bypass, this does not hold true anymore and it is excessive. In support of this, her AST and high MCV is highly suggestive of alcohol abuse. Also, on arrival, her alcohol level was 36, it should have been 0. Therefore, this patient should abstain all her life from alcohol as well as non-steroidal anti-inflammatory drugs. She does have, at this time, severe macrocytosis with leukopenia. It needs to be investigated with vitamin R02 and folic acid level. For now, I think her pancreatitis is improving. We can eventually increase her diet to full liquid and monitor her. Hopefully, she will be able to go home soon. She should continue taking her multivitamins and may require supplemental vitamin Q23 and folic acid. The tiny pseudocyst in the head of the pancreas is not concerning at all and recedes quite frequently. Only when it becomes very large and persistent that we will worry about. Her liver function tests elevation appears to be more in relation to the alcohol consumption and alcoholic hepatitis. Her serology for hepatitis A, B, and C has been negative previously in 12/2019. Her vitamin B12 has been low on 06/20/2018, but subsequently, it has been normal.  Folic acid was also normal, the last one in 12/2019. Iron saturation on 12/03/2019, was adequate at 37.     I had a long discussion with the patient regarding the effect of alcohol on her and counseling her also about her depression that she takes as an excuse to drink, hopefully this will work.    Sincerely,      MD CALEB [email or web address]  D:  02/03/2021 18:03  T:  02/03/2021 23:20  JOB #:  [id number]  CC:  Chadwick Barry MD

## 2021-02-04 NOTE — PROGRESS NOTES
Hospitalist Progress Note    Patient: Elicia Bernal MRN: [id number]  CSN: [id number]    YOB: 1975  Age: 39 y.o. Sex: female    DOA: 2/2/2021 LOS:  LOS: 2 days          Chief Complaint:    pancreatitis      Assessment/Plan     acute edematous interstitial pancreatitis  Lipase normal - expected for h/o chronic pancreatitis   Advance diet  Appreciate GI consult  NO etoh reviewed with patient, this has been discussed in past with her on prior visit, but she has still drank    Diarrhea-symptomatic tx     Transaminitis -better    UTI -  Recent treatment with macrobid     Alcohol use -counselled absolute cessation    C difficile toxin NEGATIVE     Right olecranon injury -order xray      DVT protocol: SCDs/GI prophylaxis: Pepcid    H/o bariatric surgery  Disposition :  Patient Active Problem List   Diagnosis Code    Status post gastric bypass for obesity Z98.84    Hypertension I10    Anemia D64.9    Anxiety F41.9    GERD (gastroesophageal reflux disease) K21.9    Smoker F17.200    Portal vein thrombosis I81    Gastritis and duodenitis K29.90    Acute on chronic pancreatitis (HCC) K85.90, K86.1    Pancreatitis K85.90    Nausea & vomiting R11.2    Elevated liver enzymes R74.8    Pancreatitis, alcoholic, acute V50.60    Hematuria R31.9    UTI (urinary tract infection) N39.0    Abdominal pain R10.9    Transaminitis R74.01    Injury of right elbow S59.901A       Subjective:    I am better  Still c/o loose stools  No abd cramps or pain  Tolerated jello  No nasuea    Review of systems:    Constitutional: denies fevers  Respiratory: denies SOB, cough  Cardiovascular: denies chest pain  Gastrointestinal: denies nausea, vomiting      Vital signs/Intake and Output:  Visit Vitals  BP (!) 157/108 (BP 1 Location: Right upper arm, BP Patient Position: Sitting; At rest)   Pulse 86   Temp 98.5 °F (36.9 °C)   Resp 16   Ht 5' 5\" (1.651 m)   Wt 95.7 kg (211 lb)   SpO2 96%   Breastfeeding No   BMI 35.11 kg/m²     Current Shift:  No intake/output data recorded. Last three shifts:  02/02 1901 - 02/04 0700  In: 3700 [P.O.:700; I.V.:3000]  Out: -     Exam:    General: Well developed, alert, NAD, OX3  CVS:Regular rate and rhythm, no M/R/G, S1/S2 heard, no thrill  Lungs:Clear to auscultation bilaterally, no wheezes, rhonchi, or rales  Abdomen: Soft, Nontender, No distention, Normal Bowel sounds, No hepatomegaly  Extremities: No C/C/E, pulses palpable 2+  Neuro:grossly normal , follows commands  Psych:appropriate                Labs: Results:       Chemistry Recent Labs     02/04/21 0115 02/03/21  0845 02/02/21 1915   GLU 77 128* 107*    136 137   K 3.7 3.6 4.2   * 105 105   CO2 24 23 23   BUN 2* 3* 7   CREA 0.53* 0.68 0.86   CA 7.8* 7.3* 8.2*   AGAP 6 8 9   BUCR 4* 4* 8*   * 226* 201*   TP 5.1* 5.6* 7.2   ALB 2.2* 2.5* 3.2*   GLOB 2.9 3.1 4.0   AGRAT 0.8 0.8 0.8      CBC w/Diff Recent Labs     02/04/21 0115 02/03/21  0845 02/02/21  1810   WBC 3.9* 3.0* 6.9   RBC 2.51* 2.65* 3.24*   HGB 9.7* 10.0* 12.3   HCT 28.6* 29.9* 36.3    232 311   GRANS 55 69 77*   LYMPH 32 22 17*   EOS 4 1 0      Cardiac Enzymes Recent Labs     02/02/21 1915   CPK 79   CKND1 CALCULATION NOT PERFORMED WHEN RESULT IS BELOW LINEAR LIMIT      Coagulation Recent Labs     02/02/21 1817   PTP 14.1   INR 1.1   APTT 27.9       Lipid Panel Lab Results   Component Value Date/Time    Cholesterol, total 139 02/03/2021 08:45 AM    HDL Cholesterol 60 02/03/2021 08:45 AM    LDL, calculated 54.2 02/03/2021 08:45 AM    VLDL, calculated 24.8 02/03/2021 08:45 AM    Triglyceride 124 02/03/2021 08:45 AM    CHOL/HDL Ratio 2.3 02/03/2021 08:45 AM      BNP No results for input(s): BNPP in the last 72 hours.    Liver Enzymes Recent Labs     02/04/21  0115   TP 5.1*   ALB 2.2*   *      Thyroid Studies Lab Results   Component Value Date/Time    TSH 2.18 06/29/2020 12:35 AM        Procedures/imaging: see electronic medical records for all procedures/Xrays and details which were not copied into this note but were reviewed prior to creation of Araceli Sequeira MD

## 2021-02-04 NOTE — PROGRESS NOTES
20:50 Report received from [address] [place] [address] & assessment completed. Lungs are clear bilat. Abd remains tender in the epigastric region. + BS. 0 c/o nausea or vomiting. Resting quietly in bed with the TV & talking on the phone. Voiding per BR w/o difficulty. 21:55 Dr Carmencita Taylor paged with +  results of salmonella in stool. 22:45 Shift assessment completed. See [facility] [place]. 02:45 Reassessed with 0 changes noted. Resting quietly in bed with eyes closed between cares x for voiding per BR w/o difficulty. 08:10 Bedside and Verbal shift change report given to STEPHEN Hernandez RN (oncoming nurse) by Cici Gaitan RN (offgoing nurse). Report included the following information SBAR.

## 2021-02-04 NOTE — PROGRESS NOTES
[address] [place] [address] care for this pt. Pt is awake, alert, oriented x4. Resting in bed. Denies pain. Lungs are clear. Abdomen soft with active BS. No new complaints at this time. 1713   Pt medicated with Benadryl 25 mg Iv for itching. 1942  Pt medicated with Oxycodone 5 mg po for pain level to abdomen 8/10.

## 2021-02-05 PROBLEM — A02.9 SALMONELLA: Status: ACTIVE | Noted: 2021-02-05

## 2021-02-05 LAB
ALBUMIN SERPL-MCNC: 2.3 G/DL (ref 3.4–5)
ALBUMIN/GLOB SERPL: 0.7 {RATIO} (ref 0.8–1.7)
ALP SERPL-CCNC: 162 U/L (ref 45–117)
ALT SERPL-CCNC: 59 U/L (ref 13–56)
ANION GAP SERPL CALC-SCNC: 9 MMOL/L (ref 3–18)
AST SERPL-CCNC: 61 U/L (ref 10–38)
BASOPHILS # BLD: 0 K/UL (ref 0–0.1)
BASOPHILS NFR BLD: 0 % (ref 0–2)
BILIRUB DIRECT SERPL-MCNC: 0.1 MG/DL (ref 0–0.2)
BILIRUB SERPL-MCNC: 0.2 MG/DL (ref 0.2–1)
BUN SERPL-MCNC: 2 MG/DL (ref 7–18)
BUN/CREAT SERPL: 4 (ref 12–20)
CALCIUM SERPL-MCNC: 7.2 MG/DL (ref 8.5–10.1)
CHLORIDE SERPL-SCNC: 107 MMOL/L (ref 100–111)
CO2 SERPL-SCNC: 22 MMOL/L (ref 21–32)
CREAT SERPL-MCNC: 0.55 MG/DL (ref 0.6–1.3)
DIFFERENTIAL METHOD BLD: ABNORMAL
EOSINOPHIL # BLD: 0.2 K/UL (ref 0–0.4)
EOSINOPHIL NFR BLD: 4 % (ref 0–5)
ERYTHROCYTE [DISTWIDTH] IN BLOOD BY AUTOMATED COUNT: 20.5 % (ref 11.6–14.5)
GLOBULIN SER CALC-MCNC: 3.2 G/DL (ref 2–4)
GLUCOSE SERPL-MCNC: 88 MG/DL (ref 74–99)
HCT VFR BLD AUTO: 28.1 % (ref 35–45)
HGB BLD-MCNC: 9.6 G/DL (ref 12–16)
LYMPHOCYTES # BLD: 1.6 K/UL (ref 0.9–3.6)
LYMPHOCYTES NFR BLD: 26 % (ref 21–52)
MCH RBC QN AUTO: 38.6 PG (ref 24–34)
MCHC RBC AUTO-ENTMCNC: 34.2 G/DL (ref 31–37)
MCV RBC AUTO: 112.9 FL (ref 74–97)
MONOCYTES # BLD: 0.6 K/UL (ref 0.05–1.2)
MONOCYTES NFR BLD: 10 % (ref 3–10)
NEUTS SEG # BLD: 3.6 K/UL (ref 1.8–8)
NEUTS SEG NFR BLD: 60 % (ref 40–73)
PLATELET # BLD AUTO: 249 K/UL (ref 135–420)
PMV BLD AUTO: 9.3 FL (ref 9.2–11.8)
POTASSIUM SERPL-SCNC: 3.6 MMOL/L (ref 3.5–5.5)
PROT SERPL-MCNC: 5.5 G/DL (ref 6.4–8.2)
RBC # BLD AUTO: 2.49 M/UL (ref 4.2–5.3)
SODIUM SERPL-SCNC: 138 MMOL/L (ref 136–145)
WBC # BLD AUTO: 6 K/UL (ref 4.6–13.2)

## 2021-02-05 PROCEDURE — 65270000029 HC RM PRIVATE

## 2021-02-05 PROCEDURE — 74011250637 HC RX REV CODE- 250/637: Performed by: INTERNAL MEDICINE

## 2021-02-05 PROCEDURE — 74011250636 HC RX REV CODE- 250/636: Performed by: HOSPITALIST

## 2021-02-05 PROCEDURE — 74011250637 HC RX REV CODE- 250/637: Performed by: FAMILY MEDICINE

## 2021-02-05 PROCEDURE — 74011250637 HC RX REV CODE- 250/637: Performed by: HOSPITALIST

## 2021-02-05 PROCEDURE — 85025 COMPLETE CBC W/AUTO DIFF WBC: CPT

## 2021-02-05 PROCEDURE — 74011250636 HC RX REV CODE- 250/636: Performed by: FAMILY MEDICINE

## 2021-02-05 PROCEDURE — 36415 COLL VENOUS BLD VENIPUNCTURE: CPT

## 2021-02-05 PROCEDURE — 80076 HEPATIC FUNCTION PANEL: CPT

## 2021-02-05 PROCEDURE — 80048 BASIC METABOLIC PNL TOTAL CA: CPT

## 2021-02-05 RX ORDER — SUCRALFATE 1 G/1
1 TABLET ORAL
Status: DISCONTINUED | OUTPATIENT
Start: 2021-02-05 | End: 2021-02-07 | Stop reason: HOSPADM

## 2021-02-05 RX ORDER — CIPROFLOXACIN 500 MG/1
500 TABLET ORAL EVERY 12 HOURS
Status: DISCONTINUED | OUTPATIENT
Start: 2021-02-05 | End: 2021-02-07 | Stop reason: HOSPADM

## 2021-02-05 RX ORDER — SODIUM CHLORIDE 9 MG/ML
50 INJECTION, SOLUTION INTRAVENOUS CONTINUOUS
Status: DISCONTINUED | OUTPATIENT
Start: 2021-02-05 | End: 2021-02-07 | Stop reason: HOSPADM

## 2021-02-05 RX ORDER — ESCITALOPRAM OXALATE 10 MG/1
20 TABLET ORAL DAILY
Status: DISCONTINUED | OUTPATIENT
Start: 2021-02-05 | End: 2021-02-07 | Stop reason: HOSPADM

## 2021-02-05 RX ORDER — METOCLOPRAMIDE HYDROCHLORIDE 5 MG/5ML
5 SOLUTION ORAL
Status: DISCONTINUED | OUTPATIENT
Start: 2021-02-05 | End: 2021-02-07 | Stop reason: HOSPADM

## 2021-02-05 RX ADMIN — DIPHENHYDRAMINE HYDROCHLORIDE 25 MG: 50 INJECTION, SOLUTION INTRAMUSCULAR; INTRAVENOUS at 00:28

## 2021-02-05 RX ADMIN — HYDROMORPHONE HYDROCHLORIDE 1 MG: 1 INJECTION, SOLUTION INTRAMUSCULAR; INTRAVENOUS; SUBCUTANEOUS at 11:40

## 2021-02-05 RX ADMIN — DIPHENHYDRAMINE HYDROCHLORIDE 25 MG: 50 INJECTION, SOLUTION INTRAMUSCULAR; INTRAVENOUS at 17:41

## 2021-02-05 RX ADMIN — HYDROMORPHONE HYDROCHLORIDE 1 MG: 1 INJECTION, SOLUTION INTRAMUSCULAR; INTRAVENOUS; SUBCUTANEOUS at 01:17

## 2021-02-05 RX ADMIN — SUCRALFATE 1 G: 1 TABLET ORAL at 21:41

## 2021-02-05 RX ADMIN — RIVAROXABAN 20 MG: 10 TABLET, FILM COATED ORAL at 11:28

## 2021-02-05 RX ADMIN — SUCRALFATE 1 G: 1 TABLET ORAL at 17:42

## 2021-02-05 RX ADMIN — METOCLOPRAMIDE HYDROCHLORIDE 5 MG: 5 SOLUTION ORAL at 11:28

## 2021-02-05 RX ADMIN — ESCITALOPRAM OXALATE 20 MG: 10 TABLET ORAL at 11:28

## 2021-02-05 RX ADMIN — SUCRALFATE 1 G: 1 TABLET ORAL at 11:28

## 2021-02-05 RX ADMIN — PANTOPRAZOLE SODIUM 40 MG: 40 TABLET, DELAYED RELEASE ORAL at 07:28

## 2021-02-05 RX ADMIN — DIPHENHYDRAMINE HYDROCHLORIDE 25 MG: 50 INJECTION, SOLUTION INTRAMUSCULAR; INTRAVENOUS at 06:50

## 2021-02-05 RX ADMIN — Medication 10 ML: at 01:27

## 2021-02-05 RX ADMIN — Medication 10 ML: at 17:00

## 2021-02-05 RX ADMIN — CIPROFLOXACIN HYDROCHLORIDE 500 MG: 500 TABLET, FILM COATED ORAL at 10:00

## 2021-02-05 RX ADMIN — METOCLOPRAMIDE HYDROCHLORIDE 5 MG: 5 SOLUTION ORAL at 21:42

## 2021-02-05 RX ADMIN — PANTOPRAZOLE SODIUM 40 MG: 40 TABLET, DELAYED RELEASE ORAL at 17:42

## 2021-02-05 RX ADMIN — DIPHENHYDRAMINE HYDROCHLORIDE 25 MG: 50 INJECTION, SOLUTION INTRAMUSCULAR; INTRAVENOUS at 23:58

## 2021-02-05 RX ADMIN — CIPROFLOXACIN HYDROCHLORIDE 500 MG: 500 TABLET, FILM COATED ORAL at 21:41

## 2021-02-05 RX ADMIN — METOCLOPRAMIDE HYDROCHLORIDE 5 MG: 5 SOLUTION ORAL at 17:42

## 2021-02-05 RX ADMIN — SODIUM CHLORIDE 50 ML/HR: 900 INJECTION, SOLUTION INTRAVENOUS at 15:00

## 2021-02-05 RX ADMIN — TRAZODONE HYDROCHLORIDE 100 MG: 100 TABLET ORAL at 21:41

## 2021-02-05 RX ADMIN — DIPHENHYDRAMINE HYDROCHLORIDE 25 MG: 50 INJECTION, SOLUTION INTRAMUSCULAR; INTRAVENOUS at 11:40

## 2021-02-05 RX ADMIN — HYDROMORPHONE HYDROCHLORIDE 1 MG: 1 INJECTION, SOLUTION INTRAMUSCULAR; INTRAVENOUS; SUBCUTANEOUS at 18:46

## 2021-02-05 RX ADMIN — Medication 10 ML: at 09:00

## 2021-02-05 RX ADMIN — SODIUM CHLORIDE 75 ML/HR: 900 INJECTION, SOLUTION INTRAVENOUS at 01:20

## 2021-02-05 RX ADMIN — HYDROMORPHONE HYDROCHLORIDE 1 MG: 1 INJECTION, SOLUTION INTRAMUSCULAR; INTRAVENOUS; SUBCUTANEOUS at 07:28

## 2021-02-05 NOTE — PROGRESS NOTES
2035 - Bedside and Verbal shift change report given to Guanakito Johansen RN  (oncoming nurse) by Pamela Khan RN (offgoing nurse). Report included the following information SBAR, Kardex, Intake/Output, MAR and Recent Results. Shift summary -- Pt expressed concerns for itching with opioids and possible allergic reaction. Patient informed that itching is a common side effect to these medications. Pt then stated that she would rather have itchiness, that could be controlled with IV Benadryl, with IV Dilaudid that could better manage her pain. Pt also expressed frustrations with doctors giving conflicting information. States that Dr. Isabella Elizondo ordered for her take a specific medication regimen to prevent her dumping syndrome s/p gastric bypass surgery, yet has not been able to get those medications ordered. Also requesting antidepressive medication to be ordered. Relayed these concerns to the nighttime Hospitalist but MD stated to defer to day rounding physician. 0745 - Bedside and Verbal shift change report given to Phyllis Andersen RN (oncoming nurse) by Guanakito Johansen RN (offgoing nurse). Report included the following information SBAR, Kardex, Intake/Output, MAR and Recent Results.

## 2021-02-05 NOTE — ROUTINE PROCESS
1945  Bedside and Verbal shift change report given to Serge Mike RN (oncoming nurse) by Olga Berry RN (offgoing nurse). Report included the following information SBAR, Kardex and MAR.

## 2021-02-05 NOTE — PROGRESS NOTES
Pt is independent and has family in the area to assist as needed. Anticipate pt will transition home with physician follow up when medically stable. Please encourage ambulation to assist with identifying potential transition of care needs. Care Management Interventions  Mode of Transport at Discharge:  Other (see comment)(Family)  Transition of Care Consult (CM Consult): Discharge Planning  Health Maintenance Reviewed: Yes  Current Support Network: Family Lives Nearby  The Plan for Transition of Care is Related to the Following Treatment Goals : Home with physician follow up   Discharge Location  Discharge Placement: Home with family assistance

## 2021-02-06 LAB
ANION GAP SERPL CALC-SCNC: 9 MMOL/L (ref 3–18)
BASOPHILS # BLD: 0 K/UL (ref 0–0.1)
BASOPHILS NFR BLD: 0 % (ref 0–2)
BUN SERPL-MCNC: 2 MG/DL (ref 7–18)
BUN/CREAT SERPL: 3 (ref 12–20)
CALCIUM SERPL-MCNC: 7.6 MG/DL (ref 8.5–10.1)
CHLORIDE SERPL-SCNC: 107 MMOL/L (ref 100–111)
CO2 SERPL-SCNC: 23 MMOL/L (ref 21–32)
CREAT SERPL-MCNC: 0.62 MG/DL (ref 0.6–1.3)
DIFFERENTIAL METHOD BLD: ABNORMAL
EOSINOPHIL # BLD: 0.2 K/UL (ref 0–0.4)
EOSINOPHIL NFR BLD: 4 % (ref 0–5)
ERYTHROCYTE [DISTWIDTH] IN BLOOD BY AUTOMATED COUNT: 20.3 % (ref 11.6–14.5)
GLUCOSE SERPL-MCNC: 91 MG/DL (ref 74–99)
HCT VFR BLD AUTO: 27.8 % (ref 35–45)
HGB BLD-MCNC: 9.5 G/DL (ref 12–16)
LYMPHOCYTES # BLD: 1.3 K/UL (ref 0.9–3.6)
LYMPHOCYTES NFR BLD: 23 % (ref 21–52)
MCH RBC QN AUTO: 38.6 PG (ref 24–34)
MCHC RBC AUTO-ENTMCNC: 34.2 G/DL (ref 31–37)
MCV RBC AUTO: 113 FL (ref 74–97)
MONOCYTES # BLD: 0.6 K/UL (ref 0.05–1.2)
MONOCYTES NFR BLD: 12 % (ref 3–10)
NEUTS SEG # BLD: 3.3 K/UL (ref 1.8–8)
NEUTS SEG NFR BLD: 61 % (ref 40–73)
PLATELET # BLD AUTO: 269 K/UL (ref 135–420)
PMV BLD AUTO: 9.1 FL (ref 9.2–11.8)
POTASSIUM SERPL-SCNC: 3.7 MMOL/L (ref 3.5–5.5)
RBC # BLD AUTO: 2.46 M/UL (ref 4.2–5.3)
SODIUM SERPL-SCNC: 139 MMOL/L (ref 136–145)
WBC # BLD AUTO: 5.5 K/UL (ref 4.6–13.2)

## 2021-02-06 PROCEDURE — 36415 COLL VENOUS BLD VENIPUNCTURE: CPT

## 2021-02-06 PROCEDURE — 65270000029 HC RM PRIVATE

## 2021-02-06 PROCEDURE — 74011250636 HC RX REV CODE- 250/636: Performed by: FAMILY MEDICINE

## 2021-02-06 PROCEDURE — 74011250637 HC RX REV CODE- 250/637: Performed by: INTERNAL MEDICINE

## 2021-02-06 PROCEDURE — 80048 BASIC METABOLIC PNL TOTAL CA: CPT

## 2021-02-06 PROCEDURE — 74011250637 HC RX REV CODE- 250/637: Performed by: HOSPITALIST

## 2021-02-06 PROCEDURE — 74011250636 HC RX REV CODE- 250/636: Performed by: INTERNAL MEDICINE

## 2021-02-06 PROCEDURE — 85025 COMPLETE CBC W/AUTO DIFF WBC: CPT

## 2021-02-06 PROCEDURE — 74011250637 HC RX REV CODE- 250/637: Performed by: FAMILY MEDICINE

## 2021-02-06 PROCEDURE — 74011250636 HC RX REV CODE- 250/636: Performed by: HOSPITALIST

## 2021-02-06 RX ORDER — AMLODIPINE BESYLATE 5 MG/1
5 TABLET ORAL DAILY
Status: DISCONTINUED | OUTPATIENT
Start: 2021-02-06 | End: 2021-02-07 | Stop reason: HOSPADM

## 2021-02-06 RX ORDER — FUROSEMIDE 10 MG/ML
40 INJECTION INTRAMUSCULAR; INTRAVENOUS ONCE
Status: COMPLETED | OUTPATIENT
Start: 2021-02-06 | End: 2021-02-06

## 2021-02-06 RX ADMIN — CIPROFLOXACIN HYDROCHLORIDE 500 MG: 500 TABLET, FILM COATED ORAL at 09:34

## 2021-02-06 RX ADMIN — DIPHENHYDRAMINE HYDROCHLORIDE 25 MG: 50 INJECTION, SOLUTION INTRAMUSCULAR; INTRAVENOUS at 13:34

## 2021-02-06 RX ADMIN — HYDROMORPHONE HYDROCHLORIDE 1 MG: 1 INJECTION, SOLUTION INTRAMUSCULAR; INTRAVENOUS; SUBCUTANEOUS at 20:23

## 2021-02-06 RX ADMIN — ESCITALOPRAM OXALATE 20 MG: 10 TABLET ORAL at 09:34

## 2021-02-06 RX ADMIN — HYDROMORPHONE HYDROCHLORIDE 1 MG: 1 INJECTION, SOLUTION INTRAMUSCULAR; INTRAVENOUS; SUBCUTANEOUS at 07:58

## 2021-02-06 RX ADMIN — METOCLOPRAMIDE HYDROCHLORIDE 5 MG: 5 SOLUTION ORAL at 10:51

## 2021-02-06 RX ADMIN — Medication 10 ML: at 16:16

## 2021-02-06 RX ADMIN — SUCRALFATE 1 G: 1 TABLET ORAL at 21:57

## 2021-02-06 RX ADMIN — DIPHENHYDRAMINE HYDROCHLORIDE 25 MG: 50 INJECTION, SOLUTION INTRAMUSCULAR; INTRAVENOUS at 19:28

## 2021-02-06 RX ADMIN — CIPROFLOXACIN HYDROCHLORIDE 500 MG: 500 TABLET, FILM COATED ORAL at 21:54

## 2021-02-06 RX ADMIN — SUCRALFATE 1 G: 1 TABLET ORAL at 16:13

## 2021-02-06 RX ADMIN — SUCRALFATE 1 G: 1 TABLET ORAL at 06:56

## 2021-02-06 RX ADMIN — HYDROMORPHONE HYDROCHLORIDE 1 MG: 1 INJECTION, SOLUTION INTRAMUSCULAR; INTRAVENOUS; SUBCUTANEOUS at 01:24

## 2021-02-06 RX ADMIN — SODIUM CHLORIDE 50 ML/HR: 900 INJECTION, SOLUTION INTRAVENOUS at 08:00

## 2021-02-06 RX ADMIN — DIPHENHYDRAMINE HYDROCHLORIDE 25 MG: 50 INJECTION, SOLUTION INTRAMUSCULAR; INTRAVENOUS at 07:22

## 2021-02-06 RX ADMIN — RIVAROXABAN 20 MG: 10 TABLET, FILM COATED ORAL at 07:22

## 2021-02-06 RX ADMIN — METOCLOPRAMIDE HYDROCHLORIDE 5 MG: 5 SOLUTION ORAL at 21:57

## 2021-02-06 RX ADMIN — SUCRALFATE 1 G: 1 TABLET ORAL at 10:51

## 2021-02-06 RX ADMIN — HYDROMORPHONE HYDROCHLORIDE 1 MG: 1 INJECTION, SOLUTION INTRAMUSCULAR; INTRAVENOUS; SUBCUTANEOUS at 14:00

## 2021-02-06 RX ADMIN — FUROSEMIDE 40 MG: 10 INJECTION INTRAMUSCULAR; INTRAVENOUS at 12:02

## 2021-02-06 RX ADMIN — METOCLOPRAMIDE HYDROCHLORIDE 5 MG: 5 SOLUTION ORAL at 16:13

## 2021-02-06 RX ADMIN — AMLODIPINE BESYLATE 5 MG: 5 TABLET ORAL at 10:51

## 2021-02-06 RX ADMIN — Medication 10 ML: at 00:01

## 2021-02-06 RX ADMIN — PANTOPRAZOLE SODIUM 40 MG: 40 TABLET, DELAYED RELEASE ORAL at 16:13

## 2021-02-06 RX ADMIN — PANTOPRAZOLE SODIUM 40 MG: 40 TABLET, DELAYED RELEASE ORAL at 06:56

## 2021-02-06 RX ADMIN — METOCLOPRAMIDE HYDROCHLORIDE 5 MG: 5 SOLUTION ORAL at 06:57

## 2021-02-06 RX ADMIN — TRAZODONE HYDROCHLORIDE 100 MG: 100 TABLET ORAL at 21:54

## 2021-02-06 NOTE — PROGRESS NOTES
Problem: Risk for Spread of Infection  Goal: Prevent transmission of infectious organism to others  Description: Prevent the transmission of infectious organisms to other patients, staff members, and visitors. Outcome: Progressing Towards Goal     Problem: Patient Education:  Go to Education Activity  Goal: Patient/Family Education  Outcome: Progressing Towards Goal     Problem: Falls - Risk of  Goal: *Absence of Falls  Description: Document Nena Seaman Fall Risk and appropriate interventions in the flowsheet.   Outcome: Progressing Towards Goal  Note: Fall Risk Interventions:  Mobility Interventions: Communicate number of staff needed for ambulation/transfer, Patient to call before getting OOB         Medication Interventions: Evaluate medications/consider consulting pharmacy, Teach patient to arise slowly         History of Falls Interventions: Evaluate medications/consider consulting pharmacy         Problem: Patient Education: Go to Patient Education Activity  Goal: Patient/Family Education  Outcome: Progressing Towards Goal     Problem: General Infection Care Plan (Adult and Pediatric)  Goal: Improvement in signs and symptoms of infection  Outcome: Progressing Towards Goal  Goal: *Optimize nutritional status  Outcome: Progressing Towards Goal     Problem: Patient Education: Go to Patient Education Activity  Goal: Patient/Family Education  Outcome: Progressing Towards Goal     Problem: Hypertension  Goal: *Blood pressure within specified parameters  Outcome: Progressing Towards Goal  Goal: *Fluid volume balance  Outcome: Progressing Towards Goal  Goal: *Labs within defined limits  Outcome: Progressing Towards Goal

## 2021-02-06 NOTE — ROUTINE PROCESS
Bedside and Verbal shift change report given to JAD Sam RN (oncoming nurse) by Gurvinder Aquino RN (offgoing nurse). Report included the following information SBAR, Kardex, Intake/Output, MAR and Recent Results. Introduced self to pt, updated whiteboard. NAD noted. Reviewed medications for tonight and updated whiteboard on prn medications. Call bell within reach. 2358- Pt c/o itchiness, requests prn benadryl. PRN benadryl given per MD order. 0124- Pt c/o pain 7/10 in back, requests prn dilaudid. PRN dilaudid given per MD order. 0202- Spoke with Dr. Lenin Park, updated on current BP. Dr. Lenin Park states will put in prn BP meds when she gets a chance. Bedside and Verbal shift change report given to PORTILLO Lin RN (oncoming nurse) by JAD Sam RN (offgoing nurse). Report included the following information SBAR, Kardex, Intake/Output, MAR and Recent Results.

## 2021-02-06 NOTE — PROGRESS NOTES
Hospitalist Progress Note    Patient: Arnulfo Caldwell MRN: [id number]  CSN: [id number]    YOB: 1975  Age: 39 y.o.   Sex: female    DOA: 2/2/2021 LOS:  LOS: 4 days          Chief Complaint:    Diarrhea continues and blood pressure running high  Did not want to go home today   Still requiring IV antibiotics       Assessment/Plan     acute edematous interstitial pancreatitis-better  Lipase normal - expected for h/o chronic pancreatitis     Appreciate GI consult  NO etoh reviewed with patient, this has been discussed in past with her on prior visit, but she has still drank     Diarrhea-salmonella positive on stool test-start cipro     Transaminitis - improved     UTI -  Recent treatment with macrobid      Alcohol use -counselled absolute cessation     C difficile toxin NEGATIVE    Advance diet  D/c  Today  Cancelled due to persistent need for pain med and diarrhea from salmonella   Anticipate tomorrow d/c     HTN  Will start low dose amlodipine today   Disposition :  Patient Active Problem List   Diagnosis Code    Status post gastric bypass for obesity Z98.84    Hypertension I10    Anemia D64.9    Anxiety F41.9    GERD (gastroesophageal reflux disease) K21.9    Smoker F17.200    Portal vein thrombosis I81    Gastritis and duodenitis K29.90    Acute on chronic pancreatitis ([facility] [address] K85.90, K86.1    Pancreatitis K85.90    Nausea & vomiting R11.2    Elevated liver enzymes R74.8    Pancreatitis, alcoholic, acute N91.94    Hematuria R31.9    UTI (urinary tract infection) N39.0    Abdominal pain R10.9    Transaminitis R74.01    Injury of right elbow S59.901A    Salmonella A02.9       Subjective:  Loose stool still, not watery  No inc abd pain, in fact pain is better  No N/V      Review of systems:    Constitutional: denies fevers, chills  Respiratory: denies SOB  Cardiovascular: denies chest pain  Gastrointestinal: denies nausea, vomiting      Vital signs/Intake and Output:  Visit Vitals  BP (!) 148/100 (BP 1 Location: Right upper arm, BP Patient Position: At rest)   Pulse 91   Temp 98.3 °F (36.8 °C)   Resp 18   Ht 5' 5\" (1.651 m)   Wt 101.5 kg (223 lb 11.2 oz)   SpO2 95%   Breastfeeding No   BMI 37.23 kg/m²     Current Shift:  No intake/output data recorded. Last three shifts:  02/04 1901 - 02/06 0700  In: 900 [I.V.:900]  Out: -     Exam:    General: Well developed, alert, NAD, OX3  CVS:Regular rate and rhythm, no M/R/G, S1/S2 heard, no thrill  Lungs:Clear to auscultation bilaterally, no wheezes, rhonchi, or rales  Abdomen: Soft, Nontender, No distention, Normal Bowel sounds, No hepatomegaly  Extremities: No C/C/E, pulses palpable 2+  Neuro:grossly normal , follows commands  Psych:appropriate                Labs: Results:       Chemistry Recent Labs     02/06/21 0145 02/05/21 0305 02/04/21  0115   GLU 91 88 77    138 142   K 3.7 3.6 3.7    107 112*   CO2 23 22 24   BUN 2* 2* 2*   CREA 0.62 0.55* 0.53*   CA 7.6* 7.2* 7.8*   AGAP 9 9 6   BUCR 3* 4* 4*   AP  --  162* 176*   TP  --  5.5* 5.1*   ALB  --  2.3* 2.2*   GLOB  --  3.2 2.9   AGRAT  --  0.7* 0.8      CBC w/Diff Recent Labs     02/06/21 0145 02/05/21 0305 02/04/21  0115   WBC 5.5 6.0 3.9*   RBC 2.46* 2.49* 2.51*   HGB 9.5* 9.6* 9.7*   HCT 27.8* 28.1* 28.6*    249 238   GRANS 61 60 55   LYMPH 23 26 32   EOS 4 4 4      Cardiac Enzymes No results for input(s): CPK, CKND1, JONATAN in the last 72 hours. No lab exists for component: CKRMB, TROIP   Coagulation No results for input(s): PTP, INR, APTT, INREXT, INREXT in the last 72 hours. Lipid Panel Lab Results   Component Value Date/Time    Cholesterol, total 139 02/03/2021 08:45 AM    HDL Cholesterol 60 02/03/2021 08:45 AM    LDL, calculated 54.2 02/03/2021 08:45 AM    VLDL, calculated 24.8 02/03/2021 08:45 AM    Triglyceride 124 02/03/2021 08:45 AM    CHOL/HDL Ratio 2.3 02/03/2021 08:45 AM      BNP No results for input(s): BNPP in the last 72 hours.    Liver Enzymes Recent Labs 02/05/21  0305   TP 5.5*   ALB 2.3*   *      Thyroid Studies Lab Results   Component Value Date/Time    TSH 2.18 06/29/2020 12:35 AM        Procedures/imaging: see electronic medical records for all procedures/Xrays and details which were not copied into this note but were reviewed prior to creation of Yesica Tapia MD

## 2021-02-06 NOTE — PROGRESS NOTES
Summary -- Pt comfortable throughout shift. Reports pain control to goal with prescribed prn regimen. Requests benadryl prior to dilaudid to help control itching. Reports better pain control with dialudid than roxicodone (declines roxicodone). Tolerating PO without n/v.  Started on antihypertensive this morning. Pt reported tightness to BLE. Mild edema noted; lasix administered. Pt expresses desire to continue IVF. Patient Vitals for the past 12 hrs:   Temp Pulse Resp BP SpO2   02/06/21 1559 98.4 °F (36.9 °C) 79 17 133/88 98 %   02/06/21 1207 98.5 °F (36.9 °C) 71 17 134/86 97 %   02/06/21 0813 98.3 °F (36.8 °C) 91 18 (!) 148/100 95 %     Shift change report given to Jessica Bueno RN (oncoming nurse) by John Bajwa RN (offgoing nurse). Report included the following information SBAR, Kardex, ED Summary, Intake/Output, MAR and Recent Results.

## 2021-02-06 NOTE — PROGRESS NOTES
Problem: Risk for Spread of Infection  Goal: Prevent transmission of infectious organism to others  Description: Prevent the transmission of infectious organisms to other patients, staff members, and visitors. Outcome: Progressing Towards Goal     Problem: Patient Education:  Go to Education Activity  Goal: Patient/Family Education  Outcome: Progressing Towards Goal     Problem: Falls - Risk of  Goal: *Absence of Falls  Description: Document Willian Lobe Fall Risk and appropriate interventions in the flowsheet. Outcome: Progressing Towards Goal  Note: Fall Risk Interventions:  Mobility Interventions: Communicate number of staff needed for ambulation/transfer, Patient to call before getting OOB         Medication Interventions: Patient to call before getting OOB, Teach patient to arise slowly         History of Falls Interventions:  Investigate reason for fall         Problem: Patient Education: Go to Patient Education Activity  Goal: Patient/Family Education  Outcome: Progressing Towards Goal

## 2021-02-06 NOTE — ROUTINE PROCESS
Bedside shift change report given to Madhu Martin RN (oncoming nurse) by Bossman Pollard RN (offgoing nurse). Report included the following information SBAR, Kardex, ED Summary, Intake/Output, MAR and Recent Results.

## 2021-02-07 VITALS
DIASTOLIC BLOOD PRESSURE: 92 MMHG | WEIGHT: 223.7 LBS | SYSTOLIC BLOOD PRESSURE: 139 MMHG | RESPIRATION RATE: 16 BRPM | HEART RATE: 100 BPM | HEIGHT: 65 IN | BODY MASS INDEX: 37.27 KG/M2 | TEMPERATURE: 98 F | OXYGEN SATURATION: 95 %

## 2021-02-07 LAB
ANION GAP SERPL CALC-SCNC: 8 MMOL/L (ref 3–18)
BASOPHILS # BLD: 0 K/UL (ref 0–0.1)
BASOPHILS NFR BLD: 0 % (ref 0–2)
BUN SERPL-MCNC: 2 MG/DL (ref 7–18)
BUN/CREAT SERPL: 3 (ref 12–20)
CALCIUM SERPL-MCNC: 8.2 MG/DL (ref 8.5–10.1)
CHLORIDE SERPL-SCNC: 104 MMOL/L (ref 100–111)
CO2 SERPL-SCNC: 25 MMOL/L (ref 21–32)
CREAT SERPL-MCNC: 0.67 MG/DL (ref 0.6–1.3)
DIFFERENTIAL METHOD BLD: ABNORMAL
EOSINOPHIL # BLD: 0.3 K/UL (ref 0–0.4)
EOSINOPHIL NFR BLD: 5 % (ref 0–5)
ERYTHROCYTE [DISTWIDTH] IN BLOOD BY AUTOMATED COUNT: 20.2 % (ref 11.6–14.5)
GLUCOSE SERPL-MCNC: 89 MG/DL (ref 74–99)
HCT VFR BLD AUTO: 29.5 % (ref 35–45)
HGB BLD-MCNC: 9.8 G/DL (ref 12–16)
LYMPHOCYTES # BLD: 1.5 K/UL (ref 0.9–3.6)
LYMPHOCYTES NFR BLD: 27 % (ref 21–52)
MCH RBC QN AUTO: 37.4 PG (ref 24–34)
MCHC RBC AUTO-ENTMCNC: 33.2 G/DL (ref 31–37)
MCV RBC AUTO: 112.6 FL (ref 74–97)
MONOCYTES # BLD: 0.7 K/UL (ref 0.05–1.2)
MONOCYTES NFR BLD: 12 % (ref 3–10)
NEUTS SEG # BLD: 3.1 K/UL (ref 1.8–8)
NEUTS SEG NFR BLD: 56 % (ref 40–73)
PLATELET # BLD AUTO: 299 K/UL (ref 135–420)
PMV BLD AUTO: 9.1 FL (ref 9.2–11.8)
POTASSIUM SERPL-SCNC: 3.6 MMOL/L (ref 3.5–5.5)
RBC # BLD AUTO: 2.62 M/UL (ref 4.2–5.3)
SODIUM SERPL-SCNC: 137 MMOL/L (ref 136–145)
WBC # BLD AUTO: 5.5 K/UL (ref 4.6–13.2)

## 2021-02-07 PROCEDURE — 74011250637 HC RX REV CODE- 250/637: Performed by: INTERNAL MEDICINE

## 2021-02-07 PROCEDURE — 74011250636 HC RX REV CODE- 250/636: Performed by: FAMILY MEDICINE

## 2021-02-07 PROCEDURE — 36415 COLL VENOUS BLD VENIPUNCTURE: CPT

## 2021-02-07 PROCEDURE — 80048 BASIC METABOLIC PNL TOTAL CA: CPT

## 2021-02-07 PROCEDURE — 74011250637 HC RX REV CODE- 250/637: Performed by: HOSPITALIST

## 2021-02-07 PROCEDURE — 85025 COMPLETE CBC W/AUTO DIFF WBC: CPT

## 2021-02-07 RX ORDER — AMLODIPINE BESYLATE 5 MG/1
5 TABLET ORAL DAILY
Qty: 30 TAB | Refills: 0 | Status: SHIPPED | OUTPATIENT
Start: 2021-02-08

## 2021-02-07 RX ORDER — METOCLOPRAMIDE HYDROCHLORIDE 5 MG/5ML
5 SOLUTION ORAL
Qty: 600 ML | Refills: 1 | Status: SHIPPED | OUTPATIENT
Start: 2021-02-07

## 2021-02-07 RX ORDER — OXYCODONE HYDROCHLORIDE 5 MG/1
5 TABLET ORAL
Qty: 15 TAB | Refills: 0 | Status: SHIPPED | OUTPATIENT
Start: 2021-02-07 | End: 2021-02-10

## 2021-02-07 RX ORDER — CIPROFLOXACIN 500 MG/1
500 TABLET ORAL EVERY 12 HOURS
Qty: 14 TAB | Refills: 0 | Status: SHIPPED | OUTPATIENT
Start: 2021-02-07 | End: 2022-08-27

## 2021-02-07 RX ORDER — PANTOPRAZOLE SODIUM 40 MG/1
40 TABLET, DELAYED RELEASE ORAL
Qty: 60 TAB | Refills: 0 | Status: SHIPPED | OUTPATIENT
Start: 2021-02-07

## 2021-02-07 RX ADMIN — HYDROMORPHONE HYDROCHLORIDE 1 MG: 1 INJECTION, SOLUTION INTRAMUSCULAR; INTRAVENOUS; SUBCUTANEOUS at 02:43

## 2021-02-07 RX ADMIN — ESCITALOPRAM OXALATE 20 MG: 10 TABLET ORAL at 08:46

## 2021-02-07 RX ADMIN — Medication 10 ML: at 08:50

## 2021-02-07 RX ADMIN — Medication 10 ML: at 01:09

## 2021-02-07 RX ADMIN — SUCRALFATE 1 G: 1 TABLET ORAL at 07:56

## 2021-02-07 RX ADMIN — HYDROMORPHONE HYDROCHLORIDE 1 MG: 1 INJECTION, SOLUTION INTRAMUSCULAR; INTRAVENOUS; SUBCUTANEOUS at 08:46

## 2021-02-07 RX ADMIN — RIVAROXABAN 20 MG: 10 TABLET, FILM COATED ORAL at 08:46

## 2021-02-07 RX ADMIN — DIPHENHYDRAMINE HYDROCHLORIDE 25 MG: 50 INJECTION, SOLUTION INTRAMUSCULAR; INTRAVENOUS at 01:37

## 2021-02-07 RX ADMIN — AMLODIPINE BESYLATE 5 MG: 5 TABLET ORAL at 08:46

## 2021-02-07 RX ADMIN — METOCLOPRAMIDE HYDROCHLORIDE 5 MG: 5 SOLUTION ORAL at 07:56

## 2021-02-07 RX ADMIN — PANTOPRAZOLE SODIUM 40 MG: 40 TABLET, DELAYED RELEASE ORAL at 06:42

## 2021-02-07 RX ADMIN — DIPHENHYDRAMINE HYDROCHLORIDE 25 MG: 50 INJECTION, SOLUTION INTRAMUSCULAR; INTRAVENOUS at 07:57

## 2021-02-07 RX ADMIN — CIPROFLOXACIN HYDROCHLORIDE 500 MG: 500 TABLET, FILM COATED ORAL at 08:46

## 2021-02-07 NOTE — DISCHARGE SUMMARY
Discharge Summary    Patient: Maureen Pate MRN: [id number]  CSN: [id number]    YOB: 1975  Age: 39 y.o. Sex: female    DOA: 2/2/2021 LOS:  LOS: 5 days   Discharge Date:      Primary Care Provider:  Dorothy Singh NP    Admission Diagnoses: Pancreatitis [K85.90]  Hematuria [R31.9]    Discharge Diagnoses:    Problem List as of 2/7/2021 Date Reviewed: 6/27/2020          Codes Class Noted - Resolved    Salmonella ICD-10-CM: A02.9  ICD-9-CM: 003.9  2/5/2021 - Present        UTI (urinary tract infection) ICD-10-CM: N39.0  ICD-9-CM: 599.0  2/3/2021 - Present        Abdominal pain ICD-10-CM: R10.9  ICD-9-CM: 789.00  2/3/2021 - Present        Transaminitis ICD-10-CM: R74.01  ICD-9-CM: 790.4  2/3/2021 - Present        Injury of right elbow ICD-10-CM: J60.431X  ICD-9-CM: 959.3  2/3/2021 - Present        Hematuria ICD-10-CM: R31.9  ICD-9-CM: 599.70  2/2/2021 - Present        Pancreatitis, alcoholic, acute QUE-02-MR: K85.20  ICD-9-CM: 577.0  9/10/2020 - Present        * (Principal) Pancreatitis ICD-10-CM: K85.90  ICD-9-CM: 485.1  8/23/2020 - Present        Nausea & vomiting ICD-10-CM: R11.2  ICD-9-CM: 787.01  8/23/2020 - Present        Elevated liver enzymes ICD-10-CM: R74.8  ICD-9-CM: 790.5  8/23/2020 - Present        Acute on chronic pancreatitis ([facility] [address] ICD-10-CM: K85.90, K86.1  ICD-9-CM: 637.0, 577.1  7/27/2020 - Present        Portal vein thrombosis ICD-10-CM: W91  ICD-9-CM: 077  6/27/2020 - Present        Gastritis and duodenitis ICD-10-CM: K29.90  ICD-9-CM: 535.50  6/27/2020 - Present        Hypertension ICD-10-CM: I10  ICD-9-CM: 401.9  Unknown - Present        Anemia ICD-10-CM: D64.9  ICD-9-CM: 436. 9  Unknown - Present        Anxiety ICD-10-CM: F41.9  ICD-9-CM: 300.00  Unknown - Present        GERD (gastroesophageal reflux disease) ICD-10-CM: K21.9  ICD-9-CM: 530.81  Unknown - Present        Smoker ICD-10-CM: F17.200  ICD-9-CM: 305.1  Unknown - Present    Overview Signed 6/20/2018  8:40 AM by ARNIE Villalobos     smokes 5 cigarettes per day             Status post gastric bypass for obesity ICD-10-CM: Z98.84  ICD-9-CM: V45.86  1/1/2000 - Present    Overview Signed 6/20/2018  8:30 AM by ARNIE Villalobos / librado hermosillo             RESOLVED: Fever ICD-10-CM: R50.9  ICD-9-CM: 780.60  9/21/2018 - 12/4/2019        RESOLVED: Cancer ([facility] ICD-10-CM: C80.1  ICD-9-CM: 199.1  Unknown - 12/4/2019    Overview Signed 6/20/2018  8:29 AM by ARNIE Villalobos      history of endometrial cancer             RESOLVED: Severe obesity with body mass index (BMI) of 35.0 to 39.9 with comorbidity ([facility] ICD-10-CM: E66.01  ICD-9-CM: 278.01  Unknown - 10/2/2018        RESOLVED: Diarrhea ICD-10-CM: R19.7  ICD-9-CM: 787.91  Unknown - 12/4/2019              Discharge Medications:     Current Discharge Medication List      START taking these medications    Details   amLODIPine (NORVASC) 5 mg tablet Take 1 Tab by mouth daily. Qty: 30 Tab, Refills: 0      pantoprazole (PROTONIX) 40 mg tablet Take 1 Tab by mouth Before breakfast and dinner. Qty: 60 Tab, Refills: 0      ciprofloxacin HCl (CIPRO) 500 mg tablet Take 1 Tab by mouth every twelve (12) hours. Qty: 14 Tab, Refills: 0      oxyCODONE IR (ROXICODONE) 5 mg immediate release tablet Take 1 Tab by mouth every four (4) hours as needed for Pain for up to 3 days. Max Daily Amount: 30 mg.  Qty: 15 Tab, Refills: 0    Associated Diagnoses: Acute pancreatitis, unspecified complication status, unspecified pancreatitis type; Generalized abdominal pain         CONTINUE these medications which have CHANGED    Details   metoclopramide (REGLAN) 5 mg/5 mL soln Take 5 mL by mouth Before breakfast, lunch, dinner and at bedtime. Qty: 600 mL, Refills: 1         CONTINUE these medications which have NOT CHANGED    Details   sucralfate (CARAFATE) 1 gram tablet Take 1 Tab by mouth Before breakfast, lunch, dinner and at bedtime for 180 days.   Qty: 120 Tab, Refills: 5      rivaroxaban (XARELTO) 20 mg tab tablet Take 1 Tab by mouth daily. Qty: 30 Tab, Refills: 0      acetaminophen (Tylenol Extra Strength) 500 mg tablet Take 1,000 mg by mouth every six (6) hours as needed for Pain.      escitalopram oxalate (LEXAPRO) 20 mg tablet Take 20 mg by mouth daily. Indications: anxiousness associated with depression      traZODone (DESYREL) 100 mg tablet Take  by mouth nightly. Pt is unsure of the dose for this medication      multivitamin (ONE A DAY) tablet Take 1 Tab by mouth daily. Discharge Condition: Good    Procedures : none     Consults: GI       PHYSICAL EXAM   Visit Vitals  BP (!) 139/92   Pulse 100   Temp 98 °F (36.7 °C)   Resp 16   Ht 5' 5\" (1.651 m)   Wt 101.5 kg (223 lb 11.2 oz)   SpO2 95%   Breastfeeding No   BMI 37.23 kg/m²                                  Admission HPI :   Paddy Alcantara is a 39 y.o. female with past medical history of anemia, hypertension, thromboembolism of internal iliac artery, pancreatitis, colitis, C. difficile, gastric bypass in 2000 with revision in 2018 presents to the emergency department C/O nausea vomiting and diarrhea for the past couple days with associated abdominal pain.  Patient denies fevers.  No chest pain or shortness of breath.  Patient has been on Macrobid for UTI for the past several days.  No black tarry stool.    Reports that symptoms have been increasing actually over the past 1 to 2 weeks but it got acutely worse in the last 4 days. Initially just had heartburn-like symptoms. States that her symptoms may be related to increasing stressors in her life including family issues with her sibling and homeschooling her children etc.  She states that she has increased her alcohol intake but only admits to drinking Mosc[place] because she does not drink beer or hard liquor. Has not taken any new supplements or vitamins except for Metamucil. Also reports falling off her bed and injuring her right elbow.   She reports that her bed is very high off the floor and that she has a step on and off of a stool on and off of the bed. She notes that a day ago while she was trying to step down from her bed she missed her stool entirely lost her balance and fell striking her right elbow she denies hitting her head she also denies any  dizziness lightheadedness or loss of consciousness.    Takes Trazodone to sleep and wants to make sure she gets it.      Hospital Course :  acute edematous interstitial pancreatitis-better  Lipase normal - expected for h/o chronic pancreatitis   Discharge on low fat diet and alcohol cessation      Appreciate GI consult  NO etoh reviewed with patient, this has been discussed in past with her on prior visit, but she has still drank     Diarrhea-salmonella positive on stool test-start cipro  Discharged on 14 dose course      Transaminitis - improved     UTI -  Recent treatment with macrobid improved  But on cipro      Alcohol use -counselled absolute cessation     C difficile toxin NEGATIVE     Advance diet  D/c  Today  Cancelled dyesterday to persistent need for pain med and diarrhea from salmonella   Anticipate tomorrow today      HTN  Will started low dose amlodipine with improved of blood pressure disharged on this     Activity: Activity as tolerated    Diet: Low fat, Low cholesterol    Follow-up: PCP on Tuesday as planned     Disposition: home     Minutes spent on discharge: 35 min       Labs: Results:       Chemistry Recent Labs     02/07/21 0100 02/06/21 0145 02/05/21  0305   GLU 89 91 88    139 138   K 3.6 3.7 3.6    107 107   CO2 25 23 22   BUN 2* 2* 2*   CREA 0.67 0.62 0.55*   CA 8.2* 7.6* 7.2*   AGAP 8 9 9   BUCR 3* 3* 4*   AP  --   --  162*   TP  --   --  5.5*   ALB  --   --  2.3*   GLOB  --   --  3.2   AGRAT  --   --  0.7*      CBC w/Diff Recent Labs     02/07/21 0100 02/06/21 0145 02/05/21  0305   WBC 5.5 5.5 6.0   RBC 2.62* 2.46* 2.49*   HGB 9.8* 9.5* 9.6*   HCT 29.5* 27.8* 28.1*    269 249   GRANS 56 61 60   LYMPH 27 23 26   EOS 5 4 4      Cardiac Enzymes No results for input(s): CPK, CKND1, JONATAN in the last 72 hours. No lab exists for component: CKRMB, TROIP   Coagulation No results for input(s): PTP, INR, APTT, INREXT, INREXT in the last 72 hours. Lipid Panel Lab Results   Component Value Date/Time    Cholesterol, total 139 02/03/2021 08:45 AM    HDL Cholesterol 60 02/03/2021 08:45 AM    LDL, calculated 54.2 02/03/2021 08:45 AM    VLDL, calculated 24.8 02/03/2021 08:45 AM    Triglyceride 124 02/03/2021 08:45 AM    CHOL/HDL Ratio 2.3 02/03/2021 08:45 AM      BNP No results for input(s): BNPP in the last 72 hours. Liver Enzymes Recent Labs     02/05/21  0305   TP 5.5*   ALB 2.3*   *      Thyroid Studies Lab Results   Component Value Date/Time    TSH 2.18 06/29/2020 12:35 AM            Significant Diagnostic Studies: Xr Elbow Rt Min 3 V    Result Date: 2/3/2021  Right elbow exam. Indication: Pain. Findings: AP, lateral, and  oblique views of the right elbow are submitted for evaluation. There is no evidence of fracture or dislocation. No joint effusion is noted. Osseous mineralization is radiographically normal. The regional soft tissue structures are unremarkable. Negative radiographs right elbow. Mri Abd W Wo Cont    Result Date: 2/3/2021  EXAM: MRI ABDOMEN CLINICAL INDICATION/HISTORY:  Pseudocyst -Additional: Pancreatitis, abdominal pain. COMPARISON: 8/27/2020 TECHNIQUE: MRI of the abdomen was performed with and without intravenous contrast. MRCP included. Post processing 3D rendering was done on a separate workstation under concurrent physician supervision. _______________ FINDINGS: LOWER CHEST: Unremarkable. LIVER: Normal liver contour and parenchymal signal. No suspicious lesion. BILIARY: No intrahepatic biliary dilation. Common duct is mildly distended and tortuous, likely sequela of cholecystectomy.  PANCREAS: There is some signal abnormality in the pancreatic head and mild surrounding edema, though this is significantly decreased when compared with 8/27/2020. Mild irregularity and ectasia of main pancreatic duct in the body/tail as noted previously, but less pronounced. Decreased size of small rim-enhancing collection adjacent to the pancreatic neck and proximal duodenum (postcontrast image 70) small rounded cyst within the pancreatic neck parenchyma From prior (postcontrast image 69), likely pancreatitis related as well, has benign features. SPLEEN: Normal. ADRENALS: Normal. KIDNEYS: Normal. LYMPH NODES: No enlarged lymph nodes. GASTROINTESTINAL TRACT: No bowel dilation or wall thickening. VASCULATURE: Unremarkable. Pancreatitis related narrowing of SMV appears improved in the interval. BONES: No acute or aggressive osseous abnormalities identified. OTHER: None. _______________     Decreased inflammatory changes centered at the pancreatic head, but there is some signal abnormality and peripancreatic edema which suggests a component of acute pancreatitis. The small rim-enhancing peripancreatic collection is decreased in size and appears inflammatory. Interval development of small cyst within the pancreatic neck parenchyma which is likely a pancreatitis related collection. Upstream ductal ectasia is decreased. Ct Abd Pelv W Cont    Result Date: 2/2/2021  EXAM: CT of the abdomen and pelvis INDICATION: Upper abdominal pain nausea vomiting COMPARISON: August 23, 2020 TECHNIQUE: Axial CT imaging of the abdomen and pelvis was performed with intravenous contrast. Multiplanar reformats were generated. One or more dose reduction techniques were used on this CT: automated exposure control, adjustment of the mAs and/or kVp according to patient size, and iterative reconstruction techniques. The specific techniques used on this CT exam have been documented in the patient's electronic medical record.  Digital Imaging and Communications in Medicine (DICOM) format image data are available to nonaffiliated external healthcare facilities or entities on a secure, media free, reciprocally searchable basis with patient authorization for at least a 12-month period after this study. _______________ FINDINGS: LOWER CHEST: Unremarkable. LIVER, BILIARY: Liver is normal. No biliary dilation. Cholecystectomy PANCREAS: There is peripancreatic inflammation suggesting acute edematous interstitial pancreatitis. There is 11 mm low-attenuation in the head of the pancreas seen on axial image 43 suggesting intrapancreatic fluid collection SPLEEN: Normal. ADRENALS: Normal. KIDNEYS: Normal. VASCULATURE: Unremarkable LYMPH NODES: No enlarged lymph nodes. GASTROINTESTINAL TRACT: Post gastric bypass changes present. There is wall thickening of the second portion the duodenum secondary to the adjacent acute pancreatitis. PELVIC ORGANS: Urinary bladder is under distended and there is pericystic stranding concerning for cystitis. Hysterectomy. No free fluid. BONES: No acute or aggressive osseous abnormalities identified. OTHER: _______________     There is acute edematous interstitial pancreatitis. There is a fluid collection in the head of the pancreas measuring 12 mm which may represent focal area of pancreatic necrosis and intrapancreatic fluid collection/pseudocyst. There is secondary inflammation of the duodenum. Pericystic stranding around the urinary bladder. Correlate with urinalysis to exclude cystitis. Xr Chest Port    Result Date: 2/2/2021  EXAM:  AP Portable Chest X-ray 1 view INDICATION: Abdominal pain for one week COMPARISON: September 10, 2020 _______________ FINDINGS:  Heart and mediastinal contours are within normal limits for portable radiograph. Lungs are clear of active disease. There are no pleural effusions. No acute osseous findings. ________________      No acute process        No results found for this or any previous visit.         CC: Prakash Brink NP

## 2021-02-07 NOTE — PROGRESS NOTES
1045-discharge instructions reviewed with pt. Pt verbalizes understanding of instructions and able to teach back. 1051-pt discharged from facility.

## 2021-02-07 NOTE — ROUTINE PROCESS
Bedside and Verbal shift change report given to JAD Barrow RN (oncoming nurse) by Lyly Arriola. Riley ABURTO (offgoing nurse). Report included the following information SBAR, Kardex, Intake/Output, MAR and Recent Results. Introduced self to pt, updated whiteboard. NAD noted. Call bell within reach. 2023- pt c/o pain 8/10 in back, requesting prn dilaudid. PRN dilaudid given per MD order. 3098- PIV infiltrated. PIV insertion attempt x1 unsuccessful. Nurse Supervisor aware, paged ED nurse to assist with PIV insertion. 9886- pt c/o pain 7/10 in back, requesting prn dilaudid. PRN dilaudid given per MD order. Bedside and Verbal shift change report given to GENOVEVA Hernandez RN (oncoming nurse) by JAD Barrow RN (offgoing nurse). Report included the following information SBAR, Kardex, Intake/Output, MAR and Recent Results.

## 2021-02-07 NOTE — PROGRESS NOTES
Problem: Risk for Spread of Infection  Goal: Prevent transmission of infectious organism to others  Description: Prevent the transmission of infectious organisms to other patients, staff members, and visitors. Outcome: Progressing Towards Goal     Problem: Patient Education:  Go to Education Activity  Goal: Patient/Family Education  Outcome: Progressing Towards Goal     Problem: Falls - Risk of  Goal: *Absence of Falls  Description: Document Sabina Morales Fall Risk and appropriate interventions in the flowsheet.   Outcome: Progressing Towards Goal  Note: Fall Risk Interventions:  Mobility Interventions: Communicate number of staff needed for ambulation/transfer, Patient to call before getting OOB         Medication Interventions: Teach patient to arise slowly         History of Falls Interventions: Evaluate medications/consider consulting pharmacy         Problem: Patient Education: Go to Patient Education Activity  Goal: Patient/Family Education  Outcome: Progressing Towards Goal     Problem: General Infection Care Plan (Adult and Pediatric)  Goal: Improvement in signs and symptoms of infection  Outcome: Progressing Towards Goal  Goal: *Optimize nutritional status  Outcome: Progressing Towards Goal     Problem: Patient Education: Go to Patient Education Activity  Goal: Patient/Family Education  Outcome: Progressing Towards Goal     Problem: Hypertension  Goal: *Blood pressure within specified parameters  Outcome: Progressing Towards Goal  Goal: *Fluid volume balance  Outcome: Progressing Towards Goal  Goal: *Labs within defined limits  Outcome: Progressing Towards Goal

## 2021-02-08 LAB
O+P SPEC MICRO: NORMAL
O+P STL CONC: NORMAL
SPECIMEN SOURCE: NORMAL

## 2022-02-18 ENCOUNTER — HOSPITAL ENCOUNTER (OUTPATIENT)
Dept: PREADMISSION TESTING | Age: 47
Discharge: HOME | End: 2022-02-18
Payer: MEDICAID

## 2022-02-18 ENCOUNTER — TRANSCRIBE ORDER (OUTPATIENT)
Dept: REGISTRATION | Age: 47
End: 2022-02-18

## 2022-02-18 ENCOUNTER — HOSPITAL ENCOUNTER (OUTPATIENT)
Dept: LAB | Age: 47
Discharge: HOME | End: 2022-02-18

## 2022-02-18 DIAGNOSIS — Z01.818 OTHER SPECIFIED PRE-OPERATIVE EXAMINATION: ICD-10-CM

## 2022-02-18 DIAGNOSIS — Z01.818 OTHER SPECIFIED PRE-OPERATIVE EXAMINATION: Primary | ICD-10-CM

## 2022-02-18 LAB
ATRIAL RATE: 69 BPM
CALCULATED P AXIS, ECG09: 69 DEGREES
CALCULATED R AXIS, ECG10: 31 DEGREES
CALCULATED T AXIS, ECG11: 50 DEGREES
DIAGNOSIS, 93000: NORMAL
P-R INTERVAL, ECG05: 172 MS
Q-T INTERVAL, ECG07: 394 MS
QRS DURATION, ECG06: 82 MS
QTC CALCULATION (BEZET), ECG08: 422 MS
VENTRICULAR RATE, ECG03: 69 BPM
XX-LABCORP SPECIMEN COL,LCBCF: NORMAL

## 2022-02-18 PROCEDURE — 99001 SPECIMEN HANDLING PT-LAB: CPT

## 2022-02-18 PROCEDURE — 93005 ELECTROCARDIOGRAM TRACING: CPT

## 2022-04-26 NOTE — CONSULTS
[id number] [facility]    Name:  Dick Arzola  MR#:   [id number]  :  1975  ACCOUNT #:  [de-identified]  DATE OF SERVICE:  2019    HISTORY OF PRESENT ILLNESS:  This is a 72-year-old female who came at 03:26 this morning to the emergency room complaining of severe epigastric pain that apparently awoken her up last night and radiating to the back associated with nausea and vomiting. The patient also claimed that she has been having foul smelling diarrhea. She is extremely poor historian. It seems that she has been having bouts of similar pain for the past 4 years where every time she has to go to the emergency room and is being admitted at different places. She claims that her attacks happen about once every 3 months where as the pain can last for 3 to 4 days before subsiding. The pain usually is excruciating, this time was 9 to 10/10 and presently is 7/10. She claims also that in the last couple of years or may be more, she has been having diarrhea for which she was investigated at [facility] by Dr. 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 She claimed that she was treated with prednisone during this last summer from  to about 3 weeks ago. The first dose gave her constipation and in fact she had to drop the dose by half and after that the diarrhea resumed. She claims that she was having between 6 to 8 liquid or loose bowel movements per day including 2 to 3 at night. She denied however having any rectal bleeding, mucus, tenesmus, or oil. She claimed that stools are large, but this time the stool had foul smell to them. She claimed that about 2 months ago when she went to the emergency room she was told to have colitis on 10/02/2019, and was given antibiotics.   Also, she had one attack in 2019 where the CT scan showed that she has preduodenal inflammation likely secondary to acute pancreatitis, diffuse colonic wall thickening of the entire colon suggestive of either colitis and/or distention. The patient already had a gastric bypass surgery in 2000 by Dr. Erica Valdez and there has been a revision of her surgery by Dr. Alivia Del Rosario in 2019. The patient claimed that she gained 30 pounds since that last surgery and she attribute this to the steroids. She also admitted to drinking a bottle of wine at least 3 to 4 times a week, but has not had any drinking for about 2 days. She claims that she treats her stress by drinking. She has not been able to work for the past year mostly because she has to go to bathroom on a regular basis with diarrhea. She does not take any multivitamin supplement as was recommended to her. She smokes occasionally. She denies taking any drugs. She never had any liver disease or hepatitis. PAST SURGICAL HISTORY:  She has previous cholecystectomy many years ago. She had a partial hysterectomy. MEDICATIONS AT HOME:  She was taking Desyrel 100 mg, multivitamins that I am not sure if she is taking this, Bentyl 20 mg every 6 hours as needed. She came to our office recently in November and about 2 weeks ago, and at that time lab test was requested but none of them was done. ALLERGIES:  SHE HAS NO KNOWN DRUG ALLERGIES. SOCIAL HISTORY:  This patient has three kids, two twins age 15 and the younger is 15. She does not work. She did not travel abroad. FAMILY HISTORY:  There is a strong family history of Crohn's disease on her father's side. Her father  from liver cancer at age 48. Her paternal aunt had ovarian cancer in her 46s. REVIEW OF SYSTEMS:  She has no diabetes mellitus, coronary artery disease, or history of stroke. She does complain of some balance issues and falling on at least four occasions in the last months. She h as no dysuria, hematuria, or burning sensation. She claimed that in the last 2 years has been having bouts of fever and chills but nothing recent.   On her previous chart, I see that she has been having CT scan of the abdomen since at least 02/2017, and almost every time there is a mention of some kind of pancreatitis. She has no shortness of breath. No chest pain. No coughing. No rash. No chronic back pain. No arthralgia. No sores in the mouth. PHYSICAL EXAMINATION:  GENERAL:  I have a 81-year-old grumpy [facility] female who appears to be in no major distress except that she is a poor historian and had to be extremely patient with her. VITAL SIGNS:  She weighs 215 pounds, temperature 99.4 to 99.6 maximum, pulse 100, blood pressure 156/94, breathing 15, saturation 100%. SKIN:  Normal.  No evidence of any rash. No obvious stigmata of chronic liver disease. HEENT:  The eyes are unremarkable. The pupils are equal and reactive to light. The sclerae are anicteric. The conjunctivae are pink. The oropharyngeal cavity shows a dry and pink mucous membrane. Normal teeth. NECK:  Supple. No palpable mass. No enlarged thyroid. LUNGS:  Clear to auscultation. HEART:  The cardiac rhythm is regular. S1 and S2 normal.  No murmur. ABDOMEN:  Not distended, rounded, soft. There is moderate diffuse tenderness with more severe in the epigastric area. Bowel sounds are diminished. EXTREMITIES:  Unremarkable. No pedal edema. No clubbing. NEUROLOGIC:  No tremor. No focal neurological signs. She does have dry skin. She is alert and oriented. LABORATORY DATA:  We have a CBC with hemoglobin of 13.8, which used to be 12.5 on 10/02/2019, WBC 11.1, and neutrophils 84%. She has high MCV at 103.7, it was 110 in 03/2019, platelets 903. Normal coagulation in 04/2019. Complete metabolic panel today showed that she has a total bilirubin of 1.7, globulin 4.4, , , alkaline phosphatase 149. Lipase 3309. Vitamin B12 316 and folate 8.2. Negative HCG.   Her LFTs had been in fact slightly elevated since at least in 01/2019 before that, they were normal.  It increased gradually with always predominance of the AST. On 10/02/2019, her AST was 201, ALT 77,  alkaline phosphatase 143, and total bilirubin was 1.1. CT scan of the abdomen and pelvis was done with contrast showed acute interstitial pancreatitis, hepatomegaly with steatosis, prior gastric bypass with nonspecific edematous appearance of the gastric pouch. On the previous CT scan, there was mention of a small hiatal hernia in 2017. This patient has been taking omeprazole 20 mg daily for at least a year or more because of chronic GERD. Denies having any dysphagia. ASSESSMENT:  This is a 43-year-old female who has been having bouts of reoccurring pancreatitis for the past 4 years with epigastric pain radiating to the back, typical of acute on chronic pancreatitis. She does consume large quantities of alcohol and in my opinion she has chronic alcoholic pancreatitis with liver alcoholic disease to explain by the elevation of the aspartate aminotransferase more than the alanine transaminase and also macrocytosis without vitamin B12 deficiency. I think the diarrhea also could be caused by alcohol abuse. This patient should stop all alcohol consumption completely. For now, I am going to do testing on her stool to see if there is any infectious etiology or even exocrine pancreatic insufficiency, but there is no weight loss. I do not think that she has celiac disease. She may have some eosinophilic colitis, I am not sure this is all the cause of her diarrhea, it may have contributed to it. She was treated with steroids for almost six months and only improved on the higher dose. At any rate, we need to do a full investigation to sort things out that one thing for sure this patient should abandon drinking alcohol and every time she would come to the emergency room we have to check her alcohol level.   She should also take multivitamins including vitamin D ,abstain from smoking and start exercising. I explained to her that she has to treat her stress and depression by other means than taking drugs or alcohol. PLAN:  We will follow her at the office. For now, we have to let her pancreatitis pass. Hopefully, she will never drink again and I hope that she understood this clearly. She has been under the care of different specialists and also she has been to different emergency rooms mostly because of the alcohol consumption.       MD CALEB [email or web address]  D:  12/03/2019 14:08  T:  12/03/2019 18:28  JOB #:  [id number]  CC:  MD Kevon García NP Cartilage Graft Text: The defect edges were debeveled with a #15 scalpel blade.  Given the location of the defect, shape of the defect, the fact the defect involved a full thickness cartilage defect a cartilage graft was deemed most appropriate.  An appropriate donor site was identified, cleansed, and anesthetized. The cartilage graft was then harvested and transferred to the recipient site, oriented appropriately and then sutured into place.  The secondary defect was then repaired using a primary closure.

## 2022-08-25 ENCOUNTER — APPOINTMENT (OUTPATIENT)
Dept: CT IMAGING | Age: 47
DRG: 282 | End: 2022-08-25
Attending: EMERGENCY MEDICINE
Payer: MEDICAID

## 2022-08-25 ENCOUNTER — APPOINTMENT (OUTPATIENT)
Dept: VASCULAR SURGERY | Age: 47
DRG: 282 | End: 2022-08-25
Attending: INTERNAL MEDICINE
Payer: MEDICAID

## 2022-08-25 ENCOUNTER — HOSPITAL ENCOUNTER (INPATIENT)
Age: 47
LOS: 2 days | Discharge: HOME | DRG: 282 | End: 2022-08-27
Attending: EMERGENCY MEDICINE | Admitting: INTERNAL MEDICINE
Payer: MEDICAID

## 2022-08-25 DIAGNOSIS — A59.9 TRICHOMONOSIS: ICD-10-CM

## 2022-08-25 DIAGNOSIS — K85.90 ACUTE PANCREATITIS, UNSPECIFIED COMPLICATION STATUS, UNSPECIFIED PANCREATITIS TYPE: Primary | ICD-10-CM

## 2022-08-25 DIAGNOSIS — Z98.84 HISTORY OF ROUX-EN-Y GASTRIC BYPASS: ICD-10-CM

## 2022-08-25 DIAGNOSIS — E86.0 DEHYDRATION: ICD-10-CM

## 2022-08-25 LAB
ALBUMIN SERPL-MCNC: 3.1 G/DL (ref 3.4–5)
ALBUMIN/GLOB SERPL: 0.7 {RATIO} (ref 0.8–1.7)
ALP SERPL-CCNC: 96 U/L (ref 45–117)
ALT SERPL-CCNC: 32 U/L (ref 13–56)
AMPHET UR QL SCN: NEGATIVE
ANION GAP SERPL CALC-SCNC: 11 MMOL/L (ref 3–18)
APPEARANCE UR: ABNORMAL
AST SERPL-CCNC: 53 U/L (ref 10–38)
ATRIAL RATE: 82 BPM
BACTERIA URNS QL MICRO: NEGATIVE /HPF
BARBITURATES UR QL SCN: NEGATIVE
BASOPHILS # BLD: 0 K/UL (ref 0–0.1)
BASOPHILS NFR BLD: 0 % (ref 0–2)
BENZODIAZ UR QL: NEGATIVE
BILIRUB SERPL-MCNC: 1.1 MG/DL (ref 0.2–1)
BILIRUB UR QL: NEGATIVE
BUN SERPL-MCNC: 8 MG/DL (ref 7–18)
BUN/CREAT SERPL: 8 (ref 12–20)
CALCIUM SERPL-MCNC: 8.5 MG/DL (ref 8.5–10.1)
CALCULATED P AXIS, ECG09: 73 DEGREES
CALCULATED R AXIS, ECG10: 9 DEGREES
CALCULATED T AXIS, ECG11: 55 DEGREES
CANNABINOIDS UR QL SCN: POSITIVE
CHLORIDE SERPL-SCNC: 101 MMOL/L (ref 100–111)
CO2 SERPL-SCNC: 19 MMOL/L (ref 21–32)
COCAINE UR QL SCN: NEGATIVE
COLOR UR: YELLOW
CREAT SERPL-MCNC: 0.98 MG/DL (ref 0.6–1.3)
DIAGNOSIS, 93000: NORMAL
DIFFERENTIAL METHOD BLD: ABNORMAL
EOSINOPHIL # BLD: 0 K/UL (ref 0–0.4)
EOSINOPHIL NFR BLD: 0 % (ref 0–5)
EPITH CASTS URNS QL MICRO: ABNORMAL /LPF (ref 0–5)
ERYTHROCYTE [DISTWIDTH] IN BLOOD BY AUTOMATED COUNT: 19 % (ref 11.6–14.5)
ETHANOL SERPL-MCNC: <3 MG/DL (ref 0–3)
GLOBULIN SER CALC-MCNC: 4.5 G/DL (ref 2–4)
GLUCOSE SERPL-MCNC: 153 MG/DL (ref 74–99)
GLUCOSE UR STRIP.AUTO-MCNC: NEGATIVE MG/DL
HCG UR QL: NEGATIVE
HCT VFR BLD AUTO: 37.2 % (ref 35–45)
HDSCOM,HDSCOM: ABNORMAL
HEMOCCULT STL QL: NEGATIVE
HGB BLD-MCNC: 12.5 G/DL (ref 12–16)
HGB UR QL STRIP: ABNORMAL
IMM GRANULOCYTES # BLD AUTO: 0 K/UL (ref 0–0.04)
IMM GRANULOCYTES NFR BLD AUTO: 0 % (ref 0–0.5)
KETONES UR QL STRIP.AUTO: NEGATIVE MG/DL
LACTATE SERPL-SCNC: 1.5 MMOL/L (ref 0.4–2)
LEUKOCYTE ESTERASE UR QL STRIP.AUTO: ABNORMAL
LIPASE SERPL-CCNC: 1143 U/L (ref 73–393)
LYMPHOCYTES # BLD: 1.8 K/UL (ref 0.9–3.6)
LYMPHOCYTES NFR BLD: 19 % (ref 21–52)
MAGNESIUM SERPL-MCNC: 1.8 MG/DL (ref 1.6–2.6)
MCH RBC QN AUTO: 28.5 PG (ref 24–34)
MCHC RBC AUTO-ENTMCNC: 33.6 G/DL (ref 31–37)
MCV RBC AUTO: 84.9 FL (ref 78–100)
METHADONE UR QL: NEGATIVE
MONOCYTES # BLD: 0.7 K/UL (ref 0.05–1.2)
MONOCYTES NFR BLD: 8 % (ref 3–10)
NEUTS SEG # BLD: 6.6 K/UL (ref 1.8–8)
NEUTS SEG NFR BLD: 72 % (ref 40–73)
NITRITE UR QL STRIP.AUTO: NEGATIVE
NRBC # BLD: 0 K/UL (ref 0–0.01)
NRBC BLD-RTO: 0 PER 100 WBC
OPIATES UR QL: POSITIVE
P-R INTERVAL, ECG05: 142 MS
PCP UR QL: NEGATIVE
PH UR STRIP: 6 [PH] (ref 5–8)
PLATELET # BLD AUTO: 314 K/UL (ref 135–420)
PMV BLD AUTO: 9.3 FL (ref 9.2–11.8)
POTASSIUM SERPL-SCNC: 3.6 MMOL/L (ref 3.5–5.5)
PROT SERPL-MCNC: 7.6 G/DL (ref 6.4–8.2)
PROT UR STRIP-MCNC: NEGATIVE MG/DL
Q-T INTERVAL, ECG07: 406 MS
QRS DURATION, ECG06: 88 MS
QTC CALCULATION (BEZET), ECG08: 474 MS
RBC # BLD AUTO: 4.38 M/UL (ref 4.2–5.3)
RBC #/AREA URNS HPF: ABNORMAL /HPF (ref 0–5)
SODIUM SERPL-SCNC: 131 MMOL/L (ref 136–145)
SP GR UR REFRACTOMETRY: 1.01 (ref 1–1.03)
TRICHOMONAS UR QL MICRO: ABNORMAL
TROPONIN-HIGH SENSITIVITY: 7 NG/L (ref 0–54)
UROBILINOGEN UR QL STRIP.AUTO: 0.2 EU/DL (ref 0.2–1)
VENTRICULAR RATE, ECG03: 82 BPM
WBC # BLD AUTO: 9.1 K/UL (ref 4.6–13.2)
WBC URNS QL MICRO: ABNORMAL /HPF (ref 0–5)

## 2022-08-25 PROCEDURE — 80307 DRUG TEST PRSMV CHEM ANLYZR: CPT

## 2022-08-25 PROCEDURE — 82272 OCCULT BLD FECES 1-3 TESTS: CPT

## 2022-08-25 PROCEDURE — 96374 THER/PROPH/DIAG INJ IV PUSH: CPT

## 2022-08-25 PROCEDURE — 80053 COMPREHEN METABOLIC PANEL: CPT

## 2022-08-25 PROCEDURE — 74011250636 HC RX REV CODE- 250/636: Performed by: INTERNAL MEDICINE

## 2022-08-25 PROCEDURE — 87086 URINE CULTURE/COLONY COUNT: CPT

## 2022-08-25 PROCEDURE — 65270000029 HC RM PRIVATE

## 2022-08-25 PROCEDURE — 82077 ASSAY SPEC XCP UR&BREATH IA: CPT

## 2022-08-25 PROCEDURE — 83605 ASSAY OF LACTIC ACID: CPT

## 2022-08-25 PROCEDURE — 96375 TX/PRO/DX INJ NEW DRUG ADDON: CPT

## 2022-08-25 PROCEDURE — 99285 EMERGENCY DEPT VISIT HI MDM: CPT

## 2022-08-25 PROCEDURE — 96376 TX/PRO/DX INJ SAME DRUG ADON: CPT

## 2022-08-25 PROCEDURE — 81025 URINE PREGNANCY TEST: CPT

## 2022-08-25 PROCEDURE — 81001 URINALYSIS AUTO W/SCOPE: CPT

## 2022-08-25 PROCEDURE — 74011250636 HC RX REV CODE- 250/636: Performed by: EMERGENCY MEDICINE

## 2022-08-25 PROCEDURE — 74176 CT ABD & PELVIS W/O CONTRAST: CPT

## 2022-08-25 PROCEDURE — C9113 INJ PANTOPRAZOLE SODIUM, VIA: HCPCS | Performed by: EMERGENCY MEDICINE

## 2022-08-25 PROCEDURE — 85025 COMPLETE CBC W/AUTO DIFF WBC: CPT

## 2022-08-25 PROCEDURE — 74011250637 HC RX REV CODE- 250/637: Performed by: INTERNAL MEDICINE

## 2022-08-25 PROCEDURE — C9113 INJ PANTOPRAZOLE SODIUM, VIA: HCPCS | Performed by: INTERNAL MEDICINE

## 2022-08-25 PROCEDURE — 93970 EXTREMITY STUDY: CPT

## 2022-08-25 PROCEDURE — 80323 ALKALOIDS NOS: CPT

## 2022-08-25 PROCEDURE — 83690 ASSAY OF LIPASE: CPT

## 2022-08-25 PROCEDURE — 74011000250 HC RX REV CODE- 250: Performed by: INTERNAL MEDICINE

## 2022-08-25 PROCEDURE — 74011000636 HC RX REV CODE- 636: Performed by: EMERGENCY MEDICINE

## 2022-08-25 PROCEDURE — 84484 ASSAY OF TROPONIN QUANT: CPT

## 2022-08-25 PROCEDURE — 93005 ELECTROCARDIOGRAM TRACING: CPT

## 2022-08-25 PROCEDURE — 83735 ASSAY OF MAGNESIUM: CPT

## 2022-08-25 RX ORDER — SODIUM CHLORIDE 9 MG/ML
125 INJECTION, SOLUTION INTRAVENOUS CONTINUOUS
Status: DISPENSED | OUTPATIENT
Start: 2022-08-25 | End: 2022-08-26

## 2022-08-25 RX ORDER — ESCITALOPRAM OXALATE 10 MG/1
40 TABLET ORAL DAILY
Status: DISCONTINUED | OUTPATIENT
Start: 2022-08-25 | End: 2022-08-28 | Stop reason: HOSPADM

## 2022-08-25 RX ORDER — LORAZEPAM 1 MG/1
1 TABLET ORAL
Status: DISCONTINUED | OUTPATIENT
Start: 2022-08-25 | End: 2022-08-28 | Stop reason: HOSPADM

## 2022-08-25 RX ORDER — SODIUM CHLORIDE 0.9 % (FLUSH) 0.9 %
5-40 SYRINGE (ML) INJECTION AS NEEDED
Status: DISCONTINUED | OUTPATIENT
Start: 2022-08-25 | End: 2022-08-28 | Stop reason: HOSPADM

## 2022-08-25 RX ORDER — DIPHENHYDRAMINE HYDROCHLORIDE 50 MG/ML
25 INJECTION, SOLUTION INTRAMUSCULAR; INTRAVENOUS
Status: DISCONTINUED | OUTPATIENT
Start: 2022-08-25 | End: 2022-08-28 | Stop reason: HOSPADM

## 2022-08-25 RX ORDER — MORPHINE SULFATE 4 MG/ML
4 INJECTION INTRAVENOUS
Status: COMPLETED | OUTPATIENT
Start: 2022-08-25 | End: 2022-08-25

## 2022-08-25 RX ORDER — ONDANSETRON 4 MG/1
4 TABLET, ORALLY DISINTEGRATING ORAL
Status: DISCONTINUED | OUTPATIENT
Start: 2022-08-25 | End: 2022-08-28 | Stop reason: HOSPADM

## 2022-08-25 RX ORDER — FAMOTIDINE 10 MG/ML
20 INJECTION INTRAVENOUS
Status: COMPLETED | OUTPATIENT
Start: 2022-08-25 | End: 2022-08-25

## 2022-08-25 RX ORDER — MORPHINE SULFATE 4 MG/ML
INJECTION INTRAVENOUS
Status: DISPENSED
Start: 2022-08-25 | End: 2022-08-25

## 2022-08-25 RX ORDER — SODIUM CHLORIDE 0.9 % (FLUSH) 0.9 %
5-40 SYRINGE (ML) INJECTION EVERY 8 HOURS
Status: DISCONTINUED | OUTPATIENT
Start: 2022-08-25 | End: 2022-08-26 | Stop reason: SDUPTHER

## 2022-08-25 RX ORDER — LORAZEPAM 1 MG/1
2 TABLET ORAL
Status: DISCONTINUED | OUTPATIENT
Start: 2022-08-25 | End: 2022-08-28 | Stop reason: HOSPADM

## 2022-08-25 RX ORDER — ONDANSETRON 2 MG/ML
4 INJECTION INTRAMUSCULAR; INTRAVENOUS
Status: DISCONTINUED | OUTPATIENT
Start: 2022-08-25 | End: 2022-08-28 | Stop reason: HOSPADM

## 2022-08-25 RX ORDER — AMLODIPINE BESYLATE 5 MG/1
5 TABLET ORAL DAILY
Status: DISCONTINUED | OUTPATIENT
Start: 2022-08-25 | End: 2022-08-28 | Stop reason: HOSPADM

## 2022-08-25 RX ORDER — PANTOPRAZOLE SODIUM 40 MG/10ML
40 INJECTION, POWDER, LYOPHILIZED, FOR SOLUTION INTRAVENOUS
Status: COMPLETED | OUTPATIENT
Start: 2022-08-25 | End: 2022-08-25

## 2022-08-25 RX ORDER — METOCLOPRAMIDE HYDROCHLORIDE 5 MG/ML
10 INJECTION INTRAMUSCULAR; INTRAVENOUS
Status: DISCONTINUED | OUTPATIENT
Start: 2022-08-25 | End: 2022-08-28 | Stop reason: HOSPADM

## 2022-08-25 RX ORDER — ACETAMINOPHEN 325 MG/1
650 TABLET ORAL
Status: DISCONTINUED | OUTPATIENT
Start: 2022-08-25 | End: 2022-08-28 | Stop reason: HOSPADM

## 2022-08-25 RX ORDER — LORAZEPAM 2 MG/ML
3 INJECTION, SOLUTION INTRAMUSCULAR; INTRAVENOUS
Status: DISCONTINUED | OUTPATIENT
Start: 2022-08-25 | End: 2022-08-28 | Stop reason: HOSPADM

## 2022-08-25 RX ORDER — METRONIDAZOLE 500 MG/100ML
500 INJECTION, SOLUTION INTRAVENOUS EVERY 12 HOURS
Status: DISCONTINUED | OUTPATIENT
Start: 2022-08-25 | End: 2022-08-28 | Stop reason: HOSPADM

## 2022-08-25 RX ORDER — MORPHINE SULFATE 2 MG/ML
2 INJECTION, SOLUTION INTRAMUSCULAR; INTRAVENOUS
Status: DISCONTINUED | OUTPATIENT
Start: 2022-08-25 | End: 2022-08-28 | Stop reason: HOSPADM

## 2022-08-25 RX ORDER — POLYETHYLENE GLYCOL 3350 17 G/17G
17 POWDER, FOR SOLUTION ORAL DAILY PRN
Status: DISCONTINUED | OUTPATIENT
Start: 2022-08-25 | End: 2022-08-28 | Stop reason: HOSPADM

## 2022-08-25 RX ORDER — METRONIDAZOLE 500 MG/100ML
500 INJECTION, SOLUTION INTRAVENOUS
Status: DISCONTINUED | OUTPATIENT
Start: 2022-08-25 | End: 2022-08-25

## 2022-08-25 RX ORDER — ACETAMINOPHEN 650 MG/1
650 SUPPOSITORY RECTAL
Status: DISCONTINUED | OUTPATIENT
Start: 2022-08-25 | End: 2022-08-28 | Stop reason: HOSPADM

## 2022-08-25 RX ORDER — SODIUM CHLORIDE 0.9 % (FLUSH) 0.9 %
5-40 SYRINGE (ML) INJECTION AS NEEDED
Status: DISCONTINUED | OUTPATIENT
Start: 2022-08-25 | End: 2022-08-26 | Stop reason: SDUPTHER

## 2022-08-25 RX ORDER — ENOXAPARIN SODIUM 100 MG/ML
40 INJECTION SUBCUTANEOUS DAILY
Status: DISCONTINUED | OUTPATIENT
Start: 2022-08-25 | End: 2022-08-28 | Stop reason: HOSPADM

## 2022-08-25 RX ORDER — ONDANSETRON 4 MG/1
4 TABLET, FILM COATED ORAL
COMMUNITY

## 2022-08-25 RX ORDER — LORAZEPAM 2 MG/ML
1 INJECTION, SOLUTION INTRAMUSCULAR; INTRAVENOUS
Status: DISCONTINUED | OUTPATIENT
Start: 2022-08-25 | End: 2022-08-28 | Stop reason: HOSPADM

## 2022-08-25 RX ORDER — LORAZEPAM 2 MG/ML
2 INJECTION, SOLUTION INTRAMUSCULAR; INTRAVENOUS
Status: DISCONTINUED | OUTPATIENT
Start: 2022-08-25 | End: 2022-08-28 | Stop reason: HOSPADM

## 2022-08-25 RX ORDER — ONDANSETRON 2 MG/ML
4 INJECTION INTRAMUSCULAR; INTRAVENOUS
Status: COMPLETED | OUTPATIENT
Start: 2022-08-25 | End: 2022-08-25

## 2022-08-25 RX ORDER — TRAZODONE HYDROCHLORIDE 100 MG/1
100 TABLET ORAL
Status: DISCONTINUED | OUTPATIENT
Start: 2022-08-25 | End: 2022-08-28 | Stop reason: HOSPADM

## 2022-08-25 RX ORDER — SODIUM CHLORIDE 0.9 % (FLUSH) 0.9 %
5-40 SYRINGE (ML) INJECTION EVERY 8 HOURS
Status: DISCONTINUED | OUTPATIENT
Start: 2022-08-25 | End: 2022-08-28 | Stop reason: HOSPADM

## 2022-08-25 RX ADMIN — METRONIDAZOLE 500 MG: 500 INJECTION, SOLUTION INTRAVENOUS at 17:18

## 2022-08-25 RX ADMIN — FOLIC ACID: 5 INJECTION, SOLUTION INTRAMUSCULAR; INTRAVENOUS; SUBCUTANEOUS at 11:00

## 2022-08-25 RX ADMIN — ONDANSETRON HYDROCHLORIDE 4 MG: 2 INJECTION INTRAMUSCULAR; INTRAVENOUS at 01:44

## 2022-08-25 RX ADMIN — SODIUM CHLORIDE 1000 ML: 900 INJECTION, SOLUTION INTRAVENOUS at 03:58

## 2022-08-25 RX ADMIN — METOCLOPRAMIDE 10 MG: 5 INJECTION, SOLUTION INTRAMUSCULAR; INTRAVENOUS at 07:32

## 2022-08-25 RX ADMIN — IOHEXOL: 240 INJECTION, SOLUTION INTRATHECAL; INTRAVASCULAR; INTRAVENOUS; ORAL at 01:44

## 2022-08-25 RX ADMIN — SODIUM CHLORIDE, PRESERVATIVE FREE 10 ML: 5 INJECTION INTRAVENOUS at 14:27

## 2022-08-25 RX ADMIN — SODIUM CHLORIDE 1000 ML: 900 INJECTION, SOLUTION INTRAVENOUS at 01:44

## 2022-08-25 RX ADMIN — MORPHINE SULFATE 2 MG: 2 INJECTION, SOLUTION INTRAMUSCULAR; INTRAVENOUS at 07:32

## 2022-08-25 RX ADMIN — AMLODIPINE BESYLATE 5 MG: 5 TABLET ORAL at 09:57

## 2022-08-25 RX ADMIN — DIPHENHYDRAMINE HYDROCHLORIDE 25 MG: 50 INJECTION, SOLUTION INTRAMUSCULAR; INTRAVENOUS at 12:12

## 2022-08-25 RX ADMIN — ENOXAPARIN SODIUM 40 MG: 100 INJECTION SUBCUTANEOUS at 10:00

## 2022-08-25 RX ADMIN — SODIUM CHLORIDE 40 MG: 9 INJECTION, SOLUTION INTRAMUSCULAR; INTRAVENOUS; SUBCUTANEOUS at 22:16

## 2022-08-25 RX ADMIN — MORPHINE SULFATE 2 MG: 2 INJECTION, SOLUTION INTRAMUSCULAR; INTRAVENOUS at 17:19

## 2022-08-25 RX ADMIN — FAMOTIDINE 20 MG: 10 INJECTION, SOLUTION INTRAVENOUS at 01:44

## 2022-08-25 RX ADMIN — ESCITALOPRAM OXALATE 40 MG: 10 TABLET ORAL at 09:57

## 2022-08-25 RX ADMIN — TRAZODONE HYDROCHLORIDE 100 MG: 100 TABLET ORAL at 22:16

## 2022-08-25 RX ADMIN — MORPHINE SULFATE 2 MG: 2 INJECTION, SOLUTION INTRAMUSCULAR; INTRAVENOUS at 22:16

## 2022-08-25 RX ADMIN — PANTOPRAZOLE SODIUM 40 MG: 40 INJECTION, POWDER, FOR SOLUTION INTRAVENOUS at 05:12

## 2022-08-25 RX ADMIN — MORPHINE SULFATE 2 MG: 2 INJECTION, SOLUTION INTRAMUSCULAR; INTRAVENOUS at 12:12

## 2022-08-25 RX ADMIN — ONDANSETRON 4 MG: 4 TABLET, ORALLY DISINTEGRATING ORAL at 22:16

## 2022-08-25 RX ADMIN — MORPHINE SULFATE 4 MG: 4 INJECTION INTRAVENOUS at 04:06

## 2022-08-25 RX ADMIN — DIPHENHYDRAMINE HYDROCHLORIDE 25 MG: 50 INJECTION, SOLUTION INTRAMUSCULAR; INTRAVENOUS at 17:18

## 2022-08-25 RX ADMIN — DIPHENHYDRAMINE HYDROCHLORIDE 25 MG: 50 INJECTION, SOLUTION INTRAMUSCULAR; INTRAVENOUS at 22:15

## 2022-08-25 RX ADMIN — SODIUM CHLORIDE, PRESERVATIVE FREE 10 ML: 5 INJECTION INTRAVENOUS at 21:09

## 2022-08-25 RX ADMIN — SODIUM CHLORIDE 40 MG: 9 INJECTION, SOLUTION INTRAMUSCULAR; INTRAVENOUS; SUBCUTANEOUS at 09:57

## 2022-08-25 RX ADMIN — MORPHINE SULFATE 4 MG: 4 INJECTION INTRAVENOUS at 02:02

## 2022-08-25 RX ADMIN — ACETAMINOPHEN 650 MG: 325 TABLET ORAL at 09:57

## 2022-08-25 RX ADMIN — SODIUM CHLORIDE, PRESERVATIVE FREE 10 ML: 5 INJECTION INTRAVENOUS at 21:08

## 2022-08-25 NOTE — ED NOTES
TRANSFER - OUT REPORT:    Verbal report given to [facility] RN(name) on Arnulfo Caldwell  being transferred to Medical(unit) for routine progression of care       Report consisted of patients Situation, Background, Assessment and   Recommendations(SBAR). Information from the following report(s) SBAR, ED Summary and MAR was reviewed with the receiving nurse. Lines:   Peripheral IV 08/25/22 Right Hand (Active)   Site Assessment Clean, dry, & intact 08/25/22 0136   Phlebitis Assessment 0 08/25/22 0136   Infiltration Assessment 0 08/25/22 0136   Dressing Status Clean, dry, & intact 08/25/22 0136        Opportunity for questions and clarification was provided.       Patient transported with:   Registered Nurse

## 2022-08-25 NOTE — ED NOTES
Pt endorses a decrease in pain following morphine admin. Pt continues to endorse nausea while drinking oral contrast r/t sugar. Pt educated that it is sugar free Crystal Light.  Pt verbalizes that she will continue to try to drink PO contrast.

## 2022-08-25 NOTE — ED NOTES
Pt medicated per [facility] following allergy verification.     IVF continue to infuse

## 2022-08-25 NOTE — ED TRIAGE NOTES
Pt arrives to ER w c/o lower abdominal pain, n/v/d x 2 days. Recent skin removal surgery to abdomen in march.

## 2022-08-25 NOTE — ED NOTES
Order for midline noted. RRT is not yet on shift. Nursing Supervisor made aware and RRT will be notified.

## 2022-08-25 NOTE — H&P
History & Physical    Patient: Alcon Collier MRN: [id number]  [facility]: [id number]    YOB: 1975  Age: 41 y.o. Sex: female      DOA: 8/25/2022    Chief Complaint:   Chief Complaint   Patient presents with    Abdominal Pain          HPI:     Alcon Collier is a 41 y.o.  female who has history of gastric bypass x2, eosinophilic colitis, portal vein thrombosis abdominal pain with nausea and vomiting and chronic malabsorption presents with worsening abdominal pain for and nausea for 3 days but has been progressive since she had her panniculectomy by Dr. Susu Bah she admits to drinking about 3 large Heineken's a day since the loss of her breast friend from end-stage renal disease and the loss of her father.   She had been alcohol free for about a year in fact her last admission was about a year ago for that in the emergency room she was found to have a pancreatitis with lipase of 1000 CT shows abdominal leak I am asked to admit for further treatment her bariatric surgeon was consulted and I was told no other procedures were planned and she will be treated medically    Past Medical History:   Diagnosis Date    Anemia     C. difficile diarrhea     Cancer ([facility] 2011     history of endometrial cancer    Chronic pain     Colitis     Diarrhea     GERD (gastroesophageal reflux disease)     Hypertension 2017    Ill-defined condition     mass in  breast    Ill-defined condition     pancreatitis    Intestinal malabsorption     Pancreatitis     Psychiatric disorder     Anxiety and depression    Severe obesity with body mass index (BMI) of 35.0 to 39.9 with comorbidity ([facility]     Smoker     smokes 5 cigarettes per day    Status post gastric bypass for obesity 2000    open / [place]    Syncopal episodes     has plans to see a neurologist     Thromboembolism of internal iliac artery ([facility]     Wears dentures     teeth extraction due to vomiting and vitamin deficiencies       Past Surgical History:   Procedure Laterality Date    HX CHOLECYSTECTOMY      HX GASTRIC BYPASS  2000    gastric bypass    HX GASTRIC BYPASS  09/2018    HX GI  2017    EGD    HX GI      colonoscopy    HX HYSTERECTOMY      HX OTHER SURGICAL Right     resection of benign breast mass       Family History   Problem Relation Age of Onset    Diabetes Mother        Social History     Socioeconomic History    Marital status: SINGLE   Tobacco Use    Smoking status: Former     Types: Cigarettes     Quit date: 9/1/2018     Years since quitting: 3.9    Smokeless tobacco: Never   Substance and Sexual Activity    Alcohol use: No     Alcohol/week: 1.0 standard drink     Types: 1 Cans of beer per week     Comment: rare    Drug use: Yes     Types: Marijuana     Comment: weekly    Sexual activity: Not Currently     Birth control/protection: Surgical       Prior to Admission medications    Medication Sig Start Date End Date Taking? Authorizing Provider   amLODIPine (NORVASC) 5 mg tablet Take 1 Tab by mouth daily. Patient not taking: Reported on 8/25/2022 2/8/21   Minnie Middleton MD   metoclopramide (REGLAN) 5 mg/5 mL soln Take 5 mL by mouth Before breakfast, lunch, dinner and at bedtime. 2/7/21   Minnie Middleton MD   pantoprazole (PROTONIX) 40 mg tablet Take 1 Tab by mouth Before breakfast and dinner. 2/7/21   Minnie Middleton MD   ciprofloxacin HCl (CIPRO) 500 mg tablet Take 1 Tab by mouth every twelve (12) hours. 2/7/21   Minnie Middleton MD   rivaroxaban (XARELTO) 20 mg tab tablet Take 1 Tab by mouth daily. 8/31/20   Jesús Landon MD   acetaminophen (Tylenol Extra Strength) 500 mg tablet Take 1,000 mg by mouth every six (6) hours as needed for Pain. Provider, Historical   escitalopram oxalate (LEXAPRO) 20 mg tablet Take 20 mg by mouth daily. Indications: anxiousness associated with depression    Provider, Historical   traZODone (DESYREL) 100 mg tablet Take  by mouth nightly.  Pt is unsure of the dose for this medication    Provider, Historical multivitamin (ONE A DAY) tablet Take 1 Tab by mouth daily. Provider, Historical       No Known Allergies      Review of Systems  GENERAL: Patient alert, awake and oriented times 3, able to communicate full sentences and not in distress. HEENT: No change in vision, no earache, tinnitus, sore throat or sinus congestion. NECK: No pain or stiffness. PULMONARY: No shortness of breath, cough or wheeze. Cardiovascular: no pnd or orthopnea, no CP  GASTROINTESTINAL:+ abdominal pain,+ nausea, vomiting or diarrhea, melena or bright red blood per rectum. GENITOURINARY: No urinary frequency, urgency, hesitancy or dysuria. MUSCULOSKELETAL: No joint or muscle pain, no back pain, no recent trauma. DERMATOLOGIC: No rash, no itching, no lesions. Skin from panniculectomy ok  ENDOCRINE: No polyuria, polydipsia, no heat or cold intolerance. No recent change in weight. HEMATOLOGICAL: No anemia or easy bruising or bleeding. NEUROLOGIC: No headache, seizures, numbness, tingling or weakness. Physical Exam:     Physical Exam:  Visit Vitals  BP (!) 158/92   Pulse 88   Temp 98.4 °F (36.9 °C)   Resp 16   Ht 5' 5\" (1.651 m)   Wt 77.1 kg (170 lb)   SpO2 100%   BMI 28.29 kg/m²      O2 Device: None (Room air)    Temp (24hrs), Av °F (36.7 °C), Min:97.5 °F (36.4 °C), Max:98.4 °F (36.9 °C)    No intake/output data recorded. No intake/output data recorded. General:  Alert, cooperative, no distress, appears stated age. Head: Normocephalic, without obvious abnormality, atraumatic. Eyes:  Conjunctivae/corneas clear. PERRL, EOMs intact. Nose: Nares normal. No drainage or sinus tenderness. Neck: Supple, symmetrical, trachea midline, no adenopathy, thyroid: no enlargement, no carotid bruit and no JVD. Lungs:   Clear to auscultation bilaterally. Heart:  Regular rate and rhythm, S1, S2 normal.     Abdomen: Soft, non-tender.  Bowel sounds normal. Incision from panniculectomy c d intact   Extremities: Extremities normal, atraumatic, no cyanosis or edema. Pulses: 2+ and symmetric all extremities. Skin:  No rashes or lesions   Neurologic: AAOx3, No focal motor or sensory deficit. Labs Reviewed: All lab results for the last 24 hours reviewed.    and EKG    Procedures/imaging: see electronic medical records for all procedures/Xrays and details which were not copied into this note but were reviewed prior to creation of Plan      Assessment/Plan     Active Problems:    Abdominal pain (2/3/2021)  From pancratitis and chronic abdominal leak contained  Clear liquid diet  Pain control  Did not start IV abx for this as this is chronic   On flagyl(lactacte neg, wbc ok no fever )      Acute pancreatitis (8/25/2022)  Fluids  Pain control  Liquid diet    Chronic Abdominal leak  Surgical consult   No abx  Monitor for sepsis  IV protonix    ETOH use   Ciwa started  Banana bag  Advised to quit    Post panniculectomy  Stable    Trichomonas   Flagyl bid for 7 days  Needs partner treatment as well   is a     Tobacco disorder  THC use    Hx of Portal vein Thrombosis with increase d dimer   Was on Xarelto  Check Duplex leg  Medline access hard stick then get contrast cT of chest abdomen and Pelvis     Plan  Clear liquid diet  ETOH witdrawl protocol with CIWA  IV protonix  Serial lipase  Fluids  Pain control    HTN  Clonidine prn   Restart amlodipine      DVT/GI Prophylaxis: Jase Kirkland MD  8/25/2022 6:07 AM

## 2022-08-25 NOTE — PROGRESS NOTES
Problem: General Medical Care Plan  Goal: *Optimal pain control at patient's stated goal  Outcome: Progressing Towards Goal  Goal: *Anxiety reduced or absent  Outcome: Progressing Towards Goal     Problem: General Medical Care Plan  Goal: *Anxiety reduced or absent  Outcome: Progressing Towards Goal

## 2022-08-25 NOTE — PROGRESS NOTES
Patient admitted to room 310 from ED for pancreatitis. Patient arrived to unit via wheelchair around [address] [facility]. Patient is a+ox4. Patient denies any nausea at this time. Patient complained of headache 5/10 and received prn Tylenol. Patient is continent of bowel and bladder. Patient has 22G iv to right hand. Admission information received from patient. Patient denied any questions or concerns.

## 2022-08-25 NOTE — ED NOTES
Pt able to ambulate well without assistance to restroom. Pt endorses a decrease in pain.     CT updated r/t Pt completing half of PO contrast.

## 2022-08-25 NOTE — ED PROVIDER NOTES
EMERGENCY DEPARTMENT HISTORY AND PHYSICAL EXAM    Date: 2022  Patient Name: Deepak De La Rosa    History of Presenting Illness     Chief Complaint   Patient presents with    Abdominal Pain         History Provided By: Patient    Additional History (Context):   1:38 AM   Deepak De La Rosa is a 41 y.o. female with PMHX of status post open laparotomy gastric bypass and Scar-en-Y procedure in the year  by Dr. Herrera Silva now followed by revision Dr. Ky Antonio . Additionally there is history of alcohol induced pancreatitis as well as anemia, syncopal episodes, diarrhea, hypertension, previous history of thromboembolism internal iliac artery postoperatively after completing a course of Xarelto. She no longer uses anticoagulation, who presents to the emergency department C/O abdominal pain. She states she has been having symptoms going over the last 4 months however symptoms are never been this severe. She has been having nausea vomiting as well as periumbilical and epigastric abdominal pain. Her father  several weeks ago when she acknowledges returning to smoking and drinking and the stresses has affected her. She denies vomiting any blood or bilious material.  She denies any blood per rectum. Painless rectal severe abdominal pain, radiating to her back. Food aggravates it and sometimes drink. No other associated provocative factors. Social History  Acknowledges alcohol and tobacco use. Family History  Denies any significant family history. PCP: Christian RAMOS NP    Current Facility-Administered Medications   Medication Dose Route Frequency Provider Last Rate Last Admin    metroNIDAZOLE (FLAGYL) IVPB premix 500 mg  500 mg IntraVENous NOW Margo David MD         Current Outpatient Medications   Medication Sig Dispense Refill    amLODIPine (NORVASC) 5 mg tablet Take 1 Tab by mouth daily.  (Patient not taking: Reported on 2022) 30 Tab 0    metoclopramide (REGLAN) 5 mg/5 mL soln Take 5 mL by mouth Before breakfast, lunch, dinner and at bedtime. 600 mL 1    pantoprazole (PROTONIX) 40 mg tablet Take 1 Tab by mouth Before breakfast and dinner. 60 Tab 0    ciprofloxacin HCl (CIPRO) 500 mg tablet Take 1 Tab by mouth every twelve (12) hours. 14 Tab 0    rivaroxaban (XARELTO) 20 mg tab tablet Take 1 Tab by mouth daily. 30 Tab 0    acetaminophen (Tylenol Extra Strength) 500 mg tablet Take 1,000 mg by mouth every six (6) hours as needed for Pain.      escitalopram oxalate (LEXAPRO) 20 mg tablet Take 20 mg by mouth daily. Indications: anxiousness associated with depression      traZODone (DESYREL) 100 mg tablet Take  by mouth nightly. Pt is unsure of the dose for this medication      multivitamin (ONE A DAY) tablet Take 1 Tab by mouth daily.          Past History     Past Medical History:  Past Medical History:   Diagnosis Date    Anemia     C. difficile diarrhea     Cancer ([facility] 2011     history of endometrial cancer    Chronic pain     Colitis     Diarrhea     GERD (gastroesophageal reflux disease)     Hypertension 2017    Ill-defined condition     mass in  breast    Ill-defined condition     pancreatitis    Intestinal malabsorption     Pancreatitis     Psychiatric disorder     Anxiety and depression    Severe obesity with body mass index (BMI) of 35.0 to 39.9 with comorbidity ([facility]     Smoker     smokes 5 cigarettes per day    Status post gastric bypass for obesity 2000    open / [place]    Syncopal episodes     has plans to see a neurologist     Thromboembolism of internal iliac artery ([facility]     Wears dentures     teeth extraction due to vomiting and vitamin deficiencies       Past Surgical History:  Past Surgical History:   Procedure Laterality Date    HX CHOLECYSTECTOMY      HX GASTRIC BYPASS  2000    gastric bypass    HX GASTRIC BYPASS  09/2018    HX GI  2017    EGD    HX GI      colonoscopy    HX HYSTERECTOMY      HX OTHER SURGICAL Right     resection of benign breast mass       Family History:  Family History   Problem Relation Age of Onset    Diabetes Mother        Social History:  Social History     Tobacco Use    Smoking status: Former     Types: Cigarettes     Quit date: 9/1/2018     Years since quitting: 3.9    Smokeless tobacco: Never   Substance Use Topics    Alcohol use: No     Alcohol/week: 1.0 standard drink     Types: 1 Cans of beer per week     Comment: rare    Drug use: Yes     Types: Marijuana     Comment: weekly       Allergies:  No Known Allergies      Review of Systems   Review of Systems   Constitutional:  Positive for appetite change. Cardiovascular:  Positive for chest pain. Gastrointestinal:  Positive for nausea and vomiting. All other systems reviewed and are negative. Physical Exam     Vitals:    08/25/22 0044 08/25/22 0112 08/25/22 0317   BP: (!) 164/110 (!) 155/90 (!) 158/92   Pulse: 91 87 88   Resp: 18 18 16   Temp: 97.5 °F (36.4 °C) 98 °F (36.7 °C) 98.4 °F (36.9 °C)   SpO2: 100%  100%   Weight: 77.1 kg (170 lb)     Height: 5' 5\" (1.651 m)       Physical Exam  Vitals and nursing note reviewed. Constitutional:       General: She is not in acute distress. Appearance: She is well-developed. She is not diaphoretic. Comments: Well-appearing nontoxic female   HENT:      Head: Normocephalic and atraumatic. Eyes:      General: No scleral icterus. Extraocular Movements:      Right eye: Normal extraocular motion. Left eye: Normal extraocular motion. Conjunctiva/sclera: Conjunctivae normal.      Pupils: Pupils are equal, round, and reactive to light. Neck:      Trachea: No tracheal deviation. Cardiovascular:      Rate and Rhythm: Normal rate and regular rhythm. Heart sounds: Normal heart sounds. Pulmonary:      Effort: Pulmonary effort is normal. No respiratory distress. Breath sounds: Normal breath sounds. No stridor. Abdominal:      General: Bowel sounds are normal. There is no distension. Palpations: Abdomen is soft. Tenderness: There is abdominal tenderness in the epigastric area. There is no rebound. Comments: Tender epigastrium without guarding. Musculoskeletal:         General: No tenderness. Normal range of motion. Cervical back: Normal range of motion and neck supple. Comments: Grossly unremarkable without abnormalities   Skin:     General: Skin is warm and dry. Capillary Refill: Capillary refill takes less than 2 seconds. Findings: No erythema or rash. Neurological:      Mental Status: She is alert and oriented to person, place, and time. GCS: GCS eye subscore is 4. GCS verbal subscore is 5. GCS motor subscore is 6. Cranial Nerves: No cranial nerve deficit. Motor: No weakness. Psychiatric:         Mood and Affect: Mood normal.         Behavior: Behavior normal.         Thought Content: Thought content normal.         Judgment: Judgment normal.     Labs -  Recent Results (from the past 24 hour(s))   CBC WITH AUTOMATED DIFF    Collection Time: 08/25/22  1:28 AM   Result Value Ref Range    WBC 9.1 4.6 - 13.2 K/uL    RBC 4.38 4.20 - 5.30 M/uL    HGB 12.5 12.0 - 16.0 g/dL    HCT 37.2 35.0 - 45.0 %    MCV 84.9 78.0 - 100.0 FL    MCH 28.5 24.0 - 34.0 PG    MCHC 33.6 31.0 - 37.0 g/dL    RDW 19.0 (H) 11.6 - 14.5 %    PLATELET 916 751 - 596 K/uL    MPV 9.3 9.2 - 11.8 FL    NRBC 0.0 0  WBC    ABSOLUTE NRBC 0.00 0.00 - 0.01 K/uL    NEUTROPHILS 72 40 - 73 %    LYMPHOCYTES 19 (L) 21 - 52 %    MONOCYTES 8 3 - 10 %    EOSINOPHILS 0 0 - 5 %    BASOPHILS 0 0 - 2 %    IMMATURE GRANULOCYTES 0 0.0 - 0.5 %    ABS. NEUTROPHILS 6.6 1.8 - 8.0 K/UL    ABS. LYMPHOCYTES 1.8 0.9 - 3.6 K/UL    ABS. MONOCYTES 0.7 0.05 - 1.2 K/UL    ABS. EOSINOPHILS 0.0 0.0 - 0.4 K/UL    ABS. BASOPHILS 0.0 0.0 - 0.1 K/UL    ABS. IMM.  GRANS. 0.0 0.00 - 0.04 K/UL    DF AUTOMATED     METABOLIC PANEL, COMPREHENSIVE    Collection Time: 08/25/22  1:28 AM   Result Value Ref Range    Sodium 131 (L) 136 - 145 mmol/L Potassium 3.6 3.5 - 5.5 mmol/L    Chloride 101 100 - 111 mmol/L    CO2 19 (L) 21 - 32 mmol/L    Anion gap 11 3.0 - 18 mmol/L    Glucose 153 (H) 74 - 99 mg/dL    BUN 8 7.0 - 18 MG/DL    Creatinine 0.98 0.6 - 1.3 MG/DL    BUN/Creatinine ratio 8 (L) 12 - 20      GFR est AA >60 >60 ml/min/1.73m2    GFR est non-AA >60 >60 ml/min/1.73m2    Calcium 8.5 8.5 - 10.1 MG/DL    Bilirubin, total 1.1 (H) 0.2 - 1.0 MG/DL    ALT (SGPT) 32 13 - 56 U/L    AST (SGOT) 53 (H) 10 - 38 U/L    Alk.  phosphatase 96 45 - 117 U/L    Protein, total 7.6 6.4 - 8.2 g/dL    Albumin 3.1 (L) 3.4 - 5.0 g/dL    Globulin 4.5 (H) 2.0 - 4.0 g/dL    A-G Ratio 0.7 (L) 0.8 - 1.7     LIPASE    Collection Time: 08/25/22  1:28 AM   Result Value Ref Range    Lipase 1,143 (H) 73 - 393 U/L   MAGNESIUM    Collection Time: 08/25/22  1:28 AM   Result Value Ref Range    Magnesium 1.8 1.6 - 2.6 mg/dL   URINALYSIS W/ RFLX MICROSCOPIC    Collection Time: 08/25/22  3:10 AM   Result Value Ref Range    Color YELLOW      Appearance CLOUDY      Specific gravity 1.009 1.005 - 1.030      pH (UA) 6.0 5.0 - 8.0      Protein Negative NEG mg/dL    Glucose Negative NEG mg/dL    Ketone Negative NEG mg/dL    Bilirubin Negative NEG      Blood TRACE (A) NEG      Urobilinogen 0.2 0.2 - 1.0 EU/dL    Nitrites Negative NEG      Leukocyte Esterase TRACE (A) NEG     URINE MICROSCOPIC ONLY    Collection Time: 08/25/22  3:10 AM   Result Value Ref Range    WBC 0 to 3 0 - 5 /hpf    RBC 0 to 3 0 - 5 /hpf    Epithelial cells 3+ 0 - 5 /lpf    Bacteria Negative NEG /hpf    Trichomonas FEW (A) NEG     HCG URINE, QL    Collection Time: 08/25/22  3:10 AM   Result Value Ref Range    HCG urine, QL Negative NEG     EKG, 12 LEAD, INITIAL    Collection Time: 08/25/22  5:23 AM   Result Value Ref Range    Ventricular Rate 82 BPM    Atrial Rate 82 BPM    P-R Interval 142 ms    QRS Duration 88 ms    Q-T Interval 406 ms    QTC Calculation (Bezet) 474 ms    Calculated P Axis 73 degrees    Calculated R Axis 9 degrees    Calculated T Axis 55 degrees    Diagnosis       Poor data quality, interpretation may be adversely affected  Normal sinus rhythm  Normal ECG  When compared with ECG of 18-FEB-2022 13:36,  No significant change was found     LACTIC ACID    Collection Time: 08/25/22  5:35 AM   Result Value Ref Range    Lactic acid 1.5 0.4 - 2.0 MMOL/L        Radiologic Studies -   CT ABD PELV WO CONT   Final Result      1. Status post Scar-en-Y gastric bypass, edematous thick-walled formed pouch   with ulcerative type transmural perforation of the left lateral wall, with a   small volume of localized adjacent free air in the left upper quadrant. 2. CT findings of acute interstitial pancreatitis. 3. Prior cholecystectomy. CRITICAL RESULT:     These critical results were directly communicated to Arturo Kramer M.D. on   8/25/2022 4:45 AM.  Results understood and acknowledged. CT Results  (Last 48 hours)                 08/25/22 0419  CT ABD PELV WO CONT Final result    Impression:      1. Status post Scar-en-Y gastric bypass, edematous thick-walled formed pouch   with ulcerative type transmural perforation of the left lateral wall, with a   small volume of localized adjacent free air in the left upper quadrant. 2. CT findings of acute interstitial pancreatitis. 3. Prior cholecystectomy. CRITICAL RESULT:     These critical results were directly communicated to Arturo Kramer M.D. on   8/25/2022 4:45 AM.  Results understood and acknowledged. Narrative:  EXAM: CT ABD PELV WO CONT       CLINICAL INDICATION/HISTORY: abd pain   -Additional: None       COMPARISON: MRI abdomen 02/03/2021, CT abdomen/pelvis from 2/2/2021       TECHNIQUE: Axial CT imaging of the abdomen and pelvis was performed with oral   but, without without intravenous contrast. Multiplanar reformats were generated.    One or more dose reduction techniques were used on this CT: automated exposure   control, adjustment of the mAs and/or kVp according to patient size, and   iterative reconstruction techniques. The specific techniques used on this CT   exam have been documented in the patient's electronic medical record. Digital   Imaging and Communications in Medicine (DICOM) format image data are available   to nonaffiliated external healthcare facilities or entities on a secure, media   free, reciprocally searchable basis with patient authorization for at least a   12-month period after this study. _______________       FINDINGS:       LOWER CHEST: Unremarkable. LIVER, BILIARY: Liver is unremarkable No biliary dilation. Prior cholecystectomy       PANCREAS: Moderate edematous enlargement of the head/uncinate process, neck and   body with peripancreatic edema and small volume fluid. SPLEEN: Unremarkable       ADRENALS: Thickened left adrenal gland, likely hyperplasia and/or reactive from   adjacent induration       KIDNEYS, URETERS, BLADDER: No hydronephrosis, nephrolithiasis, perinephric fluid   or hydroureter. Unremarkable bladder       PELVIC ORGANS: Left adnexal/ovarian 3.7 cm cyst, almost certainly benign   functional etiology. GASTROINTESTINAL TRACT: Status post Scar-en-Y gastric bypass, with abnormal   edematous thickened formed gastric pouch.      > There is a transmural ulcerative type perforation of the left lateral formed   gastric pouch, 2 cm proximal to the gastrojejunal anastomosis. (Axial 25) There   is a small amount of localized free air interposed between the excluded stomach   and the inferior left lobe of liver. No significant free air within the   remaining abdomen.         > Oral contrast opacifies the distal jejunal jejunal anastomosis and distal   small bowel.     > No large bowel dilation or wall thickening. LYMPH NODES: No enlarged lymph nodes. VASCULATURE: Unremarkable. OSSEOUS: No acute or aggressive osseous abnormalities identified.        OTHER: As above.       _______________                 CXR Results  (Last 48 hours)      None            Medications given in the ED-  Medications   metroNIDAZOLE (FLAGYL) IVPB premix 500 mg (has no administration in time range)   iohexol (OMNIPAQUE) ORAL mixture ( Oral Given 8/25/22 0144)   ondansetron (ZOFRAN) injection 4 mg (4 mg IntraVENous Given 8/25/22 0144)   sodium chloride 0.9 % bolus infusion 1,000 mL (1,000 mL IntraVENous New Bag 8/25/22 0144)   famotidine (PF) (PEPCID) injection 20 mg (20 mg IntraVENous Given 8/25/22 0144)   morphine injection 4 mg (0 mg IntraVENous Held 8/25/22 0211)   sodium chloride 0.9 % bolus infusion 1,000 mL (0 mL IntraVENous IV Completed 8/25/22 0539)   morphine injection 4 mg (4 mg IntraVENous Given 8/25/22 0406)   pantoprazole (PROTONIX) injection 40 mg (40 mg IntraVENous Given 8/25/22 2820)         Medical Decision Making   I am the first provider for this patient. I reviewed the vital signs, available nursing notes, past medical history, past surgical history, family history and social history. Vital Signs-Reviewed the patient's vital signs. Pulse Oximetry Analysis -100% on room air    Cardiac Monitor:  Rate: 90 bpm  Rhythm: Sinus rhythm    EKG interpretation: (Preliminary)  5:23 AM    Normal sinus rhythm, rate 82, normal ST segments, there is no preexcitation. QRS is normal.  QTC is 474 ms. EKG read by Gaby De Leon MD      Records Reviewed: NURSING NOTES AND PREVIOUS MEDICAL RECORDS    Provider Notes (Medical Decision Making):   Patient with recurrent abdominal pain consistent with her previous known peptic ulcer disease associated with previous gastric surgery Scar-en-Y surgery. Alcohol and tobacco are likely contributors as we did find evidence of pancreatitis however consumption of on the PEG did demonstrate an ulceration which appears to be closed. There is no noted extravasation or significant amount of free air. At this point she is likely nonoperative.   This was confirmed after conversation with Dr. Marisa Bruce who asked patient be admitted but also be dressed with internal medicine for alcohol induced pancreatitis. He will consult on her from a bariatric surgery standpoint but at this juncture we will treat her conservatively. We see no evidence of sepsis this morning. Antibiotics added to deal with incidental findings of trichomonas. Procedures:  Procedures    ED Course:   1:38 AM: Initial assessment performed. The patients presenting problems have been discussed, and they are in agreement with the care plan formulated and outlined with them. I have encouraged them to ask questions as they arise throughout their visit. 3:55 AM  Progress note  Continues to have pain and discomfort we will add more morphine. She is doing well with contrast.    CONSULT NOTE:   5:02 AM  Katherine Brothers MD   spoke with Dr. Nayely Castañeda  Specialty: Bariatric surgeon  Discussed pt's hx, disposition, and available diagnostic and imaging results  over the telephone. Reviewed care plans. Consulting physician agrees with plans as outlined. Agrees with current management. As of this point patient is unlikely to require surgical intervention. Seen her and scoped her and noticed the patient well. Many of her ulcerations are chronic. We reviewed her CTs and there looks to be no extravasation. We will admit her to the medical service continue treatment for alcoholic pancreatitis associated with bariatric surgery keep her n.p.o. IV fluids PPIs aggressive hydration and any further interventions pending response to interventions and/or treatments  . CONSULT NOTE:   5:32 AM  Katherine Brothers MD   spoke with Dr. Keesha Mathew  Specialty: Hospitalist  Discussed pt's hx, disposition, and available diagnostic and imaging results  over the telephone. Reviewed care plans. Consulting physician agrees with plans as outlined.   Agrees with admission we will continue with treatment for alcohol and appreciates consultation by bariatric surgery. .        Diagnosis and Disposition     Critical Care Time: 0 minutes    Core Measures:  For Hospitalized Patients:    1. Hospitalization Decision Time:  The decision to hospitalize the patient was made by John Quinones MD at 4:45 AM on 8/25/2022    2. Aspirin: Aspirin was not given because the patient is a bleeding risk    5:53 AM  Patient is being admitted to the hospital by Dr. Daniel Mccormack. The results of their tests and reasons for their admission have been discussed with them and/or available family. They convey agreement and understanding for the need to be admitted and for their admission diagnosis. CONDITIONS ON ADMISSION:  Sepsis is not present at the time of admission. Deep Vein Thrombosis is not present at the time of admission. Thrombosis is present at the time of admission. Urinary Tract Infection is not present at the time of admission. Pneumonia is not present at the time of admission. MRSA is not present at the time of admission. Wound infection is not present at the time of admission. Pressure Ulcer is not present at the time of admission. CLINICAL IMPRESSION:    1. Acute pancreatitis, unspecified complication status, unspecified pancreatitis type    2. History of Scar-en-Y gastric bypass    3. Dehydration    4. Trichomonosis          _______________________________    This note was partially transcribed via voice recognition software. Although efforts have been made to catch any discrepancies, it may contain sound alike words, grammatical errors, or nonsensical words.

## 2022-08-26 LAB
ALBUMIN SERPL-MCNC: 2.3 G/DL (ref 3.4–5)
ALBUMIN/GLOB SERPL: 0.6 {RATIO} (ref 0.8–1.7)
ALP SERPL-CCNC: 160 U/L (ref 45–117)
ALT SERPL-CCNC: 70 U/L (ref 13–56)
ANION GAP SERPL CALC-SCNC: 8 MMOL/L (ref 3–18)
AST SERPL-CCNC: 124 U/L (ref 10–38)
BACTERIA SPEC CULT: NORMAL
BASOPHILS # BLD: 0 K/UL (ref 0–0.1)
BASOPHILS NFR BLD: 0 % (ref 0–2)
BILIRUB SERPL-MCNC: 0.5 MG/DL (ref 0.2–1)
BUN SERPL-MCNC: 4 MG/DL (ref 7–18)
BUN/CREAT SERPL: 6 (ref 12–20)
CALCIUM SERPL-MCNC: 8 MG/DL (ref 8.5–10.1)
CHLORIDE SERPL-SCNC: 109 MMOL/L (ref 100–111)
CO2 SERPL-SCNC: 21 MMOL/L (ref 21–32)
COTININE UR QL SCN: POSITIVE NG/ML
CREAT SERPL-MCNC: 0.65 MG/DL (ref 0.6–1.3)
DIFFERENTIAL METHOD BLD: ABNORMAL
DRUG SCREEN COMMENT:, 753798: ABNORMAL
EOSINOPHIL # BLD: 0.1 K/UL (ref 0–0.4)
EOSINOPHIL NFR BLD: 2 % (ref 0–5)
ERYTHROCYTE [DISTWIDTH] IN BLOOD BY AUTOMATED COUNT: 18.9 % (ref 11.6–14.5)
GLOBULIN SER CALC-MCNC: 4 G/DL (ref 2–4)
GLUCOSE SERPL-MCNC: 85 MG/DL (ref 74–99)
HCT VFR BLD AUTO: 34.6 % (ref 35–45)
HGB BLD-MCNC: 11.1 G/DL (ref 12–16)
IMM GRANULOCYTES # BLD AUTO: 0 K/UL (ref 0–0.04)
IMM GRANULOCYTES NFR BLD AUTO: 0 % (ref 0–0.5)
LIPASE SERPL-CCNC: 640 U/L (ref 73–393)
LYMPHOCYTES # BLD: 1.2 K/UL (ref 0.9–3.6)
LYMPHOCYTES NFR BLD: 18 % (ref 21–52)
MAGNESIUM SERPL-MCNC: 1.8 MG/DL (ref 1.6–2.6)
MCH RBC QN AUTO: 28.3 PG (ref 24–34)
MCHC RBC AUTO-ENTMCNC: 32.1 G/DL (ref 31–37)
MCV RBC AUTO: 88.3 FL (ref 78–100)
MONOCYTES # BLD: 0.6 K/UL (ref 0.05–1.2)
MONOCYTES NFR BLD: 9 % (ref 3–10)
NEUTS SEG # BLD: 4.5 K/UL (ref 1.8–8)
NEUTS SEG NFR BLD: 70 % (ref 40–73)
NRBC # BLD: 0 K/UL (ref 0–0.01)
NRBC BLD-RTO: 0 PER 100 WBC
PLATELET # BLD AUTO: 239 K/UL (ref 135–420)
PMV BLD AUTO: 9.2 FL (ref 9.2–11.8)
POTASSIUM SERPL-SCNC: 3.2 MMOL/L (ref 3.5–5.5)
PROT SERPL-MCNC: 6.3 G/DL (ref 6.4–8.2)
RBC # BLD AUTO: 3.92 M/UL (ref 4.2–5.3)
SERVICE CMNT-IMP: NORMAL
SODIUM SERPL-SCNC: 138 MMOL/L (ref 136–145)
WBC # BLD AUTO: 6.4 K/UL (ref 4.6–13.2)

## 2022-08-26 PROCEDURE — 74011250637 HC RX REV CODE- 250/637: Performed by: INTERNAL MEDICINE

## 2022-08-26 PROCEDURE — C9113 INJ PANTOPRAZOLE SODIUM, VIA: HCPCS | Performed by: INTERNAL MEDICINE

## 2022-08-26 PROCEDURE — 83690 ASSAY OF LIPASE: CPT

## 2022-08-26 PROCEDURE — 74011250636 HC RX REV CODE- 250/636: Performed by: HOSPITALIST

## 2022-08-26 PROCEDURE — 65270000029 HC RM PRIVATE

## 2022-08-26 PROCEDURE — 83735 ASSAY OF MAGNESIUM: CPT

## 2022-08-26 PROCEDURE — 85025 COMPLETE CBC W/AUTO DIFF WBC: CPT

## 2022-08-26 PROCEDURE — 74011000250 HC RX REV CODE- 250: Performed by: INTERNAL MEDICINE

## 2022-08-26 PROCEDURE — 80053 COMPREHEN METABOLIC PANEL: CPT

## 2022-08-26 PROCEDURE — 74011250636 HC RX REV CODE- 250/636: Performed by: INTERNAL MEDICINE

## 2022-08-26 PROCEDURE — 36415 COLL VENOUS BLD VENIPUNCTURE: CPT

## 2022-08-26 RX ORDER — SODIUM CHLORIDE 9 MG/ML
100 INJECTION, SOLUTION INTRAVENOUS CONTINUOUS
Status: DISCONTINUED | OUTPATIENT
Start: 2022-08-26 | End: 2022-08-28 | Stop reason: HOSPADM

## 2022-08-26 RX ORDER — MAGNESIUM SULFATE 1 G/100ML
1 INJECTION INTRAVENOUS ONCE
Status: COMPLETED | OUTPATIENT
Start: 2022-08-26 | End: 2022-08-26

## 2022-08-26 RX ORDER — POTASSIUM CHLORIDE 7.45 MG/ML
10 INJECTION INTRAVENOUS
Status: COMPLETED | OUTPATIENT
Start: 2022-08-26 | End: 2022-08-26

## 2022-08-26 RX ADMIN — DIPHENHYDRAMINE HYDROCHLORIDE 25 MG: 50 INJECTION, SOLUTION INTRAMUSCULAR; INTRAVENOUS at 10:01

## 2022-08-26 RX ADMIN — AMLODIPINE BESYLATE 5 MG: 5 TABLET ORAL at 09:46

## 2022-08-26 RX ADMIN — SODIUM CHLORIDE, PRESERVATIVE FREE 10 ML: 5 INJECTION INTRAVENOUS at 05:14

## 2022-08-26 RX ADMIN — SODIUM CHLORIDE 40 MG: 9 INJECTION, SOLUTION INTRAMUSCULAR; INTRAVENOUS; SUBCUTANEOUS at 09:46

## 2022-08-26 RX ADMIN — POTASSIUM CHLORIDE 10 MEQ: 7.46 INJECTION, SOLUTION INTRAVENOUS at 15:42

## 2022-08-26 RX ADMIN — POTASSIUM CHLORIDE 10 MEQ: 7.46 INJECTION, SOLUTION INTRAVENOUS at 16:54

## 2022-08-26 RX ADMIN — POTASSIUM CHLORIDE 10 MEQ: 7.46 INJECTION, SOLUTION INTRAVENOUS at 13:12

## 2022-08-26 RX ADMIN — DIPHENHYDRAMINE HYDROCHLORIDE 25 MG: 50 INJECTION, SOLUTION INTRAMUSCULAR; INTRAVENOUS at 16:52

## 2022-08-26 RX ADMIN — MORPHINE SULFATE 2 MG: 2 INJECTION, SOLUTION INTRAMUSCULAR; INTRAVENOUS at 22:50

## 2022-08-26 RX ADMIN — DIPHENHYDRAMINE HYDROCHLORIDE 25 MG: 50 INJECTION, SOLUTION INTRAMUSCULAR; INTRAVENOUS at 22:50

## 2022-08-26 RX ADMIN — ENOXAPARIN SODIUM 40 MG: 100 INJECTION SUBCUTANEOUS at 09:46

## 2022-08-26 RX ADMIN — METRONIDAZOLE 500 MG: 500 INJECTION, SOLUTION INTRAVENOUS at 05:24

## 2022-08-26 RX ADMIN — MAGNESIUM SULFATE HEPTAHYDRATE 1 G: 1 INJECTION, SOLUTION INTRAVENOUS at 11:38

## 2022-08-26 RX ADMIN — TRAZODONE HYDROCHLORIDE 100 MG: 100 TABLET ORAL at 22:50

## 2022-08-26 RX ADMIN — FOLIC ACID: 5 INJECTION, SOLUTION INTRAMUSCULAR; INTRAVENOUS; SUBCUTANEOUS at 09:46

## 2022-08-26 RX ADMIN — SODIUM CHLORIDE 100 ML/HR: 9 INJECTION, SOLUTION INTRAVENOUS at 18:33

## 2022-08-26 RX ADMIN — SODIUM CHLORIDE 125 ML/HR: 9 INJECTION, SOLUTION INTRAVENOUS at 03:19

## 2022-08-26 RX ADMIN — SODIUM CHLORIDE, PRESERVATIVE FREE 10 ML: 5 INJECTION INTRAVENOUS at 21:07

## 2022-08-26 RX ADMIN — METRONIDAZOLE 500 MG: 500 INJECTION, SOLUTION INTRAVENOUS at 16:53

## 2022-08-26 RX ADMIN — SODIUM CHLORIDE 100 ML/HR: 9 INJECTION, SOLUTION INTRAVENOUS at 20:21

## 2022-08-26 RX ADMIN — POTASSIUM CHLORIDE 10 MEQ: 7.46 INJECTION, SOLUTION INTRAVENOUS at 14:34

## 2022-08-26 RX ADMIN — SODIUM CHLORIDE 40 MG: 9 INJECTION, SOLUTION INTRAMUSCULAR; INTRAVENOUS; SUBCUTANEOUS at 21:43

## 2022-08-26 RX ADMIN — SODIUM CHLORIDE, PRESERVATIVE FREE 10 ML: 5 INJECTION INTRAVENOUS at 14:05

## 2022-08-26 RX ADMIN — MORPHINE SULFATE 2 MG: 2 INJECTION, SOLUTION INTRAMUSCULAR; INTRAVENOUS at 10:38

## 2022-08-26 RX ADMIN — ESCITALOPRAM OXALATE 40 MG: 10 TABLET ORAL at 09:46

## 2022-08-26 RX ADMIN — MORPHINE SULFATE 2 MG: 2 INJECTION, SOLUTION INTRAMUSCULAR; INTRAVENOUS at 17:46

## 2022-08-26 NOTE — PROGRESS NOTES
Hospitalist Progress Note    Patient: Josie Hatch MRN: [id number]  CSN: [id number]    YOB: 1975  Age: 41 y.o. Sex: female    DOA: 8/25/2022 LOS:  LOS: 1 day                Assessment/Plan     Patient Active Problem List   Diagnosis Code    Status post gastric bypass for obesity Z98.84    Hypertension I10    Anemia D64.9    Anxiety F41.9    GERD (gastroesophageal reflux disease) K21.9    Smoker F17.200    Portal vein thrombosis I81    Gastritis and duodenitis K29.90    Acute on chronic pancreatitis (HCC) K85.90, K86.1    Pancreatitis K85.90    Nausea & vomiting R11.2    Elevated liver enzymes R74.8    Pancreatitis, alcoholic, acute S62.82    Hematuria R31.9    UTI (urinary tract infection) N39.0    Abdominal pain R10.9    Transaminitis R74.01    Injury of right elbow S59.901A    Salmonella A02.9    Acute pancreatitis K85.90        Chief complaint :  Abdominal pain  41 y.o.  female who has history of gastric bypass x2, eosinophilic colitis, portal vein thrombosis abdominal pain with nausea and vomiting and chronic malabsorption presents with worsening abdominal pain for and nausea. Abdominal pain -  CT abdomen and pelvis showed  Status post Scar-en-Y gastric bypass, edematous thick-walled formed pouch with ulcerative type transmural perforation of the left lateral wall, with a small volume of localized adjacent free air in the left upper quadrant. Discussed with Dr. Isabella Elizondo, recommend continue clear liquid diet. The area in question is old and not a true perforation. If her lipase is improving then can advance diet. Continue with flagyl for now. Wbc in normal range. Acute pancreatitis -  IVF  Pain control  Clear liquid diet  Follow lipase  Follow LFTs    ETOH use -  On CIWA  Banana bag    HTN -  Continue meds  Monitor BP    LE duplex with no DVT    DVT prophylaxis with lovenox    Disposition : 2-3 days    Review of systems  General: No fevers or chills.   Cardiovascular: No chest pain or pressure. No palpitations. Pulmonary: No shortness of breath. Gastrointestinal: see above. Physical Exam:  General: Awake, cooperative, no acute distress    HEENT: NC, Atraumatic. PERRLA, anicteric sclerae. Lungs: CTA Bilaterally. No Wheezing/Rhonchi/Rales. Heart:  S1 S2,  No murmur, No Rubs, No Gallops  Abdomen: Soft, Non distended, epigastric tender tender. +Bowel sounds,   Extremities: No c/c/e  Psych:   Not anxious or agitated. Neurologic:  No acute neurological deficit. Vital signs/Intake and Output:  Visit Vitals  BP (!) 136/96   Pulse 100   Temp 98.1 °F (36.7 °C)   Resp 16   Ht 5' 5\" (1.651 m)   Wt 77.1 kg (170 lb)   SpO2 100%   BMI 28.29 kg/m²     Current Shift:  08/26 0701 - 08/26 1900  In: 4504 [P.O.:120; I.V.:950]  Out: -   Last three shifts:  08/24 1901 - 08/26 0700  In: 3558 [P.O.:240; I.V.:1500]  Out: -             Labs: Results:       Chemistry Recent Labs     08/26/22  0334 08/25/22  0128   GLU 85 153*    131*   K 3.2* 3.6    101   CO2 21 19*   BUN 4* 8   CREA 0.65 0.98   CA 8.0* 8.5   AGAP 8 11   BUCR 6* 8*   * 96   TP 6.3* 7.6   ALB 2.3* 3.1*   GLOB 4.0 4.5*   AGRAT 0.6* 0.7*      CBC w/Diff Recent Labs     08/26/22  0334 08/25/22  0128   WBC 6.4 9.1   RBC 3.92* 4.38   HGB 11.1* 12.5   HCT 34.6* 37.2    314   GRANS 70 72   LYMPH 18* 19*   EOS 2 0      Cardiac Enzymes No results for input(s): CPK, CKND1, JONATAN in the last 72 hours. No lab exists for component: CKRMB, TROIP   Coagulation No results for input(s): PTP, INR, APTT, INREXT in the last 72 hours. Lipid Panel Lab Results   Component Value Date/Time    Cholesterol, total 139 02/03/2021 08:45 AM    HDL Cholesterol 60 02/03/2021 08:45 AM    LDL, calculated 54.2 02/03/2021 08:45 AM    VLDL, calculated 24.8 02/03/2021 08:45 AM    Triglyceride 124 02/03/2021 08:45 AM    CHOL/HDL Ratio 2.3 02/03/2021 08:45 AM      BNP No results for input(s): BNPP in the last 72 hours.    Liver Enzymes Recent Labs 08/26/22  0334   TP 6.3*   ALB 2.3*   *      Thyroid Studies Lab Results   Component Value Date/Time    TSH 2.18 06/29/2020 12:35 AM        Procedures/imaging: see electronic medical records for all procedures/Xrays and details which were not copied into this note but were reviewed prior to creation of Plan

## 2022-08-26 NOTE — PROGRESS NOTES
Reason for Admission:   Abdominal pain [R10.9]  Acute pancreatitis [K85.90]    PCP: Yakelin Garcia NP    There are currently no Active Isolations  No active infections                RUR Score:     12             Resources/supports,as identified by patient/family:         Barriers to discharge:none             Plan for utilizing home health:    no                          Likelihood of readmission:            Transition of Care Plan:    Home with outpatient resources. Initial assessment completed with patient. Cognitive status of patient: oriented to time, place, person and situation. Face sheet information confirmed:  yes. The patient did not designate anyone to participate in her discharge plan and to receive any needed information. This patient lives in a apartment with patient and daughter. Patient is able to navigate steps as needed. Prior to hospitalization, patient was considered to be independent with ADLs/IADLS : yes . The patient and friend will be available to transport patient home upon discharge. The patient denies any home DME's. Patient is not currently active with home health. Patient has not stayed in a skilled nursing facility or rehab. Was  stay within last 60 days : no. This patient is on dialysis/days :no        . Currently, the discharge plan is Home. The patient states that she can obtain her medications from the pharmacy, and take her medications as directed. Patient's current insurance is Payor: [employer]ICA[employer] / Plan: [employer] / Product Type: Managed Care Medicaid /        Care Management Interventions  PCP Verified by CM: Yes  Palliative Care Criteria Met (RRAT>21 & CHF Dx)?: No  Mode of Transport at Discharge:  Other (see comment) (friend)  Support Systems: Child(abdirizak), Other Family Member(s)  Confirm Follow Up Transport: Self  Discharge Location  Patient Expects to be Discharged to[de-identified] Home with outpatient services

## 2022-08-26 NOTE — PROGRESS NOTES
conducted an initial consultation and Spiritual Assessment for Shobha Stuart, who is a 41 y.o.,female. Patients Primary Language is: [place]. According to the patients EMR Taoist Affiliation is: Natanael Rosenthal. The reason the Patient came to the hospital is:   Patient Active Problem List    Diagnosis Date Noted    Acute pancreatitis 08/25/2022    Salmonella 02/05/2021    UTI (urinary tract infection) 02/03/2021    Abdominal pain 02/03/2021    Transaminitis 02/03/2021    Injury of right elbow 02/03/2021    Hematuria 02/02/2021    Pancreatitis, alcoholic, acute 51/69/5880    Pancreatitis 08/23/2020    Nausea & vomiting 08/23/2020    Elevated liver enzymes 08/23/2020    Acute on chronic pancreatitis ([address] 07/27/2020    Portal vein thrombosis 06/27/2020    Gastritis and duodenitis 06/27/2020    Hypertension     Anemia     Anxiety     GERD (gastroesophageal reflux disease)     Smoker     Status post gastric bypass for obesity 01/01/2000        The  provided the following Interventions:  Initiated a relationship of care and support. Explored issues of virgen, belief, spirituality and Mu-ism/ritual needs while hospitalized. Listened empathically. Provided chaplaincy education. Provided information about Spiritual Care Services. Offered assurance of continued prayers on patients behalf. Chart reviewed. The following outcomes were achieved:  Patient shared limited information about both their medical narrative and spiritual journey/beliefs. Patient processed feeling about current hospitalization. Patient expressed gratitude for pastoral care visit. Assessment:  Patient does not have any Mu-ism/cultural needs that will affect patients preferences in health care. There are no further spiritual or Mu-ism issues which require intervention at this time. Plan:  Chaplains will continue to follow and will provide pastoral care on an as needed/requested basis.    recommends bedside caregivers page  on duty if patient shows signs of acute spiritual or emotional distress.       Sister Rogelio Brett, [place], [address]  [phone]

## 2022-08-26 NOTE — PROGRESS NOTES
Advance Care Planning   Advance Care Planning Inpatient Note  [facility]    Today's Date: 8/26/2022  Unit: THE [address] SURGICAL/ONCOLOGY    Received request from rounding. Upon review of chart and communication with care team, patient's decision making abilities are not in question. Patient was/were present in the room during visit. Goals of ACP Conversation:  Discuss Advance Care planning documents    Health Care Decision Makers:    No Decision Maker has Been Documented    Summary:  No Decision Maker named by patient at this time    Leila 53 (Patient Wishes) on file:  None     Assessment:    Patient does not see the need tohave it written down.      Interventions:  Reviewed but did not complete ACP document    Care Preferences Communicated:  No    Outcomes/Plan:  Postponed    Chaplain Ovi on 8/26/2022 at 1:58 PM

## 2022-08-26 NOTE — PROGRESS NOTES

## 2022-08-26 NOTE — PROGRESS NOTES
Verbal shift change report given to LAMONTE Baca (oncoming nurse) by Jagjit Rabago RN (offgoing nurse). Report included the following information SBAR, Kardex, Intake/Output, MAR, and Recent Results.

## 2022-08-27 VITALS
BODY MASS INDEX: 28.32 KG/M2 | HEIGHT: 65 IN | OXYGEN SATURATION: 98 % | HEART RATE: 72 BPM | WEIGHT: 170 LBS | SYSTOLIC BLOOD PRESSURE: 160 MMHG | DIASTOLIC BLOOD PRESSURE: 85 MMHG | TEMPERATURE: 98.4 F | RESPIRATION RATE: 16 BRPM

## 2022-08-27 LAB
ALBUMIN SERPL-MCNC: 2.7 G/DL (ref 3.4–5)
ALBUMIN/GLOB SERPL: 0.7 {RATIO} (ref 0.8–1.7)
ALP SERPL-CCNC: 152 U/L (ref 45–117)
ALT SERPL-CCNC: 47 U/L (ref 13–56)
ANION GAP SERPL CALC-SCNC: 10 MMOL/L (ref 3–18)
AST SERPL-CCNC: 39 U/L (ref 10–38)
BASOPHILS # BLD: 0 K/UL (ref 0–0.1)
BASOPHILS NFR BLD: 1 % (ref 0–2)
BILIRUB SERPL-MCNC: 0.5 MG/DL (ref 0.2–1)
BUN SERPL-MCNC: 4 MG/DL (ref 7–18)
BUN/CREAT SERPL: 6 (ref 12–20)
CALCIUM SERPL-MCNC: 8.8 MG/DL (ref 8.5–10.1)
CHLORIDE SERPL-SCNC: 109 MMOL/L (ref 100–111)
CO2 SERPL-SCNC: 21 MMOL/L (ref 21–32)
CREAT SERPL-MCNC: 0.64 MG/DL (ref 0.6–1.3)
DIFFERENTIAL METHOD BLD: ABNORMAL
EOSINOPHIL # BLD: 0.2 K/UL (ref 0–0.4)
EOSINOPHIL NFR BLD: 3 % (ref 0–5)
ERYTHROCYTE [DISTWIDTH] IN BLOOD BY AUTOMATED COUNT: 19.1 % (ref 11.6–14.5)
GLOBULIN SER CALC-MCNC: 4 G/DL (ref 2–4)
GLUCOSE SERPL-MCNC: 66 MG/DL (ref 74–99)
HCT VFR BLD AUTO: 36.7 % (ref 35–45)
HGB BLD-MCNC: 11.8 G/DL (ref 12–16)
IMM GRANULOCYTES # BLD AUTO: 0 K/UL (ref 0–0.04)
IMM GRANULOCYTES NFR BLD AUTO: 0 % (ref 0–0.5)
LIPASE SERPL-CCNC: 350 U/L (ref 73–393)
LYMPHOCYTES # BLD: 2.2 K/UL (ref 0.9–3.6)
LYMPHOCYTES NFR BLD: 32 % (ref 21–52)
MAGNESIUM SERPL-MCNC: 2 MG/DL (ref 1.6–2.6)
MCH RBC QN AUTO: 28.4 PG (ref 24–34)
MCHC RBC AUTO-ENTMCNC: 32.2 G/DL (ref 31–37)
MCV RBC AUTO: 88.4 FL (ref 78–100)
MONOCYTES # BLD: 0.4 K/UL (ref 0.05–1.2)
MONOCYTES NFR BLD: 6 % (ref 3–10)
NEUTS SEG # BLD: 4 K/UL (ref 1.8–8)
NEUTS SEG NFR BLD: 58 % (ref 40–73)
NRBC # BLD: 0 K/UL (ref 0–0.01)
NRBC BLD-RTO: 0 PER 100 WBC
PLATELET # BLD AUTO: 271 K/UL (ref 135–420)
PMV BLD AUTO: 9.5 FL (ref 9.2–11.8)
POTASSIUM SERPL-SCNC: 3.6 MMOL/L (ref 3.5–5.5)
PROT SERPL-MCNC: 6.7 G/DL (ref 6.4–8.2)
RBC # BLD AUTO: 4.15 M/UL (ref 4.2–5.3)
SODIUM SERPL-SCNC: 140 MMOL/L (ref 136–145)
WBC # BLD AUTO: 7 K/UL (ref 4.6–13.2)

## 2022-08-27 PROCEDURE — 85025 COMPLETE CBC W/AUTO DIFF WBC: CPT

## 2022-08-27 PROCEDURE — 83690 ASSAY OF LIPASE: CPT

## 2022-08-27 PROCEDURE — C9113 INJ PANTOPRAZOLE SODIUM, VIA: HCPCS | Performed by: INTERNAL MEDICINE

## 2022-08-27 PROCEDURE — 74011250636 HC RX REV CODE- 250/636: Performed by: INTERNAL MEDICINE

## 2022-08-27 PROCEDURE — 36415 COLL VENOUS BLD VENIPUNCTURE: CPT

## 2022-08-27 PROCEDURE — 74011250637 HC RX REV CODE- 250/637: Performed by: INTERNAL MEDICINE

## 2022-08-27 PROCEDURE — 80053 COMPREHEN METABOLIC PANEL: CPT

## 2022-08-27 PROCEDURE — 74011000250 HC RX REV CODE- 250: Performed by: INTERNAL MEDICINE

## 2022-08-27 PROCEDURE — 83735 ASSAY OF MAGNESIUM: CPT

## 2022-08-27 RX ORDER — METRONIDAZOLE 500 MG/1
500 TABLET ORAL 2 TIMES DAILY
Qty: 10 TABLET | Refills: 0 | Status: SHIPPED | OUTPATIENT
Start: 2022-08-27 | End: 2022-09-01

## 2022-08-27 RX ADMIN — METRONIDAZOLE 500 MG: 500 INJECTION, SOLUTION INTRAVENOUS at 05:39

## 2022-08-27 RX ADMIN — SODIUM CHLORIDE, PRESERVATIVE FREE 10 ML: 5 INJECTION INTRAVENOUS at 13:39

## 2022-08-27 RX ADMIN — AMLODIPINE BESYLATE 5 MG: 5 TABLET ORAL at 08:43

## 2022-08-27 RX ADMIN — SODIUM CHLORIDE 40 MG: 9 INJECTION, SOLUTION INTRAMUSCULAR; INTRAVENOUS; SUBCUTANEOUS at 08:43

## 2022-08-27 RX ADMIN — ESCITALOPRAM OXALATE 40 MG: 10 TABLET ORAL at 08:43

## 2022-08-27 RX ADMIN — METRONIDAZOLE 500 MG: 500 INJECTION, SOLUTION INTRAVENOUS at 17:07

## 2022-08-27 RX ADMIN — DIPHENHYDRAMINE HYDROCHLORIDE 25 MG: 50 INJECTION, SOLUTION INTRAMUSCULAR; INTRAVENOUS at 06:41

## 2022-08-27 RX ADMIN — SODIUM CHLORIDE, PRESERVATIVE FREE 10 ML: 5 INJECTION INTRAVENOUS at 05:02

## 2022-08-27 RX ADMIN — FOLIC ACID: 5 INJECTION, SOLUTION INTRAMUSCULAR; INTRAVENOUS; SUBCUTANEOUS at 08:45

## 2022-08-27 RX ADMIN — MORPHINE SULFATE 2 MG: 2 INJECTION, SOLUTION INTRAMUSCULAR; INTRAVENOUS at 06:41

## 2022-08-27 RX ADMIN — ENOXAPARIN SODIUM 40 MG: 100 INJECTION SUBCUTANEOUS at 08:43

## 2022-08-27 NOTE — PROGRESS NOTES
Hospitalist Progress Note    Patient: Dagmar Kathleen MRN: [id number]  CSN: [id number]    YOB: 1975  Age: 41 y.o. Sex: female    DOA: 8/25/2022 LOS:  LOS: 2 days          Assessment/Plan     Patient Active Problem List   Diagnosis Code    Status post gastric bypass for obesity Z98.84    Hypertension I10    Anemia D64.9    Anxiety F41.9    GERD (gastroesophageal reflux disease) K21.9    Smoker F17.200    Portal vein thrombosis I81    Gastritis and duodenitis K29.90    Acute on chronic pancreatitis (HCC) K85.90, K86.1    Pancreatitis K85.90    Nausea & vomiting R11.2    Elevated liver enzymes R74.8    Pancreatitis, alcoholic, acute N55.37    Hematuria R31.9    UTI (urinary tract infection) N39.0    Abdominal pain R10.9    Transaminitis R74.01    Injury of right elbow S59.901A    Salmonella A02.9    Acute pancreatitis K85.90        Chief complaint :  Abdominal pain  41 y.o.  female who has history of gastric bypass x2, eosinophilic colitis, portal vein thrombosis abdominal pain with nausea and vomiting and chronic malabsorption presents with worsening abdominal pain for and nausea. Abdominal pain -  CT abdomen and pelvis showed  Status post Scar-en-Y gastric bypass, edematous thick-walled formed pouch with ulcerative type transmural perforation of the left lateral wall, with a small volume of localized adjacent free air in the left upper quadrant. Discussed with Dr. Argentina Glover, recommend continue clear liquid diet. The area in question is old and not a true perforation. If her lipase is improving then can advance diet. Continue with flagyl for now. Wbc in normal range.      Acute pancreatitis -  Lipase normal now   LFTs better  Adavnce diet  Decrease pain control     ETOH use -  On CIWA  Banana bag    HTN -  Continue meds  Monitor BP    LE duplex with no DVT    DVT prophylaxis with lovenox    Disposition : 2-3 days    S: wanst to eat more  Wants to know when can go home    Review of systems  General: No fevers or chills. Cardiovascular: No chest pain or pressure. No palpitations. Pulmonary: No shortness of breath. Gastrointestinal: see above. Physical Exam:  General: Awake, cooperative, no acute distress    HEENT: NC, Atraumatic. PERRLA, anicteric sclerae. Lungs: CTA Bilaterally. No Wheezing/Rhonchi/Rales. Heart:  S1 S2,  No murmur, No Rubs, No Gallops  Abdomen: Soft, Non distended, epigastric tender tender. +Bowel sounds,   Extremities: No c/c/e  Psych:   Not anxious or agitated. Neurologic:  No acute neurological deficit. Vital signs/Intake and Output:  Visit Vitals  BP (!) 160/85   Pulse 72   Temp 98.4 °F (36.9 °C)   Resp 16   Ht 5' 5\" (1.651 m)   Wt 77.1 kg (170 lb)   SpO2 98%   BMI 28.29 kg/m²     Current Shift:  No intake/output data recorded. Last three shifts:  08/25 1901 - 08/27 0700  In: 5107 [P.O.:840; I.V.:3700]  Out: -             Labs: Results:       Chemistry Recent Labs     08/27/22  0630 08/26/22  0334 08/25/22  0128   GLU 66* 85 153*    138 131*   K 3.6 3.2* 3.6    109 101   CO2 21 21 19*   BUN 4* 4* 8   CREA 0.64 0.65 0.98   CA 8.8 8.0* 8.5   AGAP 10 8 11   BUCR 6* 6* 8*   * 160* 96   TP 6.7 6.3* 7.6   ALB 2.7* 2.3* 3.1*   GLOB 4.0 4.0 4.5*   AGRAT 0.7* 0.6* 0.7*        CBC w/Diff Recent Labs     08/27/22  0630 08/26/22  0334 08/25/22  0128   WBC 7.0 6.4 9.1   RBC 4.15* 3.92* 4.38   HGB 11.8* 11.1* 12.5   HCT 36.7 34.6* 37.2    239 314   GRANS 58 70 72   LYMPH 32 18* 19*   EOS 3 2 0        Cardiac Enzymes No results for input(s): CPK, CKND1, JNOATAN in the last 72 hours. No lab exists for component: CKRMB, TROIP   Coagulation No results for input(s): PTP, INR, APTT, INREXT, INREXT in the last 72 hours.     Lipid Panel Lab Results   Component Value Date/Time    Cholesterol, total 139 02/03/2021 08:45 AM    HDL Cholesterol 60 02/03/2021 08:45 AM    LDL, calculated 54.2 02/03/2021 08:45 AM    VLDL, calculated 24.8 02/03/2021 08:45 AM Triglyceride 124 02/03/2021 08:45 AM    CHOL/HDL Ratio 2.3 02/03/2021 08:45 AM      BNP No results for input(s): BNPP in the last 72 hours.    Liver Enzymes Recent Labs     08/27/22  0630   TP 6.7   ALB 2.7*   *        Thyroid Studies Lab Results   Component Value Date/Time    TSH 2.18 06/29/2020 12:35 AM        Procedures/imaging: see electronic medical records for all procedures/Xrays and details which were not copied into this note but were reviewed prior to creation of Plan

## 2022-08-27 NOTE — PROGRESS NOTES
Problem: General Medical Care Plan  Goal: *Vital signs within specified parameters  Outcome: Progressing Towards Goal  Goal: *Labs within defined limits  Outcome: Progressing Towards Goal  Goal: *Absence of infection signs and symptoms  Outcome: Progressing Towards Goal  Goal: *Optimal pain control at patient's stated goal  Outcome: Progressing Towards Goal  Goal: *Skin integrity maintained  Outcome: Progressing Towards Goal  Goal: *Fluid volume balance  Outcome: Progressing Towards Goal  Goal: *Optimize nutritional status  Outcome: Progressing Towards Goal  Goal: *Anxiety reduced or absent  Outcome: Progressing Towards Goal  Goal: *Progressive mobility and function (eg: ADL's)  Outcome: Progressing Towards Goal     Problem: Patient Education: Go to Patient Education Activity  Goal: Patient/Family Education  Outcome: Progressing Towards Goal     Problem: Falls - Risk of  Goal: *Absence of Falls  Description: Document Lucila Fall Risk and appropriate interventions in the flowsheet.   Outcome: Progressing Towards Goal  Note: Fall Risk Interventions:            Medication Interventions: Teach patient to arise slowly                   Problem: Patient Education: Go to Patient Education Activity  Goal: Patient/Family Education  Outcome: Progressing Towards Goal

## 2022-08-27 NOTE — PROGRESS NOTES
Bedside shift change report given to Marilee Goldman RN (oncoming nurse) by Deniz Talavera RN (offgoing nurse). Report included the following information SBAR, Kardex, Intake/Output, MAR, and Recent Results.

## 2022-08-27 NOTE — DISCHARGE SUMMARY
Discharge Summary    Patient: Bashir Jain MRN: [id number]  CSN: [id number]    YOB: 1975  Age: 41 y.o. Sex: female    DOA: 8/25/2022 LOS:  LOS: 2 days   Discharge Date: 8/27/2022     Primary Care Provider:  Alexey Guzman NP    Admission Diagnoses: Abdominal pain [R10.9]  Acute pancreatitis [K85.90]    Discharge Diagnoses:    Problem List as of 8/27/2022 Date Reviewed: 8/25/2022            Codes Class Noted - Resolved    * (Principal) Acute pancreatitis ICD-10-CM: K85.90  ICD-9-CM: 577.0  8/25/2022 - Present        Salmonella ICD-10-CM: A02.9  ICD-9-CM: 003.9  2/5/2021 - Present        Hematuria ICD-10-CM: R31.9  ICD-9-CM: 599.70  2/2/2021 - Present        Pancreatitis, alcoholic, acute CMI-20-OX: K85.20  ICD-9-CM: 577.0  9/10/2020 - Present        Nausea & vomiting ICD-10-CM: R11.2  ICD-9-CM: 787.01  8/23/2020 - Present        Elevated liver enzymes ICD-10-CM: R74.8  ICD-9-CM: 790.5  8/23/2020 - Present        Gastritis and duodenitis ICD-10-CM: K29.90  ICD-9-CM: 535.50  6/27/2020 - Present        Anemia ICD-10-CM: D64.9  ICD-9-CM: 733. 9  Unknown - Present        Anxiety ICD-10-CM: F41.9  ICD-9-CM: 300.00  Unknown - Present        GERD (gastroesophageal reflux disease) ICD-10-CM: K21.9  ICD-9-CM: 530.81  Unknown - Present        Smoker ICD-10-CM: F17.200  ICD-9-CM: 305.1  Unknown - Present    Overview Signed 6/20/2018  8:40 AM by ARNIE Smart     smokes 5 cigarettes per day             Status post gastric bypass for obesity ICD-10-CM: Z98.84  ICD-9-CM: V45.86  1/1/2000 - Present    Overview Signed 6/20/2018  8:30 AM by ARNIE Smart / librado hermosillo             RESOLVED: Fever ICD-10-CM: R50.9  ICD-9-CM: 780.60  9/21/2018 - 12/4/2019        RESOLVED: [facility] ([facility] ICD-10-CM: C80.1  ICD-9-CM: 199.1  Unknown - 12/4/2019    Overview Signed 6/20/2018  8:29 AM by ARNIE Smart      history of endometrial cancer             RESOLVED: Severe obesity with body mass index (BMI) of 35.0 to 39.9 with comorbidity ([facility] ICD-10-CM: E66.01  ICD-9-CM: 278.01  Unknown - 10/2/2018        RESOLVED: Diarrhea ICD-10-CM: R19.7  ICD-9-CM: 787.91  Unknown - 12/4/2019        UTI (urinary tract infection) ICD-10-CM: N39.0  ICD-9-CM: 599.0  2/3/2021 - Present        Abdominal pain ICD-10-CM: R10.9  ICD-9-CM: 789.00  2/3/2021 - Present        Transaminitis ICD-10-CM: R74.01  ICD-9-CM: 790.4  2/3/2021 - Present        Injury of right elbow ICD-10-CM: G38.482S  ICD-9-CM: 959.3  2/3/2021 - Present        Pancreatitis ICD-10-CM: K85.90  ICD-9-CM: 556.2  8/23/2020 - Present        Acute on chronic pancreatitis ([facility] ICD-10-CM: K85.90, K86.1  ICD-9-CM: 916.9, 577.1  7/27/2020 - Present        Portal vein thrombosis ICD-10-CM: I26  ICD-9-CM: 680  6/27/2020 - Present        Hypertension ICD-10-CM: I10  ICD-9-CM: 401.9  Unknown - Present           Discharge Medications:     Discharge Medication List as of 8/27/2022  7:29 PM        START taking these medications    Details   metroNIDAZOLE (FlagyL) 500 mg tablet Take 1 Tablet by mouth two (2) times a day for 5 days. , Normal, Disp-10 Tablet, R-0           CONTINUE these medications which have NOT CHANGED    Details   ondansetron hcl (Zofran) 4 mg tablet Take 4 mg by mouth every eight (8) hours as needed for Nausea or Vomiting., Historical Med      escitalopram oxalate (LEXAPRO) 20 mg tablet Take 40 mg by mouth daily. Indications: anxiousness associated with depression, Historical Med      traZODone (DESYREL) 100 mg tablet Take  by mouth nightly. Pt is unsure of the dose for this medication, Historical Med      multivitamin (ONE A DAY) tablet Take 1 Tab by mouth daily. , Historical Med      amLODIPine (NORVASC) 5 mg tablet Take 1 Tab by mouth daily. , Normal, Disp-30 Tab, R-0      metoclopramide (REGLAN) 5 mg/5 mL soln Take 5 mL by mouth Before breakfast, lunch, dinner and at bedtime., Normal, Disp-600 mL, R-1      pantoprazole (PROTONIX) 40 mg tablet Take 1 Tab by mouth Before breakfast and dinner., Normal, Disp-60 Tab, R-0      rivaroxaban (XARELTO) 20 mg tab tablet Take 1 Tab by mouth daily. , Print, Disp-30 Tab,R-0           STOP taking these medications       ciprofloxacin HCl (CIPRO) 500 mg tablet Comments:   Reason for Stopping:         acetaminophen (TYLENOL) 500 mg tablet Comments:   Reason for Stopping:               Discharge Condition: Stable    Procedures : none    Consults: None      PHYSICAL EXAM   Visit Vitals  BP (!) 160/85   Pulse 72   Temp 98.4 °F (36.9 °C)   Resp 16   Ht 5' 5\" (1.651 m)   Wt 77.1 kg (170 lb)   SpO2 98%   BMI 28.29 kg/m²     General: Awake, cooperative, no acute distress    HEENT: NC, Atraumatic. PERRLA, EOMI. Anicteric sclerae. Lungs:  CTA Bilaterally. No Wheezing/Rhonchi/Rales. Heart:  Regular  rhythm,  No murmur, No Rubs, No Gallops  Abdomen: Soft, Non distended, Non tender. +Bowel sounds,   Extremities: No c/c/e  Psych:   Not anxious or agitated. Neurologic:  No acute neurological deficits. Admission HPI : Katie Moreno is a 41 y.o.  female who has history of gastric bypass x2, eosinophilic colitis, portal vein thrombosis abdominal pain with nausea and vomiting and chronic malabsorption presents with worsening abdominal pain for and nausea for 3 days but has been progressive since she had her panniculectomy by Dr. Mark Conner she admits to drinking about 3 large [employer]ineken's a day since the loss of her breast friend from end-stage renal disease and the loss of her father.   She had been alcohol free for about a year in fact her last admission was about a year ago for that in the emergency room she was found to have a pancreatitis with lipase of 1000 CT shows abdominal leak I am asked to admit for further treatment her bariatric surgeon was consulted and I was told no other procedures were planned and she will be treated medically    Hospital Course : Chief complaint :  Abdominal pain  41 y.o.  female who has history of gastric bypass x2, eosinophilic colitis, portal vein thrombosis abdominal pain with nausea and vomiting and chronic malabsorption presents with worsening abdominal pain for and nausea. Abdominal pain -  CT abdomen and pelvis showed  Status post Scar-en-Y gastric bypass, edematous thick-walled formed pouch with ulcerative type transmural perforation of the left lateral wall, with a small volume of localized adjacent free air in the left upper quadrant. Acute pancreatitis -  Lipase normal now   LFTs better  Advanced diet  Decrease pain control      ETOH use -  On CIWA  Banana bag     HTN -  Continue meds  Monitor BP     LE duplex with no DVT       Activity: Activity as tolerated    Diet: Regular Diet    Follow-up: PCP    Disposition: home    Minutes spent on discharge: 45 minutes      Labs: Results:       Chemistry No results for input(s): GLU, NA, K, CL, CO2, BUN, CREA, CA, AGAP, BUCR, TBIL, AP, TP, ALB, GLOB, AGRAT in the last 72 hours. No lab exists for component: GPT     CBC w/Diff No results for input(s): WBC, RBC, HGB, HCT, PLT, GRANS, LYMPH, EOS, HGBEXT, HCTEXT, PLTEXT, HGBEXT, HCTEXT, PLTEXT in the last 72 hours. Cardiac Enzymes No results for input(s): CPK, CKND1, JONATAN in the last 72 hours. No lab exists for component: CKRMB, TROIP   Coagulation No results for input(s): PTP, INR, APTT, INREXT, INREXT in the last 72 hours. Lipid Panel Lab Results   Component Value Date/Time    Cholesterol, total 139 02/03/2021 08:45 AM    HDL Cholesterol 60 02/03/2021 08:45 AM    LDL, calculated 54.2 02/03/2021 08:45 AM    VLDL, calculated 24.8 02/03/2021 08:45 AM    Triglyceride 124 02/03/2021 08:45 AM    CHOL/HDL Ratio 2.3 02/03/2021 08:45 AM      BNP No results for input(s): BNPP in the last 72 hours. Liver Enzymes No results for input(s): TP, ALB, TBIL, AP in the last 72 hours.     No lab exists for component: SGOT, GPT, DBIL     Thyroid Studies Lab Results   Component Value Date/Time    TSH 2.18 06/29/2020 12:35 AM            Significant Diagnostic Studies: CT ABD PELV WO CONT    Result Date: 8/25/2022  EXAM: CT ABD PELV WO CONT CLINICAL INDICATION/HISTORY: abd pain -Additional: None COMPARISON: MRI abdomen 02/03/2021, CT abdomen/pelvis from 2/2/2021 TECHNIQUE: Axial CT imaging of the abdomen and pelvis was performed with oral but, without without intravenous contrast. Multiplanar reformats were generated. One or more dose reduction techniques were used on this CT: automated exposure control, adjustment of the mAs and/or kVp according to patient size, and iterative reconstruction techniques. The specific techniques used on this CT exam have been documented in the patient's electronic medical record. Digital Imaging and Communications in Medicine (DICOM) format image data are available to nonaffiliated external healthcare facilities or entities on a secure, media free, reciprocally searchable basis with patient authorization for at least a 12-month period after this study. _______________ FINDINGS: LOWER CHEST: Unremarkable. LIVER, BILIARY: Liver is unremarkable No biliary dilation. Prior cholecystectomy PANCREAS: Moderate edematous enlargement of the head/uncinate process, neck and body with peripancreatic edema and small volume fluid. SPLEEN: Unremarkable ADRENALS: Thickened left adrenal gland, likely hyperplasia and/or reactive from adjacent induration KIDNEYS, URETERS, BLADDER: No hydronephrosis, nephrolithiasis, perinephric fluid or hydroureter. Unremarkable bladder PELVIC ORGANS: Left adnexal/ovarian 3.7 cm cyst, almost certainly benign functional etiology. GASTROINTESTINAL TRACT: Status post Scar-en-Y gastric bypass, with abnormal edematous thickened formed gastric pouch.   > There is a transmural ulcerative type perforation of the left lateral formed gastric pouch, 2 cm proximal to the gastrojejunal anastomosis.  (Axial 25) There is a small amount of localized free air interposed between the excluded stomach and the inferior left lobe of liver. No significant free air within the remaining abdomen.   > Oral contrast opacifies the distal jejunal jejunal anastomosis and distal small bowel.   > No large bowel dilation or wall thickening. LYMPH NODES: No enlarged lymph nodes. VASCULATURE: Unremarkable. OSSEOUS: No acute or aggressive osseous abnormalities identified. OTHER: As above. _______________     1. Status post Scar-en-Y gastric bypass, edematous thick-walled formed pouch with ulcerative type transmural perforation of the left lateral wall, with a small volume of localized adjacent free air in the left upper quadrant. 2. CT findings of acute interstitial pancreatitis. 3. Prior cholecystectomy. CRITICAL RESULT:  These critical results were directly communicated to Uma Pena M.D. on 8/25/2022 4:45 AM.  Results understood and acknowledged. DUPLEX LOWER EXT VENOUS BILAT    Result Date: 8/25/2022  · No evidence of deep vein thrombosis in the right lower extremity. · No evidence of deep vein thrombosis in the left lower extremity. No results found for this or any previous visit.         CC: Isabella Polanco, NP

## 2022-08-28 LAB
COTININE SERPL-MCNC: 99.5 NG/ML
NICOTINE SERPL-MCNC: 6.5 NG/ML

## 2022-08-28 NOTE — PROGRESS NOTES
D/C instructions given by day shift nurse. No report received due to pt already D/C'd. Pt still in room at this time, had questions regarding medications and diet - hospitalist on-call paged. MD stated she will have attending MD call pt in the morning for clarifications. Per MD, pt informed she can take Tylenol for pain and diet as instructed. Pt insisted on leaving tonight. IV caths removed with tips intact, accompanied ambulatory to exit. D/C'd in stable condition with family accompanying pt via private car.

## 2023-01-24 NOTE — PROGRESS NOTES
Bed: H04  Expected date:   Expected time:   Means of arrival: [facility] [employer]  Comments:  dp   Subjective:  
 
Danelle Newman  is a 41 y.o. female who presents for follow-up about 6 weeks following laparoscopy with removal of eroded silastic band from her VBG/gastric bypass. She has lost a total of 25 pounds since surgery. Body mass index is 33.85 kg/m². [facility] Weight [facility] 11/7/2018 10/2/2018 9/24/2018 9/18/2018 9/14/2018 8/9/2018 8/2/2018 Today's Wt 203 lb 6.4 oz 208 lb 3.2 oz 236 lb 8 oz - 213 lb 206 lb 5 oz 196 lb BMI 33.85 kg/m2 34.65 kg/m2 - 39.36 kg/m2 35.45 kg/m2 34.6 kg/m2 33.64 kg/m2 Surgery related complication: renal insufficiency She reports still some solid food intolerances and denies vomiting and abdominal pain. Patients pain score:0 Weight Loss Metrics 11/7/2018 10/2/2018 9/24/2018 9/18/2018 9/14/2018 8/9/2018 8/2/2018 Today's Wt 203 lb 6.4 oz 208 lb 3.2 oz 236 lb 8 oz - 213 lb 206 lb 5 oz 196 lb BMI 33.85 kg/m2 34.65 kg/m2 - 39.36 kg/m2 35.45 kg/m2 34.6 kg/m2 33.64 kg/m2 The patient's exercise level: very active. Changes in her medical history and medications have been reviewed. Patient Active Problem List  
Diagnosis Code  Cancer ([address] C80.1  Intestinal malabsorption K90.9  Status post gastric bypass for obesity Z98.84  
 Hypertension I10  
 Anemia D64.9  Anxiety F41.9  GERD (gastroesophageal reflux disease) K21.9  Wears dentures Z97.2  Syncopal episodes R55  Smoker F17.200  Diarrhea R19.7  Status post laparoscopy Z98.890  Fever R50.9 Past Medical History:  
Diagnosis Date  Anemia  Anxiety  Cancer [facility]) 2011  
  history of endometrial cancer  Diarrhea  GERD (gastroesophageal reflux disease)  Hypertension 2017  Intestinal malabsorption  Severe obesity with body mass index (BMI) of 35.0 to 39.9 with comorbidity ([address]  Smoker   
 smokes 5 cigarettes per day  Status post gastric bypass for obesity 2000  
 open / [facility]  Syncopal episodes has plans to see a neurologist   
 Wears dentures   
 teeth extraction due to vomiting and vitamin deficiencies Past Surgical History:  
Procedure Laterality Date  HX CHOLECYSTECTOMY  HX GASTRIC BYPASS  2000  
 open / [facility]  HX GI  2017 EGD  HX OTHER SURGICAL Right   
 resection of benign breast mass  HX PARTIAL HYSTERECTOMY    
 cancer related / pt with both ovaries Current Outpatient Medications Medication Sig Dispense Refill  metoprolol tartrate (LOPRESSOR) 25 mg tablet   0  
 hydrOXYzine pamoate (VISTARIL) 25 mg capsule Take 1 Cap by mouth three (3) times daily as needed for Itching for up to 14 days. 50 Cap 1  
 spironolactone (ALDACTONE) 25 mg tablet   0  
 Omeprazole delayed release (PRILOSEC D/R) 20 mg tablet Take 1 Tab by mouth daily. 30 Tab 2  
 furosemide (LASIX) 40 mg tablet   0  
 oxyCODONE-acetaminophen (PERCOCET) 5-325 mg per tablet Take 1 Tab by mouth every four (4) hours as needed for Pain. Max Daily Amount: 6 Tabs. 30 Tab 0  
 vitamin B complex (B COMPLEX PO) Take  by mouth daily. Takes the liquid Objective:  
 
Visit Vitals /80 (BP 1 Location: Right arm, BP Patient Position: Sitting) Pulse 90 Temp 98.2 °F (36.8 °C) Ht 5' 5\" (1.651 m) Wt 92.3 kg (203 lb 6.4 oz) SpO2 100% BMI 33.85 kg/m² Physical Exam: 
 
General:  alert, cooperative, no distress, appears stated age Lungs:   clear to auscultation bilaterally Heart:  Regular rate and rhythm Abdomen:   abdomen is soft without significant tenderness, masses, organomegaly or guarding; Incisions: healing well, smal chronically incarcerated hernia in midline incision Assessment:  
 
1. History of Morbid obesity, status post  laparoscopy. Doing well, no concerns. Food intolerances are likely normal.  Will pursue UGI if this does not improve and possibly EGD. Plan: 1. Remember to measure portions, continue low carbohydrate diet 2. Tolerated transition to solids without issue 3. Remember vitamin supplements. 4. Exercise regimen appears adequate. 5. Attend support group 6. Follow-up in 1 month(s). 7. Total time spent with the patient 20 minutes. 8. Labs ordered today

## 2024-05-11 NOTE — PROGRESS NOTES
Chart reviewed. Pt admitted for elective surgical procedure. CM will follow for discharge planning needs. 1000  Met with pt at bedside. Pt lives with her young children. Pt denies using any  services or DME. Pt expresses concerns about caring for herself at home. CM offered to provide personal care agency information and pt declined. Pt will have friend/family pick her up at discharge. Transition of Care ([facility]) Plan:          Pt admitted for an elective surgical procedure. Pt is independent. Please encourage ambulation. No plan of care needs identified. Anticipate pt will be medically stable for discharge within the next 24-48 hours. CM available to assist as needed. [facility] Transportation:   How is patient being transported at discharge? Family/Friend      When? Once cleared by physician     Is transport scheduled? N/A      Follow-up appointment and transportation:   PCP/Specialist?  See AVS for Appointment         Who is transporting to the follow-up appointment? Self/Family/Friend      Is transport for follow up appointment scheduled? N/A    Communication plan (with patient/family): Who is being called? Patient or Next of Kin? Responsible party? Patient      What number(s) is to be used? See Facesheet      What service provider is calling for [facility] services? When are they calling? Readmission Risk? (Green/Low; Yellow/Moderate; Red/High):  Green      Care Management Interventions  PCP Verified by CM: Yes  Mode of Transport at Discharge:  Other (see comment) (friend/family)  Transition of Care Consult (CM Consult): Discharge Planning  Current Support Network: Own Home (lives with children)  Confirm Follow Up Transport: Self  Plan discussed with Pt/Family/Caregiver: Yes  Discharge Location  Discharge Placement: Home No

## (undated) DEVICE — AIRLIFE™ NASAL OXYGEN CANNULA CURVED, FLARED TIP WITH 14 FOOT (4.3 M) CRUSH-RESISTANT TUBING, OVER-THE-EAR STYLE: Brand: AIRLIFE™

## (undated) DEVICE — MOUTHPIECE ENDOSCP 20X27MM --

## (undated) DEVICE — SET ADMIN 16ML TBNG L100IN 2 Y INJ SITE IV PIGGY BK DISP

## (undated) DEVICE — STERILE POLYISOPRENE POWDER-FREE SURGICAL GLOVES: Brand: PROTEXIS

## (undated) DEVICE — BRUSH CYTO L240CM DIA2.3MM GI COLONOSCOPY 3 RNG HNDL RADPQ

## (undated) DEVICE — MEDI-VAC NON-CONDUCTIVE SUCTION TUBING: Brand: CARDINAL HEALTH

## (undated) DEVICE — APPLIER CLP M L L11.4IN DIA10MM ENDOSCP ROT MULT FOR LIG

## (undated) DEVICE — NEEDLE INSUF L150MM DIA2MM DISP FOR PNEUMOPERI ENDOPATH

## (undated) DEVICE — MAJ-1414 SINGLE USE ADPATER BIOPSY VALV: Brand: SINGLE USE ADAPTOR BIOPSY VALVE

## (undated) DEVICE — SINGLE PORT MANIFOLD: Brand: NEPTUNE 2

## (undated) DEVICE — TRNQT TEXT 1X18IN BLU LF DISP -- CONVERT TO ITEM 362165

## (undated) DEVICE — REM POLYHESIVE ADULT PATIENT RETURN ELECTRODE: Brand: VALLEYLAB

## (undated) DEVICE — 4-PORT MANIFOLD: Brand: NEPTUNE 2

## (undated) DEVICE — KENDALL SCD EXPRESS SLEEVES, KNEE LENGTH, MEDIUM: Brand: KENDALL SCD

## (undated) DEVICE — DEVON™ KNEE AND BODY STRAP 60" X 3" (1.5 M X 7.6 CM): Brand: DEVON

## (undated) DEVICE — SLEEVE TRCR L100MM DIA5MM UNIV STBL FOR BLDELSS DIL TIP

## (undated) DEVICE — SUT MONOCRYL PLUS UD 4-0 --

## (undated) DEVICE — SUT VCRL + 2-0 27IN SH UD --

## (undated) DEVICE — SYR 50ML SLIP TIP NSAF LF STRL --

## (undated) DEVICE — SHEAR RMFG HARMONIC 5MMX45CM -- LAWSON OEM ITEM 322220

## (undated) DEVICE — 3M™ STERI-STRIP™ REINFORCED ADHESIVE SKIN CLOSURES, R1548, 1 IN X 5 IN (25 MM X 125 MM), 4 STRIPS/ENVELOPE: Brand: 3M™ STERI-STRIP™

## (undated) DEVICE — ALL PURPOSE SPONGES,NON-WOVEN, 4 PLY: Brand: CURITY

## (undated) DEVICE — GOWN,SIRUS,NONRNF,SETINSLV,XL,20/CS: Brand: MEDLINE

## (undated) DEVICE — VISUALIZATION SYSTEM: Brand: CLEARIFY

## (undated) DEVICE — KENDALL RADIOLUCENT FOAM MONITORING ELECTRODE RECTANGULAR SHAPE: Brand: KENDALL

## (undated) DEVICE — GARMENT,MEDLINE,DVT,INT,CALF,MED, GEN2: Brand: MEDLINE

## (undated) DEVICE — SUTURE PDS II SZ 0 L27IN ABSRB VLT L26MM CT-2 1/2 CIR Z334H

## (undated) DEVICE — SPONGE GZ W4XL4IN COT 12 PLY TYP VII WVN C FLD DSGN

## (undated) DEVICE — DEVICE INFL 60ML 12ATM CONVENIENT LOK REL HNDL HI PRSS FLX

## (undated) DEVICE — NDL PRT INJ NSAF BLNT 18GX1.5 --

## (undated) DEVICE — AGENT HEMSTAT W6XL9IN OXIDIZED REGENERATED CELOS ABSRB FOR

## (undated) DEVICE — ENDO CARRY-ON PROCEDURE KIT INCLUDES ENZYMATIC SPONGE, GAUZE, BIOHAZARD LABEL, TRAY, LUBRICANT, DIRTY SCOPE LABEL, WATER LABEL, TRAY, DRAWSTRING PAD, AND DEFENDO 4-PIECE KIT.: Brand: ENDO CARRY-ON PROCEDURE KIT

## (undated) DEVICE — FORCEPS BX CAP 240CM L RAD JAW 4

## (undated) DEVICE — CATH IV SAFE STR 22GX1IN BLU -- PROTECTIV PLUS

## (undated) DEVICE — ENDOCUT SCISSOR TIP, DISPOSABLE: Brand: RENEW

## (undated) DEVICE — EJECTOR SALIVA 6 IN FLX CLR

## (undated) DEVICE — SOL IRRIGATION INJ NACL 0.9% 500ML BTL

## (undated) DEVICE — TROCAR ENDOSCP L100MM DIA12MM STBL SL BLDELSS ENDOPATH XCEL

## (undated) DEVICE — TRAP SPEC COLL POLYP POLYSTYR --

## (undated) DEVICE — TROCAR LAP L100MM DIA5MM BLDELSS W/ STBL SL ENDOPATH XCEL

## (undated) DEVICE — GOWN ISOLATN REG BLU POLY UNISX W/ THMB LOOP

## (undated) DEVICE — TUBING, SUCTION, 1/4" X 12', STRAIGHT: Brand: MEDLINE

## (undated) DEVICE — PREP SKN PREVAIL 40ML APPL --

## (undated) DEVICE — Device

## (undated) DEVICE — SYR 3ML LL TIP 1/10ML GRAD --

## (undated) DEVICE — ESOPHAGEAL/COLONIC/BILIARY WIREGUIDED BALLOON DILATATION CATHETER: Brand: CRE™ PRO

## (undated) DEVICE — BARIATRIC: Brand: MEDLINE INDUSTRIES, INC.

## (undated) DEVICE — SOLUTION LACTATED RINGERS INJECTION USP

## (undated) DEVICE — LAP-BAND SYSTEM CALIBRATION TUBE: Brand: LAP-BAND